# Patient Record
Sex: FEMALE | Race: WHITE | NOT HISPANIC OR LATINO | Employment: OTHER | URBAN - METROPOLITAN AREA
[De-identification: names, ages, dates, MRNs, and addresses within clinical notes are randomized per-mention and may not be internally consistent; named-entity substitution may affect disease eponyms.]

---

## 2023-08-23 ENCOUNTER — HOSPITAL ENCOUNTER (OUTPATIENT)
Dept: DATA CONVERSION | Facility: HOSPITAL | Age: 88
Discharge: HOME | End: 2023-08-23
Payer: COMMERCIAL

## 2023-08-23 DIAGNOSIS — R73.01 IMPAIRED FASTING GLUCOSE: ICD-10-CM

## 2023-08-23 DIAGNOSIS — I63.9 CEREBRAL INFARCTION, UNSPECIFIED (MULTI): ICD-10-CM

## 2023-08-23 DIAGNOSIS — D64.9 ANEMIA, UNSPECIFIED: ICD-10-CM

## 2023-08-23 LAB
ALBUMIN SERPL-MCNC: 4 GM/DL (ref 3.5–5)
ALBUMIN/GLOB SERPL: 1.5 RATIO (ref 1.5–3)
ALP BLD-CCNC: 170 U/L (ref 35–125)
ALT SERPL-CCNC: 34 U/L (ref 5–40)
ANION GAP SERPL CALCULATED.3IONS-SCNC: 11 MMOL/L (ref 0–19)
APPEARANCE PLAS: ABNORMAL
AST SERPL-CCNC: 33 U/L (ref 5–40)
BILIRUB SERPL-MCNC: 0.4 MG/DL (ref 0.1–1.2)
BUN SERPL-MCNC: 15 MG/DL (ref 8–25)
BUN/CREAT SERPL: 25 RATIO (ref 8–21)
CALCIUM SERPL-MCNC: 9.4 MG/DL (ref 8.5–10.4)
CHLORIDE SERPL-SCNC: 101 MMOL/L (ref 97–107)
CHOLEST SERPL-MCNC: 148 MG/DL (ref 133–200)
CHOLEST/HDLC SERPL: 2 RATIO
CO2 SERPL-SCNC: 27 MMOL/L (ref 24–31)
COLOR SPUN FLD: ABNORMAL
CREAT SERPL-MCNC: 0.6 MG/DL (ref 0.4–1.6)
DEPRECATED RDW RBC AUTO: 49.8 FL (ref 37–54)
ERYTHROCYTE [DISTWIDTH] IN BLOOD BY AUTOMATED COUNT: 13.9 % (ref 11.7–15)
FASTING STATUS PATIENT QL REPORTED: ABNORMAL
FERRITIN SERPL-MCNC: 85 NG/ML (ref 13–150)
GFR SERPL CREATININE-BSD FRML MDRD: 86 ML/MIN/1.73 M2
GLOBULIN SER-MCNC: 2.7 G/DL (ref 1.9–3.7)
GLUCOSE SERPL-MCNC: 177 MG/DL (ref 65–99)
HBA1C MFR BLD: 6.5 % (ref 4–6)
HCT VFR BLD AUTO: 38.4 % (ref 36–44)
HDLC SERPL-MCNC: 73 MG/DL
HGB BLD-MCNC: 11.8 GM/DL (ref 12–15)
IRON SATN MFR SERPL: 21.6 % (ref 12–50)
IRON SERPL-MCNC: 65 UG/DL (ref 30–160)
LDLC SERPL CALC-MCNC: 55 MG/DL (ref 65–130)
MCH RBC QN AUTO: 29.9 PG (ref 26–34)
MCHC RBC AUTO-ENTMCNC: 30.7 % (ref 31–37)
MCV RBC AUTO: 97.2 FL (ref 80–100)
NRBC BLD-RTO: 0 /100 WBC
PLATELET # BLD AUTO: 271 K/UL (ref 150–450)
PMV BLD AUTO: 9.6 CU (ref 7–12.6)
POTASSIUM SERPL-SCNC: 4.4 MMOL/L (ref 3.4–5.1)
PROT SERPL-MCNC: 6.7 G/DL (ref 5.9–7.9)
RBC # BLD AUTO: 3.95 M/UL (ref 4–4.9)
SODIUM SERPL-SCNC: 139 MMOL/L (ref 133–145)
TIBC SERPL-MCNC: 301 UG/DL (ref 228–428)
TRIGL SERPL-MCNC: 98 MG/DL (ref 40–150)
VIT B12 SERPL-MCNC: 1652 PG/ML (ref 211–946)
WBC # BLD AUTO: 4.8 K/UL (ref 4.5–11)

## 2023-10-26 ENCOUNTER — APPOINTMENT (OUTPATIENT)
Dept: RADIOLOGY | Facility: HOSPITAL | Age: 88
End: 2023-10-26
Payer: COMMERCIAL

## 2023-10-26 ENCOUNTER — HOSPITAL ENCOUNTER (OUTPATIENT)
Facility: HOSPITAL | Age: 88
Setting detail: OBSERVATION
LOS: 1 days | Discharge: HOME | End: 2023-10-28
Attending: STUDENT IN AN ORGANIZED HEALTH CARE EDUCATION/TRAINING PROGRAM | Admitting: INTERNAL MEDICINE
Payer: COMMERCIAL

## 2023-10-26 DIAGNOSIS — G45.9 TIA (TRANSIENT ISCHEMIC ATTACK): Primary | ICD-10-CM

## 2023-10-26 DIAGNOSIS — I63.89 OTHER CEREBRAL INFARCTION (MULTI): ICD-10-CM

## 2023-10-26 DIAGNOSIS — G45.4 TRANSIENT GLOBAL AMNESIA: ICD-10-CM

## 2023-10-26 DIAGNOSIS — I65.23 OCCLUSION AND STENOSIS OF BILATERAL CAROTID ARTERIES: ICD-10-CM

## 2023-10-26 PROBLEM — R47.81 DEFICIT IN COMMUNICATION DUE TO SLURRED SPEECH: Status: ACTIVE | Noted: 2023-10-26

## 2023-10-26 PROBLEM — R42 DIZZINESS: Status: ACTIVE | Noted: 2023-10-26

## 2023-10-26 LAB
ALBUMIN SERPL-MCNC: 3.5 G/DL (ref 3.5–5)
ALP BLD-CCNC: 190 U/L (ref 35–125)
ALT SERPL-CCNC: 15 U/L (ref 5–40)
ANION GAP SERPL CALC-SCNC: 10 MMOL/L
AST SERPL-CCNC: 16 U/L (ref 5–40)
BASOPHILS # BLD AUTO: 0.03 X10*3/UL (ref 0–0.1)
BASOPHILS NFR BLD AUTO: 0.5 %
BILIRUB SERPL-MCNC: 0.3 MG/DL (ref 0.1–1.2)
BUN SERPL-MCNC: 19 MG/DL (ref 8–25)
CALCIUM SERPL-MCNC: 9.4 MG/DL (ref 8.5–10.4)
CHLORIDE SERPL-SCNC: 103 MMOL/L (ref 97–107)
CO2 SERPL-SCNC: 26 MMOL/L (ref 24–31)
CREAT SERPL-MCNC: 0.8 MG/DL (ref 0.4–1.6)
EOSINOPHIL # BLD AUTO: 0.13 X10*3/UL (ref 0–0.4)
EOSINOPHIL NFR BLD AUTO: 2 %
ERYTHROCYTE [DISTWIDTH] IN BLOOD BY AUTOMATED COUNT: 13.8 % (ref 11.5–14.5)
GFR SERPL CREATININE-BSD FRML MDRD: 71 ML/MIN/1.73M*2
GLUCOSE SERPL-MCNC: 93 MG/DL (ref 65–99)
HCT VFR BLD AUTO: 29.6 % (ref 36–46)
HGB BLD-MCNC: 9.7 G/DL (ref 12–16)
IMM GRANULOCYTES # BLD AUTO: 0.02 X10*3/UL (ref 0–0.5)
IMM GRANULOCYTES NFR BLD AUTO: 0.3 % (ref 0–0.9)
LYMPHOCYTES # BLD AUTO: 1.35 X10*3/UL (ref 0.8–3)
LYMPHOCYTES NFR BLD AUTO: 20.5 %
MAGNESIUM SERPL-MCNC: 2 MG/DL (ref 1.6–3.1)
MCH RBC QN AUTO: 30.6 PG (ref 26–34)
MCHC RBC AUTO-ENTMCNC: 32.8 G/DL (ref 32–36)
MCV RBC AUTO: 93 FL (ref 80–100)
MONOCYTES # BLD AUTO: 0.69 X10*3/UL (ref 0.05–0.8)
MONOCYTES NFR BLD AUTO: 10.5 %
NEUTROPHILS # BLD AUTO: 4.38 X10*3/UL (ref 1.6–5.5)
NEUTROPHILS NFR BLD AUTO: 66.2 %
NRBC BLD-RTO: 0 /100 WBCS (ref 0–0)
PLATELET # BLD AUTO: 251 X10*3/UL (ref 150–450)
PMV BLD AUTO: 9.2 FL (ref 7.5–11.5)
POTASSIUM SERPL-SCNC: 3.8 MMOL/L (ref 3.4–5.1)
PROT SERPL-MCNC: 5.7 G/DL (ref 5.9–7.9)
RBC # BLD AUTO: 3.17 X10*6/UL (ref 4–5.2)
SODIUM SERPL-SCNC: 139 MMOL/L (ref 133–145)
TROPONIN T SERPL-MCNC: 24 NG/L
TROPONIN T SERPL-MCNC: 26 NG/L
TROPONIN T SERPL-MCNC: 26 NG/L
WBC # BLD AUTO: 6.6 X10*3/UL (ref 4.4–11.3)

## 2023-10-26 PROCEDURE — 83735 ASSAY OF MAGNESIUM: CPT | Performed by: EMERGENCY MEDICINE

## 2023-10-26 PROCEDURE — 2060000001 HC INTERMEDIATE ICU ROOM DAILY

## 2023-10-26 PROCEDURE — 70450 CT HEAD/BRAIN W/O DYE: CPT | Mod: MG

## 2023-10-26 PROCEDURE — 36415 COLL VENOUS BLD VENIPUNCTURE: CPT | Performed by: EMERGENCY MEDICINE

## 2023-10-26 PROCEDURE — 93010 ELECTROCARDIOGRAM REPORT: CPT | Performed by: INTERNAL MEDICINE

## 2023-10-26 PROCEDURE — 85025 COMPLETE CBC W/AUTO DIFF WBC: CPT | Performed by: EMERGENCY MEDICINE

## 2023-10-26 PROCEDURE — 84484 ASSAY OF TROPONIN QUANT: CPT | Performed by: EMERGENCY MEDICINE

## 2023-10-26 PROCEDURE — 99285 EMERGENCY DEPT VISIT HI MDM: CPT | Mod: 25 | Performed by: STUDENT IN AN ORGANIZED HEALTH CARE EDUCATION/TRAINING PROGRAM

## 2023-10-26 PROCEDURE — 81003 URINALYSIS AUTO W/O SCOPE: CPT | Performed by: EMERGENCY MEDICINE

## 2023-10-26 PROCEDURE — 80053 COMPREHEN METABOLIC PANEL: CPT | Performed by: EMERGENCY MEDICINE

## 2023-10-26 ASSESSMENT — LIFESTYLE VARIABLES
EVER HAD A DRINK FIRST THING IN THE MORNING TO STEADY YOUR NERVES TO GET RID OF A HANGOVER: NO
HAVE YOU EVER FELT YOU SHOULD CUT DOWN ON YOUR DRINKING: NO
REASON UNABLE TO ASSESS: NO
HAVE PEOPLE ANNOYED YOU BY CRITICIZING YOUR DRINKING: NO
EVER FELT BAD OR GUILTY ABOUT YOUR DRINKING: NO

## 2023-10-26 ASSESSMENT — PAIN DESCRIPTION - DESCRIPTORS: DESCRIPTORS: DULL

## 2023-10-26 ASSESSMENT — PAIN SCALES - GENERAL: PAINLEVEL_OUTOF10: 7

## 2023-10-26 ASSESSMENT — PAIN DESCRIPTION - ORIENTATION: ORIENTATION: LEFT;RIGHT

## 2023-10-26 ASSESSMENT — PAIN - FUNCTIONAL ASSESSMENT: PAIN_FUNCTIONAL_ASSESSMENT: 0-10

## 2023-10-26 ASSESSMENT — PAIN DESCRIPTION - LOCATION: LOCATION: HAND

## 2023-10-26 ASSESSMENT — PAIN DESCRIPTION - PAIN TYPE: TYPE: ACUTE PAIN

## 2023-10-27 ENCOUNTER — APPOINTMENT (OUTPATIENT)
Dept: CARDIOLOGY | Facility: HOSPITAL | Age: 88
End: 2023-10-27
Payer: COMMERCIAL

## 2023-10-27 ENCOUNTER — APPOINTMENT (OUTPATIENT)
Dept: RADIOLOGY | Facility: HOSPITAL | Age: 88
End: 2023-10-27
Payer: COMMERCIAL

## 2023-10-27 ENCOUNTER — APPOINTMENT (OUTPATIENT)
Dept: NEUROLOGY | Facility: HOSPITAL | Age: 88
End: 2023-10-27
Payer: COMMERCIAL

## 2023-10-27 PROBLEM — R47.81 SLURRED SPEECH: Status: ACTIVE | Noted: 2023-10-27

## 2023-10-27 LAB
ANION GAP SERPL CALC-SCNC: 9 MMOL/L
APPEARANCE UR: CLEAR
ATRIAL RATE: 67 BPM
BASOPHILS # BLD AUTO: 0.01 X10*3/UL (ref 0–0.1)
BASOPHILS NFR BLD AUTO: 0.2 %
BILIRUB UR STRIP.AUTO-MCNC: NEGATIVE MG/DL
BUN SERPL-MCNC: 16 MG/DL (ref 8–25)
CALCIUM SERPL-MCNC: 9.1 MG/DL (ref 8.5–10.4)
CHLORIDE SERPL-SCNC: 107 MMOL/L (ref 97–107)
CHOLEST SERPL-MCNC: 106 MG/DL (ref 133–200)
CHOLEST/HDLC SERPL: 1.7 {RATIO}
CO2 SERPL-SCNC: 29 MMOL/L (ref 24–31)
COLOR UR: COLORLESS
CREAT SERPL-MCNC: 0.7 MG/DL (ref 0.4–1.6)
EJECTION FRACTION APICAL 4 CHAMBER: 65.9
EOSINOPHIL # BLD AUTO: 0.1 X10*3/UL (ref 0–0.4)
EOSINOPHIL NFR BLD AUTO: 1.9 %
ERYTHROCYTE [DISTWIDTH] IN BLOOD BY AUTOMATED COUNT: 14 % (ref 11.5–14.5)
EST. AVERAGE GLUCOSE BLD GHB EST-MCNC: 120 MG/DL
GFR SERPL CREATININE-BSD FRML MDRD: 83 ML/MIN/1.73M*2
GLUCOSE BLD MANUAL STRIP-MCNC: 168 MG/DL (ref 74–99)
GLUCOSE BLD MANUAL STRIP-MCNC: 78 MG/DL (ref 74–99)
GLUCOSE SERPL-MCNC: 93 MG/DL (ref 65–99)
GLUCOSE UR STRIP.AUTO-MCNC: NORMAL MG/DL
HBA1C MFR BLD: 5.8 %
HCT VFR BLD AUTO: 31.6 % (ref 36–46)
HDLC SERPL-MCNC: 61 MG/DL
HGB BLD-MCNC: 9.9 G/DL (ref 12–16)
IMM GRANULOCYTES # BLD AUTO: 0.01 X10*3/UL (ref 0–0.5)
IMM GRANULOCYTES NFR BLD AUTO: 0.2 % (ref 0–0.9)
KETONES UR STRIP.AUTO-MCNC: NEGATIVE MG/DL
LDLC SERPL CALC-MCNC: 31 MG/DL (ref 65–130)
LEUKOCYTE ESTERASE UR QL STRIP.AUTO: NEGATIVE
LYMPHOCYTES # BLD AUTO: 1.23 X10*3/UL (ref 0.8–3)
LYMPHOCYTES NFR BLD AUTO: 23.3 %
MCH RBC QN AUTO: 29.5 PG (ref 26–34)
MCHC RBC AUTO-ENTMCNC: 31.3 G/DL (ref 32–36)
MCV RBC AUTO: 94 FL (ref 80–100)
MONOCYTES # BLD AUTO: 0.55 X10*3/UL (ref 0.05–0.8)
MONOCYTES NFR BLD AUTO: 10.4 %
NEUTROPHILS # BLD AUTO: 3.39 X10*3/UL (ref 1.6–5.5)
NEUTROPHILS NFR BLD AUTO: 64 %
NITRITE UR QL STRIP.AUTO: NEGATIVE
NRBC BLD-RTO: 0 /100 WBCS (ref 0–0)
NT-PROBNP SERPL-MCNC: 540 PG/ML (ref 0–624)
P AXIS: 65 DEGREES
P OFFSET: 209 MS
P ONSET: 139 MS
PH UR STRIP.AUTO: 5.5 [PH]
PLATELET # BLD AUTO: 247 X10*3/UL (ref 150–450)
PMV BLD AUTO: 9.4 FL (ref 7.5–11.5)
POTASSIUM SERPL-SCNC: 4 MMOL/L (ref 3.4–5.1)
PR INTERVAL: 162 MS
PROT UR STRIP.AUTO-MCNC: NEGATIVE MG/DL
Q ONSET: 220 MS
QRS COUNT: 11 BEATS
QRS DURATION: 92 MS
QT INTERVAL: 422 MS
QTC CALCULATION(BAZETT): 445 MS
QTC FREDERICIA: 438 MS
R AXIS: 44 DEGREES
RBC # BLD AUTO: 3.36 X10*6/UL (ref 4–5.2)
RBC # UR STRIP.AUTO: NEGATIVE /UL
SODIUM SERPL-SCNC: 145 MMOL/L (ref 133–145)
SP GR UR STRIP.AUTO: 1.01
T AXIS: 63 DEGREES
T OFFSET: 431 MS
TRIGL SERPL-MCNC: 71 MG/DL (ref 40–150)
TROPONIN T SERPL-MCNC: 27 NG/L
UROBILINOGEN UR STRIP.AUTO-MCNC: NORMAL MG/DL
VENTRICULAR RATE: 67 BPM
WBC # BLD AUTO: 5.3 X10*3/UL (ref 4.4–11.3)

## 2023-10-27 PROCEDURE — 97530 THERAPEUTIC ACTIVITIES: CPT | Mod: GP

## 2023-10-27 PROCEDURE — G0378 HOSPITAL OBSERVATION PER HR: HCPCS

## 2023-10-27 PROCEDURE — 97166 OT EVAL MOD COMPLEX 45 MIN: CPT | Mod: GO

## 2023-10-27 PROCEDURE — 85025 COMPLETE CBC W/AUTO DIFF WBC: CPT | Performed by: INTERNAL MEDICINE

## 2023-10-27 PROCEDURE — 93005 ELECTROCARDIOGRAM TRACING: CPT

## 2023-10-27 PROCEDURE — 82947 ASSAY GLUCOSE BLOOD QUANT: CPT | Mod: 59

## 2023-10-27 PROCEDURE — 97535 SELF CARE MNGMENT TRAINING: CPT | Mod: GO

## 2023-10-27 PROCEDURE — 93880 EXTRACRANIAL BILAT STUDY: CPT | Performed by: SURGERY

## 2023-10-27 PROCEDURE — 83036 HEMOGLOBIN GLYCOSYLATED A1C: CPT | Performed by: INTERNAL MEDICINE

## 2023-10-27 PROCEDURE — 70551 MRI BRAIN STEM W/O DYE: CPT | Mod: MG

## 2023-10-27 PROCEDURE — 83880 ASSAY OF NATRIURETIC PEPTIDE: CPT | Performed by: INTERNAL MEDICINE

## 2023-10-27 PROCEDURE — 99223 1ST HOSP IP/OBS HIGH 75: CPT | Performed by: INTERNAL MEDICINE

## 2023-10-27 PROCEDURE — 84484 ASSAY OF TROPONIN QUANT: CPT | Performed by: INTERNAL MEDICINE

## 2023-10-27 PROCEDURE — 93325 DOPPLER ECHO COLOR FLOW MAPG: CPT | Performed by: INTERNAL MEDICINE

## 2023-10-27 PROCEDURE — 80061 LIPID PANEL: CPT | Performed by: INTERNAL MEDICINE

## 2023-10-27 PROCEDURE — 70544 MR ANGIOGRAPHY HEAD W/O DYE: CPT | Mod: 59,MG

## 2023-10-27 PROCEDURE — 2500000001 HC RX 250 WO HCPCS SELF ADMINISTERED DRUGS (ALT 637 FOR MEDICARE OP): Performed by: INTERNAL MEDICINE

## 2023-10-27 PROCEDURE — 92610 EVALUATE SWALLOWING FUNCTION: CPT | Mod: GN | Performed by: SPEECH-LANGUAGE PATHOLOGIST

## 2023-10-27 PROCEDURE — 70547 MR ANGIOGRAPHY NECK W/O DYE: CPT | Mod: MG

## 2023-10-27 PROCEDURE — 94760 N-INVAS EAR/PLS OXIMETRY 1: CPT

## 2023-10-27 PROCEDURE — 36415 COLL VENOUS BLD VENIPUNCTURE: CPT | Performed by: INTERNAL MEDICINE

## 2023-10-27 PROCEDURE — 93325 DOPPLER ECHO COLOR FLOW MAPG: CPT

## 2023-10-27 PROCEDURE — 93880 EXTRACRANIAL BILAT STUDY: CPT

## 2023-10-27 PROCEDURE — 80048 BASIC METABOLIC PNL TOTAL CA: CPT | Performed by: INTERNAL MEDICINE

## 2023-10-27 PROCEDURE — 93308 TTE F-UP OR LMTD: CPT | Performed by: INTERNAL MEDICINE

## 2023-10-27 PROCEDURE — 93321 DOPPLER ECHO F-UP/LMTD STD: CPT | Performed by: INTERNAL MEDICINE

## 2023-10-27 PROCEDURE — 95819 EEG AWAKE AND ASLEEP: CPT

## 2023-10-27 PROCEDURE — 2500000004 HC RX 250 GENERAL PHARMACY W/ HCPCS (ALT 636 FOR OP/ED): Performed by: INTERNAL MEDICINE

## 2023-10-27 PROCEDURE — 97161 PT EVAL LOW COMPLEX 20 MIN: CPT | Mod: GP

## 2023-10-27 RX ORDER — ACETAMINOPHEN 325 MG/1
650 TABLET ORAL EVERY 6 HOURS PRN
Status: DISCONTINUED | OUTPATIENT
Start: 2023-10-27 | End: 2023-10-28 | Stop reason: HOSPADM

## 2023-10-27 RX ORDER — GABAPENTIN 300 MG/1
300 CAPSULE ORAL 2 TIMES DAILY
COMMUNITY
End: 2024-03-29 | Stop reason: ALTCHOICE

## 2023-10-27 RX ORDER — CLOPIDOGREL BISULFATE 75 MG/1
75 TABLET ORAL DAILY
COMMUNITY
End: 2023-10-28 | Stop reason: HOSPADM

## 2023-10-27 RX ORDER — CALCITONIN SALMON 200 [IU]/.09ML
1 SPRAY, METERED NASAL DAILY
COMMUNITY
End: 2023-12-04

## 2023-10-27 RX ORDER — PANTOPRAZOLE SODIUM 40 MG/1
40 TABLET, DELAYED RELEASE ORAL 2 TIMES DAILY
Status: DISCONTINUED | OUTPATIENT
Start: 2023-10-27 | End: 2023-10-28 | Stop reason: HOSPADM

## 2023-10-27 RX ORDER — CLOPIDOGREL BISULFATE 75 MG/1
75 TABLET ORAL DAILY
Status: DISCONTINUED | OUTPATIENT
Start: 2023-10-27 | End: 2023-10-28 | Stop reason: HOSPADM

## 2023-10-27 RX ORDER — ASPIRIN 81 MG/1
81 TABLET ORAL DAILY
Status: DISCONTINUED | OUTPATIENT
Start: 2023-10-27 | End: 2023-10-28 | Stop reason: HOSPADM

## 2023-10-27 RX ORDER — POTASSIUM CHLORIDE 750 MG/1
10 TABLET, FILM COATED, EXTENDED RELEASE ORAL DAILY
COMMUNITY
End: 2023-11-26 | Stop reason: SDUPTHER

## 2023-10-27 RX ORDER — ATORVASTATIN CALCIUM 40 MG/1
40 TABLET, FILM COATED ORAL NIGHTLY
Status: DISCONTINUED | OUTPATIENT
Start: 2023-10-27 | End: 2023-10-27

## 2023-10-27 RX ORDER — HYDRALAZINE HYDROCHLORIDE 20 MG/ML
10 INJECTION INTRAMUSCULAR; INTRAVENOUS
Status: DISCONTINUED | OUTPATIENT
Start: 2023-10-27 | End: 2023-10-28 | Stop reason: HOSPADM

## 2023-10-27 RX ORDER — SULFASALAZINE 500 MG/1
500 TABLET ORAL 2 TIMES DAILY
COMMUNITY
Start: 2022-01-02 | End: 2024-03-05 | Stop reason: ALTCHOICE

## 2023-10-27 RX ORDER — ATORVASTATIN CALCIUM 80 MG/1
80 TABLET, FILM COATED ORAL NIGHTLY
Status: DISCONTINUED | OUTPATIENT
Start: 2023-10-27 | End: 2023-10-28 | Stop reason: HOSPADM

## 2023-10-27 RX ORDER — PANTOPRAZOLE SODIUM 40 MG/1
40 TABLET, DELAYED RELEASE ORAL 2 TIMES DAILY
COMMUNITY
End: 2024-03-05 | Stop reason: ALTCHOICE

## 2023-10-27 RX ORDER — VERAPAMIL HYDROCHLORIDE 180 MG/1
180 TABLET, FILM COATED, EXTENDED RELEASE ORAL DAILY
Status: DISCONTINUED | OUTPATIENT
Start: 2023-10-27 | End: 2023-10-28 | Stop reason: HOSPADM

## 2023-10-27 RX ORDER — ISOSORBIDE MONONITRATE 60 MG/1
60 TABLET, EXTENDED RELEASE ORAL DAILY
Status: DISCONTINUED | OUTPATIENT
Start: 2023-10-27 | End: 2023-10-28 | Stop reason: HOSPADM

## 2023-10-27 RX ORDER — HEPARIN SODIUM 5000 [USP'U]/ML
5000 INJECTION, SOLUTION INTRAVENOUS; SUBCUTANEOUS EVERY 8 HOURS SCHEDULED
Status: DISCONTINUED | OUTPATIENT
Start: 2023-10-27 | End: 2023-10-27

## 2023-10-27 RX ORDER — HYDRALAZINE HYDROCHLORIDE 25 MG/1
25 TABLET, FILM COATED ORAL EVERY 6 HOURS PRN
Status: DISCONTINUED | OUTPATIENT
Start: 2023-10-29 | End: 2023-10-28 | Stop reason: HOSPADM

## 2023-10-27 RX ORDER — ACETAMINOPHEN 500 MG
600 TABLET ORAL EVERY 8 HOURS PRN
COMMUNITY
End: 2023-10-28 | Stop reason: HOSPADM

## 2023-10-27 RX ORDER — HYDROXYCHLOROQUINE SULFATE 200 MG/1
200 TABLET, FILM COATED ORAL DAILY
Status: DISCONTINUED | OUTPATIENT
Start: 2023-10-27 | End: 2023-10-28 | Stop reason: HOSPADM

## 2023-10-27 RX ORDER — POLYETHYLENE GLYCOL 3350 17 G/17G
17 POWDER, FOR SOLUTION ORAL DAILY PRN
Status: DISCONTINUED | OUTPATIENT
Start: 2023-10-27 | End: 2023-10-28 | Stop reason: HOSPADM

## 2023-10-27 RX ORDER — LABETALOL HYDROCHLORIDE 5 MG/ML
10 INJECTION, SOLUTION INTRAVENOUS EVERY 10 MIN PRN
Status: DISCONTINUED | OUTPATIENT
Start: 2023-10-27 | End: 2023-10-28 | Stop reason: HOSPADM

## 2023-10-27 RX ORDER — CLONIDINE HYDROCHLORIDE 0.2 MG/1
0.2 TABLET ORAL 2 TIMES DAILY
COMMUNITY
Start: 2019-03-18 | End: 2023-10-28 | Stop reason: HOSPADM

## 2023-10-27 RX ORDER — FERROUS SULFATE 325(65) MG
65 TABLET ORAL
Status: DISCONTINUED | OUTPATIENT
Start: 2023-10-27 | End: 2023-10-28 | Stop reason: HOSPADM

## 2023-10-27 RX ORDER — SULFASALAZINE 500 MG/1
500 TABLET ORAL
Status: DISCONTINUED | OUTPATIENT
Start: 2023-10-27 | End: 2023-10-28 | Stop reason: HOSPADM

## 2023-10-27 RX ORDER — CARVEDILOL 6.25 MG/1
6.25 TABLET ORAL
Status: DISCONTINUED | OUTPATIENT
Start: 2023-10-27 | End: 2023-10-28 | Stop reason: HOSPADM

## 2023-10-27 RX ORDER — POTASSIUM CHLORIDE 750 MG/1
10 TABLET, FILM COATED, EXTENDED RELEASE ORAL
Status: DISCONTINUED | OUTPATIENT
Start: 2023-10-27 | End: 2023-10-28 | Stop reason: HOSPADM

## 2023-10-27 RX ORDER — ASPIRIN 81 MG/1
81 TABLET ORAL DAILY
Status: DISCONTINUED | OUTPATIENT
Start: 2023-10-27 | End: 2023-10-27

## 2023-10-27 RX ADMIN — FERROUS SULFATE TAB 325 MG (65 MG ELEMENTAL FE) 65 MG OF IRON: 325 (65 FE) TAB at 10:53

## 2023-10-27 RX ADMIN — SULFASALAZINE 500 MG: 500 TABLET ORAL at 10:00

## 2023-10-27 RX ADMIN — ATORVASTATIN CALCIUM 40 MG: 40 TABLET, FILM COATED ORAL at 00:35

## 2023-10-27 RX ADMIN — ASPIRIN 81 MG: 81 TABLET, COATED ORAL at 10:50

## 2023-10-27 RX ADMIN — PANTOPRAZOLE SODIUM 40 MG: 40 TABLET, DELAYED RELEASE ORAL at 10:50

## 2023-10-27 RX ADMIN — CLOPIDOGREL BISULFATE 75 MG: 75 TABLET ORAL at 10:50

## 2023-10-27 RX ADMIN — HYDROXYCHLOROQUINE SULFATE 200 MG: 200 TABLET ORAL at 10:50

## 2023-10-27 RX ADMIN — CARVEDILOL 6.25 MG: 6.25 TABLET, FILM COATED ORAL at 18:25

## 2023-10-27 RX ADMIN — APIXABAN 2.5 MG: 2.5 TABLET, FILM COATED ORAL at 10:51

## 2023-10-27 RX ADMIN — APIXABAN 2.5 MG: 2.5 TABLET, FILM COATED ORAL at 21:45

## 2023-10-27 RX ADMIN — PANTOPRAZOLE SODIUM 40 MG: 40 TABLET, DELAYED RELEASE ORAL at 21:45

## 2023-10-27 RX ADMIN — ATORVASTATIN CALCIUM 80 MG: 80 TABLET, FILM COATED ORAL at 21:45

## 2023-10-27 RX ADMIN — CARVEDILOL 6.25 MG: 6.25 TABLET, FILM COATED ORAL at 10:50

## 2023-10-27 RX ADMIN — ACETAMINOPHEN 650 MG: 325 TABLET ORAL at 12:49

## 2023-10-27 RX ADMIN — HEPARIN SODIUM 5000 UNITS: 5000 INJECTION, SOLUTION INTRAVENOUS; SUBCUTANEOUS at 00:35

## 2023-10-27 RX ADMIN — SULFASALAZINE 500 MG: 500 TABLET ORAL at 18:00

## 2023-10-27 RX ADMIN — POTASSIUM CHLORIDE 10 MEQ: 750 TABLET, EXTENDED RELEASE ORAL at 10:50

## 2023-10-27 SDOH — HEALTH STABILITY: MENTAL HEALTH: HOW OFTEN DO YOU HAVE 6 OR MORE DRINKS ON ONE OCCASION?: NEVER

## 2023-10-27 SDOH — HEALTH STABILITY: PHYSICAL HEALTH: ON AVERAGE, HOW MANY MINUTES DO YOU ENGAGE IN EXERCISE AT THIS LEVEL?: 0 MIN

## 2023-10-27 SDOH — SOCIAL STABILITY: SOCIAL NETWORK: HOW OFTEN DO YOU GET TOGETHER WITH FRIENDS OR RELATIVES?: TWICE A WEEK

## 2023-10-27 SDOH — SOCIAL STABILITY: SOCIAL INSECURITY
WITHIN THE LAST YEAR, HAVE TO BEEN RAPED OR FORCED TO HAVE ANY KIND OF SEXUAL ACTIVITY BY YOUR PARTNER OR EX-PARTNER?: NO

## 2023-10-27 SDOH — ECONOMIC STABILITY: INCOME INSECURITY: IN THE PAST 12 MONTHS, HAS THE ELECTRIC, GAS, OIL, OR WATER COMPANY THREATENED TO SHUT OFF SERVICE IN YOUR HOME?: NO

## 2023-10-27 SDOH — HEALTH STABILITY: MENTAL HEALTH
STRESS IS WHEN SOMEONE FEELS TENSE, NERVOUS, ANXIOUS, OR CAN'T SLEEP AT NIGHT BECAUSE THEIR MIND IS TROUBLED. HOW STRESSED ARE YOU?: NOT AT ALL

## 2023-10-27 SDOH — SOCIAL STABILITY: SOCIAL NETWORK: ARE YOU MARRIED, WIDOWED, DIVORCED, SEPARATED, NEVER MARRIED, OR LIVING WITH A PARTNER?: MARRIED

## 2023-10-27 SDOH — HEALTH STABILITY: MENTAL HEALTH: HOW OFTEN DO YOU HAVE A DRINK CONTAINING ALCOHOL?: 2-4 TIMES A MONTH

## 2023-10-27 SDOH — SOCIAL STABILITY: SOCIAL INSECURITY: DOES ANYONE TRY TO KEEP YOU FROM HAVING/CONTACTING OTHER FRIENDS OR DOING THINGS OUTSIDE YOUR HOME?: NO

## 2023-10-27 SDOH — SOCIAL STABILITY: SOCIAL INSECURITY
WITHIN THE LAST YEAR, HAVE YOU BEEN KICKED, HIT, SLAPPED, OR OTHERWISE PHYSICALLY HURT BY YOUR PARTNER OR EX-PARTNER?: NO

## 2023-10-27 SDOH — ECONOMIC STABILITY: FOOD INSECURITY: WITHIN THE PAST 12 MONTHS, THE FOOD YOU BOUGHT JUST DIDN'T LAST AND YOU DIDN'T HAVE MONEY TO GET MORE.: NEVER TRUE

## 2023-10-27 SDOH — SOCIAL STABILITY: SOCIAL NETWORK
DO YOU BELONG TO ANY CLUBS OR ORGANIZATIONS SUCH AS CHURCH GROUPS UNIONS, FRATERNAL OR ATHLETIC GROUPS, OR SCHOOL GROUPS?: NO

## 2023-10-27 SDOH — HEALTH STABILITY: MENTAL HEALTH: HOW MANY STANDARD DRINKS CONTAINING ALCOHOL DO YOU HAVE ON A TYPICAL DAY?: 1 OR 2

## 2023-10-27 SDOH — HEALTH STABILITY: PHYSICAL HEALTH: ON AVERAGE, HOW MANY DAYS PER WEEK DO YOU ENGAGE IN MODERATE TO STRENUOUS EXERCISE (LIKE A BRISK WALK)?: 0 DAYS

## 2023-10-27 SDOH — ECONOMIC STABILITY: HOUSING INSECURITY: IN THE LAST 12 MONTHS, HOW MANY PLACES HAVE YOU LIVED?: 1

## 2023-10-27 SDOH — ECONOMIC STABILITY: HOUSING INSECURITY
IN THE LAST 12 MONTHS, WAS THERE A TIME WHEN YOU DID NOT HAVE A STEADY PLACE TO SLEEP OR SLEPT IN A SHELTER (INCLUDING NOW)?: NO

## 2023-10-27 SDOH — SOCIAL STABILITY: SOCIAL NETWORK: IN A TYPICAL WEEK, HOW MANY TIMES DO YOU TALK ON THE PHONE WITH FAMILY, FRIENDS, OR NEIGHBORS?: THREE TIMES A WEEK

## 2023-10-27 SDOH — ECONOMIC STABILITY: FOOD INSECURITY: WITHIN THE PAST 12 MONTHS, YOU WORRIED THAT YOUR FOOD WOULD RUN OUT BEFORE YOU GOT MONEY TO BUY MORE.: NEVER TRUE

## 2023-10-27 SDOH — SOCIAL STABILITY: SOCIAL NETWORK: HOW OFTEN DO YOU ATTEND CHURCH OR RELIGIOUS SERVICES?: NEVER

## 2023-10-27 SDOH — SOCIAL STABILITY: SOCIAL INSECURITY: HAS ANYONE EVER THREATENED TO HURT YOUR FAMILY OR YOUR PETS?: NO

## 2023-10-27 SDOH — SOCIAL STABILITY: SOCIAL INSECURITY: WITHIN THE LAST YEAR, HAVE YOU BEEN AFRAID OF YOUR PARTNER OR EX-PARTNER?: NO

## 2023-10-27 SDOH — SOCIAL STABILITY: SOCIAL INSECURITY: WITHIN THE LAST YEAR, HAVE YOU BEEN HUMILIATED OR EMOTIONALLY ABUSED IN OTHER WAYS BY YOUR PARTNER OR EX-PARTNER?: NO

## 2023-10-27 SDOH — SOCIAL STABILITY: SOCIAL INSECURITY: DO YOU FEEL ANYONE HAS EXPLOITED OR TAKEN ADVANTAGE OF YOU FINANCIALLY OR OF YOUR PERSONAL PROPERTY?: NO

## 2023-10-27 SDOH — SOCIAL STABILITY: SOCIAL NETWORK: HOW OFTEN DO YOU ATTENT MEETINGS OF THE CLUB OR ORGANIZATION YOU BELONG TO?: NEVER

## 2023-10-27 SDOH — SOCIAL STABILITY: SOCIAL INSECURITY: HAVE YOU HAD THOUGHTS OF HARMING ANYONE ELSE?: NO

## 2023-10-27 SDOH — ECONOMIC STABILITY: INCOME INSECURITY: IN THE LAST 12 MONTHS, WAS THERE A TIME WHEN YOU WERE NOT ABLE TO PAY THE MORTGAGE OR RENT ON TIME?: NO

## 2023-10-27 SDOH — ECONOMIC STABILITY: INCOME INSECURITY: HOW HARD IS IT FOR YOU TO PAY FOR THE VERY BASICS LIKE FOOD, HOUSING, MEDICAL CARE, AND HEATING?: NOT HARD AT ALL

## 2023-10-27 SDOH — SOCIAL STABILITY: SOCIAL INSECURITY: WERE YOU ABLE TO COMPLETE ALL THE BEHAVIORAL HEALTH SCREENINGS?: YES

## 2023-10-27 SDOH — SOCIAL STABILITY: SOCIAL INSECURITY: ARE THERE ANY APPARENT SIGNS OF INJURIES/BEHAVIORS THAT COULD BE RELATED TO ABUSE/NEGLECT?: NO

## 2023-10-27 SDOH — SOCIAL STABILITY: SOCIAL INSECURITY: DO YOU FEEL UNSAFE GOING BACK TO THE PLACE WHERE YOU ARE LIVING?: NO

## 2023-10-27 SDOH — ECONOMIC STABILITY: TRANSPORTATION INSECURITY
IN THE PAST 12 MONTHS, HAS THE LACK OF TRANSPORTATION KEPT YOU FROM MEDICAL APPOINTMENTS OR FROM GETTING MEDICATIONS?: NO

## 2023-10-27 SDOH — SOCIAL STABILITY: SOCIAL INSECURITY: ARE YOU OR HAVE YOU BEEN THREATENED OR ABUSED PHYSICALLY, EMOTIONALLY, OR SEXUALLY BY ANYONE?: NO

## 2023-10-27 SDOH — SOCIAL STABILITY: SOCIAL INSECURITY: ABUSE: ADULT

## 2023-10-27 SDOH — ECONOMIC STABILITY: TRANSPORTATION INSECURITY
IN THE PAST 12 MONTHS, HAS LACK OF TRANSPORTATION KEPT YOU FROM MEETINGS, WORK, OR FROM GETTING THINGS NEEDED FOR DAILY LIVING?: NO

## 2023-10-27 ASSESSMENT — LIFESTYLE VARIABLES
SKIP TO QUESTIONS 9-10: 1
SUBSTANCE_ABUSE_PAST_12_MONTHS: NO
AUDIT-C TOTAL SCORE: 2
AUDIT-C TOTAL SCORE: 2
SKIP TO QUESTIONS 9-10: 1
HOW MANY STANDARD DRINKS CONTAINING ALCOHOL DO YOU HAVE ON A TYPICAL DAY: 1 OR 2
AUDIT-C TOTAL SCORE: 2
AUDIT-C TOTAL SCORE: 2
HOW OFTEN DO YOU HAVE 6 OR MORE DRINKS ON ONE OCCASION: NEVER
PRESCIPTION_ABUSE_PAST_12_MONTHS: NO
HOW OFTEN DO YOU HAVE A DRINK CONTAINING ALCOHOL: 2-4 TIMES A MONTH
SKIP TO QUESTIONS 9-10: 1

## 2023-10-27 ASSESSMENT — PAIN - FUNCTIONAL ASSESSMENT
PAIN_FUNCTIONAL_ASSESSMENT: 0-10

## 2023-10-27 ASSESSMENT — COGNITIVE AND FUNCTIONAL STATUS - GENERAL
MOVING TO AND FROM BED TO CHAIR: A LITTLE
DRESSING REGULAR LOWER BODY CLOTHING: A LITTLE
DRESSING REGULAR UPPER BODY CLOTHING: A LITTLE
MOBILITY SCORE: 20
DAILY ACTIVITIY SCORE: 21
STANDING UP FROM CHAIR USING ARMS: A LITTLE
STANDING UP FROM CHAIR USING ARMS: A LITTLE
DRESSING REGULAR LOWER BODY CLOTHING: A LITTLE
HELP NEEDED FOR BATHING: A LITTLE
CLIMB 3 TO 5 STEPS WITH RAILING: A LITTLE
MOVING TO AND FROM BED TO CHAIR: A LITTLE
MOBILITY SCORE: 21
HELP NEEDED FOR BATHING: A LITTLE
WALKING IN HOSPITAL ROOM: A LITTLE
DRESSING REGULAR UPPER BODY CLOTHING: A LITTLE
STANDING UP FROM CHAIR USING ARMS: A LITTLE
WALKING IN HOSPITAL ROOM: A LITTLE
HELP NEEDED FOR BATHING: A LITTLE
DAILY ACTIVITIY SCORE: 21
WALKING IN HOSPITAL ROOM: A LITTLE
CLIMB 3 TO 5 STEPS WITH RAILING: A LITTLE
TOILETING: A LITTLE
DAILY ACTIVITIY SCORE: 21
CLIMB 3 TO 5 STEPS WITH RAILING: A LITTLE
CLIMB 3 TO 5 STEPS WITH RAILING: A LITTLE
TOILETING: A LITTLE
MOBILITY SCORE: 20
DRESSING REGULAR LOWER BODY CLOTHING: A LITTLE
MOVING TO AND FROM BED TO CHAIR: A LITTLE
DRESSING REGULAR LOWER BODY CLOTHING: A LITTLE
MOBILITY SCORE: 20
STANDING UP FROM CHAIR USING ARMS: A LITTLE
DAILY ACTIVITIY SCORE: 21
WALKING IN HOSPITAL ROOM: A LITTLE
PATIENT BASELINE BEDBOUND: NO
HELP NEEDED FOR BATHING: A LITTLE

## 2023-10-27 ASSESSMENT — COLUMBIA-SUICIDE SEVERITY RATING SCALE - C-SSRS
6. HAVE YOU EVER DONE ANYTHING, STARTED TO DO ANYTHING, OR PREPARED TO DO ANYTHING TO END YOUR LIFE?: YES
2. HAVE YOU ACTUALLY HAD ANY THOUGHTS OF KILLING YOURSELF?: NO
6. HAVE YOU EVER DONE ANYTHING, STARTED TO DO ANYTHING, OR PREPARED TO DO ANYTHING TO END YOUR LIFE?: NO
1. IN THE PAST MONTH, HAVE YOU WISHED YOU WERE DEAD OR WISHED YOU COULD GO TO SLEEP AND NOT WAKE UP?: NO

## 2023-10-27 ASSESSMENT — ACTIVITIES OF DAILY LIVING (ADL)
LACK_OF_TRANSPORTATION: NO
ADEQUATE_TO_COMPLETE_ADL: YES
TOILETING: INDEPENDENT
JUDGMENT_ADEQUATE_SAFELY_COMPLETE_DAILY_ACTIVITIES: YES
HEARING - RIGHT EAR: HEARING AID
FEEDING YOURSELF: INDEPENDENT
PATIENT'S MEMORY ADEQUATE TO SAFELY COMPLETE DAILY ACTIVITIES?: YES
WALKS IN HOME: INDEPENDENT
BATHING: NEEDS ASSISTANCE
LACK_OF_TRANSPORTATION: NO
HEARING - LEFT EAR: HEARING AID
GROOMING: NEEDS ASSISTANCE
DRESSING YOURSELF: NEEDS ASSISTANCE
BATHING_ASSISTANCE: MINIMAL
HOME_MANAGEMENT_TIME_ENTRY: 15

## 2023-10-27 ASSESSMENT — PAIN SCALES - GENERAL
PAINLEVEL_OUTOF10: 10 - WORST POSSIBLE PAIN
PAINLEVEL_OUTOF10: 5 - MODERATE PAIN
PAINLEVEL_OUTOF10: 0 - NO PAIN
PAINLEVEL_OUTOF10: 0 - NO PAIN
PAINLEVEL_OUTOF10: 8
PAINLEVEL_OUTOF10: 0 - NO PAIN
PAINLEVEL_OUTOF10: 3
PAINLEVEL_OUTOF10: 8

## 2023-10-27 ASSESSMENT — PATIENT HEALTH QUESTIONNAIRE - PHQ9
2. FEELING DOWN, DEPRESSED OR HOPELESS: NOT AT ALL
1. LITTLE INTEREST OR PLEASURE IN DOING THINGS: NOT AT ALL
SUM OF ALL RESPONSES TO PHQ9 QUESTIONS 1 & 2: 0

## 2023-10-27 ASSESSMENT — ENCOUNTER SYMPTOMS
SPEECH DIFFICULTY: 1
DIZZINESS: 1

## 2023-10-27 ASSESSMENT — PAIN DESCRIPTION - DESCRIPTORS: DESCRIPTORS: ACHING

## 2023-10-27 NOTE — NURSING NOTE
0706- received report from off going nurse. Pt denies having any pain or SOB at this time. Bed locked and low and call light within reach.    9666- Pt left unit for MRI

## 2023-10-27 NOTE — PROGRESS NOTES
Physical Therapy    Physical Therapy Evaluation & Treatment    Patient Name: Rakan Cervantes  MRN: [id number]  Today's Date: 10/27/2023   Time Calculation  Start Time: 1130  Stop Time: 1200  Time Calculation (min): 30 min    Assessment/Plan   PT Assessment  Rehab Prognosis: Good  Evaluation/Treatment Tolerance: Patient tolerated treatment well  Strengths: Support of extended family/friends, Housing layout, Attitude of self, Ability to acquire knowledge  Assessment Comment:  (pt demonstrated safe functional mobility and amb with cane; No stairs needed negotiated at home per spouse/son; pt is functioning at baseline  and is safe for d/c home with intermittant assist PRN from family, use of cane at all times; No home PT per pt.)  End of Session Patient Position: Bed, 3 rail up  IP OR SWING BED PT PLAN  Inpatient or Swing Bed: Inpatient  PT Plan  PT Plan: PT Eval only  PT Eval Only Reason: At baseline function  PT Discharge Recommendations: No further acute PT  Equipment Recommended upon Discharge: Straight cane  PT Recommended Transfer Status: Stand by assist      Subjective     General Visit Information:  General  Reason for Referral: impaired mobility  Past Medical History Relevant to Rehab:  (CVA, A-fib, MI, kyphoscoliosis)  Family/Caregiver Present: Yes  Patient Position Received: Bed, 3 rail up  General Comment:  (pt long sitting in bed on room air; pt agreeable to therapy; Spouse and son present)  Home Living:  Home Living  Type of Home: House  Lives With: Spouse  Home Adaptive Equipment: Cane  Home Layout: One level  Home Access: No concerns  Bathroom Shower/Tub: Tub/shower unit  Bathroom Toilet: Adaptive toilet seating  Bathroom Equipment: Grab bars in shower, Shower chair with back  Prior Level of Function:  Prior Function Per Pt/Caregiver Report  Level of [place]: Independent with ADLs and functional transfers  Receives Help From: Family  Homemaking Assistance:  (spouse does the lunadry)  Ambulatory  Assistance: Independent  Precautions:  Precautions  Hearing/Visual Limitations:  ([place]---bilateral aides, glasses for reading)  Medical Precautions: Fall precautions  Vital Signs:  Vital Signs  Heart Rate: 74  SpO2: 98 %  Patient Position: Sitting (after return to bed from ambulation trial)    Objective   Pain:  Pain Assessment  Pain Assessment: 0-10  Pain Score: 8  Pain Type: Chronic pain  Pain Location: Back (shldr)  Pain Orientation: Left  Pain Descriptors: Aching  Pain Frequency: Constant/continuous  Patient's Stated Pain Goal: 4  Pain Interventions:  (modalities, posture, execises, pain meds PRN)  Cognition:  Cognition  Overall Cognitive Status: Within Functional Limits  Orientation Level: Oriented X4    General Assessments:                Activity Tolerance  Endurance: Decreased tolerance for upright activites (due to agr related changes)    Sensation  Light Touch: No apparent deficits    Coordination  Movements are Fluid and Coordinated: Yes (bilateral LE's appropriate for age)    Postural Control  Posture Comment:  (kyphoscoliosis with mild to mod forward head, protracted shldrs)    Static Sitting Balance  Static Sitting-Balance Support: Feet supported  Static Sitting-Level of Assistance: Independent  Static Sitting-Comment/Number of Minutes:  (good balance; distant supervision of 1)  Dynamic Sitting Balance  Dynamic Sitting-Balance Support: Feet supported  Dynamic Sitting-Comments:  (good with distant supervision of 1)    Static Standing Balance  Static Standing-Balance Support: Right upper extremity supported  Static Standing-Level of Assistance: Distant supervision  Static Standing-Comment/Number of Minutes:  (1 min---good balance)  Dynamic Standing Balance  Dynamic Standing-Balance Support: Right upper extremity supported  Dynamic Standing-Comments:  (4 min---fair + balance)  Functional Assessments:  Bed Mobility  Bed Mobility: Yes  Bed Mobility 1  Bed Mobility 1: Supine to sitting  Level of Assistance 1:  Independent  Bed Mobility Comments 1:  (head of bed elevated 35 degrees)  Bed Mobility 2  Bed Mobility  2: Sitting to supine  Level of Assistance 2: Independent  Bed Mobility Comments 2:  (head of bed elevated 35 degrees)    Transfers  Transfer: Yes  Transfer 1  Transfer From 1: Sit to  Transfer to 1: Stand  Technique 1: Sit to stand  Transfer Device 1: Cane  Transfer Level of Assistance 1: Distant supervision  Trials/Comments 1:  (verbal cues for proper bilateral hand placement and reasons for)  Transfers 2  Transfer From 2: Stand to  Transfer to 2: Sit  Technique 2: Stand to sit  Transfer Device 2: Cane  Transfer Level of Assistance 2: Distant supervision  Trials/Comments 2:  (verbal cues for proper bilateral hand placement and reasons for)    Ambulation/Gait Training  Ambulation/Gait Training Performed: Yes  Ambulation/Gait Training 1  Surface 1: Level tile  Device 1: Single point cane  Assistance 1: Close supervision  Comments/Distance (ft) 1:  (pt amb 150 ft x 1 with cane,+ turns,  close supervision of 1 for safety, slow sun, verbal cuesfor erect posture as able.)  Extremity/Trunk Assessments:        Treatments:  Therapeutic Activity  Therapeutic Activity Performed: Yes (see bed mobility, transfers, amb with cane,)    Bed Mobility  Bed Mobility: Yes  Bed Mobility 1  Bed Mobility 1: Supine to sitting  Level of Assistance 1: Independent  Bed Mobility Comments 1:  (head of bed elevated 35 degrees)  Bed Mobility 2  Bed Mobility  2: Sitting to supine  Level of Assistance 2: Independent  Bed Mobility Comments 2:  (head of bed elevated 35 degrees)    Ambulation/Gait Training  Ambulation/Gait Training Performed: Yes  Ambulation/Gait Training 1  Surface 1: Level tile  Device 1: Single point cane  Assistance 1: Close supervision  Comments/Distance (ft) 1:  (pt amb 150 ft x 1 with cane,+ turns,  close supervision of 1 for safety, slow sun, verbal cuesfor erect posture as able.)  Transfers  Transfer: Yes  Transfer  1  Transfer From 1: Sit to  Transfer to 1: Stand  Technique 1: Sit to stand  Transfer Device 1: Cane  Transfer Level of Assistance 1: Distant supervision  Trials/Comments 1:  (verbal cues for proper bilateral hand placement and reasons for)  Transfers 2  Transfer From 2: Stand to  Transfer to 2: Sit  Technique 2: Stand to sit  Transfer Device 2: Cane  Transfer Level of Assistance 2: Distant supervision  Trials/Comments 2:  (verbal cues for proper bilateral hand placement and reasons for)  Outcome Measures:  [facility] Basic Mobility  Turning from your back to your side while in a flat bed without using bedrails: None  Moving from lying on your back to sitting on the side of a flat bed without using bedrails: None  Moving to and from bed to chair (including a wheelchair): A little  Standing up from a chair using your arms (e.g. wheelchair or bedside chair): A little  To walk in hospital room: A little  Climbing 3-5 steps with railing: A little  Basic Mobility - Total Score: 20    Encounter Problems       Encounter Problems (Active)       Pain - Adult             Encounter Problems (Resolved)       ADL       Goal 1 (Met)       Start:  10/27/23    Expected End:  10/27/23    Resolved:  10/27/23    Patient will demonstrate improved ADL skills:  Grooming with Supervision assist .        UE Dressing with Supervision assist .       LE Dressing with Supervision assist .      Toileting with Supervision assist .                   Education Documentation  Mobility Training, taught by Michelle Pastor PT at 10/27/2023 12:36 PM.  Learner: Patient  Readiness: Eager  Method: Explanation  Response: Verbalizes Understanding    ADL Training, taught by Michelle Pastor PT at 10/27/2023 12:36 PM.  Learner: Patient  Readiness: Eager  Method: Explanation  Response: Verbalizes Understanding    Education Comments  No comments found.

## 2023-10-27 NOTE — CONSULTS
Inpatient consult to Cardiology  Consult performed by: Nessa Callaway MD  Consult ordered by: Layo Pantoja MD  Reason for consult: Atrial fibrillation, possible TIA.        History Of Present Illness:    Rakan Cervantes is a 88 y.o. female with history of atrial fibrillation on oral anticoagulation.  Patient presented to the hospital with the slurred speech.  Patient felt disease slurred speech.  Her  noticed the slurred speech and EMS was called.  Patient was admitted to the hospital.  Symptoms resolved quickly.  Feels back to baseline now.  Denies any chest pain or shortness of breath.  Denies any palpitations or dizziness.  Patient has history of atrial fibrillation on oral anticoagulation and baby aspirin.    Last Recorded Vitals:  Vitals:    10/27/23 0223 10/27/23 0229 10/27/23 0734 10/27/23 1121   BP: 142/64  161/70 164/59   BP Location: Right arm  Right arm Right arm   Patient Position: Lying  Lying Lying   Pulse: 61  63 68   Resp: 18  22 15   Temp: 36.3 °C (97.3 °F)  36.2 °C (97.2 °F) 36.6 °C (97.9 °F)   TempSrc: Tympanic  Temporal Temporal   SpO2: 98%  94% 99%   Weight:  54.2 kg (119 lb 7.8 oz)     Height:           Last Labs:  CBC - 10/27/2023:  5:02 AM  5.3 9.9 247    31.6      CMP - 10/27/2023:  5:02 AM  9.1 5.7 16 --- 0.3   3.2 3.5 15 190      PTT - 6/16/2023:  6:43 AM  1.1   11.6 26.3     Hemoglobin A1C   Date/Time Value Ref Range Status   10/27/2023 05:02 AM 5.8 (H) See below % Final   08/23/2023 10:38 AM 6.5 (H) 4.0 - 6.0 % Final     Comment:     Hemoglobin A1C levels are related to mean blood glucose during the   preceding 2-3 months. The relationship table below may be used as a   general guide. Each 1% increase in HGB A1C is a reflection of an   increase in mean glucose of approximately 30 mg/dl.   Reference: Diabetes Care, volume 29, supplement 1 Jan. 2006                        HGB A1C ................. Approx. Mean Glucose   _______________________________________________   6%    ...............................  120 mg/dl   7%   ...............................  150 mg/dl   8%   ...............................  180 mg/dl   9%   ...............................  210 mg/dl   10%  ...............................  240 mg/dl  Performed at [address] [address]     02/17/2023 12:01 PM 5.9 4.0 - 6.0 % Final     Comment:     Hemoglobin A1C levels are related to mean blood glucose during the   preceding 2-3 months. The relationship table below may be used as a   general guide. Each 1% increase in HGB A1C is a reflection of an   increase in mean glucose of approximately 30 mg/dl.   Reference: Diabetes Care, volume 29, supplement 1 Jan. 2006                        HGB A1C ................. Approx. Mean Glucose   _______________________________________________   6%   ...............................  120 mg/dl   7%   ...............................  150 mg/dl   8%   ...............................  180 mg/dl   9%   ...............................  210 mg/dl   10%  ...............................  240 mg/dl  Performed at [address] [address]       LDL Calculated   Date/Time Value Ref Range Status   10/27/2023 05:02 AM 31 (L) 65 - 130 mg/dL Final   08/23/2023 10:38 AM 55 (L) 65 - 130 MG/DL Final   07/17/2023 12:50 PM 40 (L) 65 - 130 MG/DL Final   06/16/2023 06:43 AM 48 (L) 65 - 130 MG/DL Final      Last I/O:  I/O last 3 completed shifts:  In: 200 (3.7 mL/kg) [P.O.:200]  Out: - (0 mL/kg)   Weight: 54.2 kg       Past Medical History:  She has a past medical history of Anemia, Arthritis, Asthma, Jonas esophagus, CAD (coronary artery disease), Cervical radiculopathy, Constipation, Degenerative joint disease, Dyslipidemia, Dysuria, Erosive esophagitis, GERD (gastroesophageal reflux disease), Hyperparathyroidism (CMS/HCC), Irritable bowel syndrome (IBS), Labile hypertension, Left foot pain, Macular degeneration, Neck pain, Osteoporosis, Paroxysmal atrial  fibrillation (CMS/HCC), Polyarthritis with negative rheumatoid factor (CMS/HCC), Post menopausal syndrome, Spinal stenosis, and Stroke (CMS/[facility], located within [facility]).    Past Surgical History:  She has a past surgical history that includes Other surgical history (02/14/2022); MR angio head wo IV contrast (04/23/2013); CT angio head w and wo IV contrast (05/08/2013); MR angio head wo IV contrast (07/20/2016); Cholecystectomy; Cardiovascular stress test (2017); Cardiovascular stress test (2004); Cataract extraction (Bilateral, 2011); Carpal tunnel release (Left, 2014); Total hip arthroplasty (Right, 2016); Revision total hip arthroplasty (Left, 2013); Coronary stent placement (05/2023); MR angio head wo IV contrast (10/27/2023); and MR angio neck wo IV contrast (10/27/2023).      Social History:  She reports that she has never smoked. She has never been exposed to tobacco smoke. She has never used smokeless tobacco. She reports current alcohol use of about 1.0 standard drink of alcohol per week. She reports that she does not use drugs.    Family History:  Family History   Problem Relation Name Age of Onset    No Known Problems Mother      No Known Problems Father      No Known Problems Sister          Allergies:  Meperidine, Morphine, Lyrica [pregabalin], and Tramadol    Inpatient Medications:  Scheduled medications   Medication Dose Route Frequency    apixaban  2.5 mg oral q12h    aspirin  81 mg oral Daily    atorvastatin  80 mg oral Nightly    carvedilol  6.25 mg oral BID after meals    clopidogrel  75 mg oral Daily    ferrous sulfate  65 mg of iron oral Daily with breakfast    hydroxychloroquine  200 mg oral Daily    [Held by provider] isosorbide mononitrate ER  60 mg oral Daily    pantoprazole  40 mg oral BID    pneumococcal conjugate  0.5 mL intramuscular During hospitalization    potassium chloride CR  10 mEq oral Daily with breakfast    sulfaSALAzine  500 mg oral BID after meals    [Held by provider] verapamil SR  180 mg oral Daily      PRN medications   Medication    hydrALAZINE    Followed by    [START ON 10/29/2023] hydrALAZINE    labetaloL    oxygen    polyethylene glycol     Continuous Medications   Medication Dose Last Rate     Outpatient Medications:  Current Outpatient Medications   Medication Instructions    acetaminophen (TYLENOL) 600 mg, oral, Every 8 hours PRN    aspirin 81 mg EC tablet TAKE 1 TABLET BY MOUTH ONE TIME DAILY    calcitonin, salmon, (Miacalcin) 200 unit/actuation nasal spray 1 spray, One Nostril, Daily    cloNIDine (CATAPRES) 0.2 mg, oral, 2 times daily    clopidogrel (PLAVIX) 75 mg, oral, Daily    DULoxetine (CYMBALTA) 30 mg, oral, Daily with evening meal    gabapentin (NEURONTIN) 300 mg, oral, 2 times daily    pantoprazole (PROTONIX) 40 mg, oral, 2 times daily    potassium chloride CR 10 mEq ER tablet 10 mEq, oral, Daily    sulfaSALAzine (AZULFIDINE) 500 mg, oral, 2 times daily       Physical Exam:  General: Patient is in no acute distress.  HEENT: atraumatic normocephalic.  Neck: is supple jugular venous pressure within normal limits no thyromegaly.  Cardiovascular regular rate and rhythm normal heart sounds no murmurs rubs or gallops.  Lungs: clear to auscultation bilaterally.  Abdomen: is soft nontender.  Extremities warm to touch no edema.  Neurologic examination: patient is awake alert oriented to person, place, date/time.  Psychiatric examination: patient has good insight denies feeling suicidal and depressed.  Pulses 2+ intact bilaterally     Assessment/Plan   #1 slurred speech.  Most likely related to TIA.  Pending MRI of the brain.  Telemetry reviewed no atrial fibrillation currently in sinus rhythm.  At home she has been on Eliquis and aspirin.  She is on the right doses for both medications.  Recommend to continue.  We will check NT-proBNP as well as troponin.  Patient had a vague sensation before having the slurred speech of maybe shortness of breath may be suffocation or pressure in her chest.  Otherwise  her vitals remained stable.  Recommend aspirin Eliquis and high intensity statin.  2D echo reviewed no major abnormalities.    2.  Hypertension continue current home medications.    3.  Paroxysmal atrial fibrillation.  As above.    4.  Hyperlipidemia continue intensity statin.    Thank you for allow me to participate with her care  Peripheral IV 10/26/23 20 G Left Forearm (Active)   Site Assessment Clean;Dry;Intact 10/27/23 0750   Dressing Type Transparent 10/27/23 0750   Line Status Flushed 10/27/23 0750   Dressing Status Clean;Dry;Occlusive 10/27/23 0750   Number of days: 1       Code Status:  Full Code      Nessa Callaway MD

## 2023-10-27 NOTE — PROGRESS NOTES
Rakan Cervantes is a 88 y.o. female on day 1 of admission presenting with TIA (transient ischemic attack).      Subjective   Examined.  Patient is alert oriented x3, no slurred speech, moves all extremities equally, to be discharged soon. Passed swallow evaluation.       Objective     Last Recorded Vitals  /70 (BP Location: Right arm, Patient Position: Lying)   Pulse 63   Temp 36.2 °C (97.2 °F) (Temporal)   Resp 22   Wt 54.2 kg (119 lb 7.8 oz)   SpO2 94%   Intake/Output last 3 Shifts:    Intake/Output Summary (Last 24 hours) at 10/27/2023 1024  Last data filed at 10/27/2023 0229  Gross per 24 hour   Intake 200 ml   Output --   Net 200 ml       Admission Weight  Weight: 50.3 kg (111 lb) (10/26/23 1959)    Daily Weight  10/27/23 : 54.2 kg (119 lb 7.8 oz)    Image Results  CT head wo IV contrast  Narrative: Interpreted By:  Sherlyn Jackson,   STUDY:  CT HEAD WO IV CONTRAST 10/26/2023 9:14 pm      INDICATION:  dizziness, slurred speech x 30 min with history of fall 1 week earlier      COMPARISON:  None available.      ACCESSION NUMBER(S):  [id number]      ORDERING CLINICIAN:  ODILIA HANDY      TECHNIQUE:  Unenhanced axial images of the brain are performed.      FINDINGS:  No ventriculomegaly is present. There is old infarct within the right  parietal lobe unchanged since the prior study with an old lacunar  infarct within the right insula identified as well.      There is some diminished density within the periventricular white  matter indicating mild chronic microvascular ischemic disease.      There is no mass effect, intracranial hemorrhage, or extra-axial  fluid collection.      Calcified plaque is seen within each carotid siphon.      Impression: Old transcortical infarct within the right parietal lobe with old  lacunar infarct of right insula.      Mild chronic microvascular ischemic disease.      Signed by: Sherlyn Jackson 10/26/2023 9:28 PM  Dictation workstation:   [id number]      Physical Exam    Relevant  Results               Assessment/Plan   This patient currently has cardiac telemetry ordered; if you would like to modify or discontinue the telemetry order, click here to go to the orders activity to modify/discontinue the order.              Principal Problem:    TIA (transient ischemic attack)  Active Problems:    Dizziness    Deficit in communication due to slurred speech    Rakan Cervantes is a 88 y.o. female presenting with Slurred Speech.  Patient admitted for further evaluation and management.        1) Slurred Speech and Dizziness 2/2 TIA versus Disequilibrium:  Dizziness and slurred speech resolved.   Admitted to [place] unit  Continuous telemetry pulse oximetry monitoring  CT brain without contrast appreciated  Obtain TTE with bubble-pending  Carotid Arterial Doppler f ultrasound-pending  MRI head, MRA brain/neck- in progress  Neurology consultation appreciate recs.  Speech and Swallow Evaluation - passed  Permissive HTN Management  PT/OT/Rehabilitation Consult   Continue ASA/Plavix/statin  Neuro checks Q shift  Up with Assist         2) H/o Paroxysmal Atrial Fibrillation:     Currently SR.   Continue with patient is renally adjusted DOAC for stroke prophylaxis  Continue with Coreg for rate control  Consult cardiology to further evaluate the patient        3) CAD s/p PCI w/ Recent ETHEL:     Continue with DAPT high intensity statin therapy  Cardiology consultation placed           4) IBD/Polyarthritis with negative rheumatoid factor:        Continue with home sulfasalazine 500 mg p.o. twice daily  Continue with home hydroxychloroquine 200 mg p.o. daily     5) Dyslipidemia:        Continue with home high intensity statin therapy        6) ADRIANO/Jonas's esophagus/GERD:     Continue iron supplementation therapy  Continue with home PPI oral dose        7) Hypertension:     Currently holding patient's Imdur 60 mg p.o. daily, verapamil 100 mg p.o. daily  For permissive BP management  Restart when appropriate            8) GI + DVT PPX:        Continue PPI  Continue renally adjusted DOAC         Discharge plan  -PT OT and fall precaution  -Patient lives home with spouse and uses a cane, independent with ADLs  -Anticipated discharge to home, likely with home care once cardiology and neurology evaluation is complete.              Angi Morales, APRN-CNP

## 2023-10-27 NOTE — PROGRESS NOTES
Speech-Language Pathology                 Therapy Communication Note    Patient Name: Rakan Cervantes  MRN: [id number]  Today's Date: 10/27/2023     Discipline: Speech Language Pathology    Missed Visit Reason:  Pt currently off floor for MRI, will attempt later when pt available    Missed Time: Attempt    Comment:

## 2023-10-27 NOTE — CARE PLAN
Problem: Pain - Adult  Goal: Verbalizes/displays adequate comfort level or baseline comfort level  Outcome: Progressing     Problem: Safety - Adult  Goal: Free from fall injury  Outcome: Progressing

## 2023-10-27 NOTE — H&P
History Of Present Illness      Rakan Cervantes is a 88 y.o. female presenting with Slurred Speech.         Rakan Cervantes is a 88 y.o. female presenting with slurred speech for 30 min. Sx started at 1830 and  noted that her legs were weak on both sides.  When the son arrived at their house at 7 the slurred speech was completely resolved.  The patient did have dizziness with a fall approximately 1 week ago and was complaining of dizziness and lightheadedness today.  NIH is currently 0 on triage exam and there are no acute focal neurodeficits on brief neurologic exam.  Stroke work-up is initiated, however given resolution of symptoms brain attack is not called.       88-year-old female presents with slurred speech.  Episode occurred at 1830, lasted approximately 30 minutes.  Patient's son and  at bedside.  Per son, by the time he arrived 1900 slurred speech had completely resolved.  Has been and son both state the patient was incredibly weak, requiring 2 person assist when normally she is ambulatory independently.  Patient had a fall approximately 2 weeks ago, hitting her head, but did not seek medical evaluation at that time.  She has had repeat dizzy episodes since then.  Prior history of MI, no prior history of stroke.  Recent fevers or chills.  No recent urinary symptoms         Family stated at [id number] pt was having slurred speech. While in triage, pt was alert and had no slurred speech. Swanton test was negative. Family stated pt fell a week ago and was laying on the floor all night. Currently pt is c/o left shoulder pain radiating down to her hand         Patient's emergency room diagnostic work-up noted for a normal WBC count of 6.6.  The patient's H&H is slightly low at 9.7/29.6.  The platelet count was normal.  Patient's blood chemistry noted for an elevated alkaline phosphatase of 190.  Kidney function and electrolytes are within normal limits.  First set troponin level elevated 24.   Initial CT brain without contrast was noted for old transcortical infarct within the right parietal lobe with old lacunar infarct of right insula.  Mild chronic microvascular ischemic disease.      Patient recently experienced an MI earlier this year.  She received a stent placement in her heart by Dr. Pantoja from cardiology.  She appears to have developed paroxysmal atrial fibrillation as well during this time and was started on renally adjusted DOAC therapy.  She appears to be on triple therapy at this time.      Patient was accompanied by her son and  at the bedside.  Her son is Eduar Cervantes.  He can be reached at [phone].      EKG:    Rate is 67, Rhythm is sinus, Axis is normal, QTc is 445, no ST elevation         Past Medical History        Degenerative joint disease  GERD  Spinal stenosis  Asthma  CVA  Macular degeneration  Erosive esophagitis  Left foot pain  Dyslipidemia  Irritable bowel syndrome  Postmenopausal disorder  Osteoporosis  Dysuria  Labile hypertension  Neck pain  Polyarthritis with negative rheumatoid factor  Hyperparathyroidism  Anemia  Shoulder arthritis  Leg syndrome  Paroxysmal atrial fibrillation  CAD  Constipation  Jonas's esophagus  Cervical radiculopathy        Surgical History        Past Surgical History:   Procedure Laterality Date    CT HEAD ANGIO W AND WO IV CONTRAST  5/8/2013    CT HEAD ANGIO W AND WO IV CONTRAST [facility]    MR HEAD ANGIO WO IV CONTRAST  4/23/2013    MR HEAD ANGIO WO IV CONTRAST [facility]    MR HEAD ANGIO WO IV CONTRAST  7/20/2016    MR HEAD ANGIO WO IV CONTRAST [facility]    OTHER SURGICAL HISTORY  02/14/2022    No history of surgery             Status postcholecystectomy  S/p left fifth metatarsal fracture  Stress test Dr. Khalil 2017, Dr. Pantoja 2004  Status post right shoulder replacement  S/p left hip ORIF for fracture Dr. Erickson 2007  Right cataract surgery 2011  Left cataract surgery 2011  Left carpal tunnel release  2014  Conversion of left ORIF to THR by Dr. Erickson 2013  Right total hip arthroplasty 2016  CAD s/p stent placement by Dr. Pantoja May 2023 ([facility])  Proximal atrial fibrillation           Social History        She has no history on file for tobacco use, alcohol use, and drug use.    Never smoker      Family History      Father: Heart disease  Mother: Hypertension/CVA      Allergies      Morphine/Lyrica/tramadol          Review of Systems      General: No change in weight. No weakenss. No Fevers/Chills/Night Sweats   Skin: No skin/hair/nail changes. No rashes or sores.  Head:  No trauma. No Headache/nasuea/vomitting.   Eyes: No visual changes. No tearing. No itching.   Ears: No hearing loss. No tinnitus. No vertigo. No discharge.  Nose, Sinuses: No rhinorrhea, No nasal congestion. No epistaxis.  Mouth, Throat, Neck: No bleeding gums, hoarseness, sore throat or swollen neck  Cardiac: No palpitations. No PINTO. No PND. No Orthopnea.   Respiratory: No Shortness of Breath. No wheezing. No cough. No hemoptysis.   GI: No nausea/vomiting. No indigestion. No diarrhea. No constipation.   Extremities: No numbness or tingling. No paresthesias.   Urinary: No change in urinary frequency. No change in hesitancy. No hematuria. No incontinence.         Physical Exam        Constitutional:  Pleasant  Eyes: PERRL, EOMI,   ENMT: mucous membranes moist  Head/Neck: Neck supple, No JVD,   Respiratory/Thorax: Patent airways, CTAB,   Cardiovascular: Regular, rate and rhythm, no murmurs  Gastrointestinal: Soft, non-distended, +BS.  Musculoskeletal: ROM intact, no joint swelling, normal strength  Extremities: peripheral pulses intact; no edema  Neurological: Alert and Oriented x 3; no focal deficits; gross motor and sensation intact; CN II-XII intact. No asterixis.  Psychological: Appropriate mood and behavior  Skin: Warm and dry, no lesions, no rashes.     Last Recorded Vitals        Blood pressure 141/66, pulse 68, temperature 36.9 °C (98.4 °F),  "temperature source Temporal, resp. rate 16, height 1.499 m (4' 11\"), weight 50.3 kg (111 lb), SpO2 94 %.            Relevant Results                    Assessment/Plan   Principal Problem:    TIA (transient ischemic attack)  Active Problems:    Dizziness    Deficit in communication due to slurred speech        Rakan Cervantes is a 88 y.o. female presenting with Slurred Speech.  Patient admitted for further evaluation and management.      1) Slurred Speech and Dizziness 2/2 TIA versus Disequilibrium:      Admitted to [facility] unit  Continuous telemetry pulse oximetry monitoring  CT brain without contrast appreciated  Obtain TTE with bubble  Carotid Arterial Doppler f ultrasound  MRI head, MRA brain/neck  Neurology consultation appreciate recs.  Speech and Swallow Evaluation   Permissive HTN Management  PT/OT/Rehabilitation Consult   Continue ASA/Plavix/statin  Neuro checks Q shift  Up with Assist   Aspiration Precautions       2) H/o Paroxysmal Atrial Fibrillation:      Continue with patient is renally adjusted DOAC for stroke prophylaxis  Continue with Coreg for rate control  Consult cardiology to further evaluate the patient      3) CAD s/p PCI w/ Recent ETHEL:    Continue with DAPT high intensity statin therapy  Cardiology consultation placed        4) IBD/Polyarthritis with negative rheumatoid factor:      Continue with home sulfasalazine 500 mg p.o. twice daily  Continue with home hydroxychloroquine 200 mg p.o. daily    5) Dyslipidemia:      Continue with home high intensity statin therapy      6) ADRIANO/Jonas's esophagus/GERD:    Continue iron supplementation therapy  Continue with home PPI oral dose      7) Hypertension:    Currently holding patient's Imdur 60 mg p.o. daily, verapamil 100 mg p.o. daily  For permissive BP management  Restart when appropriate        8) GI + DVT PPX:      Continue PPI  Continue renally adjusted DOAC             I spent 36 minutes in the professional and overall care of this " patient.        This Dictation was Transcribed using a Nuance Dragon Voice Recognition System Device (with Compatible Computer + Software) and as such may contain Grammatical Errors and Unintentional Typing Misprints.                Layo Pantoja MD/MPH

## 2023-10-27 NOTE — PROGRESS NOTES
Speech-Language Pathology    Inpatient Speech-Language Pathology Clinical Swallow Evaluation    Patient Name: Rakan Cervantes  MRN: [id number]  Today's Date: 10/27/2023   Time Calculation  Start Time: 1007  Stop Time: 1022  Time Calculation (min): 15 min         Current Problem:   1. TIA (transient ischemic attack)  Carotid duplex bilateral    Carotid duplex bilateral    Transthoracic Echo (TTE) Limited    Transthoracic Echo (TTE) Limited    CANCELED: Transthoracic Echo (TTE) Complete    CANCELED: Transthoracic Echo (TTE) Complete      2. Transient global amnesia  Carotid duplex bilateral    Carotid duplex bilateral      Rakan Cervantes is a 88 y.o. female presenting with slurred speech for 30 min. Sx started at 1830 and  noted that her legs were weak on both sides.  When the son arrived at their house at 7 the slurred speech was completely resolved.  The patient did have dizziness with a fall approximately 1 week ago and was complaining of dizziness and lightheadedness today.  NIH is currently 0 on triage exam and there are no acute focal neurodeficits on brief neurologic exam.  Stroke work-up is initiated, however given resolution of symptoms brain attack is not called.          Recommendations:  Risk for Aspiration: No  Solid Diet Recommendations : Regular (IDDSI Level 7)  Liquid Diet Recommendations: Thin (IDDSI Level 0)  Medication Administration Recommendations: With Liquid      Assessment:  Assessment Results: swallow WFL, no overt s/s aspiration, no further tx indicated  Medical Staff Made Aware: Yes      Plan:  Inpatient/Swing Bed or Outpatient: Inpatient  SLP Plan: No skilled SLP  No Skilled SLP: Independent with swallowing  Diet Recommendations: Solid  Solid Consistency: Regular (IDDSI Level 7)  Liquid Consistency: Thin (IDDSI Level 0)      Subjective   Pt alert, oriented x4, eating breakfast when SLP arrived, just returned from MRI, pt reports no difficulty with swallowing/speech    General Visit  Information:  Patient Class: Inpatient  Reason for Referral: assess swallow per CVA protocol  Past Medical History Relevant to Rehab: h/o CVA, slurred speech  Prior to Session Communication: Bedside nurse (elliot GÓMEZ RN)  Date of Onset: 10/26/23  Date of Order: 10/27/23  [employer] Diet: regular solids and thin liquids  Current Diet : regular solids and thin liquids    PAIN  Pain Assessment: 0-10  Pain Score: 8  Pain Type: Chronic pain  Pain Location: Shoulder     Baseline Assessment:  Hearing: Within Functional Limits  Respiratory status: room air  Behavior/Cognition: Alert, cooperative, pleasant  Vision: functional for self feeding  Patient postitioning: upright in bed  Baseline vocal quality: WFL  Volitional cough: WFL  Volitional swallow: WFL    Oral/Motor Assessment:  Oral Hygiene: oral mucosa moist  Dentition: Dentures (Lower Partial), Dentures (Upper Full)  Oral Motor: Within Functional Limits  Facial Symmetry: Within Functional Limits  Labial ROM: Within Functional Limits  Lingual Agility: Within Functional Limits  Lingual ROM: Within Functional Limits  Vocal Quality: Within Functional Limits  Intelligibility: Intelligible  Breath Support: Adequate for speech  Hearing: Within Functional Limits    Consistencies Trialed: Yes  Consistencies Trialed: Yes: approx 4 oz thin liquids via cup and straw, (single and consecutive sips), fresh fruit on breakfast tray.    Objective   Mastication WFL, adequate oral clearance, swallow onset timely, laryngeal elevation adequaate upon palaption, no overt s/s aspiration with any textures assessed.        Pt reports no speech difficulty, states that  her speech was slurred but symptoms have resolved. No dysarthria or aphasia observed. Pt oriented x 4, follows commands, answers questions appropriately, aware of situation, Cognition appears WFL. No formal speech/language evaluation is indicated d/t no acute needs     Inpatient:  Education Documentation  Pt/nursing staff educated on  role of SLP, results and recommendations. Pt?nursing able to verbalize understanding and agreement.

## 2023-10-27 NOTE — PROGRESS NOTES
Acute assessment     Patient states she live home with spouse who is losing his eye site but they manage together. Neither of them drive but their sons, and friends help get them to Dr appointments and get them what he needs. States they have a cleaning service come in once a month. Patient does all the cooking. Patient states they have grab bars, and shower chair, cane, walker and a rolator at home. Pt states she had a heart attack in may and had HHC and does not want it again.   TCC will follow for DC planning    10/27/23 1116   Discharge Planning   Living Arrangements Spouse/significant other   Support Systems Spouse/significant other;Children;Family members;Friends/neighbors   Type of Residence Private residence   Number of Stairs to Enter Residence 0   Number of Stairs Within Residence 0   Do you have animals or pets at home? No   Who is requesting discharge planning? Provider   Home or Post Acute Services None   Patient expects to be discharged to: home with no needs   Does the patient need discharge transport arranged? No   Financial Resource Strain   How hard is it for you to pay for the very basics like food, housing, medical care, and heating? Not hard   Housing Stability   In the last 12 months, was there a time when you were not able to pay the mortgage or rent on time? N   In the last 12 months, how many places have you lived? 1   In the last 12 months, was there a time when you did not have a steady place to sleep or slept in a shelter (including now)? N   Transportation Needs   In the past 12 months, has lack of transportation kept you from medical appointments or from getting medications? no   In the past 12 months, has lack of transportation kept you from meetings, work, or from getting things needed for daily living? No

## 2023-10-27 NOTE — ED TRIAGE NOTES
The patient was seen by me in a triage capacity at 8:04 PM with a goal of rapid assessment and initiation of care.  The patient will have a complete history and physical exam performed and documented by another provider.      Rakan Cervantes is a 88 y.o. female presenting with slurred speech for 30 min. Sx started at 1830 and  noted that her legs were weak on both sides.  When the son arrived at their house at 7 the slurred speech was completely resolved.  The patient did have dizziness with a fall approximately 1 week ago and was complaining of dizziness and lightheadedness today.  NIH is currently 0 on triage exam and there are no acute focal neurodeficits on brief neurologic exam.  Stroke work-up is initiated, however given resolution of symptoms brain attack is not called.      Work up:   Imaging:  CT head wo IV contrast    (Results Pending)     Labs:  Labs Reviewed   TROPONIN T SERIES, HIGH SENSITIVITY (0, 2 HR, 6 HR)    Narrative:     The following orders were created for panel order Troponin T Series, High Sensitivity (0, 2HR, 6HR).  Procedure                               Abnormality         Status                     ---------                               -----------         ------                     Serial Troponin, Initial...[[id number]]                                                   Please view results for these tests on the individual orders.   CBC WITH AUTO DIFFERENTIAL   COMPREHENSIVE METABOLIC PANEL   MAGNESIUM   URINALYSIS WITH REFLEX MICROSCOPIC AND CULTURE    Narrative:     The following orders were created for panel order Urinalysis with Reflex Microscopic and Culture.  Procedure                               Abnormality         Status                     ---------                               -----------         ------                     Urinalysis with Reflex M...[[id number]]                                                 Extra Urine Gray Tube[[id number]]                                                          Please view results for these tests on the individual orders.   SERIAL TROPONIN, INITIAL ([facility])   URINALYSIS WITH REFLEX MICROSCOPIC AND CULTURE   EXTRA URINE GRAY TUBE     Medications:  Medications - No data to display        Rosemarie Kuo,

## 2023-10-27 NOTE — NURSING NOTE
Pt resting quietly in bed, no assessment changes to report from arrival to unit. See corresponding flowsheet. No needs identified at this time. Call light in reach. Continuing to monitor.

## 2023-10-27 NOTE — CARE PLAN
Problem: Pain - Adult  Goal: Verbalizes/displays adequate comfort level or baseline comfort level  Outcome: Progressing     Problem: Safety - Adult  Goal: Free from fall injury  Outcome: Progressing     Problem: Discharge Planning  Goal: Discharge to home or other facility with appropriate resources  Outcome: Progressing     Problem: Chronic Conditions and Co-morbidities  Goal: Patient's chronic conditions and co-morbidity symptoms are monitored and maintained or improved  Outcome: Progressing     Problem: Pain  Goal: Takes deep breaths with improved pain control throughout the shift  Outcome: Progressing  Goal: Turns in bed with improved pain control throughout the shift  Outcome: Progressing  Goal: Walks with improved pain control throughout the shift  Outcome: Progressing  Goal: Performs ADL's with improved pain control throughout shift  Outcome: Progressing  Goal: Participates in PT with improved pain control throughout the shift  Outcome: Progressing  Goal: Free from opioid side effects throughout the shift  Outcome: Progressing  Goal: Free from acute confusion related to pain meds throughout the shift  Outcome: Progressing

## 2023-10-27 NOTE — CONSULTS
Inpatient consult to Neurology  Consult performed by: DANIA Pelletier-CNP  Consult ordered by: Layo Pantoja MD          History Of Present Illness  Rakan Cervantes is a 88 y.o. female presenting with concerns for slurred speech times approximately 30 minutes.  Patient is alert and oriented to all tells me today she feels back to baseline but states yesterday when her  came home he felt like her speech was abnormal and was having difficulty understanding her she noted at that time she did feel an overall heaviness in her body.  She does have a history of stroke 2001.  There is also some noted dizziness 1 week ago with a fall and intermittent episodes of dizziness since.  She does have a history of paroxysmal A-fib on Eliquis 2.5 twice a day denies missed doses.  Lives at home with family no tobacco alcohol or drug use.  Past Medical History  Past Medical History:   Diagnosis Date    Anemia     Arthritis     Asthma     Jonas esophagus     CAD (coronary artery disease)     Cervical radiculopathy     Constipation     Degenerative joint disease     Dyslipidemia     Dysuria     Erosive esophagitis     GERD (gastroesophageal reflux disease)     Hyperparathyroidism (CMS/HCC)     Irritable bowel syndrome (IBS)     Labile hypertension     Left foot pain     Macular degeneration     Neck pain     Osteoporosis     Paroxysmal atrial fibrillation (CMS/HCC)     Polyarthritis with negative rheumatoid factor (CMS/HCC)     Post menopausal syndrome     Spinal stenosis     Stroke (CMS/HCC)      Surgical History  Past Surgical History:   Procedure Laterality Date    CARDIOVASCULAR STRESS TEST  2017    Dr. Khalil    CARDIOVASCULAR STRESS TEST  2004    Dr. Pantoja    CARPAL TUNNEL RELEASE Left 2014    CATARACT EXTRACTION Bilateral 2011    CHOLECYSTECTOMY      CORONARY STENT PLACEMENT  05/2023    LAD    CT HEAD ANGIO W AND WO IV CONTRAST  05/08/2013    CT HEAD ANGIO W AND WO IV CONTRAST [facility]    MR HEAD ANGIO WO  IV CONTRAST  04/23/2013    MR HEAD ANGIO WO IV CONTRAST [facility]    MR HEAD ANGIO WO IV CONTRAST  07/20/2016    MR HEAD ANGIO WO IV CONTRAST [facility]    OTHER SURGICAL HISTORY  02/14/2022    No history of surgery    REVISION TOTAL HIP ARTHROPLASTY Left 2013    Conversion from left ORIF    TOTAL HIP ARTHROPLASTY Right 2016     Social History  Social History     Tobacco Use    Smoking status: Never     Passive exposure: Never    Smokeless tobacco: Never   Vaping Use    Vaping Use: Never used   Substance Use Topics    Alcohol use: Yes     Alcohol/week: 1.0 standard drink of alcohol     Types: 1 Glasses of wine per week    Drug use: Never     Allergies  Meperidine, Morphine, Lyrica [pregabalin], and Tramadol  Medications Prior to Admission   Medication Sig Dispense Refill Last Dose    cloNIDine (Catapres) 0.2 mg tablet Take 1 tablet (0.2 mg) by mouth 2 times a day.   Unknown    sulfaSALAzine (Azulfidine) 500 mg tablet Take 1 tablet (500 mg) by mouth 2 times a day.   Unknown    acetaminophen (Tylenol) 500 mg tablet Take 600 mg by mouth every 8 hours if needed for mild pain (1 - 3).   Unknown    aspirin 81 mg EC tablet TAKE 1 TABLET BY MOUTH ONE TIME DAILY (Patient not taking: Reported on 10/26/2023) 90 tablet 3 Unknown    calcitonin, salmon, (Miacalcin) 200 unit/actuation nasal spray Administer 1 spray into one nostril once daily.   Unknown    clopidogrel (Plavix) 75 mg tablet Take 1 tablet (75 mg) by mouth once daily.   Unknown    DULoxetine (Cymbalta) 30 mg DR capsule TAKE 1 CAPSULE BY MOUTH ONCE DAILY at dinner (Patient not taking: Reported on 10/26/2023) 90 capsule 2 Unknown    gabapentin (Neurontin) 300 mg capsule Take 1 capsule (300 mg) by mouth 2 times a day.   Unknown    pantoprazole (ProtoNix) 40 mg EC tablet Take 1 tablet (40 mg) by mouth 2 times a day.   Unknown    potassium chloride CR 10 mEq ER tablet Take 1 tablet (10 mEq) by mouth once daily.   Unknown       Review of Systems  "  Neurological:  Positive for dizziness and speech difficulty.     Neurological Exam  Physical Exam  Last Recorded Vitals  Blood pressure 161/70, pulse 63, temperature 36.2 °C (97.2 °F), temperature source Temporal, resp. rate 22, height 1.499 m (4' 11\"), weight 54.2 kg (119 lb 7.8 oz), SpO2 94 %.    Relevant Results  Results for orders placed or performed during the hospital encounter of 10/26/23 (from the past 24 hour(s))   Urinalysis with Reflex Microscopic and Culture   Result Value Ref Range    Color, Urine Colorless (N) Light-Yellow, Yellow, Dark-Yellow    Appearance, Urine Clear Clear    Specific Gravity, Urine 1.007 1.005 - 1.035    pH, Urine 5.5 5.0, 5.5, 6.0, 6.5, 7.0, 7.5, 8.0    Protein, Urine NEGATIVE NEGATIVE, 10 (TRACE), 20 (TRACE) mg/dL    Glucose, Urine Normal Normal mg/dL    Blood, Urine NEGATIVE NEGATIVE    Ketones, Urine NEGATIVE NEGATIVE mg/dL    Bilirubin, Urine NEGATIVE NEGATIVE    Urobilinogen, Urine Normal Normal mg/dL    Nitrite, Urine NEGATIVE NEGATIVE    Leukocyte Esterase, Urine NEGATIVE NEGATIVE   CBC and Auto Differential   Result Value Ref Range    WBC 6.6 4.4 - 11.3 x10*3/uL    nRBC 0.0 0.0 - 0.0 /100 WBCs    RBC 3.17 (L) 4.00 - 5.20 x10*6/uL    Hemoglobin 9.7 (L) 12.0 - 16.0 g/dL    Hematocrit 29.6 (L) 36.0 - 46.0 %    MCV 93 80 - 100 fL    MCH 30.6 26.0 - 34.0 pg    MCHC 32.8 32.0 - 36.0 g/dL    RDW 13.8 11.5 - 14.5 %    Platelets 251 150 - 450 x10*3/uL    MPV 9.2 7.5 - 11.5 fL    Neutrophils % 66.2 40.0 - 80.0 %    Immature Granulocytes %, Automated 0.3 0.0 - 0.9 %    Lymphocytes % 20.5 13.0 - 44.0 %    Monocytes % 10.5 2.0 - 10.0 %    Eosinophils % 2.0 0.0 - 6.0 %    Basophils % 0.5 0.0 - 2.0 %    Neutrophils Absolute 4.38 1.60 - 5.50 x10*3/uL    Immature Granulocytes Absolute, Automated 0.02 0.00 - 0.50 x10*3/uL    Lymphocytes Absolute 1.35 0.80 - 3.00 x10*3/uL    Monocytes Absolute 0.69 0.05 - 0.80 x10*3/uL    Eosinophils Absolute 0.13 0.00 - 0.40 x10*3/uL    Basophils Absolute " 0.03 0.00 - 0.10 x10*3/uL   Comprehensive Metabolic Panel   Result Value Ref Range    Glucose 93 65 - 99 mg/dL    Sodium 139 133 - 145 mmol/L    Potassium 3.8 3.4 - 5.1 mmol/L    Chloride 103 97 - 107 mmol/L    Bicarbonate 26 24 - 31 mmol/L    Urea Nitrogen 19 8 - 25 mg/dL    Creatinine 0.80 0.40 - 1.60 mg/dL    eGFR 71 >60 mL/min/1.73m*2    Calcium 9.4 8.5 - 10.4 mg/dL    Albumin 3.5 3.5 - 5.0 g/dL    Alkaline Phosphatase 190 (H) 35 - 125 U/L    Total Protein 5.7 (L) 5.9 - 7.9 g/dL    AST 16 5 - 40 U/L    Bilirubin, Total 0.3 0.1 - 1.2 mg/dL    ALT 15 5 - 40 U/L    Anion Gap 10 <=19 mmol/L   Magnesium   Result Value Ref Range    Magnesium 2.00 1.60 - 3.10 mg/dL   Serial Troponin, Initial ([facility])   Result Value Ref Range    Troponin T, High Sensitivity 24 (H) <=15 ng/L   Serial Troponin, 2 Hour ([facility])   Result Value Ref Range    Troponin T, High Sensitivity 26 (H) <=15 ng/L   Serial Troponin, 6 Hour ([facility])   Result Value Ref Range    Troponin T, High Sensitivity 26 (H) <=15 ng/L   Serial Troponin, 6 Hour ([facility])   Result Value Ref Range    Troponin T, High Sensitivity 27 (H) <=15 ng/L   Lipid Panel   Result Value Ref Range    Cholesterol 106 (L) 133 - 200 mg/dL    HDL-Cholesterol 61.0 >50.0 mg/dL    Cholesterol/HDL Ratio 1.7 SEE COMMENT    LDL Calculated 31 (L) 65 - 130 mg/dL    Triglycerides 71 40 - 150 mg/dL   Hemoglobin A1C   Result Value Ref Range    Hemoglobin A1C 5.8 (H) See below %    Estimated Average Glucose 120 Not Established mg/dL   Basic Metabolic Panel   Result Value Ref Range    Glucose 93 65 - 99 mg/dL    Sodium 145 133 - 145 mmol/L    Potassium 4.0 3.4 - 5.1 mmol/L    Chloride 107 97 - 107 mmol/L    Bicarbonate 29 24 - 31 mmol/L    Urea Nitrogen 16 8 - 25 mg/dL    Creatinine 0.70 0.40 - 1.60 mg/dL    eGFR 83 >60 mL/min/1.73m*2    Calcium 9.1 8.5 - 10.4 mg/dL    Anion Gap 9 <=19 mmol/L   CBC and Auto Differential   Result Value Ref Range    WBC 5.3 4.4 - 11.3 x10*3/uL    nRBC 0.0 0.0 - 0.0 /100 WBCs     RBC 3.36 (L) 4.00 - 5.20 x10*6/uL    Hemoglobin 9.9 (L) 12.0 - 16.0 g/dL    Hematocrit 31.6 (L) 36.0 - 46.0 %    MCV 94 80 - 100 fL    MCH 29.5 26.0 - 34.0 pg    MCHC 31.3 (L) 32.0 - 36.0 g/dL    RDW 14.0 11.5 - 14.5 %    Platelets 247 150 - 450 x10*3/uL    MPV 9.4 7.5 - 11.5 fL    Neutrophils % 64.0 40.0 - 80.0 %    Immature Granulocytes %, Automated 0.2 0.0 - 0.9 %    Lymphocytes % 23.3 13.0 - 44.0 %    Monocytes % 10.4 2.0 - 10.0 %    Eosinophils % 1.9 0.0 - 6.0 %    Basophils % 0.2 0.0 - 2.0 %    Neutrophils Absolute 3.39 1.60 - 5.50 x10*3/uL    Immature Granulocytes Absolute, Automated 0.01 0.00 - 0.50 x10*3/uL    Lymphocytes Absolute 1.23 0.80 - 3.00 x10*3/uL    Monocytes Absolute 0.55 0.05 - 0.80 x10*3/uL    Eosinophils Absolute 0.10 0.00 - 0.40 x10*3/uL    Basophils Absolute 0.01 0.00 - 0.10 x10*3/uL   Transthoracic Echo (TTE) Limited   Result Value Ref Range    BSA 1.5 m2   CT head wo IV contrast    Result Date: 10/26/2023  Interpreted By:  Sherlyn Jackson, STUDY: CT HEAD WO IV CONTRAST 10/26/2023 9:14 pm   INDICATION: dizziness, slurred speech x 30 min with history of fall 1 week earlier   COMPARISON: None available.   ACCESSION NUMBER(S): [id number]   ORDERING CLINICIAN: ODILIA HANDY   TECHNIQUE: Unenhanced axial images of the brain are performed.   FINDINGS: No ventriculomegaly is present. There is old infarct within the right parietal lobe unchanged since the prior study with an old lacunar infarct within the right insula identified as well.   There is some diminished density within the periventricular white matter indicating mild chronic microvascular ischemic disease.   There is no mass effect, intracranial hemorrhage, or extra-axial fluid collection.   Calcified plaque is seen within each carotid siphon.       Old transcortical infarct within the right parietal lobe with old lacunar infarct of right insula.   Mild chronic microvascular ischemic disease.   Signed by: Sherlyn Jackson 10/26/2023 9:28 PM  Dictation workstation:   [id number]       NIH Stroke Scale  1A. Level of Consciousness: Alert, Keenly Responsive  1B. Ask Month and Age: Both Questions Right  1C. Blink Eyes & Squeeze Hands: Performs Both Tasks  2. Best Gaze: Normal  3. Visual: No Visual Loss  4. Facial Palsy: Normal Symmetrical Movements  5A. Motor - Left Arm: No Drift  5B. Motor - Right Arm: No Drift  6A. Motor - Left Leg: No Drift  6B. Motor - Right Leg: No Drift  7. Limb Ataxia: Absent  8. Sensory Loss: Normal  9. Best Language: No Aphasia  10. Dysarthria: Normal  11. Extinction and Inattention: No Abnormality  NIH Stroke Scale: 0           Phil Coma Scale  Best Eye Response: Spontaneous  Best Verbal Response: Oriented  Best Motor Response: Follows commands  Phil Coma Scale Score: 15                 I have personally reviewed the following imaging results CT head wo IV contrast    Result Date: 10/26/2023  Interpreted By:  Sherlyn Jackson, STUDY: CT HEAD WO IV CONTRAST 10/26/2023 9:14 pm   INDICATION: dizziness, slurred speech x 30 min with history of fall 1 week earlier   COMPARISON: None available.   ACCESSION NUMBER(S): [id number]   ORDERING CLINICIAN: ODILIA HANDY   TECHNIQUE: Unenhanced axial images of the brain are performed.   FINDINGS: No ventriculomegaly is present. There is old infarct within the right parietal lobe unchanged since the prior study with an old lacunar infarct within the right insula identified as well.   There is some diminished density within the periventricular white matter indicating mild chronic microvascular ischemic disease.   There is no mass effect, intracranial hemorrhage, or extra-axial fluid collection.   Calcified plaque is seen within each carotid siphon.       Old transcortical infarct within the right parietal lobe with old lacunar infarct of right insula.   Mild chronic microvascular ischemic disease.   Signed by: Sherlyn Jackson 10/26/2023 9:28 PM Dictation workstation:   [id number]        Assessment/Plan   Principal Problem:    TIA (transient ischemic attack)  Active Problems:    Dizziness    Deficit in communication due to slurred speech      88-year-old female with history of CVA, HTN, chronic pain, paroxysmal A-fib presented to the emergency department for concerns for dysarthria x30 minutes.  Patient also noted that time she had overall heaviness.  CT of the brain without acute findings notes remote right parietal infarct given the location and size we will check EEG though likely TIA event as preliminary review of MRI does not reveal acute CVA.  MRA head and neck and echocardiogram pending we will continue to monitor.      Heaven Powers, APRN-CNP

## 2023-10-27 NOTE — PROGRESS NOTES
Occupational Therapy    Evaluation/Treatment    Patient Name: Rakan Cervantes  MRN: [id number]  : 1935  Today's Date: 10/27/23          Assessment:  OT Assessment: Pt appears to be at baseline, no skilled acute OT needs at this time.  Prognosis: Good  End of Session Patient Position: Bed, 2 rail up  Prognosis: Good  Plan:  No Skilled OT: At baseline function  OT Discharge Recommendations: Other (Comment) (prn assist with ADL/IADL at DC)  OT - OK to Discharge: Yes     Subjective   Current Problem:  1. TIA (transient ischemic attack)  Carotid duplex bilateral    Carotid duplex bilateral    Transthoracic Echo (TTE) Limited    Transthoracic Echo (TTE) Limited    CANCELED: Transthoracic Echo (TTE) Complete    CANCELED: Transthoracic Echo (TTE) Complete      2. Transient global amnesia  Carotid duplex bilateral    Carotid duplex bilateral      3. Other cerebral infarction (CMS/HCC)  Transthoracic Echo (TTE) Limited        General:   OT Received On: 10/27/23  General  Reason for Referral: OT Eval and treat  Past Medical History Relevant to Rehab: h/o CVA, slurred speech (PMH:DJD, GERD, spinal stenosis, asthma, CVA, mac degeneration, erosive esophagitis, DJD, polyarthritis, A fib, CAD, cerv radiculopathy)  General Comment: Pt is an 88 year old female admitted for concern for CVA, post fall 2 weeks ago without injury. CT (-) for acute however old infarcts  Precautions:  Medical Precautions: Fall precautions  Vital Signs:     Pain:  Pain Assessment  Pain Assessment: 0-10  Pain Score: 3  Pain Type: Chronic pain  Pain Location: Shoulder    Objective   Cognition:  Orientation Level: Oriented X4      Home Living:  Type of Home: House  Lives With: Spouse  Home Adaptive Equipment: Cane  Home Layout: One level  Home Access: Stairs to enter with rails  Entrance Stairs-Number of Steps: 3  Bathroom Shower/Tub: Tub/shower unit  Bathroom Equipment: Grab bars in shower, Shower chair with back  Prior Function:  Level of [place]:  Needs assistance with ADLs (independent with all except for bathing ( Min A from spouse))  Receives Help From: Family  Homemaking Assistance:  (shared with spouse, son drives)  Ambulatory Assistance: Independent  Hand Dominance: Left  IADL History:  Mode of Transportation: Car (son drives)  ADL:  Eating Assistance: Independent  Grooming Assistance: Stand by  Bathing Assistance: Minimal  UE Dressing Assistance: Stand by  LE Dressing Assistance: Modified independent (Device)  Toileting Assistance with Device: Stand by  Activities of Daily Living:    Toileting  Toileting Level of Assistance: Distant supervision  Where Assessed: Toilet  Toileting Comments: progressed from close superivison to distant supervision  Bed Mobility/Transfers:    Transfers  Transfer:  (supervision with cane)  Vision:Vision - Basic Assessment  Current Vision: Other (Comment)  Visual History:  (mac degeneration)  Sensation:     Strength:  Strength Comments:  (B UE 3+/4- throughout age appropriate)  Coordination:  Movements are Fluid and Coordinated: Yes   Hand Function:  Hand Function  Gross Grasp: Functional  Coordination: Functional (with arthritic deficits)  Outcome Measures: [facility] Daily Activity  Putting on and taking off regular lower body clothing: A little  Bathing (including washing, rinsing, drying): A little  Putting on and taking off regular upper body clothing: None  Toileting, which includes using toilet, bedpan or urinal: A little  Taking care of personal grooming such as brushing teeth: None  Eating Meals: None  Daily Activity - Total Score: 21    EDUCATION:   Goals:  Encounter Problems       Encounter Problems (Resolved)       ADL       Goal 1 (Met)       Start:  10/27/23    Expected End:  10/27/23    Resolved:  10/27/23    Patient will demonstrate improved ADL skills:  Grooming with Supervision assist .        UE Dressing with Supervision assist .       LE Dressing with Supervision assist .      Toileting with Supervision assist  .

## 2023-10-27 NOTE — NURSING NOTE
Report received from ED RN, pt arrived to room 11-A on the SDU via wheelchair accompanied by an ED tech. Assumed patient care on arrival. No signs of acute distress Identified. Admission documentation initiated. No needs expressed at this time. She is currently sitting on the edge of the bed, call light in reach. Assessing and continuing to monitor.

## 2023-10-27 NOTE — ED PROVIDER NOTES
HPI   Chief Complaint   Patient presents with    slurred speech     Family stated at [id number] pt was having slurred speech. While in triage, pt was alert and had no slurred speech. [place] test was negative. Family stated pt fell a week ago and was laying on the floor all night. Currently pt is c/o left shoulder pain radiating down to her hand.       Patient is an 88-year-old female presents emergency department for evaluation of episode of slurred speech and weakness along with dizziness occurred today at 6 PM.  Patient states that over the last week to week and a half she has been having intermittent episodes of dizziness where it feels as if the room is spinning and she feels off balance.  She notes during these episodes she feels very weak.  She states that they resolve on their own, but she has never had episodes like this before.  Today around 6:30 PM patient had episode of significant dizziness and  noted that she had slurred speech and felt very weak.  This resolved after a few minutes, but patient persisted to feel significantly weak and they present here for further evaluation.  Patient relatively asymptomatic at this time.  She notes a history of heart attack with 1 stent following with cardiologist Dr. Pantoja and notes that she is on Plavix but no other blood thinners.  She denies any headache, vision changes, nausea, vomiting, fevers, chills.      History provided by:  Patient   used: No                        No data recorded                Patient History   No past medical history on file.  Past Surgical History:   Procedure Laterality Date    CT HEAD ANGIO W AND WO IV CONTRAST  5/8/2013    CT HEAD ANGIO W AND WO IV CONTRAST [facility]    MR HEAD ANGIO WO IV CONTRAST  4/23/2013    MR HEAD ANGIO WO IV CONTRAST [facility]    MR HEAD ANGIO WO IV CONTRAST  7/20/2016    MR HEAD ANGIO WO IV CONTRAST [facility]    OTHER SURGICAL HISTORY  02/14/2022     No history of surgery     No family history on file.  Social History     Tobacco Use    Smoking status: Not on file    Smokeless tobacco: Not on file   Substance Use Topics    Alcohol use: Not on file    Drug use: Not on file       Physical Exam   ED Triage Vitals [10/26/23 1959]   Temp Heart Rate Resp BP   36.9 °C (98.4 °F) 70 18 152/51      SpO2 Temp Source Heart Rate Source Patient Position   95 % Temporal Brachial --      BP Location FiO2 (%)     -- --       Physical Exam  Constitutional:       Appearance: Normal appearance.   HENT:      Head: Normocephalic and atraumatic.   Eyes:      Extraocular Movements: Extraocular movements intact.      Pupils: Pupils are equal, round, and reactive to light.   Cardiovascular:      Rate and Rhythm: Normal rate and regular rhythm.   Pulmonary:      Effort: Pulmonary effort is normal.      Breath sounds: Normal breath sounds.   Abdominal:      General: Abdomen is flat.      Palpations: Abdomen is soft.      Tenderness: There is abdominal tenderness.   Musculoskeletal:         General: Normal range of motion.      Cervical back: Normal range of motion and neck supple.   Skin:     General: Skin is warm and dry.   Neurological:      General: No focal deficit present.      Mental Status: She is alert and oriented to person, place, and time.      Comments: NIHSS 0.    Psychiatric:         Mood and Affect: Mood normal.         Behavior: Behavior normal.         ED Course & MDM   ED Course as of 10/27/23 0050   Thu Oct 26, 2023   2140 EKG ordered and interpreted by me at 2018.  Rate is 67, Rhythm is sinus, Axis is normal, QTc is 445, no ST elevation [JM]      ED Course User Index  [JM] Randi Trejo MD         Diagnoses as of 10/27/23 0050   TIA (transient ischemic attack)       Medical Decision Making  Patient is an 88-year-old male presents emergency department for evaluation of episode of slurred speech and dizziness.    EKG was interpreted by attending  physician.    Lab work done today included CMP, CBC, troponins, magnesium.  Lab work shows anemia, elevated troponin of 24 with repeat troponin of 26.    Scans done today were interpreted/confirmed by radiologist and also interpreted by me which included CT brain without contrast.  CT brain shows no acute intracranial abnormality with old transcortical infarct within the right parietal lobe with old lacunar infarct of right insula and mild chronic microvascular ischemic disease.    Medications not given at today's visit.    I saw this patient in conjunction with Dr. Trejo.  Given patient's intermittent episodes of dizziness over the last week and a half with episode of dizziness today with slurred speech, concern is for TIA.  Patient is neurologically intact at this time with no deficits and no continued slurred speech.  NIH score of 0.  Given patient's complete resolution of symptoms, patient is not a candidate for any TNK or other acute interventions.  CT scan shows no evidence of acute intracranial abnormality and lab work relatively unremarkable other than borderline elevated troponin.  Given patient's episode today concern is for possible TIA.  Given this she will be admitted for further stroke work-up.  Patient agreeable this plan moving forward.  I spoke with hospitalist on-call who is agreeable to admission of patient for further management.    The patient/family was counseled on clinical impression, expectations, and plan along with recommendations to admission.  All questions were answered and involved parties were understanding and agreeable to course of treatment.  Case was discussed with admitting physician and any consultants. Bed type, ED treatment and further ED workup decided by joint decision making with admitting team and any consultants. Patient stable for admission per my assessment and further management of patient will be deferred to the inpatient setting.    ** Disclaimer:  Parts of this  document were written utilizing a voice to text dictation software.  Note may contain minor transcription or typographical errors that were inadvertently transcribed by the computer software.           Nano Almonte PA-C  10/27/23 0050

## 2023-10-27 NOTE — ED PROVIDER NOTES
HPI   Chief Complaint   Patient presents with    slurred speech     Family stated at [id number] pt was having slurred speech. While in triage, pt was alert and had no slurred speech. [place] test was negative. Family stated pt fell a week ago and was laying on the floor all night. Currently pt is c/o left shoulder pain radiating down to her hand.       88-year-old female presents with slurred speech.  Episode occurred at 1830, lasted approximately 30 minutes.  Patient's son and  at bedside.  Per son, by the time he arrived 1900 slurred speech had completely resolved.  Has been and son both state the patient was incredibly weak, requiring 2 person assist when normally she is ambulatory independently.  Patient had a fall approximately 2 weeks ago, hitting her head, but did not seek medical evaluation at that time.  She has had repeat dizzy episodes since then.  Prior history of MI, no prior history of stroke.  Recent fevers or chills.  No recent urinary symptoms.                            No data recorded                Patient History   No past medical history on file.  Past Surgical History:   Procedure Laterality Date    CT HEAD ANGIO W AND WO IV CONTRAST  5/8/2013    CT HEAD ANGIO W AND WO IV CONTRAST [facility] CLINICAL LEGACY    MR HEAD ANGIO WO IV CONTRAST  4/23/2013    MR HEAD ANGIO WO IV CONTRAST [facility] CLINICAL LEGACY    MR HEAD ANGIO WO IV CONTRAST  7/20/2016    MR HEAD ANGIO WO IV CONTRAST [facility] EMERGENCY LEGACY    OTHER SURGICAL HISTORY  02/14/2022    No history of surgery     No family history on file.  Social History     Tobacco Use    Smoking status: Not on file    Smokeless tobacco: Not on file   Substance Use Topics    Alcohol use: Not on file    Drug use: Not on file       Physical Exam   ED Triage Vitals [10/26/23 1959]   Temp Heart Rate Resp BP   36.9 °C (98.4 °F) 70 18 152/51      SpO2 Temp Source Heart Rate Source Patient Position   95 % Temporal Brachial --      BP Location FiO2 (%)     -- --        Physical Exam  Vitals and nursing note reviewed.   Constitutional:       General: She is not in acute distress.     Appearance: She is not ill-appearing.   HENT:      Head: Normocephalic and atraumatic.      Mouth/Throat:      Mouth: Mucous membranes are moist.      Pharynx: Oropharynx is clear.   Eyes:      Extraocular Movements: Extraocular movements intact.      Conjunctiva/sclera: Conjunctivae normal.      Pupils: Pupils are equal, round, and reactive to light.   Cardiovascular:      Rate and Rhythm: Normal rate and regular rhythm.   Pulmonary:      Effort: Pulmonary effort is normal. No respiratory distress.      Breath sounds: Normal breath sounds.   Abdominal:      General: There is no distension.      Palpations: Abdomen is soft.      Tenderness: There is no abdominal tenderness.   Musculoskeletal:         General: No swelling or deformity. Normal range of motion.      Cervical back: Normal range of motion and neck supple.   Skin:     General: Skin is warm and dry.      Capillary Refill: Capillary refill takes less than 2 seconds.   Neurological:      General: No focal deficit present.      Mental Status: She is alert and oriented to person, place, and time. Mental status is at baseline.      Cranial Nerves: Cranial nerves 2-12 are intact. No dysarthria.      Sensory: Sensation is intact.      Motor: Motor function is intact. No pronator drift.      Coordination: Finger-Nose-Finger Test and Heel to Shin Test normal.   Psychiatric:         Mood and Affect: Mood normal.         Behavior: Behavior normal.         ED Course & [facility]   ED Course as of 10/26/23 2310   u Oct 26, 2023   2140 EKG ordered and interpreted by me at 2018.  Rate is 67, Rhythm is sinus, Axis is normal, QTc is 445, no ST elevation [JM]      ED Course User Index  [JM] Randi Trejo MD       Medical Decision Making  88 y.o. female presents with symptoms most likely d/t TIA given laboratory, radiographic, & historical and  physical exam features.  Considered wide ddx for pt's presentation, including metabolic disturbance (lytes, hypo/hyperglycemia), ICH, seizure, vascular issue, ACS, large brain mass, encephalitis/meningitis -- less/not c/w H&P and supportive studies.  Patient did not have any neurological deficits on arrival.  Code brain attack was not activated.  Patient not given tPA as NIH was 0 and all symptoms had resolved.  CT angiograms not obtained as again NIH is 0, less likely to be LVO.  Neurointerventionalist was not consulted.  Given concern for acute stroke/neurological injury, warrants inpatient admission for further workup and management.  At the end of my shift, patient is pending admission.  Patient signed out to TE[facility]ILO who will admit the patient.          Procedure  Procedures     Randi Trejo MD  10/26/23 7975

## 2023-10-27 NOTE — NURSING NOTE
Assumed care of patient at this time 1905. At bedside report, patient had no c/o pain. vss and afebrile. Bed low and locked, call button within reach and bed alarm on. Patient has no needs that require immediate nursing interventions.

## 2023-10-28 VITALS
SYSTOLIC BLOOD PRESSURE: 169 MMHG | DIASTOLIC BLOOD PRESSURE: 62 MMHG | TEMPERATURE: 97.5 F | OXYGEN SATURATION: 99 % | HEIGHT: 59 IN | BODY MASS INDEX: 24.09 KG/M2 | RESPIRATION RATE: 16 BRPM | WEIGHT: 119.49 LBS | HEART RATE: 65 BPM

## 2023-10-28 LAB
GLUCOSE BLD MANUAL STRIP-MCNC: 195 MG/DL (ref 74–99)
GLUCOSE BLD MANUAL STRIP-MCNC: 99 MG/DL (ref 74–99)
TROPONIN T SERPL-MCNC: 24 NG/L

## 2023-10-28 PROCEDURE — 36415 COLL VENOUS BLD VENIPUNCTURE: CPT | Performed by: INTERNAL MEDICINE

## 2023-10-28 PROCEDURE — 99232 SBSQ HOSP IP/OBS MODERATE 35: CPT | Performed by: INTERNAL MEDICINE

## 2023-10-28 PROCEDURE — G0378 HOSPITAL OBSERVATION PER HR: HCPCS

## 2023-10-28 PROCEDURE — 84484 ASSAY OF TROPONIN QUANT: CPT | Performed by: INTERNAL MEDICINE

## 2023-10-28 PROCEDURE — 2500000001 HC RX 250 WO HCPCS SELF ADMINISTERED DRUGS (ALT 637 FOR MEDICARE OP): Performed by: INTERNAL MEDICINE

## 2023-10-28 PROCEDURE — 82947 ASSAY GLUCOSE BLOOD QUANT: CPT

## 2023-10-28 PROCEDURE — 2500000004 HC RX 250 GENERAL PHARMACY W/ HCPCS (ALT 636 FOR OP/ED): Performed by: INTERNAL MEDICINE

## 2023-10-28 RX ORDER — VERAPAMIL HYDROCHLORIDE 180 MG/1
180 TABLET, FILM COATED, EXTENDED RELEASE ORAL DAILY
Qty: 30 TABLET | Refills: 1 | Status: SHIPPED | OUTPATIENT
Start: 2023-10-29 | End: 2024-04-08 | Stop reason: ALTCHOICE

## 2023-10-28 RX ORDER — CARVEDILOL 6.25 MG/1
6.25 TABLET ORAL
Refills: 0
Start: 2023-10-28 | End: 2024-01-08

## 2023-10-28 RX ORDER — ISOSORBIDE MONONITRATE 60 MG/1
60 TABLET, EXTENDED RELEASE ORAL DAILY
Qty: 30 TABLET | Refills: 1 | Status: SHIPPED | OUTPATIENT
Start: 2023-10-29 | End: 2024-01-08

## 2023-10-28 RX ORDER — HYDROXYCHLOROQUINE SULFATE 200 MG/1
200 TABLET, FILM COATED ORAL DAILY
Refills: 0
Start: 2023-10-29 | End: 2024-01-08

## 2023-10-28 RX ORDER — ATORVASTATIN CALCIUM 80 MG/1
80 TABLET, FILM COATED ORAL NIGHTLY
Qty: 30 TABLET | Refills: 1 | Status: SHIPPED | OUTPATIENT
Start: 2023-10-28 | End: 2024-04-08 | Stop reason: SDUPTHER

## 2023-10-28 RX ORDER — FERROUS SULFATE 325(65) MG
65 TABLET ORAL
Qty: 30 TABLET | Refills: 1 | Status: SHIPPED | OUTPATIENT
Start: 2023-10-29 | End: 2024-03-29 | Stop reason: SDUPTHER

## 2023-10-28 RX ADMIN — APIXABAN 2.5 MG: 2.5 TABLET, FILM COATED ORAL at 09:50

## 2023-10-28 RX ADMIN — HYDROXYCHLOROQUINE SULFATE 200 MG: 200 TABLET ORAL at 08:13

## 2023-10-28 RX ADMIN — SULFASALAZINE 500 MG: 500 TABLET ORAL at 09:00

## 2023-10-28 RX ADMIN — VERAPAMIL HYDROCHLORIDE 180 MG: 180 TABLET, FILM COATED, EXTENDED RELEASE ORAL at 09:50

## 2023-10-28 RX ADMIN — ISOSORBIDE MONONITRATE 60 MG: 60 TABLET, EXTENDED RELEASE ORAL at 09:50

## 2023-10-28 RX ADMIN — ASPIRIN 81 MG: 81 TABLET, COATED ORAL at 08:13

## 2023-10-28 RX ADMIN — POTASSIUM CHLORIDE 10 MEQ: 750 TABLET, EXTENDED RELEASE ORAL at 08:13

## 2023-10-28 RX ADMIN — FERROUS SULFATE TAB 325 MG (65 MG ELEMENTAL FE) 65 MG OF IRON: 325 (65 FE) TAB at 08:13

## 2023-10-28 RX ADMIN — PANTOPRAZOLE SODIUM 40 MG: 40 TABLET, DELAYED RELEASE ORAL at 08:13

## 2023-10-28 RX ADMIN — CARVEDILOL 6.25 MG: 6.25 TABLET, FILM COATED ORAL at 08:13

## 2023-10-28 RX ADMIN — CLOPIDOGREL BISULFATE 75 MG: 75 TABLET ORAL at 09:49

## 2023-10-28 ASSESSMENT — COGNITIVE AND FUNCTIONAL STATUS - GENERAL
STANDING UP FROM CHAIR USING ARMS: A LITTLE
TOILETING: A LITTLE
HELP NEEDED FOR BATHING: A LITTLE
CLIMB 3 TO 5 STEPS WITH RAILING: A LITTLE
MOBILITY SCORE: 20
WALKING IN HOSPITAL ROOM: A LITTLE
MOVING TO AND FROM BED TO CHAIR: A LITTLE
DAILY ACTIVITIY SCORE: 21
DRESSING REGULAR LOWER BODY CLOTHING: A LITTLE

## 2023-10-28 ASSESSMENT — PAIN - FUNCTIONAL ASSESSMENT
PAIN_FUNCTIONAL_ASSESSMENT: 0-10
PAIN_FUNCTIONAL_ASSESSMENT: 0-10

## 2023-10-28 ASSESSMENT — PAIN SCALES - GENERAL
PAINLEVEL_OUTOF10: 0 - NO PAIN
PAINLEVEL_OUTOF10: 0 - NO PAIN

## 2023-10-28 NOTE — PROGRESS NOTES
"Subjective   Patient reports that she is feeling fine.  No funny episodes since yesterday.  She states that she has been getting up to go to the bathroom and walking around in the hallways ad bubba without difficulty.     Objective   Neurological Exam  Physical Exam    Last Recorded Vitals  Blood pressure 146/60, pulse 65, temperature 36.4 °C (97.5 °F), temperature source Temporal, resp. rate 16, height 1.499 m (4' 11\"), weight 54.2 kg (119 lb 7.8 oz), SpO2 99 %.      Neurologically, pt is awake and oriented x4. Attention, concentration, memory, cortical processing are intact. No aphasia  Cranial nerves: VFF, EOMI, No nystagmus, Face is symmetric to sensory and motor, no dysarthria, Tongue protrudes midline, Shrug symmetric  Motor: 5/5 strength B throughout, no tremor or asterixis  Sensation is intact bilaterally throughout  Coordination: no ataxia, no dysmetria/dysdiadochokinesia         Scheduled medications  apixaban, 2.5 mg, oral, q12h  aspirin, 81 mg, oral, Daily  atorvastatin, 80 mg, oral, Nightly  carvedilol, 6.25 mg, oral, BID after meals  clopidogrel, 75 mg, oral, Daily  ferrous sulfate, 65 mg of iron, oral, Daily with breakfast  hydroxychloroquine, 200 mg, oral, Daily  isosorbide mononitrate ER, 60 mg, oral, Daily  pantoprazole, 40 mg, oral, BID  perflutren lipid microspheres, 0.5-10 mL of dilution, intravenous, Once in imaging  perflutren protein A microsphere, 0.5 mL, intravenous, Once in imaging  pneumococcal conjugate, 0.5 mL, intramuscular, During hospitalization  potassium chloride CR, 10 mEq, oral, Daily with breakfast  sulfaSALAzine, 500 mg, oral, BID after meals  sulfur hexafluoride microsphr, 2 mL, intravenous, Once in imaging  verapamil SR, 180 mg, oral, Daily      Continuous medications     PRN medications  PRN medications: acetaminophen, hydrALAZINE **FOLLOWED BY** [START ON 10/29/2023] hydrALAZINE, labetaloL, oxygen, polyethylene glycol     Results for orders placed or performed during the " hospital encounter of 10/26/23 (from the past 96 hour(s))   Urinalysis with Reflex Microscopic and Culture   Result Value Ref Range    Color, Urine Colorless (N) Light-Yellow, Yellow, Dark-Yellow    Appearance, Urine Clear Clear    Specific Gravity, Urine 1.007 1.005 - 1.035    pH, Urine 5.5 5.0, 5.5, 6.0, 6.5, 7.0, 7.5, 8.0    Protein, Urine NEGATIVE NEGATIVE, 10 (TRACE), 20 (TRACE) mg/dL    Glucose, Urine Normal Normal mg/dL    Blood, Urine NEGATIVE NEGATIVE    Ketones, Urine NEGATIVE NEGATIVE mg/dL    Bilirubin, Urine NEGATIVE NEGATIVE    Urobilinogen, Urine Normal Normal mg/dL    Nitrite, Urine NEGATIVE NEGATIVE    Leukocyte Esterase, Urine NEGATIVE NEGATIVE   CBC and Auto Differential   Result Value Ref Range    WBC 6.6 4.4 - 11.3 x10*3/uL    nRBC 0.0 0.0 - 0.0 /100 WBCs    RBC 3.17 (L) 4.00 - 5.20 x10*6/uL    Hemoglobin 9.7 (L) 12.0 - 16.0 g/dL    Hematocrit 29.6 (L) 36.0 - 46.0 %    MCV 93 80 - 100 fL    MCH 30.6 26.0 - 34.0 pg    MCHC 32.8 32.0 - 36.0 g/dL    RDW 13.8 11.5 - 14.5 %    Platelets 251 150 - 450 x10*3/uL    MPV 9.2 7.5 - 11.5 fL    Neutrophils % 66.2 40.0 - 80.0 %    Immature Granulocytes %, Automated 0.3 0.0 - 0.9 %    Lymphocytes % 20.5 13.0 - 44.0 %    Monocytes % 10.5 2.0 - 10.0 %    Eosinophils % 2.0 0.0 - 6.0 %    Basophils % 0.5 0.0 - 2.0 %    Neutrophils Absolute 4.38 1.60 - 5.50 x10*3/uL    Immature Granulocytes Absolute, Automated 0.02 0.00 - 0.50 x10*3/uL    Lymphocytes Absolute 1.35 0.80 - 3.00 x10*3/uL    Monocytes Absolute 0.69 0.05 - 0.80 x10*3/uL    Eosinophils Absolute 0.13 0.00 - 0.40 x10*3/uL    Basophils Absolute 0.03 0.00 - 0.10 x10*3/uL   Comprehensive Metabolic Panel   Result Value Ref Range    Glucose 93 65 - 99 mg/dL    Sodium 139 133 - 145 mmol/L    Potassium 3.8 3.4 - 5.1 mmol/L    Chloride 103 97 - 107 mmol/L    Bicarbonate 26 24 - 31 mmol/L    Urea Nitrogen 19 8 - 25 mg/dL    Creatinine 0.80 0.40 - 1.60 mg/dL    eGFR 71 >60 mL/min/1.73m*2    Calcium 9.4 8.5 - 10.4  mg/dL    Albumin 3.5 3.5 - 5.0 g/dL    Alkaline Phosphatase 190 (H) 35 - 125 U/L    Total Protein 5.7 (L) 5.9 - 7.9 g/dL    AST 16 5 - 40 U/L    Bilirubin, Total 0.3 0.1 - 1.2 mg/dL    ALT 15 5 - 40 U/L    Anion Gap 10 <=19 mmol/L   Magnesium   Result Value Ref Range    Magnesium 2.00 1.60 - 3.10 mg/dL   Serial Troponin, Initial ([facility])   Result Value Ref Range    Troponin T, High Sensitivity 24 (H) <=15 ng/L   Serial Troponin, 2 Hour ([facility])   Result Value Ref Range    Troponin T, High Sensitivity 26 (H) <=15 ng/L   Serial Troponin, 6 Hour ([facility])   Result Value Ref Range    Troponin T, High Sensitivity 26 (H) <=15 ng/L   Serial Troponin, 6 Hour ([facility])   Result Value Ref Range    Troponin T, High Sensitivity 27 (H) <=15 ng/L   Lipid Panel   Result Value Ref Range    Cholesterol 106 (L) 133 - 200 mg/dL    HDL-Cholesterol 61.0 >50.0 mg/dL    Cholesterol/HDL Ratio 1.7 SEE COMMENT    LDL Calculated 31 (L) 65 - 130 mg/dL    Triglycerides 71 40 - 150 mg/dL   Hemoglobin A1C   Result Value Ref Range    Hemoglobin A1C 5.8 (H) See below %    Estimated Average Glucose 120 Not Established mg/dL   Basic Metabolic Panel   Result Value Ref Range    Glucose 93 65 - 99 mg/dL    Sodium 145 133 - 145 mmol/L    Potassium 4.0 3.4 - 5.1 mmol/L    Chloride 107 97 - 107 mmol/L    Bicarbonate 29 24 - 31 mmol/L    Urea Nitrogen 16 8 - 25 mg/dL    Creatinine 0.70 0.40 - 1.60 mg/dL    eGFR 83 >60 mL/min/1.73m*2    Calcium 9.1 8.5 - 10.4 mg/dL    Anion Gap 9 <=19 mmol/L   CBC and Auto Differential   Result Value Ref Range    WBC 5.3 4.4 - 11.3 x10*3/uL    nRBC 0.0 0.0 - 0.0 /100 WBCs    RBC 3.36 (L) 4.00 - 5.20 x10*6/uL    Hemoglobin 9.9 (L) 12.0 - 16.0 g/dL    Hematocrit 31.6 (L) 36.0 - 46.0 %    MCV 94 80 - 100 fL    MCH 29.5 26.0 - 34.0 pg    MCHC 31.3 (L) 32.0 - 36.0 g/dL    RDW 14.0 11.5 - 14.5 %    Platelets 247 150 - 450 x10*3/uL    MPV 9.4 7.5 - 11.5 fL    Neutrophils % 64.0 40.0 - 80.0 %    Immature Granulocytes %, Automated 0.2 0.0 -  0.9 %    Lymphocytes % 23.3 13.0 - 44.0 %    Monocytes % 10.4 2.0 - 10.0 %    Eosinophils % 1.9 0.0 - 6.0 %    Basophils % 0.2 0.0 - 2.0 %    Neutrophils Absolute 3.39 1.60 - 5.50 x10*3/uL    Immature Granulocytes Absolute, Automated 0.01 0.00 - 0.50 x10*3/uL    Lymphocytes Absolute 1.23 0.80 - 3.00 x10*3/uL    Monocytes Absolute 0.55 0.05 - 0.80 x10*3/uL    Eosinophils Absolute 0.10 0.00 - 0.40 x10*3/uL    Basophils Absolute 0.01 0.00 - 0.10 x10*3/uL   NT-PROBNP   Result Value Ref Range    PROBNP 540 0 - 624 pg/mL   Transthoracic Echo (TTE) Limited   Result Value Ref Range    LV A4C EF 65.9    POCT GLUCOSE   Result Value Ref Range    POCT Glucose 168 (H) 74 - 99 mg/dL   ECG 12 lead   Result Value Ref Range    Ventricular Rate 67 BPM    Atrial Rate 67 BPM    DE Interval 162 ms    QRS Duration 92 ms    QT Interval 422 ms    QTC Calculation(Bazett) 445 ms    P Axis 65 degrees    R Axis 44 degrees    T Axis 63 degrees    QRS Count 11 beats    Q Onset 220 ms    P Onset 139 ms    P Offset 209 ms    T Offset 431 ms    QTC Fredericia 438 ms   POCT GLUCOSE   Result Value Ref Range    POCT Glucose 78 74 - 99 mg/dL   Troponin T, High Sensitivity   Result Value Ref Range    Troponin T, High Sensitivity 24 (H) <=15 ng/L   POCT GLUCOSE   Result Value Ref Range    POCT Glucose 99 74 - 99 mg/dL   POCT GLUCOSE   Result Value Ref Range    POCT Glucose 195 (H) 74 - 99 mg/dL          EEG    Result Date: 10/27/2023  IMPRESSION This routine EEG is normal in awake and sleep state. No epileptiform discharges or lateralizing signs are seen. This report has been interpreted and electronically signed by    Carotid duplex bilateral    Result Date: 10/27/2023           [place] [id number]            Phone [phone]  Vascular Lab Report  [facility] US CAROTID ARTERY DUPLEX BILATERAL Patient Name:      NICOLE PRICE       [place] Physician:  [id number] Andres Bender MD Study Date:        10/27/2023            Ordering Provider:  [id number] TERRANCE TAVAREZ MRN/PID:           [id number]             Fellow: Accession#:        [id number]         Technologist:       Michelle Short RVT Date of Birth/Age: 1935 / 88 years Technologist 2: Gender:            F                    Encounter#:         [id number] Admission Status:  Inpatient            Location Performed: [facility]  Diagnosis/ICD: Occlusion and stenosis of bilateral carotid arteries-I65.23 Indication:    Carotid Occlusion/Stenosis w/o infarct CPT Codes:     97262 Cerebrovascular Carotid Duplex scan complete  CONCLUSIONS: Right Carotid: Findings are consistent with less than 50% stenosis of the right proximal internal carotid artery. Laminar flow seen by color Doppler. Right external carotid artery appears patent with no evidence of stenosis. The right vertebral artery is patent with antegrade flow. No evidence of hemodynamically significant stenosis in the right subclavian artery. Left Carotid: Findings are consistent with less than 50% stenosis of the left proximal internal carotid artery. Left external carotid artery appears patent with no evidence of stenosis. The left vertebral artery is patent with antegrade flow. No evidence of hemodynamically significant stenosis in the left subclavian artery. Additional Findings: Tortuous vessels bilaterally.  Imaging & Doppler Findings: Right Plaque Morph: The proximal right internal carotid artery demonstrates heterogenous plaque. The proximal right external carotid artery demonstrates heterogenous and calcified plaque. The mid right common carotid artery demonstrates heterogenous plaque. The distal right common carotid artery demonstrates heterogenous and calcified plaque. The right carotid bulb demonstrates heterogenous plaque. Left Plaque Morph: The proximal left internal carotid artery demonstrates heterogenous plaque. The proximal left external carotid artery demonstrates heterogenous plaque. The mid left common  carotid artery demonstrates heterogenous plaque. The distal left common carotid artery demonstrates heterogenous and calcified plaque. The left carotid bulb demonstrates heterogenous plaque.   Right                        Left   PSV      EDV                PSV      EDV 89 cm/s            CCA P    65 cm/s 54 cm/s            CCA D    58 cm/s 56 cm/s  9 cm/s    ICA P    56 cm/s  6 cm/s 70 cm/s  13 cm/s   ICA M    76 cm/s  12 cm/s 47 cm/s  13 cm/s   ICA D    83 cm/s  17 cm/s 102 cm/s            ECA     116 cm/s 65 cm/s  12 cm/s Vertebral  64 cm/s  6 cm/s 107 cm/s         Subclavian 102 cm/s                Right Left ICA/CCA Ratio  1.0  1.0   [id number] Andres Bender MD Electronically signed by [id number] Andres Bender MD on 10/27/2023 at 3:53:59 PM  ** Final **     ECG 12 lead    Result Date: 10/27/2023  Normal sinus rhythm Cannot rule out Anterior infarct , age undetermined Abnormal ECG No previous ECGs available Confirmed by Krishna Cervantes ([id number]) on 10/27/2023 3:13:15 PM    Transthoracic Echo (TTE) Limited    Result Date: 10/27/2023           [place] [address]            Phone [phone] TRANSTHORACIC ECHOCARDIOGRAM REPORT  Patient Name:      NICOLE Lock Physician:    [id number] Nessa Callaway MD Study Date:        10/27/2023           Ordering Provider:    [id number] GARCIA WISEMAN MRN/PID:           [id number]             Fellow: Accession#:        [id number]         Nurse: Date of Birth/Age: 1935 / 88 years Sonographer:          Lynette Hutton ACS,                                                               DEMETRA, MARYAN Gender:            F                    Additional Staff: Height:            149.86 cm            Admit Date: Weight:            52.99 kg             Admission  Status:     Inpatient -                                                               Routine BSA:               1.47 m2              Department Location[facility]  [facility] Blood Pressure: 161 /70 mmHg Study Type:    TRANSTHORACIC ECHO (TTE) LIMITED Diagnosis/ICD: Other cerebral infarction-I63.89 Indication:    Transischemic Attack CPT Codes:     Echo Limited-01152; Color Doppler-99723; Doppler Full-64357 Patient History: Pertinent History: TIA, Atrial fib. Study Detail: The following Echo studies were performed: 2D, M-Mode, Doppler and               color flow.  PHYSICIAN INTERPRETATION: Left Ventricle: Left ventricular systolic function is normal, with an estimated ejection fraction of 60-65%. There are no regional wall motion abnormalities. The left ventricular cavity size is normal. There is left ventricular concentric remodeling. Spectral Doppler shows a normal pattern of left ventricular diastolic filling. Left Atrium: The left atrium is normal in size. Right Ventricle: The right ventricle is normal in size. There is normal right ventricular global systolic function. Right Atrium: The right atrium is normal in size. Aortic Valve: The aortic valve was not well visualized. There is evidence of mildly elevated transaortic gradients consistent with sclerosis of the aortic valve. There is trivial aortic valve regurgitation. Mitral Valve: The mitral valve is normal in structure. There is no evidence of mitral valve regurgitation. Tricuspid Valve: The tricuspid valve is structurally normal. There is trace tricuspid regurgitation. The Doppler estimated RVSP is slightly elevated at 31.3 mmHg. Pulmonic Valve: The pulmonic valve is structurally normal. There is physiologic pulmonic valve regurgitation. Pericardium: There is no pericardial effusion noted. Aorta: The aortic root is normal. Systemic Veins: The inferior vena cava was not well visualized.  CONCLUSIONS:  1. Left ventricular systolic function is normal with a  60-65% estimated ejection fraction.  2. No evidence of mitral valve regurgitation.  3. Slightly elevated RVSP.  4. Trace tricuspid regurgitation is visualized.  5. Aortic valve sclerosis. QUANTITATIVE DATA SUMMARY: LV SYSTOLIC FUNCTION BY 2D PLANIMETRY (MOD):                     Normal Ranges: EF-A4C View: 65.9 % (>=55%) EF-A2C View: 57.5 % EF-Biplane:  62.4 % LV DIASTOLIC FUNCTION:                        Normal Ranges: MV Peak E:    1.31 m/s (0.7-1.2 m/s) MV Peak A:    1.31 m/s (0.42-0.7 m/s) E/A Ratio:    1.00     (1.0-2.2) MV lateral e' 0.08 m/s MV medial e'  0.05 m/s MITRAL VALVE:                 Normal Ranges: MV DT: 311 msec (150-240msec)  RIGHT VENTRICLE: TAPSE: 16.5 mm RV s'  0.08 m/s TRICUSPID VALVE/RVSP:                             Normal Ranges: Peak TR Velocity: 2.66 m/s RV Syst Pressure: 31.3 mmHg (< 30mmHg)  [id number] Nessa Callaway MD Electronically signed on 10/27/2023 at 12:17:04 PM  ** Final **     MR angio neck wo IV contrast    Result Date: 10/27/2023  Interpreted By:  Vicente Person, STUDY: MR ANGIO NECK WO IV CONTRAST;  10/27/2023 9:42 am   INDICATION: Signs/Symptoms:TIA.   COMPARISON: None.   ACCESSION NUMBER(S): [id number]   ORDERING CLINICIAN: TERRANCE TAVAREZ   TECHNIQUE: Time of flight MRA of the neck was performed. The images were reviewed as source images and maximum intensity projections.   FINDINGS: Right carotid vessels:  There is expected flow signal in the visualized portion of the common carotid artery.  There is mild attenuation of flow signal at the carotid bifurcation which may be secondary to flow related artifact. The internal carotid artery in the neck demonstrates expected flow signal.  No evidence for hemodynamically significant stenosis.   Left carotid vessels:   There is expected flow signal in the visualized portion of the common carotid artery.  There is mild attenuation of flow signal at the carotid bifurcation which may be secondary to flow related artifact. The internal  carotid artery in the neck demonstrates expected flow signal. No evidence for hemodynamically significant stenosis.   Vertebral vessels:   The visualized segments of the cervical vertebral arteries demonstrate expected flow signal. No evidence for hemodynamically significant stenosis.       No evidence of significant stenosis on MRA of the neck.   MACRO: None   Signed by: Vicente Person 10/27/2023 11:35 AM Dictation workstation:   [id number]    MR angio head wo IV contrast    Result Date: 10/27/2023  Interpreted By:  Vicente Person, STUDY: MR ANGIO HEAD WO IV CONTRAST;  10/27/2023 9:42 am   INDICATION: Signs/Symptoms:TIA.   COMPARISON: None.   ACCESSION NUMBER(S): [id number]   ORDERING CLINICIAN: TERRANCE TAVAREZ   TECHNIQUE: Time-of-flight MRA of the head was performed. The images were reviewed as source images and maximum intensity projections.   FINDINGS: No evidence for hemodynamically significant stenosis or acute abnormality.   Anterior circulation:    There is expected flow signal in bilateral intracranial internal carotid arteries, bilateral carotid terminals, bilateral proximal anterior and middle cerebral arteries.   Posterior circulation:    Bilateral intracranial vertebral arteries, vertebrobasilar junction, basilar artery and proximal posterior cerebral arteries demonstrate expected flow signal. Fetal origin of the right posterior cerebral artery.       1.  There is no evidence for hemodynamically significant stenosis or large branch vessel cutoffs of the visualized intracranial vasculature. 2. No evidence for aneurysm or vascular malformation.   MACRO: None   Signed by: Vicente Person 10/27/2023 11:31 AM Dictation workstation:   [id number]    MR brain wo IV contrast    Result Date: 10/27/2023  Interpreted By:  Vicente Person, STUDY: MR BRAIN WO IV CONTRAST; 10/27/2023 9:42 am   INDICATION: Signs/Symptoms:TIA;   COMPARISON: CT head dated 10/26/2023   ACCESSION NUMBER(S): [id number]    ORDERING CLINICIAN: TERRANCE TAVAREZ   TECHNIQUE: Multiple, multiplanar sequences of the brain were acquired.   FINDINGS: Encephalomalacia consistent with old infarct in the posterior right MCA distribution involving the right temporal, right parietal lobe, and posterior right insular cortex. There is no evidence for acute infarct. No restricted diffusion is seen. No focal mass effect or midline shift is identified. The ventricles and sulci are symmetric and appropriate for the patient's age.   There is a mild-moderate degree of nonspecific white matter change most consistent with chronic small-vessel ischemic disease. No acute intracranial hemorrhage is seen. No intra-axial or extra-axial fluid collection is seen.   The visualized paranasal sinuses and mastoid air cells are clear.       No acute intracranial findings.   Old right MCA distribution infarct as described.   Mild-moderate nonspecific white matter change most consistent with chronic small-vessel ischemic disease.   Signed by: Vicente Person 10/27/2023 11:22 AM Dictation workstation:   [id number]    CT head wo IV contrast    Result Date: 10/26/2023  Interpreted By:  Sherlyn Jackson, STUDY: CT HEAD WO IV CONTRAST 10/26/2023 9:14 pm   INDICATION: dizziness, slurred speech x 30 min with history of fall 1 week earlier   COMPARISON: None available.   ACCESSION NUMBER(S): [id number]   ORDERING CLINICIAN: ODILIA HANDY   TECHNIQUE: Unenhanced axial images of the brain are performed.   FINDINGS: No ventriculomegaly is present. There is old infarct within the right parietal lobe unchanged since the prior study with an old lacunar infarct within the right insula identified as well.   There is some diminished density within the periventricular white matter indicating mild chronic microvascular ischemic disease.   There is no mass effect, intracranial hemorrhage, or extra-axial fluid collection.   Calcified plaque is seen within each carotid siphon.       Old  transcortical infarct within the right parietal lobe with old lacunar infarct of right insula.   Mild chronic microvascular ischemic disease.   Signed by: Sherlyn Jackson 10/26/2023 9:28 PM Dictation workstation:   [id number]    EEG reveals no epileptiform activity.  No asymmetry.       Assessment/Plan   Principal Problem:    TIA (transient ischemic attack)  Active Problems:    Dizziness    Deficit in communication due to slurred speech    Slurred speech    88-year-old female with history of CVA, HTN, chronic pain, paroxysmal A-fib presented to the emergency department for concerns for dysarthria x30 minutes.  Patient also noted that time she had overall heaviness.  EEG was unrevealing.  MRI shows no stroke.  Overall, I suspect this was a TIA despite being on maximal medical therapy.  In fact, I believe the combination of Eliquis Plavix and aspirin is in excess of what is needed for stroke prevention.  Nonetheless, I will defer to cardiology for further evaluation of preventative antiplatelet and anticoagulation choices.  She is also back to her normal neurologic baseline and okay for discharge to home when medically cleared to do so.  She should follow-up with Dr. Jones, my partner, in 4 to 6 weeks.    I personally spent 25minutes today, exclusive of procedures, providing care for this patient, including preparation, face to face time, documentation and other services such as review of medical records, diagnostic result, patient education, counseling, coordination of care as specified in the encounter.

## 2023-10-28 NOTE — DISCHARGE SUMMARY
Discharge Diagnosis  TIA (transient ischemic attack)    Issues Requiring Follow-Up  Cardiology and neurology follow up  Prescriptions were sent to your pharmacy    Discharge Meds     Your medication list        START taking these medications        Instructions Last Dose Given Next Dose Due   apixaban 2.5 mg tablet  Commonly known as: Eliquis      Take 1 tablet (2.5 mg) by mouth every 12 hours.       aspirin 81 mg EC tablet      TAKE 1 TABLET BY MOUTH ONE TIME DAILY       atorvastatin 80 mg tablet  Commonly known as: Lipitor      Take 1 tablet (80 mg) by mouth once daily at bedtime.       carvedilol 6.25 mg tablet  Commonly known as: Coreg      Take 1 tablet (6.25 mg) by mouth 2 times a day after meals.       DULoxetine 30 mg DR capsule  Commonly known as: Cymbalta      TAKE 1 CAPSULE BY MOUTH ONCE DAILY at dinner       ferrous sulfate 325 (65 Fe) MG tablet  Start taking on: October 29, 2023      Take 1 tablet (65 mg of iron) by mouth once daily with a meal. Do not start before October 29, 2023.       hydroxychloroquine 200 mg tablet  Commonly known as: Plaquenil  Start taking on: October 29, 2023      Take 1 tablet (200 mg) by mouth once daily. Do not start before October 29, 2023.       isosorbide mononitrate ER 60 mg 24 hr tablet  Commonly known as: Imdur  Start taking on: October 29, 2023      Take 1 tablet (60 mg) by mouth once daily. Do not crush or chew. Do not start before October 29, 2023.       verapamil  mg ER tablet  Commonly known as: Calan-SR  Start taking on: October 29, 2023      Take 1 tablet (180 mg) by mouth once daily. Do not crush or chew. Do not start before October 29, 2023.              CONTINUE taking these medications        Instructions Last Dose Given Next Dose Due   calcitonin (salmon) 200 unit/actuation nasal spray  Commonly known as: Miacalcin           gabapentin 300 mg capsule  Commonly known as: Neurontin           pantoprazole 40 mg EC tablet  Commonly known as: ProtoNix            potassium chloride CR 10 mEq ER tablet  Commonly known as: Klor-Con           sulfaSALAzine 500 mg tablet  Commonly known as: Azulfidine                  STOP taking these medications      acetaminophen 500 mg tablet  Commonly known as: Tylenol        cloNIDine 0.2 mg tablet  Commonly known as: Catapres        clopidogrel 75 mg tablet  Commonly known as: Plavix                  Where to Get Your Medications        These medications were sent to [id number] - [place], [place] - [address]  [address] [place] [address]      Phone: [phone]   apixaban 2.5 mg tablet  atorvastatin 80 mg tablet  ferrous sulfate 325 (65 Fe) MG tablet  isosorbide mononitrate ER 60 mg 24 hr tablet  verapamil  mg ER tablet       Information about where to get these medications is not yet available    Ask your nurse or doctor about these medications  carvedilol 6.25 mg tablet  hydroxychloroquine 200 mg tablet         Test Results Pending At Discharge  Pending Labs       No current pending labs.            Hospital Course   Patient is 88-year-old female patient with past medical history of A-fib, hyperlipidemia, and hypertension who presented with slurred speech and dizziness.  Slurred speech and dizziness resolved without intervention, neurology has seen and evaluated patient and cleared for discharge with neurology follow-up.  Cardiology has seen and evaluated patient commending outpatient follow-up and continue with Eliquis and aspirin.   MRI was negative for acute stroke.  Echocardiogram was unremarkable per cardiology.  Pertinent Physical Exam At Time of Discharge  Physical Exam  HENT:      Head: Normocephalic and atraumatic.      Mouth/Throat:      Mouth: Mucous membranes are moist.   Eyes:      Extraocular Movements: Extraocular movements intact.   Cardiovascular:      Rate and Rhythm: Rhythm irregular.   Pulmonary:      Effort: Pulmonary effort is normal.   Abdominal:      General: Bowel sounds are  normal.   Musculoskeletal:         General: Normal range of motion.      Cervical back: Neck supple.   Skin:     General: Skin is warm and dry.   Neurological:      Mental Status: She is alert and oriented to person, place, and time.   Psychiatric:         Mood and Affect: Mood normal.         Outpatient Follow-Up  Future Appointments   Date Time Provider Department Center   12/15/2023  2:00 PM Agustin Meadows MD [id number] [facility]   12/15/2023  2:30 PM Suzi [id number] [facility]   12/15/2023  3:00 PM Suzi [id number] [facility]   12/20/2023  9:00 AM Luke Pantoja MD [id number] [facility]   3/29/2024 11:30 AM Sergey Balderrama MD [id number] [facility]         Angi Morales, APRN-CNP

## 2023-10-28 NOTE — PROGRESS NOTES
Subjective Data:  Patient is doing good.  Nuys any chest pain.  No shortness of breath.  Lying comfortable in bed    Overnight Events:    Telemetry overnight reviewed no events     Objective Data:  Last Recorded Vitals:  Vitals:    10/27/23 2354 10/28/23 0003 10/28/23 0425 10/28/23 0700   BP: 178/57 168/62 (!) 137/45 179/72   BP Location: Left arm Left arm Left arm Right arm   Patient Position: Lying Lying Lying Lying   Pulse: 67 70 66 73   Resp: 24 17 22 26   Temp:  37.1 °C (98.8 °F) 36.2 °C (97.2 °F) 36.1 °C (97 °F)   TempSrc:  Temporal Temporal Oral   SpO2: 98% 99% 96% 100%   Weight:       Height:           Last Labs:  CBC - 10/27/2023:  5:02 AM  5.3 9.9 247    31.6      CMP - 10/27/2023:  5:02 AM  9.1 5.7 16 --- 0.3   3.2 3.5 15 190      PTT - 6/16/2023:  6:43 AM  1.1   11.6 26.3     HGBA1C   Date/Time Value Ref Range Status   10/27/2023 05:02 AM 5.8 See below % Final   08/23/2023 10:38 AM 6.5 4.0 - 6.0 % Final     Comment:     Hemoglobin A1C levels are related to mean blood glucose during the   preceding 2-3 months. The relationship table below may be used as a   general guide. Each 1% increase in HGB A1C is a reflection of an   increase in mean glucose of approximately 30 mg/dl.   Reference: Diabetes Care, volume 29, supplement 1 Jan. 2006                        HGB A1C ................. Approx. Mean Glucose   _______________________________________________   6%   ...............................  120 mg/dl   7%   ...............................  150 mg/dl   8%   ...............................  180 mg/dl   9%   ...............................  210 mg/dl   10%  ...............................  240 mg/dl  Performed at [address] [address]     02/17/2023 12:01 PM 5.9 4.0 - 6.0 % Final     Comment:     Hemoglobin A1C levels are related to mean blood glucose during the   preceding 2-3 months. The relationship table below may be used as a   general guide. Each 1% increase in HGB A1C is a  reflection of an   increase in mean glucose of approximately 30 mg/dl.   Reference: Diabetes Care, volume 29, supplement 1 Jan. 2006                        HGB A1C ................. Approx. Mean Glucose   _______________________________________________   6%   ...............................  120 mg/dl   7%   ...............................  150 mg/dl   8%   ...............................  180 mg/dl   9%   ...............................  210 mg/dl   10%  ...............................  240 mg/dl  Performed at [address] [address]       LDLCALC   Date/Time Value Ref Range Status   10/27/2023 05:02 AM 31 65 - 130 mg/dL Final   08/23/2023 10:38 AM 55 65 - 130 MG/DL Final   07/17/2023 12:50 PM 40 65 - 130 MG/DL Final   06/16/2023 06:43 AM 48 65 - 130 MG/DL Final      Last I/O:  I/O last 3 completed shifts:  In: 620 (11.4 mL/kg) [P.O.:620]  Out: - (0 mL/kg)   Weight: 54.2 kg         Inpatient Medications:  Scheduled medications   Medication Dose Route Frequency    apixaban  2.5 mg oral q12h    aspirin  81 mg oral Daily    atorvastatin  80 mg oral Nightly    carvedilol  6.25 mg oral BID after meals    clopidogrel  75 mg oral Daily    ferrous sulfate  65 mg of iron oral Daily with breakfast    hydroxychloroquine  200 mg oral Daily    isosorbide mononitrate ER  60 mg oral Daily    pantoprazole  40 mg oral BID    perflutren lipid microspheres  0.5-10 mL of dilution intravenous Once in imaging    perflutren protein A microsphere  0.5 mL intravenous Once in imaging    pneumococcal conjugate  0.5 mL intramuscular During hospitalization    potassium chloride CR  10 mEq oral Daily with breakfast    sulfaSALAzine  500 mg oral BID after meals    sulfur hexafluoride microsphr  2 mL intravenous Once in imaging    verapamil SR  180 mg oral Daily     PRN medications   Medication    acetaminophen    hydrALAZINE    Followed by    [START ON 10/29/2023] hydrALAZINE    labetaloL    oxygen    polyethylene glycol      Continuous Medications   Medication Dose Last Rate       Physical Exam:  General: Patient is in no acute distress.  HEENT: atraumatic normocephalic.  Neck: is supple jugular venous pressure within normal limits no thyromegaly.  Cardiovascular regular rate and rhythm normal heart sounds no murmurs rubs or gallops.  Lungs: clear to auscultation bilaterally.  Abdomen: is soft nontender.  Extremities warm to touch no edema.     Assessment/Plan   1 slurred speech.  Most likely related to TIA.  Pending MRI of the brain.  Telemetry reviewed no atrial fibrillation currently in sinus rhythm.  At home she has been on Eliquis and aspirin.  She is on the right doses for both medications.  Recommend to continue.  NT-proBNP and troponin within normal limit.  2D echo within normal limits.  Stable from my standpoint outpatient follow-up.  Defer further management of her stroke to neurology.     2.  Hypertension continue current home medications.     3.  Paroxysmal atrial fibrillation.  As above.     4.  Hyperlipidemia continue intensity statin.     Peripheral IV 10/26/23 20 G Left Forearm (Active)   Site Assessment Clean;Dry;Intact 10/28/23 0700   Dressing Status Clean;Dry 10/28/23 0700   Number of days: 2       Code Status:  Full Code      Nessa Callaway MD

## 2023-10-28 NOTE — CARE PLAN
The patient's goals for the shift include maintain safety and comfort    The clinical goals for the shift include No falls during shift    Over the shift, the patient did make progress toward the following goals.       Problem: Safety - Adult  Goal: Free from fall injury  Outcome: Progressing

## 2023-10-28 NOTE — CARE PLAN
Problem: Pain - Adult  Goal: Verbalizes/displays adequate comfort level or baseline comfort level  Outcome: Progressing     Problem: Discharge Planning  Goal: Discharge to home or other facility with appropriate resources  Outcome: Progressing     Problem: Safety - Adult  Goal: Free from fall injury  Outcome: Progressing     Problem: Chronic Conditions and Co-morbidities  Goal: Patient's chronic conditions and co-morbidity symptoms are monitored and maintained or improved  Outcome: Progressing     Problem: Fall/Injury  Goal: Verbalize understanding of personal risk factors for fall in the hospital  Outcome: Progressing     Problem: Fall/Injury  Goal: Verbalize understanding of risk factor reduction measures to prevent injury from fall in the home  Outcome: Progressing     Problem: Fall/Injury  Goal: Pace activities to prevent fatigue by end of the shift  Outcome: Progressing   The patient's goals for the shift include getting stronger.     The clinical goals for the shift include improve independence safely and understand plan for pt.     YANET ADHIKARI RN

## 2023-11-17 ENCOUNTER — LAB (OUTPATIENT)
Dept: LAB | Facility: LAB | Age: 88
End: 2023-11-17
Payer: COMMERCIAL

## 2023-11-17 DIAGNOSIS — R20.2 PARESTHESIA OF SKIN: Primary | ICD-10-CM

## 2023-11-17 DIAGNOSIS — M54.12 RADICULOPATHY, CERVICAL REGION: ICD-10-CM

## 2023-11-17 DIAGNOSIS — M54.16 RADICULOPATHY, LUMBAR REGION: ICD-10-CM

## 2023-11-17 LAB
ALBUMIN SERPL-MCNC: 3.6 G/DL (ref 3.5–5)
ALP BLD-CCNC: 150 U/L (ref 35–125)
ALT SERPL-CCNC: 12 U/L (ref 5–40)
ANION GAP SERPL CALC-SCNC: 10 MMOL/L
AST SERPL-CCNC: 14 U/L (ref 5–40)
BASOPHILS # BLD AUTO: 0.03 X10*3/UL (ref 0–0.1)
BASOPHILS NFR BLD AUTO: 0.4 %
BILIRUB SERPL-MCNC: 0.3 MG/DL (ref 0.1–1.2)
BUN SERPL-MCNC: 17 MG/DL (ref 8–25)
CALCIUM SERPL-MCNC: 9.2 MG/DL (ref 8.5–10.4)
CHLORIDE SERPL-SCNC: 102 MMOL/L (ref 97–107)
CK SERPL-CCNC: 97 U/L (ref 24–195)
CO2 SERPL-SCNC: 25 MMOL/L (ref 24–31)
CREAT SERPL-MCNC: 0.7 MG/DL (ref 0.4–1.6)
EOSINOPHIL # BLD AUTO: 0.17 X10*3/UL (ref 0–0.4)
EOSINOPHIL NFR BLD AUTO: 2.4 %
ERYTHROCYTE [DISTWIDTH] IN BLOOD BY AUTOMATED COUNT: 13.9 % (ref 11.5–14.5)
GFR SERPL CREATININE-BSD FRML MDRD: 83 ML/MIN/1.73M*2
GLUCOSE SERPL-MCNC: 87 MG/DL (ref 65–99)
HCT VFR BLD AUTO: 33.9 % (ref 36–46)
HGB BLD-MCNC: 10.6 G/DL (ref 12–16)
IMM GRANULOCYTES # BLD AUTO: 0.06 X10*3/UL (ref 0–0.5)
IMM GRANULOCYTES NFR BLD AUTO: 0.9 % (ref 0–0.9)
LYMPHOCYTES # BLD AUTO: 1.4 X10*3/UL (ref 0.8–3)
LYMPHOCYTES NFR BLD AUTO: 20.2 %
MCH RBC QN AUTO: 29.6 PG (ref 26–34)
MCHC RBC AUTO-ENTMCNC: 31.3 G/DL (ref 32–36)
MCV RBC AUTO: 95 FL (ref 80–100)
MONOCYTES # BLD AUTO: 0.77 X10*3/UL (ref 0.05–0.8)
MONOCYTES NFR BLD AUTO: 11.1 %
NEUTROPHILS # BLD AUTO: 4.51 X10*3/UL (ref 1.6–5.5)
NEUTROPHILS NFR BLD AUTO: 65 %
NRBC BLD-RTO: 0 /100 WBCS (ref 0–0)
PLATELET # BLD AUTO: 329 X10*3/UL (ref 150–450)
POTASSIUM SERPL-SCNC: 5.1 MMOL/L (ref 3.4–5.1)
PROT SERPL-MCNC: 5.9 G/DL (ref 5.9–7.9)
RBC # BLD AUTO: 3.58 X10*6/UL (ref 4–5.2)
SODIUM SERPL-SCNC: 137 MMOL/L (ref 133–145)
TSH SERPL DL<=0.05 MIU/L-ACNC: 1.51 MIU/L (ref 0.27–4.2)
VIT B12 SERPL-MCNC: 1522 PG/ML (ref 211–946)
WBC # BLD AUTO: 6.9 X10*3/UL (ref 4.4–11.3)

## 2023-11-17 PROCEDURE — 36415 COLL VENOUS BLD VENIPUNCTURE: CPT

## 2023-11-17 PROCEDURE — 85025 COMPLETE CBC W/AUTO DIFF WBC: CPT

## 2023-11-17 PROCEDURE — 82550 ASSAY OF CK (CPK): CPT

## 2023-11-17 PROCEDURE — 84443 ASSAY THYROID STIM HORMONE: CPT

## 2023-11-17 PROCEDURE — 82607 VITAMIN B-12: CPT

## 2023-11-17 PROCEDURE — 80053 COMPREHEN METABOLIC PANEL: CPT

## 2023-11-26 PROBLEM — D64.9 ANEMIA: Status: ACTIVE | Noted: 2023-11-26

## 2023-11-26 PROBLEM — R50.9 FEVER: Status: ACTIVE | Noted: 2023-11-26

## 2023-11-26 PROBLEM — D72.819 LEUCOPENIA: Status: ACTIVE | Noted: 2023-11-26

## 2023-11-26 PROBLEM — I95.9 LOW BLOOD PRESSURE: Status: ACTIVE | Noted: 2023-11-26

## 2023-11-26 PROBLEM — R40.0 SLEEPINESS: Status: ACTIVE | Noted: 2023-11-26

## 2023-11-26 PROBLEM — R07.81 RIB PAIN: Status: ACTIVE | Noted: 2023-11-26

## 2023-11-26 PROBLEM — M81.0 OSTEOPOROSIS: Status: ACTIVE | Noted: 2023-11-26

## 2023-11-26 PROBLEM — S19.9XXA INJURY OF NECK: Status: ACTIVE | Noted: 2023-11-26

## 2023-11-26 PROBLEM — M19.019 INFLAMMATION OF JOINT OF SHOULDER REGION: Status: ACTIVE | Noted: 2023-11-26

## 2023-11-26 PROBLEM — E87.1 HYPONATREMIA: Status: ACTIVE | Noted: 2023-11-26

## 2023-11-26 PROBLEM — I49.9 CARDIAC RHYTHM DISORDER OR DISTURBANCE OR CHANGE: Status: ACTIVE | Noted: 2023-11-26

## 2023-11-26 PROBLEM — M06.09 POLYARTHRITIS WITH NEGATIVE RHEUMATOID FACTOR (MULTI): Status: ACTIVE | Noted: 2023-11-26

## 2023-11-26 PROBLEM — H90.3 SENSORINEURAL HEARING LOSS, BILATERAL: Status: ACTIVE | Noted: 2023-11-26

## 2023-11-26 PROBLEM — I48.91 ATRIAL FIBRILLATION (MULTI): Status: ACTIVE | Noted: 2023-11-26

## 2023-11-26 PROBLEM — I25.10 CORONARY ARTERY DISEASE: Status: ACTIVE | Noted: 2023-11-26

## 2023-11-26 PROBLEM — I16.0 HYPERTENSIVE URGENCY: Status: ACTIVE | Noted: 2023-11-26

## 2023-11-26 PROBLEM — K21.9 GASTROESOPHAGEAL REFLUX DISEASE: Status: ACTIVE | Noted: 2023-11-26

## 2023-11-26 PROBLEM — E87.6 HYPOKALEMIA: Status: ACTIVE | Noted: 2023-11-26

## 2023-11-26 PROBLEM — R30.0 DYSURIA: Status: ACTIVE | Noted: 2023-11-26

## 2023-11-26 PROBLEM — E78.2 MIXED HYPERLIPIDEMIA: Status: ACTIVE | Noted: 2023-11-26

## 2023-11-26 PROBLEM — E86.0 DEHYDRATION: Status: ACTIVE | Noted: 2023-11-26

## 2023-11-26 PROBLEM — E21.3 HYPERPARATHYROIDISM (MULTI): Status: ACTIVE | Noted: 2023-11-26

## 2023-11-26 PROBLEM — R06.00 DYSPNEA: Status: ACTIVE | Noted: 2023-11-26

## 2023-11-26 PROBLEM — J44.9 CHRONIC OBSTRUCTIVE PULMONARY DISEASE (MULTI): Status: ACTIVE | Noted: 2023-11-26

## 2023-11-26 PROBLEM — K22.10 EROSIVE ESOPHAGITIS: Status: ACTIVE | Noted: 2023-11-26

## 2023-11-26 PROBLEM — S82.009A FRACTURE OF PATELLA: Status: ACTIVE | Noted: 2023-11-26

## 2023-11-26 PROBLEM — M54.12 CERVICAL RADICULOPATHY: Status: ACTIVE | Noted: 2023-11-26

## 2023-11-26 PROBLEM — H90.A22 SENSORINEURAL HEARING LOSS (SNHL) OF LEFT EAR WITH RESTRICTED HEARING OF RIGHT EAR: Status: ACTIVE | Noted: 2023-11-26

## 2023-11-26 PROBLEM — R05.9 COUGH: Status: ACTIVE | Noted: 2023-11-26

## 2023-11-26 PROBLEM — M96.1 FAILED BACK SYNDROME: Status: ACTIVE | Noted: 2023-11-26

## 2023-11-26 PROBLEM — Z86.73 HISTORY OF CEREBROVASCULAR ACCIDENT: Status: ACTIVE | Noted: 2023-11-26

## 2023-11-26 PROBLEM — J45.909 ASTHMA (HHS-HCC): Status: ACTIVE | Noted: 2023-11-26

## 2023-11-26 PROBLEM — N95.9 MENOPAUSAL AND POSTMENOPAUSAL DISORDER: Status: ACTIVE | Noted: 2023-11-26

## 2023-11-26 PROBLEM — I21.4 ACUTE NON-ST SEGMENT ELEVATION MYOCARDIAL INFARCTION (MULTI): Status: ACTIVE | Noted: 2023-11-26

## 2023-11-26 PROBLEM — R55 NEAR SYNCOPE: Status: ACTIVE | Noted: 2023-11-26

## 2023-11-26 PROBLEM — E55.9 VITAMIN D DEFICIENCY: Status: ACTIVE | Noted: 2023-11-26

## 2023-11-26 PROBLEM — E83.52 HYPERCALCEMIA: Status: ACTIVE | Noted: 2023-11-26

## 2023-11-26 PROBLEM — I67.9 CEREBROVASCULAR DISEASE: Status: ACTIVE | Noted: 2023-10-26

## 2023-11-26 PROBLEM — M79.672 PAIN IN LEFT FOOT: Status: ACTIVE | Noted: 2023-11-26

## 2023-11-26 PROBLEM — I10 LABILE ESSENTIAL HYPERTENSION: Status: ACTIVE | Noted: 2023-11-26

## 2023-11-26 PROBLEM — H69.93 DYSFUNCTION OF BOTH EUSTACHIAN TUBES: Status: ACTIVE | Noted: 2023-11-26
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
## 2023-11-26 PROBLEM — R26.81 UNSTEADY GAIT: Status: ACTIVE | Noted: 2023-11-26

## 2023-11-26 PROBLEM — Z91.89 AT RISK FOR BLEEDING: Status: ACTIVE | Noted: 2023-11-26

## 2023-11-26 PROBLEM — M54.2 NECK PAIN: Status: ACTIVE | Noted: 2023-11-26

## 2023-11-26 PROBLEM — K58.9 IRRITABLE BOWEL SYNDROME: Status: ACTIVE | Noted: 2023-11-26

## 2023-11-26 PROBLEM — R53.1 ASTHENIA: Status: ACTIVE | Noted: 2023-11-26

## 2023-11-26 PROBLEM — N18.30 STAGE 3 CHRONIC KIDNEY DISEASE (MULTI): Status: ACTIVE | Noted: 2023-11-26

## 2023-11-26 PROBLEM — S89.90XA INJURY OF KNEE: Status: ACTIVE | Noted: 2023-11-26

## 2023-11-26 PROBLEM — H35.30 MACULAR DEGENERATION: Status: ACTIVE | Noted: 2023-11-26

## 2023-11-26 PROBLEM — R73.01 IMPAIRED FASTING GLUCOSE: Status: ACTIVE | Noted: 2023-11-26

## 2023-11-26 PROBLEM — F41.9 ANXIETY: Status: ACTIVE | Noted: 2023-11-26

## 2023-11-26 PROBLEM — R07.9 CHEST PAIN: Status: ACTIVE | Noted: 2023-11-26

## 2023-11-26 PROBLEM — M48.00 SPINAL STENOSIS: Status: ACTIVE | Noted: 2023-11-26

## 2023-11-26 PROBLEM — G45.9 TRANSIENT ISCHEMIC ATTACK: Status: ACTIVE | Noted: 2023-11-26

## 2023-11-26 PROBLEM — N39.0 ACUTE LOWER URINARY TRACT INFECTION: Status: ACTIVE | Noted: 2023-11-26

## 2023-11-26 PROBLEM — M25.579 ANKLE PAIN: Status: ACTIVE | Noted: 2023-11-26

## 2023-11-26 PROBLEM — M62.81 MUSCLE WEAKNESS: Status: ACTIVE | Noted: 2023-11-26

## 2023-11-26 PROBLEM — J18.9 PNEUMONIA: Status: ACTIVE | Noted: 2023-11-26

## 2023-11-26 RX ORDER — AMLODIPINE BESYLATE 2.5 MG/1
2.5 TABLET ORAL DAILY
COMMUNITY
End: 2023-11-27 | Stop reason: ALTCHOICE

## 2023-11-26 RX ORDER — VALSARTAN 320 MG/1
320 TABLET ORAL
COMMUNITY
End: 2024-03-29 | Stop reason: ALTCHOICE

## 2023-11-26 RX ORDER — CLOPIDOGREL BISULFATE 75 MG/1
75 TABLET ORAL DAILY
COMMUNITY
Start: 2019-04-15 | End: 2024-01-08

## 2023-11-26 RX ORDER — ACETAMINOPHEN 500 MG
2000 TABLET ORAL DAILY
COMMUNITY

## 2023-11-26 RX ORDER — LOSARTAN POTASSIUM 50 MG/1
50 TABLET ORAL DAILY
COMMUNITY
Start: 2023-06-18 | End: 2024-04-08 | Stop reason: SDUPTHER

## 2023-11-26 RX ORDER — SENNOSIDES 8.6 MG/1
1 TABLET ORAL DAILY
COMMUNITY
End: 2024-03-29 | Stop reason: ALTCHOICE

## 2023-11-26 RX ORDER — POTASSIUM CHLORIDE 1500 MG/1
20 TABLET, EXTENDED RELEASE ORAL DAILY
COMMUNITY
End: 2024-03-29 | Stop reason: ALTCHOICE

## 2023-11-26 RX ORDER — OMEPRAZOLE 40 MG/1
40 CAPSULE, DELAYED RELEASE ORAL
COMMUNITY
End: 2024-03-29 | Stop reason: ALTCHOICE

## 2023-11-26 RX ORDER — SERTRALINE HYDROCHLORIDE 25 MG/1
25 TABLET, FILM COATED ORAL DAILY
COMMUNITY
End: 2024-03-29 | Stop reason: ALTCHOICE

## 2023-11-26 RX ORDER — DOCUSATE SODIUM 100 MG/1
100 CAPSULE, LIQUID FILLED ORAL DAILY PRN
COMMUNITY
End: 2024-05-20

## 2023-11-26 RX ORDER — DICYCLOMINE HYDROCHLORIDE 10 MG/1
10 CAPSULE ORAL 3 TIMES DAILY PRN
COMMUNITY
End: 2024-03-29 | Stop reason: ALTCHOICE

## 2023-11-26 RX ORDER — METOPROLOL SUCCINATE 50 MG/1
50 TABLET, EXTENDED RELEASE ORAL DAILY
COMMUNITY
End: 2024-03-29 | Stop reason: ALTCHOICE

## 2023-11-26 RX ORDER — FLUTICASONE PROPIONATE AND SALMETEROL 250; 50 UG/1; UG/1
1 POWDER RESPIRATORY (INHALATION)
COMMUNITY
End: 2024-03-05 | Stop reason: ALTCHOICE

## 2023-11-26 RX ORDER — OLMESARTAN MEDOXOMIL AND HYDROCHLOROTHIAZIDE 40/25 40; 25 MG/1; MG/1
1 TABLET ORAL DAILY
COMMUNITY
End: 2024-03-29 | Stop reason: ALTCHOICE

## 2023-11-26 RX ORDER — NITROGLYCERIN 80 MG/1
1 PATCH TRANSDERMAL DAILY
COMMUNITY
End: 2024-03-29 | Stop reason: ALTCHOICE

## 2023-11-26 RX ORDER — CLONIDINE HYDROCHLORIDE 0.2 MG/1
0.2 TABLET ORAL DAILY
COMMUNITY
End: 2024-03-29 | Stop reason: ALTCHOICE

## 2023-11-26 RX ORDER — CLONAZEPAM 1 MG/1
1 TABLET ORAL EVERY 12 HOURS PRN
COMMUNITY
End: 2024-03-29 | Stop reason: ALTCHOICE

## 2023-11-27 ENCOUNTER — OFFICE VISIT (OUTPATIENT)
Dept: CARDIOLOGY | Facility: CLINIC | Age: 88
End: 2023-11-27
Payer: COMMERCIAL

## 2023-11-27 VITALS
HEART RATE: 82 BPM | TEMPERATURE: 98.6 F | HEIGHT: 59 IN | SYSTOLIC BLOOD PRESSURE: 120 MMHG | DIASTOLIC BLOOD PRESSURE: 60 MMHG | RESPIRATION RATE: 16 BRPM | BODY MASS INDEX: 22.38 KG/M2 | WEIGHT: 111 LBS

## 2023-11-27 DIAGNOSIS — I20.89 STABLE ANGINA PECTORIS (CMS-HCC): ICD-10-CM

## 2023-11-27 DIAGNOSIS — I21.4 ACUTE NON-ST SEGMENT ELEVATION MYOCARDIAL INFARCTION (MULTI): ICD-10-CM

## 2023-11-27 DIAGNOSIS — I48.0 PAROXYSMAL ATRIAL FIBRILLATION (MULTI): Primary | ICD-10-CM

## 2023-11-27 DIAGNOSIS — I25.10 CORONARY ARTERY DISEASE INVOLVING NATIVE CORONARY ARTERY OF NATIVE HEART WITHOUT ANGINA PECTORIS: ICD-10-CM

## 2023-11-27 PROCEDURE — 1126F AMNT PAIN NOTED NONE PRSNT: CPT | Performed by: INTERNAL MEDICINE

## 2023-11-27 PROCEDURE — 3078F DIAST BP <80 MM HG: CPT | Performed by: INTERNAL MEDICINE

## 2023-11-27 PROCEDURE — 1111F DSCHRG MED/CURRENT MED MERGE: CPT | Performed by: INTERNAL MEDICINE

## 2023-11-27 PROCEDURE — 1159F MED LIST DOCD IN RCRD: CPT | Performed by: INTERNAL MEDICINE

## 2023-11-27 PROCEDURE — 1036F TOBACCO NON-USER: CPT | Performed by: INTERNAL MEDICINE

## 2023-11-27 PROCEDURE — 99214 OFFICE O/P EST MOD 30 MIN: CPT | Performed by: INTERNAL MEDICINE

## 2023-11-27 PROCEDURE — 3074F SYST BP LT 130 MM HG: CPT | Performed by: INTERNAL MEDICINE

## 2023-11-27 ASSESSMENT — LIFESTYLE VARIABLES
HAS A RELATIVE, FRIEND, DOCTOR, OR ANOTHER HEALTH PROFESSIONAL EXPRESSED CONCERN ABOUT YOUR DRINKING OR SUGGESTED YOU CUT DOWN: NO
HOW OFTEN DO YOU HAVE A DRINK CONTAINING ALCOHOL: MONTHLY OR LESS
HOW OFTEN DO YOU HAVE SIX OR MORE DRINKS ON ONE OCCASION: NEVER
SKIP TO QUESTIONS 9-10: 1
HAVE YOU OR SOMEONE ELSE BEEN INJURED AS A RESULT OF YOUR DRINKING: NO
AUDIT-C TOTAL SCORE: 1
HOW MANY STANDARD DRINKS CONTAINING ALCOHOL DO YOU HAVE ON A TYPICAL DAY: 1 OR 2
AUDIT TOTAL SCORE: 1

## 2023-11-27 ASSESSMENT — PATIENT HEALTH QUESTIONNAIRE - PHQ9
1. LITTLE INTEREST OR PLEASURE IN DOING THINGS: NOT AT ALL
SUM OF ALL RESPONSES TO PHQ9 QUESTIONS 1 AND 2: 0
2. FEELING DOWN, DEPRESSED OR HOPELESS: NOT AT ALL

## 2023-11-27 ASSESSMENT — PAIN SCALES - GENERAL: PAINLEVEL: 0-NO PAIN

## 2023-11-27 NOTE — PROGRESS NOTES
History of present illness:  This is a very pleasant 88-year-old female following up in my office after recent hospitalization for TIA.  Patient has a longstanding history of for atrial fibrillation.  She has been on aspirin and Eliquis when she had this episode of possible TIA due to weakness and inability to speech.  Patient is unaware of the episode but her  noticed that.  Patient underwent extensive workup including 2D echo, MRI MRA of the brain and carotid duplex everything within normal limits.  Recovered within 24 hours.  No clear evidence of UTI.  Patient doing good.  Denies any chest pain or shortness of breath.     Past Medical History:   Diagnosis Date    Acute non-ST segment elevation myocardial infarction (CMS/HCC) 11/26/2023    Anemia     Arthritis     Asthma     Atrial fibrillation (CMS/[facility]) 11/26/2023    Jonas esophagus     CAD (coronary artery disease)     Cardiac rhythm disorder or disturbance or change 11/26/2023    Cervical radiculopathy     Chest pain 11/26/2023    Chronic obstructive pulmonary disease (CMS/HCC) 11/26/2023    Constipation     Coronary artery disease 11/26/2023    Degenerative joint disease     Dyslipidemia     Dysuria     Erosive esophagitis     GERD (gastroesophageal reflux disease)     Hyperparathyroidism (CMS/[facility])     Hypertensive urgency 11/26/2023    Irritable bowel syndrome (IBS)     Labile hypertension     Left foot pain     Low blood pressure 11/26/2023    Macular degeneration     Mixed hyperlipidemia 11/26/2023    Neck pain     Osteoporosis     Paroxysmal atrial fibrillation (CMS/[facility])     Polyarthritis with negative rheumatoid factor (CMS/[facility])     Post menopausal syndrome     Spinal stenosis     Stage 3 chronic kidney disease (CMS/HCC) 11/26/2023    Stroke (CMS/[facility])     Transient ischemic attack 11/26/2023       Past Surgical History:   Procedure Laterality Date    CARDIOVASCULAR STRESS TEST  2017    Dr. Khalil    CARDIOVASCULAR STRESS TEST  2004    Dr. Pantoja     "CARPAL TUNNEL RELEASE Left 2014    CATARACT EXTRACTION Bilateral 2011    CHOLECYSTECTOMY      CORONARY STENT PLACEMENT  05/2023    LAD    CT HEAD ANGIO W AND WO IV CONTRAST  05/08/2013    CT HEAD ANGIO W AND WO IV CONTRAST [facility]    MR HEAD ANGIO WO IV CONTRAST  04/23/2013    MR HEAD ANGIO WO IV CONTRAST [facility]    MR HEAD ANGIO WO IV CONTRAST  07/20/2016    MR HEAD ANGIO WO IV CONTRAST [facility]    MR HEAD ANGIO WO IV CONTRAST  10/27/2023    MR HEAD ANGIO WO IV CONTRAST 10/27/2023 [facility]    MR NECK ANGIO WO IV CONTRAST  10/27/2023    MR NECK ANGIO WO IV CONTRAST 10/27/2023 VER[facility] MRI    OTHER SURGICAL HISTORY  02/14/2022    No history of surgery    REVISION TOTAL HIP ARTHROPLASTY Left 2013    Conversion from left ORIF    TOTAL HIP ARTHROPLASTY Right 2016       Allergies   Allergen Reactions    Meperidine Hallucinations     Other reaction(s): Mental Status Change    Morphine Hallucinations    Opioids - Morphine Analogues Hallucinations and Confusion    Pregabalin Rash, Other and Unknown     \"Flu like symptoms\"    Flu like symtoms    Tramadol Rash, Itching and Unknown        reports that she has never smoked. She has never been exposed to tobacco smoke. She has never used smokeless tobacco. She reports that she does not currently use alcohol. She reports that she does not use drugs.    Family History   Problem Relation Name Age of Onset    No Known Problems Mother      No Known Problems Father      No Known Problems Sister         Patient's Medications   New Prescriptions    No medications on file   Previous Medications    APIXABAN (ELIQUIS) 2.5 MG TABLET    Take 1 tablet (2.5 mg) by mouth every 12 hours.    ASCORBIC ACID (VITAMIN C) 100 MG TABLET    Take 1 tablet (100 mg) by mouth once daily.    ASPIRIN 81 MG EC TABLET    TAKE 1 TABLET BY MOUTH ONE TIME DAILY    ATORVASTATIN (LIPITOR) 80 MG TABLET    Take 1 tablet (80 mg) by mouth once daily at bedtime.    CALCITONIN, SALMON, " (MIACALCIN) 200 UNIT/ACTUATION NASAL SPRAY    Administer 1 spray into one nostril once daily.    CARVEDILOL (COREG) 6.25 MG TABLET    Take 1 tablet (6.25 mg) by mouth 2 times a day after meals.    CHOLECALCIFEROL (VITAMIN D-3) 50 MCG (2,000 UNIT) CAPSULE    Take 1 capsule (50 mcg) by mouth early in the morning..    CLONAZEPAM (KLONOPIN) 1 MG TABLET    Take 1 tablet (1 mg) by mouth every 12 hours if needed.    CLONIDINE (CATAPRES) 0.2 MG TABLET    Take 1 tablet (0.2 mg) by mouth once daily.    CLOPIDOGREL (PLAVIX) 75 MG TABLET    Take 1 tablet (75 mg) by mouth once daily.    DICYCLOMINE (BENTYL) 10 MG CAPSULE    Take 1 capsule (10 mg) by mouth 3 times a day as needed. Before meals    DOCUSATE SODIUM (COLACE) 100 MG CAPSULE    Take 1 capsule (100 mg) by mouth once daily as needed for constipation.    DULOXETINE (CYMBALTA) 30 MG DR CAPSULE    TAKE 1 CAPSULE BY MOUTH ONCE DAILY at dinner    FERROUS SULFATE 325 (65 FE) MG TABLET    Take 1 tablet (65 mg of iron) by mouth once daily with a meal. Do not start before October 29, 2023.    FLUTICASONE PROPION-SALMETEROL (ADVAIR DISKUS) 250-50 MCG/DOSE DISKUS INHALER    Inhale 1 puff.    GABAPENTIN (NEURONTIN) 300 MG CAPSULE    Take 1 capsule (300 mg) by mouth 2 times a day.    HYDROXYCHLOROQUINE (PLAQUENIL) 200 MG TABLET    Take 1 tablet (200 mg) by mouth once daily. Do not start before October 29, 2023.    ISOSORBIDE MONONITRATE ER (IMDUR) 60 MG 24 HR TABLET    Take 1 tablet (60 mg) by mouth once daily. Do not crush or chew. Do not start before October 29, 2023.    LOSARTAN (COZAAR) 50 MG TABLET    Take 1 tablet (50 mg) by mouth once daily.    METOPROLOL SUCCINATE XL (TOPROL-XL) 50 MG 24 HR TABLET    Take 1 tablet (50 mg) by mouth once daily.    NITROGLYCERIN (NITRODUR) 0.4 MG/HR PATCH    Place 1 patch on the skin once daily. remove after 12 hours    OLMESARTAN-HYDROCHLOROTHIAZIDE (BENICAR HCT) 40-25 MG TABLET    Take 1 tablet by mouth once daily.    OMEPRAZOLE (PRILOSEC) 40  MG DR CAPSULE    Take 1 capsule (40 mg) by mouth once daily in the morning. Take before meals.    PANTOPRAZOLE (PROTONIX) 40 MG EC TABLET    Take 1 tablet (40 mg) by mouth 2 times a day.    POTASSIUM CHLORIDE CR 20 MEQ ER TABLET    Take 1 tablet (20 mEq) by mouth once daily. Take with food.    SENNOSIDES (SENOKOT) 8.6 MG TABLET    Take 1 tablet (8.6 mg) by mouth once daily.    SERTRALINE (ZOLOFT) 25 MG TABLET    Take 1 tablet (25 mg) by mouth once daily.    SULFASALAZINE (AZULFIDINE) 500 MG TABLET    Take 1 tablet (500 mg) by mouth 2 times a day.    VALSARTAN (DIOVAN) 320 MG TABLET    Take 1 tablet (320 mg) by mouth once daily.    VERAPAMIL SR (CALAN-SR) 180 MG ER TABLET    Take 1 tablet (180 mg) by mouth once daily. Do not crush or chew. Do not start before October 29, 2023.    VIT C/E/ZN/COPPR/LUTEIN/ZEAXAN (PRESERVISION AREDS-2 ORAL)    As directed orally   Modified Medications    No medications on file   Discontinued Medications    AMLODIPINE (NORVASC) 2.5 MG TABLET    Take 1 tablet (2.5 mg) by mouth once daily.    POTASSIUM CHLORIDE CR 10 MEQ ER TABLET    Take 1 tablet (10 mEq) by mouth once daily.       Objective   Physical Exam  General: Patient in no acute distress   HEENT: Atraumatic normocephalic.  Neck: Supple, jugular venous pressure within normal limit.  No bruits  Lungs: Clear to auscultation bilaterally  Cardiovascular: Regular rate and rhythm, normal heart sounds, no murmurs rubs or gallops  Abdomen: Soft nontender nondistended.  Normal bowel sounds.  Extremities: Warm to touch, no edema.    Lab Review   Lab on 11/17/2023   Component Date Value    Thyroid Stimulating Horm* 11/17/2023 1.51     Creatine Kinase 11/17/2023 97     Vitamin B12 11/17/2023 1,522 (H)     WBC 11/17/2023 6.9     nRBC 11/17/2023 0.0     RBC 11/17/2023 3.58 (L)     Hemoglobin 11/17/2023 10.6 (L)     Hematocrit 11/17/2023 33.9 (L)     MCV 11/17/2023 95     MCH 11/17/2023 29.6     MCHC 11/17/2023 31.3 (L)     RDW 11/17/2023 13.9      Platelets 11/17/2023 329     Neutrophils % 11/17/2023 65.0     Immature Granulocytes %,* 11/17/2023 0.9     Lymphocytes % 11/17/2023 20.2     Monocytes % 11/17/2023 11.1     Eosinophils % 11/17/2023 2.4     Basophils % 11/17/2023 0.4     Neutrophils Absolute 11/17/2023 4.51     Immature Granulocytes Ab* 11/17/2023 0.06     Lymphocytes Absolute 11/17/2023 1.40     Monocytes Absolute 11/17/2023 0.77     Eosinophils Absolute 11/17/2023 0.17     Basophils Absolute 11/17/2023 0.03     Glucose 11/17/2023 87     Sodium 11/17/2023 137     Potassium 11/17/2023 5.1     Chloride 11/17/2023 102     Bicarbonate 11/17/2023 25     Urea Nitrogen 11/17/2023 17     Creatinine 11/17/2023 0.70     eGFR 11/17/2023 83     Calcium 11/17/2023 9.2     Albumin 11/17/2023 3.6     Alkaline Phosphatase 11/17/2023 150 (H)     Total Protein 11/17/2023 5.9     AST 11/17/2023 14     Bilirubin, Total 11/17/2023 0.3     ALT 11/17/2023 12     Anion Gap 11/17/2023 10    Admission on 10/26/2023, Discharged on 10/28/2023   Component Date Value    WBC 10/26/2023 6.6     nRBC 10/26/2023 0.0     RBC 10/26/2023 3.17 (L)     Hemoglobin 10/26/2023 9.7 (L)     Hematocrit 10/26/2023 29.6 (L)     MCV 10/26/2023 93     MCH 10/26/2023 30.6     MCHC 10/26/2023 32.8     RDW 10/26/2023 13.8     Platelets 10/26/2023 251     MPV 10/26/2023 9.2     Neutrophils % 10/26/2023 66.2     Immature Granulocytes %,* 10/26/2023 0.3     Lymphocytes % 10/26/2023 20.5     Monocytes % 10/26/2023 10.5     Eosinophils % 10/26/2023 2.0     Basophils % 10/26/2023 0.5     Neutrophils Absolute 10/26/2023 4.38     Immature Granulocytes Ab* 10/26/2023 0.02     Lymphocytes Absolute 10/26/2023 1.35     Monocytes Absolute 10/26/2023 0.69     Eosinophils Absolute 10/26/2023 0.13     Basophils Absolute 10/26/2023 0.03     Glucose 10/26/2023 93     Sodium 10/26/2023 139     Potassium 10/26/2023 3.8     Chloride 10/26/2023 103     Bicarbonate 10/26/2023 26     Urea Nitrogen 10/26/2023 19      Creatinine 10/26/2023 0.80     eGFR 10/26/2023 71     Calcium 10/26/2023 9.4     Albumin 10/26/2023 3.5     Alkaline Phosphatase 10/26/2023 190 (H)     Total Protein 10/26/2023 5.7 (L)     AST 10/26/2023 16     Bilirubin, Total 10/26/2023 0.3     ALT 10/26/2023 15     Anion Gap 10/26/2023 10     Magnesium 10/26/2023 2.00     Ventricular Rate 10/27/2023 67     Atrial Rate 10/27/2023 67     CO Interval 10/27/2023 162     QRS Duration 10/27/2023 92     QT Interval 10/27/2023 422     QTC Calculation(Bazett) 10/27/2023 445     P Axis 10/27/2023 65     R Axis 10/27/2023 44     T Axis 10/27/2023 63     QRS Count 10/27/2023 11     Q Onset 10/27/2023 220     P Onset 10/27/2023 139     P Offset 10/27/2023 209     T Offset 10/27/2023 431     QTC Fredericia 10/27/2023 438     Troponin T, High Sensiti* 10/26/2023 24 (H)     Color, Urine 10/26/2023 Colorless (N)     Appearance, Urine 10/26/2023 Clear     Specific Gravity, Urine 10/26/2023 1.007     pH, Urine 10/26/2023 5.5     Protein, Urine 10/26/2023 NEGATIVE     Glucose, Urine 10/26/2023 Normal     Blood, Urine 10/26/2023 NEGATIVE     Ketones, Urine 10/26/2023 NEGATIVE     Bilirubin, Urine 10/26/2023 NEGATIVE     Urobilinogen, Urine 10/26/2023 Normal     Nitrite, Urine 10/26/2023 NEGATIVE     Leukocyte Esterase, Urine 10/26/2023 NEGATIVE     Troponin T, High Sensiti* 10/26/2023 26 (H)     Troponin T, High Sensiti* 10/26/2023 26 (H)     Troponin T, High Sensiti* 10/27/2023 27 (H)     Cholesterol 10/27/2023 106 (L)     HDL-Cholesterol 10/27/2023 61.0     Cholesterol/HDL Ratio 10/27/2023 1.7     LDL Calculated 10/27/2023 31 (L)     Triglycerides 10/27/2023 71     Hemoglobin A1C 10/27/2023 5.8 (H)     Estimated Average Glucose 10/27/2023 120     Glucose 10/27/2023 93     Sodium 10/27/2023 145     Potassium 10/27/2023 4.0     Chloride 10/27/2023 107     Bicarbonate 10/27/2023 29     Urea Nitrogen 10/27/2023 16     Creatinine 10/27/2023 0.70     eGFR 10/27/2023 83     Calcium  10/27/2023 9.1     Anion Gap 10/27/2023 9     WBC 10/27/2023 5.3     nRBC 10/27/2023 0.0     RBC 10/27/2023 3.36 (L)     Hemoglobin 10/27/2023 9.9 (L)     Hematocrit 10/27/2023 31.6 (L)     MCV 10/27/2023 94     MCH 10/27/2023 29.5     MCHC 10/27/2023 31.3 (L)     RDW 10/27/2023 14.0     Platelets 10/27/2023 247     MPV 10/27/2023 9.4     Neutrophils % 10/27/2023 64.0     Immature Granulocytes %,* 10/27/2023 0.2     Lymphocytes % 10/27/2023 23.3     Monocytes % 10/27/2023 10.4     Eosinophils % 10/27/2023 1.9     Basophils % 10/27/2023 0.2     Neutrophils Absolute 10/27/2023 3.39     Immature Granulocytes Ab* 10/27/2023 0.01     Lymphocytes Absolute 10/27/2023 1.23     Monocytes Absolute 10/27/2023 0.55     Eosinophils Absolute 10/27/2023 0.10     Basophils Absolute 10/27/2023 0.01     LV A4C EF 10/27/2023 65.9     POCT Glucose 10/27/2023 168 (H)     PROBNP 10/27/2023 540     Troponin T, High Sensiti* 10/28/2023 24 (H)     POCT Glucose 10/27/2023 78     POCT Glucose 10/28/2023 99     POCT Glucose 10/28/2023 195 (H)         Assessment/Plan   Patient Active Problem List   Diagnosis    Cerebrovascular disease    Dizziness and giddiness    Deficit in communication due to slurred speech    Slurred speech    At risk for bleeding    Unsteady gait    Cardiac rhythm disorder or disturbance or change    Acute lower urinary tract infection    Acute non-ST segment elevation myocardial infarction (CMS/HCC)    Anemia    Chest pain    Ankle pain    Anxiety    Asthenia    Asthma    Atrial fibrillation (CMS/HCC)    Cervical radiculopathy    Chronic obstructive pulmonary disease (CMS/HCC)    Coronary artery disease    Cough    Dehydration    Dysfunction of both eustachian tubes    Dyspnea    Dysuria    Erosive esophagitis    Failed back syndrome    Fall    Fever    Fracture of patella    Gastroesophageal reflux disease    History of cerebrovascular accident    Hypercalcemia    Hyperparathyroidism (CMS/HCC)    Hypertensive urgency     Hypokalemia    Hyponatremia    Impaired fasting glucose    Inflammation of joint of shoulder region    Injury of knee    Injury of neck    Irritable bowel syndrome    Leucopenia    Macular degeneration    Menopausal and postmenopausal disorder    Mixed hyperlipidemia    Muscle weakness    Neck pain    Osteoporosis    Pain in left foot    Pneumonia    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  Patient has a longstanding history of for atrial fibrillation.  She has been on aspirin and Eliquis when she had this episode of possible TIA due to weakness and inability to speech.  Patient is unaware of the episode but her  noticed that.  Patient underwent extensive workup including 2D echo, MRI MRA of the brain and carotid duplex everything within normal limits.  Recovered within 24 hours.  No clear evidence of UTI.  Patient doing good.  Denies any chest pain or shortness of breath.  Denies any palpitations or dizziness.  At this point from my standpoint she is stable continue current medications blood pressure heart rate well-controlled continue aspirin and Eliquis.  Will follow-up in 6 months.  Of note patient may need a left shoulder surgery she is going to see Dr. Alejandra in 2 weeks.  If that is the case then think she could have the surgery from cardiac standpoint at low moderate risk.  If she needs clearance please let me know.    Nessa Callaway MD

## 2023-12-02 DIAGNOSIS — M81.0 AGE-RELATED OSTEOPOROSIS WITHOUT CURRENT PATHOLOGICAL FRACTURE: ICD-10-CM

## 2023-12-04 RX ORDER — CALCITONIN SALMON 200 [IU]/.09ML
SPRAY, METERED NASAL
Qty: 3.7 ML | Refills: 11 | Status: SHIPPED | OUTPATIENT
Start: 2023-12-04 | End: 2024-03-05

## 2023-12-06 ENCOUNTER — APPOINTMENT (OUTPATIENT)
Dept: PAIN MEDICINE | Facility: CLINIC | Age: 88
End: 2023-12-06
Payer: COMMERCIAL

## 2023-12-15 ENCOUNTER — APPOINTMENT (OUTPATIENT)
Dept: OTOLARYNGOLOGY | Facility: CLINIC | Age: 88
End: 2023-12-15
Payer: COMMERCIAL

## 2023-12-15 ENCOUNTER — APPOINTMENT (OUTPATIENT)
Dept: AUDIOLOGY | Facility: CLINIC | Age: 88
End: 2023-12-15
Payer: COMMERCIAL

## 2023-12-20 ENCOUNTER — OFFICE VISIT (OUTPATIENT)
Dept: PAIN MEDICINE | Facility: CLINIC | Age: 88
End: 2023-12-20
Payer: COMMERCIAL

## 2023-12-20 VITALS
SYSTOLIC BLOOD PRESSURE: 134 MMHG | WEIGHT: 112 LBS | RESPIRATION RATE: 22 BRPM | DIASTOLIC BLOOD PRESSURE: 82 MMHG | HEART RATE: 63 BPM | HEIGHT: 59 IN | BODY MASS INDEX: 22.58 KG/M2

## 2023-12-20 DIAGNOSIS — G89.29 CHRONIC LEFT SHOULDER PAIN: ICD-10-CM

## 2023-12-20 DIAGNOSIS — M46.1 BILATERAL SACROILIITIS (CMS-HCC): Primary | ICD-10-CM

## 2023-12-20 DIAGNOSIS — M25.512 CHRONIC LEFT SHOULDER PAIN: ICD-10-CM

## 2023-12-20 PROCEDURE — 1125F AMNT PAIN NOTED PAIN PRSNT: CPT | Performed by: ANESTHESIOLOGY

## 2023-12-20 PROCEDURE — 3079F DIAST BP 80-89 MM HG: CPT | Performed by: ANESTHESIOLOGY

## 2023-12-20 PROCEDURE — 1036F TOBACCO NON-USER: CPT | Performed by: ANESTHESIOLOGY

## 2023-12-20 PROCEDURE — 99214 OFFICE O/P EST MOD 30 MIN: CPT | Performed by: ANESTHESIOLOGY

## 2023-12-20 PROCEDURE — 3075F SYST BP GE 130 - 139MM HG: CPT | Performed by: ANESTHESIOLOGY

## 2023-12-20 PROCEDURE — 99204 OFFICE O/P NEW MOD 45 MIN: CPT | Performed by: ANESTHESIOLOGY

## 2023-12-20 PROCEDURE — 1159F MED LIST DOCD IN RCRD: CPT | Performed by: ANESTHESIOLOGY

## 2023-12-20 ASSESSMENT — PATIENT HEALTH QUESTIONNAIRE - PHQ9
1. LITTLE INTEREST OR PLEASURE IN DOING THINGS: NOT AT ALL
SUM OF ALL RESPONSES TO PHQ9 QUESTIONS 1 & 2: 0
2. FEELING DOWN, DEPRESSED OR HOPELESS: NOT AT ALL

## 2023-12-20 ASSESSMENT — PAIN DESCRIPTION - DESCRIPTORS: DESCRIPTORS: SHARP

## 2023-12-20 ASSESSMENT — LIFESTYLE VARIABLES
HOW MANY STANDARD DRINKS CONTAINING ALCOHOL DO YOU HAVE ON A TYPICAL DAY: 1 OR 2
AUDIT-C TOTAL SCORE: 1
HOW OFTEN DO YOU HAVE A DRINK CONTAINING ALCOHOL: MONTHLY OR LESS
SKIP TO QUESTIONS 9-10: 1
HOW OFTEN DO YOU HAVE SIX OR MORE DRINKS ON ONE OCCASION: NEVER

## 2023-12-20 ASSESSMENT — ENCOUNTER SYMPTOMS
ARTHRALGIAS: 1
JOINT SWELLING: 1
ACTIVITY CHANGE: 1

## 2023-12-20 ASSESSMENT — PAIN SCALES - GENERAL
PAINLEVEL: 10-WORST PAIN EVER
PAINLEVEL_OUTOF10: 10 - WORST POSSIBLE PAIN

## 2023-12-20 ASSESSMENT — PAIN - FUNCTIONAL ASSESSMENT: PAIN_FUNCTIONAL_ASSESSMENT: 0-10

## 2023-12-20 NOTE — PROGRESS NOTES
The patient is an 88-year-old female with left shoulder pain.  She has had this pain for many years.  The pain is worse with activity involving use of her left arm.  The patient is left-handed.  She underwent right shoulder replacement approximately 10 years ago.  The surgery was quite successful.   The patient would like to undergo surgery to address her left shoulder if it is an option for her.  She is also experiencing right sided low back pain that radiates into the hip.  The pain is worse when she is rising from the seated position and getting out of bed in the morning.  She has pain when walking.  She underwent lumbar fusion many years ago.  She also has had both hips replaced.    Review of Systems   Constitutional:  Positive for activity change.   Musculoskeletal:  Positive for arthralgias and joint swelling.   All other systems reviewed and are negative.    GENERAL: alert and appropriate, in no distress, well-hydrated, well nourished, interactive         SKIN: no rash noted         HEAD: normocephalic, no abnormality or lesion noted         EYES: no injection and visual acuity is grossly normal         EARS: external ears normal, no mastoid tenderness         NOSE: external nose normal without rhinorrhea         OROPHARYNX: moist mucus membranes, no tonsillar hypertrophy/exudate, uvula midline and pharynx non-erythematous, lips, teeth and gums are without obvious lesion         NECK: Reduced ROM, no cervical LNs noted         RESPIRATORY: breathing non-labored and no grunting/flaring/retractions         CHEST: equal chest rise with normal respiratory effort         ABDOMEN: soft and non-tender         BACK: back normal in appearance, cervical and lumbar spine with reduced ROM         EXTREMITIES: strength intact, left shoulder range of motion reduced and painful, the SI compression test reproduced pain, Gaenslen's reproduced pain, TAWNY was deferred          NEUROLOGIC: gait antalgic, Sharan sign negative,  Spurling sign did not reproduce pain, sensation grossly intact    Assessment and Plan    -Chronicity--chronic musculoskeletal pain    -Diagnostics--we reviewed her imaging    -Pharmacologic--no change    -Psychologic--no need for psychologic intervention from my standpoint    -Physical--we discussed the importance of physical therapy and exercise.  We discussed avoidance and modification techniques.    -Intervention--the patient is a candidate for right sacroiliac joint injection.  I explained the risks benefits and alternatives of the procedure to the patient.  The patient wishes to proceed.    I spent time educating the patient on the condition including the treatment and the prognosis.  I invited the patient to call at anytime with any questions.

## 2023-12-20 NOTE — PROGRESS NOTES
Sp02: 99.    Patient has left shoulder pain.  She would like a shoulder replacement, but is getting info from doctors.  Her right shoulder was replaced 2010.

## 2024-01-04 ENCOUNTER — ANCILLARY PROCEDURE (OUTPATIENT)
Dept: RADIOLOGY | Facility: CLINIC | Age: 89
End: 2024-01-04
Payer: COMMERCIAL

## 2024-01-04 ENCOUNTER — OUTSIDE PROCEDURE (OUTPATIENT)
Dept: PAIN MEDICINE | Facility: CLINIC | Age: 89
End: 2024-01-04
Payer: COMMERCIAL

## 2024-01-04 DIAGNOSIS — M46.1 SACROILIITIS, NOT ELSEWHERE CLASSIFIED (CMS-HCC): ICD-10-CM

## 2024-01-04 PROCEDURE — 77003 FLUOROGUIDE FOR SPINE INJECT: CPT | Mod: RSC

## 2024-01-04 NOTE — PROGRESS NOTES
Pre and postprocedure diagnosis--sacroiliitis    Procedure--right sacroiliac joint injection    Anesthesia--local    Complications--none    Clinical note--the patient has a history of pain.  I explained the risks, benefits, and alternatives of the procedure to the patient.  The patient wishes to proceed.    Procedure Note--The patient was brought to the procedure room and placed in prone position.  Sterile prep and drape with ChloraPrep and a fenestrated drape.  5 mL of half percent lidocaine were injected through a 25-gauge spinal needle for local anesthesia.  A 22-gauge spinal needle was guided to the right sacroiliac joint under fluoroscopic guidance.  Contrast was injected under live fluoroscopy to ensure proper needle placement.  Then 40 mg of triamcinolone and 2 mL of half percent lidocaine were injected through the needle.  The needle was removed and the patient was transferred to recovery.

## 2024-01-07 DIAGNOSIS — I10 ESSENTIAL (PRIMARY) HYPERTENSION: ICD-10-CM

## 2024-01-07 DIAGNOSIS — G45.9 TIA (TRANSIENT ISCHEMIC ATTACK): ICD-10-CM

## 2024-01-08 RX ORDER — CARVEDILOL 6.25 MG/1
6.25 TABLET ORAL
Qty: 180 TABLET | Refills: 2 | Status: SHIPPED | OUTPATIENT
Start: 2024-01-08

## 2024-01-08 RX ORDER — AMLODIPINE BESYLATE 2.5 MG/1
2.5 TABLET ORAL DAILY
Qty: 90 TABLET | Refills: 2 | Status: SHIPPED | OUTPATIENT
Start: 2024-01-08 | End: 2024-03-29 | Stop reason: ALTCHOICE

## 2024-01-08 RX ORDER — CLOPIDOGREL BISULFATE 75 MG/1
75 TABLET ORAL DAILY
Qty: 90 TABLET | Refills: 2 | Status: SHIPPED | OUTPATIENT
Start: 2024-01-08 | End: 2024-03-05 | Stop reason: ALTCHOICE

## 2024-01-08 RX ORDER — ISOSORBIDE MONONITRATE 60 MG/1
60 TABLET, EXTENDED RELEASE ORAL
Qty: 90 TABLET | Refills: 2 | Status: SHIPPED | OUTPATIENT
Start: 2024-01-08

## 2024-01-08 RX ORDER — HYDROXYCHLOROQUINE SULFATE 200 MG/1
TABLET, FILM COATED ORAL
Qty: 90 TABLET | Refills: 2 | Status: SHIPPED | OUTPATIENT
Start: 2024-01-08 | End: 2024-03-05 | Stop reason: ALTCHOICE

## 2024-01-08 NOTE — PROGRESS NOTES
Orders Only  1/4/2024  [facility]       Campos Stevenson MD  Pain Medicine     Progress Notes  Campos Stevenson MD (Physician)  Pain Management  Pre and postprocedure diagnosis--sacroiliitis     Procedure--right sacroiliac joint injection     Anesthesia--local     Complications--none     Clinical note--the patient has a history of pain.  I explained the risks, benefits, and alternatives of the procedure to the patient.  The patient wishes to proceed.     Procedure Note--The patient was brought to the procedure room and placed in prone position.  Sterile prep and drape with ChloraPrep and a fenestrated drape.  5 mL of half percent lidocaine were injected through a 25-gauge spinal needle for local anesthesia.  A 22-gauge spinal needle was guided to the right sacroiliac joint under fluoroscopic guidance.  Contrast was injected under live fluoroscopy to ensure proper needle placement.  Then 40 mg of triamcinolone and 2 mL of half percent lidocaine were injected through the needle.  The needle was removed and the patient was transferred to recovery.

## 2024-01-26 ENCOUNTER — APPOINTMENT (OUTPATIENT)
Dept: OTOLARYNGOLOGY | Facility: CLINIC | Age: 89
End: 2024-01-26
Payer: COMMERCIAL

## 2024-01-26 ENCOUNTER — APPOINTMENT (OUTPATIENT)
Dept: AUDIOLOGY | Facility: CLINIC | Age: 89
End: 2024-01-26
Payer: COMMERCIAL

## 2024-01-31 ENCOUNTER — TELEPHONE (OUTPATIENT)
Dept: PAIN MEDICINE | Facility: CLINIC | Age: 89
End: 2024-01-31

## 2024-01-31 ENCOUNTER — OFFICE VISIT (OUTPATIENT)
Dept: PAIN MEDICINE | Facility: CLINIC | Age: 89
End: 2024-01-31
Payer: COMMERCIAL

## 2024-01-31 VITALS
RESPIRATION RATE: 22 BRPM | SYSTOLIC BLOOD PRESSURE: 150 MMHG | DIASTOLIC BLOOD PRESSURE: 50 MMHG | BODY MASS INDEX: 22.38 KG/M2 | HEART RATE: 69 BPM | HEIGHT: 59 IN | WEIGHT: 111 LBS

## 2024-01-31 DIAGNOSIS — M96.1 POSTLAMINECTOMY SYNDROME, LUMBAR REGION: Primary | ICD-10-CM

## 2024-01-31 PROCEDURE — 1125F AMNT PAIN NOTED PAIN PRSNT: CPT | Performed by: ANESTHESIOLOGY

## 2024-01-31 PROCEDURE — 1157F ADVNC CARE PLAN IN RCRD: CPT | Performed by: ANESTHESIOLOGY

## 2024-01-31 PROCEDURE — 99214 OFFICE O/P EST MOD 30 MIN: CPT | Performed by: ANESTHESIOLOGY

## 2024-01-31 PROCEDURE — 3077F SYST BP >= 140 MM HG: CPT | Performed by: ANESTHESIOLOGY

## 2024-01-31 PROCEDURE — 1036F TOBACCO NON-USER: CPT | Performed by: ANESTHESIOLOGY

## 2024-01-31 PROCEDURE — 1159F MED LIST DOCD IN RCRD: CPT | Performed by: ANESTHESIOLOGY

## 2024-01-31 PROCEDURE — 3078F DIAST BP <80 MM HG: CPT | Performed by: ANESTHESIOLOGY

## 2024-01-31 ASSESSMENT — PAIN SCALES - GENERAL
PAINLEVEL_OUTOF10: 9
PAINLEVEL: 9

## 2024-01-31 ASSESSMENT — PAIN - FUNCTIONAL ASSESSMENT: PAIN_FUNCTIONAL_ASSESSMENT: 0-10

## 2024-01-31 ASSESSMENT — PAIN DESCRIPTION - DESCRIPTORS: DESCRIPTORS: ACHING

## 2024-01-31 ASSESSMENT — ENCOUNTER SYMPTOMS
ARTHRALGIAS: 1
BACK PAIN: 1
ACTIVITY CHANGE: 1

## 2024-01-31 NOTE — LETTER
1/31/24   Rakan Cervantes   1935   [address]  [facility] [address]    Dear Dr. Valladares,    ______ Patient IS CLEARED to hold ELIQUIS (APIXABAN)  for 3 days for Interventional Pain Management Procedure.  ______ Patient IS NOT CLEARED to hold ELIQUIS (APIXABAN)  for Interventional Pain Management Procedure.    Comments: ______________________________________________________________________________________________________________________    Physician Print Name: ______________________________________    Physician Signature: ________________________________________    Date/Time: _______________________________________________    We thank you for your prompt response.  Please fax response back to [phone].    Sincerely,      NOAM ANTHONY

## 2024-01-31 NOTE — PROGRESS NOTES
The patient is an 88-year-old female with low back pain.  The patient reports that the right-sided hip pain is seem to improve after her sacroiliac joint injection.  The low back pain is the predominant issue.  The pain is worse with activity.  She gets some relief with rest.  She is also experiencing left shoulder pain.  The patient is awaiting a phone call from the shoulder surgeon to discuss shoulder replacement.    Review of Systems   Constitutional:  Positive for activity change.   Musculoskeletal:  Positive for arthralgias, back pain and gait problem.   All other systems reviewed and are negative.    GENERAL: alert and appropriate, in no distress, well-hydrated, well-nourished and interactive  SKIN: no rash noted, surgical scars well healed  RESPIRATORY: breathing non-labored and no grunting/flaring/retractions  CHEST: equal chest rise with normal respiratory effort  ABDOMEN: soft and non-tender  BACK: back normal in appearance, spine with reduced ROM  EXTREMITIES: strength intact  NEUROLOGIC: gait antalgic, SLR negative, sensation grossly intact.    Assessment and Plan    -Chronicity--chronic spinal pain    -Diagnostics--no new imaging ordered    -Pharmacologic--no change    -Psychologic--no need for psychologic intervention from my standpoint    -Physical--we discussed the importance of physical therapy and exercise.  We discussed avoidance and modification techniques.    -Intervention--the patient is a candidate for a caudal epidural steroid injection.  I explained the risks benefits and alternatives of the procedure to the patient.  The patient wishes to proceed.  If the patient is not a candidate for shoulder replacement, we can consider temporary peripheral nerve stimulation of the suprascapular nerve.    I spent time educating the patient on the condition including the treatment and the prognosis.  I invited the patient to call at anytime with any questions.

## 2024-01-31 NOTE — LETTER
1/31/24   Rakan Cervantes   1935   [address]  [facility] [address]    Dear Dr. Balderrama,    ______ Patient IS CLEARED to hold PLAVIX (CLOPIDROGEL) for 7 days for Interventional Pain Management Procedure.  ______ Patient IS NOT CLEARED to hold PLAVIX (CLOPIDROGEL) for Interventional Pain Management Procedure.    Comments: ______________________________________________________________________________________________________________________    Physician Print Name: ______________________________________    Physician Signature: ________________________________________    Date/Time: _______________________________________________    We thank you for your prompt response.  Please fax response back to [phone]    Sincerely,      NOAM ANTHONY

## 2024-02-12 ENCOUNTER — TELEPHONE (OUTPATIENT)
Dept: PAIN MEDICINE | Facility: CLINIC | Age: 89
End: 2024-02-12
Payer: COMMERCIAL

## 2024-02-12 NOTE — TELEPHONE ENCOUNTER
Called patient AGAIN. Explained to her the situation with her claim at [facility]. Patient scheduled PRESTON

## 2024-02-12 NOTE — TELEPHONE ENCOUNTER
Patient called stating that  keeps calling her to schedule her procedure. She stated that she had an Epidural done on 1/4/24 and on 2/1/24 she was notified that it was denied by insurance and not covered. She states she does want to have another if it is not covered by the insurance and would like to know what to do now.

## 2024-02-22 ENCOUNTER — TELEPHONE (OUTPATIENT)
Dept: CARDIOLOGY | Facility: CLINIC | Age: 89
End: 2024-02-22
Payer: COMMERCIAL

## 2024-02-23 NOTE — TELEPHONE ENCOUNTER
The provider information for surgery for pt:    Dr. Ivan Sandoval  [facility]  [address] [place] [address] [phone]/ [phone] Office  [phone] Fax

## 2024-03-05 ENCOUNTER — HOSPITAL ENCOUNTER (OUTPATIENT)
Dept: OPERATING ROOM | Facility: HOSPITAL | Age: 89
Discharge: HOME | End: 2024-03-05
Payer: COMMERCIAL

## 2024-03-05 ENCOUNTER — HOSPITAL ENCOUNTER (OUTPATIENT)
Dept: RADIOLOGY | Facility: HOSPITAL | Age: 89
Discharge: HOME | End: 2024-03-05
Payer: COMMERCIAL

## 2024-03-05 VITALS
HEIGHT: 59 IN | RESPIRATION RATE: 18 BRPM | WEIGHT: 112 LBS | BODY MASS INDEX: 22.58 KG/M2 | DIASTOLIC BLOOD PRESSURE: 61 MMHG | HEART RATE: 68 BPM | OXYGEN SATURATION: 100 % | TEMPERATURE: 97.2 F | SYSTOLIC BLOOD PRESSURE: 149 MMHG

## 2024-03-05 DIAGNOSIS — M96.1 POSTLAMINECTOMY SYNDROME, LUMBAR REGION: ICD-10-CM

## 2024-03-05 PROCEDURE — 62323 NJX INTERLAMINAR LMBR/SAC: CPT | Performed by: ANESTHESIOLOGY

## 2024-03-05 PROCEDURE — 2550000001 HC RX 255 CONTRASTS: Performed by: ANESTHESIOLOGY

## 2024-03-05 PROCEDURE — 2500000005 HC RX 250 GENERAL PHARMACY W/O HCPCS: Performed by: ANESTHESIOLOGY

## 2024-03-05 PROCEDURE — 2500000004 HC RX 250 GENERAL PHARMACY W/ HCPCS (ALT 636 FOR OP/ED): Performed by: ANESTHESIOLOGY

## 2024-03-05 RX ORDER — LIDOCAINE HYDROCHLORIDE 5 MG/ML
INJECTION, SOLUTION INFILTRATION; PERINEURAL AS NEEDED
Status: COMPLETED | OUTPATIENT
Start: 2024-03-05 | End: 2024-03-05

## 2024-03-05 RX ORDER — TRIAMCINOLONE ACETONIDE 40 MG/ML
INJECTION, SUSPENSION INTRA-ARTICULAR; INTRAMUSCULAR AS NEEDED
Status: COMPLETED | OUTPATIENT
Start: 2024-03-05 | End: 2024-03-05

## 2024-03-05 RX ADMIN — TRIAMCINOLONE ACETONIDE 60 MG: 40 INJECTION, SUSPENSION INTRA-ARTICULAR; INTRAMUSCULAR at 13:46

## 2024-03-05 RX ADMIN — LIDOCAINE HYDROCHLORIDE 8 ML: 5 INJECTION, SOLUTION INFILTRATION; PERINEURAL at 13:47

## 2024-03-05 RX ADMIN — IOHEXOL 1 ML: 240 INJECTION, SOLUTION INTRATHECAL; INTRAVASCULAR; INTRAVENOUS; ORAL at 13:45

## 2024-03-05 RX ADMIN — LIDOCAINE HYDROCHLORIDE 6 ML: 5 INJECTION, SOLUTION INFILTRATION; PERINEURAL at 13:45

## 2024-03-05 ASSESSMENT — PAIN DESCRIPTION - DESCRIPTORS: DESCRIPTORS: ACHING;BURNING;DULL

## 2024-03-05 ASSESSMENT — PAIN SCALES - GENERAL
PAINLEVEL_OUTOF10: 7
PAINLEVEL_OUTOF10: 7
PAINLEVEL_OUTOF10: 0 - NO PAIN

## 2024-03-05 ASSESSMENT — PAIN - FUNCTIONAL ASSESSMENT
PAIN_FUNCTIONAL_ASSESSMENT: 0-10
PAIN_FUNCTIONAL_ASSESSMENT: 0-10

## 2024-03-05 ASSESSMENT — COLUMBIA-SUICIDE SEVERITY RATING SCALE - C-SSRS
1. IN THE PAST MONTH, HAVE YOU WISHED YOU WERE DEAD OR WISHED YOU COULD GO TO SLEEP AND NOT WAKE UP?: NO
2. HAVE YOU ACTUALLY HAD ANY THOUGHTS OF KILLING YOURSELF?: NO
6. HAVE YOU EVER DONE ANYTHING, STARTED TO DO ANYTHING, OR PREPARED TO DO ANYTHING TO END YOUR LIFE?: NO

## 2024-03-05 NOTE — OP NOTE
* No procedures listed * Operative Note     Date: 3/5/2024  OR Location: [facility] OR    Name: Rakan Cervantes, : 1935, Age: 88 y.o., MRN: [id number], Sex: female    Diagnosis  * No Diagnosis Codes entered * * No Diagnosis Codes entered *     Procedures  * No procedures documented on diagnosis form *    Surgeons   * No surgeons found in log *    Resident/Fellow/Other Assistant:  * No surgeons found in log *    Procedure Summary  Anesthesia: * No anesthesia type entered *  ASA: ASA status not filed in the log.  Anesthesia Staff: No anesthesia staff entered.  Estimated Blood Loss: 0mL  Intra-op Medications: * Intraprocedure medication information is unavailable because the case start and end events have not been set *      Intraprocedure I/O Totals       None           Specimen: No specimens collected     Staff:   No surgical staff documented.         Drains and/or Catheters: * None in log *    Tourniquet Times:         Implants:     Findings:     Indications: Rakan Cervnates is an 88 y.o. female who is having surgery for * No pre-op diagnosis entered *.     The patient was seen in the preoperative area. The risks, benefits, complications, treatment options, non-operative alternatives, expected recovery and outcomes were discussed with the patient. The possibilities of reaction to medication, pulmonary aspiration, injury to surrounding structures, bleeding, recurrent infection, the need for additional procedures, failure to diagnose a condition, and creating a complication requiring transfusion or operation were discussed with the patient. The patient concurred with the proposed plan, giving informed consent.  The site of surgery was properly noted/marked if necessary per policy. The patient has been actively warmed in preoperative area. Preoperative antibiotics are not indicated. Venous thrombosis prophylaxis are not indicated.    Procedure Details: The patient was brought to the procedure room and  placed in the prone position.  Sterile prep and drape with ChloraPrep and sterile towels.  6 mL of half percent lidocaine were injected through a 25-gauge needle for local anesthesia.  A 22-gauge spinal needle was guided through the sacral hiatus to the caudal epidural space.  After negative aspiration, 1 mL of contrast was injected through the needle to ensure proper needle placement.  Then 8 mL of 0.25% percent lidocaine and 60 mg of triamcinolone were injected through the needle.  The needle was removed and the patient was transferred to recovery.    Complications:  None; patient tolerated the procedure well.    Disposition: PACU - hemodynamically stable.  Condition: stable         Additional Details:     Attending Attestation: I performed the procedure.    *No primary surgeon found*

## 2024-03-05 NOTE — DISCHARGE INSTRUCTIONS
You underwent a procedure today    After most procedures, it is recommended that you relax and limit your activity for the remainder of the day unless you have been told otherwise by your pain physician.  You should not drive a car, operate machinery, or make important legal decisions unless otherwise directed by your pain physician.  You may resume your normal activity, including exercise, tomorrow.      Keep a written pain diary of how much pain relief you experienced following the procedure and the length of time of pain relief you experienced pain relief. Following diagnostic injections like medial branch nerve blocks, sacroiliac joint blocks, stellate ganglion injections and other blocks, it is very important you record the specific amount of pain relief you experienced immediately after the injectionand how long it lasted. Your doctor will ask you for this information at your follow up visit.     For all injections, please keep the injection site dry and inspect the site for a couple of days. You may remove the Band-Aid the day of the injection at any time.     Some discomfort, bruising or slight swelling may occur at the injection site. This is not abnormal if it occurs.  If needed you may:    -Take over the counter medication such as Tylenol or Motrin.   -Apply an ice pack for 30 minutes, 2 to 3 times a day for the first 24 hours.     You may shower today; no soaking baths, hot tubs, whirlpools or swimming pools for two days.      If you are given steroids in your injection, it may take 3-5 days for the steroid medication to take effect. You may notice a worsening of your symptoms for 1-2 days after the injection. This is not abnormal.  You may use acetaminophen, ibuprofen, or prescription medication that your doctor may have prescribed for you if you need to do so.     A few common side effects of steroids include facial flushing, sweating, restlessness, irritability,difficulty sleeping, increase in blood  sugar, and increased blood pressure. If you have diabetes, please monitor your blood sugar at least once a day for at least 5 days. If you have poorly controlled high blood pressure, monitoryour blood pressure for at least 2 days and contact your primary care physician if these numbers are unusually high for you.      If you take aspirin or non-steroidal anti-inflammatory drugs (examples are Motrin, Advil, ibuprofen, Naprosyn, Voltaren, Relafen, etc.) you may restart these this evening.    You do not need to discontinue non-aspirin-containing pain medications prior to an injection (examples: Celebrex, tramadol, hydrocodone and acetaminophen).      If you take a blood thinning medication (Coumadin, Lovenox, Fragmin,Ticlid, Plavix, Pradaxa, etc.), please discuss this with your primary care physician/cardiologist and your pain physician. These medications MUST be discontinued before you can have an injection safely, without the risk of uncontrolled bleeding. If these medications are not discontinued for an appropriate period of time, you will not be able to receivean injection.      If you are taking Coumadin, please have your INR checked the morning of your procedure and bringthe result to your appointment unless otherwise instructed. If your INR is over 1.2, your injection may need to be rescheduled to avoid uncontrolled bleeding from the needle placement.     Call [facility] at [phone] between 8am-4pm Monday - Friday if you are experiencing the following:    If you received an epidural or spinal injection:    -Headache that doesnot go away with medicine, is worse when sitting or standing up, and is greatly relieved upon lying down.   -Severe pain worse than or different than your baseline pain.   -Chills or fever (101º F or greater).   -Drainage or signs of infection at the injection site     Go directly to the Emergency Department if you are experiencing the following and received an  epidural or spinal injection:   -Abrupt weakness or progressive weakness in your legs that starts after you leave the clinic.   -Abrupt severe or worsening numbness in your legs.   -Inability to urinate after the injection or loss of bowel or bladder control without the urge to defecate or urinate.     If you have a clinical question that cannot wait until your next appointment, please call [phone] between 8am-4pm Monday - Friday or send a [employer] message. We do our best to return all non-emergency messages within 24 hours, Monday - Friday. A nurse or physician will return your message.      If you need to cancel an appointment, please call the scheduling staff at [phone] during normal business hours or leave a message at least 24 hours in advance.     If you are going to be sedated for your next procedure, you MUST have responsible adult who can legally drive accompany you home. You cannot eat or drink for eight hours prior to the planned procedure if you are going to receive sedation. You may take your non-blood thinning medications with a small sip of water.

## 2024-03-05 NOTE — POST-PROCEDURE NOTE
PT ARRIVED IN RECOVERY.  PT NOT IN DISTRESS.  DENIES PAIN, NAUSEA OR DIZZINESS.  PT ABLE TO STAND AND AMBULATE WITHOUT DIFFICULTY. DISCHARGE INSTRUCTIONS REVIEWED WITH PT.  DRESSING CLEAN, DRY AND INTACT WITHOUT SHADOWING.

## 2024-03-05 NOTE — H&P
The patient has a history of low back pain.    ROS:    Negative except pain    GENERAL: alert and appropriate, in no distress, well-hydrated, well-nourished and interactive  SKIN: no rash noted  RESPIRATORY: breathing non-labored and no grunting/flaring/retractions  CHEST: equal chest rise with normal respiratory effort  ABDOMEN: soft and non-tender  BACK: back normal in appearance, spine with reduced ROM  EXTREMITIES: strength intact  NEUROLOGIC: gait antalgic, sensation grossly intact.    Assessment and plan    Caudal epidural steroid injection

## 2024-03-06 ENCOUNTER — APPOINTMENT (OUTPATIENT)
Dept: PAIN MEDICINE | Facility: CLINIC | Age: 89
End: 2024-03-06
Payer: COMMERCIAL

## 2024-03-11 NOTE — TELEPHONE ENCOUNTER
Dear Dr.Dr. Ivan Sandoval     Mr. Rakan Cervantes is a patient of mine who I saw in the office for follow-up on history of stroke.Patient has no active chest pain or shortness of breath.  She did have a history of atrial fibrillation on Eliquis and baby aspirin.  From cardiac standpoint may proceed with surgery at low moderate risk.  Patient asymptomatic cardiac wise.  May hold Eliquis for 3 days and restart after.  May continue baby aspirin throughout the procedure. Please do not hesitate to contact me if you have additional questions.    Sincerely.    Nessa Callaway MD  Phone [phone]

## 2024-03-14 ENCOUNTER — LAB (OUTPATIENT)
Dept: LAB | Facility: LAB | Age: 89
End: 2024-03-14
Payer: COMMERCIAL

## 2024-03-14 DIAGNOSIS — R73.01 IMPAIRED FASTING GLUCOSE: Primary | ICD-10-CM

## 2024-03-14 DIAGNOSIS — I25.10 ATHEROSCLEROTIC HEART DISEASE OF NATIVE CORONARY ARTERY WITHOUT ANGINA PECTORIS: ICD-10-CM

## 2024-03-14 DIAGNOSIS — Z86.2 PERSONAL HISTORY OF DISEASES OF THE BLOOD AND BLOOD-FORMING ORGANS AND CERTAIN DISORDERS INVOLVING THE IMMUNE MECHANISM: ICD-10-CM

## 2024-03-14 LAB
ALBUMIN SERPL-MCNC: 4.1 G/DL (ref 3.5–5)
ALP BLD-CCNC: 99 U/L (ref 35–125)
ALT SERPL-CCNC: 25 U/L (ref 5–40)
ANION GAP SERPL CALC-SCNC: 12 MMOL/L
AST SERPL-CCNC: 20 U/L (ref 5–40)
BILIRUB SERPL-MCNC: 0.6 MG/DL (ref 0.1–1.2)
BUN SERPL-MCNC: 11 MG/DL (ref 8–25)
CALCIUM SERPL-MCNC: 9.4 MG/DL (ref 8.5–10.4)
CHLORIDE SERPL-SCNC: 99 MMOL/L (ref 97–107)
CHOLEST SERPL-MCNC: 154 MG/DL (ref 133–200)
CHOLEST/HDLC SERPL: 1.9 {RATIO}
CO2 SERPL-SCNC: 26 MMOL/L (ref 24–31)
CREAT SERPL-MCNC: 0.6 MG/DL (ref 0.4–1.6)
EGFRCR SERPLBLD CKD-EPI 2021: 86 ML/MIN/1.73M*2
ERYTHROCYTE [DISTWIDTH] IN BLOOD BY AUTOMATED COUNT: 13.9 % (ref 11.5–14.5)
EST. AVERAGE GLUCOSE BLD GHB EST-MCNC: 134 MG/DL
FERRITIN SERPL-MCNC: 40 NG/ML (ref 13–150)
GLUCOSE SERPL-MCNC: 97 MG/DL (ref 65–99)
HBA1C MFR BLD: 6.3 %
HCT VFR BLD AUTO: 37.9 % (ref 36–46)
HDLC SERPL-MCNC: 83 MG/DL
HGB BLD-MCNC: 12.2 G/DL (ref 12–16)
IRON SATN MFR SERPL: 27 % (ref 12–50)
IRON SERPL-MCNC: 82 UG/DL (ref 30–160)
LDLC SERPL CALC-MCNC: 44 MG/DL (ref 65–130)
MCH RBC QN AUTO: 30.1 PG (ref 26–34)
MCHC RBC AUTO-ENTMCNC: 32.2 G/DL (ref 32–36)
MCV RBC AUTO: 94 FL (ref 80–100)
NRBC BLD-RTO: 0 /100 WBCS (ref 0–0)
PLATELET # BLD AUTO: 273 X10*3/UL (ref 150–450)
POTASSIUM SERPL-SCNC: 4 MMOL/L (ref 3.4–5.1)
PROT SERPL-MCNC: 5.9 G/DL (ref 5.9–7.9)
RBC # BLD AUTO: 4.05 X10*6/UL (ref 4–5.2)
SODIUM SERPL-SCNC: 137 MMOL/L (ref 133–145)
TIBC SERPL-MCNC: 307 UG/DL (ref 228–428)
TRIGL SERPL-MCNC: 136 MG/DL (ref 40–150)
UIBC SERPL-MCNC: 225 UG/DL (ref 110–370)
WBC # BLD AUTO: 8.1 X10*3/UL (ref 4.4–11.3)

## 2024-03-14 PROCEDURE — 80061 LIPID PANEL: CPT

## 2024-03-14 PROCEDURE — 82728 ASSAY OF FERRITIN: CPT

## 2024-03-14 PROCEDURE — 80053 COMPREHEN METABOLIC PANEL: CPT

## 2024-03-14 PROCEDURE — 83550 IRON BINDING TEST: CPT

## 2024-03-14 PROCEDURE — 83036 HEMOGLOBIN GLYCOSYLATED A1C: CPT

## 2024-03-14 PROCEDURE — 83540 ASSAY OF IRON: CPT

## 2024-03-14 PROCEDURE — 85027 COMPLETE CBC AUTOMATED: CPT

## 2024-03-14 PROCEDURE — 36415 COLL VENOUS BLD VENIPUNCTURE: CPT

## 2024-03-20 ENCOUNTER — OFFICE VISIT (OUTPATIENT)
Dept: PAIN MEDICINE | Facility: CLINIC | Age: 89
End: 2024-03-20
Payer: COMMERCIAL

## 2024-03-20 VITALS
WEIGHT: 112 LBS | HEART RATE: 66 BPM | DIASTOLIC BLOOD PRESSURE: 84 MMHG | OXYGEN SATURATION: 98 % | BODY MASS INDEX: 22.58 KG/M2 | RESPIRATION RATE: 22 BRPM | HEIGHT: 59 IN | SYSTOLIC BLOOD PRESSURE: 130 MMHG

## 2024-03-20 DIAGNOSIS — M96.1 LUMBAR POSTLAMINECTOMY SYNDROME: Primary | ICD-10-CM

## 2024-03-20 PROCEDURE — 1125F AMNT PAIN NOTED PAIN PRSNT: CPT | Performed by: ANESTHESIOLOGY

## 2024-03-20 PROCEDURE — 3075F SYST BP GE 130 - 139MM HG: CPT | Performed by: ANESTHESIOLOGY

## 2024-03-20 PROCEDURE — 99213 OFFICE O/P EST LOW 20 MIN: CPT | Performed by: ANESTHESIOLOGY

## 2024-03-20 PROCEDURE — 3079F DIAST BP 80-89 MM HG: CPT | Performed by: ANESTHESIOLOGY

## 2024-03-20 PROCEDURE — 1036F TOBACCO NON-USER: CPT | Performed by: ANESTHESIOLOGY

## 2024-03-20 PROCEDURE — 1157F ADVNC CARE PLAN IN RCRD: CPT | Performed by: ANESTHESIOLOGY

## 2024-03-20 PROCEDURE — 1159F MED LIST DOCD IN RCRD: CPT | Performed by: ANESTHESIOLOGY

## 2024-03-20 ASSESSMENT — PATIENT HEALTH QUESTIONNAIRE - PHQ9
SUM OF ALL RESPONSES TO PHQ9 QUESTIONS 1 & 2: 0
2. FEELING DOWN, DEPRESSED OR HOPELESS: NOT AT ALL
1. LITTLE INTEREST OR PLEASURE IN DOING THINGS: NOT AT ALL

## 2024-03-20 ASSESSMENT — ENCOUNTER SYMPTOMS
ARTHRALGIAS: 1
ACTIVITY CHANGE: 1
BACK PAIN: 1

## 2024-03-20 ASSESSMENT — PAIN DESCRIPTION - DESCRIPTORS: DESCRIPTORS: SHARP

## 2024-03-20 ASSESSMENT — PAIN SCALES - GENERAL
PAINLEVEL: 7
PAINLEVEL_OUTOF10: 7

## 2024-03-20 ASSESSMENT — PAIN - FUNCTIONAL ASSESSMENT: PAIN_FUNCTIONAL_ASSESSMENT: 0-10

## 2024-03-20 NOTE — PROGRESS NOTES
The patient is an 88-year-old female with low back and right leg pain.  The patient reports that the pain is much improved after her caudal epidural steroid injection.  The patient is pleased with her response to treatment.    Review of Systems   Constitutional:  Positive for activity change.   Musculoskeletal:  Positive for arthralgias, back pain and gait problem.   All other systems reviewed and are negative.    GENERAL: alert and appropriate, in no distress, well-hydrated, well-nourished and interactive  SKIN: no rash noted, surgical scars well healed  RESPIRATORY: breathing non-labored and no grunting/flaring/retractions  CHEST: equal chest rise with normal respiratory effort  ABDOMEN: soft and non-tender  BACK: back normal in appearance, spine with reduced ROM  EXTREMITIES: strength intact  NEUROLOGIC: gait antalgic, SLR negative, sensation grossly intact.    Assessment and Plan    -Chronicity--chronic spinal pain    -Diagnostics--no new imaging    -Pharmacologic--no change    -Psychologic--no need for psychologic intervention from my standpoint    -Physical--we discussed the importance of physical therapy and exercise.  We discussed avoidance and modification techniques.    -Intervention--we will consider repeating the caudal epidural steroid injection in the future if necessary    I spent time educating the patient on the condition including the treatment and the prognosis.  I invited the patient to call at anytime with any questions.

## 2024-03-26 PROBLEM — Z66 DO NOT RESUSCITATE: Status: ACTIVE | Noted: 2023-05-14

## 2024-03-26 PROBLEM — M19.90 OSTEOARTHRITIS: Status: ACTIVE | Noted: 2023-04-28

## 2024-03-26 PROBLEM — I25.10 ARTERIOSCLEROSIS OF CORONARY ARTERY: Status: ACTIVE | Noted: 2023-05-14

## 2024-03-26 PROBLEM — R53.1 WEAKNESS: Status: ACTIVE | Noted: 2023-04-28

## 2024-03-26 PROBLEM — Z86.2 HISTORY OF ANEMIA: Status: ACTIVE | Noted: 2024-03-14

## 2024-03-26 PROBLEM — H91.93 BILATERAL HEARING LOSS: Status: ACTIVE | Noted: 2023-11-26

## 2024-03-26 PROBLEM — H69.90 DYSFUNCTION OF EUSTACHIAN TUBE: Status: ACTIVE | Noted: 2023-11-26

## 2024-03-26 PROBLEM — M19.012 OSTEOARTHRITIS OF LEFT GLENOHUMERAL JOINT: Status: ACTIVE | Noted: 2024-02-22

## 2024-03-26 PROBLEM — G25.81 RESTLESS LEGS SYNDROME: Status: ACTIVE | Noted: 2023-05-23

## 2024-03-26 PROBLEM — R47.9 DIFFICULTY USING VERBAL COMMUNICATION: Status: ACTIVE | Noted: 2023-10-26

## 2024-03-26 PROBLEM — B99.9 INFECTIOUS DISEASE: Status: ACTIVE | Noted: 2023-11-26

## 2024-03-26 PROBLEM — M19.012 ARTHRITIS OF LEFT GLENOHUMERAL JOINT: Status: ACTIVE | Noted: 2023-11-26

## 2024-03-26 PROBLEM — I65.23 BILATERAL CAROTID ARTERY OCCLUSION: Status: ACTIVE | Noted: 2023-05-23

## 2024-03-26 PROBLEM — G89.29 CHRONIC PAIN OF LEFT UPPER EXTREMITY: Status: ACTIVE | Noted: 2023-02-23

## 2024-03-26 PROBLEM — S46.112A LABRAL TEAR OF LONG HEAD OF LEFT BICEPS TENDON: Status: ACTIVE | Noted: 2023-12-07

## 2024-03-26 PROBLEM — R26.9 ABNORMAL GAIT: Status: ACTIVE | Noted: 2023-11-26

## 2024-03-26 PROBLEM — K59.00 CONSTIPATION: Status: ACTIVE | Noted: 2023-05-14

## 2024-03-26 PROBLEM — E78.00 HYPERCHOLESTEROLEMIA: Status: ACTIVE | Noted: 2023-05-23

## 2024-03-26 PROBLEM — I12.9 CHRONIC KIDNEY DISEASE DUE TO HYPERTENSION: Status: ACTIVE | Noted: 2023-06-17

## 2024-03-26 PROBLEM — T81.9XXA COMPLICATION OF SURGICAL PROCEDURE: Status: ACTIVE | Noted: 2023-11-26

## 2024-03-26 PROBLEM — I48.91 RAPID ATRIAL FIBRILLATION (MULTI): Status: ACTIVE | Noted: 2023-05-14

## 2024-03-26 PROBLEM — M79.602 CHRONIC PAIN OF LEFT UPPER EXTREMITY: Status: ACTIVE | Noted: 2023-02-23

## 2024-03-26 RX ORDER — CYCLOBENZAPRINE HCL 5 MG
TABLET ORAL
COMMUNITY
End: 2024-03-29 | Stop reason: ALTCHOICE

## 2024-03-26 RX ORDER — PREDNISONE 10 MG/1
TABLET ORAL
COMMUNITY
Start: 2023-12-05 | End: 2024-03-29 | Stop reason: ALTCHOICE

## 2024-03-26 RX ORDER — POLYETHYLENE GLYCOL 3350 17 G/17G
17 POWDER, FOR SOLUTION ORAL DAILY PRN
COMMUNITY
Start: 2023-10-27 | End: 2024-03-29 | Stop reason: ALTCHOICE

## 2024-03-29 ENCOUNTER — OFFICE VISIT (OUTPATIENT)
Dept: PRIMARY CARE | Facility: CLINIC | Age: 89
End: 2024-03-29
Payer: COMMERCIAL

## 2024-03-29 VITALS
SYSTOLIC BLOOD PRESSURE: 108 MMHG | OXYGEN SATURATION: 95 % | TEMPERATURE: 97.5 F | DIASTOLIC BLOOD PRESSURE: 57 MMHG | HEART RATE: 62 BPM | BODY MASS INDEX: 23.59 KG/M2 | WEIGHT: 117 LBS | HEIGHT: 59 IN

## 2024-03-29 DIAGNOSIS — M81.0 AGE-RELATED OSTEOPOROSIS WITHOUT CURRENT PATHOLOGICAL FRACTURE: ICD-10-CM

## 2024-03-29 DIAGNOSIS — I48.0 PAF (PAROXYSMAL ATRIAL FIBRILLATION) (MULTI): Primary | ICD-10-CM

## 2024-03-29 DIAGNOSIS — I10 LABILE ESSENTIAL HYPERTENSION: ICD-10-CM

## 2024-03-29 DIAGNOSIS — E55.9 VITAMIN D DEFICIENCY: ICD-10-CM

## 2024-03-29 DIAGNOSIS — E78.00 HYPERCHOLESTEROLEMIA: ICD-10-CM

## 2024-03-29 DIAGNOSIS — R73.01 IMPAIRED FASTING BLOOD SUGAR: ICD-10-CM

## 2024-03-29 DIAGNOSIS — G45.9 TIA (TRANSIENT ISCHEMIC ATTACK): ICD-10-CM

## 2024-03-29 DIAGNOSIS — F41.9 ANXIETY: ICD-10-CM

## 2024-03-29 DIAGNOSIS — M30.0: ICD-10-CM

## 2024-03-29 DIAGNOSIS — R73.01 IMPAIRED FASTING GLUCOSE: ICD-10-CM

## 2024-03-29 PROCEDURE — 3074F SYST BP LT 130 MM HG: CPT | Performed by: INTERNAL MEDICINE

## 2024-03-29 PROCEDURE — 99214 OFFICE O/P EST MOD 30 MIN: CPT | Performed by: INTERNAL MEDICINE

## 2024-03-29 PROCEDURE — 1159F MED LIST DOCD IN RCRD: CPT | Performed by: INTERNAL MEDICINE

## 2024-03-29 PROCEDURE — G2211 COMPLEX E/M VISIT ADD ON: HCPCS | Performed by: INTERNAL MEDICINE

## 2024-03-29 PROCEDURE — 1157F ADVNC CARE PLAN IN RCRD: CPT | Performed by: INTERNAL MEDICINE

## 2024-03-29 PROCEDURE — 1125F AMNT PAIN NOTED PAIN PRSNT: CPT | Performed by: INTERNAL MEDICINE

## 2024-03-29 PROCEDURE — 3078F DIAST BP <80 MM HG: CPT | Performed by: INTERNAL MEDICINE

## 2024-03-29 PROCEDURE — 1123F ACP DISCUSS/DSCN MKR DOCD: CPT | Performed by: INTERNAL MEDICINE

## 2024-03-29 PROCEDURE — 1158F ADVNC CARE PLAN TLK DOCD: CPT | Performed by: INTERNAL MEDICINE

## 2024-03-29 PROCEDURE — 1036F TOBACCO NON-USER: CPT | Performed by: INTERNAL MEDICINE

## 2024-03-29 RX ORDER — SULFASALAZINE 500 MG/1
500 TABLET ORAL 2 TIMES DAILY
COMMUNITY

## 2024-03-29 RX ORDER — FERROUS SULFATE 325(65) MG
65 TABLET ORAL
Start: 2024-03-29 | End: 2024-04-08 | Stop reason: SDUPTHER

## 2024-03-29 RX ORDER — CLOPIDOGREL BISULFATE 75 MG/1
TABLET ORAL DAILY
COMMUNITY

## 2024-03-29 RX ORDER — CALCITONIN SALMON 200 [IU]/.09ML
1 SPRAY, METERED NASAL DAILY
COMMUNITY

## 2024-03-29 RX ORDER — HYDROXYCHLOROQUINE SULFATE 200 MG/1
TABLET, FILM COATED ORAL
COMMUNITY

## 2024-03-29 ASSESSMENT — ENCOUNTER SYMPTOMS
LOSS OF SENSATION IN FEET: 0
DEPRESSION: 0
OCCASIONAL FEELINGS OF UNSTEADINESS: 1

## 2024-03-29 ASSESSMENT — PATIENT HEALTH QUESTIONNAIRE - PHQ9
SUM OF ALL RESPONSES TO PHQ9 QUESTIONS 1 AND 2: 0
2. FEELING DOWN, DEPRESSED OR HOPELESS: NOT AT ALL
1. LITTLE INTEREST OR PLEASURE IN DOING THINGS: NOT AT ALL

## 2024-03-29 ASSESSMENT — PAIN SCALES - GENERAL: PAINLEVEL: 10-WORST PAIN EVER

## 2024-03-29 NOTE — PATIENT INSTRUCTIONS
follow up September.    Diagnoses and all orders for this visit:  PAF (paroxysmal atrial fibrillation) (CMS/HCC)  Comments:  With TIA HX PAF on Plavix( clopidogrel) 75 mg, asa 81 mg daily and Eliquis low dose..  Hypercholesterolemia  Comments:  atrovastatin LDL 44.  Labile essential hypertension  Comments:  BP doing well.  Impaired fasting glucose  Comments:  HGA1C 6.3%.  Anxiety  Polyarteritis (CMS/HCC)  TIA (transient ischemic attack)  Comments:  Recommebnd Assited living for both you and .  Orders:  -     ferrous sulfate, 325 mg ferrous sulfate, tablet; Take 1 tablet by mouth once daily with breakfast. M-W-F  Age-related osteoporosis without current pathological fracture  Comments:  Calcitonin

## 2024-03-29 NOTE — PROGRESS NOTES
[facility]: [facility]  PROGRESS NOTE      Rakan Cervantes is a 88 y.o. female that is presenting today for No chief complaint on file..    Assessment/Plan   Diagnoses and all orders for this visit:  PAF (paroxysmal atrial fibrillation) (CMS/HCC)  Comments:  With TIA HX PAF on Plavix( clopidogrel) 75 mg, asa 81 mg daily and Eliquis low dose..  Hypercholesterolemia  Comments:  atrovastatin LDL 44.  Labile essential hypertension  Comments:  BP doing well.  Impaired fasting glucose  Comments:  HGA1C 6.3%.  Anxiety  Polyarteritis (CMS/HCC)  TIA (transient ischemic attack)  Comments:  Recommebnd Assited living for both you and .  Orders:  -     ferrous sulfate, 325 mg ferrous sulfate, tablet; Take 1 tablet by mouth once daily with breakfast. M-W-F  Age-related osteoporosis without current pathological fracture  Comments:  Calcitonin    Subjective   TIA,PAF, HTN, SS , , osteoporosis .Wheel chair, Yoon daughter from [place], pt and  decline. He dose not like fact wife has DNRCCARREST. Does not want to lose her.      Review of Systems   Objective   There were no vitals filed for this visit.   There is no height or weight on file to calculate BMI.  Physical Exam  Diagnostic Results   Lab Results   Component Value Date    GLUCOSE 97 03/14/2024    CALCIUM 9.4 03/14/2024     03/14/2024    K 4.0 03/14/2024    CO2 26 03/14/2024    CL 99 03/14/2024    BUN 11 03/14/2024    CREATININE 0.60 03/14/2024     Lab Results   Component Value Date    ALT 25 03/14/2024    AST 20 03/14/2024    ALKPHOS 99 03/14/2024    BILITOT 0.6 03/14/2024     Lab Results   Component Value Date    WBC 8.1 03/14/2024    HGB 12.2 03/14/2024    HCT 37.9 03/14/2024    MCV 94 03/14/2024     03/14/2024     Lab Results   Component Value Date    CHOL 154 03/14/2024    CHOL 106 (L) 10/27/2023    CHOL 148 08/23/2023     Lab Results   Component Value Date    HDL 83.0 03/14/2024    HDL 61.0  "10/27/2023    HDL 73 08/23/2023     Lab Results   Component Value Date    LDLCALC 44 (L) 03/14/2024    LDLCALC 31 (L) 10/27/2023    LDLCALC 55 (L) 08/23/2023     Lab Results   Component Value Date    TRIG 136 03/14/2024    TRIG 71 10/27/2023    TRIG 98 08/23/2023     No components found for: \"CHOLHDL\"  Lab Results   Component Value Date    HGBA1C 6.3 (H) 03/14/2024     Other labs not included in the list above were reviewed either before or during this encounter.    History    Past Medical History:   Diagnosis Date    Acute non-ST segment elevation myocardial infarction (CMS/HCC) 11/26/2023    Anemia     Arthritis     Asthma     Atrial fibrillation (CMS/HCC) 11/26/2023    Jonas esophagus     CAD (coronary artery disease)     Cardiac rhythm disorder or disturbance or change 11/26/2023    Cervical radiculopathy     Chest pain 11/26/2023    Chronic obstructive pulmonary disease (CMS/HCC) 11/26/2023    Constipation     Coronary artery disease 11/26/2023    Degenerative joint disease     Dyslipidemia     Dysuria     Erosive esophagitis     GERD (gastroesophageal reflux disease)     Hyperparathyroidism (CMS/HCC)     Hypertensive urgency 11/26/2023    Irritable bowel syndrome (IBS)     Labile hypertension     Left foot pain     Low blood pressure 11/26/2023    Macular degeneration     Mixed hyperlipidemia 11/26/2023    Neck pain     Osteoporosis     Paroxysmal atrial fibrillation (CMS/HCC)     Polyarthritis with negative rheumatoid factor (CMS/HCC)     Post menopausal syndrome     Spinal stenosis     Stage 3 chronic kidney disease (CMS/HCC) 11/26/2023    Stroke (CMS/[facility])     Transient ischemic attack 11/26/2023     Past Surgical History:   Procedure Laterality Date    CARDIOVASCULAR STRESS TEST  2017    Dr. Khalil    CARDIOVASCULAR STRESS TEST  2004    Dr. Pantoja    CARPAL TUNNEL RELEASE Left 2014    CATARACT EXTRACTION Bilateral 2011    CHOLECYSTECTOMY      CORONARY STENT PLACEMENT  05/2023    [facility]    CT HEAD ANGIO W AND " WO IV CONTRAST  05/08/2013    CT HEAD ANGIO W AND WO IV CONTRAST [facility]    MR HEAD ANGIO WO IV CONTRAST  04/23/2013    MR HEAD ANGIO WO IV CONTRAST [facility]    MR HEAD ANGIO WO IV CONTRAST  07/20/2016    MR HEAD ANGIO WO IV CONTRAST [facility]    MR HEAD ANGIO WO IV CONTRAST  10/27/2023    MR HEAD ANGIO WO IV CONTRAST 10/27/2023 [facility]    MR NECK ANGIO WO IV CONTRAST  10/27/2023    MR NECK ANGIO WO IV CONTRAST 10/27/2023 [facility] MRI    OTHER SURGICAL HISTORY  02/14/2022    No history of surgery    REVISION TOTAL HIP ARTHROPLASTY Left 2013    Conversion from left ORIF    TOTAL HIP ARTHROPLASTY Right 2016     Family History   Problem Relation Name Age of Onset    No Known Problems Mother      No Known Problems Father      No Known Problems Sister       Social History     Socioeconomic History    Marital status:      Spouse name: Not on file    Number of children: Not on file    Years of education: Not on file    Highest education level: Not on file   Occupational History    Not on file   Tobacco Use    Smoking status: Never     Passive exposure: Never    Smokeless tobacco: Never   Vaping Use    Vaping Use: Never used   Substance and Sexual Activity    Alcohol use: Not Currently    Drug use: Never    Sexual activity: Not Currently   Other Topics Concern    Not on file   Social History Narrative    Not on file     Social Determinants of Health     Financial Resource Strain: Low Risk  (10/27/2023)    Overall Financial Resource Strain (CARDIA)     Difficulty of Paying Living Expenses: Not hard at all   Food Insecurity: No Food Insecurity (10/27/2023)    Hunger Vital Sign     Worried About Running Out of Food in the Last Year: Never true     Ran Out of Food in the Last Year: Never true   Transportation Needs: No Transportation Needs (10/27/2023)    PRAPARE - Transportation     Lack of Transportation (Medical): No     Lack of Transportation (Non-Medical): No   Physical Activity:  "Inactive (10/27/2023)    Exercise Vital Sign     Days of Exercise per Week: 0 days     Minutes of Exercise per Session: 0 min   Stress: No Stress Concern Present (10/27/2023)    Citizen of [place] of Occupational Health - Occupational Stress Questionnaire     Feeling of Stress : Not at all   Social Connections: Moderately Isolated (10/27/2023)    Social Connection and Isolation Panel [NHANES]     Frequency of Communication with Friends and Family: Three times a week     Frequency of Social Gatherings with Friends and Family: Twice a week     Attends Rastafarian Services: Never     Active Member of Clubs or Organizations: No     Attends Club or Organization Meetings: Never     Marital Status:    Intimate Partner Violence: Not At Risk (10/27/2023)    Humiliation, Afraid, Rape, and Kick questionnaire     Fear of Current or Ex-Partner: No     Emotionally Abused: No     Physically Abused: No     Sexually Abused: No   Housing Stability: Low Risk  (10/27/2023)    Housing Stability Vital Sign     Unable to Pay for Housing in the Last Year: No     Number of Places Lived in the Last Year: 1     Unstable Housing in the Last Year: No     Allergies   Allergen Reactions    Meperidine Hallucinations     Other reaction(s): Mental Status Change    Morphine Hallucinations    Opioids - Morphine Analogues Hallucinations and Confusion    Pregabalin Rash, Other and Unknown     \"Flu like symptoms\"    Flu like symtoms    Tramadol Rash, Itching and Unknown     Current Outpatient Medications on File Prior to Visit   Medication Sig Dispense Refill    polyethylene glycol (Glycolax, Miralax) 17 gram packet Take 17 g by mouth once daily as needed.      predniSONE (Deltasone) 10 mg tablet TAKE 1/2 (ONE-HALF) OF A TABLET (5 mg) by mouth in the morning.      amLODIPine (Norvasc) 2.5 mg tablet TAKE 1 TABLET BY MOUTH ONCE DAILY 90 tablet 2    apixaban (Eliquis) 2.5 mg tablet Take 1 tablet (2.5 mg) by mouth every 12 hours. 60 tablet 1    ascorbic " acid (Vitamin C) 100 mg tablet Take 1 tablet (100 mg) by mouth once daily.      aspirin 81 mg EC tablet TAKE 1 TABLET BY MOUTH ONE TIME DAILY 90 tablet 3    atorvastatin (Lipitor) 80 mg tablet Take 1 tablet (80 mg) by mouth once daily at bedtime. 30 tablet 1    carvedilol (Coreg) 6.25 mg tablet TAKE 1 TABLET BY MOUTH TWICE DAILY WITH FOOD 180 tablet 2    cholecalciferol (Vitamin D-3) 50 mcg (2,000 unit) capsule Take 1 capsule (50 mcg) by mouth early in the morning..      clonazePAM (KlonoPIN) 1 mg tablet Take 1 tablet (1 mg) by mouth every 12 hours if needed.      cloNIDine (Catapres) 0.2 mg tablet Take 1 tablet (0.2 mg) by mouth once daily.      cyclobenzaprine (Flexeril) 5 mg tablet Take by mouth.      dicyclomine (Bentyl) 10 mg capsule Take 1 capsule (10 mg) by mouth 3 times a day as needed. Before meals      docusate sodium (Colace) 100 mg capsule Take 1 capsule (100 mg) by mouth once daily as needed for constipation.      DULoxetine (Cymbalta) 30 mg DR capsule TAKE 1 CAPSULE BY MOUTH ONCE DAILY at dinner 90 capsule 2    ferrous sulfate 325 (65 Fe) MG tablet Take 1 tablet (65 mg of iron) by mouth once daily with a meal. Do not start before October 29, 2023. 30 tablet 1    gabapentin (Neurontin) 300 mg capsule Take 1 capsule (300 mg) by mouth 2 times a day.      isosorbide mononitrate ER (Imdur) 60 mg 24 hr tablet TAKE 1 TABLET BY MOUTH ONCE DAILY IN THE MORNING 90 tablet 2    losartan (Cozaar) 50 mg tablet Take 1 tablet (50 mg) by mouth once daily.      metoprolol succinate XL (Toprol-XL) 50 mg 24 hr tablet Take 1 tablet (50 mg) by mouth once daily.      nitroglycerin (Nitrodur) 0.4 mg/hr patch Place 1 patch on the skin once daily. remove after 12 hours      olmesartan-hydrochlorothiazide (BENIcar HCT) 40-25 mg tablet Take 1 tablet by mouth once daily.      omeprazole (PriLOSEC) 40 mg DR capsule Take 1 capsule (40 mg) by mouth once daily in the morning. Take before meals.      potassium chloride CR 20 mEq ER  tablet Take 1 tablet (20 mEq) by mouth once daily. Take with food.      sennosides (Senokot) 8.6 mg tablet Take 1 tablet (8.6 mg) by mouth once daily.      sertraline (Zoloft) 25 mg tablet Take 1 tablet (25 mg) by mouth once daily.      valsartan (Diovan) 320 mg tablet Take 1 tablet (320 mg) by mouth once daily.      verapamil SR (Calan-SR) 180 mg ER tablet Take 1 tablet (180 mg) by mouth once daily. Do not crush or chew. Do not start before October 29, 2023. 30 tablet 1    vit C/E/Zn/coppr/lutein/zeaxan (PRESERVISION AREDS-2 ORAL) As directed orally       No current facility-administered medications on file prior to visit.     Immunization History   Administered Date(s) Administered    Flu vaccine, quadrivalent, high-dose, preservative free, age 65y+ (FLUZONE) 09/28/2020, 09/29/2021, 11/10/2022, 09/15/2023    Influenza, High Dose Seasonal, Preservative Free 10/12/2016, 10/16/2017, 10/05/2018, 10/09/2019    Influenza, seasonal, injectable 11/01/2008, 10/21/2010, 09/27/2011, 10/03/2012, 10/28/2013, 09/11/2014    Influenza, seasonal, injectable, preservative free 09/23/2015    Moderna SARS-CoV-2 Vaccination 01/30/2021, 02/27/2021, 11/12/2021    Pneumococcal conjugate vaccine, 13-valent (PREVNAR 13) 08/04/2015    Pneumococcal polysaccharide vaccine, 23-valent, age 2 years and older (PNEUMOVAX 23) 10/01/2006, 01/01/2010    Tdap vaccine, age 7 year and older (BOOSTRIX, ADACEL) 09/28/2020    Zoster vaccine, recombinant, adult (SHINGRIX) 05/14/2019, 07/31/2019    Zoster, live 05/27/2015     Patient's medical history was reviewed and updated either before or during this encounter.       Sergey Balderrama MD

## 2024-04-05 ENCOUNTER — TELEPHONE (OUTPATIENT)
Dept: PRIMARY CARE | Facility: CLINIC | Age: 89
End: 2024-04-05
Payer: COMMERCIAL

## 2024-04-05 DIAGNOSIS — G45.9 TIA (TRANSIENT ISCHEMIC ATTACK): ICD-10-CM

## 2024-04-05 DIAGNOSIS — I10 ESSENTIAL HYPERTENSION: ICD-10-CM

## 2024-04-05 NOTE — TELEPHONE ENCOUNTER
LV 3/29/24, 9/29/24    Potassium 20 meq   Atorvastatin 80mg  Verapamil  ER  Ferrous sulfate 325mg   Losartan 50mg     [facility] [address]    Patient can wait until Monday

## 2024-04-07 DIAGNOSIS — I10 ESSENTIAL (PRIMARY) HYPERTENSION: ICD-10-CM

## 2024-04-07 DIAGNOSIS — I48.0 PAROXYSMAL ATRIAL FIBRILLATION (MULTI): ICD-10-CM

## 2024-04-07 DIAGNOSIS — G45.9 TIA (TRANSIENT ISCHEMIC ATTACK): ICD-10-CM

## 2024-04-08 RX ORDER — POTASSIUM CHLORIDE 1500 MG/1
20 TABLET, EXTENDED RELEASE ORAL DAILY
Qty: 90 TABLET | Refills: 2 | Status: SHIPPED | OUTPATIENT
Start: 2024-04-08 | End: 2024-04-08 | Stop reason: ALTCHOICE

## 2024-04-08 RX ORDER — ATORVASTATIN CALCIUM 80 MG/1
80 TABLET, FILM COATED ORAL NIGHTLY
Qty: 30 TABLET | Refills: 2 | Status: SHIPPED | OUTPATIENT
Start: 2024-04-08

## 2024-04-08 RX ORDER — POTASSIUM CHLORIDE 750 MG/1
20 TABLET, FILM COATED, EXTENDED RELEASE ORAL DAILY
COMMUNITY
End: 2024-04-08 | Stop reason: ALTCHOICE

## 2024-04-08 RX ORDER — POTASSIUM CHLORIDE 20 MEQ/1
20 TABLET, EXTENDED RELEASE ORAL DAILY
Qty: 90 TABLET | Refills: 2 | Status: SHIPPED | OUTPATIENT
Start: 2024-04-08

## 2024-04-08 RX ORDER — VERAPAMIL HYDROCHLORIDE 180 MG/1
180 TABLET, FILM COATED, EXTENDED RELEASE ORAL DAILY
Qty: 30 TABLET | Refills: 2 | Status: SHIPPED | OUTPATIENT
Start: 2024-04-08 | End: 2024-05-20

## 2024-04-08 RX ORDER — VERAPAMIL HYDROCHLORIDE 180 MG/1
180 CAPSULE, EXTENDED RELEASE ORAL DAILY
Qty: 90 CAPSULE | Refills: 2 | Status: SHIPPED | OUTPATIENT
Start: 2024-04-08 | End: 2024-04-08 | Stop reason: ALTCHOICE

## 2024-04-08 RX ORDER — FERROUS SULFATE TAB 325 MG (65 MG ELEMENTAL FE) 325 (65 FE) MG
1 TAB ORAL
Qty: 36 TABLET | Refills: 2 | OUTPATIENT
Start: 2024-04-08

## 2024-04-08 RX ORDER — LOSARTAN POTASSIUM 50 MG/1
50 TABLET ORAL DAILY
Qty: 90 TABLET | Refills: 2 | OUTPATIENT
Start: 2024-04-08

## 2024-04-08 RX ORDER — LOSARTAN POTASSIUM 50 MG/1
50 TABLET ORAL DAILY
Qty: 90 TABLET | Refills: 2 | Status: SHIPPED | OUTPATIENT
Start: 2024-04-08 | End: 2024-05-28 | Stop reason: SDUPTHER

## 2024-04-08 RX ORDER — FERROUS SULFATE 325(65) MG
65 TABLET ORAL
Qty: 36 TABLET | Refills: 2 | Status: SHIPPED | OUTPATIENT
Start: 2024-04-08

## 2024-04-08 RX ORDER — ATORVASTATIN CALCIUM 80 MG/1
80 TABLET, FILM COATED ORAL DAILY
Qty: 90 TABLET | Refills: 2 | OUTPATIENT
Start: 2024-04-08

## 2024-04-18 ENCOUNTER — TELEPHONE (OUTPATIENT)
Dept: PRIMARY CARE | Facility: CLINIC | Age: 89
End: 2024-04-18
Payer: COMMERCIAL

## 2024-04-18 NOTE — TELEPHONE ENCOUNTER
NTG patch stopped on isosorbide 60 mg, Gabapentin 300 mg BID stopped on Duloxetine polypharmacy, list from 3/24 vist Is what I wish her to be on.  list.

## 2024-05-17 DIAGNOSIS — G45.9 TIA (TRANSIENT ISCHEMIC ATTACK): ICD-10-CM

## 2024-05-17 DIAGNOSIS — M48.07 SPINAL STENOSIS, LUMBOSACRAL REGION: ICD-10-CM

## 2024-05-17 DIAGNOSIS — M06.09 RHEUMATOID ARTHRITIS WITHOUT RHEUMATOID FACTOR, MULTIPLE SITES (MULTI): ICD-10-CM

## 2024-05-17 DIAGNOSIS — D64.9 ANEMIA, UNSPECIFIED: ICD-10-CM

## 2024-05-20 RX ORDER — VERAPAMIL HYDROCHLORIDE 180 MG/1
180 TABLET, FILM COATED, EXTENDED RELEASE ORAL DAILY
Qty: 90 TABLET | Refills: 2 | Status: SHIPPED | OUTPATIENT
Start: 2024-05-20

## 2024-05-20 RX ORDER — DULOXETIN HYDROCHLORIDE 30 MG/1
30 CAPSULE, DELAYED RELEASE ORAL
Qty: 90 CAPSULE | Refills: 2 | Status: SHIPPED | OUTPATIENT
Start: 2024-05-20

## 2024-05-20 RX ORDER — DOCUSATE SODIUM 100 MG/1
CAPSULE, LIQUID FILLED ORAL
Qty: 90 CAPSULE | Refills: 2 | Status: SHIPPED | OUTPATIENT
Start: 2024-05-20

## 2024-05-20 RX ORDER — PANTOPRAZOLE SODIUM 40 MG/1
40 TABLET, DELAYED RELEASE ORAL 2 TIMES DAILY
Qty: 180 TABLET | Refills: 2 | Status: SHIPPED | OUTPATIENT
Start: 2024-05-20

## 2024-05-20 RX ORDER — GABAPENTIN 300 MG/1
CAPSULE ORAL
Qty: 180 CAPSULE | Refills: 2 | Status: SHIPPED | OUTPATIENT
Start: 2024-05-20

## 2024-05-28 ENCOUNTER — OFFICE VISIT (OUTPATIENT)
Dept: CARDIOLOGY | Facility: CLINIC | Age: 89
End: 2024-05-28
Payer: COMMERCIAL

## 2024-05-28 VITALS
HEIGHT: 59 IN | HEART RATE: 72 BPM | OXYGEN SATURATION: 97 % | BODY MASS INDEX: 22.78 KG/M2 | SYSTOLIC BLOOD PRESSURE: 185 MMHG | DIASTOLIC BLOOD PRESSURE: 73 MMHG | TEMPERATURE: 98.6 F | RESPIRATION RATE: 18 BRPM | WEIGHT: 113 LBS

## 2024-05-28 DIAGNOSIS — I95.1 ORTHOSTATIC HYPOTENSION: ICD-10-CM

## 2024-05-28 DIAGNOSIS — E78.00 HYPERCHOLESTEROLEMIA: ICD-10-CM

## 2024-05-28 DIAGNOSIS — I48.0 PAROXYSMAL ATRIAL FIBRILLATION (MULTI): ICD-10-CM

## 2024-05-28 DIAGNOSIS — R07.2 PRECORDIAL PAIN: ICD-10-CM

## 2024-05-28 DIAGNOSIS — I10 LABILE ESSENTIAL HYPERTENSION: ICD-10-CM

## 2024-05-28 DIAGNOSIS — I10 ESSENTIAL HYPERTENSION: ICD-10-CM

## 2024-05-28 DIAGNOSIS — I16.0 HYPERTENSIVE URGENCY: ICD-10-CM

## 2024-05-28 DIAGNOSIS — I48.91 RAPID ATRIAL FIBRILLATION (MULTI): Primary | ICD-10-CM

## 2024-05-28 PROCEDURE — 3077F SYST BP >= 140 MM HG: CPT | Performed by: INTERNAL MEDICINE

## 2024-05-28 PROCEDURE — 99214 OFFICE O/P EST MOD 30 MIN: CPT | Performed by: INTERNAL MEDICINE

## 2024-05-28 PROCEDURE — 1159F MED LIST DOCD IN RCRD: CPT | Performed by: INTERNAL MEDICINE

## 2024-05-28 PROCEDURE — 1157F ADVNC CARE PLAN IN RCRD: CPT | Performed by: INTERNAL MEDICINE

## 2024-05-28 PROCEDURE — 1036F TOBACCO NON-USER: CPT | Performed by: INTERNAL MEDICINE

## 2024-05-28 PROCEDURE — 1125F AMNT PAIN NOTED PAIN PRSNT: CPT | Performed by: INTERNAL MEDICINE

## 2024-05-28 PROCEDURE — 3078F DIAST BP <80 MM HG: CPT | Performed by: INTERNAL MEDICINE

## 2024-05-28 PROCEDURE — 1160F RVW MEDS BY RX/DR IN RCRD: CPT | Performed by: INTERNAL MEDICINE

## 2024-05-28 RX ORDER — LOSARTAN POTASSIUM 100 MG/1
100 TABLET ORAL DAILY
Qty: 90 TABLET | Refills: 3 | Status: SHIPPED | OUTPATIENT
Start: 2024-05-28 | End: 2025-05-28

## 2024-05-28 ASSESSMENT — LIFESTYLE VARIABLES
HAS A RELATIVE, FRIEND, DOCTOR, OR ANOTHER HEALTH PROFESSIONAL EXPRESSED CONCERN ABOUT YOUR DRINKING OR SUGGESTED YOU CUT DOWN: NO
HOW OFTEN DO YOU HAVE SIX OR MORE DRINKS ON ONE OCCASION: NEVER
HOW MANY STANDARD DRINKS CONTAINING ALCOHOL DO YOU HAVE ON A TYPICAL DAY: 1 OR 2
AUDIT-C TOTAL SCORE: 1
HOW OFTEN DO YOU HAVE A DRINK CONTAINING ALCOHOL: MONTHLY OR LESS
HAVE YOU OR SOMEONE ELSE BEEN INJURED AS A RESULT OF YOUR DRINKING: NO
AUDIT TOTAL SCORE: 1
SKIP TO QUESTIONS 9-10: 1

## 2024-05-28 ASSESSMENT — PATIENT HEALTH QUESTIONNAIRE - PHQ9
1. LITTLE INTEREST OR PLEASURE IN DOING THINGS: SEVERAL DAYS
10. IF YOU CHECKED OFF ANY PROBLEMS, HOW DIFFICULT HAVE THESE PROBLEMS MADE IT FOR YOU TO DO YOUR WORK, TAKE CARE OF THINGS AT HOME, OR GET ALONG WITH OTHER PEOPLE: SOMEWHAT DIFFICULT
2. FEELING DOWN, DEPRESSED OR HOPELESS: SEVERAL DAYS
SUM OF ALL RESPONSES TO PHQ9 QUESTIONS 1 AND 2: 2

## 2024-05-28 ASSESSMENT — ENCOUNTER SYMPTOMS
DEPRESSION: 0
OCCASIONAL FEELINGS OF UNSTEADINESS: 1
LOSS OF SENSATION IN FEET: 0

## 2024-05-28 ASSESSMENT — PAIN SCALES - GENERAL: PAINLEVEL: 10-WORST PAIN EVER

## 2024-05-28 NOTE — PROGRESS NOTES
History of present illness:   This is a very pleasant 88-year-old female following up in my office after recent hospitalization for TIA.  Patient has a longstanding history of for atrial fibrillation.  She has been on aspirin and Eliquis when she had this episode of possible TIA due to weakness and inability to speech.  Patient is unaware of the episode but her  noticed that.  Patient underwent extensive workup including 2D echo, MRI MRA of the brain and carotid duplex everything within normal limits.  Recovered within 24 hours.  No clear evidence of UTI.  Patient doing good.  Denies any chest pain or shortness of breath.  Denies any palpitations or dizziness.      Past Medical History:   Diagnosis Date    Acute non-ST segment elevation myocardial infarction (Multi) 11/26/2023    Anemia     Arteriosclerosis of coronary artery 05/14/2023    Arthritis     Asthma ([facility])     Atrial fibrillation (Multi) 11/26/2023    Jonas esophagus     Bilateral carotid artery occlusion 05/23/2023    CAD (coronary artery disease)     Cardiac rhythm disorder or disturbance or change 11/26/2023    Cervical radiculopathy     Chest pain 11/26/2023    Chronic obstructive pulmonary disease (Multi) 11/26/2023    Constipation     Coronary artery disease 11/26/2023    Degenerative joint disease     Dyslipidemia     Dysuria     Erosive esophagitis     GERD (gastroesophageal reflux disease)     Hypercholesterolemia 05/23/2023    Hyperparathyroidism (Multi)     Hypertensive urgency 11/26/2023    Impaired fasting glucose 11/26/2023    Irritable bowel syndrome (IBS)     Labile essential hypertension 11/26/2023    Labile hypertension     Left foot pain     Low blood pressure 11/26/2023    Macular degeneration     Mixed hyperlipidemia 11/26/2023    Neck pain     Osteoporosis 11/26/2023    REclast '19 Cr 1.4 switch to calcitonin.    Paroxysmal atrial fibrillation (Multi)     Polyarthritis with negative rheumatoid factor (Multi)     Post  "menopausal syndrome     Rapid atrial fibrillation (Multi) 05/14/2023    Spinal stenosis     Stage 3 chronic kidney disease (Multi) 11/26/2023    Stroke (Multi)     Transient ischemic attack 11/26/2023       Past Surgical History:   Procedure Laterality Date    CARDIOVASCULAR STRESS TEST  2017    Dr. Khalil    CARDIOVASCULAR STRESS TEST  2004    Dr. Pantoja    CARPAL TUNNEL RELEASE Left 2014    CATARACT EXTRACTION Bilateral 2011    CHOLECYSTECTOMY      CORONARY STENT PLACEMENT  05/2023    [facility]    CT HEAD ANGIO W AND WO IV CONTRAST  05/08/2013    CT HEAD ANGIO W AND WO IV CONTRAST [facility]    MR HEAD ANGIO WO IV CONTRAST  04/23/2013    MR HEAD ANGIO WO IV CONTRAST [facility]    MR HEAD ANGIO WO IV CONTRAST  07/20/2016    MR HEAD ANGIO WO IV CONTRAST [facility]    MR HEAD ANGIO WO IV CONTRAST  10/27/2023    MR HEAD ANGIO WO IV CONTRAST 10/27/2023 [facility] MRI    MR NECK ANGIO WO IV CONTRAST  10/27/2023    MR NECK ANGIO WO IV CONTRAST 10/27/2023 [facility] MRI    OTHER SURGICAL HISTORY  02/14/2022    No history of surgery    REVISION TOTAL HIP ARTHROPLASTY Left 2013    Conversion from left ORIF    TOTAL HIP ARTHROPLASTY Right 2016       Allergies   Allergen Reactions    Meperidine Hallucinations     Other reaction(s): Mental Status Change    Morphine Hallucinations    Opioids - Morphine Analogues Hallucinations and Confusion    Pregabalin Rash, Other and Unknown     \"Flu like symptoms\"    Flu like symtoms    Tramadol Rash, Itching and Unknown        reports that she has never smoked. She has never been exposed to tobacco smoke. She has never used smokeless tobacco. She reports current alcohol use. She reports that she does not use drugs.    Family History   Problem Relation Name Age of Onset    No Known Problems Mother      No Known Problems Father      No Known Problems Sister         Patient's Medications   New Prescriptions    No medications on file   Previous Medications    APIXABAN (ELIQUIS) 2.5 MG " TABLET    Take 1 tablet (2.5 mg) by mouth every 12 hours.    ASCORBIC ACID (VITAMIN C) 100 MG TABLET    Take 1 tablet (100 mg) by mouth once daily.    ATORVASTATIN (LIPITOR) 80 MG TABLET    Take 1 tablet (80 mg) by mouth once daily at bedtime.    CALCITONIN, SALMON, (MIACALCIN) 200 UNIT/ACTUATION NASAL SPRAY    Administer 1 spray into one nostril once daily. Alternate nostril    CARVEDILOL (COREG) 6.25 MG TABLET    TAKE 1 TABLET BY MOUTH TWICE DAILY WITH FOOD    CHOLECALCIFEROL (VITAMIN D-3) 50 MCG (2,000 UNIT) CAPSULE    Take 1 capsule (50 mcg) by mouth early in the morning..    CLOPIDOGREL (PLAVIX) 75 MG TABLET    Take by mouth once daily.    DOCUSATE SODIUM (COLACE) 100 MG CAPSULE    TAKE 1 CAPSULE BY MOUTH ONCE DAILY AS NEEDED to prevent constipation from iron    DULOXETINE (CYMBALTA) 30 MG DR CAPSULE    TAKE 1 CAPSULE BY MOUTH ONCE DAILY at dinner    FERROUS SULFATE, 325 MG FERROUS SULFATE, TABLET    Take 1 tablet by mouth once daily with breakfast. M-W-F    GABAPENTIN (NEURONTIN) 300 MG CAPSULE    TAKE 1 CAPSULE BY MOUTH AT NOON AND BEDTIME    HYDROXYCHLOROQUINE (PLAQUENIL) 200 MG TABLET    Take by mouth.    ISOSORBIDE MONONITRATE ER (IMDUR) 60 MG 24 HR TABLET    TAKE 1 TABLET BY MOUTH ONCE DAILY IN THE MORNING    LOSARTAN (COZAAR) 50 MG TABLET    Take 1 tablet (50 mg) by mouth once daily.    PANTOPRAZOLE (PROTONIX) 40 MG EC TABLET    TAKE 1 TABLET BY MOUTH TWICE DAILY    POTASSIUM CHLORIDE CR (KLOR-CON M20) 20 MEQ ER TABLET    Take 1 tablet (20 mEq) by mouth once daily. Do not crush, chew, or split.    SULFASALAZINE (AZULFIDINE) 500 MG TABLET    Take 1 tablet (500 mg) by mouth 2 times a day.    VERAPAMIL SR (CALAN-SR) 180 MG ER TABLET    Take 1 tablet (180 mg) by mouth once daily. Do not crush or chew.    VIT C/E/ZN/COPPR/LUTEIN/ZEAXAN (PRESERVISION AREDS-2 ORAL)    As directed orally   Modified Medications    No medications on file   Discontinued Medications    No medications on file       Objective    Physical Exam  General: Patient in no acute distress   HEENT: Atraumatic normocephalic.  Neck: Supple, jugular venous pressure within normal limit.  No bruits  Lungs: Clear to auscultation bilaterally  Cardiovascular: Regular rate and rhythm, normal heart sounds, no murmurs rubs or gallops  Abdomen: Soft nontender nondistended.  Normal bowel sounds.  Extremities: Warm to touch, no edema.      Lab Review   No visits with results within 2 Month(s) from this visit.   Latest known visit with results is:   Lab on 03/14/2024   Component Date Value    Hemoglobin A1C 03/14/2024 6.3 (H)     Estimated Average Glucose 03/14/2024 134     WBC 03/14/2024 8.1     nRBC 03/14/2024 0.0     RBC 03/14/2024 4.05     Hemoglobin 03/14/2024 12.2     Hematocrit 03/14/2024 37.9     MCV 03/14/2024 94     MCH 03/14/2024 30.1     MCHC 03/14/2024 32.2     RDW 03/14/2024 13.9     Platelets 03/14/2024 273     Glucose 03/14/2024 97     Sodium 03/14/2024 137     Potassium 03/14/2024 4.0     Chloride 03/14/2024 99     Bicarbonate 03/14/2024 26     Urea Nitrogen 03/14/2024 11     Creatinine 03/14/2024 0.60     eGFR 03/14/2024 86     Calcium 03/14/2024 9.4     Albumin 03/14/2024 4.1     Alkaline Phosphatase 03/14/2024 99     Total Protein 03/14/2024 5.9     AST 03/14/2024 20     Bilirubin, Total 03/14/2024 0.6     ALT 03/14/2024 25     Anion Gap 03/14/2024 12     Cholesterol 03/14/2024 154     HDL-Cholesterol 03/14/2024 83.0     Cholesterol/HDL Ratio 03/14/2024 1.9     LDL Calculated 03/14/2024 44 (L)     Triglycerides 03/14/2024 136     Iron 03/14/2024 82     UIBC 03/14/2024 225     TIBC 03/14/2024 307     % Saturation 03/14/2024 27     Ferritin 03/14/2024 40         Assessment/Plan   Patient Active Problem List   Diagnosis    Cerebrovascular disease    Dizziness and giddiness    Difficulty using verbal communication    Slurred speech    At risk for bleeding    Abnormal gait    Cardiac rhythm disorder or disturbance or change    Acute lower urinary  tract infection    Acute non-ST segment elevation myocardial infarction (Multi)    Anemia    Chest pain    Ankle pain    Anxiety    Weakness    Asthma ([facility])    Rapid atrial fibrillation (Multi)    Cervical radiculopathy    Chronic obstructive pulmonary disease (Multi)    Arteriosclerosis of coronary artery    Cough    Dehydration    Dysfunction of both eustachian tubes    Dyspnea    Dysuria    Erosive esophagitis    Failed back syndrome    Fall    Fever    Fracture of patella    Gastroesophageal reflux disease    History of cerebrovascular accident    Hypercalcemia    Hyperparathyroidism (Multi)    Hypertensive urgency    Hypokalemia    Hyponatremia    Impaired fasting glucose    Arthritis of left glenohumeral joint    Injury of knee    Injury of neck    Irritable bowel syndrome    Leucopenia    Macular degeneration    Infectious disease    Hypercholesterolemia    Muscle weakness    Neck pain    Osteoporosis    Pain in left foot    Pneumonia    Osteoarthritis    Rib pain    Bilateral hearing loss    Sensorineural hearing loss (SNHL) of both ears    Somnolence    Spinal stenosis    Chronic kidney disease due to hypertension    Labile essential hypertension    Low blood pressure    Near syncope    Transient ischemic attack    Vitamin D deficiency    Bilateral carotid artery occlusion    Chronic pain of left upper extremity    Complication of surgical procedure    Constipation    Do not resuscitate    Dysfunction of eustachian tube    History of anemia    Labral tear of long head of left biceps tendon    Osteoarthritis of left glenohumeral joint    Restless legs syndrome    Polyarteritis (Multi)    This is a very pleasant 88-year-old female following up in my office after recent hospitalization for TIA.  Patient has a longstanding history of for atrial fibrillation.  She has been on aspirin and Eliquis when she had this episode of possible TIA due to weakness and inability to speech.  Patient is unaware of the  episode but her  noticed that.  Patient underwent extensive workup including 2D echo, MRI MRA of the brain and carotid duplex everything within normal limits.  Recovered within 24 hours.  No clear evidence of UTI.  Patient doing good.  Denies any chest pain or shortness of breath.  Denies any palpitations or dizziness.  Patient stable from my standpoint however her blood pressure is elevated today she does not track her blood pressure at home and before it was not high therefore I am going to start initially with increasing losartan to 100 mg oral daily.  We will add more medications if she calls me back in a week telling me that her blood pressure remains poorly controlled.  Otherwise she stable from my standpoint we will follow-up in 6 months        Nessa Callaway MD

## 2024-05-29 ENCOUNTER — TELEPHONE (OUTPATIENT)
Dept: CARDIOLOGY | Facility: CLINIC | Age: 89
End: 2024-05-29
Payer: COMMERCIAL

## 2024-05-29 DIAGNOSIS — I10 PRIMARY HYPERTENSION: Primary | ICD-10-CM

## 2024-05-30 ENCOUNTER — OFFICE VISIT (OUTPATIENT)
Dept: PAIN MEDICINE | Facility: CLINIC | Age: 89
End: 2024-05-30
Payer: COMMERCIAL

## 2024-05-30 VITALS
DIASTOLIC BLOOD PRESSURE: 61 MMHG | OXYGEN SATURATION: 95 % | HEIGHT: 59 IN | WEIGHT: 113 LBS | BODY MASS INDEX: 22.78 KG/M2 | HEART RATE: 70 BPM | SYSTOLIC BLOOD PRESSURE: 153 MMHG

## 2024-05-30 DIAGNOSIS — M96.1 POSTLAMINECTOMY SYNDROME OF LUMBAR REGION: ICD-10-CM

## 2024-05-30 DIAGNOSIS — M54.16 LUMBAR RADICULOPATHY: ICD-10-CM

## 2024-05-30 DIAGNOSIS — M46.1 SACROILIITIS (CMS-HCC): Primary | ICD-10-CM

## 2024-05-30 PROCEDURE — 99214 OFFICE O/P EST MOD 30 MIN: CPT | Performed by: NURSE PRACTITIONER

## 2024-05-30 PROCEDURE — 1160F RVW MEDS BY RX/DR IN RCRD: CPT | Performed by: NURSE PRACTITIONER

## 2024-05-30 PROCEDURE — 3077F SYST BP >= 140 MM HG: CPT | Performed by: NURSE PRACTITIONER

## 2024-05-30 PROCEDURE — 3078F DIAST BP <80 MM HG: CPT | Performed by: NURSE PRACTITIONER

## 2024-05-30 PROCEDURE — 1036F TOBACCO NON-USER: CPT | Performed by: NURSE PRACTITIONER

## 2024-05-30 PROCEDURE — 1157F ADVNC CARE PLAN IN RCRD: CPT | Performed by: NURSE PRACTITIONER

## 2024-05-30 PROCEDURE — 1159F MED LIST DOCD IN RCRD: CPT | Performed by: NURSE PRACTITIONER

## 2024-05-30 RX ORDER — HYDROCHLOROTHIAZIDE 25 MG/1
25 TABLET ORAL DAILY
Qty: 90 TABLET | Refills: 3 | Status: SHIPPED | OUTPATIENT
Start: 2024-05-30 | End: 2025-05-30

## 2024-05-30 ASSESSMENT — PAIN SCALES - GENERAL
PAINLEVEL_OUTOF10: 10 - WORST POSSIBLE PAIN
PAINLEVEL: 10-WORST PAIN EVER
PAINLEVEL_OUTOF10: 10 - WORST POSSIBLE PAIN

## 2024-05-30 ASSESSMENT — ENCOUNTER SYMPTOMS
OCCASIONAL FEELINGS OF UNSTEADINESS: 1
DEPRESSION: 0
LOSS OF SENSATION IN FEET: 0

## 2024-05-30 ASSESSMENT — PAIN DESCRIPTION - DESCRIPTORS
DESCRIPTORS: ACHING
DESCRIPTORS: ACHING

## 2024-05-30 ASSESSMENT — PAIN - FUNCTIONAL ASSESSMENT: PAIN_FUNCTIONAL_ASSESSMENT: 0-10

## 2024-05-30 NOTE — PROGRESS NOTES
Subjective   Patient ID: Rakan Cervantes is a 88 y.o. female who presents for Back Pain.    HPI 87 YO Female with a longstanding history of right leg and low-back pain 2/2 Lumbar Post-Laminectomy Syndrome. On January 4th, 2024, Rakan underwent a Right SIJ injection due to persistent Sacroiliitis. She then underwent a Caudal PRESTON on 3/5/2024 2/2 her post-laminectomy syndrome. Today she reports that she has an appointment with Dr. Stevenson scheduled for June to discuss repeating the SIJ injection however she had a fall a couple weeks ago that has further exacerbated her right low back pain and she is hoping today's appointment can expedite the scheduling of her SIJ injection.     She reports she had significant improvement in her right low-back pain post-injection. She reports at least 75% improvement in pain until just recently (>90 days). She is now approaching her baseline pain. She reports the pain is most significant with prolonged sitting, standing or walking. She reports pain that originates in the low back, on the right side. She states the pain radiates down the back of her right leg through to her heel and then wraps around to the anterior portion of her right foot. She does note that the relief she has been feeling from the caudal injection is still prominent but covers a different area.     Review of Systems Unless noted in the HPI all other systems have been reviewed and are negative for complaint.    Objective   Physical Exam  General- No acute distress, well appearing and well nourished. Quite thin/frail.   Eyes Conjunctiva and lids: No erythema, swelling or discharge  Neck - Supple, no cervical lymphadenopathy.   Pulmonary - Respiratory effort: Normal respiration.   Cardiovascular - Normal rate and rhythm.  Examination of extremities for edema and/or varicosities: No peripheral edema  Abdomen: Soft, Non-tender, non-distended, no abdominal masses.   Musculoskeletal - Spine with significantly decreased ROM.    Skin - Skin and subcutaneous tissue: Normal without rashes or lesions. Well-healed surgical scars.   Neurologic - Antalgic gait; use of wheelchair for mobility. Sensation grossly intact. SIJ provocation is positive: Evelin's, Compression, Distraction, Gaenslen's and Thigh Thrust are all positive.   Psychiatric - Orientation to person, place, and time: Normal. Mood and affect: Normal.    Assessment/Plan   Problem List Items Addressed This Visit       Sacroiliitis (CMS-HCC) - Primary    Postlaminectomy syndrome of lumbar region    Lumbar radiculopathy     TREATMENT PLAN:  I had a nice discussion with the patient today and our plan will be as follows:  Radiology: No new imaging to review at this time.   Physically: Encouraged patient to continue with increased physical activity as able.   Psychologically: No acute psychological needs at this time.    Medication: Nothing at this time.   Duration: Chronic/ongoing.    Intervention: Rakan is s/p both right SIJ injection on 1/4/24 and Caudal PRESTON on 3/5/2024. Based on patient's complaints and physical exam findings, I am ordering a repeat Right SIJ injection to be performed under fluoroscopic guidance with local anesthetic by Dr. BABCOCK.

## 2024-06-03 NOTE — TELEPHONE ENCOUNTER
Called pt to advise provider prescribed hyrdochlorothiazide and prescription is ready at preferred pharmacy.

## 2024-06-17 ENCOUNTER — OFFICE VISIT (OUTPATIENT)
Dept: PAIN MEDICINE | Facility: CLINIC | Age: 89
End: 2024-06-17
Payer: COMMERCIAL

## 2024-06-17 ENCOUNTER — PREP FOR PROCEDURE (OUTPATIENT)
Dept: PAIN MEDICINE | Facility: CLINIC | Age: 89
End: 2024-06-17

## 2024-06-17 ENCOUNTER — PREP FOR PROCEDURE (OUTPATIENT)
Dept: PAIN MEDICINE | Facility: CLINIC | Age: 89
End: 2024-06-17
Payer: COMMERCIAL

## 2024-06-17 VITALS
WEIGHT: 112 LBS | RESPIRATION RATE: 22 BRPM | SYSTOLIC BLOOD PRESSURE: 162 MMHG | OXYGEN SATURATION: 98 % | DIASTOLIC BLOOD PRESSURE: 58 MMHG | HEART RATE: 62 BPM | HEIGHT: 59 IN | BODY MASS INDEX: 22.58 KG/M2

## 2024-06-17 DIAGNOSIS — M96.1 POSTLAMINECTOMY SYNDROME, LUMBAR REGION: Primary | ICD-10-CM

## 2024-06-17 DIAGNOSIS — M46.1 SACROILIITIS (CMS-HCC): ICD-10-CM

## 2024-06-17 DIAGNOSIS — M46.1 SACROILIITIS (CMS-HCC): Primary | ICD-10-CM

## 2024-06-17 PROCEDURE — 99214 OFFICE O/P EST MOD 30 MIN: CPT | Performed by: ANESTHESIOLOGY

## 2024-06-17 PROCEDURE — 3078F DIAST BP <80 MM HG: CPT | Performed by: ANESTHESIOLOGY

## 2024-06-17 PROCEDURE — 1159F MED LIST DOCD IN RCRD: CPT | Performed by: ANESTHESIOLOGY

## 2024-06-17 PROCEDURE — 3077F SYST BP >= 140 MM HG: CPT | Performed by: ANESTHESIOLOGY

## 2024-06-17 PROCEDURE — 1036F TOBACCO NON-USER: CPT | Performed by: ANESTHESIOLOGY

## 2024-06-17 PROCEDURE — 1157F ADVNC CARE PLAN IN RCRD: CPT | Performed by: ANESTHESIOLOGY

## 2024-06-17 ASSESSMENT — PAIN DESCRIPTION - DESCRIPTORS: DESCRIPTORS: ACHING

## 2024-06-17 ASSESSMENT — ENCOUNTER SYMPTOMS
BACK PAIN: 1
ACTIVITY CHANGE: 1
BACK PAIN: 1
ACTIVITY CHANGE: 1

## 2024-06-17 ASSESSMENT — PAIN SCALES - GENERAL
PAINLEVEL_OUTOF10: 5 - MODERATE PAIN
PAINLEVEL: 5

## 2024-06-17 ASSESSMENT — PAIN - FUNCTIONAL ASSESSMENT: PAIN_FUNCTIONAL_ASSESSMENT: 0-10

## 2024-06-17 NOTE — PROGRESS NOTES
The patient is an 88-year-old female with low back and bilateral leg pain.  The pain is constant.  The pain is worse with activity.  She gets some relief with rest.  The patient underwent a caudal epidural steroid injection 3 and half months ago.  The patient reports at least 75% relief for 3 months before the pain returned.  The pain has not yet reached preprocedure state.  The patient would like to have another caudal epidural steroid injection authorized and performed prior to the pain returning to its preprocedure state.  Her quality of life is unacceptable.  The patient continues her physician directed home exercise program daily as she has daily since she participated in a formal physical therapy program last year.    Review of Systems   Constitutional:  Positive for activity change.   Musculoskeletal:  Positive for back pain and gait problem.   All other systems reviewed and are negative.    GENERAL: alert and appropriate, in no distress, well-hydrated, well-nourished and interactive  SKIN: no rash noted, surgical scars well healed  RESPIRATORY: breathing non-labored and no grunting/flaring/retractions  CHEST: equal chest rise with normal respiratory effort  ABDOMEN: soft and non-tender  BACK: back normal in appearance, spine with reduced ROM  EXTREMITIES: strength intact  NEUROLOGIC: gait antalgic, SLR negative, sensation grossly intact.    Assessment and Plan    -Chronicity--chronic spinal pain    -Diagnostics--no new imaging    -Pharmacologic--no change    -Psychologic--no need for psychologic intervention from my standpoint    -Physical--we discussed the importance of physical therapy and exercise.  We discussed avoidance and modification techniques.    -Intervention--the patient is a candidate for a caudal epidural steroid injection.  I explained the risks benefits and alternatives of the procedure to the patient.  The patient wishes to proceed.    I spent time educating the patient on the condition  including the treatment and the prognosis.  I invited the patient to call at anytime with any questions.

## 2024-06-17 NOTE — H&P
Review of Systems   Constitutional:  Positive for activity change.   Musculoskeletal:  Positive for back pain and gait problem.   All other systems reviewed and are negative.

## 2024-07-03 DIAGNOSIS — G45.9 TIA (TRANSIENT ISCHEMIC ATTACK): ICD-10-CM

## 2024-07-03 DIAGNOSIS — E78.00 HYPERCHOLESTEROLEMIA: ICD-10-CM

## 2024-07-07 RX ORDER — ATORVASTATIN CALCIUM 80 MG/1
80 TABLET, FILM COATED ORAL NIGHTLY
Qty: 90 TABLET | Refills: 0 | Status: SHIPPED | OUTPATIENT
Start: 2024-07-07

## 2024-07-08 DIAGNOSIS — G45.9 TIA (TRANSIENT ISCHEMIC ATTACK): ICD-10-CM

## 2024-07-08 NOTE — TELEPHONE ENCOUNTER
Pharmacy is requesting a refill on pt behalf, pharmacy will let pt know when ready for \ out for delivery        Requested Prescriptions     Pending Prescriptions Disp Refills    apixaban (Eliquis) 2.5 mg tablet [Pharmacy Med Name: Eliquis 2.5 mg tablet] 180 tablet 3     Sig: Take 1 tablet (2.5 mg) by mouth 2 times a day.

## 2024-07-16 ENCOUNTER — HOSPITAL ENCOUNTER (OUTPATIENT)
Dept: OPERATING ROOM | Facility: HOSPITAL | Age: 89
Discharge: HOME | End: 2024-07-16
Payer: COMMERCIAL

## 2024-07-16 VITALS
TEMPERATURE: 96.8 F | DIASTOLIC BLOOD PRESSURE: 61 MMHG | HEART RATE: 64 BPM | HEIGHT: 59 IN | OXYGEN SATURATION: 98 % | RESPIRATION RATE: 16 BRPM | BODY MASS INDEX: 22.58 KG/M2 | WEIGHT: 112 LBS | SYSTOLIC BLOOD PRESSURE: 153 MMHG

## 2024-07-16 DIAGNOSIS — M46.1 SACROILIITIS (CMS-HCC): ICD-10-CM

## 2024-07-16 PROCEDURE — 7100000010 HC PHASE TWO TIME - EACH INCREMENTAL 1 MINUTE

## 2024-07-16 PROCEDURE — 2500000004 HC RX 250 GENERAL PHARMACY W/ HCPCS (ALT 636 FOR OP/ED): Performed by: ANESTHESIOLOGY

## 2024-07-16 PROCEDURE — 27096 INJECT SACROILIAC JOINT: CPT | Performed by: ANESTHESIOLOGY

## 2024-07-16 PROCEDURE — 7100000009 HC PHASE TWO TIME - INITIAL BASE CHARGE

## 2024-07-16 PROCEDURE — 2500000005 HC RX 250 GENERAL PHARMACY W/O HCPCS: Performed by: ANESTHESIOLOGY

## 2024-07-16 PROCEDURE — 2550000001 HC RX 255 CONTRASTS: Performed by: ANESTHESIOLOGY

## 2024-07-16 PROCEDURE — 3600000006 HC OR TIME - EACH INCREMENTAL 1 MINUTE - PROCEDURE LEVEL ONE

## 2024-07-16 PROCEDURE — 3600000001 HC OR TIME - INITIAL BASE CHARGE - PROCEDURE LEVEL ONE

## 2024-07-16 RX ORDER — TRIAMCINOLONE ACETONIDE 40 MG/ML
INJECTION, SUSPENSION INTRA-ARTICULAR; INTRAMUSCULAR AS NEEDED
Status: COMPLETED | OUTPATIENT
Start: 2024-07-16 | End: 2024-07-16

## 2024-07-16 RX ORDER — LIDOCAINE HYDROCHLORIDE 5 MG/ML
INJECTION, SOLUTION INFILTRATION; INTRAVENOUS AS NEEDED
Status: COMPLETED | OUTPATIENT
Start: 2024-07-16 | End: 2024-07-16

## 2024-07-16 ASSESSMENT — PAIN - FUNCTIONAL ASSESSMENT
PAIN_FUNCTIONAL_ASSESSMENT: 0-10
PAIN_FUNCTIONAL_ASSESSMENT: 0-10

## 2024-07-16 ASSESSMENT — PAIN DESCRIPTION - DESCRIPTORS
DESCRIPTORS: ACHING;DULL
DESCRIPTORS: PRESSURE;ACHING

## 2024-07-16 ASSESSMENT — PAIN SCALES - GENERAL
PAINLEVEL_OUTOF10: 7
PAINLEVEL_OUTOF10: 7

## 2024-07-16 NOTE — DISCHARGE INSTRUCTIONS
You underwent a procedure today    After most procedures, it is recommended that you relax and limit your activity for the remainder of the day unless you have been told otherwise by your pain physician.  You should not drive a car, operate machinery, or make important legal decisions unless otherwise directed by your pain physician.  You may resume your normal activity, including exercise, tomorrow.      Keep a written pain diary of how much pain relief you experienced following the procedure and the length of time of pain relief you experienced pain relief. Following diagnostic injections like medial branch nerve blocks, sacroiliac joint blocks, stellate ganglion injections and other blocks, it is very important you record the specific amount of pain relief you experienced immediately after the injectionand how long it lasted. Your doctor will ask you for this information at your follow up visit.     For all injections, please keep the injection site dry and inspect the site for a couple of days. You may remove the Band-Aid the day of the injection at any time.     Some discomfort, bruising or slight swelling may occur at the injection site. This is not abnormal if it occurs.  If needed you may:    -Take over the counter medication such as Tylenol or Motrin.   -Apply an ice pack for 30 minutes, 2 to 3 times a day for the first 24 hours.     You may shower today; no soaking baths, hot tubs, whirlpools or swimming pools for two days.      If you are given steroids in your injection, it may take 3-5 days for the steroid medication to take effect. You may notice a worsening of your symptoms for 1-2 days after the injection. This is not abnormal.  You may use acetaminophen, ibuprofen, or prescription medication that your doctor may have prescribed for you if you need to do so.     A few common side effects of steroids include facial flushing, sweating, restlessness, irritability,difficulty sleeping, increase in blood  sugar, and increased blood pressure. If you have diabetes, please monitor your blood sugar at least once a day for at least 5 days. If you have poorly controlled high blood pressure, monitoryour blood pressure for at least 2 days and contact your primary care physician if these numbers are unusually high for you.      If you take aspirin or non-steroidal anti-inflammatory drugs (examples are Motrin, Advil, ibuprofen, Naprosyn, Voltaren, Relafen, etc.) you may restart these this evening.    You do not need to discontinue non-aspirin-containing pain medications prior to an injection (examples: Celebrex, tramadol, hydrocodone and acetaminophen).      If you take a blood thinning medication (Coumadin, Lovenox, Fragmin,Ticlid, Plavix, Pradaxa, etc.), please discuss this with your primary care physician/cardiologist and your pain physician. These medications MUST be discontinued before you can have an injection safely, without the risk of uncontrolled bleeding. If these medications are not discontinued for an appropriate period of time, you will not be able to receivean injection.      If you are taking Coumadin, please have your INR checked the morning of your procedure and bringthe result to your appointment unless otherwise instructed. If your INR is over 1.2, your injection may need to be rescheduled to avoid uncontrolled bleeding from the needle placement.     Call [facility] at [phone] between 8am-4pm Monday - Friday if you are experiencing the following:    If you received an epidural or spinal injection:    -Headache that doesnot go away with medicine, is worse when sitting or standing up, and is greatly relieved upon lying down.   -Severe pain worse than or different than your baseline pain.   -Chills or fever (101º F or greater).   -Drainage or signs of infection at the injection site     Go directly to the Emergency Department if you are experiencing the following and received an  epidural or spinal injection:   -Abrupt weakness or progressive weakness in your legs that starts after you leave the clinic.   -Abrupt severe or worsening numbness in your legs.   -Inability to urinate after the injection or loss of bowel or bladder control without the urge to defecate or urinate.     If you have a clinical question that cannot wait until your next appointment, please call [phone] between 8am-4pm Monday - Friday or send a [employer] message. We do our best to return all non-emergency messages within 24 hours, Monday - Friday. A nurse or physician will return your message.      If you need to cancel an appointment, please call the scheduling staff at [phone] during normal business hours or leave a message at least 24 hours in advance.     If you are going to be sedated for your next procedure, you MUST have responsible adult who can legally drive accompany you home. You cannot eat or drink for eight hours prior to the planned procedure if you are going to receive sedation. You may take your non-blood thinning medications with a small sip of water.

## 2024-07-16 NOTE — POST-PROCEDURE NOTE
1401-Patient brought back from procedure room. Patient states right lower back pain rated 7/10, no change since prior to admission. Bandaid to right lower back dry and intact. Patient denies any weakness or decrease in sensation to BLE. Discharge instructions explained to patient and copy provided. Patient to follow up with doctor as scheduled.

## 2024-07-30 ENCOUNTER — APPOINTMENT (OUTPATIENT)
Dept: OPERATING ROOM | Facility: HOSPITAL | Age: 89
End: 2024-07-30
Payer: COMMERCIAL

## 2024-08-14 ENCOUNTER — APPOINTMENT (OUTPATIENT)
Dept: PAIN MEDICINE | Facility: CLINIC | Age: 89
End: 2024-08-14
Payer: COMMERCIAL

## 2024-09-10 ENCOUNTER — LAB (OUTPATIENT)
Dept: LAB | Facility: LAB | Age: 89
End: 2024-09-10
Payer: COMMERCIAL

## 2024-09-10 DIAGNOSIS — E78.00 HYPERCHOLESTEROLEMIA: ICD-10-CM

## 2024-09-10 DIAGNOSIS — R73.01 IMPAIRED FASTING BLOOD SUGAR: ICD-10-CM

## 2024-09-10 DIAGNOSIS — E55.9 VITAMIN D DEFICIENCY: ICD-10-CM

## 2024-09-10 LAB
25(OH)D3 SERPL-MCNC: 45 NG/ML (ref 31–100)
ALBUMIN SERPL-MCNC: 4 G/DL (ref 3.5–5)
ALP BLD-CCNC: 92 U/L (ref 35–125)
ALT SERPL-CCNC: 13 U/L (ref 5–40)
ANION GAP SERPL CALC-SCNC: 13 MMOL/L
AST SERPL-CCNC: 16 U/L (ref 5–40)
BASOPHILS # BLD AUTO: 0.02 X10*3/UL (ref 0–0.1)
BASOPHILS NFR BLD AUTO: 0.5 %
BILIRUB SERPL-MCNC: 0.5 MG/DL (ref 0.1–1.2)
BUN SERPL-MCNC: 10 MG/DL (ref 8–25)
CALCIUM SERPL-MCNC: 9.8 MG/DL (ref 8.5–10.4)
CHLORIDE SERPL-SCNC: 94 MMOL/L (ref 97–107)
CHOLEST SERPL-MCNC: 146 MG/DL (ref 133–200)
CHOLEST/HDLC SERPL: 1.9 {RATIO}
CO2 SERPL-SCNC: 29 MMOL/L (ref 24–31)
CREAT SERPL-MCNC: 0.6 MG/DL (ref 0.4–1.6)
EGFRCR SERPLBLD CKD-EPI 2021: 86 ML/MIN/1.73M*2
EOSINOPHIL # BLD AUTO: 0.07 X10*3/UL (ref 0–0.4)
EOSINOPHIL NFR BLD AUTO: 1.7 %
ERYTHROCYTE [DISTWIDTH] IN BLOOD BY AUTOMATED COUNT: 13.6 % (ref 11.5–14.5)
EST. AVERAGE GLUCOSE BLD GHB EST-MCNC: 120 MG/DL
GLUCOSE SERPL-MCNC: 107 MG/DL (ref 65–99)
HBA1C MFR BLD: 5.8 %
HCT VFR BLD AUTO: 31.6 % (ref 36–46)
HDLC SERPL-MCNC: 75 MG/DL
HGB BLD-MCNC: 10.2 G/DL (ref 12–16)
IMM GRANULOCYTES # BLD AUTO: 0.01 X10*3/UL (ref 0–0.5)
IMM GRANULOCYTES NFR BLD AUTO: 0.2 % (ref 0–0.9)
LDLC SERPL CALC-MCNC: 50 MG/DL (ref 65–130)
LYMPHOCYTES # BLD AUTO: 1.14 X10*3/UL (ref 0.8–3)
LYMPHOCYTES NFR BLD AUTO: 27.7 %
MCH RBC QN AUTO: 28.7 PG (ref 26–34)
MCHC RBC AUTO-ENTMCNC: 32.3 G/DL (ref 32–36)
MCV RBC AUTO: 89 FL (ref 80–100)
MONOCYTES # BLD AUTO: 0.5 X10*3/UL (ref 0.05–0.8)
MONOCYTES NFR BLD AUTO: 12.2 %
NEUTROPHILS # BLD AUTO: 2.37 X10*3/UL (ref 1.6–5.5)
NEUTROPHILS NFR BLD AUTO: 57.7 %
NRBC BLD-RTO: 0 /100 WBCS (ref 0–0)
PLATELET # BLD AUTO: 234 X10*3/UL (ref 150–450)
POTASSIUM SERPL-SCNC: 3.7 MMOL/L (ref 3.4–5.1)
PROT SERPL-MCNC: 6 G/DL (ref 5.9–7.9)
RBC # BLD AUTO: 3.55 X10*6/UL (ref 4–5.2)
SODIUM SERPL-SCNC: 136 MMOL/L (ref 133–145)
TRIGL SERPL-MCNC: 104 MG/DL (ref 40–150)
WBC # BLD AUTO: 4.1 X10*3/UL (ref 4.4–11.3)

## 2024-09-10 PROCEDURE — 80053 COMPREHEN METABOLIC PANEL: CPT

## 2024-09-10 PROCEDURE — 36415 COLL VENOUS BLD VENIPUNCTURE: CPT

## 2024-09-10 PROCEDURE — 82306 VITAMIN D 25 HYDROXY: CPT

## 2024-09-10 PROCEDURE — 85025 COMPLETE CBC W/AUTO DIFF WBC: CPT

## 2024-09-10 PROCEDURE — 83036 HEMOGLOBIN GLYCOSYLATED A1C: CPT

## 2024-09-10 PROCEDURE — 80061 LIPID PANEL: CPT

## 2024-09-20 ENCOUNTER — OFFICE VISIT (OUTPATIENT)
Dept: PRIMARY CARE | Facility: CLINIC | Age: 89
End: 2024-09-20
Payer: COMMERCIAL

## 2024-09-20 VITALS
WEIGHT: 114 LBS | SYSTOLIC BLOOD PRESSURE: 138 MMHG | DIASTOLIC BLOOD PRESSURE: 60 MMHG | HEIGHT: 59 IN | OXYGEN SATURATION: 97 % | HEART RATE: 60 BPM | TEMPERATURE: 97.3 F | BODY MASS INDEX: 22.98 KG/M2

## 2024-09-20 DIAGNOSIS — M81.0 OSTEOPOROSIS WITHOUT CURRENT PATHOLOGICAL FRACTURE, UNSPECIFIED OSTEOPOROSIS TYPE: ICD-10-CM

## 2024-09-20 DIAGNOSIS — I48.91 RAPID ATRIAL FIBRILLATION (MULTI): Primary | ICD-10-CM

## 2024-09-20 DIAGNOSIS — R73.01 IMPAIRED FASTING GLUCOSE: ICD-10-CM

## 2024-09-20 DIAGNOSIS — I10 LABILE ESSENTIAL HYPERTENSION: ICD-10-CM

## 2024-09-20 DIAGNOSIS — Z23 ENCOUNTER FOR IMMUNIZATION: ICD-10-CM

## 2024-09-20 DIAGNOSIS — K21.00 GASTROESOPHAGEAL REFLUX DISEASE WITH ESOPHAGITIS WITHOUT HEMORRHAGE: ICD-10-CM

## 2024-09-20 DIAGNOSIS — H91.93 BILATERAL HEARING LOSS, UNSPECIFIED HEARING LOSS TYPE: ICD-10-CM

## 2024-09-20 DIAGNOSIS — E78.00 HYPERCHOLESTEROLEMIA: ICD-10-CM

## 2024-09-20 DIAGNOSIS — M96.1 POSTLAMINECTOMY SYNDROME OF LUMBAR REGION: ICD-10-CM

## 2024-09-20 DIAGNOSIS — E21.3 HYPERPARATHYROIDISM (MULTI): ICD-10-CM

## 2024-09-20 PROCEDURE — 90662 IIV NO PRSV INCREASED AG IM: CPT | Performed by: INTERNAL MEDICINE

## 2024-09-20 PROCEDURE — 3078F DIAST BP <80 MM HG: CPT | Performed by: INTERNAL MEDICINE

## 2024-09-20 PROCEDURE — 1126F AMNT PAIN NOTED NONE PRSNT: CPT | Performed by: INTERNAL MEDICINE

## 2024-09-20 PROCEDURE — 3075F SYST BP GE 130 - 139MM HG: CPT | Performed by: INTERNAL MEDICINE

## 2024-09-20 PROCEDURE — 99214 OFFICE O/P EST MOD 30 MIN: CPT | Performed by: INTERNAL MEDICINE

## 2024-09-20 PROCEDURE — 1159F MED LIST DOCD IN RCRD: CPT | Performed by: INTERNAL MEDICINE

## 2024-09-20 PROCEDURE — 1157F ADVNC CARE PLAN IN RCRD: CPT | Performed by: INTERNAL MEDICINE

## 2024-09-20 ASSESSMENT — ENCOUNTER SYMPTOMS
OCCASIONAL FEELINGS OF UNSTEADINESS: 1
LOSS OF SENSATION IN FEET: 0
DEPRESSION: 0

## 2024-09-20 ASSESSMENT — PAIN SCALES - GENERAL: PAINLEVEL: 0-NO PAIN

## 2024-09-20 ASSESSMENT — PATIENT HEALTH QUESTIONNAIRE - PHQ9
1. LITTLE INTEREST OR PLEASURE IN DOING THINGS: NOT AT ALL
2. FEELING DOWN, DEPRESSED OR HOPELESS: NOT AT ALL
SUM OF ALL RESPONSES TO PHQ9 QUESTIONS 1 AND 2: 0

## 2024-09-22 NOTE — PROGRESS NOTES
[facility]: [facility]  PROGRESS NOTE      Rakan Cervantes is a 89 y.o. female that is being seen  today for New Patient Visit ([facility]).  Subjective   Patient is a 89-year-old female with history of atrial fibrillation on Eliquis, low back pain s/p surgery, hypertension, hyperlipidemia, osteoporosis who is being seen for follow-up as well as to establish a new primary care physician.  Patient has been seen by pain management in the past and had injections in her back.  Patient had surgery done in the back many years ago.  Patient does have difficulty in walking and is usually in wheelchair or uses walker.  Patient's blood pressure has been fairly controlled.  Patient lab work reviewed and has been stable.      ROS  Negative for fever or chills  Negative for sore throat, ear pain, nasal discharge  Negative for cough, shortness of breath or wheezing  Negative for chest pain, palpitations, swelling of legs  Negative for abdominal pain, constipation, diarrhea, blood in the stools  Negative for urinary complaints  Negative for headache, dizziness, weakness or numbness  Positive for back pain and difficulty in walking  Negative for depression or anxiety  All other systems reviewed and were negative   Vitals:    09/20/24 1125   BP: 138/60   Pulse: 60   Temp: 36.3 °C (97.3 °F)   SpO2: 97%      Vitals:    09/20/24 1125   Weight: 51.7 kg (114 lb)     Body mass index is 23.03 kg/m².  Physical Exam  Constitutional: Patient does not appear to be in any acute distress  Head and Face: NCAT  Eyes: Normal external exam, EOMI  ENT: Normal external inspection of ears and nose. Oropharynx normal.  Cardiovascular: RRR, S1/S2, no murmurs, rubs, or gallops, radial pulses +2, no edema of extremities  Pulmonary: CTAB, no respiratory distress.  Abdomen: +BS, soft, non-tender, nondistended, no guarding or rebound, no masses noted  MSK: Patient has back pain and has difficulty in walking.  Skin- No lesions,  contusions, or erythema.  Peripheral puslses palpable bilaterally 2+  Neuro: AAO X3, Cranial nerves 2-12 grossly intact,DTR 2+ in all 4 limbs   Psychiatric: Judgment intact. Appropriate mood and behavior    LABS   [unfilled]  Lab Results   Component Value Date    GLUCOSE 107 (H) 09/10/2024    CALCIUM 9.8 09/10/2024     09/10/2024    K 3.7 09/10/2024    CO2 29 09/10/2024    CL 94 (L) 09/10/2024    BUN 10 09/10/2024    CREATININE 0.60 09/10/2024     Lab Results   Component Value Date    ALT 13 09/10/2024    AST 16 09/10/2024    ALKPHOS 92 09/10/2024    BILITOT 0.5 09/10/2024     Lab Results   Component Value Date    WBC 4.1 (L) 09/10/2024    HGB 10.2 (L) 09/10/2024    HCT 31.6 (L) 09/10/2024    MCV 89 09/10/2024     09/10/2024     Lab Results   Component Value Date    CHOL 146 09/10/2024    CHOL 154 03/14/2024    CHOL 106 (L) 10/27/2023     Lab Results   Component Value Date    HDL 75.0 09/10/2024    HDL 83.0 03/14/2024    HDL 61.0 10/27/2023     Lab Results   Component Value Date    LDLCALC 50 (L) 09/10/2024    LDLCALC 44 (L) 03/14/2024    LDLCALC 31 (L) 10/27/2023     Lab Results   Component Value Date    TRIG 104 09/10/2024    TRIG 136 03/14/2024    TRIG 71 10/27/2023     Lab Results   Component Value Date    HGBA1C 5.8 (H) 09/10/2024     Other labs not included in the list above were reviewed either before or during this encounter.    History    Past Medical History:   Diagnosis Date    Acute non-ST segment elevation myocardial infarction (Multi) 11/26/2023    Anemia     Arteriosclerosis of coronary artery 05/14/2023    Arthritis     Asthma (HHS-HCC)     Atrial fibrillation (Multi) 11/26/2023    Jonas esophagus     Bilateral carotid artery occlusion 05/23/2023    CAD (coronary artery disease)     Cardiac rhythm disorder or disturbance or change 11/26/2023    Cervical radiculopathy     Chest pain 11/26/2023    Chronic obstructive pulmonary disease (Multi) 11/26/2023    Constipation     Coronary  artery disease 11/26/2023    Degenerative joint disease     Dyslipidemia     Dysuria     Erosive esophagitis     GERD (gastroesophageal reflux disease)     Hypercholesterolemia 05/23/2023    Hyperparathyroidism (Multi)     Hypertensive urgency 11/26/2023    Impaired fasting glucose 11/26/2023    Irritable bowel syndrome (IBS)     Labile essential hypertension 11/26/2023    Labile hypertension     Left foot pain     Low blood pressure 11/26/2023    Macular degeneration     Mixed hyperlipidemia 11/26/2023    Neck pain     Osteoporosis 11/26/2023    REclast '19 Cr 1.4 switch to calcitonin.    Paroxysmal atrial fibrillation (Multi)     Polyarthritis with negative rheumatoid factor (Multi)     Post menopausal syndrome     Rapid atrial fibrillation (Multi) 05/14/2023    Spinal stenosis     Stage 3 chronic kidney disease (Multi) 11/26/2023    Stroke (Multi)     Transient ischemic attack 11/26/2023     Past Surgical History:   Procedure Laterality Date    CARDIOVASCULAR STRESS TEST  2017    Dr. Khalil    CARDIOVASCULAR STRESS TEST  2004    Dr. Pantoja    CARPAL TUNNEL RELEASE Left 2014    CATARACT EXTRACTION Bilateral 2011    CHOLECYSTECTOMY      CORONARY STENT PLACEMENT  05/2023    [facility]    CT HEAD ANGIO W AND WO IV CONTRAST  05/08/2013    CT HEAD ANGIO W AND WO IV CONTRAST [facility]    MR HEAD ANGIO WO IV CONTRAST  04/23/2013    MR HEAD ANGIO WO IV CONTRAST [facility]    MR HEAD ANGIO WO IV CONTRAST  07/20/2016    MR HEAD ANGIO WO IV CONTRAST [facility]    MR HEAD ANGIO WO IV CONTRAST  10/27/2023    MR HEAD ANGIO WO IV CONTRAST 10/27/2023 [facility]    MR NECK ANGIO WO IV CONTRAST  10/27/2023    MR NECK ANGIO WO IV CONTRAST 10/27/2023 [facility]    OTHER SURGICAL HISTORY  02/14/2022    No history of surgery    REVISION TOTAL HIP ARTHROPLASTY Left 2013    Conversion from left ORIF    TOTAL HIP ARTHROPLASTY Right 2016     Family History   Problem Relation Name Age of Onset    No Known Problems Mother       "No Known Problems Father      No Known Problems Sister       Allergies   Allergen Reactions    Meperidine Hallucinations     Other reaction(s): Mental Status Change    Morphine Hallucinations    Opioids - Morphine Analogues Hallucinations and Confusion    Pregabalin Rash, Other and Unknown     \"Flu like symptoms\"    Flu like symtoms    Tramadol Rash, Itching and Unknown     Current Outpatient Medications on File Prior to Visit   Medication Sig Dispense Refill    apixaban (Eliquis) 2.5 mg tablet Take 1 tablet (2.5 mg) by mouth 2 times a day. 180 tablet 3    ascorbic acid (Vitamin C) 100 mg tablet Take 1 tablet (100 mg) by mouth once daily.      atorvastatin (Lipitor) 80 mg tablet Take 1 tablet (80 mg) by mouth once daily at bedtime. 90 tablet 0    carvedilol (Coreg) 6.25 mg tablet TAKE 1 TABLET BY MOUTH TWICE DAILY WITH FOOD 180 tablet 2    cholecalciferol (Vitamin D-3) 50 mcg (2,000 unit) capsule Take 1 capsule (50 mcg) by mouth early in the morning..      clopidogrel (Plavix) 75 mg tablet Take by mouth once daily.      docusate sodium (Colace) 100 mg capsule TAKE 1 CAPSULE BY MOUTH ONCE DAILY AS NEEDED to prevent constipation from iron 90 capsule 2    DULoxetine (Cymbalta) 30 mg DR capsule TAKE 1 CAPSULE BY MOUTH ONCE DAILY at dinner 90 capsule 2    ferrous sulfate, 325 mg ferrous sulfate, tablet Take 1 tablet by mouth once daily with breakfast. M-W-F 36 tablet 2    hydroCHLOROthiazide (HYDRODiuril) 25 mg tablet Take 1 tablet (25 mg) by mouth once daily. 90 tablet 3    hydroxychloroquine (Plaquenil) 200 mg tablet Take by mouth.      isosorbide mononitrate ER (Imdur) 60 mg 24 hr tablet TAKE 1 TABLET BY MOUTH ONCE DAILY IN THE MORNING 90 tablet 2    losartan (Cozaar) 100 mg tablet Take 1 tablet (100 mg) by mouth once daily. 90 tablet 3    pantoprazole (ProtoNix) 40 mg EC tablet TAKE 1 TABLET BY MOUTH TWICE DAILY 180 tablet 2    potassium chloride CR (Klor-Con M20) 20 mEq ER tablet Take 1 tablet (20 mEq) by mouth once " daily. Do not crush, chew, or split. 90 tablet 2    verapamil SR (Calan-SR) 180 mg ER tablet Take 1 tablet (180 mg) by mouth once daily. Do not crush or chew. 90 tablet 2    vit C/E/Zn/coppr/lutein/zeaxan (PRESERVISION AREDS-2 ORAL) As directed orally      calcitonin, salmon, (Miacalcin) 200 unit/actuation nasal spray Administer 1 spray into one nostril once daily. Alternate nostril      gabapentin (Neurontin) 300 mg capsule TAKE 1 CAPSULE BY MOUTH AT NOON AND BEDTIME (Patient not taking: Reported on 9/20/2024) 180 capsule 2    sulfaSALAzine (Azulfidine) 500 mg tablet Take 1 tablet (500 mg) by mouth 2 times a day.       No current facility-administered medications on file prior to visit.     Immunization History   Administered Date(s) Administered    Flu vaccine, quadrivalent, high-dose, preservative free, age 65y+ (FLUZONE) 09/28/2020, 09/29/2021, 11/10/2022, 09/15/2023    Flu vaccine, trivalent, preservative free, HIGH-DOSE, age 65y+ (Fluzone) 10/12/2016, 10/16/2017, 10/05/2018, 10/09/2019, 09/20/2024    Flu vaccine, trivalent, preservative free, age 6 months and greater (Fluarix/Fluzone/Flulaval) 09/23/2015    Influenza, seasonal, injectable 11/01/2008, 10/21/2010, 09/27/2011, 10/03/2012, 10/28/2013, 09/11/2014    Moderna SARS-CoV-2 Vaccination 01/30/2021, 02/27/2021, 11/12/2021    Pneumococcal conjugate vaccine, 13-valent (PREVNAR 13) 08/04/2015    Pneumococcal polysaccharide vaccine, 23-valent, age 2 years and older (PNEUMOVAX 23) 10/01/2006, 01/01/2010    Tdap vaccine, age 7 year and older (BOOSTRIX, ADACEL) 09/28/2020    Zoster vaccine, recombinant, adult (SHINGRIX) 05/14/2019, 07/31/2019    Zoster, live 05/27/2015     Patient's medical history was reviewed and updated either before or during this encounter.  ASSESSMENT / PLAN:  Diagnoses and all orders for this visit:  Rapid atrial fibrillation (Multi)  Hypercholesterolemia  Labile essential hypertension  -     CBC; Future  -     Comprehensive Metabolic  Panel; Future  Bilateral hearing loss, unspecified hearing loss type  Hyperparathyroidism (Multi)  Impaired fasting glucose  -     Hemoglobin A1C; Future  Osteoporosis without current pathological fracture, unspecified osteoporosis type  Gastroesophageal reflux disease with esophagitis without hemorrhage  Postlaminectomy syndrome of lumbar region  Encounter for immunization  Other orders  -     Flu vaccine, trivalent, preservative free, HIGH-DOSE, age 65y+ (Fluzone)    Patient is being seen for follow-up as well as to establish a new primary care physician.  Previous records reviewed.  Patient does have chronic pain in her back and has difficulty in walking.  Patient has been seen by pain management.  Blood pressure is fairly controlled with current medications.  Patient lab work reviewed and has been stable.      Samuel Sky MD

## 2024-09-26 ENCOUNTER — APPOINTMENT (OUTPATIENT)
Dept: CARDIOLOGY | Facility: HOSPITAL | Age: 89
DRG: 322 | End: 2024-09-26
Payer: COMMERCIAL

## 2024-09-26 ENCOUNTER — HOSPITAL ENCOUNTER (INPATIENT)
Facility: HOSPITAL | Age: 89
LOS: 3 days | Discharge: HOME HEALTH | DRG: 322 | End: 2024-09-29
Attending: EMERGENCY MEDICINE | Admitting: INTERNAL MEDICINE
Payer: COMMERCIAL

## 2024-09-26 ENCOUNTER — APPOINTMENT (OUTPATIENT)
Dept: RADIOLOGY | Facility: HOSPITAL | Age: 89
DRG: 322 | End: 2024-09-26
Payer: COMMERCIAL

## 2024-09-26 DIAGNOSIS — M48.07 SPINAL STENOSIS, LUMBOSACRAL REGION: ICD-10-CM

## 2024-09-26 DIAGNOSIS — R07.9 CHEST PAIN, UNSPECIFIED TYPE: ICD-10-CM

## 2024-09-26 DIAGNOSIS — G45.9 TIA (TRANSIENT ISCHEMIC ATTACK): ICD-10-CM

## 2024-09-26 DIAGNOSIS — I21.4 NSTEMI (NON-ST ELEVATED MYOCARDIAL INFARCTION) (MULTI): Primary | ICD-10-CM

## 2024-09-26 DIAGNOSIS — I10 ESSENTIAL HYPERTENSION: ICD-10-CM

## 2024-09-26 DIAGNOSIS — E87.6 HYPOKALEMIA: ICD-10-CM

## 2024-09-26 DIAGNOSIS — G45.9 TRANSIENT CEREBRAL ISCHEMIC ATTACK, UNSPECIFIED: ICD-10-CM

## 2024-09-26 DIAGNOSIS — I25.10 CORONARY ARTERY DISEASE INVOLVING NATIVE CORONARY ARTERY OF NATIVE HEART WITHOUT ANGINA PECTORIS: ICD-10-CM

## 2024-09-26 PROBLEM — R07.89 OTHER CHEST PAIN: Status: ACTIVE | Noted: 2024-09-26

## 2024-09-26 PROBLEM — I48.0 PAF (PAROXYSMAL ATRIAL FIBRILLATION) (MULTI): Status: ACTIVE | Noted: 2024-09-26

## 2024-09-26 PROBLEM — R79.89 TROPONIN LEVEL ELEVATED: Status: ACTIVE | Noted: 2024-09-26

## 2024-09-26 PROBLEM — E83.51 HYPOCALCEMIA: Status: ACTIVE | Noted: 2024-09-26

## 2024-09-26 LAB
ALBUMIN SERPL BCP-MCNC: 2.8 G/DL (ref 3.4–5)
ALBUMIN SERPL BCP-MCNC: 3.3 G/DL (ref 3.4–5)
ALP SERPL-CCNC: 55 U/L (ref 33–136)
ALP SERPL-CCNC: 68 U/L (ref 33–136)
ALT SERPL W P-5'-P-CCNC: 11 U/L (ref 7–45)
ALT SERPL W P-5'-P-CCNC: 15 U/L (ref 7–45)
ANION GAP SERPL CALCULATED.3IONS-SCNC: 10 MMOL/L (ref 10–20)
ANION GAP SERPL CALCULATED.3IONS-SCNC: 8 MMOL/L (ref 10–20)
APTT PPP: 24.9 SECONDS (ref 22–32.5)
AST SERPL W P-5'-P-CCNC: 15 U/L (ref 9–39)
AST SERPL W P-5'-P-CCNC: 19 U/L (ref 9–39)
BASOPHILS # BLD AUTO: 0.03 X10*3/UL (ref 0–0.1)
BASOPHILS NFR BLD AUTO: 0.6 %
BILIRUB SERPL-MCNC: 0.3 MG/DL (ref 0–1.2)
BILIRUB SERPL-MCNC: 0.4 MG/DL (ref 0–1.2)
BNP SERPL-MCNC: 600 PG/ML (ref 0–99)
BUN SERPL-MCNC: 7 MG/DL (ref 6–23)
BUN SERPL-MCNC: 9 MG/DL (ref 6–23)
CALCIUM SERPL-MCNC: 6.7 MG/DL (ref 8.6–10.3)
CALCIUM SERPL-MCNC: 8.1 MG/DL (ref 8.6–10.3)
CARDIAC TROPONIN I PNL SERPL HS: 118 NG/L (ref 0–13)
CARDIAC TROPONIN I PNL SERPL HS: 145 NG/L (ref 0–13)
CHLORIDE SERPL-SCNC: 109 MMOL/L (ref 98–107)
CHLORIDE SERPL-SCNC: 115 MMOL/L (ref 98–107)
CO2 SERPL-SCNC: 22 MMOL/L (ref 21–32)
CO2 SERPL-SCNC: 29 MMOL/L (ref 21–32)
CREAT SERPL-MCNC: 0.51 MG/DL (ref 0.5–1.05)
CREAT SERPL-MCNC: 0.62 MG/DL (ref 0.5–1.05)
EGFRCR SERPLBLD CKD-EPI 2021: 85 ML/MIN/1.73M*2
EGFRCR SERPLBLD CKD-EPI 2021: 89 ML/MIN/1.73M*2
EOSINOPHIL # BLD AUTO: 0.05 X10*3/UL (ref 0–0.4)
EOSINOPHIL NFR BLD AUTO: 1 %
ERYTHROCYTE [DISTWIDTH] IN BLOOD BY AUTOMATED COUNT: 15.5 % (ref 11.5–14.5)
ERYTHROCYTE [DISTWIDTH] IN BLOOD BY AUTOMATED COUNT: 15.6 % (ref 11.5–14.5)
FLUAV RNA RESP QL NAA+PROBE: NOT DETECTED
FLUBV RNA RESP QL NAA+PROBE: NOT DETECTED
GLUCOSE SERPL-MCNC: 139 MG/DL (ref 74–99)
GLUCOSE SERPL-MCNC: 88 MG/DL (ref 74–99)
HCT VFR BLD AUTO: 32.9 % (ref 36–46)
HCT VFR BLD AUTO: 33.3 % (ref 36–46)
HGB BLD-MCNC: 10.2 G/DL (ref 12–16)
HGB BLD-MCNC: 10.5 G/DL (ref 12–16)
HOLD SPECIMEN: NORMAL
IMM GRANULOCYTES # BLD AUTO: 0.01 X10*3/UL (ref 0–0.5)
IMM GRANULOCYTES NFR BLD AUTO: 0.2 % (ref 0–0.9)
LYMPHOCYTES # BLD AUTO: 0.87 X10*3/UL (ref 0.8–3)
LYMPHOCYTES NFR BLD AUTO: 17.2 %
MAGNESIUM SERPL-MCNC: 1.71 MG/DL (ref 1.6–2.4)
MCH RBC QN AUTO: 28.8 PG (ref 26–34)
MCH RBC QN AUTO: 28.9 PG (ref 26–34)
MCHC RBC AUTO-ENTMCNC: 31 G/DL (ref 32–36)
MCHC RBC AUTO-ENTMCNC: 31.5 G/DL (ref 32–36)
MCV RBC AUTO: 91 FL (ref 80–100)
MCV RBC AUTO: 93 FL (ref 80–100)
MONOCYTES # BLD AUTO: 0.52 X10*3/UL (ref 0.05–0.8)
MONOCYTES NFR BLD AUTO: 10.3 %
NEUTROPHILS # BLD AUTO: 3.58 X10*3/UL (ref 1.6–5.5)
NEUTROPHILS NFR BLD AUTO: 70.7 %
NRBC BLD-RTO: 0 /100 WBCS (ref 0–0)
NRBC BLD-RTO: 0 /100 WBCS (ref 0–0)
PLATELET # BLD AUTO: 247 X10*3/UL (ref 150–450)
PLATELET # BLD AUTO: 247 X10*3/UL (ref 150–450)
POTASSIUM SERPL-SCNC: 2.1 MMOL/L (ref 3.5–5.3)
POTASSIUM SERPL-SCNC: 2.8 MMOL/L (ref 3.5–5.3)
PROT SERPL-MCNC: 4.5 G/DL (ref 6.4–8.2)
PROT SERPL-MCNC: 5.3 G/DL (ref 6.4–8.2)
RBC # BLD AUTO: 3.53 X10*6/UL (ref 4–5.2)
RBC # BLD AUTO: 3.65 X10*6/UL (ref 4–5.2)
SARS-COV-2 RNA RESP QL NAA+PROBE: NOT DETECTED
SODIUM SERPL-SCNC: 143 MMOL/L (ref 136–145)
SODIUM SERPL-SCNC: 145 MMOL/L (ref 136–145)
TSH SERPL-ACNC: 0.57 MIU/L (ref 0.44–3.98)
WBC # BLD AUTO: 5.1 X10*3/UL (ref 4.4–11.3)
WBC # BLD AUTO: 6.8 X10*3/UL (ref 4.4–11.3)

## 2024-09-26 PROCEDURE — 2500000001 HC RX 250 WO HCPCS SELF ADMINISTERED DRUGS (ALT 637 FOR MEDICARE OP): Performed by: EMERGENCY MEDICINE

## 2024-09-26 PROCEDURE — B2111ZZ FLUOROSCOPY OF MULTIPLE CORONARY ARTERIES USING LOW OSMOLAR CONTRAST: ICD-10-PCS | Performed by: INTERNAL MEDICINE

## 2024-09-26 PROCEDURE — 93010 ELECTROCARDIOGRAM REPORT: CPT | Performed by: INTERNAL MEDICINE

## 2024-09-26 PROCEDURE — 96365 THER/PROPH/DIAG IV INF INIT: CPT

## 2024-09-26 PROCEDURE — 84484 ASSAY OF TROPONIN QUANT: CPT | Performed by: EMERGENCY MEDICINE

## 2024-09-26 PROCEDURE — 36415 COLL VENOUS BLD VENIPUNCTURE: CPT | Performed by: EMERGENCY MEDICINE

## 2024-09-26 PROCEDURE — B2151ZZ FLUOROSCOPY OF LEFT HEART USING LOW OSMOLAR CONTRAST: ICD-10-PCS | Performed by: INTERNAL MEDICINE

## 2024-09-26 PROCEDURE — 2500000004 HC RX 250 GENERAL PHARMACY W/ HCPCS (ALT 636 FOR OP/ED)

## 2024-09-26 PROCEDURE — 80053 COMPREHEN METABOLIC PANEL: CPT | Performed by: EMERGENCY MEDICINE

## 2024-09-26 PROCEDURE — 71045 X-RAY EXAM CHEST 1 VIEW: CPT | Performed by: STUDENT IN AN ORGANIZED HEALTH CARE EDUCATION/TRAINING PROGRAM

## 2024-09-26 PROCEDURE — 83880 ASSAY OF NATRIURETIC PEPTIDE: CPT | Performed by: EMERGENCY MEDICINE

## 2024-09-26 PROCEDURE — 85027 COMPLETE CBC AUTOMATED: CPT | Performed by: EMERGENCY MEDICINE

## 2024-09-26 PROCEDURE — 96366 THER/PROPH/DIAG IV INF ADDON: CPT

## 2024-09-26 PROCEDURE — 2500000002 HC RX 250 W HCPCS SELF ADMINISTERED DRUGS (ALT 637 FOR MEDICARE OP, ALT 636 FOR OP/ED): Performed by: INTERNAL MEDICINE

## 2024-09-26 PROCEDURE — 85730 THROMBOPLASTIN TIME PARTIAL: CPT | Performed by: EMERGENCY MEDICINE

## 2024-09-26 PROCEDURE — 2500000001 HC RX 250 WO HCPCS SELF ADMINISTERED DRUGS (ALT 637 FOR MEDICARE OP): Performed by: INTERNAL MEDICINE

## 2024-09-26 PROCEDURE — 2500000004 HC RX 250 GENERAL PHARMACY W/ HCPCS (ALT 636 FOR OP/ED): Performed by: EMERGENCY MEDICINE

## 2024-09-26 PROCEDURE — 2500000001 HC RX 250 WO HCPCS SELF ADMINISTERED DRUGS (ALT 637 FOR MEDICARE OP)

## 2024-09-26 PROCEDURE — 83735 ASSAY OF MAGNESIUM: CPT | Performed by: INTERNAL MEDICINE

## 2024-09-26 PROCEDURE — 96375 TX/PRO/DX INJ NEW DRUG ADDON: CPT

## 2024-09-26 PROCEDURE — 96361 HYDRATE IV INFUSION ADD-ON: CPT

## 2024-09-26 PROCEDURE — 71045 X-RAY EXAM CHEST 1 VIEW: CPT

## 2024-09-26 PROCEDURE — 2060000001 HC INTERMEDIATE ICU ROOM DAILY

## 2024-09-26 PROCEDURE — 93005 ELECTROCARDIOGRAM TRACING: CPT

## 2024-09-26 PROCEDURE — 027135Z DILATION OF CORONARY ARTERY, TWO ARTERIES WITH TWO DRUG-ELUTING INTRALUMINAL DEVICES, PERCUTANEOUS APPROACH: ICD-10-PCS | Performed by: INTERNAL MEDICINE

## 2024-09-26 PROCEDURE — 87636 SARSCOV2 & INF A&B AMP PRB: CPT

## 2024-09-26 PROCEDURE — 85025 COMPLETE CBC W/AUTO DIFF WBC: CPT | Performed by: EMERGENCY MEDICINE

## 2024-09-26 PROCEDURE — 99291 CRITICAL CARE FIRST HOUR: CPT

## 2024-09-26 PROCEDURE — 84443 ASSAY THYROID STIM HORMONE: CPT | Performed by: INTERNAL MEDICINE

## 2024-09-26 PROCEDURE — 4A023N7 MEASUREMENT OF CARDIAC SAMPLING AND PRESSURE, LEFT HEART, PERCUTANEOUS APPROACH: ICD-10-PCS | Performed by: INTERNAL MEDICINE

## 2024-09-26 PROCEDURE — 84075 ASSAY ALKALINE PHOSPHATASE: CPT | Performed by: EMERGENCY MEDICINE

## 2024-09-26 RX ORDER — ASPIRIN 325 MG
325 TABLET ORAL ONCE
Status: COMPLETED | OUTPATIENT
Start: 2024-09-26 | End: 2024-09-26

## 2024-09-26 RX ORDER — CARVEDILOL 6.25 MG/1
6.25 TABLET ORAL
Qty: 180 TABLET | Refills: 1 | Status: SHIPPED | OUTPATIENT
Start: 2024-09-26 | End: 2024-09-29 | Stop reason: HOSPADM

## 2024-09-26 RX ORDER — ISOSORBIDE MONONITRATE 30 MG/1
60 TABLET, EXTENDED RELEASE ORAL
Status: DISCONTINUED | OUTPATIENT
Start: 2024-09-27 | End: 2024-09-26

## 2024-09-26 RX ORDER — PANTOPRAZOLE SODIUM 40 MG/10ML
40 INJECTION, POWDER, LYOPHILIZED, FOR SOLUTION INTRAVENOUS DAILY
Status: DISCONTINUED | OUTPATIENT
Start: 2024-09-27 | End: 2024-09-26

## 2024-09-26 RX ORDER — PANTOPRAZOLE SODIUM 40 MG/1
40 TABLET, DELAYED RELEASE ORAL 2 TIMES DAILY
Status: DISCONTINUED | OUTPATIENT
Start: 2024-09-26 | End: 2024-09-27

## 2024-09-26 RX ORDER — ISOSORBIDE MONONITRATE 60 MG/1
60 TABLET, EXTENDED RELEASE ORAL
Status: DISCONTINUED | OUTPATIENT
Start: 2024-09-26 | End: 2024-09-28

## 2024-09-26 RX ORDER — HYDRALAZINE HYDROCHLORIDE 20 MG/ML
5 INJECTION INTRAMUSCULAR; INTRAVENOUS EVERY 6 HOURS PRN
Status: DISCONTINUED | OUTPATIENT
Start: 2024-09-26 | End: 2024-09-27

## 2024-09-26 RX ORDER — ISOSORBIDE MONONITRATE 60 MG/1
60 TABLET, EXTENDED RELEASE ORAL
Qty: 90 TABLET | Refills: 1 | Status: SHIPPED | OUTPATIENT
Start: 2024-09-26 | End: 2024-09-29 | Stop reason: HOSPADM

## 2024-09-26 RX ORDER — ONDANSETRON HYDROCHLORIDE 2 MG/ML
4 INJECTION, SOLUTION INTRAVENOUS EVERY 8 HOURS PRN
Status: DISCONTINUED | OUTPATIENT
Start: 2024-09-26 | End: 2024-09-29 | Stop reason: HOSPADM

## 2024-09-26 RX ORDER — DULOXETIN HYDROCHLORIDE 30 MG/1
30 CAPSULE, DELAYED RELEASE ORAL
Status: DISCONTINUED | OUTPATIENT
Start: 2024-09-27 | End: 2024-09-29 | Stop reason: HOSPADM

## 2024-09-26 RX ORDER — CARVEDILOL 6.25 MG/1
6.25 TABLET ORAL
Status: DISCONTINUED | OUTPATIENT
Start: 2024-09-27 | End: 2024-09-26

## 2024-09-26 RX ORDER — ONDANSETRON 4 MG/1
4 TABLET, FILM COATED ORAL EVERY 8 HOURS PRN
Status: DISCONTINUED | OUTPATIENT
Start: 2024-09-26 | End: 2024-09-29 | Stop reason: HOSPADM

## 2024-09-26 RX ORDER — FERROUS SULFATE 325(65) MG
65 TABLET ORAL
Status: DISCONTINUED | OUTPATIENT
Start: 2024-09-27 | End: 2024-09-29 | Stop reason: HOSPADM

## 2024-09-26 RX ORDER — CARVEDILOL 6.25 MG/1
6.25 TABLET ORAL
Status: DISCONTINUED | OUTPATIENT
Start: 2024-09-26 | End: 2024-09-27

## 2024-09-26 RX ORDER — CLOPIDOGREL BISULFATE 75 MG/1
75 TABLET ORAL DAILY
Qty: 90 TABLET | Refills: 1 | Status: SHIPPED | OUTPATIENT
Start: 2024-09-26

## 2024-09-26 RX ORDER — LOSARTAN POTASSIUM 100 MG/1
100 TABLET ORAL DAILY
Status: DISCONTINUED | OUTPATIENT
Start: 2024-09-27 | End: 2024-09-26

## 2024-09-26 RX ORDER — HEPARIN SODIUM 5000 [USP'U]/ML
INJECTION, SOLUTION INTRAVENOUS; SUBCUTANEOUS AS NEEDED
Status: DISCONTINUED | OUTPATIENT
Start: 2024-09-26 | End: 2024-09-27

## 2024-09-26 RX ORDER — POLYETHYLENE GLYCOL 3350 17 G/17G
17 POWDER, FOR SOLUTION ORAL DAILY PRN
Status: DISCONTINUED | OUTPATIENT
Start: 2024-09-26 | End: 2024-09-29 | Stop reason: HOSPADM

## 2024-09-26 RX ORDER — GUAIFENESIN 600 MG/1
600 TABLET, EXTENDED RELEASE ORAL EVERY 12 HOURS PRN
Status: DISCONTINUED | OUTPATIENT
Start: 2024-09-26 | End: 2024-09-29 | Stop reason: HOSPADM

## 2024-09-26 RX ORDER — ACETAMINOPHEN 650 MG/1
650 SUPPOSITORY RECTAL EVERY 4 HOURS PRN
Status: DISCONTINUED | OUTPATIENT
Start: 2024-09-26 | End: 2024-09-29 | Stop reason: HOSPADM

## 2024-09-26 RX ORDER — POTASSIUM CHLORIDE 14.9 MG/ML
20 INJECTION INTRAVENOUS
Status: COMPLETED | OUTPATIENT
Start: 2024-09-26 | End: 2024-09-27

## 2024-09-26 RX ORDER — HYDROXYCHLOROQUINE SULFATE 200 MG/1
200 TABLET, FILM COATED ORAL DAILY
Status: DISCONTINUED | OUTPATIENT
Start: 2024-09-27 | End: 2024-09-29 | Stop reason: HOSPADM

## 2024-09-26 RX ORDER — HEPARIN SODIUM 5000 [USP'U]/ML
60 INJECTION, SOLUTION INTRAVENOUS; SUBCUTANEOUS ONCE
Status: COMPLETED | OUTPATIENT
Start: 2024-09-26 | End: 2024-09-26

## 2024-09-26 RX ORDER — ASCORBIC ACID 250 MG
250 TABLET ORAL DAILY
Status: DISCONTINUED | OUTPATIENT
Start: 2024-09-27 | End: 2024-09-29 | Stop reason: HOSPADM

## 2024-09-26 RX ORDER — GUAIFENESIN/DEXTROMETHORPHAN 100-10MG/5
5 SYRUP ORAL EVERY 4 HOURS PRN
Status: DISCONTINUED | OUTPATIENT
Start: 2024-09-26 | End: 2024-09-29 | Stop reason: HOSPADM

## 2024-09-26 RX ORDER — ATORVASTATIN CALCIUM 80 MG/1
80 TABLET, FILM COATED ORAL NIGHTLY
Status: DISCONTINUED | OUTPATIENT
Start: 2024-09-26 | End: 2024-09-29 | Stop reason: HOSPADM

## 2024-09-26 RX ORDER — FAMOTIDINE 10 MG/ML
20 INJECTION INTRAVENOUS ONCE
Status: COMPLETED | OUTPATIENT
Start: 2024-09-26 | End: 2024-09-26

## 2024-09-26 RX ORDER — LOSARTAN POTASSIUM 100 MG/1
100 TABLET ORAL DAILY
Status: DISCONTINUED | OUTPATIENT
Start: 2024-09-26 | End: 2024-09-29 | Stop reason: HOSPADM

## 2024-09-26 RX ORDER — POTASSIUM CHLORIDE 20 MEQ/1
20 TABLET, EXTENDED RELEASE ORAL DAILY
Status: DISCONTINUED | OUTPATIENT
Start: 2024-09-27 | End: 2024-09-29 | Stop reason: HOSPADM

## 2024-09-26 RX ORDER — VERAPAMIL HYDROCHLORIDE 180 MG/1
180 TABLET, EXTENDED RELEASE ORAL DAILY
Status: DISCONTINUED | OUTPATIENT
Start: 2024-09-27 | End: 2024-09-27

## 2024-09-26 RX ORDER — POTASSIUM CHLORIDE 1.5 G/1.58G
40 POWDER, FOR SOLUTION ORAL ONCE
Status: COMPLETED | OUTPATIENT
Start: 2024-09-26 | End: 2024-09-26

## 2024-09-26 RX ORDER — ACETAMINOPHEN 160 MG/5ML
650 SOLUTION ORAL EVERY 4 HOURS PRN
Status: DISCONTINUED | OUTPATIENT
Start: 2024-09-26 | End: 2024-09-29 | Stop reason: HOSPADM

## 2024-09-26 RX ORDER — SULFASALAZINE 500 MG/1
500 TABLET ORAL 2 TIMES DAILY
Status: DISCONTINUED | OUTPATIENT
Start: 2024-09-26 | End: 2024-09-29 | Stop reason: HOSPADM

## 2024-09-26 RX ORDER — ACETAMINOPHEN 325 MG/1
650 TABLET ORAL EVERY 4 HOURS PRN
Status: DISCONTINUED | OUTPATIENT
Start: 2024-09-26 | End: 2024-09-29 | Stop reason: HOSPADM

## 2024-09-26 RX ORDER — HEPARIN SODIUM 10000 [USP'U]/100ML
0-4000 INJECTION, SOLUTION INTRAVENOUS CONTINUOUS
Status: DISCONTINUED | OUTPATIENT
Start: 2024-09-26 | End: 2024-09-27

## 2024-09-26 SDOH — ECONOMIC STABILITY: HOUSING INSECURITY: AT ANY TIME IN THE PAST 12 MONTHS, WERE YOU HOMELESS OR LIVING IN A SHELTER (INCLUDING NOW)?: NO

## 2024-09-26 SDOH — ECONOMIC STABILITY: INCOME INSECURITY: HOW HARD IS IT FOR YOU TO PAY FOR THE VERY BASICS LIKE FOOD, HOUSING, MEDICAL CARE, AND HEATING?: NOT VERY HARD

## 2024-09-26 SDOH — ECONOMIC STABILITY: INCOME INSECURITY: IN THE LAST 12 MONTHS, WAS THERE A TIME WHEN YOU WERE NOT ABLE TO PAY THE MORTGAGE OR RENT ON TIME?: NO

## 2024-09-26 SDOH — HEALTH STABILITY: MENTAL HEALTH: HOW OFTEN DO YOU HAVE A DRINK CONTAINING ALCOHOL?: MONTHLY OR LESS

## 2024-09-26 SDOH — SOCIAL STABILITY: SOCIAL INSECURITY: ARE YOU OR HAVE YOU BEEN THREATENED OR ABUSED PHYSICALLY, EMOTIONALLY, OR SEXUALLY BY ANYONE?: NO

## 2024-09-26 SDOH — SOCIAL STABILITY: SOCIAL INSECURITY: WITHIN THE LAST YEAR, HAVE YOU BEEN AFRAID OF YOUR PARTNER OR EX-PARTNER?: NO

## 2024-09-26 SDOH — SOCIAL STABILITY: SOCIAL INSECURITY: ARE THERE ANY APPARENT SIGNS OF INJURIES/BEHAVIORS THAT COULD BE RELATED TO ABUSE/NEGLECT?: NO

## 2024-09-26 SDOH — SOCIAL STABILITY: SOCIAL INSECURITY: WITHIN THE LAST YEAR, HAVE YOU BEEN HUMILIATED OR EMOTIONALLY ABUSED IN OTHER WAYS BY YOUR PARTNER OR EX-PARTNER?: NO

## 2024-09-26 SDOH — ECONOMIC STABILITY: FOOD INSECURITY: WITHIN THE PAST 12 MONTHS, YOU WORRIED THAT YOUR FOOD WOULD RUN OUT BEFORE YOU GOT MONEY TO BUY MORE.: NEVER TRUE

## 2024-09-26 SDOH — ECONOMIC STABILITY: INCOME INSECURITY: IN THE PAST 12 MONTHS, HAS THE ELECTRIC, GAS, OIL, OR WATER COMPANY THREATENED TO SHUT OFF SERVICE IN YOUR HOME?: NO

## 2024-09-26 SDOH — SOCIAL STABILITY: SOCIAL INSECURITY: HAS ANYONE EVER THREATENED TO HURT YOUR FAMILY OR YOUR PETS?: NO

## 2024-09-26 SDOH — ECONOMIC STABILITY: FOOD INSECURITY: WITHIN THE PAST 12 MONTHS, THE FOOD YOU BOUGHT JUST DIDN'T LAST AND YOU DIDN'T HAVE MONEY TO GET MORE.: NEVER TRUE

## 2024-09-26 SDOH — SOCIAL STABILITY: SOCIAL INSECURITY: HAVE YOU HAD THOUGHTS OF HARMING ANYONE ELSE?: NO

## 2024-09-26 SDOH — HEALTH STABILITY: MENTAL HEALTH: HOW OFTEN DO YOU HAVE 6 OR MORE DRINKS ON ONE OCCASION?: NEVER

## 2024-09-26 SDOH — SOCIAL STABILITY: SOCIAL INSECURITY: DOES ANYONE TRY TO KEEP YOU FROM HAVING/CONTACTING OTHER FRIENDS OR DOING THINGS OUTSIDE YOUR HOME?: NO

## 2024-09-26 SDOH — HEALTH STABILITY: MENTAL HEALTH: HOW MANY STANDARD DRINKS CONTAINING ALCOHOL DO YOU HAVE ON A TYPICAL DAY?: 1 OR 2

## 2024-09-26 SDOH — SOCIAL STABILITY: SOCIAL INSECURITY: HAVE YOU HAD ANY THOUGHTS OF HARMING ANYONE ELSE?: NO

## 2024-09-26 SDOH — SOCIAL STABILITY: SOCIAL INSECURITY: DO YOU FEEL UNSAFE GOING BACK TO THE PLACE WHERE YOU ARE LIVING?: NO

## 2024-09-26 SDOH — SOCIAL STABILITY: SOCIAL INSECURITY: WERE YOU ABLE TO COMPLETE ALL THE BEHAVIORAL HEALTH SCREENINGS?: YES

## 2024-09-26 SDOH — SOCIAL STABILITY: SOCIAL INSECURITY: DO YOU FEEL ANYONE HAS EXPLOITED OR TAKEN ADVANTAGE OF YOU FINANCIALLY OR OF YOUR PERSONAL PROPERTY?: NO

## 2024-09-26 SDOH — ECONOMIC STABILITY: HOUSING INSECURITY: IN THE PAST 12 MONTHS, HOW MANY TIMES HAVE YOU MOVED WHERE YOU WERE LIVING?: 0

## 2024-09-26 SDOH — SOCIAL STABILITY: SOCIAL INSECURITY: ABUSE: ADULT

## 2024-09-26 ASSESSMENT — LIFESTYLE VARIABLES
AUDIT-C TOTAL SCORE: 1
SKIP TO QUESTIONS 9-10: 1
HOW MANY STANDARD DRINKS CONTAINING ALCOHOL DO YOU HAVE ON A TYPICAL DAY: 1 OR 2
HOW OFTEN DO YOU HAVE 6 OR MORE DRINKS ON ONE OCCASION: NEVER
AUDIT-C TOTAL SCORE: 1
SKIP TO QUESTIONS 9-10: 1
AUDIT-C TOTAL SCORE: 1
HOW OFTEN DO YOU HAVE A DRINK CONTAINING ALCOHOL: MONTHLY OR LESS

## 2024-09-26 ASSESSMENT — COGNITIVE AND FUNCTIONAL STATUS - GENERAL
STANDING UP FROM CHAIR USING ARMS: A LITTLE
TOILETING: A LITTLE
PATIENT BASELINE BEDBOUND: NO
CLIMB 3 TO 5 STEPS WITH RAILING: A LITTLE
DAILY ACTIVITIY SCORE: 23
WALKING IN HOSPITAL ROOM: A LITTLE
MOBILITY SCORE: 21

## 2024-09-26 ASSESSMENT — ACTIVITIES OF DAILY LIVING (ADL)
LACK_OF_TRANSPORTATION: NO
PATIENT'S MEMORY ADEQUATE TO SAFELY COMPLETE DAILY ACTIVITIES?: YES
FEEDING YOURSELF: INDEPENDENT
BATHING: INDEPENDENT
WALKS IN HOME: INDEPENDENT
HEARING - RIGHT EAR: HEARING AID
GROOMING: INDEPENDENT
TOILETING: NEEDS ASSISTANCE
ADEQUATE_TO_COMPLETE_ADL: YES
JUDGMENT_ADEQUATE_SAFELY_COMPLETE_DAILY_ACTIVITIES: YES
HEARING - LEFT EAR: HEARING AID
DRESSING YOURSELF: INDEPENDENT

## 2024-09-26 ASSESSMENT — PATIENT HEALTH QUESTIONNAIRE - PHQ9
SUM OF ALL RESPONSES TO PHQ9 QUESTIONS 1 & 2: 0
1. LITTLE INTEREST OR PLEASURE IN DOING THINGS: NOT AT ALL
2. FEELING DOWN, DEPRESSED OR HOPELESS: NOT AT ALL

## 2024-09-26 ASSESSMENT — COLUMBIA-SUICIDE SEVERITY RATING SCALE - C-SSRS
1. IN THE PAST MONTH, HAVE YOU WISHED YOU WERE DEAD OR WISHED YOU COULD GO TO SLEEP AND NOT WAKE UP?: NO
6. HAVE YOU EVER DONE ANYTHING, STARTED TO DO ANYTHING, OR PREPARED TO DO ANYTHING TO END YOUR LIFE?: NO
2. HAVE YOU ACTUALLY HAD ANY THOUGHTS OF KILLING YOURSELF?: NO

## 2024-09-26 ASSESSMENT — PAIN - FUNCTIONAL ASSESSMENT
PAIN_FUNCTIONAL_ASSESSMENT: 0-10
PAIN_FUNCTIONAL_ASSESSMENT: 0-10

## 2024-09-26 ASSESSMENT — PAIN SCALES - GENERAL
PAINLEVEL_OUTOF10: 0 - NO PAIN
PAINLEVEL_OUTOF10: 0 - NO PAIN

## 2024-09-26 NOTE — Clinical Note
The right radial pulse is 1+. [Normal] : alert, normal voice/communication, healthy appearing, no acute distress [Sclera] : the sclera and conjunctiva were normal [No Respiratory Distress] : no respiratory distress [No Acc Muscle Use] : no accessory muscle use [Alert] : alert [Healthy Appearing] : healthy appearing [Hearing Threshold Finger Rub Not [place]] : hearing was normal [Normal Appearance] : the appearance of the neck was normal [Heart Rate And Rhythm] : heart rate was normal and rhythm regular [Normal S1, S2] : normal S1 and S2 [Bowel Sounds] : normal bowel sounds [Abdomen Tenderness] : non-tender [No Masses] : no abdominal mass palpated [Abdomen Soft] : soft [Abnormal Walk] : normal gait [No Focal Deficits] : no focal deficits [Oriented To Time, Place, And Person] : oriented to person, place, and time [No External Hemorrhoid] : no external hemorrhoids [de-identified] : No blood seen

## 2024-09-26 NOTE — Clinical Note
Inflation 1: Pressure = 15 kedar; Duration = 20 sec. Inflation 2: Pressure = 14 kedar; Duration = 14 sec. Inflation 3: Pressure = 16 kedar; Duration = 20 sec.

## 2024-09-26 NOTE — Clinical Note
Closure device placed in the right radial artery. Site closed by radial compression system. 16cc [id number]

## 2024-09-26 NOTE — ED PROVIDER NOTES
[facility]   Chief Complaint   Patient presents with   • Chest Pain     Chest pain with sob and weakness since this afternoon,. Sate she feels lightheaded.        HPI  Patient is an 89-year-old female with history of NSTEMI, asthma, A-fib, CAD who presents ED for chest pain that started this afternoon.  Patient reports associated shortness of breath and weakness.  She states her shortness of breath is exertional.  Her chest pain is substernal, constant, does not radiate.  Patient is well-appearing and in no acute distress.  Not diaphoretic.  Patient denies any recent illness, fever chills, cough or congestion, abdominal pain or NVD, urinary symptoms.  She denies any recent hospitalizations or surgeries.  Denies any recent travel or sick contacts.      Patient History   Past Medical History:   Diagnosis Date   • Acute non-ST segment elevation myocardial infarction (Multi) 11/26/2023   • Anemia    • Arteriosclerosis of coronary artery 05/14/2023   • Arthritis    • Asthma ([facility])    • Atrial fibrillation (Multi) 11/26/2023   • Jonas esophagus    • Bilateral carotid artery occlusion 05/23/2023   • CAD (coronary artery disease)    • Cardiac rhythm disorder or disturbance or change 11/26/2023   • Cervical radiculopathy    • Chest pain 11/26/2023   • Chronic obstructive pulmonary disease (Multi) 11/26/2023   • Constipation    • Coronary artery disease 11/26/2023   • Degenerative joint disease    • Dyslipidemia    • Dysuria    • Erosive esophagitis    • GERD (gastroesophageal reflux disease)    • Hypercholesterolemia 05/23/2023   • Hyperparathyroidism (Multi)    • Hypertensive urgency 11/26/2023   • Impaired fasting glucose 11/26/2023   • Irritable bowel syndrome (IBS)    • Labile essential hypertension 11/26/2023   • Labile hypertension    • Left foot pain    • Low blood pressure 11/26/2023   • Macular degeneration    • Mixed hyperlipidemia 11/26/2023   • Neck pain    • Osteoporosis 11/26/2023    REclast '19 Cr 1.4 switch to  calcitonin.   • Paroxysmal atrial fibrillation (Multi)    • Polyarthritis with negative rheumatoid factor (Multi)    • Post menopausal syndrome    • Rapid atrial fibrillation (Multi) 05/14/2023   • Spinal stenosis    • Stage 3 chronic kidney disease (Multi) 11/26/2023   • Stroke (Multi)    • Transient ischemic attack 11/26/2023     Past Surgical History:   Procedure Laterality Date   • CARDIOVASCULAR STRESS TEST  2017    Dr. Khalil   • CARDIOVASCULAR STRESS TEST  2004    Dr. Pantoja   • CARPAL TUNNEL RELEASE Left 2014   • CATARACT EXTRACTION Bilateral 2011   • CHOLECYSTECTOMY     • CORONARY STENT PLACEMENT  05/2023    [facility]   • CT HEAD ANGIO W AND WO IV CONTRAST  05/08/2013    CT HEAD ANGIO W AND WO IV CONTRAST [facility]   • MR HEAD ANGIO WO IV CONTRAST  04/23/2013    MR HEAD ANGIO WO IV CONTRAST [facility]   • MR HEAD ANGIO WO IV CONTRAST  07/20/2016    MR HEAD ANGIO WO IV CONTRAST [facility]   • MR HEAD ANGIO WO IV CONTRAST  10/27/2023    MR HEAD ANGIO WO IV CONTRAST 10/27/2023 [facility]   • MR NECK ANGIO WO IV CONTRAST  10/27/2023    MR NECK ANGIO WO IV CONTRAST 10/27/2023 [facility] MRI   • OTHER SURGICAL HISTORY  02/14/2022    No history of surgery   • REVISION TOTAL HIP ARTHROPLASTY Left 2013    Conversion from left ORIF   • TOTAL HIP ARTHROPLASTY Right 2016     Family History   Problem Relation Name Age of Onset   • No Known Problems Mother     • No Known Problems Father     • No Known Problems Sister       Social History     Tobacco Use   • Smoking status: Never     Passive exposure: Never   • Smokeless tobacco: Never   Vaping Use   • Vaping status: Never Used   Substance Use Topics   • Alcohol use: Yes   • Drug use: Never       Physical Exam   ED Triage Vitals   Temperature Heart Rate Respirations BP   09/26/24 1424 09/26/24 1424 09/26/24 1424 09/26/24 1424   36.3 °C (97.3 °F) (!) 109 18 (!) 155/93      Pulse Ox Temp Source Heart Rate Source Patient Position   09/26/24 1424 09/26/24 1424  09/26/24 1530 09/26/24 1424   97 % Oral Monitor Lying      BP Location FiO2 (%)     09/26/24 1424 --     Right arm        Physical Exam  Vitals reviewed.   Constitutional:       General: She is not in acute distress.     Appearance: Normal appearance. She is not ill-appearing.   HENT:      Head: Normocephalic and atraumatic.   Eyes:      Extraocular Movements: Extraocular movements intact.   Cardiovascular:      Rate and Rhythm: Regular rhythm.      Heart sounds: Normal heart sounds.   Pulmonary:      Effort: Pulmonary effort is normal.      Breath sounds: Normal breath sounds.   Abdominal:      General: Abdomen is flat.      Palpations: Abdomen is soft.      Tenderness: There is no abdominal tenderness.   Musculoskeletal:         General: Normal range of motion.      Cervical back: Normal range of motion and neck supple.   Skin:     General: Skin is warm and dry.   Neurological:      General: No focal deficit present.      Mental Status: She is alert and oriented to person, place, and time.   Psychiatric:         Mood and Affect: Mood normal.         Behavior: Behavior normal.           ED Course & MDM   Diagnoses as of 09/26/24 1830   Hypokalemia   Chest pain, unspecified type                 No data recorded     Hilmar Coma Scale Score: 15 (09/26/24 1426 : Patience Banks, ANGELICA)                           Medical Decision Making  Parts of this chart have been completed using voice recognition software. Please excuse any errors of transcription.  My thought process and reason for plan has been formulated from the time that I saw the patient until the time of disposition and is not specific to one specific moment during their visit and furthermore my MDM encompasses this entire chart and not only this text box.    HPI:   Detailed above.    Exam:   A medically appropriate exam performed, outlined above, given the known history and presentation.    History obtained from:   Patient, EMR    EKG/Cardiac monitor:    Interpreted by attending physician, see their note for ED course for more detail.    Social Determinants of Health considered during this visit:   Housing, Family or social support    Medications given during visit:  Medications   potassium chloride (Klor-Con) packet 40 mEq (has no administration in time range)   potassium chloride 20 mEq in sterile water for injection 100 mL (has no administration in time range)   aspirin tablet 325 mg (325 mg oral Given 9/26/24 1601)   famotidine PF (Pepcid) injection 20 mg (20 mg intravenous Given 9/26/24 1601)   sodium chloride 0.9 % bolus 500 mL (500 mL intravenous New Bag 9/26/24 1600)        Diagnostic/tests:  Labs Reviewed   CBC WITH AUTO DIFFERENTIAL - Abnormal       Result Value    WBC 5.1      nRBC 0.0      RBC 3.65 (*)     Hemoglobin 10.5 (*)     Hematocrit 33.3 (*)     MCV 91      MCH 28.8      MCHC 31.5 (*)     RDW 15.5 (*)     Platelets 247      Neutrophils % 70.7      Immature Granulocytes %, Automated 0.2      Lymphocytes % 17.2      Monocytes % 10.3      Eosinophils % 1.0      Basophils % 0.6      Neutrophils Absolute 3.58      Immature Granulocytes Absolute, Automated 0.01      Lymphocytes Absolute 0.87      Monocytes Absolute 0.52      Eosinophils Absolute 0.05      Basophils Absolute 0.03     B-TYPE NATRIURETIC PEPTIDE - Abnormal     (*)     Narrative:        <100 pg/mL - Heart failure unlikely  100-299 pg/mL - Intermediate probability of acute heart                  failure exacerbation. Correlate with clinical                  context and patient history.    >=300 pg/mL - Heart Failure likely. Correlate with clinical                  context and patient history.    BNP testing is performed using different testing methodology at [facility] than at other [facility] hospitals. Direct result comparisons should only be made within the same method.      SERIAL TROPONIN-INITIAL - Abnormal    Troponin I, High Sensitivity 145 (*)     Narrative:      Less than 99th percentile of normal range cutoff-  Female and children under 18 years old <14 ng/L; Male <21 ng/L: Negative  Repeat testing should be performed if clinically indicated.     Female and children under 18 years old 14-50 ng/L; Male 21-50 ng/L:  Consistent with possible cardiac damage and possible increased clinical   risk. Serial measurements may help to assess extent of myocardial damage.     >50 ng/L: Consistent with cardiac damage, increased clinical risk and  myocardial infarction. Serial measurements may help assess extent of   myocardial damage.      NOTE: Children less than 1 year old may have higher baseline troponin   levels and results should be interpreted in conjunction with the overall   clinical context.     NOTE: Troponin I testing is performed using a different   testing methodology at [facility] than at other   [facility]. Direct result comparisons should only   be made within the same method.   SERIAL TROPONIN, 1 HOUR - Abnormal    Troponin I, High Sensitivity 118 (*)     Narrative:     Less than 99th percentile of normal range cutoff-  Female and children under 18 years old <14 ng/L; Male <21 ng/L: Negative  Repeat testing should be performed if clinically indicated.     Female and children under 18 years old 14-50 ng/L; Male 21-50 ng/L:  Consistent with possible cardiac damage and possible increased clinical   risk. Serial measurements may help to assess extent of myocardial damage.     >50 ng/L: Consistent with cardiac damage, increased clinical risk and  myocardial infarction. Serial measurements may help assess extent of   myocardial damage.      NOTE: Children less than 1 year old may have higher baseline troponin   levels and results should be interpreted in conjunction with the overall   clinical context.     NOTE: Troponin I testing is performed using a different   testing methodology at [facility] than at other   [facility]. Direct  result comparisons should only   be made within the same method.   COMPREHENSIVE METABOLIC PANEL - Abnormal    Glucose 139 (*)     Sodium 145      Potassium 2.1 (*)     Chloride 115 (*)     Bicarbonate 22      Anion Gap 10      Urea Nitrogen 7      Creatinine 0.51      eGFR 89      Calcium 6.7 (*)     Albumin 2.8 (*)     Alkaline Phosphatase 55      Total Protein 4.5 (*)     AST 15      Bilirubin, Total 0.3      ALT 11     SARS-COV-2 PCR - Normal    Coronavirus 2019, PCR Not Detected      Narrative:     This assay has received FDA Emergency Use Authorization (EUA) and is only authorized for the duration of time that circumstances exist to justify the authorization of the emergency use of in vitro diagnostic tests for the detection of SARS-CoV-2 virus and/or diagnosis of COVID-19 infection under section 564(b)(1) of the Act, 21 U.S.C. 360bbb-3(b)(1). This assay is an in vitro diagnostic nucleic acid amplification test for the qualitative detection of SARS-CoV-2 from nasopharyngeal specimens and has been validated for use at [facility]. Negative results do not preclude COVID-19 infections and should not be used as the sole basis for diagnosis, treatment, or other management decisions.     INFLUENZA A AND B PCR - Normal    Flu A Result Not Detected      Flu B Result Not Detected      Narrative:     This assay is an in vitro diagnostic multiplex nucleic acid amplification test for the detection and discrimination of Influenza A & B from nasopharyngeal specimens, and has been validated for use at [facility]. Negative results do not preclude Influenza A/B infections, and should not be used as the sole basis for diagnosis, treatment, or other management decisions. If Influenza A/B and RSV PCR results are negative, testing for Parainfluenza virus, Adenovirus and Metapneumovirus is routinely performed for [facility] pediatric oncology and intensive care inpatients, and is available on  other patients by placing an add-on request.   TROPONIN SERIES- (INITIAL, 1 HR)    Narrative:     The following orders were created for panel order Troponin I Series, High Sensitivity (0, 1 HR).  Procedure                               Abnormality         Status                     ---------                               -----------         ------                     Troponin I, High Sensiti...[[id number]]  Abnormal            Final result               Troponin, High Sensitivi...[[id number]]  Abnormal            Final result                 Please view results for these tests on the individual orders.   COMPREHENSIVE METABOLIC PANEL   URINALYSIS WITH REFLEX CULTURE AND MICROSCOPIC    Narrative:     The following orders were created for panel order Urinalysis with Reflex Culture and Microscopic.  Procedure                               Abnormality         Status                     ---------                               -----------         ------                     Urinalysis with Reflex C...[[id number]]                                                 Extra Urine Gray Tube[[id number]]                                                         Please view results for these tests on the individual orders.   URINALYSIS WITH REFLEX CULTURE AND MICROSCOPIC   EXTRA URINE GRAY TUBE      XR chest 1 view   Final Result   No acute pulmonary process.        MACRO   None        Signed by: Madhu Stanford 9/26/2024 3:24 PM   Dictation workstation:   [id number]           MDM Summary:  Patient will require admission for hypokalemia and high risk chest pain.  Potassium is 2.1, repletion initiated.  No other electrolyte abnormality.  Patient has a positive delta troponin.  Chest x-ray shows no acute findings.  Patient is pending admission at this time.    I have signed out the patient´s emergency department care to the attending physician Dr. Harden. We discussed the pertinent history, physical exam, completed/pending test results (if  applicable) and current treatment plan. Please refer to his/her chart for the patients remaining Emergency Department course and final disposition.      Procedure  Procedures     José Del Valle PA-C  09/26/24 1134

## 2024-09-27 PROBLEM — I10 ESSENTIAL HYPERTENSION: Status: ACTIVE | Noted: 2024-09-27

## 2024-09-27 LAB
ALBUMIN SERPL BCP-MCNC: 3.1 G/DL (ref 3.4–5)
ALP SERPL-CCNC: 56 U/L (ref 33–136)
ALT SERPL W P-5'-P-CCNC: 14 U/L (ref 7–45)
ANION GAP SERPL CALCULATED.3IONS-SCNC: 9 MMOL/L (ref 10–20)
APTT PPP: 52.6 SECONDS (ref 22–32.5)
AST SERPL W P-5'-P-CCNC: 19 U/L (ref 9–39)
ATRIAL RATE: 108 BPM
BASOPHILS # BLD AUTO: 0.03 X10*3/UL (ref 0–0.1)
BASOPHILS NFR BLD AUTO: 0.6 %
BILIRUB SERPL-MCNC: 0.7 MG/DL (ref 0–1.2)
BUN SERPL-MCNC: 8 MG/DL (ref 6–23)
CA-I BLD-SCNC: 1.26 MMOL/L (ref 1.1–1.33)
CALCIUM SERPL-MCNC: 9 MG/DL (ref 8.6–10.3)
CHLORIDE SERPL-SCNC: 112 MMOL/L (ref 98–107)
CO2 SERPL-SCNC: 26 MMOL/L (ref 21–32)
CREAT SERPL-MCNC: 0.54 MG/DL (ref 0.5–1.05)
EGFRCR SERPLBLD CKD-EPI 2021: 88 ML/MIN/1.73M*2
EOSINOPHIL # BLD AUTO: 0.08 X10*3/UL (ref 0–0.4)
EOSINOPHIL NFR BLD AUTO: 1.5 %
ERYTHROCYTE [DISTWIDTH] IN BLOOD BY AUTOMATED COUNT: 15.5 % (ref 11.5–14.5)
GLUCOSE SERPL-MCNC: 117 MG/DL (ref 74–99)
HCT VFR BLD AUTO: 29 % (ref 36–46)
HGB BLD-MCNC: 8.9 G/DL (ref 12–16)
IMM GRANULOCYTES # BLD AUTO: 0.01 X10*3/UL (ref 0–0.5)
IMM GRANULOCYTES NFR BLD AUTO: 0.2 % (ref 0–0.9)
LYMPHOCYTES # BLD AUTO: 1.36 X10*3/UL (ref 0.8–3)
LYMPHOCYTES NFR BLD AUTO: 25 %
MAGNESIUM SERPL-MCNC: 1.99 MG/DL (ref 1.6–2.4)
MCH RBC QN AUTO: 28.3 PG (ref 26–34)
MCHC RBC AUTO-ENTMCNC: 30.7 G/DL (ref 32–36)
MCV RBC AUTO: 92 FL (ref 80–100)
MONOCYTES # BLD AUTO: 0.54 X10*3/UL (ref 0.05–0.8)
MONOCYTES NFR BLD AUTO: 9.9 %
NEUTROPHILS # BLD AUTO: 3.41 X10*3/UL (ref 1.6–5.5)
NEUTROPHILS NFR BLD AUTO: 62.8 %
NRBC BLD-RTO: 0 /100 WBCS (ref 0–0)
P AXIS: 77 DEGREES
P OFFSET: 201 MS
P ONSET: 138 MS
PLATELET # BLD AUTO: 233 X10*3/UL (ref 150–450)
POTASSIUM SERPL-SCNC: 3.5 MMOL/L (ref 3.5–5.3)
PR INTERVAL: 150 MS
PROT SERPL-MCNC: 5.1 G/DL (ref 6.4–8.2)
Q ONSET: 213 MS
QRS COUNT: 18 BEATS
QRS DURATION: 96 MS
QT INTERVAL: 368 MS
QTC CALCULATION(BAZETT): 493 MS
QTC FREDERICIA: 447 MS
R AXIS: 45 DEGREES
RBC # BLD AUTO: 3.14 X10*6/UL (ref 4–5.2)
SODIUM SERPL-SCNC: 143 MMOL/L (ref 136–145)
T AXIS: 34 DEGREES
T OFFSET: 397 MS
VENTRICULAR RATE: 108 BPM
WBC # BLD AUTO: 5.4 X10*3/UL (ref 4.4–11.3)

## 2024-09-27 PROCEDURE — 2780000003 HC OR 278 NO HCPCS: Performed by: INTERNAL MEDICINE

## 2024-09-27 PROCEDURE — 97110 THERAPEUTIC EXERCISES: CPT | Mod: GP

## 2024-09-27 PROCEDURE — C1725 CATH, TRANSLUMIN NON-LASER: HCPCS | Performed by: INTERNAL MEDICINE

## 2024-09-27 PROCEDURE — 82330 ASSAY OF CALCIUM: CPT | Performed by: INTERNAL MEDICINE

## 2024-09-27 PROCEDURE — 99153 MOD SED SAME PHYS/QHP EA: CPT | Performed by: INTERNAL MEDICINE

## 2024-09-27 PROCEDURE — 2500000004 HC RX 250 GENERAL PHARMACY W/ HCPCS (ALT 636 FOR OP/ED): Mod: JZ | Performed by: INTERNAL MEDICINE

## 2024-09-27 PROCEDURE — 83735 ASSAY OF MAGNESIUM: CPT | Performed by: INTERNAL MEDICINE

## 2024-09-27 PROCEDURE — 99152 MOD SED SAME PHYS/QHP 5/>YRS: CPT | Performed by: INTERNAL MEDICINE

## 2024-09-27 PROCEDURE — 85025 COMPLETE CBC W/AUTO DIFF WBC: CPT | Performed by: INTERNAL MEDICINE

## 2024-09-27 PROCEDURE — 2060000001 HC INTERMEDIATE ICU ROOM DAILY

## 2024-09-27 PROCEDURE — 2500000005 HC RX 250 GENERAL PHARMACY W/O HCPCS: Performed by: INTERNAL MEDICINE

## 2024-09-27 PROCEDURE — 97161 PT EVAL LOW COMPLEX 20 MIN: CPT | Mod: GP

## 2024-09-27 PROCEDURE — 75710 ARTERY X-RAYS ARM/LEG: CPT | Performed by: INTERNAL MEDICINE

## 2024-09-27 PROCEDURE — 2500000001 HC RX 250 WO HCPCS SELF ADMINISTERED DRUGS (ALT 637 FOR MEDICARE OP): Performed by: INTERNAL MEDICINE

## 2024-09-27 PROCEDURE — C1894 INTRO/SHEATH, NON-LASER: HCPCS | Performed by: INTERNAL MEDICINE

## 2024-09-27 PROCEDURE — C1887 CATHETER, GUIDING: HCPCS | Performed by: INTERNAL MEDICINE

## 2024-09-27 PROCEDURE — 85730 THROMBOPLASTIN TIME PARTIAL: CPT | Performed by: EMERGENCY MEDICINE

## 2024-09-27 PROCEDURE — 93458 L HRT ARTERY/VENTRICLE ANGIO: CPT | Performed by: INTERNAL MEDICINE

## 2024-09-27 PROCEDURE — C9600 PERC DRUG-EL COR STENT SING: HCPCS | Mod: LC | Performed by: INTERNAL MEDICINE

## 2024-09-27 PROCEDURE — 92928 PRQ TCAT PLMT NTRAC ST 1 LES: CPT | Performed by: INTERNAL MEDICINE

## 2024-09-27 PROCEDURE — C1874 STENT, COATED/COV W/DEL SYS: HCPCS | Performed by: INTERNAL MEDICINE

## 2024-09-27 PROCEDURE — 99223 1ST HOSP IP/OBS HIGH 75: CPT | Performed by: INTERNAL MEDICINE

## 2024-09-27 PROCEDURE — 2720000007 HC OR 272 NO HCPCS: Performed by: INTERNAL MEDICINE

## 2024-09-27 PROCEDURE — 80053 COMPREHEN METABOLIC PANEL: CPT | Performed by: INTERNAL MEDICINE

## 2024-09-27 PROCEDURE — 2500000002 HC RX 250 W HCPCS SELF ADMINISTERED DRUGS (ALT 637 FOR MEDICARE OP, ALT 636 FOR OP/ED): Performed by: INTERNAL MEDICINE

## 2024-09-27 PROCEDURE — 2500000004 HC RX 250 GENERAL PHARMACY W/ HCPCS (ALT 636 FOR OP/ED): Performed by: INTERNAL MEDICINE

## 2024-09-27 PROCEDURE — 2550000001 HC RX 255 CONTRASTS: Performed by: INTERNAL MEDICINE

## 2024-09-27 PROCEDURE — C1769 GUIDE WIRE: HCPCS | Performed by: INTERNAL MEDICINE

## 2024-09-27 PROCEDURE — 36415 COLL VENOUS BLD VENIPUNCTURE: CPT | Performed by: INTERNAL MEDICINE

## 2024-09-27 DEVICE — STENT ONYXNG25026UX ONYX 2.50X26RX
Type: IMPLANTABLE DEVICE | Site: HEART | Status: FUNCTIONAL
Brand: ONYX FRONTIER™

## 2024-09-27 RX ORDER — HEPARIN SODIUM 1000 [USP'U]/ML
INJECTION, SOLUTION INTRAVENOUS; SUBCUTANEOUS AS NEEDED
Status: DISCONTINUED | OUTPATIENT
Start: 2024-09-27 | End: 2024-09-27 | Stop reason: HOSPADM

## 2024-09-27 RX ORDER — CARVEDILOL 12.5 MG/1
12.5 TABLET ORAL
Status: DISCONTINUED | OUTPATIENT
Start: 2024-09-27 | End: 2024-09-28

## 2024-09-27 RX ORDER — NITROGLYCERIN 40 MG/100ML
INJECTION INTRAVENOUS AS NEEDED
Status: DISCONTINUED | OUTPATIENT
Start: 2024-09-27 | End: 2024-09-27 | Stop reason: HOSPADM

## 2024-09-27 RX ORDER — LIDOCAINE HYDROCHLORIDE 10 MG/ML
INJECTION, SOLUTION EPIDURAL; INFILTRATION; INTRACAUDAL; PERINEURAL AS NEEDED
Status: DISCONTINUED | OUTPATIENT
Start: 2024-09-27 | End: 2024-09-27 | Stop reason: HOSPADM

## 2024-09-27 RX ORDER — HYDRALAZINE HYDROCHLORIDE 20 MG/ML
10 INJECTION INTRAMUSCULAR; INTRAVENOUS ONCE
Status: COMPLETED | OUTPATIENT
Start: 2024-09-27 | End: 2024-09-27

## 2024-09-27 RX ORDER — FENTANYL CITRATE 50 UG/ML
INJECTION, SOLUTION INTRAMUSCULAR; INTRAVENOUS AS NEEDED
Status: DISCONTINUED | OUTPATIENT
Start: 2024-09-27 | End: 2024-09-27 | Stop reason: HOSPADM

## 2024-09-27 RX ORDER — ASPIRIN 81 MG/1
81 TABLET ORAL DAILY
Status: DISCONTINUED | OUTPATIENT
Start: 2024-09-28 | End: 2024-09-29 | Stop reason: HOSPADM

## 2024-09-27 RX ORDER — NITROGLYCERIN 0.4 MG/1
0.4 TABLET SUBLINGUAL EVERY 5 MIN PRN
Status: CANCELLED | OUTPATIENT
Start: 2024-09-27

## 2024-09-27 RX ORDER — PANTOPRAZOLE SODIUM 40 MG/1
40 TABLET, DELAYED RELEASE ORAL DAILY
Status: DISCONTINUED | OUTPATIENT
Start: 2024-09-28 | End: 2024-09-29 | Stop reason: HOSPADM

## 2024-09-27 RX ORDER — HYDRALAZINE HYDROCHLORIDE 20 MG/ML
INJECTION INTRAMUSCULAR; INTRAVENOUS AS NEEDED
Status: DISCONTINUED | OUTPATIENT
Start: 2024-09-27 | End: 2024-09-27 | Stop reason: HOSPADM

## 2024-09-27 RX ORDER — CLOPIDOGREL BISULFATE 75 MG/1
75 TABLET ORAL DAILY
Status: DISCONTINUED | OUTPATIENT
Start: 2024-09-28 | End: 2024-09-29 | Stop reason: HOSPADM

## 2024-09-27 RX ORDER — ATORVASTATIN CALCIUM 80 MG/1
80 TABLET, FILM COATED ORAL NIGHTLY
Status: CANCELLED | OUTPATIENT
Start: 2024-09-27

## 2024-09-27 RX ORDER — CALCIUM GLUCONATE 20 MG/ML
2 INJECTION, SOLUTION INTRAVENOUS ONCE
Status: COMPLETED | OUTPATIENT
Start: 2024-09-27 | End: 2024-09-27

## 2024-09-27 RX ORDER — IODIXANOL 320 MG/ML
INJECTION, SOLUTION INTRAVASCULAR AS NEEDED
Status: DISCONTINUED | OUTPATIENT
Start: 2024-09-27 | End: 2024-09-27 | Stop reason: HOSPADM

## 2024-09-27 RX ORDER — VERAPAMIL HYDROCHLORIDE 180 MG/1
180 TABLET, EXTENDED RELEASE ORAL DAILY
Status: DISCONTINUED | OUTPATIENT
Start: 2024-09-27 | End: 2024-09-28

## 2024-09-27 RX ORDER — MIDAZOLAM HYDROCHLORIDE 1 MG/ML
INJECTION, SOLUTION INTRAMUSCULAR; INTRAVENOUS AS NEEDED
Status: DISCONTINUED | OUTPATIENT
Start: 2024-09-27 | End: 2024-09-27 | Stop reason: HOSPADM

## 2024-09-27 RX ORDER — HYDRALAZINE HYDROCHLORIDE 20 MG/ML
10 INJECTION INTRAMUSCULAR; INTRAVENOUS EVERY 6 HOURS PRN
Status: DISCONTINUED | OUTPATIENT
Start: 2024-09-27 | End: 2024-09-29 | Stop reason: HOSPADM

## 2024-09-27 RX ORDER — NAPROXEN SODIUM 220 MG/1
81 TABLET, FILM COATED ORAL DAILY
Status: CANCELLED | OUTPATIENT
Start: 2024-09-27

## 2024-09-27 RX ORDER — SODIUM CHLORIDE 9 MG/ML
75 INJECTION, SOLUTION INTRAVENOUS CONTINUOUS
Status: DISCONTINUED | OUTPATIENT
Start: 2024-09-27 | End: 2024-09-28

## 2024-09-27 RX ORDER — NAPROXEN SODIUM 220 MG/1
TABLET, FILM COATED ORAL AS NEEDED
Status: DISCONTINUED | OUTPATIENT
Start: 2024-09-27 | End: 2024-09-27 | Stop reason: HOSPADM

## 2024-09-27 RX ORDER — MAGNESIUM SULFATE 1 G/100ML
1 INJECTION INTRAVENOUS ONCE
Status: COMPLETED | OUTPATIENT
Start: 2024-09-27 | End: 2024-09-27

## 2024-09-27 RX ORDER — VERAPAMIL HYDROCHLORIDE 2.5 MG/ML
INJECTION, SOLUTION INTRAVENOUS AS NEEDED
Status: DISCONTINUED | OUTPATIENT
Start: 2024-09-27 | End: 2024-09-27 | Stop reason: HOSPADM

## 2024-09-27 RX ORDER — CLOPIDOGREL BISULFATE 75 MG/1
TABLET ORAL AS NEEDED
Status: DISCONTINUED | OUTPATIENT
Start: 2024-09-27 | End: 2024-09-27 | Stop reason: HOSPADM

## 2024-09-27 SDOH — SOCIAL STABILITY: SOCIAL INSECURITY: WITHIN THE LAST YEAR, HAVE YOU BEEN AFRAID OF YOUR PARTNER OR EX-PARTNER?: NO

## 2024-09-27 SDOH — SOCIAL STABILITY: SOCIAL NETWORK: HOW OFTEN DO YOU ATTEND CHURCH OR RELIGIOUS SERVICES?: NEVER

## 2024-09-27 SDOH — SOCIAL STABILITY: SOCIAL INSECURITY: WITHIN THE LAST YEAR, HAVE YOU BEEN HUMILIATED OR EMOTIONALLY ABUSED IN OTHER WAYS BY YOUR PARTNER OR EX-PARTNER?: NO

## 2024-09-27 SDOH — HEALTH STABILITY: MENTAL HEALTH
HOW OFTEN DO YOU NEED TO HAVE SOMEONE HELP YOU WHEN YOU READ INSTRUCTIONS, PAMPHLETS, OR OTHER WRITTEN MATERIAL FROM YOUR DOCTOR OR PHARMACY?: NEVER

## 2024-09-27 SDOH — HEALTH STABILITY: MENTAL HEALTH: HOW OFTEN DO YOU HAVE 6 OR MORE DRINKS ON ONE OCCASION?: NEVER

## 2024-09-27 SDOH — SOCIAL STABILITY: SOCIAL NETWORK: HOW OFTEN DO YOU GET TOGETHER WITH FRIENDS OR RELATIVES?: TWICE A WEEK

## 2024-09-27 SDOH — SOCIAL STABILITY: SOCIAL NETWORK: IN A TYPICAL WEEK, HOW MANY TIMES DO YOU TALK ON THE PHONE WITH FAMILY, FRIENDS, OR NEIGHBORS?: THREE TIMES A WEEK

## 2024-09-27 SDOH — SOCIAL STABILITY: SOCIAL NETWORK: ARE YOU MARRIED, WIDOWED, DIVORCED, SEPARATED, NEVER MARRIED, OR LIVING WITH A PARTNER?: MARRIED

## 2024-09-27 SDOH — HEALTH STABILITY: PHYSICAL HEALTH: ON AVERAGE, HOW MANY MINUTES DO YOU ENGAGE IN EXERCISE AT THIS LEVEL?: 0 MIN

## 2024-09-27 SDOH — SOCIAL STABILITY: SOCIAL NETWORK: HOW OFTEN DO YOU ATTENT MEETINGS OF THE CLUB OR ORGANIZATION YOU BELONG TO?: NEVER

## 2024-09-27 SDOH — HEALTH STABILITY: MENTAL HEALTH: HOW OFTEN DO YOU HAVE A DRINK CONTAINING ALCOHOL?: MONTHLY OR LESS

## 2024-09-27 SDOH — ECONOMIC STABILITY: INCOME INSECURITY: IN THE PAST 12 MONTHS, HAS THE ELECTRIC, GAS, OIL, OR WATER COMPANY THREATENED TO SHUT OFF SERVICE IN YOUR HOME?: NO

## 2024-09-27 SDOH — HEALTH STABILITY: MENTAL HEALTH: HOW MANY STANDARD DRINKS CONTAINING ALCOHOL DO YOU HAVE ON A TYPICAL DAY?: 1 OR 2

## 2024-09-27 SDOH — HEALTH STABILITY: PHYSICAL HEALTH: ON AVERAGE, HOW MANY DAYS PER WEEK DO YOU ENGAGE IN MODERATE TO STRENUOUS EXERCISE (LIKE A BRISK WALK)?: 0 DAYS

## 2024-09-27 ASSESSMENT — PAIN DESCRIPTION - DESCRIPTORS
DESCRIPTORS: ACHING
DESCRIPTORS: ACHING

## 2024-09-27 ASSESSMENT — LIFESTYLE VARIABLES
AUDIT-C TOTAL SCORE: 1
SKIP TO QUESTIONS 9-10: 1

## 2024-09-27 ASSESSMENT — COGNITIVE AND FUNCTIONAL STATUS - GENERAL
STANDING UP FROM CHAIR USING ARMS: A LITTLE
MOBILITY SCORE: 17
STANDING UP FROM CHAIR USING ARMS: A LITTLE
DAILY ACTIVITIY SCORE: 23
MOVING FROM LYING ON BACK TO SITTING ON SIDE OF FLAT BED WITH BEDRAILS: A LITTLE
TURNING FROM BACK TO SIDE WHILE IN FLAT BAD: A LITTLE
WALKING IN HOSPITAL ROOM: A LITTLE
WALKING IN HOSPITAL ROOM: A LITTLE
MOBILITY SCORE: 21
MOVING TO AND FROM BED TO CHAIR: A LITTLE
TOILETING: A LITTLE
CLIMB 3 TO 5 STEPS WITH RAILING: A LOT
CLIMB 3 TO 5 STEPS WITH RAILING: A LITTLE

## 2024-09-27 ASSESSMENT — ACTIVITIES OF DAILY LIVING (ADL)
ADL_ASSISTANCE: INDEPENDENT
LACK_OF_TRANSPORTATION: NO

## 2024-09-27 ASSESSMENT — PAIN SCALES - GENERAL
PAINLEVEL_OUTOF10: 1
PAINLEVEL_OUTOF10: 0 - NO PAIN
PAINLEVEL_OUTOF10: 10 - WORST POSSIBLE PAIN
PAINLEVEL_OUTOF10: 10 - WORST POSSIBLE PAIN
PAINLEVEL_OUTOF10: 0 - NO PAIN

## 2024-09-27 ASSESSMENT — PAIN - FUNCTIONAL ASSESSMENT
PAIN_FUNCTIONAL_ASSESSMENT: 0-10

## 2024-09-27 ASSESSMENT — PAIN DESCRIPTION - LOCATION: LOCATION: HEAD

## 2024-09-27 NOTE — H&P
History Of Present Illness        Rakan Cervantes is a 89 y.o. female presenting with Chest Pain.      The patient has been experiencing chest pain over the past 2 to 3 days.  This has been intermittent and substernal.       Patient reports associated shortness of breath and weakness. She states her shortness of breath is exertional. Her chest pain is substernal, constant, does not radiate.       She states that she does have a history of CAD and had a heart attack with 1 stent placed in May 2023. Her cardiologist is Dr. Callaway. She states that she does not smoke or drink. She denies any history of PE or DVT. No recent travel or immobility. No recent surgery. She does have a history of hypertension and hyperlipidemia.      Vital signs in the emergency room are noted for Tmax 36.3, pulse rate 109, respiratory rate 18, /93.  Patient saturate 97% on room air.      The patient's ED diagnostic workup upon arrival to the hospital was noted for a CBC with differential that suggested a minimal anemia with an H&H of 10.5/33.3.  The patient's platelet count was stable at 247.  The WBC count was within normal range.  Rapid influenza and coronavirus testing were negative.  The patient's blood chemistry was noted for an elevated glucose of 139.  The potassium was 2.1 with a chloride of 115.  His calcium was low at 6.7 with a low albumin of 2.8.  The patient's BNP was elevated 600.  First set cardiac exam levels 145 followed by 118.  A magnesium level and TSH level were added on by me.      A chest x-ray was obtained for the patient noted for the following:      IMPRESSION:  No acute pulmonary process.      EKG with sinus tachycardia at 108 bpm, normal axis, normal voltage, and a slight inversion of the T waves in depression of the ST segment in the inferior and lateral leads .      In the ED the patient was loaded with aspirin 325 mg p.o. x 1.  She was given Pepcid 20 mg IV push x 1.  She is given potassium 40 mEq p.o. x 1.   She was given 100 cc normal saline bolus.  This was discussed with her cardiologist and decision was made to start her on a heparin drip with ED physician.  She will be kept n.p.o. past midnight for possible left heart catheterization tomorrow      Off note patient underwent a 2D echocardiogram on October 27, 2023.  This was read for the following:    CONCLUSIONS:     1. Left ventricular systolic function is normal with a 60-65% estimated ejection fraction.   2. No evidence of mitral valve regurgitation.   3. Slightly elevated RVSP.   4. Trace tricuspid regurgitation is visualized.   5. Aortic valve sclerosis.        Past Medical History        Past Medical History:   Diagnosis Date    Acute non-ST segment elevation myocardial infarction (Multi) 11/26/2023    Anemia     Arteriosclerosis of coronary artery 05/14/2023    Arthritis     Asthma ([facility])     Atrial fibrillation (Multi) 11/26/2023    Jonas esophagus     Bilateral carotid artery occlusion 05/23/2023    CAD (coronary artery disease)     Cardiac rhythm disorder or disturbance or change 11/26/2023    Cervical radiculopathy     Chest pain 11/26/2023    Chronic obstructive pulmonary disease (Multi) 11/26/2023    Constipation     Coronary artery disease 11/26/2023    Degenerative joint disease     Dyslipidemia     Dysuria     Erosive esophagitis     GERD (gastroesophageal reflux disease)     Hypercholesterolemia 05/23/2023    Hyperparathyroidism (Multi)     Hypertensive urgency 11/26/2023    Impaired fasting glucose 11/26/2023    Irritable bowel syndrome (IBS)     Labile essential hypertension 11/26/2023    Labile hypertension     Left foot pain     Low blood pressure 11/26/2023    Macular degeneration     Mixed hyperlipidemia 11/26/2023    Neck pain     Osteoporosis 11/26/2023    REclast '19 Cr 1.4 switch to calcitonin.    Paroxysmal atrial fibrillation (Multi)     Polyarthritis with negative rheumatoid factor (Multi)     Post menopausal syndrome     Rapid  atrial fibrillation (Multi) 05/14/2023    Spinal stenosis     Stage 3 chronic kidney disease (Multi) 11/26/2023    Stroke (Multi)     Transient ischemic attack 11/26/2023           Surgical History          Past Surgical History:   Procedure Laterality Date    CARDIOVASCULAR STRESS TEST  2017    Dr. Khalil    CARDIOVASCULAR STRESS TEST  2004    Dr. Pantoja    CARPAL TUNNEL RELEASE Left 2014    CATARACT EXTRACTION Bilateral 2011    CHOLECYSTECTOMY      CORONARY STENT PLACEMENT  05/2023    [facility]AD    CT HEAD ANGIO W AND WO IV CONTRAST  05/08/2013    CT HEAD ANGIO W AND WO IV CONTRAST [facility]    MR HEAD ANGIO WO IV CONTRAST  04/23/2013    MR HEAD ANGIO WO IV CONTRAST [facility]    MR HEAD ANGIO WO IV CONTRAST  07/20/2016    MR HEAD ANGIO WO IV CONTRAST [facility]    MR HEAD ANGIO WO IV CONTRAST  10/27/2023    MR HEAD ANGIO WO IV CONTRAST 10/27/2023 [facility]    MR NECK ANGIO WO IV CONTRAST  10/27/2023    MR NECK ANGIO WO IV CONTRAST 10/27/2023 [facility] MRI    OTHER SURGICAL HISTORY  02/14/2022    No history of surgery    REVISION TOTAL HIP ARTHROPLASTY Left 2013    Conversion from left ORIF    TOTAL HIP ARTHROPLASTY Right 2016            Social History      She reports that she has never smoked. She has never been exposed to tobacco smoke. She has never used smokeless tobacco. She reports current alcohol use. She reports that she does not use drugs.        Family History        Family History   Problem Relation Name Age of Onset    No Known Problems Mother      No Known Problems Father      No Known Problems Sister              Allergies        Meperidine, Morphine, Opioids - morphine analogues, Pregabalin, and Tramadol        Review of Systems      14-point ROS otherwise negative, as per HPI/Interval History.    General: No change in weight. No weakenss. No Fevers/Chills/Night Sweats   Skin: No skin/hair/nail changes. No rashes or sores.  Head:  No trauma. No Headache/nasuea/vomitting.   Eyes: No  "visual changes. No tearing. No itching.   Ears: No hearing loss. No tinnitus. No vertigo. No discharge.  Nose, Sinuses: No rhinorrhea, No nasal congestion. No epistaxis.  Mouth, Throat, Neck: No bleeding gums, hoarseness, sore throat or swollen neck  Cardiac: No palpitations. No PINTO. No PND. No Orthopnea.   Respiratory: No Shortness of Breath. No wheezing. No cough. No hemoptysis.   GI: No nausea/vomiting. No indigestion. No diarrhea. No constipation.   Extremities: No numbness or tingling. No paresthesias.   Urinary: No change in urinary frequency. No change in hesitancy. No hematuria. No incontinence.           Physical Exam        Constitutional:  Pleasant  Eyes: PERRL, EOMI,   ENMT: mucous membranes moist  Head/Neck: Neck supple, No JVD,   Respiratory/Thorax: Patent airways, CTAB,   Cardiovascular: Regular, rate and rhythm, no murmurs  Gastrointestinal: Soft, non-distended, +BS.  Musculoskeletal: ROM intact, no joint swelling, normal strength  Extremities: peripheral pulses intact; no edema  Neurological: Alert and Oriented x 3; no focal deficits; gross motor and sensation intact; CN II-XII intact. No asterixis.  Psychological: Appropriate mood and behavior  Skin: No lesions, No rashes.         Last Recorded Vitals  Blood pressure 171/78, pulse 87, temperature 36.5 °C (97.7 °F), temperature source Oral, resp. rate (!) 28, height 1.448 m (4' 9\"), weight 51 kg (112 lb 7 oz), SpO2 97%.    Relevant Results    Lab Results   Component Value Date    WBC 6.8 09/26/2024    HGB 10.2 (L) 09/26/2024    HCT 32.9 (L) 09/26/2024    MCV 93 09/26/2024     09/26/2024       Lab Results   Component Value Date    GLUCOSE 88 09/26/2024    CALCIUM 8.1 (L) 09/26/2024     09/26/2024    K 2.8 (LL) 09/26/2024    CO2 29 09/26/2024     (H) 09/26/2024    BUN 9 09/26/2024    CREATININE 0.62 09/26/2024       Lab Results   Component Value Date    HGBA1C 5.8 (H) 09/10/2024         CT head wo IV contrast    Result Date: " 10/26/2023  Interpreted By:  Sherlyn Jackson, STUDY: CT HEAD WO IV CONTRAST 10/26/2023 9:14 pm   INDICATION: dizziness, slurred speech x 30 min with history of fall 1 week earlier   COMPARISON: None available.   ACCESSION NUMBER(S): [id number]   ORDERING CLINICIAN: ODILIA HANDY   TECHNIQUE: Unenhanced axial images of the brain are performed.   FINDINGS: No ventriculomegaly is present. There is old infarct within the right parietal lobe unchanged since the prior study with an old lacunar infarct within the right insula identified as well.   There is some diminished density within the periventricular white matter indicating mild chronic microvascular ischemic disease.   There is no mass effect, intracranial hemorrhage, or extra-axial fluid collection.   Calcified plaque is seen within each carotid siphon.       Old transcortical infarct within the right parietal lobe with old lacunar infarct of right insula.   Mild chronic microvascular ischemic disease.   Signed by: Sherlyn Jackson 10/26/2023 9:28 PM Dictation workstation:   [id number]       Scheduled medications  [START ON 9/27/2024] ascorbic acid, 250 mg, oral, Daily  atorvastatin, 80 mg, oral, Nightly  carvedilol, 6.25 mg, oral, BID  [START ON 9/27/2024] DULoxetine, 30 mg, oral, Daily with evening meal  [START ON 9/27/2024] ferrous sulfate (325 mg ferrous sulfate), 65 mg of iron, oral, Daily with breakfast  heparin (porcine), 60 Units/kg, intravenous, Once  [START ON 9/27/2024] hydroxychloroquine, 200 mg, oral, Daily  isosorbide mononitrate ER, 60 mg, oral, Daily before breakfast  losartan, 100 mg, oral, Daily  pantoprazole, 40 mg, oral, BID  potassium chloride, 20 mEq, intravenous, q2h  [START ON 9/27/2024] potassium chloride CR, 20 mEq, oral, Daily  sulfaSALAzine, 500 mg, oral, BID  [START ON 9/27/2024] verapamil SR, 180 mg, oral, Daily      Continuous medications  heparin, 0-4,000 Units/hr      PRN medications  PRN medications: heparin (porcine),  hydrALAZINE        Assessment/Plan   Principal Problem:    NSTEMI (non-ST elevated myocardial infarction) (Multi)  Active Problems:    Hypokalemia    Hypercholesterolemia    Hypocalcemia    Troponin level elevated    Other chest pain    PAF (paroxysmal atrial fibrillation) (Multi)        Rakan Cervantes is a 89 y.o. Female presenting with Chest Pain.  The patient has been experiencing chest pain over the past 2 to 3 days.  This has been intermittent and substernal.  Patient admitted for further evaluation and management.          NSTEMI    Admit patient to Telemetry service   Continuous Cardiac Monitor and BP Monitor Placement   She currently denies any chest pain whatsoever.  Pain occurred earlier in the day right after 1 PM  Cardiology evaluation in AM.   Monitor Electrolytes, Keep K+>4 + Mg++>2.   EKGs noted  Will keep patient NPO  Heparin drip per Dr. Callaway  Continue High Intensity Statin in view of existing CAD   2D-Echocardiography to evaluate for LVEF, Regional Wall motion abnormalities, and any Valvular defects per Cardiology       Hypokalemia    Replace and recheck levels  Continue home potassium supplementation maintenance dose  Will add on a magnesium level      Hypocalcemia    Calcium will be replaced  Follow-up ionized calcium level in the a.m.      Elevated troponin level    Management as above    Paroxysmal atrial fibrillation    Holding patient's DOAC as she currently remains on heparin drip  Will continue rate control medication      H/o CAD s/p Stent    Continue aspirin and statin therapy      Uncontrolled hypertension    I attribute the patient's hypertensive episode in the ED secondary to her not taking her BP meds earlier today  Continue home antihypertensive medications      Dyslipidemia    Continue home statin therapy      GI + DVT prophylaxis    Home oral PPI  Heparin drip          This Dictation was Transcribed using a Nuance Dragon Voice Recognition System Device (with Compatible Computer +  Software) and as such may contain Grammatical Errors and Unintentional Typing Misprints.      I spent 34 minutes in the professional and overall care of this patient.      Layo Pantoja MD

## 2024-09-27 NOTE — PROGRESS NOTES
Physical Therapy    Physical Therapy Evaluation & Treatment    Patient Name: Rakan Cervantes  MRN: [id number]  Department: [address]  Room: 20/20  Today's Date: 9/27/2024   Time Calculation  Start Time: 1010  Stop Time: 1035  Time Calculation (min): 25 min    Assessment/Plan   PT Assessment  PT Assessment Results: Decreased strength, Decreased range of motion, Decreased endurance, Impaired balance, Decreased mobility, Decreased safety awareness, Impaired hearing  Rehab Prognosis: Good  Barriers to Discharge: weakness  Evaluation/Treatment Tolerance: Patient tolerated treatment well  Medical Staff Made Aware: Yes  Strengths: Ability to acquire knowledge, Living arrangement secure, Support of extended family/friends, Housing layout  Barriers to Participation: Comorbidities  End of Session Communication: Bedside nurse  Assessment Comment: pt required close supervision to contact guard of 1 for the above mobility; pt demonstrates decreased strength, balance, endurance, mobility ease, however pt should progress well enough for safe d/c home with intermittant assist, use of rollator, low int rehab. Pt appears to be functioning close to baseline  End of Session Patient Position: Bed, 3 rail up, Alarm on (call button in reach)   IP OR SWING BED PT PLAN  Inpatient or Swing Bed: Inpatient  PT Plan  Treatment/Interventions: Bed mobility, Transfer training, Gait training, Balance training, Strengthening, Endurance training, Range of motion, Therapeutic exercise, Therapeutic activity  PT Plan: Ongoing PT  PT Frequency: 4 times per week  PT Discharge Recommendations: Low intensity level of continued care  Equipment Recommended upon Discharge: Wheeled walker  PT Recommended Transfer Status: Contact guard, Assistive device (FWW)  PT - OK to Discharge: Yes      Subjective     General Visit Information:  General  Reason for Referral: impaired mobility; +NSTEMI  Referred By: Layo Pantoja MD  Past Medical History Relevant to Rehab: CAD  with stent, MI, HTN, COPD, OA, asthma, A-fib, osteoporosis, macular degeneration, spinal stenosis, CKD3, CVA, TIA  Family/Caregiver Present: No  Prior to Session Communication: Bedside nurse  Patient Position Received: Bed, 3 rail up, Alarm on  Preferred Learning Style: verbal, visual  General Comment: 88 yo WF admitted [facility] via ED 9/26/24 with c/o intermittant substernal CP x 2-3 days. K+ 2.1, elevated Troponins to 145, no  trending downward, placed on heparin drip. Cardiology saw pt early this morning and waiting for note however nurse cleared pt for therapy; K+now 3.5. Pt agreeable to therapy  Home Living:  Home Living  Type of Home: House  Lives With: Spouse  Home Adaptive Equipment: Walker rolling or standard, Cane, Other (Comment) (rollator, transport chair)  Home Layout: One level  Home Access: Level entry  Bathroom Shower/Tub: Tub/shower unit  Bathroom Toilet: Adaptive toilet seating  Bathroom Equipment: Grab bars in shower, Shower chair without back  Bathroom Accessibility: main floor  Home Living Comments: laundry on main floor; + med alert button  Prior Level of Function:  Prior Function Per Pt/Caregiver Report  Level of [place]: Independent with ADLs and functional transfers, Needs assistance with homemaking  Receives Help From: Family (spouse does the laundry; 2 sons provide meals, transportation;)  ADL Assistance: Independent  Homemaking Assistance: Needs assistance  Cleaning:  (has cleaning lady every 2 weeks)  Driving/Transportation:  (family/friends provide)  Ambulatory Assistance: Independent (with cane vs rollator; transport chair for community distances)  Vocational: Retired  Hand Dominance: Left  Prior Function Comments: no falls in past 6 mos; pt manages her own meds  Precautions:  Precautions  Hearing/Visual Limitations: reading glasses; robert hearing aides at home---mild [place]  Medical Precautions: Fall precautions, Cardiac precautions    Vital Signs (Past 2hrs)        Date/Time Vitals  Session Patient Position Pulse Resp SpO2 BP MAP (mmHg)    09/27/24 1010 Pre PT  Lying  61  --  97 %  --  --                        Objective   Pain:  Pain Assessment  Pain Assessment: 0-10  Cognition:  Cognition  Overall Cognitive Status: Within Functional Limits  Orientation Level: Oriented X4  Safety/Judgement:  (decreased safety insight during functional mobility)    General Assessments:  General Observation  General Observation: pleasant, cooperative, NAD               Activity Tolerance  Endurance: Decreased tolerance for upright activites  Activity Tolerance Comments: fair---PT did limit mobility until Cardiologist makes plan of care    Sensation  Light Touch: No apparent deficits    Strength  Strength Comments: robert hips 3-/5, knees 4-/5, ankles 3+/5  Coordination  Movements are Fluid and Coordinated: Yes  Heel to [place]: Intact (age appropriate)    Postural Control  Posture Comment: mild to mod forward head, protracted shldrs, mild forward flexed    Static Sitting Balance  Static Sitting-Balance Support: Feet supported  Static Sitting-Level of Assistance: Close supervision  Static Sitting-Comment/Number of Minutes: 4-5 min---good  Dynamic Sitting Balance  Dynamic Sitting-Balance Support: Feet supported  Dynamic Sitting-Level of Assistance: Close supervision  Dynamic Sitting-Comments: good -    Static Standing Balance  Static Standing-Balance Support: Bilateral upper extremity supported  Static Standing-Level of Assistance: Contact guard  Static Standing-Comment/Number of Minutes: FWW, 1-2 min---good  Dynamic Standing Balance  Dynamic Standing-Balance Support: Bilateral upper extremity supported  Dynamic Standing-Level of Assistance: Contact guard  Dynamic Standing-Comments: FWW, good -  Functional Assessments:  Bed Mobility  Bed Mobility: Yes  Bed Mobility 1  Bed Mobility 1: Supine to sitting  Level of Assistance 1: Close supervision  Bed Mobility Comments 1: head of bed elevated; limited difficulty  Bed  Mobility 2  Bed Mobility  2: Sitting to supine  Level of Assistance 2: Close supervision  Bed Mobility Comments 2: head of bed elevated; no difficulties    Transfers  Transfer: Yes  Transfer 1  Transfer From 1: Sit to  Transfer to 1: Stand  Technique 1: Sit to stand  Transfer Device 1: Walker  Transfer Level of Assistance 1: Contact guard, Minimal verbal cues  Trials/Comments 1: verbal cues for proper robert hand placement on bed  Transfers 2  Transfer From 2: Stand to  Transfer to 2: Sit  Technique 2: Stand to sit  Transfer Device 2: Walker  Transfer Level of Assistance 2: Contact guard, Minimal verbal cues  Trials/Comments 2: verbal cues for reaching back with both hands for arms of chair    Ambulation/Gait Training  Ambulation/Gait Training Performed: Yes  Ambulation/Gait Training 1  Surface 1: Level tile  Device 1: Rolling walker  Assistance 1: Contact guard  Quality of Gait 1: Diminished heel strike, Decreased step length, Forward flexed posture  Comments/Distance (ft) 1: pt amb 4 ft forward/retro x 4 sucessive trials, no loss of balance, mild SOB; O2 sat 94% with HR 66 bpm    Stairs  Stairs: No  Extremity/Trunk Assessments:  Cervical Spine   Cervical Spine:  (mild to mod forward head)  RLE   RLE :  (see above strength comments)  LLE   LLE :  (see above strength comments)  Treatments:  Therapeutic Exercise  Therapeutic Exercise Performed: Yes  Therapeutic Exercise Activity 1: supine robert AP x 30 reps  Therapeutic Exercise Activity 2: supine robert heel slides x 10 reps  Therapeutic Exercise Activity 3: supine robert hip bd/add x 10 reps  Therapeutic Exercise Activity 4: seated robert LAQ's x 15 reps  Therapeutic Exercise Activity 5: seated robert marching x 10 reps  Outcome Measures:  [facility] Basic Mobility  Turning from your back to your side while in a flat bed without using bedrails: A little  Moving from lying on your back to sitting on the side of a flat bed without using bedrails: A little  Moving to and from bed to chair  (including a wheelchair): A little  Standing up from a chair using your arms (e.g. wheelchair or bedside chair): A little  To walk in hospital room: A little  Climbing 3-5 steps with railing: A lot  Basic Mobility - Total Score: 17    Encounter Problems       Encounter Problems (Active)       Balance       STG - Maintains dynamic standing balance with upper extremity support (Progressing)       Start:  09/27/24    Expected End:  10/25/24       INTERVENTIONS:  1. Practice standing with minimal support.  2. Educate patient about standing tolerance.  3. Educate patient about independence with gait, transfers, and ADL's.  4. Educate patient about use of assistive device.  5. Educate patient about self-directed care.            Mobility       STG - Patient will ambulate 150 ft, + turns, FWW, distant supervision of 1 (Progressing)       Start:  09/27/24    Expected End:  10/25/24               PT Transfers       STG - Patient to transfer to and from sit to supine mod Ind (Progressing)       Start:  09/27/24    Expected End:  10/25/24            STG - Patient will transfer sit to and from stand mod Ind (Progressing)       Start:  09/27/24    Expected End:  10/25/24                   Education Documentation  Mobility Training, taught by Michelle Pastor, PT at 9/27/2024 10:58 AM.  Learner: Patient  Readiness: Acceptance  Method: Explanation  Response: Verbalizes Understanding    Education Comments  No comments found.

## 2024-09-27 NOTE — PROGRESS NOTES
TCC spoke to patient at bedside. Patient lives home with spouse. States their children and friends help them with grocery shopping and gets them to appointments. Patient mostly uses a cane but has rolator if needed. No reports of smoking or alcohol use. PCP is [facility] Ceron. [facility]. TCC spoke to patient, updated regarding therapy recommendations. Patient agreeable to [facility] at this time.   Will need internal referral for home health upon discharge     09/27/24 1036   Discharge Planning   Living Arrangements Spouse/significant other   Support Systems Spouse/significant other;Children;Family members;Friends/neighbors   Assistance Needed patient uses a cane and rolator, Raised toilet seat, shower chair, grab bars and a transfer chair in the home   Type of Residence Private residence   Number of Stairs to Enter Residence 0   Number of Stairs Within Residence 0   Do you have animals or pets at home? No   Who is requesting discharge planning? Provider   Home or Post Acute Services In home services   Type of Home Care Services Home PT;Home OT   Expected Discharge Disposition Home H   Does the patient need discharge transport arranged? No   Financial Resource Strain   How hard is it for you to pay for the very basics like food, housing, medical care, and heating? Not hard   Housing Stability   In the last 12 months, was there a time when you were not able to pay the mortgage or rent on time? N   In the past 12 months, how many times have you moved where you were living? 0   At any time in the past 12 months, were you homeless or living in a shelter (including now)? N   Transportation Needs   In the past 12 months, has lack of transportation kept you from medical appointments or from getting medications? no   In the past 12 months, has lack of transportation kept you from meetings, work, or from getting things needed for daily living? No   Patient Choice   Provider Choice list and CMS website  (https://medicare.gov/care-compare#search) for post-acute Quality and Resource Measure Data were provided and reviewed with: Patient   Patient / Family choosing to utilize agency / facility established prior to hospitalization No

## 2024-09-27 NOTE — ASSESSMENT & PLAN NOTE
- patient was on eliquis at home  -currently on heparin gtt due to NSTEMI  -telemetry  -cardiology following

## 2024-09-27 NOTE — NURSING NOTE
Patient arrived from ER. Assumed care of patient. Patient has been oriented to the room. Patient is A&Ox3 and is on room air. Patient is on a heparin drip at 6ml/hr. Patient is normal sinus on monitor @ 82bpm. Call light in reach.

## 2024-09-27 NOTE — PROGRESS NOTES
Rakan Cervatnes is a 89 y.o. female on day 1 of admission presenting with NSTEMI (non-ST elevated myocardial infarction) (Multi).      Subjective   Patient seen and examined. Patient w/o new c/o.  Heparin gtt infusing. NPO, Cardiac cath this afternoon.        Objective     Last Recorded Vitals  /64 (BP Location: Right arm, Patient Position: Lying)   Pulse 61   Temp 36.5 °C (97.7 °F) (Oral)   Resp 18   Wt 54.9 kg (121 lb 0.5 oz)   SpO2 97%   Intake/Output last 3 Shifts:    Intake/Output Summary (Last 24 hours) at 9/27/2024 1230  Last data filed at 9/27/2024 0400  Gross per 24 hour   Intake 320 ml   Output 0 ml   Net 320 ml       Admission Weight  Weight: 51 kg (112 lb 7 oz) (09/26/24 1424)    Daily Weight  09/27/24 : 54.9 kg (121 lb 0.5 oz)    Image Results  ECG 12 lead  Sinus tachycardia  ST & T wave abnormality, consider inferolateral ischemia  Abnormal ECG  When compared with ECG of 26-OCT-2023 20:18,  Significant changes have occurred      Physical Exam  Vitals reviewed.   Constitutional:       Appearance: She is ill-appearing.   HENT:      Head: Normocephalic.      Nose: Nose normal.      Mouth/Throat:      Mouth: Mucous membranes are moist.   Eyes:      Extraocular Movements: Extraocular movements intact.   Cardiovascular:      Rate and Rhythm: Regular rhythm.   Pulmonary:      Effort: Pulmonary effort is normal.   Abdominal:      General: Bowel sounds are normal.   Musculoskeletal:         General: Normal range of motion.      Cervical back: Neck supple.   Skin:     General: Skin is warm.   Neurological:      Mental Status: She is alert and oriented to person, place, and time.         Relevant Results      Results for orders placed or performed during the hospital encounter of 09/26/24 (from the past 24 hour(s))   Brain Natriuretic Peptide   Result Value Ref Range     (H) 0 - 99 pg/mL   Troponin I, High Sensitivity, Initial   Result Value Ref Range    Troponin I, High Sensitivity 145 (HH) 0 -  13 ng/L   PST Top   Result Value Ref Range    Extra Tube Hold for add-ons.    ECG 12 lead   Result Value Ref Range    Ventricular Rate 108 BPM    Atrial Rate 108 BPM    MI Interval 150 ms    QRS Duration 96 ms    QT Interval 368 ms    QTC Calculation(Bazett) 493 ms    P Axis 77 degrees    R Axis 45 degrees    T Axis 34 degrees    QRS Count 18 beats    Q Onset 213 ms    P Onset 138 ms    P Offset 201 ms    T Offset 397 ms    QTC Fredericia 447 ms   CBC with Differential   Result Value Ref Range    WBC 5.1 4.4 - 11.3 x10*3/uL    nRBC 0.0 0.0 - 0.0 /100 WBCs    RBC 3.65 (L) 4.00 - 5.20 x10*6/uL    Hemoglobin 10.5 (L) 12.0 - 16.0 g/dL    Hematocrit 33.3 (L) 36.0 - 46.0 %    MCV 91 80 - 100 fL    MCH 28.8 26.0 - 34.0 pg    MCHC 31.5 (L) 32.0 - 36.0 g/dL    RDW 15.5 (H) 11.5 - 14.5 %    Platelets 247 150 - 450 x10*3/uL    Neutrophils % 70.7 40.0 - 80.0 %    Immature Granulocytes %, Automated 0.2 0.0 - 0.9 %    Lymphocytes % 17.2 13.0 - 44.0 %    Monocytes % 10.3 2.0 - 10.0 %    Eosinophils % 1.0 0.0 - 6.0 %    Basophils % 0.6 0.0 - 2.0 %    Neutrophils Absolute 3.58 1.60 - 5.50 x10*3/uL    Immature Granulocytes Absolute, Automated 0.01 0.00 - 0.50 x10*3/uL    Lymphocytes Absolute 0.87 0.80 - 3.00 x10*3/uL    Monocytes Absolute 0.52 0.05 - 0.80 x10*3/uL    Eosinophils Absolute 0.05 0.00 - 0.40 x10*3/uL    Basophils Absolute 0.03 0.00 - 0.10 x10*3/uL   Troponin, High Sensitivity, 1 Hour   Result Value Ref Range    Troponin I, High Sensitivity 118 (HH) 0 - 13 ng/L   Comprehensive metabolic panel   Result Value Ref Range    Glucose 139 (H) 74 - 99 mg/dL    Sodium 145 136 - 145 mmol/L    Potassium 2.1 (LL) 3.5 - 5.3 mmol/L    Chloride 115 (H) 98 - 107 mmol/L    Bicarbonate 22 21 - 32 mmol/L    Anion Gap 10 10 - 20 mmol/L    Urea Nitrogen 7 6 - 23 mg/dL    Creatinine 0.51 0.50 - 1.05 mg/dL    eGFR 89 >60 mL/min/1.73m*2    Calcium 6.7 (L) 8.6 - 10.3 mg/dL    Albumin 2.8 (L) 3.4 - 5.0 g/dL    Alkaline Phosphatase 55 33 - 136 U/L     Total Protein 4.5 (L) 6.4 - 8.2 g/dL    AST 15 9 - 39 U/L    Bilirubin, Total 0.3 0.0 - 1.2 mg/dL    ALT 11 7 - 45 U/L   Sars-CoV-2 PCR   Result Value Ref Range    Coronavirus 2019, PCR Not Detected Not Detected   Influenza A, and B PCR   Result Value Ref Range    Flu A Result Not Detected Not Detected    Flu B Result Not Detected Not Detected   Comprehensive Metabolic Panel   Result Value Ref Range    Glucose 88 74 - 99 mg/dL    Sodium 143 136 - 145 mmol/L    Potassium 2.8 (LL) 3.5 - 5.3 mmol/L    Chloride 109 (H) 98 - 107 mmol/L    Bicarbonate 29 21 - 32 mmol/L    Anion Gap 8 (L) 10 - 20 mmol/L    Urea Nitrogen 9 6 - 23 mg/dL    Creatinine 0.62 0.50 - 1.05 mg/dL    eGFR 85 >60 mL/min/1.73m*2    Calcium 8.1 (L) 8.6 - 10.3 mg/dL    Albumin 3.3 (L) 3.4 - 5.0 g/dL    Alkaline Phosphatase 68 33 - 136 U/L    Total Protein 5.3 (L) 6.4 - 8.2 g/dL    AST 19 9 - 39 U/L    Bilirubin, Total 0.4 0.0 - 1.2 mg/dL    ALT 15 7 - 45 U/L   Magnesium   Result Value Ref Range    Magnesium 1.71 1.60 - 2.40 mg/dL   TSH with reflex to Free T4 if abnormal   Result Value Ref Range    Thyroid Stimulating Hormone 0.57 0.44 - 3.98 mIU/L   aPTT   Result Value Ref Range    aPTT 24.9 22.0 - 32.5 seconds   CBC   Result Value Ref Range    WBC 6.8 4.4 - 11.3 x10*3/uL    nRBC 0.0 0.0 - 0.0 /100 WBCs    RBC 3.53 (L) 4.00 - 5.20 x10*6/uL    Hemoglobin 10.2 (L) 12.0 - 16.0 g/dL    Hematocrit 32.9 (L) 36.0 - 46.0 %    MCV 93 80 - 100 fL    MCH 28.9 26.0 - 34.0 pg    MCHC 31.0 (L) 32.0 - 36.0 g/dL    RDW 15.6 (H) 11.5 - 14.5 %    Platelets 247 150 - 450 x10*3/uL   aPTT   Result Value Ref Range    aPTT 52.6 (H) 22.0 - 32.5 seconds   Comprehensive metabolic panel   Result Value Ref Range    Glucose 117 (H) 74 - 99 mg/dL    Sodium 143 136 - 145 mmol/L    Potassium 3.5 3.5 - 5.3 mmol/L    Chloride 112 (H) 98 - 107 mmol/L    Bicarbonate 26 21 - 32 mmol/L    Anion Gap 9 (L) 10 - 20 mmol/L    Urea Nitrogen 8 6 - 23 mg/dL    Creatinine 0.54 0.50 - 1.05 mg/dL     eGFR 88 >60 mL/min/1.73m*2    Calcium 9.0 8.6 - 10.3 mg/dL    Albumin 3.1 (L) 3.4 - 5.0 g/dL    Alkaline Phosphatase 56 33 - 136 U/L    Total Protein 5.1 (L) 6.4 - 8.2 g/dL    AST 19 9 - 39 U/L    Bilirubin, Total 0.7 0.0 - 1.2 mg/dL    ALT 14 7 - 45 U/L   CBC and Auto Differential   Result Value Ref Range    WBC 5.4 4.4 - 11.3 x10*3/uL    nRBC 0.0 0.0 - 0.0 /100 WBCs    RBC 3.14 (L) 4.00 - 5.20 x10*6/uL    Hemoglobin 8.9 (L) 12.0 - 16.0 g/dL    Hematocrit 29.0 (L) 36.0 - 46.0 %    MCV 92 80 - 100 fL    MCH 28.3 26.0 - 34.0 pg    MCHC 30.7 (L) 32.0 - 36.0 g/dL    RDW 15.5 (H) 11.5 - 14.5 %    Platelets 233 150 - 450 x10*3/uL    Neutrophils % 62.8 40.0 - 80.0 %    Immature Granulocytes %, Automated 0.2 0.0 - 0.9 %    Lymphocytes % 25.0 13.0 - 44.0 %    Monocytes % 9.9 2.0 - 10.0 %    Eosinophils % 1.5 0.0 - 6.0 %    Basophils % 0.6 0.0 - 2.0 %    Neutrophils Absolute 3.41 1.60 - 5.50 x10*3/uL    Immature Granulocytes Absolute, Automated 0.01 0.00 - 0.50 x10*3/uL    Lymphocytes Absolute 1.36 0.80 - 3.00 x10*3/uL    Monocytes Absolute 0.54 0.05 - 0.80 x10*3/uL    Eosinophils Absolute 0.08 0.00 - 0.40 x10*3/uL    Basophils Absolute 0.03 0.00 - 0.10 x10*3/uL   Magnesium   Result Value Ref Range    Magnesium 1.99 1.60 - 2.40 mg/dL   Calcium, ionized   Result Value Ref Range    POCT Calcium, Ionized 1.26 1.1 - 1.33 mmol/L      ECG 12 lead    Result Date: 9/27/2024  Sinus tachycardia ST & T wave abnormality, consider inferolateral ischemia Abnormal ECG When compared with ECG of 26-OCT-2023 20:18, Significant changes have occurred    XR chest 1 view    Result Date: 9/26/2024  Interpreted By:  Madhu Stanford, STUDY: XR CHEST 1 VIEW; 9/26/2024 3:17 pm   INDICATION: Signs/Symptoms:Chest Pain   COMPARISON: CT chest 06/16/2023   ACCESSION NUMBER(S): [id number]   ORDERING CLINICIAN: EVELINA CROOK   TECHNIQUE: Single frontal view of the chest performed.   FINDINGS: LINES AND DEVICES: None.   LUNGS: No focal consolidation,  pulmonary edema, pleural effusion or pneumothorax.   CARDIOMEDIASTINAL SILHOUETTE: The cardiomediastinal silhouette is within normal limits.   OTHER: Reverse right total shoulder arthroplasty. Severe left glenohumeral osteoarthritis.       No acute pulmonary process.   MACRO None   Signed by: Madhu Stanford 9/26/2024 3:24 PM Dictation workstation:   [id number]        Assessment & Plan  NSTEMI (non-ST elevated myocardial infarction) (Multi)  - h/o of CAD and cardiac stent  - heparin gtt  -NPO  -cardiac cath  -follow labs   Hypokalemia  - resolved  - monitor  Hypercholesterolemia  - statin  Hypocalcemia  - resolved  Troponin level elevated  - elevated, NSTEMI, cardiac cath planned  Other chest pain    PAF (paroxysmal atrial fibrillation) (Multi)  - patient was on eliquis at home  -currently on heparin gtt due to NSTEMI  -telemetry  -cardiology following  Essential hypertension  -BP improved  -continue with BP medications  - check orthostatic BP  - cardiology following    DVT risk  -heparin gtt    Discharge plan  - home with  vs SNF  -will need to follow up with PT/OT recommendation  -fall precaution    Angi Morales, DANIA-CNP

## 2024-09-27 NOTE — POST-PROCEDURE NOTE
Physician Transition of Care Summary  Invasive Cardiovascular Lab    Procedure Date: 9/27/2024  Attending:    * Nessa Callaway - Primary  Resident/Fellow/Other Assistant: Surgeons and Role:  * No surgeons found with a matching role *    Indications:   Pre-op Diagnosis      * NSTEMI (non-ST elevated myocardial infarction) (Multi) [I21.4]    Post-procedure diagnosis:   Post-op Diagnosis     * NSTEMI (non-ST elevated myocardial infarction) (Multi) [I21.4]    Procedure(s):   Left Heart Cath  48296 - [facility] CATH PLMT L HRT & ARTS W/NJX & ANGIO IMG S&I    PCI  92960 - [facility] PRQ TRLUML CORONARY ANGIOPLASTY ONE ART/BRANCH        Procedure Findings:   Patient has moderate LAD proximal disease of 40%.  Patent mid LAD stent with ostial diagonal 2 plaque shift MARISOL-3 flow no intervention needed for the left anterior descending artery or diagonals.  The left circumflex is left dominant vessel.  Has severe lesion at the proximal LPDA we treated with drug-eluting stent.  Left ventricular end-diastolic pressure mildly elevated at 19 mmHg.  LV gram was not obtained to avoid excess use of contrast    Description of the Procedure:   Coronary Geref native vessels.  Left heart catheterization.  PCI to left circumflex artery with drug-eluting stent.    Complications:   None    Stents/Implants:   Implants       Stent    Stent, [place] Rhea Jeffrey, 2.50 X 26rx - [id number] - Implanted        Inventory item: STENT, RISSA FRONTIER JEFFREY, 2.50 X 26RX Model/Cat number: [id number]    : [employer] Lot number: [id number]    Device identifier: [id number]        As of 9/27/2024       Status: Implanted                              Anticoagulation/Antiplatelet Plan:   6000 units of heparin    Estimated Blood Loss:   10 mL    Anesthesia: Moderate Sedation Anesthesia Staff: No anesthesia staff entered.    Any Specimen(s) Removed:   No specimens collected during this procedure.    Disposition:   Will be transferred to [facility].  Tomorrow may  start Eliquis 2.5 mg oral twice daily.  Aspirin 81 mg daily for 1 week and then stop.  Clopidogrel 75 mg oral daily at least for 6 months.      Electronically signed by: Nessa Callaway MD, 9/27/2024 5:08 PM

## 2024-09-27 NOTE — CONSULTS
Inpatient consult to Cardiology  Consult performed by: Nessa Callaway MD  Consult ordered by: Layo Pantoja MD  Reason for consult: Elevated troponin        History Of Present Illness:    Rakan Cervantes is a 89 y.o. female presenting with not feeling good near syncope.  Patient apparently was washing close on the sink when she felt dizzy not feeling good close to pass out.  Patient pushed the alert button that she has around her neck.  Continued  feeling good and she went to sat on her couch.  Still not eating good.  Had no chest pain.  Patient was transferred via EMS.  Her troponin were mildly elevated. EKG showing normal sinus rhythm nonspecific ST T wave abnormalities.  EKG showing sinus tachycardia nonspecific ST-T wave abnormalities.  Troponin mildly elevated without significant delta.  Patient was started on heparin drip.      Review of systems.  10 point review systems otherwise negative      Last Recorded Vitals:  Vitals:    09/27/24 0112 09/27/24 0315 09/27/24 0549 09/27/24 0557   BP: (!) 204/65 (!) 210/84 (!) 113/41    BP Location: Right arm Right arm Right arm    Patient Position: Lying  Lying    Pulse: 76 77 64    Resp: 22 24 24    Temp:  36.5 °C (97.7 °F) 36.5 °C (97.7 °F)    TempSrc:  Temporal Temporal    SpO2: 98% 97% 94%    Weight:    54.9 kg (121 lb 0.5 oz)   Height:           Last Labs:  CBC - 9/27/2024:  4:45 AM  5.4 8.9 233    29.0      CMP - 9/27/2024:  4:45 AM  9.0 5.1 19 --- 0.7   _ 3.1 14 56      PTT - 9/27/2024:  4:44 AM  _   _ 52.6     Troponin I, High Sensitivity   Date/Time Value Ref Range Status   09/26/2024 03:00  (HH) 0 - 13 ng/L Final   09/26/2024 02:21  (HH) 0 - 13 ng/L Final     BNP   Date/Time Value Ref Range Status   09/26/2024 02:21  (H) 0 - 99 pg/mL Final     Hemoglobin A1C   Date/Time Value Ref Range Status   09/10/2024 09:14 AM 5.8 (H) See below % Final   03/14/2024 11:10 AM 6.3 (H) See below % Final     LDL Calculated   Date/Time Value Ref Range Status  "  09/10/2024 09:14 AM 50 (L) 65 - 130 mg/dL Final   03/14/2024 11:10 AM 44 (L) 65 - 130 mg/dL Final   10/27/2023 05:02 AM 31 (L) 65 - 130 mg/dL Final      Last I/O:  I/O last 3 completed shifts:  In: 320 (5.8 mL/kg) [P.O.:120; IV Piggyback:200]  Out: 0 (0 mL/kg)   Weight: 54.9 kg     Past Cardiology Tests (Last 3 Years):  EKG:  ECG 12 lead 09/26/2024 (Preliminary)      ECG 12 lead 10/27/2023    Echo:  Transthoracic Echo (TTE) Limited 10/27/2023    Ejection Fractions:  No results found for: \"EF\"  Cath:  No results found for this or any previous visit from the past 1095 days.    Stress Test:  No results found for this or any previous visit from the past 1095 days.    Cardiac Imaging:  No results found for this or any previous visit from the past 1095 days.      Past Medical History:  She has a past medical history of Acute non-ST segment elevation myocardial infarction (Multi) (11/26/2023), Anemia, Arteriosclerosis of coronary artery (05/14/2023), Arthritis, Asthma ([facility]), Atrial fibrillation (Multi) (11/26/2023), Jonas esophagus, Bilateral carotid artery occlusion (05/23/2023), CAD (coronary artery disease), Cardiac rhythm disorder or disturbance or change (11/26/2023), Cervical radiculopathy, Chest pain (11/26/2023), Chronic obstructive pulmonary disease (Multi) (11/26/2023), Constipation, Coronary artery disease (11/26/2023), Degenerative joint disease, Dyslipidemia, Dysuria, Erosive esophagitis, GERD (gastroesophageal reflux disease), Hypercholesterolemia (05/23/2023), Hyperparathyroidism (Multi), Hypertensive urgency (11/26/2023), Impaired fasting glucose (11/26/2023), Irritable bowel syndrome (IBS), Labile essential hypertension (11/26/2023), Labile hypertension, Left foot pain, Low blood pressure (11/26/2023), Macular degeneration, Mixed hyperlipidemia (11/26/2023), Neck pain, Osteoporosis (11/26/2023), Paroxysmal atrial fibrillation (Multi), Polyarthritis with negative rheumatoid factor (Multi), Post " menopausal syndrome, Rapid atrial fibrillation (Multi) (05/14/2023), Spinal stenosis, Stage 3 chronic kidney disease (Multi) (11/26/2023), Stroke (Multi), and Transient ischemic attack (11/26/2023).    Past Surgical History:  She has a past surgical history that includes Other surgical history (02/14/2022); MR angio head wo IV contrast (04/23/2013); CT angio head w and wo IV contrast (05/08/2013); MR angio head wo IV contrast (07/20/2016); Cholecystectomy; Cardiovascular stress test (2017); Cardiovascular stress test (2004); Cataract extraction (Bilateral, 2011); Carpal tunnel release (Left, 2014); Total hip arthroplasty (Right, 2016); Revision total hip arthroplasty (Left, 2013); Coronary stent placement (05/2023); MR angio head wo IV contrast (10/27/2023); and MR angio neck wo IV contrast (10/27/2023).      Social History:  She reports that she has never smoked. She has never been exposed to tobacco smoke. She has never used smokeless tobacco. She reports current alcohol use. She reports that she does not use drugs.    Family History:  Family History   Problem Relation Name Age of Onset    No Known Problems Mother      No Known Problems Father      No Known Problems Sister          Allergies:  Meperidine, Morphine, Opioids - morphine analogues, Pregabalin, and Tramadol    Inpatient Medications:  Scheduled medications   Medication Dose Route Frequency    ascorbic acid  250 mg oral Daily    atorvastatin  80 mg oral Nightly    carvedilol  6.25 mg oral BID    DULoxetine  30 mg oral Daily with evening meal    ferrous sulfate (325 mg ferrous sulfate)  65 mg of iron oral Daily with breakfast    hydroxychloroquine  200 mg oral Daily    isosorbide mononitrate ER  60 mg oral Daily before breakfast    losartan  100 mg oral Daily    [START ON 9/28/2024] pantoprazole  40 mg oral Daily    potassium chloride CR  20 mEq oral Daily    sulfaSALAzine  500 mg oral BID    verapamil SR  180 mg oral Daily     PRN medications    Medication    acetaminophen    Or    acetaminophen    Or    acetaminophen    benzocaine-menthol    dextromethorphan-guaifenesin    guaiFENesin    heparin (porcine)    hydrALAZINE    ondansetron    Or    ondansetron    polyethylene glycol     Continuous Medications   Medication Dose Last Rate    heparin  0-4,000 Units/hr 600 Units/hr (09/27/24 0611)     Outpatient Medications:  Current Outpatient Medications   Medication Instructions    apixaban (ELIQUIS) 2.5 mg, oral, 2 times daily    ascorbic acid (VITAMIN C) 100 mg, oral, Daily    atorvastatin (LIPITOR) 80 mg, oral, Nightly    calcitonin, salmon, (Miacalcin) 200 unit/actuation nasal spray 1 spray, One Nostril, Daily, Alternate nostril    carvedilol (COREG) 6.25 mg, oral, 2 times daily (morning and late afternoon)    cholecalciferol (VITAMIN D-3) 2,000 Units, oral, Daily    clopidogrel (PLAVIX) 75 mg, oral, Daily    docusate sodium (Colace) 100 mg capsule TAKE 1 CAPSULE BY MOUTH ONCE DAILY AS NEEDED to prevent constipation from iron    DULoxetine (CYMBALTA) 30 mg, oral, Daily with evening meal    ferrous sulfate, 325 mg ferrous sulfate, tablet 1 tablet, oral, Daily with breakfast, M-W-F    gabapentin (Neurontin) 300 mg capsule TAKE 1 CAPSULE BY MOUTH AT NOON AND BEDTIME    hydroCHLOROthiazide (HYDRODIURIL) 25 mg, oral, Daily    hydroxychloroquine (Plaquenil) 200 mg tablet oral    isosorbide mononitrate ER (IMDUR) 60 mg, oral, Daily before breakfast    losartan (COZAAR) 100 mg, oral, Daily    pantoprazole (PROTONIX) 40 mg, oral, 2 times daily    potassium chloride CR (Klor-Con M20) 20 mEq ER tablet 20 mEq, oral, Daily, Do not crush, chew, or split.    sulfaSALAzine (AZULFIDINE) 500 mg, oral, 2 times daily    verapamil SR (CALAN-SR) 180 mg, oral, Daily, Do not crush or chew.    vit C/E/Zn/coppr/lutein/zeaxan (PRESERVISION AREDS-2 ORAL) As directed orally       Physical Exam:  General: Patient is in no acute distress.  HEENT: atraumatic normocephalic.  Neck: is  supple jugular venous pressure within normal limits no thyromegaly.  Cardiovascular regular rate and rhythm normal heart sounds no murmurs rubs or gallops.  Lungs: clear to auscultation bilaterally.  Abdomen: is soft nontender.  Extremities warm to touch no edema.  Neurologic examination: patient is awake alert oriented to person, place, date/time.  Psychiatric examination: patient has good insight denies feeling suicidal and depressed.  Pulses 2+ intact bilaterally     Assessment/Plan   #1 elevated troponin.  Patient has elevated troponin with no clear evidence of chest pain just not feeling good and dizziness.  I am not sure if she had vasovagal event or she had hypertensive urgency.  Patient has history of coronary artery disease PCI to mid LAD heavily calcified lesion back in May 2023.  EKG is nondiagnostic with nonspecific changes.  Troponin mildly elevated.  At this point we will arrange for cardiac catheterization to rule out any major changes in her coronary anatomy.  Patient's symptoms are not classic for angina.  Will keep her n.p.o. for now.    #2 hypertension.  Restart home medications.  Check orthostatics.  Will follow-up on the result of testing.    3.  Hyperlipidemia continue high-dose statin.    4.  History of atrial fibrillation currently in sinus rhythm on carvedilol as well as verapamil.  Continue current medications will monitor.  Will restart warfarin Eliquis after cardiac catheterization.    5.  History of TIA.  Recommend neurology evaluation since her symptoms included dizziness and fogginess and not feeling good.    Peripheral IV 09/26/24 20 G Left Antecubital (Active)   Site Assessment Clean;Dry;Intact 09/26/24 2120   Dressing Type Transparent 09/26/24 2120   Line Status Flushed 09/26/24 2120   Dressing Status Clean;Dry;Occlusive 09/26/24 2120   Number of days: 1       Code Status:  Full Code        Nessa Callaway MD

## 2024-09-27 NOTE — PROGRESS NOTES
Mom notified Occupational Therapy                 Therapy Communication Note    Patient Name: Rakan Cervantes  MRN: [id number]  Department: [id number]  Room: 20/20-A  Today's Date: 9/27/2024     Discipline: Occupational Therapy    Missed Visit Reason: Missed Visit Reason: Patient placed on medical hold (pt currently off floor for heart cath at this time)    Missed Time: Attempt    Comment:

## 2024-09-28 LAB
ALBUMIN SERPL BCP-MCNC: 3 G/DL (ref 3.4–5)
ANION GAP SERPL CALCULATED.3IONS-SCNC: 10 MMOL/L (ref 10–20)
BUN SERPL-MCNC: 12 MG/DL (ref 6–23)
CALCIUM SERPL-MCNC: 8 MG/DL (ref 8.6–10.3)
CHLORIDE SERPL-SCNC: 110 MMOL/L (ref 98–107)
CO2 SERPL-SCNC: 24 MMOL/L (ref 21–32)
CREAT SERPL-MCNC: 0.58 MG/DL (ref 0.5–1.05)
EGFRCR SERPLBLD CKD-EPI 2021: 87 ML/MIN/1.73M*2
ERYTHROCYTE [DISTWIDTH] IN BLOOD BY AUTOMATED COUNT: 15.7 % (ref 11.5–14.5)
GLUCOSE SERPL-MCNC: 89 MG/DL (ref 74–99)
HCT VFR BLD AUTO: 28 % (ref 36–46)
HGB BLD-MCNC: 8.3 G/DL (ref 12–16)
MCH RBC QN AUTO: 28 PG (ref 26–34)
MCHC RBC AUTO-ENTMCNC: 29.6 G/DL (ref 32–36)
MCV RBC AUTO: 95 FL (ref 80–100)
NRBC BLD-RTO: 0 /100 WBCS (ref 0–0)
PHOSPHATE SERPL-MCNC: 2.7 MG/DL (ref 2.5–4.9)
PLATELET # BLD AUTO: 236 X10*3/UL (ref 150–450)
POTASSIUM SERPL-SCNC: 3.5 MMOL/L (ref 3.5–5.3)
RBC # BLD AUTO: 2.96 X10*6/UL (ref 4–5.2)
SODIUM SERPL-SCNC: 140 MMOL/L (ref 136–145)
WBC # BLD AUTO: 4.1 X10*3/UL (ref 4.4–11.3)

## 2024-09-28 PROCEDURE — 2500000001 HC RX 250 WO HCPCS SELF ADMINISTERED DRUGS (ALT 637 FOR MEDICARE OP): Performed by: INTERNAL MEDICINE

## 2024-09-28 PROCEDURE — 97165 OT EVAL LOW COMPLEX 30 MIN: CPT | Mod: GO

## 2024-09-28 PROCEDURE — 80069 RENAL FUNCTION PANEL: CPT | Performed by: INTERNAL MEDICINE

## 2024-09-28 PROCEDURE — 2060000001 HC INTERMEDIATE ICU ROOM DAILY

## 2024-09-28 PROCEDURE — 36415 COLL VENOUS BLD VENIPUNCTURE: CPT | Performed by: INTERNAL MEDICINE

## 2024-09-28 PROCEDURE — 97530 THERAPEUTIC ACTIVITIES: CPT | Mod: GO

## 2024-09-28 PROCEDURE — 2500000002 HC RX 250 W HCPCS SELF ADMINISTERED DRUGS (ALT 637 FOR MEDICARE OP, ALT 636 FOR OP/ED): Performed by: INTERNAL MEDICINE

## 2024-09-28 PROCEDURE — 2500000001 HC RX 250 WO HCPCS SELF ADMINISTERED DRUGS (ALT 637 FOR MEDICARE OP): Performed by: NURSE PRACTITIONER

## 2024-09-28 PROCEDURE — 99232 SBSQ HOSP IP/OBS MODERATE 35: CPT | Performed by: INTERNAL MEDICINE

## 2024-09-28 PROCEDURE — 85027 COMPLETE CBC AUTOMATED: CPT | Performed by: INTERNAL MEDICINE

## 2024-09-28 RX ORDER — CARVEDILOL 25 MG/1
25 TABLET ORAL
Status: DISCONTINUED | OUTPATIENT
Start: 2024-09-28 | End: 2024-09-29 | Stop reason: HOSPADM

## 2024-09-28 RX ORDER — AMLODIPINE BESYLATE 5 MG/1
5 TABLET ORAL DAILY
Status: DISCONTINUED | OUTPATIENT
Start: 2024-09-28 | End: 2024-09-29 | Stop reason: HOSPADM

## 2024-09-28 ASSESSMENT — COGNITIVE AND FUNCTIONAL STATUS - GENERAL
HELP NEEDED FOR BATHING: A LOT
MOBILITY SCORE: 20
DAILY ACTIVITIY SCORE: 18
MOVING TO AND FROM BED TO CHAIR: A LITTLE
WALKING IN HOSPITAL ROOM: A LITTLE
TOILETING: A LITTLE
DAILY ACTIVITIY SCORE: 23
TOILETING: A LITTLE
CLIMB 3 TO 5 STEPS WITH RAILING: A LITTLE
WALKING IN HOSPITAL ROOM: A LITTLE
STANDING UP FROM CHAIR USING ARMS: A LITTLE
CLIMB 3 TO 5 STEPS WITH RAILING: A LITTLE
TOILETING: A LITTLE
DRESSING REGULAR UPPER BODY CLOTHING: A LITTLE
DAILY ACTIVITIY SCORE: 23
MOBILITY SCORE: 20
STANDING UP FROM CHAIR USING ARMS: A LITTLE
DRESSING REGULAR LOWER BODY CLOTHING: A LOT
MOVING TO AND FROM BED TO CHAIR: A LITTLE

## 2024-09-28 ASSESSMENT — PAIN - FUNCTIONAL ASSESSMENT
PAIN_FUNCTIONAL_ASSESSMENT: 0-10

## 2024-09-28 ASSESSMENT — PAIN SCALES - GENERAL
PAINLEVEL_OUTOF10: 0 - NO PAIN
PAINLEVEL_OUTOF10: 7

## 2024-09-28 ASSESSMENT — ACTIVITIES OF DAILY LIVING (ADL)
ADL_ASSISTANCE: NEEDS ASSISTANCE
BATHING_ASSISTANCE: MODERATE

## 2024-09-28 NOTE — PROGRESS NOTES
Rakan Cervantes is a 89 y.o. female on day 2 of admission presenting with NSTEMI (non-ST elevated myocardial infarction) (Multi).      Subjective   Patient seen and examined. Patient with elevated BP this morning and HA.      Objective     Last Recorded Vitals  /76   Pulse 70   Temp 36.2 °C (97.2 °F) (Temporal)   Resp (!) 28   Wt 56.3 kg (124 lb 1.9 oz)   SpO2 95%   Intake/Output last 3 Shifts:    Intake/Output Summary (Last 24 hours) at 9/28/2024 0838  Last data filed at 9/28/2024 0600  Gross per 24 hour   Intake 674.7 ml   Output 10 ml   Net 664.7 ml       Admission Weight  Weight: 51 kg (112 lb 7 oz) (09/26/24 1424)    Daily Weight  09/28/24 : 56.3 kg (124 lb 1.9 oz)    Image Results  ECG 12 lead  Sinus tachycardia  ST & T wave abnormality, consider inferolateral ischemia  Abnormal ECG  When compared with ECG of 26-OCT-2023 20:18,  Significant changes have occurred      Physical Exam  Vitals reviewed.   Constitutional:       Appearance: She is ill-appearing.   HENT:      Head: Normocephalic.      Nose: Nose normal.      Mouth/Throat:      Mouth: Mucous membranes are moist.   Eyes:      Extraocular Movements: Extraocular movements intact.   Cardiovascular:      Rate and Rhythm: Regular rhythm.   Pulmonary:      Effort: Pulmonary effort is normal.   Abdominal:      General: Bowel sounds are normal.   Musculoskeletal:         General: Normal range of motion.      Cervical back: Neck supple.   Skin:     General: Skin is warm.   Neurological:      Mental Status: She is alert and oriented to person, place, and time.         Relevant Results      Results for orders placed or performed during the hospital encounter of 09/26/24 (from the past 24 hour(s))   CBC   Result Value Ref Range    WBC 4.1 (L) 4.4 - 11.3 x10*3/uL    nRBC 0.0 0.0 - 0.0 /100 WBCs    RBC 2.96 (L) 4.00 - 5.20 x10*6/uL    Hemoglobin 8.3 (L) 12.0 - 16.0 g/dL    Hematocrit 28.0 (L) 36.0 - 46.0 %    MCV 95 80 - 100 fL    MCH 28.0 26.0 - 34.0 pg     MCHC 29.6 (L) 32.0 - 36.0 g/dL    RDW 15.7 (H) 11.5 - 14.5 %    Platelets 236 150 - 450 x10*3/uL   Renal Function Panel   Result Value Ref Range    Glucose 89 74 - 99 mg/dL    Sodium 140 136 - 145 mmol/L    Potassium 3.5 3.5 - 5.3 mmol/L    Chloride 110 (H) 98 - 107 mmol/L    Bicarbonate 24 21 - 32 mmol/L    Anion Gap 10 10 - 20 mmol/L    Urea Nitrogen 12 6 - 23 mg/dL    Creatinine 0.58 0.50 - 1.05 mg/dL    eGFR 87 >60 mL/min/1.73m*2    Calcium 8.0 (L) 8.6 - 10.3 mg/dL    Phosphorus 2.7 2.5 - 4.9 mg/dL    Albumin 3.0 (L) 3.4 - 5.0 g/dL      ECG 12 lead    Result Date: 9/27/2024  Sinus tachycardia ST & T wave abnormality, consider inferolateral ischemia Abnormal ECG When compared with ECG of 26-OCT-2023 20:18, Significant changes have occurred    XR chest 1 view    Result Date: 9/26/2024  Interpreted By:  Madhu Stanford, STUDY: XR CHEST 1 VIEW; 9/26/2024 3:17 pm   INDICATION: Signs/Symptoms:Chest Pain   COMPARISON: CT chest 06/16/2023   ACCESSION NUMBER(S): [id number]   ORDERING CLINICIAN: EVELINA CROOK   TECHNIQUE: Single frontal view of the chest performed.   FINDINGS: LINES AND DEVICES: None.   LUNGS: No focal consolidation, pulmonary edema, pleural effusion or pneumothorax.   CARDIOMEDIASTINAL SILHOUETTE: The cardiomediastinal silhouette is within normal limits.   OTHER: Reverse right total shoulder arthroplasty. Severe left glenohumeral osteoarthritis.       No acute pulmonary process.   MACRO None   Signed by: Madhu Stanford 9/26/2024 3:24 PM Dictation workstation:   [id number]        Assessment & Plan  NSTEMI (non-ST elevated myocardial infarction) (Multi)  - h/o of CAD and cardiac stent  - heparin gtt- discontinued after cath  - now on Eliquis, ASA, and Plavix  -cardiac cath-> cardiac stent placed  -follow labs   Hypokalemia  - resolved  - monitor  Hypercholesterolemia  - statin  Hypocalcemia  - resolved  Troponin level elevated  - elevated, NSTEMI, cardiac cath planned  Other chest pain    PAF (paroxysmal  atrial fibrillation) (Multi)  - resumed eliquis  - continue with current medications  - currently SR with controlled HR  -telemetry  -cardiology following  Essential hypertension  -BP elevated this morning-> patient just received her medications  - PRN Hydralazine ordered  -continue with BP medications  - check orthostatic BP  - cardiology following    DVT risk  -eliquis, asa, plavix, scd's    Discharge plan  - BP elevated this morning, patient is not ready for discharge  - will follow up with cardiology recommendation  - home with HC vs SNF  -will need to follow up with PT/OT recommendation  -fall precaution    Angi Morales, APRN-CNP

## 2024-09-28 NOTE — PROGRESS NOTES
Occupational Therapy    Evaluation/Treatment    Patient Name: Rakan Cervantes  MRN: [id number]  Department: [address]  Room: 20/20  Today's Date: 09/28/24  Time Calculation  Start Time: 1247  Stop Time: 1310  Time Calculation (min): 23 min       Assessment:  OT Assessment: Referral received, chart recviewed, and evaluation complete.  Presents with NSTEMI and weakness.  Would benefit from acute OT services. Recommend low intensity rehab upon discharge.  Prognosis: Good  Barriers to Discharge: None  Evaluation/Treatment Tolerance: Patient limited by fatigue  Medical Staff Made Aware: Yes  End of Session Communication: Bedside nurse  End of Session Patient Position: Bed, 3 rail up, Alarm off, caregiver present  Prognosis: Good  Barriers to Discharge: None  Evaluation/Treatment Tolerance: Patient limited by fatigue  Medical Staff Made Aware: Yes  Plan:  Treatment Interventions: ADL retraining, UE strengthening/ROM, Patient/family training, Neuromuscular reeducation, Compensatory technique education  OT Frequency: 4 times per week  OT Recommended Transfer Status: Assist of 1, Minimal assist  OT - OK to Discharge: Yes  Treatment Interventions: ADL retraining, UE strengthening/ROM, Patient/family training, Neuromuscular reeducation, Compensatory technique education    Subjective   Current Problem:  1. NSTEMI (non-ST elevated myocardial infarction) (Multi)  Case Request Cath Lab: Left Heart Cath    Case Request Cath Lab: Left Heart Cath    Cardiac Catheterization Procedure    Cardiac Catheterization Procedure      2. Hypokalemia        3. Chest pain, unspecified type        4. Coronary artery disease involving native coronary artery of native heart without angina pectoris  Referral to Cardiac Rehab - outpatient (for providers who will be following patient along cardiac rehab)        General:   OT Received On: 09/28/24  General  Reason for Referral: decreased funcitonal status  Referred By: Layo Pantoja MD  Past Medical  History Relevant to Rehab: CAD with stent, MI, HTN, COPD, OA, asthma, A-fib, osteoporosis, macular degeneration, spinal stenosis, CKD3, CVA, TIA  Family/Caregiver Present: Yes  Caregiver Feedback: spouse and daughter are present and provide information on patient's prior level of function  Prior to Session Communication: Bedside nurse  Patient Position Received: Bed, 4 rail up  General Comment: 89 year old WF admitted with c/o CP, SOB, and weakness.  She was positive for an NSTEMI and had a cardiac cath performed yesterday.  Precautions:  Hearing/Visual Limitations: [place] and wears heraing aides, wears glasses  Medical Precautions: Cardiac precautions, Fall precautions    Vital Sign (Past 2hrs)        Date/Time Vitals Session Patient Position Pulse Resp SpO2 BP MAP (mmHg)    09/28/24 1200 --  --  55  28  97 %  121/53  70                         Pain:  Pain Assessment  Pain Assessment: 0-10  0-10 (Numeric) Pain Score: 7  Pain Type: Acute pain  Pain Location: Head (patient reports she has a headache)    Objective   Cognition:  Overall Cognitive Status: Within Functional Limits  Orientation Level: Oriented X4           Home Living:  Type of Home: House  Lives With: Spouse  Home Adaptive Equipment: Cane, Walker rolling or standard  Home Layout: One level  Home Access: Level entry  Bathroom Shower/Tub: Tub/shower unit  Bathroom Toilet: Standard  Bathroom Equipment: Grab bars in shower, Shower chair with back  Prior Function:  Level of [place]: Needs assistance with ADLs, Needs assistance with homemaking  Receives Help From: Family  ADL Assistance: Needs assistance  Homemaking Assistance: Needs assistance  Ambulatory Assistance: Independent  Vocational: Retired  Hand Dominance: Left  IADL History:  Homemaking Responsibilities: No  IADL Comments: spouse performs IADls  ADL:  Eating Assistance: Independent  Grooming Assistance: Minimal  Bathing Assistance: Moderate  UE Dressing Assistance: Minimal  LE Dressing  Assistance: Moderate  Toileting Assistance with Device: Minimal  Functional Assistance: Minimal    Activity Tolerance:  Endurance: Decreased tolerance for upright activites  Functional Standing Tolerance:     Bed Mobility/Transfers: Bed Mobility  Bed Mobility: Yes  Bed Mobility 1  Bed Mobility 1: Supine to sitting  Level of Assistance 1: Moderate assistance  Bed Mobility 2  Bed Mobility  2: Sitting to supine  Level of Assistance 2: Minimum assistance    Transfers  Transfer: Yes  Transfer 1  Transfer From 1: Bed to  Transfer to 1: Stand  Technique 1: Sit to stand  Transfer Level of Assistance 1: Minimum assistance  Transfers 2  Transfer From 2: Stand to  Transfer to 2: Bed  Technique 2: Stand to sit  Transfer Level of Assistance 2: Minimum assistance    Sitting Balance:  Static Sitting Balance  Static Sitting-Balance Support: Feet supported  Static Sitting-Level of Assistance: Independent  Standing Balance:  Static Standing Balance  Static Standing-Balance Support: No upper extremity supported  Static Standing-Level of Assistance: Minimum assistance     Sensation:  Light Touch: No apparent deficits  Strength:  Strength Comments: BUE 3/5 grossly     Coordination:  Movements are Fluid and Coordinated: Yes   Hand Function:  Hand Function  Gross Grasp: Functional  Coordination: Impaired    Outcome Measures: [facility] Daily Activity  Putting on and taking off regular lower body clothing: A lot  Bathing (including washing, rinsing, drying): A lot  Putting on and taking off regular upper body clothing: A little  Toileting, which includes using toilet, bedpan or urinal: A little  Taking care of personal grooming such as brushing teeth: None  Eating Meals: None  Daily Activity - Total Score: 18    Goals:         Problem: Bathing  Goal: STG - Patient will bathe upper body with set up  Outcome: Progressing     Problem: Dressing Upper Extremities  Goal: LTG - Patient will complete upper body dressing independent  Outcome:  Progressing     Problem: Grooming  Goal: STG - Patient completes grooming independent  Outcome: Progressing     Problem: Functional Mobility  Goal: Perform functional mobility a household distance independent with 2ww  Outcome: Progressing     Problem: Toileting  Goal: STG - Patient will complete toileting tasks independent with 2ww  Outcome: Progressing     Problem: OT Transfers  Goal: LTG - Patient will perform all functional transfers with 2ww independent  Outcome: Progressing

## 2024-09-28 NOTE — NURSING NOTE
2013: pure wick placed.   2225: assisted up to the bathroom, tolerated well.  Resulted in a large BM.

## 2024-09-28 NOTE — ASSESSMENT & PLAN NOTE
- h/o of CAD and cardiac stent  - heparin gtt- discontinued after cath  - now on Eliquis, ASA, and Plavix  -cardiac cath-> cardiac stent placed  -follow labs

## 2024-09-28 NOTE — CARE PLAN
The patient's goals for the shift include      The clinical goals for the shift include reduce bp    Over the shift, the patient did not make progress toward the following goals. Barriers to progression include Hx of high blood pressure. Recommendations to address these barriers include monitor and administer bp medications.

## 2024-09-28 NOTE — CARE PLAN
Problem: Bathing  Goal: STG - Patient will bathe upper body with set up  Outcome: Progressing     Problem: Dressing Upper Extremities  Goal: LTG - Patient will complete upper body dressing independent  Outcome: Progressing     Problem: Grooming  Goal: STG - Patient completes grooming independent  Outcome: Progressing     Problem: Functional Mobility  Goal: Perform functional mobility a household distance independent with 2ww  Outcome: Progressing     Problem: Toileting  Goal: STG - Patient will complete toileting tasks independent with 2ww  Outcome: Progressing     Problem: OT Transfers  Goal: LTG - Patient will perform all functional transfers with 2ww independent  Outcome: Progressing

## 2024-09-28 NOTE — PROGRESS NOTES
"Subjective Data:      Overnight Events:         Objective Data:  Last Recorded Vitals:  Vitals:    09/28/24 0442 09/28/24 0500 09/28/24 0700 09/28/24 0800   BP: 149/83 164/59 170/71 177/76   BP Location:   Left arm    Patient Position:   Lying    Pulse: 69 66 73 70   Resp: (!) 28 26 (!) 29 (!) 28   Temp:   36.2 °C (97.2 °F)    TempSrc:   Temporal    SpO2: 95% 92% 96% 95%   Weight:  56.3 kg (124 lb 1.9 oz)     Height:           Last Labs:  CBC - 9/28/2024:  4:28 AM  4.1 8.3 236    28.0      CMP - 9/28/2024:  4:28 AM  8.0 5.1 19 --- 0.7   2.7 3.0 14 56      PTT - 9/27/2024:  4:44 AM  _   _ 52.6     TROPHS   Date/Time Value Ref Range Status   09/26/2024 03:00  0 - 13 ng/L Final   09/26/2024 02:21  0 - 13 ng/L Final     BNP   Date/Time Value Ref Range Status   09/26/2024 02:21  0 - 99 pg/mL Final     HGBA1C   Date/Time Value Ref Range Status   09/10/2024 09:14 AM 5.8 See below % Final   03/14/2024 11:10 AM 6.3 See below % Final     LDLCALC   Date/Time Value Ref Range Status   09/10/2024 09:14 AM 50 65 - 130 mg/dL Final   03/14/2024 11:10 AM 44 65 - 130 mg/dL Final   10/27/2023 05:02 AM 31 65 - 130 mg/dL Final      Last I/O:  I/O last 3 completed shifts:  In: 994.7 (17.7 mL/kg) [P.O.:120; I.V.:674.7 (12 mL/kg); IV Piggyback:200]  Out: 10 (0.2 mL/kg) [Blood:10]  Weight: 56.3 kg     Past Cardiology Tests (Last 3 Years):  EKG:  ECG 12 lead 09/26/2024 (Preliminary)      ECG 12 lead 10/27/2023    Echo:  Transthoracic Echo (TTE) Limited 10/27/2023    Ejection Fractions:  No results found for: \"EF\"  Cath:  No results found for this or any previous visit from the past 1095 days.    Stress Test:  No results found for this or any previous visit from the past 1095 days.    Cardiac Imaging:  No results found for this or any previous visit from the past 1095 days.      Inpatient Medications:  Scheduled medications   Medication Dose Route Frequency    apixaban  2.5 mg oral q12h    ascorbic acid  250 mg oral Daily    " aspirin  81 mg oral Daily    atorvastatin  80 mg oral Nightly    carvedilol  12.5 mg oral BID    clopidogrel  75 mg oral Daily    DULoxetine  30 mg oral Daily with evening meal    ferrous sulfate (325 mg ferrous sulfate)  65 mg of iron oral Daily with breakfast    hydroxychloroquine  200 mg oral Daily    isosorbide mononitrate ER  60 mg oral Daily before breakfast    losartan  100 mg oral Daily    pantoprazole  40 mg oral Daily    potassium chloride CR  20 mEq oral Daily    sulfaSALAzine  500 mg oral BID    verapamil SR  180 mg oral Daily     PRN medications   Medication    acetaminophen    Or    acetaminophen    Or    acetaminophen    benzocaine-menthol    dextromethorphan-guaifenesin    guaiFENesin    hydrALAZINE    ondansetron    Or    ondansetron    polyethylene glycol     Continuous Medications   Medication Dose Last Rate       Physical Exam:       Assessment/Plan   Patient has moderate LAD proximal disease of 40%.  Patent mid LAD stent with ostial diagonal 2 plaque shift MARISOL-3 flow no intervention needed for the left anterior descending artery or diagonals.  The left circumflex is left dominant vessel.  Has severe lesion at the proximal LPDA we treated with drug-eluting stent.  Left ventricular end-diastolic pressure mildly elevated at 19 mmHg.  LV gram was not obtained to avoid excess use of contrast    9/28: The patient had an uneventful night following her PCI to the distal LCx/proximal LPDA.  She has ongoing chronic pain in the left lateral chest region musculoskeletal in nature.  Systolic blood pressure slightly elevated stable sinus rhythm in the 60s per minute.  Will modify the patient's antihypertensive therapy by increasing the carvedilol from 12.5 mg twice daily to 25 mg twice daily.  Will change verapamil to amlodipine 5 mg daily to avoid bradycardia given the use of the carvedilol.  Will discontinue Imdur for now which may not be necessary for angina control following PCI.  Patient will remain on  the losartan 100 mg daily.  The patient's renal parameters remain stable creatinine is 0.58.  She remains on potassium supplementation potassium is improved to 3.5.  Will observe patient for another 24 to 48 hours.  She is on dual antiplatelet therapy for her PCI along with low-dose Eliquis 2.5 mg twice daily.    Peripheral IV 09/26/24 22 G Right;Ventral Forearm (Active)   Site Assessment Clean;Dry;Intact 09/27/24 2048   Dressing Status Clean;Dry 09/27/24 2048   Number of days: 2       Peripheral IV 09/26/24 20 G Left Antecubital (Active)   Site Assessment Clean;Dry;Intact 09/27/24 2047   Dressing Status Clean;Dry 09/27/24 2047   Number of days: 2       Code Status:  Full Code    I spent  minutes in the professional and overall care of this patient.        Sergey Bond MD

## 2024-09-28 NOTE — ASSESSMENT & PLAN NOTE
-BP elevated this morning-> patient just received her medications  - PRN Hydralazine ordered  -continue with BP medications  - check orthostatic BP  - cardiology following

## 2024-09-28 NOTE — ASSESSMENT & PLAN NOTE
- resumed eliquis  - continue with current medications  - currently SR with controlled HR  -telemetry  -cardiology following

## 2024-09-29 ENCOUNTER — HOME HEALTH ADMISSION (OUTPATIENT)
Dept: HOME HEALTH SERVICES | Facility: HOME HEALTH | Age: 89
End: 2024-09-29
Payer: COMMERCIAL

## 2024-09-29 VITALS
WEIGHT: 99.21 LBS | HEART RATE: 67 BPM | SYSTOLIC BLOOD PRESSURE: 148 MMHG | RESPIRATION RATE: 28 BRPM | TEMPERATURE: 98.6 F | HEIGHT: 57 IN | OXYGEN SATURATION: 96 % | BODY MASS INDEX: 21.4 KG/M2 | DIASTOLIC BLOOD PRESSURE: 60 MMHG

## 2024-09-29 LAB
ANION GAP SERPL CALCULATED.3IONS-SCNC: 10 MMOL/L (ref 10–20)
BUN SERPL-MCNC: 14 MG/DL (ref 6–23)
CALCIUM SERPL-MCNC: 8.4 MG/DL (ref 8.6–10.3)
CHLORIDE SERPL-SCNC: 109 MMOL/L (ref 98–107)
CO2 SERPL-SCNC: 23 MMOL/L (ref 21–32)
CREAT SERPL-MCNC: 0.52 MG/DL (ref 0.5–1.05)
EGFRCR SERPLBLD CKD-EPI 2021: 89 ML/MIN/1.73M*2
GLUCOSE SERPL-MCNC: 104 MG/DL (ref 74–99)
HOLD SPECIMEN: NORMAL
POTASSIUM SERPL-SCNC: 3.6 MMOL/L (ref 3.5–5.3)
SODIUM SERPL-SCNC: 138 MMOL/L (ref 136–145)

## 2024-09-29 PROCEDURE — 2500000001 HC RX 250 WO HCPCS SELF ADMINISTERED DRUGS (ALT 637 FOR MEDICARE OP): Performed by: INTERNAL MEDICINE

## 2024-09-29 PROCEDURE — 2500000001 HC RX 250 WO HCPCS SELF ADMINISTERED DRUGS (ALT 637 FOR MEDICARE OP): Performed by: NURSE PRACTITIONER

## 2024-09-29 PROCEDURE — 2500000002 HC RX 250 W HCPCS SELF ADMINISTERED DRUGS (ALT 637 FOR MEDICARE OP, ALT 636 FOR OP/ED): Performed by: INTERNAL MEDICINE

## 2024-09-29 PROCEDURE — 36415 COLL VENOUS BLD VENIPUNCTURE: CPT | Performed by: INTERNAL MEDICINE

## 2024-09-29 PROCEDURE — 80048 BASIC METABOLIC PNL TOTAL CA: CPT | Performed by: INTERNAL MEDICINE

## 2024-09-29 PROCEDURE — 99232 SBSQ HOSP IP/OBS MODERATE 35: CPT | Performed by: INTERNAL MEDICINE

## 2024-09-29 RX ORDER — ASPIRIN 81 MG/1
81 TABLET ORAL DAILY
Start: 2024-09-30 | End: 2024-10-04

## 2024-09-29 RX ORDER — CARVEDILOL 25 MG/1
25 TABLET ORAL
Qty: 60 TABLET | Refills: 1 | Status: SHIPPED | OUTPATIENT
Start: 2024-09-29

## 2024-09-29 RX ORDER — GABAPENTIN 300 MG/1
300 CAPSULE ORAL 2 TIMES DAILY
Start: 2024-09-29

## 2024-09-29 RX ORDER — AMLODIPINE BESYLATE 5 MG/1
5 TABLET ORAL DAILY
Qty: 30 TABLET | Refills: 1 | Status: SHIPPED | OUTPATIENT
Start: 2024-09-30

## 2024-09-29 ASSESSMENT — COGNITIVE AND FUNCTIONAL STATUS - GENERAL
TOILETING: A LITTLE
STANDING UP FROM CHAIR USING ARMS: A LITTLE
MOBILITY SCORE: 20
CLIMB 3 TO 5 STEPS WITH RAILING: A LITTLE
DAILY ACTIVITIY SCORE: 23
WALKING IN HOSPITAL ROOM: A LITTLE
MOVING TO AND FROM BED TO CHAIR: A LITTLE

## 2024-09-29 ASSESSMENT — PAIN SCALES - GENERAL: PAINLEVEL_OUTOF10: 0 - NO PAIN

## 2024-09-29 ASSESSMENT — PAIN - FUNCTIONAL ASSESSMENT: PAIN_FUNCTIONAL_ASSESSMENT: 0-10

## 2024-09-29 NOTE — PROGRESS NOTES
09/29/24 1538   Discharge Planning   Expected Discharge Disposition Home H     Will discharge home with [facility].  Referral received by [facility].  SOC is pending insurance approval.

## 2024-09-29 NOTE — NURSING NOTE
Patient noted lying in bed . Patient denies pain and distress . Patient A&Ox4 . This nurse assumes care for patient at this time .

## 2024-09-29 NOTE — DISCHARGE INSTRUCTIONS
Please follow up with cardiology and PCP  Take ASA for the total 7 days  Continue with Plavix and Eliquis  Home with HC

## 2024-09-29 NOTE — DISCHARGE SUMMARY
Discharge Diagnosis  NSTEMI (non-ST elevated myocardial infarction) (Multi)    Issues Requiring Follow-Up  Home care  Follow up with cardiology and PCP  ASA for 7 days, stop 10/5  Continue with Eliquis and Plavix    Discharge Meds     Medication List      START taking these medications     amLODIPine 5 mg tablet; Commonly known as: Norvasc; Take 1 tablet (5 mg)   by mouth once daily.; Start taking on: September 30, 2024   aspirin 81 mg EC tablet; Take 1 tablet (81 mg) by mouth once daily for 4   days. Stop after 10/5; Start taking on: September 30, 2024     CHANGE how you take these medications     carvedilol 25 mg tablet; Commonly known as: Coreg; Take 1 tablet (25 mg)   by mouth 2 times daily (morning and late afternoon).; What changed:   medication strength, how much to take   gabapentin 300 mg capsule; Commonly known as: Neurontin; Take 1 capsule   (300 mg) by mouth 2 times a day. Resume as needed; What changed: See the   new instructions.     CONTINUE taking these medications     apixaban 2.5 mg tablet; Commonly known as: Eliquis; Take 1 tablet (2.5   mg) by mouth 2 times a day.   ascorbic acid 100 mg tablet; Commonly known as: Vitamin C   atorvastatin 80 mg tablet; Commonly known as: Lipitor; Take 1 tablet (80   mg) by mouth once daily at bedtime.   calcitonin (salmon) 200 unit/actuation nasal spray; Commonly known as:   Miacalcin   cholecalciferol 50 mcg (2,000 unit) capsule; Commonly known as: Vitamin   D-3   clopidogrel 75 mg tablet; Commonly known as: Plavix; TAKE 1 TABLET BY   MOUTH ONCE DAILY   docusate sodium 100 mg capsule; Commonly known as: Colace; TAKE 1   CAPSULE BY MOUTH ONCE DAILY AS NEEDED to prevent constipation from iron   DULoxetine 30 mg DR capsule; Commonly known as: Cymbalta; TAKE 1 CAPSULE   BY MOUTH ONCE DAILY at dinner   ferrous sulfate (325 mg ferrous sulfate) tablet; Take 1 tablet by mouth   once daily with breakfast. M-W-F   hydroxychloroquine 200 mg tablet; Commonly known as:  Plaquenil   losartan 100 mg tablet; Commonly known as: Cozaar; Take 1 tablet (100   mg) by mouth once daily.   pantoprazole 40 mg EC tablet; Commonly known as: ProtoNix; TAKE 1 TABLET   BY MOUTH TWICE DAILY   PRESERVISION AREDS-2 ORAL   sulfaSALAzine 500 mg tablet; Commonly known as: Azulfidine     STOP taking these medications     hydroCHLOROthiazide 25 mg tablet; Commonly known as: HYDRODiuril   isosorbide mononitrate ER 60 mg 24 hr tablet; Commonly known as: Imdur   potassium chloride CR 20 mEq ER tablet; Commonly known as: Klor-Con M20   verapamil  mg ER tablet; Commonly known as: Calan-SR       Test Results Pending At Discharge  Pending Labs       No current pending labs.            Hospital Course   Patient reports associated shortness of breath and weakness. She states her shortness of breath is exertional. Her chest pain is substernal, constant, does not radiate.   Patient was seen and evaluated by cardiology for NSTEMI.-Cardiac catheterization and cardiac stent was placed.  Patient was cleared for discharge by cardiology on aspirin, Plavix, and Eliquis.  Patient will need outpatient follow-up with cardiology and PCP.    Pertinent Physical Exam At Time of Discharge  Physical Exam  Vitals reviewed.   HENT:      Head: Normocephalic.      Nose: Nose normal.      Mouth/Throat:      Mouth: Mucous membranes are moist.   Eyes:      Extraocular Movements: Extraocular movements intact.   Cardiovascular:      Rate and Rhythm: Regular rhythm.   Pulmonary:      Effort: Pulmonary effort is normal.   Abdominal:      General: Bowel sounds are normal.   Musculoskeletal:         General: Normal range of motion.      Cervical back: Neck supple.   Skin:     General: Skin is warm.   Neurological:      Mental Status: She is alert and oriented to person, place, and time.         Outpatient Follow-Up  Future Appointments   Date Time Provider Department Center   10/9/2024 10:00 AM Campos Stevenson MD [facility]    11/25/2024  1:00 PM Nessa Callaway MD [id number] [facility]   4/22/2025 11:30 AM Samuel Sky MD [id number] [facility]         Angi Morales, APRN-CNP   non-reactive

## 2024-09-29 NOTE — CARE PLAN
The patient's goals for the shift include      The clinical goals for the shift include remain chest pain free    Over the shift, the patient did not make progress toward the following goals. Barriers to progression include admitted with chest pain. Recommendations to address these barriers include report any chest pain.

## 2024-09-29 NOTE — PROGRESS NOTES
"Subjective Data:      Overnight Events:         Objective Data:  Last Recorded Vitals:  Vitals:    09/29/24 0200 09/29/24 0400 09/29/24 0557 09/29/24 0752   BP:  148/59     BP Location:  Right arm     Patient Position:  Lying     Pulse: 69 62 67    Resp: 23 20 (!) 28    Temp:  36.4 °C (97.5 °F)  37.2 °C (99 °F)   TempSrc:  Temporal  Temporal   SpO2: 94% 94% 96%    Weight:   45 kg (99 lb 3.3 oz)    Height:           Last Labs:  CBC - 9/28/2024:  4:28 AM  4.1 8.3 236    28.0      CMP - 9/29/2024:  5:33 AM  8.4 5.1 19 --- 0.7   2.7 3.0 14 56      PTT - 9/27/2024:  4:44 AM  _   _ 52.6     TROPHS   Date/Time Value Ref Range Status   09/26/2024 03:00  0 - 13 ng/L Final   09/26/2024 02:21  0 - 13 ng/L Final     BNP   Date/Time Value Ref Range Status   09/26/2024 02:21  0 - 99 pg/mL Final     HGBA1C   Date/Time Value Ref Range Status   09/10/2024 09:14 AM 5.8 See below % Final   03/14/2024 11:10 AM 6.3 See below % Final     LDLCALC   Date/Time Value Ref Range Status   09/10/2024 09:14 AM 50 65 - 130 mg/dL Final   03/14/2024 11:10 AM 44 65 - 130 mg/dL Final   10/27/2023 05:02 AM 31 65 - 130 mg/dL Final      Last I/O:  I/O last 3 completed shifts:  In: 1774.7 (39.4 mL/kg) [P.O.:950; I.V.:824.7 (18.3 mL/kg)]  Out: 1 (0 mL/kg) [Urine:1 (0 mL/kg/hr)]  Weight: 45 kg     Past Cardiology Tests (Last 3 Years):  EKG:  ECG 12 lead 09/26/2024 (Preliminary)      ECG 12 lead 10/27/2023    Echo:  Transthoracic Echo (TTE) Limited 10/27/2023    Ejection Fractions:  No results found for: \"EF\"  Cath:  No results found for this or any previous visit from the past 1095 days.    Stress Test:  No results found for this or any previous visit from the past 1095 days.    Cardiac Imaging:  No results found for this or any previous visit from the past 1095 days.      Inpatient Medications:  Scheduled medications   Medication Dose Route Frequency    amLODIPine  5 mg oral Daily    apixaban  2.5 mg oral q12h    ascorbic acid  250 mg " oral Daily    aspirin  81 mg oral Daily    atorvastatin  80 mg oral Nightly    carvedilol  25 mg oral BID    clopidogrel  75 mg oral Daily    DULoxetine  30 mg oral Daily with evening meal    ferrous sulfate (325 mg ferrous sulfate)  65 mg of iron oral Daily with breakfast    hydroxychloroquine  200 mg oral Daily    losartan  100 mg oral Daily    pantoprazole  40 mg oral Daily    potassium chloride CR  20 mEq oral Daily    sulfaSALAzine  500 mg oral BID     PRN medications   Medication    acetaminophen    Or    acetaminophen    Or    acetaminophen    benzocaine-menthol    dextromethorphan-guaifenesin    guaiFENesin    hydrALAZINE    ondansetron    Or    ondansetron    polyethylene glycol     Continuous Medications   Medication Dose Last Rate       Physical Exam:       Assessment/Plan   Patient has moderate LAD proximal disease of 40%.  Patent mid LAD stent with ostial diagonal 2 plaque shift MARISOL-3 flow no intervention needed for the left anterior descending artery or diagonals.  The left circumflex is left dominant vessel.  Has severe lesion at the proximal LPDA we treated with drug-eluting stent.  Left ventricular end-diastolic pressure mildly elevated at 19 mmHg.  LV gram was not obtained to avoid excess use of contrast     9/28: The patient had an uneventful night following her PCI to the distal LCx/proximal LPDA.  She has ongoing chronic pain in the left lateral chest region musculoskeletal in nature.  Systolic blood pressure slightly elevated stable sinus rhythm in the 60s per minute.  Will modify the patient's antihypertensive therapy by increasing the carvedilol from 12.5 mg twice daily to 25 mg twice daily.  Will change verapamil to amlodipine 5 mg daily to avoid bradycardia given the use of the carvedilol.  Will discontinue Imdur for now which may not be necessary for angina control following PCI.  Patient will remain on the losartan 100 mg daily.  The patient's renal parameters remain stable creatinine is  0.58.  She remains on potassium supplementation potassium is improved to 3.5.  Will observe patient for another 24 to 48 hours.  She is on dual antiplatelet therapy for her PCI along with low-dose Eliquis 2.5 mg twice daily.    9/29: The patient resting comfortably no complaints.  The automated blood pressure machine is still reading elevated systolic values of 190 mmHg but suspect that this may be an over read of her true blood pressures.  Recorded systolic blood pressures from yesterday are in the 150 mmHg range.  Her current antihypertensive therapy currently includes an increased dose of carvedilol 25 mg twice daily along with losartan 100 mg daily and amlodipine 5 mg daily as a replacement for previous verapamil.  Her potassium was only 2.8 on admission and would wish to avoid the use of a diuretic so that potassium supplements are not required.  Potassium is 3.6 today.  Patient appears prepared for discharge today but will need outpatient follow-up from her PCI procedure in approximately 2 weeks and to reassess her blood pressure at that time.  We discharged the patient on current therapy minus the potassium supplement which will need to be rechecked at the time of her first office visit.    Peripheral IV 09/26/24 22 G Right;Ventral Forearm (Active)   Site Assessment Clean;Dry;Intact 09/28/24 2100   Dressing Status Clean;Dry 09/28/24 2100   Number of days: 3       Peripheral IV 09/26/24 20 G Left Antecubital (Active)   Site Assessment Clean;Dry;Intact 09/28/24 2100   Dressing Status Clean;Dry 09/28/24 2100   Number of days: 3       Code Status:  Full Code    I spent 20 minutes in the professional and overall care of this patient.        Sergey Bond MD

## 2024-09-29 NOTE — CARE PLAN
The patient's goals for the shift include      The clinical goals for the shift include remain chest pain free    Over the shift, the patient did not make progress toward the following goals.   Problem: Pain - Adult  Goal: Verbalizes/displays adequate comfort level or baseline comfort level  Outcome: Progressing     Problem: Safety - Adult  Goal: Free from fall injury  Outcome: Progressing     Problem: Discharge Planning  Goal: Discharge to home or other facility with appropriate resources  Outcome: Progressing     Problem: Chronic Conditions and Co-morbidities  Goal: Patient's chronic conditions and co-morbidity symptoms are monitored and maintained or improved  Outcome: Progressing     Problem: Bathing  Goal: STG - Patient will bathe upper body with set up  Outcome: Progressing     Problem: Dressing Upper Extremities  Goal: LTG - Patient will complete upper body dressing independent  Outcome: Progressing     Problem: Grooming  Goal: STG - Patient completes grooming independent  Outcome: Progressing     Problem: Toileting  Goal: STG - Patient will complete toileting tasks independent with 2ww  Outcome: Progressing

## 2024-09-29 NOTE — CARE PLAN
The patient's goals for the shift include      The clinical goals for the shift include remain chest pain free    Over the shift, the patient did not make progress toward the following goals. Barriers to progression include admitted with chest pain. Recommendations to address these barriers include monitor for chest pain.

## 2024-09-29 NOTE — CARE PLAN
The patient's goals for the shift include      The clinical goals for the shift include maintain free of chest pain    Over the shift, the patient did not make progress toward the following goals.         Problem: Pain - Adult  Goal: Verbalizes/displays adequate comfort level or baseline comfort level  9/29/2024 1159 by Yenny Silva RN  Outcome: Adequate for Discharge  9/29/2024 1020 by Yenny Silva RN  Outcome: Progressing     Problem: Safety - Adult  Goal: Free from fall injury  9/29/2024 1159 by Yenny Silva RN  Outcome: Adequate for Discharge  9/29/2024 1020 by Yenny Silva RN  Outcome: Progressing     Problem: Discharge Planning  Goal: Discharge to home or other facility with appropriate resources  9/29/2024 1159 by Yenny Silva RN  Outcome: Adequate for Discharge  9/29/2024 1020 by Yenny Silva RN  Outcome: Progressing     Problem: Chronic Conditions and Co-morbidities  Goal: Patient's chronic conditions and co-morbidity symptoms are monitored and maintained or improved  9/29/2024 1159 by Yenny Silva RN  Outcome: Adequate for Discharge  9/29/2024 1020 by Yenny Silva RN  Outcome: Progressing     Problem: Bathing  Goal: STG - Patient will bathe upper body with set up  9/29/2024 1159 by Yenny Silva RN  Outcome: Adequate for Discharge  9/29/2024 1020 by Yenny Silva RN  Outcome: Progressing     Problem: Dressing Upper Extremities  Goal: LTG - Patient will complete upper body dressing independent  9/29/2024 1159 by Yenny Silva RN  Outcome: Adequate for Discharge  9/29/2024 1020 by Yenny Silva RN  Outcome: Progressing     Problem: Grooming  Goal: STG - Patient completes grooming independent  9/29/2024 1159 by Yenny Silva RN  Outcome: Adequate for Discharge  9/29/2024 1020 by Yenny Silva RN  Outcome: Progressing     Problem: Toileting  Goal: STG - Patient will complete toileting tasks independent with 2ww  9/29/2024 1159 by Yenny Silva  RN  Outcome: Adequate for Discharge  9/29/2024 1020 by Yenny Silva RN  Outcome: Progressing

## 2024-09-30 ENCOUNTER — DOCUMENTATION (OUTPATIENT)
Dept: HOME HEALTH SERVICES | Facility: HOME HEALTH | Age: 89
End: 2024-09-30
Payer: COMMERCIAL

## 2024-09-30 NOTE — PROGRESS NOTES
09/30/24 0852   Discharge Planning   Expected Discharge Disposition Home H     Received response from [facility] that they are OON with insurance and unable to accept patient.   Additional referral sent.       UPDATE:  [facility] confirmed they are willing and able to accept.  Final discharge orders, AVS, HCO attached to referral and sent.

## 2024-10-01 ENCOUNTER — TELEPHONE (OUTPATIENT)
Dept: PRIMARY CARE | Facility: CLINIC | Age: 89
End: 2024-10-01
Payer: COMMERCIAL

## 2024-10-01 ENCOUNTER — PATIENT OUTREACH (OUTPATIENT)
Dept: PRIMARY CARE | Facility: CLINIC | Age: 89
End: 2024-10-01
Payer: COMMERCIAL

## 2024-10-01 NOTE — PROGRESS NOTES
Discharge Facility: [facility]  Discharge Diagnosis: NSTEMI (non-ST elevated myocardial infarction) (Multi)   Admission Date: 9/26/24  Discharge Date:  9/29/24    PCP Appointment Date: No contact made. Task to office  Specialist Appointment Date: Unknown  Hospital Encounter and Summary Linked: Yes    Two attempts were made to reach patient within two business days after discharge. Voicemail left with contact information for patient to call back with any non-emergent questions or concerns.    Alexei Peace LPN

## 2024-10-01 NOTE — TELEPHONE ENCOUNTER
----- Message from Alexei Peace sent at 10/1/2024 12:51 PM EDT -----    Can you please contact pt to schedule hosp  follow up within 14 days of discharge.    Discharge Facility: [facility]  Discharge Diagnosis: NSTEMI (non-ST elevated myocardial infarction) (Multi)   Admission Date: 9/26/24  Discharge Date:  9/29/24    Thank you     Alexei Peace LPN

## 2024-10-03 LAB
ATRIAL RATE: 108 BPM
P AXIS: 77 DEGREES
P OFFSET: 201 MS
P ONSET: 138 MS
PR INTERVAL: 150 MS
Q ONSET: 213 MS
QRS COUNT: 18 BEATS
QRS DURATION: 96 MS
QT INTERVAL: 368 MS
QTC CALCULATION(BAZETT): 493 MS
QTC FREDERICIA: 447 MS
R AXIS: 45 DEGREES
T AXIS: 34 DEGREES
T OFFSET: 397 MS
VENTRICULAR RATE: 108 BPM

## 2024-10-07 DIAGNOSIS — J44.9 CHRONIC OBSTRUCTIVE PULMONARY DISEASE, UNSPECIFIED COPD TYPE (MULTI): Primary | ICD-10-CM

## 2024-10-07 DIAGNOSIS — Z86.73 HISTORY OF CEREBROVASCULAR ACCIDENT: ICD-10-CM

## 2024-10-09 ENCOUNTER — PREP FOR PROCEDURE (OUTPATIENT)
Dept: PAIN MEDICINE | Facility: CLINIC | Age: 89
End: 2024-10-09

## 2024-10-09 ENCOUNTER — OFFICE VISIT (OUTPATIENT)
Dept: PAIN MEDICINE | Facility: CLINIC | Age: 89
End: 2024-10-09
Payer: COMMERCIAL

## 2024-10-09 VITALS
DIASTOLIC BLOOD PRESSURE: 50 MMHG | WEIGHT: 112 LBS | BODY MASS INDEX: 22.58 KG/M2 | RESPIRATION RATE: 22 BRPM | HEART RATE: 81 BPM | HEIGHT: 59 IN | OXYGEN SATURATION: 99 % | SYSTOLIC BLOOD PRESSURE: 110 MMHG

## 2024-10-09 DIAGNOSIS — M96.1 POSTLAMINECTOMY SYNDROME, LUMBAR REGION: Primary | ICD-10-CM

## 2024-10-09 PROCEDURE — 1036F TOBACCO NON-USER: CPT | Performed by: ANESTHESIOLOGY

## 2024-10-09 PROCEDURE — 99214 OFFICE O/P EST MOD 30 MIN: CPT | Performed by: ANESTHESIOLOGY

## 2024-10-09 PROCEDURE — 3078F DIAST BP <80 MM HG: CPT | Performed by: ANESTHESIOLOGY

## 2024-10-09 PROCEDURE — 1111F DSCHRG MED/CURRENT MED MERGE: CPT | Performed by: ANESTHESIOLOGY

## 2024-10-09 PROCEDURE — 1159F MED LIST DOCD IN RCRD: CPT | Performed by: ANESTHESIOLOGY

## 2024-10-09 PROCEDURE — 3074F SYST BP LT 130 MM HG: CPT | Performed by: ANESTHESIOLOGY

## 2024-10-09 PROCEDURE — 1157F ADVNC CARE PLAN IN RCRD: CPT | Performed by: ANESTHESIOLOGY

## 2024-10-09 PROCEDURE — 1125F AMNT PAIN NOTED PAIN PRSNT: CPT | Performed by: ANESTHESIOLOGY

## 2024-10-09 ASSESSMENT — PAIN - FUNCTIONAL ASSESSMENT: PAIN_FUNCTIONAL_ASSESSMENT: 0-10

## 2024-10-09 ASSESSMENT — PAIN DESCRIPTION - DESCRIPTORS: DESCRIPTORS: ACHING

## 2024-10-09 ASSESSMENT — ENCOUNTER SYMPTOMS
ACTIVITY CHANGE: 1
BACK PAIN: 1
ARTHRALGIAS: 1

## 2024-10-09 ASSESSMENT — PAIN SCALES - GENERAL
PAINLEVEL: 7
PAINLEVEL_OUTOF10: 7

## 2024-10-09 NOTE — PROGRESS NOTES
The patient is an 89-year-old female with low back and bilateral leg pain.  The pain is constant but worse with activity.  She gets some relief with rest.  She is responded well to the occasional caudal epidural steroid injection.  Her last injection was several months ago.  The patient reports at least 75% relief of her back and leg pain for 6 months before the pain started to return.  The patient requests another caudal epidural steroid injection.  Incidentally, the patient recently underwent stent placement for non-ST elevated myocardial infarction.  The patient feels considerably better from a cardiac standpoint as well.    Review of Systems   Constitutional:  Positive for activity change.   Musculoskeletal:  Positive for arthralgias, back pain and gait problem.   All other systems reviewed and are negative.    GENERAL: alert and appropriate, in no distress, well-hydrated, well-nourished and happy, smiling, interactive  SKIN: no rash noted  RESPIRATORY: breathing non-labored and no grunting/flaring/retractions  CHEST: equal chest rise with normal respiratory effort  ABDOMEN: soft and non-tender  BACK: back normal in appearance, spine with reduced ROM  EXTREMITIES: strength intact  NEUROLOGIC: gait antalgic, SLR negative, sensation grossly intact.    Assessment and Plan    -Chronicity--chronic spinal pain    -Diagnostics--no new imaging ordered    -Pharmacologic--no change    -Psychologic--no need for psychologic intervention from my standpoint.  There are no mental health issues of which I am aware that are contributing to the patient's pain.  There are no substance abuse or alcohol abuse issues of which I am aware that are contributing to the patient's pain.    -Physical--we discussed the importance of physical therapy and exercise.  We discussed avoidance and modification techniques.    -Intervention--the patient is a candidate for a caudal epidural steroid injection which we are planning at the end of the  month.  There is no need to hold the patient's anticoagulants for this procedure.  I explained the risks benefits and alternatives of the procedure to the patient.  The patient wishes to proceed.    I spent time educating the patient on the condition including the treatment and the prognosis.  I invited the patient to call at anytime with any questions.

## 2024-10-15 ENCOUNTER — PATIENT OUTREACH (OUTPATIENT)
Dept: PRIMARY CARE | Facility: CLINIC | Age: 89
End: 2024-10-15
Payer: COMMERCIAL

## 2024-10-15 NOTE — PROGRESS NOTES
Call regarding appt. with PCP on (n/a) after hospitalization.  At time of outreach call the patient feels as if their condition has (improved) since last visit. Reviewed the PCP appointment with the pt and addressed any questions or concerns.    Alexei Peace LPN

## 2024-10-22 ENCOUNTER — APPOINTMENT (OUTPATIENT)
Dept: CARDIOLOGY | Facility: CLINIC | Age: 89
End: 2024-10-22
Payer: COMMERCIAL

## 2024-10-22 VITALS
HEART RATE: 63 BPM | HEIGHT: 59 IN | RESPIRATION RATE: 16 BRPM | DIASTOLIC BLOOD PRESSURE: 65 MMHG | TEMPERATURE: 98.6 F | BODY MASS INDEX: 22.58 KG/M2 | OXYGEN SATURATION: 95 % | WEIGHT: 112 LBS | SYSTOLIC BLOOD PRESSURE: 150 MMHG

## 2024-10-22 DIAGNOSIS — I48.0 PAF (PAROXYSMAL ATRIAL FIBRILLATION) (MULTI): ICD-10-CM

## 2024-10-22 DIAGNOSIS — I48.91 RAPID ATRIAL FIBRILLATION (MULTI): ICD-10-CM

## 2024-10-22 DIAGNOSIS — R79.89 TROPONIN LEVEL ELEVATED: ICD-10-CM

## 2024-10-22 DIAGNOSIS — I21.4 NSTEMI (NON-ST ELEVATED MYOCARDIAL INFARCTION) (MULTI): ICD-10-CM

## 2024-10-22 DIAGNOSIS — R07.89 OTHER CHEST PAIN: ICD-10-CM

## 2024-10-22 DIAGNOSIS — I25.10 CORONARY ARTERY DISEASE INVOLVING NATIVE CORONARY ARTERY OF NATIVE HEART WITHOUT ANGINA PECTORIS: Primary | ICD-10-CM

## 2024-10-22 PROCEDURE — 3077F SYST BP >= 140 MM HG: CPT | Performed by: INTERNAL MEDICINE

## 2024-10-22 PROCEDURE — 99214 OFFICE O/P EST MOD 30 MIN: CPT | Performed by: INTERNAL MEDICINE

## 2024-10-22 PROCEDURE — 3078F DIAST BP <80 MM HG: CPT | Performed by: INTERNAL MEDICINE

## 2024-10-22 PROCEDURE — 1126F AMNT PAIN NOTED NONE PRSNT: CPT | Performed by: INTERNAL MEDICINE

## 2024-10-22 PROCEDURE — 1159F MED LIST DOCD IN RCRD: CPT | Performed by: INTERNAL MEDICINE

## 2024-10-22 PROCEDURE — 1111F DSCHRG MED/CURRENT MED MERGE: CPT | Performed by: INTERNAL MEDICINE

## 2024-10-22 PROCEDURE — 1157F ADVNC CARE PLAN IN RCRD: CPT | Performed by: INTERNAL MEDICINE

## 2024-10-22 PROCEDURE — 1160F RVW MEDS BY RX/DR IN RCRD: CPT | Performed by: INTERNAL MEDICINE

## 2024-10-22 RX ORDER — ISOSORBIDE MONONITRATE 30 MG/1
30 TABLET, EXTENDED RELEASE ORAL DAILY
Qty: 30 TABLET | Refills: 11 | Status: SHIPPED | OUTPATIENT
Start: 2024-10-22 | End: 2025-10-22

## 2024-10-22 ASSESSMENT — LIFESTYLE VARIABLES
HOW OFTEN DO YOU HAVE A DRINK CONTAINING ALCOHOL: NEVER
HOW OFTEN DO YOU HAVE SIX OR MORE DRINKS ON ONE OCCASION: NEVER
SKIP TO QUESTIONS 9-10: 1
AUDIT TOTAL SCORE: 0
HAVE YOU OR SOMEONE ELSE BEEN INJURED AS A RESULT OF YOUR DRINKING: NO
AUDIT-C TOTAL SCORE: 0
HOW MANY STANDARD DRINKS CONTAINING ALCOHOL DO YOU HAVE ON A TYPICAL DAY: PATIENT DOES NOT DRINK
HAS A RELATIVE, FRIEND, DOCTOR, OR ANOTHER HEALTH PROFESSIONAL EXPRESSED CONCERN ABOUT YOUR DRINKING OR SUGGESTED YOU CUT DOWN: NO

## 2024-10-22 ASSESSMENT — PAIN SCALES - GENERAL: PAINLEVEL_OUTOF10: 0-NO PAIN

## 2024-10-22 ASSESSMENT — ENCOUNTER SYMPTOMS
DEPRESSION: 0
OCCASIONAL FEELINGS OF UNSTEADINESS: 0
LOSS OF SENSATION IN FEET: 0

## 2024-10-22 NOTE — PATIENT INSTRUCTIONS
Start taking isosorbide 30 mg a day.  Stop taking aspirin.  Watch your salt intake.  I will see you in 2 to 3 months.  Use compression stocking knee-high 20-30 mmHg.  Keep them during the day take them off at night.

## 2024-10-22 NOTE — PROGRESS NOTES
History of present illness:  is a very pleasant 89-year-old female with history of atrial fibrillation, hypertension, hyperlipidemia.  Patient follows up in my office after recent hospitalization for acute non-ST elevation myocardial infarction.  Cardiac catheterization showed patent LAD was diagonal one 99% disease small vessel for intervention.  Distal left circumflex left dominant artery of 90% treated with 1 drug-eluting stent.  Patient returns to my office for follow-up.  Still complaining of chest tightness and shortness of breath.  Has been having persistent lower extremity edema but she uses wheelchair and she barely walks.  Denies having nausea vomiting diaphoresis palpitations or syncope.    Past Medical History:   Diagnosis Date    Acute non-ST segment elevation myocardial infarction (Multi) 11/26/2023    Anemia     Arteriosclerosis of coronary artery 05/14/2023    Arthritis     Asthma     Atrial fibrillation (Multi) 11/26/2023    Jonas esophagus     Bilateral carotid artery occlusion 05/23/2023    CAD (coronary artery disease)     Cardiac rhythm disorder or disturbance or change 11/26/2023    Cervical radiculopathy     Chest pain 11/26/2023    Chronic obstructive pulmonary disease (Multi) 11/26/2023    Constipation     Coronary artery disease 11/26/2023    Degenerative joint disease     Dyslipidemia     Dysuria     Erosive esophagitis     GERD (gastroesophageal reflux disease)     Hypercholesterolemia 05/23/2023    Hyperparathyroidism (Multi)     Hypertensive urgency 11/26/2023    Impaired fasting glucose 11/26/2023    Irritable bowel syndrome (IBS)     Labile essential hypertension 11/26/2023    Labile hypertension     Left foot pain     Low blood pressure 11/26/2023    Macular degeneration     Mixed hyperlipidemia 11/26/2023    Neck pain     Osteoporosis 11/26/2023    REclast '19 Cr 1.4 switch to calcitonin.    Paroxysmal atrial fibrillation (Multi)     Polyarthritis with negative rheumatoid factor  "(Multi)     Post menopausal syndrome     Rapid atrial fibrillation (Multi) 05/14/2023    Spinal stenosis     Stage 3 chronic kidney disease (Multi) 11/26/2023    Stroke (Multi)     Transient ischemic attack 11/26/2023       Past Surgical History:   Procedure Laterality Date    CARDIAC CATHETERIZATION Left 9/27/2024    Procedure: Left Heart Cath;  Surgeon: Nessa Callaway MD;  Location: [facility];  Service: Cardiovascular;  Laterality: Left;    CARDIAC CATHETERIZATION N/A 9/27/2024    Procedure: PCI;  Surgeon: Nessa Callaway MD;  Location: [facility]iac [facility];  Service: Cardiovascular;  Laterality: N/A;    CARDIOVASCULAR STRESS TEST  2017    Dr. Khalil    CARDIOVASCULAR STRESS TEST  2004    Dr. Pantoja    CARPAL TUNNEL RELEASE Left 2014    CATARACT EXTRACTION Bilateral 2011    CHOLECYSTECTOMY      CORONARY STENT PLACEMENT  05/2023    LAD    CT HEAD ANGIO W AND WO IV CONTRAST  05/08/2013    CT HEAD ANGIO W AND WO IV CONTRAST [facility]    MR HEAD ANGIO WO IV CONTRAST  04/23/2013    MR HEAD ANGIO WO IV CONTRAST [facility]    MR HEAD ANGIO WO IV CONTRAST  07/20/2016    MR HEAD ANGIO WO IV CONTRAST [facility]    MR HEAD ANGIO WO IV CONTRAST  10/27/2023    MR HEAD ANGIO WO IV CONTRAST 10/27/2023 [facility]    MR NECK ANGIO WO IV CONTRAST  10/27/2023    MR NECK ANGIO WO IV CONTRAST 10/27/2023 [facility] MRI    OTHER SURGICAL HISTORY  02/14/2022    No history of surgery    REVISION TOTAL HIP ARTHROPLASTY Left 2013    Conversion from left ORIF    TOTAL HIP ARTHROPLASTY Right 2016       Allergies   Allergen Reactions    Meperidine Hallucinations     Other reaction(s): Mental Status Change    Morphine Hallucinations    Opioids - Morphine Analogues Hallucinations and Confusion    Pregabalin Rash, Other and Unknown     \"Flu like symptoms\"    Flu like symtoms    Tramadol Rash, Itching and Unknown        reports that she has never smoked. She has never been exposed to tobacco smoke. She has never used " smokeless tobacco. She reports that she does not currently use alcohol. She reports that she does not use drugs.    Family History   Problem Relation Name Age of Onset    No Known Problems Mother      No Known Problems Father      No Known Problems Sister         Patient's Medications   New Prescriptions    No medications on file   Previous Medications    AMLODIPINE (NORVASC) 5 MG TABLET    Take 1 tablet (5 mg) by mouth once daily.    APIXABAN (ELIQUIS) 2.5 MG TABLET    Take 1 tablet (2.5 mg) by mouth 2 times a day.    ASCORBIC ACID (VITAMIN C) 100 MG TABLET    Take 1 tablet (100 mg) by mouth once daily.    ATORVASTATIN (LIPITOR) 80 MG TABLET    Take 1 tablet (80 mg) by mouth once daily at bedtime.    CALCITONIN, SALMON, (MIACALCIN) 200 UNIT/ACTUATION NASAL SPRAY    Administer 1 spray into one nostril once daily. Alternate nostril    CARVEDILOL (COREG) 25 MG TABLET    Take 1 tablet (25 mg) by mouth 2 times daily (morning and late afternoon).    CHOLECALCIFEROL (VITAMIN D-3) 50 MCG (2,000 UNIT) CAPSULE    Take 1 capsule (50 mcg) by mouth early in the morning..    CLOPIDOGREL (PLAVIX) 75 MG TABLET    TAKE 1 TABLET BY MOUTH ONCE DAILY    DOCUSATE SODIUM (COLACE) 100 MG CAPSULE    TAKE 1 CAPSULE BY MOUTH ONCE DAILY AS NEEDED to prevent constipation from iron    DULOXETINE (CYMBALTA) 30 MG DR CAPSULE    TAKE 1 CAPSULE BY MOUTH ONCE DAILY at dinner    FERROUS SULFATE, 325 MG FERROUS SULFATE, TABLET    Take 1 tablet by mouth once daily with breakfast. M-W-[id number]    GABAPENTIN (NEURONTIN) 300 MG CAPSULE    Take 1 capsule (300 mg) by mouth 2 times a day. Resume as needed    HYDROXYCHLOROQUINE (PLAQUENIL) 200 MG TABLET    Take by mouth.    LOSARTAN (COZAAR) 100 MG TABLET    Take 1 tablet (100 mg) by mouth once daily.    PANTOPRAZOLE (PROTONIX) 40 MG EC TABLET    TAKE 1 TABLET BY MOUTH TWICE DAILY    SULFASALAZINE (AZULFIDINE) 500 MG TABLET    Take 1 tablet (500 mg) by mouth 2 times a day.    VIT C/E/ZN/COPPR/LUTEIN/ZEAXAN  (PRESERVISION AREDS-2 ORAL)    As directed orally   Modified Medications    No medications on file   Discontinued Medications    No medications on file       Objective   Physical Exam  General: Patient in no acute distress   HEENT: Atraumatic normocephalic.  Neck: Supple, jugular venous pressure within normal limit.  No bruits  Lungs: Clear to auscultation bilaterally  Cardiovascular: Regular rate and rhythm, normal heart sounds, no murmurs rubs or gallops  Abdomen: Soft nontender nondistended.  Normal bowel sounds.  Extremities: Warm to touch, no edema.    Lab Review   Admission on 09/26/2024, Discharged on 09/29/2024   Component Date Value    Ventricular Rate 09/26/2024 108     Atrial Rate 09/26/2024 108     AK Interval 09/26/2024 150     QRS Duration 09/26/2024 96     QT Interval 09/26/2024 368     QTC Calculation(Bazett) 09/26/2024 493     P Axis 09/26/2024 77     R Coggon 09/26/2024 45     T Axis 09/26/2024 34     QRS Count 09/26/2024 18     Q Onset 09/26/2024 213     P Onset 09/26/2024 138     P Offset 09/26/2024 201     T Offset 09/26/2024 397     QTC Fredericia 09/26/2024 447     WBC 09/26/2024 5.1     nRBC 09/26/2024 0.0     RBC 09/26/2024 3.65 (L)     Hemoglobin 09/26/2024 10.5 (L)     Hematocrit 09/26/2024 33.3 (L)     MCV 09/26/2024 91     MCH 09/26/2024 28.8     MCHC 09/26/2024 31.5 (L)     RDW 09/26/2024 15.5 (H)     Platelets 09/26/2024 247     Neutrophils % 09/26/2024 70.7     Immature Granulocytes %,* 09/26/2024 0.2     Lymphocytes % 09/26/2024 17.2     Monocytes % 09/26/2024 10.3     Eosinophils % 09/26/2024 1.0     Basophils % 09/26/2024 0.6     Neutrophils Absolute 09/26/2024 3.58     Immature Granulocytes Ab* 09/26/2024 0.01     Lymphocytes Absolute 09/26/2024 0.87     Monocytes Absolute 09/26/2024 0.52     Eosinophils Absolute 09/26/2024 0.05     Basophils Absolute 09/26/2024 0.03     Glucose 09/26/2024 88     Sodium 09/26/2024 143     Potassium 09/26/2024 2.8 (LL)     Chloride 09/26/2024 109 (H)      Bicarbonate 09/26/2024 29     Anion Gap 09/26/2024 8 (L)     Urea Nitrogen 09/26/2024 9     Creatinine 09/26/2024 0.62     eGFR 09/26/2024 85     Calcium 09/26/2024 8.1 (L)     Albumin 09/26/2024 3.3 (L)     Alkaline Phosphatase 09/26/2024 68     Total Protein 09/26/2024 5.3 (L)     AST 09/26/2024 19     Bilirubin, Total 09/26/2024 0.4     ALT 09/26/2024 15     BNP 09/26/2024 600 (H)     Troponin I, High Sensiti* 09/26/2024 145 (HH)     Troponin I, High Sensiti* 09/26/2024 118 (HH)     Coronavirus 2019, PCR 09/26/2024 Not Detected     Flu A Result 09/26/2024 Not Detected     Flu B Result 09/26/2024 Not Detected     Extra Tube 09/26/2024 Hold for add-ons.     Glucose 09/26/2024 139 (H)     Sodium 09/26/2024 145     Potassium 09/26/2024 2.1 (LL)     Chloride 09/26/2024 115 (H)     Bicarbonate 09/26/2024 22     Anion Gap 09/26/2024 10     Urea Nitrogen 09/26/2024 7     Creatinine 09/26/2024 0.51     eGFR 09/26/2024 89     Calcium 09/26/2024 6.7 (L)     Albumin 09/26/2024 2.8 (L)     Alkaline Phosphatase 09/26/2024 55     Total Protein 09/26/2024 4.5 (L)     AST 09/26/2024 15     Bilirubin, Total 09/26/2024 0.3     ALT 09/26/2024 11     aPTT 09/26/2024 24.9     WBC 09/26/2024 6.8     nRBC 09/26/2024 0.0     RBC 09/26/2024 3.53 (L)     Hemoglobin 09/26/2024 10.2 (L)     Hematocrit 09/26/2024 32.9 (L)     MCV 09/26/2024 93     MCH 09/26/2024 28.9     MCHC 09/26/2024 31.0 (L)     RDW 09/26/2024 15.6 (H)     Platelets 09/26/2024 247     Glucose 09/27/2024 117 (H)     Sodium 09/27/2024 143     Potassium 09/27/2024 3.5     Chloride 09/27/2024 112 (H)     Bicarbonate 09/27/2024 26     Anion Gap 09/27/2024 9 (L)     Urea Nitrogen 09/27/2024 8     Creatinine 09/27/2024 0.54     eGFR 09/27/2024 88     Calcium 09/27/2024 9.0     Albumin 09/27/2024 3.1 (L)     Alkaline Phosphatase 09/27/2024 56     Total Protein 09/27/2024 5.1 (L)     AST 09/27/2024 19     Bilirubin, Total 09/27/2024 0.7     ALT 09/27/2024 14     WBC 09/27/2024  5.4     nRBC 09/27/2024 0.0     RBC 09/27/2024 3.14 (L)     Hemoglobin 09/27/2024 8.9 (L)     Hematocrit 09/27/2024 29.0 (L)     MCV 09/27/2024 92     MCH 09/27/2024 28.3     MCHC 09/27/2024 30.7 (L)     RDW 09/27/2024 15.5 (H)     Platelets 09/27/2024 233     Neutrophils % 09/27/2024 62.8     Immature Granulocytes %,* 09/27/2024 0.2     Lymphocytes % 09/27/2024 25.0     Monocytes % 09/27/2024 9.9     Eosinophils % 09/27/2024 1.5     Basophils % 09/27/2024 0.6     Neutrophils Absolute 09/27/2024 3.41     Immature Granulocytes Ab* 09/27/2024 0.01     Lymphocytes Absolute 09/27/2024 1.36     Monocytes Absolute 09/27/2024 0.54     Eosinophils Absolute 09/27/2024 0.08     Basophils Absolute 09/27/2024 0.03     Magnesium 09/27/2024 1.99     POCT Calcium, Ionized 09/27/2024 1.26     Magnesium 09/26/2024 1.71     Thyroid Stimulating Horm* 09/26/2024 0.57     aPTT 09/27/2024 52.6 (H)     WBC 09/28/2024 4.1 (L)     nRBC 09/28/2024 0.0     RBC 09/28/2024 2.96 (L)     Hemoglobin 09/28/2024 8.3 (L)     Hematocrit 09/28/2024 28.0 (L)     MCV 09/28/2024 95     MCH 09/28/2024 28.0     MCHC 09/28/2024 29.6 (L)     RDW 09/28/2024 15.7 (H)     Platelets 09/28/2024 236     Glucose 09/28/2024 89     Sodium 09/28/2024 140     Potassium 09/28/2024 3.5     Chloride 09/28/2024 110 (H)     Bicarbonate 09/28/2024 24     Anion Gap 09/28/2024 10     Urea Nitrogen 09/28/2024 12     Creatinine 09/28/2024 0.58     eGFR 09/28/2024 87     Calcium 09/28/2024 8.0 (L)     Phosphorus 09/28/2024 2.7     Albumin 09/28/2024 3.0 (L)     Glucose 09/29/2024 104 (H)     Sodium 09/29/2024 138     Potassium 09/29/2024 3.6     Chloride 09/29/2024 109 (H)     Bicarbonate 09/29/2024 23     Anion Gap 09/29/2024 10     Urea Nitrogen 09/29/2024 14     Creatinine 09/29/2024 0.52     eGFR 09/29/2024 89     Calcium 09/29/2024 8.4 (L)     Extra Tube 09/29/2024 Hold for add-ons.    Lab on 09/10/2024   Component Date Value    WBC 09/10/2024 4.1 (L)     nRBC 09/10/2024 0.0      RBC 09/10/2024 3.55 (L)     Hemoglobin 09/10/2024 10.2 (L)     Hematocrit 09/10/2024 31.6 (L)     MCV 09/10/2024 89     MCH 09/10/2024 28.7     MCHC 09/10/2024 32.3     RDW 09/10/2024 13.6     Platelets 09/10/2024 234     Neutrophils % 09/10/2024 57.7     Immature Granulocytes %,* 09/10/2024 0.2     Lymphocytes % 09/10/2024 27.7     Monocytes % 09/10/2024 12.2     Eosinophils % 09/10/2024 1.7     Basophils % 09/10/2024 0.5     Neutrophils Absolute 09/10/2024 2.37     Immature Granulocytes Ab* 09/10/2024 0.01     Lymphocytes Absolute 09/10/2024 1.14     Monocytes Absolute 09/10/2024 0.50     Eosinophils Absolute 09/10/2024 0.07     Basophils Absolute 09/10/2024 0.02     Glucose 09/10/2024 107 (H)     Sodium 09/10/2024 136     Potassium 09/10/2024 3.7     Chloride 09/10/2024 94 (L)     Bicarbonate 09/10/2024 29     Urea Nitrogen 09/10/2024 10     Creatinine 09/10/2024 0.60     eGFR 09/10/2024 86     Calcium 09/10/2024 9.8     Albumin 09/10/2024 4.0     Alkaline Phosphatase 09/10/2024 92     Total Protein 09/10/2024 6.0     AST 09/10/2024 16     Bilirubin, Total 09/10/2024 0.5     ALT 09/10/2024 13     Anion Gap 09/10/2024 13     Hemoglobin A1C 09/10/2024 5.8 (H)     Estimated Average Glucose 09/10/2024 120     Cholesterol 09/10/2024 146     HDL-Cholesterol 09/10/2024 75.0     Cholesterol/HDL Ratio 09/10/2024 1.9     LDL Calculated 09/10/2024 50 (L)     Triglycerides 09/10/2024 104     Vitamin D, 25-Hydroxy, T* 09/10/2024 45         Assessment/Plan   Patient Active Problem List   Diagnosis    Cerebrovascular disease    Dizziness and giddiness    Difficulty using verbal communication    Slurred speech    At risk for bleeding    Abnormal gait    Cardiac rhythm disorder or disturbance or change    Acute lower urinary tract infection    Acute non-ST segment elevation myocardial infarction (Multi)    Anemia    Chest pain    Ankle pain    Anxiety    Weakness    Asthma    Rapid atrial fibrillation (Multi)    Cervical  radiculopathy    Chronic obstructive pulmonary disease (Multi)    Arteriosclerosis of coronary artery    Cough    Dehydration    Dysfunction of both eustachian tubes    Dyspnea    Dysuria    Erosive esophagitis    Failed back syndrome    Fall    Fever    Fracture of patella    Gastroesophageal reflux disease    History of cerebrovascular accident    Hypercalcemia    Hyperparathyroidism (Multi)    Hypertensive urgency    Hypokalemia    Hyponatremia    Impaired fasting glucose    Arthritis of left glenohumeral joint    Injury of knee    Injury of neck    Irritable bowel syndrome    Leucopenia    Macular degeneration    Infectious disease    Hypercholesterolemia    Muscle weakness    Neck pain    Osteoporosis    Pain in left foot    Pneumonia    Osteoarthritis    Rib pain    Bilateral hearing loss    Sensorineural hearing loss (SNHL) of both ears    Somnolence    Spinal stenosis    Chronic kidney disease due to hypertension    Labile essential hypertension    Low blood pressure    Near syncope    Transient ischemic attack    Vitamin D deficiency    Bilateral carotid artery occlusion    Chronic pain of left upper extremity    Complication of surgical procedure    Constipation    Do not resuscitate    Dysfunction of eustachian tube    History of anemia    Labral tear of long head of left biceps tendon    Osteoarthritis of left glenohumeral joint    Restless legs syndrome    Polyarteritis (Multi)    Sacroiliitis (CMS-HCC)    Postlaminectomy syndrome of lumbar region    Lumbar radiculopathy    NSTEMI (non-ST elevated myocardial infarction) (Multi)    Hypocalcemia    Troponin level elevated    Other chest pain    PAF (paroxysmal atrial fibrillation) (Multi)    Essential hypertension      This is a very pleasant 89-year-old female with history of atrial fibrillation, hypertension, hyperlipidemia.  Patient follows up in my office after recent hospitalization for acute non-ST elevation myocardial infarction.  Cardiac  catheterization showed patent LAD was diagonal one 99% disease small vessel for intervention.  Distal left circumflex left dominant artery of 90% treated with 1 drug-eluting stent.  Patient returns to my office for follow-up.  Still complaining of chest tightness and shortness of breath.  Has been having persistent lower extremity edema but she uses wheelchair and she barely walks.  Denies having nausea vomiting diaphoresis palpitations or syncope.  Blood pressure slightly elevated today.  I will start her on isosorbide 30 mg oral daily for chest tightness shortness of breath.  Does not appear volume overloaded to me.  She does have lower extremity edema 1+ likely secondary to venous stasis and positional.  Recommend her to have compression stocking.  Will follow-up in 2 months.  Will refer for GI evaluation for chronic constipation and abdominal pain.      Nessa Callaway MD

## 2024-10-23 ENCOUNTER — TELEPHONE (OUTPATIENT)
Dept: PRIMARY CARE | Facility: CLINIC | Age: 89
End: 2024-10-23
Payer: COMMERCIAL

## 2024-10-28 ENCOUNTER — APPOINTMENT (OUTPATIENT)
Dept: RADIOLOGY | Facility: HOSPITAL | Age: 89
DRG: 280 | End: 2024-10-28
Payer: COMMERCIAL

## 2024-10-28 ENCOUNTER — APPOINTMENT (OUTPATIENT)
Dept: CARDIOLOGY | Facility: HOSPITAL | Age: 89
DRG: 280 | End: 2024-10-28
Payer: COMMERCIAL

## 2024-10-28 ENCOUNTER — HOSPITAL ENCOUNTER (INPATIENT)
Facility: HOSPITAL | Age: 89
DRG: 280 | End: 2024-10-28
Admitting: STUDENT IN AN ORGANIZED HEALTH CARE EDUCATION/TRAINING PROGRAM
Payer: COMMERCIAL

## 2024-10-28 ENCOUNTER — PATIENT OUTREACH (OUTPATIENT)
Dept: PRIMARY CARE | Facility: CLINIC | Age: 89
End: 2024-10-28
Payer: COMMERCIAL

## 2024-10-28 DIAGNOSIS — R79.89 ELEVATED TROPONIN: ICD-10-CM

## 2024-10-28 DIAGNOSIS — I50.41 ACUTE COMBINED SYSTOLIC (CONGESTIVE) AND DIASTOLIC (CONGESTIVE) HEART FAILURE: ICD-10-CM

## 2024-10-28 DIAGNOSIS — R07.9 CHEST PAIN, UNSPECIFIED TYPE: ICD-10-CM

## 2024-10-28 DIAGNOSIS — I50.9 ACUTE HEART FAILURE, UNSPECIFIED HEART FAILURE TYPE: ICD-10-CM

## 2024-10-28 DIAGNOSIS — J96.01 ACUTE HYPOXIC RESPIRATORY FAILURE (MULTI): ICD-10-CM

## 2024-10-28 DIAGNOSIS — R00.0 SINUS TACHYCARDIA: Primary | ICD-10-CM

## 2024-10-28 DIAGNOSIS — E87.70 GENERALIZED EDEMA DUE TO FLUID OVERLOAD: ICD-10-CM

## 2024-10-28 DIAGNOSIS — I63.9 CEREBROVASCULAR ACCIDENT (CVA), UNSPECIFIED MECHANISM (MULTI): ICD-10-CM

## 2024-10-28 DIAGNOSIS — R60.1 GENERALIZED EDEMA DUE TO FLUID OVERLOAD: ICD-10-CM

## 2024-10-28 DIAGNOSIS — R55 SYNCOPE, UNSPECIFIED SYNCOPE TYPE: ICD-10-CM

## 2024-10-28 DIAGNOSIS — M30.0: ICD-10-CM

## 2024-10-28 DIAGNOSIS — I63.89 CEREBROVASCULAR ACCIDENT (CVA) DUE TO OTHER MECHANISM: ICD-10-CM

## 2024-10-28 DIAGNOSIS — R60.0 EXTREMITY EDEMA: ICD-10-CM

## 2024-10-28 DIAGNOSIS — J90 BILATERAL PLEURAL EFFUSION: ICD-10-CM

## 2024-10-28 PROBLEM — R20.0 LEFT ARM NUMBNESS: Status: ACTIVE | Noted: 2024-10-28

## 2024-10-28 LAB
ALBUMIN SERPL BCP-MCNC: 3.6 G/DL (ref 3.4–5)
ALP SERPL-CCNC: 105 U/L (ref 33–136)
ALT SERPL W P-5'-P-CCNC: 13 U/L (ref 7–45)
ANION GAP SERPL CALCULATED.3IONS-SCNC: 12 MMOL/L (ref 10–20)
APPEARANCE UR: CLEAR
AST SERPL W P-5'-P-CCNC: 17 U/L (ref 9–39)
ATRIAL RATE: 136 BPM
BASOPHILS # BLD AUTO: 0.04 X10*3/UL (ref 0–0.1)
BASOPHILS NFR BLD AUTO: 0.5 %
BILIRUB SERPL-MCNC: 0.6 MG/DL (ref 0–1.2)
BILIRUB UR STRIP.AUTO-MCNC: NEGATIVE MG/DL
BNP SERPL-MCNC: 253 PG/ML (ref 0–99)
BUN SERPL-MCNC: 16 MG/DL (ref 6–23)
CALCIUM SERPL-MCNC: 9.1 MG/DL (ref 8.6–10.3)
CARDIAC TROPONIN I PNL SERPL HS: 43 NG/L (ref 0–13)
CARDIAC TROPONIN I PNL SERPL HS: 608 NG/L (ref 0–13)
CARDIAC TROPONIN I PNL SERPL HS: 96 NG/L (ref 0–13)
CHLORIDE SERPL-SCNC: 99 MMOL/L (ref 98–107)
CO2 SERPL-SCNC: 29 MMOL/L (ref 21–32)
COLOR UR: COLORLESS
CREAT SERPL-MCNC: 0.54 MG/DL (ref 0.5–1.05)
D DIMER PPP FEU-MCNC: 0.9 MG/L FEU (ref 0.19–0.5)
EGFRCR SERPLBLD CKD-EPI 2021: 88 ML/MIN/1.73M*2
EOSINOPHIL # BLD AUTO: 0.09 X10*3/UL (ref 0–0.4)
EOSINOPHIL NFR BLD AUTO: 1.2 %
ERYTHROCYTE [DISTWIDTH] IN BLOOD BY AUTOMATED COUNT: 15.9 % (ref 11.5–14.5)
GLUCOSE SERPL-MCNC: 138 MG/DL (ref 74–99)
GLUCOSE UR STRIP.AUTO-MCNC: NORMAL MG/DL
HCT VFR BLD AUTO: 33.6 % (ref 36–46)
HGB BLD-MCNC: 10.4 G/DL (ref 12–16)
HOLD SPECIMEN: NORMAL
IMM GRANULOCYTES # BLD AUTO: 0.03 X10*3/UL (ref 0–0.5)
IMM GRANULOCYTES NFR BLD AUTO: 0.4 % (ref 0–0.9)
KETONES UR STRIP.AUTO-MCNC: NEGATIVE MG/DL
LEUKOCYTE ESTERASE UR QL STRIP.AUTO: NEGATIVE
LYMPHOCYTES # BLD AUTO: 0.77 X10*3/UL (ref 0.8–3)
LYMPHOCYTES NFR BLD AUTO: 10.5 %
MAGNESIUM SERPL-MCNC: 1.5 MG/DL (ref 1.6–2.4)
MCH RBC QN AUTO: 28.7 PG (ref 26–34)
MCHC RBC AUTO-ENTMCNC: 31 G/DL (ref 32–36)
MCV RBC AUTO: 93 FL (ref 80–100)
MONOCYTES # BLD AUTO: 0.72 X10*3/UL (ref 0.05–0.8)
MONOCYTES NFR BLD AUTO: 9.8 %
NEUTROPHILS # BLD AUTO: 5.7 X10*3/UL (ref 1.6–5.5)
NEUTROPHILS NFR BLD AUTO: 77.6 %
NITRITE UR QL STRIP.AUTO: NEGATIVE
NRBC BLD-RTO: 0 /100 WBCS (ref 0–0)
PH UR STRIP.AUTO: 7 [PH]
PLATELET # BLD AUTO: 281 X10*3/UL (ref 150–450)
POTASSIUM SERPL-SCNC: 3.6 MMOL/L (ref 3.5–5.3)
PROT SERPL-MCNC: 6.2 G/DL (ref 6.4–8.2)
PROT UR STRIP.AUTO-MCNC: NEGATIVE MG/DL
Q ONSET: 218 MS
QRS COUNT: 23 BEATS
QRS DURATION: 86 MS
QT INTERVAL: 254 MS
QTC CALCULATION(BAZETT): 382 MS
QTC FREDERICIA: 333 MS
R AXIS: 7 DEGREES
RBC # BLD AUTO: 3.63 X10*6/UL (ref 4–5.2)
RBC # UR STRIP.AUTO: NEGATIVE /UL
SODIUM SERPL-SCNC: 136 MMOL/L (ref 136–145)
SP GR UR STRIP.AUTO: 1.01
T AXIS: 197 DEGREES
T OFFSET: 345 MS
UROBILINOGEN UR STRIP.AUTO-MCNC: NORMAL MG/DL
VENTRICULAR RATE: 136 BPM
WBC # BLD AUTO: 7.4 X10*3/UL (ref 4.4–11.3)

## 2024-10-28 PROCEDURE — 72100 X-RAY EXAM L-S SPINE 2/3 VWS: CPT | Performed by: RADIOLOGY

## 2024-10-28 PROCEDURE — 84484 ASSAY OF TROPONIN QUANT: CPT | Performed by: NURSE PRACTITIONER

## 2024-10-28 PROCEDURE — 83735 ASSAY OF MAGNESIUM: CPT | Performed by: NURSE PRACTITIONER

## 2024-10-28 PROCEDURE — 2500000001 HC RX 250 WO HCPCS SELF ADMINISTERED DRUGS (ALT 637 FOR MEDICARE OP)

## 2024-10-28 PROCEDURE — 96361 HYDRATE IV INFUSION ADD-ON: CPT

## 2024-10-28 PROCEDURE — 80053 COMPREHEN METABOLIC PANEL: CPT | Performed by: NURSE PRACTITIONER

## 2024-10-28 PROCEDURE — 83880 ASSAY OF NATRIURETIC PEPTIDE: CPT | Performed by: NURSE PRACTITIONER

## 2024-10-28 PROCEDURE — 36415 COLL VENOUS BLD VENIPUNCTURE: CPT | Performed by: NURSE PRACTITIONER

## 2024-10-28 PROCEDURE — 81003 URINALYSIS AUTO W/O SCOPE: CPT

## 2024-10-28 PROCEDURE — 2500000001 HC RX 250 WO HCPCS SELF ADMINISTERED DRUGS (ALT 637 FOR MEDICARE OP): Performed by: NURSE PRACTITIONER

## 2024-10-28 PROCEDURE — 93010 ELECTROCARDIOGRAM REPORT: CPT | Performed by: INTERNAL MEDICINE

## 2024-10-28 PROCEDURE — 2500000004 HC RX 250 GENERAL PHARMACY W/ HCPCS (ALT 636 FOR OP/ED): Performed by: NURSE PRACTITIONER

## 2024-10-28 PROCEDURE — 2500000005 HC RX 250 GENERAL PHARMACY W/O HCPCS: Performed by: NURSE PRACTITIONER

## 2024-10-28 PROCEDURE — 71046 X-RAY EXAM CHEST 2 VIEWS: CPT

## 2024-10-28 PROCEDURE — 71275 CT ANGIOGRAPHY CHEST: CPT | Performed by: STUDENT IN AN ORGANIZED HEALTH CARE EDUCATION/TRAINING PROGRAM

## 2024-10-28 PROCEDURE — 72125 CT NECK SPINE W/O DYE: CPT | Performed by: RADIOLOGY

## 2024-10-28 PROCEDURE — 99223 1ST HOSP IP/OBS HIGH 75: CPT | Performed by: NURSE PRACTITIONER

## 2024-10-28 PROCEDURE — 72170 X-RAY EXAM OF PELVIS: CPT

## 2024-10-28 PROCEDURE — 70450 CT HEAD/BRAIN W/O DYE: CPT | Performed by: RADIOLOGY

## 2024-10-28 PROCEDURE — 85025 COMPLETE CBC W/AUTO DIFF WBC: CPT | Performed by: NURSE PRACTITIONER

## 2024-10-28 PROCEDURE — 71275 CT ANGIOGRAPHY CHEST: CPT

## 2024-10-28 PROCEDURE — 96374 THER/PROPH/DIAG INJ IV PUSH: CPT

## 2024-10-28 PROCEDURE — 70450 CT HEAD/BRAIN W/O DYE: CPT

## 2024-10-28 PROCEDURE — 72170 X-RAY EXAM OF PELVIS: CPT | Performed by: RADIOLOGY

## 2024-10-28 PROCEDURE — 72100 X-RAY EXAM L-S SPINE 2/3 VWS: CPT

## 2024-10-28 PROCEDURE — 2550000001 HC RX 255 CONTRASTS

## 2024-10-28 PROCEDURE — 93970 EXTREMITY STUDY: CPT

## 2024-10-28 PROCEDURE — 93971 EXTREMITY STUDY: CPT | Performed by: RADIOLOGY

## 2024-10-28 PROCEDURE — 72125 CT NECK SPINE W/O DYE: CPT

## 2024-10-28 PROCEDURE — 71046 X-RAY EXAM CHEST 2 VIEWS: CPT | Performed by: RADIOLOGY

## 2024-10-28 PROCEDURE — 85300 ANTITHROMBIN III ACTIVITY: CPT | Performed by: NURSE PRACTITIONER

## 2024-10-28 PROCEDURE — 1200000002 HC GENERAL ROOM WITH TELEMETRY DAILY

## 2024-10-28 PROCEDURE — 93005 ELECTROCARDIOGRAM TRACING: CPT

## 2024-10-28 PROCEDURE — 99291 CRITICAL CARE FIRST HOUR: CPT | Mod: 25

## 2024-10-28 RX ORDER — SULFASALAZINE 500 MG/1
500 TABLET ORAL 2 TIMES DAILY
Status: DISCONTINUED | OUTPATIENT
Start: 2024-10-28 | End: 2024-10-28

## 2024-10-28 RX ORDER — AMLODIPINE BESYLATE 5 MG/1
5 TABLET ORAL ONCE
Status: COMPLETED | OUTPATIENT
Start: 2024-10-28 | End: 2024-10-28

## 2024-10-28 RX ORDER — ISOSORBIDE MONONITRATE 120 MG/1
120 TABLET, EXTENDED RELEASE ORAL DAILY
Status: DISCONTINUED | OUTPATIENT
Start: 2024-10-29 | End: 2024-11-04 | Stop reason: HOSPADM

## 2024-10-28 RX ORDER — ACETAMINOPHEN 650 MG/1
650 SUPPOSITORY RECTAL 3 TIMES DAILY
Status: DISCONTINUED | OUTPATIENT
Start: 2024-10-28 | End: 2024-10-28

## 2024-10-28 RX ORDER — CARVEDILOL 25 MG/1
25 TABLET ORAL ONCE
Status: COMPLETED | OUTPATIENT
Start: 2024-10-28 | End: 2024-10-28

## 2024-10-28 RX ORDER — ATORVASTATIN CALCIUM 80 MG/1
80 TABLET, FILM COATED ORAL NIGHTLY
Status: DISCONTINUED | OUTPATIENT
Start: 2024-10-28 | End: 2024-11-04 | Stop reason: HOSPADM

## 2024-10-28 RX ORDER — DOCUSATE SODIUM 100 MG/1
100 CAPSULE, LIQUID FILLED ORAL 2 TIMES DAILY
Status: DISCONTINUED | OUTPATIENT
Start: 2024-10-28 | End: 2024-10-28

## 2024-10-28 RX ORDER — FUROSEMIDE 10 MG/ML
20 INJECTION INTRAMUSCULAR; INTRAVENOUS DAILY
Status: DISCONTINUED | OUTPATIENT
Start: 2024-10-28 | End: 2024-11-02

## 2024-10-28 RX ORDER — CARVEDILOL 25 MG/1
25 TABLET ORAL
Status: DISCONTINUED | OUTPATIENT
Start: 2024-10-28 | End: 2024-10-30

## 2024-10-28 RX ORDER — LIDOCAINE 560 MG/1
2 PATCH PERCUTANEOUS; TOPICAL; TRANSDERMAL DAILY
Status: DISCONTINUED | OUTPATIENT
Start: 2024-10-28 | End: 2024-11-01

## 2024-10-28 RX ORDER — GABAPENTIN 300 MG/1
300 CAPSULE ORAL 2 TIMES DAILY
Status: DISCONTINUED | OUTPATIENT
Start: 2024-10-28 | End: 2024-11-04 | Stop reason: HOSPADM

## 2024-10-28 RX ORDER — ACETAMINOPHEN 325 MG/1
650 TABLET ORAL 3 TIMES DAILY
Status: DISCONTINUED | OUTPATIENT
Start: 2024-10-28 | End: 2024-11-04 | Stop reason: HOSPADM

## 2024-10-28 RX ORDER — BISACODYL 5 MG
10 TABLET, DELAYED RELEASE (ENTERIC COATED) ORAL DAILY PRN
Status: DISCONTINUED | OUTPATIENT
Start: 2024-10-28 | End: 2024-11-04 | Stop reason: HOSPADM

## 2024-10-28 RX ORDER — LOSARTAN POTASSIUM 100 MG/1
100 TABLET ORAL ONCE
Status: COMPLETED | OUTPATIENT
Start: 2024-10-28 | End: 2024-10-28

## 2024-10-28 RX ORDER — FERROUS SULFATE 325(65) MG
65 TABLET ORAL
Status: DISCONTINUED | OUTPATIENT
Start: 2024-10-29 | End: 2024-11-04 | Stop reason: HOSPADM

## 2024-10-28 RX ORDER — LOSARTAN POTASSIUM 100 MG/1
100 TABLET ORAL DAILY
Status: DISCONTINUED | OUTPATIENT
Start: 2024-10-29 | End: 2024-11-04 | Stop reason: HOSPADM

## 2024-10-28 RX ORDER — SULFASALAZINE 500 MG/1
500 TABLET, DELAYED RELEASE ORAL 2 TIMES DAILY
Status: DISCONTINUED | OUTPATIENT
Start: 2024-10-28 | End: 2024-11-04 | Stop reason: HOSPADM

## 2024-10-28 RX ORDER — PANTOPRAZOLE SODIUM 40 MG/1
40 TABLET, DELAYED RELEASE ORAL 2 TIMES DAILY
Status: DISCONTINUED | OUTPATIENT
Start: 2024-10-28 | End: 2024-11-04 | Stop reason: HOSPADM

## 2024-10-28 RX ORDER — POLYETHYLENE GLYCOL 3350 17 G/17G
17 POWDER, FOR SOLUTION ORAL DAILY PRN
Status: DISCONTINUED | OUTPATIENT
Start: 2024-10-28 | End: 2024-10-29

## 2024-10-28 RX ORDER — ONDANSETRON HYDROCHLORIDE 2 MG/ML
4 INJECTION, SOLUTION INTRAVENOUS ONCE
Status: COMPLETED | OUTPATIENT
Start: 2024-10-28 | End: 2024-10-28

## 2024-10-28 RX ORDER — CHOLECALCIFEROL (VITAMIN D3) 50 MCG
2000 TABLET ORAL DAILY
Status: DISCONTINUED | OUTPATIENT
Start: 2024-10-28 | End: 2024-11-04 | Stop reason: HOSPADM

## 2024-10-28 RX ORDER — ASCORBIC ACID 250 MG
125 TABLET ORAL DAILY
Status: DISCONTINUED | OUTPATIENT
Start: 2024-10-28 | End: 2024-11-04 | Stop reason: HOSPADM

## 2024-10-28 RX ORDER — ISOSORBIDE MONONITRATE 30 MG/1
30 TABLET, EXTENDED RELEASE ORAL DAILY
Status: DISCONTINUED | OUTPATIENT
Start: 2024-10-28 | End: 2024-10-28

## 2024-10-28 RX ORDER — ACETAMINOPHEN 160 MG/5ML
650 SOLUTION ORAL 3 TIMES DAILY
Status: DISCONTINUED | OUTPATIENT
Start: 2024-10-28 | End: 2024-10-28

## 2024-10-28 RX ORDER — HYDROXYCHLOROQUINE SULFATE 200 MG/1
200 TABLET, FILM COATED ORAL DAILY
Status: DISCONTINUED | OUTPATIENT
Start: 2024-10-28 | End: 2024-11-04 | Stop reason: HOSPADM

## 2024-10-28 RX ORDER — AMLODIPINE BESYLATE 5 MG/1
5 TABLET ORAL DAILY
Status: DISCONTINUED | OUTPATIENT
Start: 2024-10-29 | End: 2024-11-03

## 2024-10-28 RX ORDER — CLOPIDOGREL BISULFATE 75 MG/1
75 TABLET ORAL DAILY
Status: DISCONTINUED | OUTPATIENT
Start: 2024-10-28 | End: 2024-11-04 | Stop reason: HOSPADM

## 2024-10-28 RX ORDER — DULOXETIN HYDROCHLORIDE 30 MG/1
30 CAPSULE, DELAYED RELEASE ORAL
Status: DISCONTINUED | OUTPATIENT
Start: 2024-10-28 | End: 2024-11-04 | Stop reason: HOSPADM

## 2024-10-28 RX ORDER — DOCUSATE SODIUM 100 MG/1
100 CAPSULE, LIQUID FILLED ORAL 2 TIMES DAILY
Status: DISCONTINUED | OUTPATIENT
Start: 2024-10-28 | End: 2024-11-04 | Stop reason: HOSPADM

## 2024-10-28 SDOH — ECONOMIC STABILITY: FOOD INSECURITY: WITHIN THE PAST 12 MONTHS, THE FOOD YOU BOUGHT JUST DIDN'T LAST AND YOU DIDN'T HAVE MONEY TO GET MORE.: NEVER TRUE

## 2024-10-28 SDOH — SOCIAL STABILITY: SOCIAL INSECURITY: ARE YOU MARRIED, WIDOWED, DIVORCED, SEPARATED, NEVER MARRIED, OR LIVING WITH A PARTNER?: MARRIED

## 2024-10-28 SDOH — HEALTH STABILITY: MENTAL HEALTH: HOW MANY DRINKS CONTAINING ALCOHOL DO YOU HAVE ON A TYPICAL DAY WHEN YOU ARE DRINKING?: PATIENT DOES NOT DRINK

## 2024-10-28 SDOH — SOCIAL STABILITY: SOCIAL INSECURITY: WITHIN THE LAST YEAR, HAVE YOU BEEN AFRAID OF YOUR PARTNER OR EX-PARTNER?: NO

## 2024-10-28 SDOH — HEALTH STABILITY: PHYSICAL HEALTH
HOW OFTEN DO YOU NEED TO HAVE SOMEONE HELP YOU WHEN YOU READ INSTRUCTIONS, PAMPHLETS, OR OTHER WRITTEN MATERIAL FROM YOUR DOCTOR OR PHARMACY?: SOMETIMES

## 2024-10-28 SDOH — SOCIAL STABILITY: SOCIAL INSECURITY: WITHIN THE LAST YEAR, HAVE YOU BEEN HUMILIATED OR EMOTIONALLY ABUSED IN OTHER WAYS BY YOUR PARTNER OR EX-PARTNER?: NO

## 2024-10-28 SDOH — ECONOMIC STABILITY: INCOME INSECURITY: IN THE PAST 12 MONTHS HAS THE ELECTRIC, GAS, OIL, OR WATER COMPANY THREATENED TO SHUT OFF SERVICES IN YOUR HOME?: NO

## 2024-10-28 SDOH — HEALTH STABILITY: MENTAL HEALTH: HOW OFTEN DO YOU HAVE SIX OR MORE DRINKS ON ONE OCCASION?: NEVER

## 2024-10-28 SDOH — ECONOMIC STABILITY: TRANSPORTATION INSECURITY: IN THE PAST 12 MONTHS, HAS LACK OF TRANSPORTATION KEPT YOU FROM MEDICAL APPOINTMENTS OR FROM GETTING MEDICATIONS?: NO

## 2024-10-28 SDOH — HEALTH STABILITY: MENTAL HEALTH: HOW OFTEN DO YOU HAVE A DRINK CONTAINING ALCOHOL?: NEVER

## 2024-10-28 SDOH — SOCIAL STABILITY: SOCIAL INSECURITY
WITHIN THE LAST YEAR, HAVE YOU BEEN RAPED OR FORCED TO HAVE ANY KIND OF SEXUAL ACTIVITY BY YOUR PARTNER OR EX-PARTNER?: NO

## 2024-10-28 SDOH — ECONOMIC STABILITY: FOOD INSECURITY: WITHIN THE PAST 12 MONTHS, YOU WORRIED THAT YOUR FOOD WOULD RUN OUT BEFORE YOU GOT THE MONEY TO BUY MORE.: NEVER TRUE

## 2024-10-28 SDOH — ECONOMIC STABILITY: HOUSING INSECURITY: IN THE LAST 12 MONTHS, WAS THERE A TIME WHEN YOU WERE NOT ABLE TO PAY THE MORTGAGE OR RENT ON TIME?: NO

## 2024-10-28 SDOH — HEALTH STABILITY: PHYSICAL HEALTH: ON AVERAGE, HOW MANY DAYS PER WEEK DO YOU ENGAGE IN MODERATE TO STRENUOUS EXERCISE (LIKE A BRISK WALK)?: 0 DAYS

## 2024-10-28 SDOH — ECONOMIC STABILITY: HOUSING INSECURITY: AT ANY TIME IN THE PAST 12 MONTHS, WERE YOU HOMELESS OR LIVING IN A SHELTER (INCLUDING NOW)?: NO

## 2024-10-28 SDOH — HEALTH STABILITY: PHYSICAL HEALTH: ON AVERAGE, HOW MANY MINUTES DO YOU ENGAGE IN EXERCISE AT THIS LEVEL?: 0 MIN

## 2024-10-28 SDOH — ECONOMIC STABILITY: HOUSING INSECURITY: IN THE PAST 12 MONTHS, HOW MANY TIMES HAVE YOU MOVED WHERE YOU WERE LIVING?: 0

## 2024-10-28 ASSESSMENT — COGNITIVE AND FUNCTIONAL STATUS - GENERAL
PATIENT BASELINE BEDBOUND: NO
MOBILITY SCORE: 24
DAILY ACTIVITIY SCORE: 24

## 2024-10-28 ASSESSMENT — COLUMBIA-SUICIDE SEVERITY RATING SCALE - C-SSRS
2. HAVE YOU ACTUALLY HAD ANY THOUGHTS OF KILLING YOURSELF?: NO
6. HAVE YOU EVER DONE ANYTHING, STARTED TO DO ANYTHING, OR PREPARED TO DO ANYTHING TO END YOUR LIFE?: NO
1. IN THE PAST MONTH, HAVE YOU WISHED YOU WERE DEAD OR WISHED YOU COULD GO TO SLEEP AND NOT WAKE UP?: NO

## 2024-10-28 ASSESSMENT — ENCOUNTER SYMPTOMS
NUMBNESS: 0
RHINORRHEA: 0
APPETITE CHANGE: 0
FEVER: 0
DYSURIA: 0
LIGHT-HEADEDNESS: 0
BACK PAIN: 1
DIZZINESS: 0
VOMITING: 0
NAUSEA: 1
ABDOMINAL PAIN: 0
ABDOMINAL DISTENTION: 0
CONSTIPATION: 0
FATIGUE: 0
DIARRHEA: 0
CHEST TIGHTNESS: 0
COUGH: 0
PALPITATIONS: 0
SHORTNESS OF BREATH: 1

## 2024-10-28 ASSESSMENT — ACTIVITIES OF DAILY LIVING (ADL)
JUDGMENT_ADEQUATE_SAFELY_COMPLETE_DAILY_ACTIVITIES: YES
WALKS IN HOME: INDEPENDENT
TOILETING: INDEPENDENT
PATIENT'S MEMORY ADEQUATE TO SAFELY COMPLETE DAILY ACTIVITIES?: YES
DRESSING YOURSELF: INDEPENDENT
HEARING - RIGHT EAR: FUNCTIONAL
LACK_OF_TRANSPORTATION: NO
GROOMING: INDEPENDENT
FEEDING YOURSELF: INDEPENDENT
ADEQUATE_TO_COMPLETE_ADL: YES
LACK_OF_TRANSPORTATION: NO
HEARING - LEFT EAR: FUNCTIONAL
BATHING: INDEPENDENT

## 2024-10-28 ASSESSMENT — PAIN DESCRIPTION - DESCRIPTORS
DESCRIPTORS: ACHING
DESCRIPTORS: HEAVINESS

## 2024-10-28 ASSESSMENT — LIFESTYLE VARIABLES
SKIP TO QUESTIONS 9-10: 1
AUDIT-C TOTAL SCORE: 0
HAVE YOU EVER FELT YOU SHOULD CUT DOWN ON YOUR DRINKING: NO
EVER HAD A DRINK FIRST THING IN THE MORNING TO STEADY YOUR NERVES TO GET RID OF A HANGOVER: NO
HAVE PEOPLE ANNOYED YOU BY CRITICIZING YOUR DRINKING: NO
TOTAL SCORE: 0
EVER FELT BAD OR GUILTY ABOUT YOUR DRINKING: NO

## 2024-10-28 ASSESSMENT — PAIN - FUNCTIONAL ASSESSMENT
PAIN_FUNCTIONAL_ASSESSMENT: 0-10
PAIN_FUNCTIONAL_ASSESSMENT: 0-10

## 2024-10-28 ASSESSMENT — PAIN DESCRIPTION - ORIENTATION: ORIENTATION: LEFT

## 2024-10-28 ASSESSMENT — PAIN DESCRIPTION - LOCATION: LOCATION: ARM

## 2024-10-28 ASSESSMENT — PAIN SCALES - GENERAL
PAINLEVEL_OUTOF10: 0 - NO PAIN
PAINLEVEL_OUTOF10: 10 - WORST POSSIBLE PAIN
PAINLEVEL_OUTOF10: 3

## 2024-10-28 ASSESSMENT — PAIN DESCRIPTION - FREQUENCY: FREQUENCY: CONSTANT/CONTINUOUS

## 2024-10-28 NOTE — ASSESSMENT & PLAN NOTE
Hx of CVA  Patient is on statin/apixaban/Plavix-continue  Patient fell on Friday, now with numbness in LUE, back pain  Imaging as above  Neurochecks  Consult neurology, appreciate recs

## 2024-10-28 NOTE — ASSESSMENT & PLAN NOTE
"Patient reports \"blacking out\" multiple times at home resulting in falls  Fall precautions  Check Ortho's  Echocardiogram  Ultrasound of the carotids  "

## 2024-10-28 NOTE — ED PROVIDER NOTES
HPI   Chief Complaint   Patient presents with    Arm Injury     Pt sts left arm pain from a fall on Friday evening. Pt is on blood thinners. Pt sts she did hit her head.       HPI  Please see my MDM see my MDMSee my MDM      Patient History   Past Medical History:   Diagnosis Date    Acute non-ST segment elevation myocardial infarction (Multi) 11/26/2023    Anemia     Arteriosclerosis of coronary artery 05/14/2023    Arthritis     Asthma     Atrial fibrillation (Multi) 11/26/2023    Jonas esophagus     Bilateral carotid artery occlusion 05/23/2023    CAD (coronary artery disease)     Cardiac rhythm disorder or disturbance or change 11/26/2023    Cervical radiculopathy     Chest pain 11/26/2023    Chronic obstructive pulmonary disease (Multi) 11/26/2023    Constipation     Coronary artery disease 11/26/2023    Degenerative joint disease     Dyslipidemia     Dysuria     Erosive esophagitis     GERD (gastroesophageal reflux disease)     Hypercholesterolemia 05/23/2023    Hyperparathyroidism (Multi)     Hypertensive urgency 11/26/2023    Impaired fasting glucose 11/26/2023    Irritable bowel syndrome (IBS)     Labile essential hypertension 11/26/2023    Labile hypertension     Left foot pain     Low blood pressure 11/26/2023    Macular degeneration     Mixed hyperlipidemia 11/26/2023    Neck pain     Osteoporosis 11/26/2023    REclast '19 Cr 1.4 switch to calcitonin.    Paroxysmal atrial fibrillation (Multi)     Polyarthritis with negative rheumatoid factor (Multi)     Post menopausal syndrome     Rapid atrial fibrillation (Multi) 05/14/2023    Spinal stenosis     Stage 3 chronic kidney disease (Multi) 11/26/2023    Stroke (Multi)     Transient ischemic attack 11/26/2023     Past Surgical History:   Procedure Laterality Date    CARDIAC CATHETERIZATION Left 9/27/2024    Procedure: Left Heart Cath;  Surgeon: Nessa Callaway MD;  Location: [facility] Cardiac Cath Lab;  Service: Cardiovascular;  Laterality: Left;    CARDIAC  CATHETERIZATION N/A 9/27/2024    Procedure: PCI;  Surgeon: Nessa Callaway MD;  Location: [facility] Cardiac Cath [facility];  Service: Cardiovascular;  Laterality: N/A;    CARDIOVASCULAR STRESS TEST  2017    Dr. Khalil    CARDIOVASCULAR STRESS TEST  2004    Dr. Pantoja    CARPAL TUNNEL RELEASE Left 2014    CATARACT EXTRACTION Bilateral 2011    CHOLECYSTECTOMY      CORONARY STENT PLACEMENT  05/2023    LAD    CT HEAD ANGIO W AND WO IV CONTRAST  05/08/2013    CT HEAD ANGIO W AND WO IV CONTRAST [facility]    MR HEAD ANGIO WO IV CONTRAST  04/23/2013    MR HEAD ANGIO WO IV CONTRAST [facility]    MR HEAD ANGIO WO IV CONTRAST  07/20/2016    MR HEAD ANGIO WO IV CONTRAST [facility]    MR HEAD ANGIO WO IV CONTRAST  10/27/2023    MR HEAD ANGIO WO IV CONTRAST 10/27/2023 [facility]    MR NECK ANGIO WO IV CONTRAST  10/27/2023    MR NECK ANGIO WO IV CONTRAST 10/27/2023 [facility] MRI    OTHER SURGICAL HISTORY  02/14/2022    No history of surgery    REVISION TOTAL HIP ARTHROPLASTY Left 2013    Conversion from left ORIF    TOTAL HIP ARTHROPLASTY Right 2016     Family History   Problem Relation Name Age of Onset    No Known Problems Mother      No Known Problems Father      No Known Problems Sister       Social History     Tobacco Use    Smoking status: Never     Passive exposure: Never    Smokeless tobacco: Never   Vaping Use    Vaping status: Never Used   Substance Use Topics    Alcohol use: Not Currently    Drug use: Never       Physical Exam   ED Triage Vitals [10/28/24 0856]   Temperature Heart Rate Respirations BP   36.3 °C (97.3 °F) 91 20 (!) 188/104      Pulse Ox Temp src Heart Rate Source Patient Position   97 % -- Monitor --      BP Location FiO2 (%)     -- --       Physical Exam  CONSTITUTIONAL: Vital signs reviewed as charted, well-developed and in no distress  Eyes: Extraocular muscles are intact. Pupils equal round and reactive to light. Conjunctiva are pink.  Left lateral eye small subconjunctival hemorrhage   ENT:  Mucous membranes are moist. Tongue in the midline. Pharynx was without erythema or exudates, uvula midline  LUNGS: Breath sounds equal and clear to auscultation. Good air exchange, no wheezes rales or retractions, pulse oximetry is charted.  HEART: Regular rate and rhythm without murmur thrill or rub, strong tones, auscultation is normal.  ABDOMEN: Soft and nontender without guarding rebound rigidity or mass. Bowel sounds are present and normal in all quadrants. There is no palpable masses or aneurysms identified. No hepatosplenomegaly, normal abdominal exam.  Neuro: The patient is awake, alert and oriented ×3. Moving all 4 extremities and answering questions appropriately.   MUSCULOSKELETAL: No midline tenderness step-off deformities or crepitus noted on palpation cervical thoracic or lumbar spine.  Does have some tenderness in the right SI joint.  No permanent spinal muscle spasms noted.  PSYCH: Awake alert oriented, normal mood and affect.  Skin:  Dry, normal color, warm to the touch, no rash present.  Small hematoma to the right occipital area.      ED Course & MDM   ED Course as of 10/28/24 1606   Mon Oct 28, 2024   0920 EKG interpreted by myself independently, EKG shows a sinus tachycardia, rate of 136 bpm, QRS 86, , QTc 382, patient has no ST elevations or depressions, negative for acute MI. [JOSE D]   0944 Heart Rate(!): 136  Patient noted to be hypertensive and tachycardic, when speaking with her she did not take any of her morning medications will order at this time. [RJ]   1459 EKG Time:1458  EKG Interpretation time:1459  EKG Interpretation: EKG shows normal sinus rhythm with a rate of 71 bpm, normal axis, QTc 460, nonspecific T wave flattening but no evidence of STEMI.    EKG was interpreted by myself independently [JL]      ED Course User Index  [JOSE D] Oz Acosta DO  [JL] William Peres DO  [RJ] DANIA Borja-CNP         Diagnoses as of 10/28/24 1606   Sinus tachycardia   Elevated troponin    Chest pain, unspecified type                 No data recorded     Phil Coma Scale Score: 15 (10/28/24 0901 : Alejandra Bond RN)                           Medical Decision Making  History obtained from: patient    Vital signs, nursing notes, current medications, past medical history, Surgical history, allergies, social history, family History were reviewed.         HPI:  Patient is an 89-year-old female history of CAD on Eliquis presenting to ED today for evaluation of interesting that she fell 3 days ago.  She fell down on her right hip also hit the right side of her head.  No loss of consciousness.  Does take the Eliquis.  And today she states she developed some heaviness in her chest into the left arm had some nausea as well.  Think she does have a little bit of a sore throat.  Denies fever chills or night sweats.  Denies dizziness, shortness of breath,, minimal extremity edema.  Vital signs positive for hypertension but otherwise grossly unremarkable.  She is nontoxic-appearing      10 point ROS was reviewed and negative except Noted above in HPI.  DDX: as listed above          MDM Summary/considerations:  EMERGENCY DEPARTMENT COURSE and DIFFERENTIAL DIAGNOSIS/MDM:    The patient presented with a chief complaint of fall, chest pain. The differential diagnosis associated with this patient's presentation includes CAD, musculoskeletal, closed head injury, intracranial hemorrhage.     Vitals:    Vitals:    10/28/24 1400 10/28/24 1415 10/28/24 1430 10/28/24 1530   BP: 148/63  145/64 167/78   Pulse: 73 73 73 76   Resp: (!) 23 19 18 13   Temp:       SpO2:       Weight:       Height:           ED Course as of 10/28/24 1606   Mon Oct 28, 2024   0920 EKG interpreted by myself independently, EKG shows a sinus tachycardia, rate of 136 bpm, QRS 86, , QTc 382, patient has no ST elevations or depressions, negative for acute MI. [JOSE D]   0944 Heart Rate(!): 136  Patient noted to be hypertensive and tachycardic, when  speaking with her she did not take any of her morning medications will order at this time. [RJ]   1459 EKG Time:1458  EKG Interpretation time:1459  EKG Interpretation: EKG shows normal sinus rhythm with a rate of 71 bpm, normal axis, QTc 460, nonspecific T wave flattening but no evidence of STEMI.    EKG was interpreted by myself independently [JL]      ED Course User Index  [JOSE D] Oz Acosta DO  [JL] William Peres DO  [RJ] DANIA Borja-CNP         Diagnoses as of 10/28/24 1606   Sinus tachycardia   Elevated troponin   Chest pain, unspecified type       History Limited by:    None    Independent history obtained from:    Family Member      External records reviewed:    None    Diagnostics interpreted by me:    EKG interpreted by my attending physician, Xray(s) of the chest and pelvis of the chest and pelvis, and CT Scan(s)      Discussions with other clinicians:    Hospitalist/Admitting Team April    Chronic conditions impacting care:    Hypertension and Heart Disease    Social determinants of health affecting care:    None    Diagnostic tests considered but not performed: Ct angio of the chest    ED Medications managed:    Medications   sodium chloride 0.9 % bolus 500 mL (0 mL intravenous Stopped 10/28/24 1131)   ondansetron (Zofran) injection 4 mg (4 mg intravenous Given 10/28/24 1003)   amLODIPine (Norvasc) tablet 5 mg (5 mg oral Given 10/28/24 1003)   carvedilol (Coreg) tablet 25 mg (25 mg oral Given 10/28/24 1003)   losartan (Cozaar) tablet 100 mg (100 mg oral Given 10/28/24 1003)   carvedilol (Coreg) tablet 25 mg (25 mg oral Given 10/28/24 1157)   nitroglycerin (Eliezer-Bid) 2 % ointment 1 inch (1 inch transdermal Given 10/28/24 1157)   iohexol (OMNIPaque) 350 mg iodine/mL solution 75 mL (75 mL intravenous Given 10/28/24 1405)       Prescription drugs considered:    None    Screenings:        Labs Reviewed   COMPREHENSIVE METABOLIC PANEL - Abnormal       Result Value    Glucose 138 (*)     Sodium  136      Potassium 3.6      Chloride 99      Bicarbonate 29      Anion Gap 12      Urea Nitrogen 16      Creatinine 0.54      eGFR 88      Calcium 9.1      Albumin 3.6      Alkaline Phosphatase 105      Total Protein 6.2 (*)     AST 17      Bilirubin, Total 0.6      ALT 13     CBC WITH AUTO DIFFERENTIAL - Abnormal    WBC 7.4      nRBC 0.0      RBC 3.63 (*)     Hemoglobin 10.4 (*)     Hematocrit 33.6 (*)     MCV 93      MCH 28.7      MCHC 31.0 (*)     RDW 15.9 (*)     Platelets 281      Neutrophils % 77.6      Immature Granulocytes %, Automated 0.4      Lymphocytes % 10.5      Monocytes % 9.8      Eosinophils % 1.2      Basophils % 0.5      Neutrophils Absolute 5.70 (*)     Immature Granulocytes Absolute, Automated 0.03      Lymphocytes Absolute 0.77 (*)     Monocytes Absolute 0.72      Eosinophils Absolute 0.09      Basophils Absolute 0.04     SERIAL TROPONIN-INITIAL - Abnormal    Troponin I, High Sensitivity 43 (*)     Narrative:     Less than 99th percentile of normal range cutoff-  Female and children under 18 years old <14 ng/L; Male <21 ng/L: Negative  Repeat testing should be performed if clinically indicated.     Female and children under 18 years old 14-50 ng/L; Male 21-50 ng/L:  Consistent with possible cardiac damage and possible increased clinical   risk. Serial measurements may help to assess extent of myocardial damage.     >50 ng/L: Consistent with cardiac damage, increased clinical risk and  myocardial infarction. Serial measurements may help assess extent of   myocardial damage.      NOTE: Children less than 1 year old may have higher baseline troponin   levels and results should be interpreted in conjunction with the overall   clinical context.     NOTE: Troponin I testing is performed using a different   testing methodology at [facility] than at other   [facility] hospitals. Direct result comparisons should only   be made within the same method.   SERIAL TROPONIN, 1 HOUR - Abnormal     Troponin I, High Sensitivity 96 (*)     Narrative:     Less than 99th percentile of normal range cutoff-  Female and children under 18 years old <14 ng/L; Male <21 ng/L: Negative  Repeat testing should be performed if clinically indicated.     Female and children under 18 years old 14-50 ng/L; Male 21-50 ng/L:  Consistent with possible cardiac damage and possible increased clinical   risk. Serial measurements may help to assess extent of myocardial damage.     >50 ng/L: Consistent with cardiac damage, increased clinical risk and  myocardial infarction. Serial measurements may help assess extent of   myocardial damage.      NOTE: Children less than 1 year old may have higher baseline troponin   levels and results should be interpreted in conjunction with the overall   clinical context.     NOTE: Troponin I testing is performed using a different   testing methodology at [facility] than at other   [facility] hospitals. Direct result comparisons should only   be made within the same method.   B-TYPE NATRIURETIC PEPTIDE - Abnormal     (*)     Narrative:        <100 pg/mL - Heart failure unlikely  100-299 pg/mL - Intermediate probability of acute heart                  failure exacerbation. Correlate with clinical                  context and patient history.    >=300 pg/mL - Heart Failure likely. Correlate with clinical                  context and patient history.    BNP testing is performed using different testing methodology at [facility] than at other [facility]. Direct result comparisons should only be made within the same method.      URINALYSIS WITH REFLEX CULTURE AND MICROSCOPIC - Abnormal    Color, Urine Colorless (*)     Appearance, Urine Clear      Specific Gravity, Urine 1.008      pH, Urine 7.0      Protein, Urine NEGATIVE      Glucose, Urine Normal      Blood, Urine NEGATIVE      Ketones, Urine NEGATIVE      Bilirubin, Urine NEGATIVE      Urobilinogen, Urine Normal       Nitrite, Urine NEGATIVE      Leukocyte Esterase, Urine NEGATIVE     D-DIMER, NON VTE - Abnormal    D-Dimer Non VTE, Quant (mg/L FEU) 0.90 (*)     Narrative:     THROMBOEMBOLIC EVENTS CANNOT BE EXCLUDED SOLELY ON THE BASIS OF THE D-DIMER LEVEL BEING WITHIN THE NORMAL REFERENCE RANGE. D-DIMER LEVELS LESS THAN 0.5 MG/L FEU IN CONJUNCTION WITH A LOW CLINICAL PROBABILITY HAVE AN EXCELLENT NEGATIVE PREDICTIVE VALUE IN EXCLUDING A DIAGNOSIS OF PULMONARY EMBOLUS (PE) OR DEEP VEIN THROMBOSIS (DVT). ELEVATED D-DIMER LEVELS ARE NOT SPECIFIC TO PE OR DVT, AND MAY BE SEEN IN PATIENTS WITH DIC, ADVANCED AGE, PREGNANCY, MALIGNANCY, LIVER DISEASE, INFECTION, AND INFLAMMATORY CONDITIONS AMONG OTHERS. D-DIMER LEVELS MAY BE DECREASED IN PATIENTS RECEIVING ANTI-COAGULATION THERAPY.   TROPONIN SERIES- (INITIAL, 1 HR)    Narrative:     The following orders were created for panel order Troponin I Series, High Sensitivity (0, 1 HR).  Procedure                               Abnormality         Status                     ---------                               -----------         ------                     Troponin I, High Sensiti...[[id number]]  Abnormal            Final result               Troponin, High Sensitivi...[[id number]]  Abnormal            Final result                 Please view results for these tests on the individual orders.   URINALYSIS WITH REFLEX CULTURE AND MICROSCOPIC    Narrative:     The following orders were created for panel order Urinalysis with Reflex Culture and Microscopic.  Procedure                               Abnormality         Status                     ---------                               -----------         ------                     Urinalysis with Reflex C...[[id number]]  Abnormal            Final result               Extra Urine Gray Tube[[id number]]                            In process                   Please view results for these tests on the individual orders.   EXTRA URINE GRAY TUBE     CT  angio chest for pulmonary embolism   Final Result   1.  No evidence of pulmonary emboli. Severe coronary artery   atherosclerotic calcifications.   2. Mild cardiomegaly with biatrial enlargement. Small to   moderate-size right greater than left pleural effusions and bibasilar   subsegmental atelectasis. Diffuse interseptal thickening and   scattered central predominant ground glass opacification of the lungs   suggestive of pulmonary edema.   3. Chronic patulous esophagus with new air-fluid throughout the   thoracic esophagus suggestive of reflux esophagitis versus achalasia.   Clinical correlation is recommended.             Signed by: Richmond Barbosa 10/28/2024 2:41 PM   Dictation workstation:   [id number]      CT cervical spine wo IV contrast   Final Result   No evidence for an acute fracture or subluxation of the cervical   spine.        There is chronic anterolisthesis of C3 on C4 and chronic   retrolisthesis of C4 on C5 with multilevel degenerative disc disease   and cervical spondylosis with central canal stenosis at C4-5 and   multilevel foraminal stenosis bilaterally.        Bone island within left pedicle and lamina of T2 extending into the   spinous process of T2.        Hypodense thyroid nodules.        MACRO:   None        Signed by: Sherlyn Jackson 10/28/2024 11:00 AM   Dictation workstation:   [id number]      CT head wo IV contrast   Final Result   Age-appropriate atrophy.        Encephalomalacia within the right posterior parietal lobe unchanged   consistent with an old infarct at this site. Old lacunar infarct of   the right insula is also identified.        No acute intracranial process.        MACRO:   None             Signed by: Sherlyn Jackson 10/28/2024 10:46 AM   Dictation workstation:   [id number]      XR chest 2 views   Final Result   Cardiomegaly without acute cardiopulmonary process. Low lung volumes.        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         Signed by: Sherlyn Jackson 10/28/2024 10:33 AM   Dictation workstation:   [id number]      XR lumbar spine 2-3 views   Final Result   Status post midline decompressive laminectomy with posterior spinal   fusion from L2 through S1        Lumbar levoscoliosis with multilevel degenerative disc disease and   spondylosis most pronounced in the lower thoracic region and at the   T12-L1 level.        Signed by: Sherlyn Jackson 10/28/2024 10:27 AM   Dictation workstation:   [id number]      XR pelvis 1-2 views   Final Result   No pelvic fracture.        Signed by: Sherlyn Jackson 10/28/2024 10:29 AM   Dictation workstation:   [id number]        Medications   sodium chloride 0.9 % bolus 500 mL (0 mL intravenous Stopped 10/28/24 1131)   ondansetron (Zofran) injection 4 mg (4 mg intravenous Given 10/28/24 1003)   amLODIPine (Norvasc) tablet 5 mg (5 mg oral Given 10/28/24 1003)   carvedilol (Coreg) tablet 25 mg (25 mg oral Given 10/28/24 1003)   losartan (Cozaar) tablet 100 mg (100 mg oral Given 10/28/24 1003)   carvedilol (Coreg) tablet 25 mg (25 mg oral Given 10/28/24 1157)   nitroglycerin (Eliezer-Bid) 2 % ointment 1 inch (1 inch transdermal Given 10/28/24 1157)   iohexol (OMNIPaque) 350 mg iodine/mL solution 75 mL (75 mL intravenous Given 10/28/24 1405)     New Prescriptions    No medications on file     I spoke with  XXXXXX. We thoroughly discussed the history, physical exam, laboratory and imaging studies, as well as, emergency department course. Based upon that discussion, we've decided to admit for further observation and evaluation of their chest pain.  As I have deemed necessary from their history, physical, and studies, I have considered and evaluated for the following diagnoses: ACUTE CORONARY SYNDROME, PERICARDIAL TAMPONADE, PNEUMOTHORAX, P ULMONARY EMBOLISM, and THORACIC DISSECTION.     Patient remains in sinus tachycardia around 130.  I have seen as low as 120.  Workup shows initial troponin 40 3 repeat 96 proBNP 253.   Glucose 138 mild anemia hemoglobin 10.4 hematocrit 33.6.  Chest x-ray shows cardiomegaly.  Spoke with cardiology recommended a total of 50 mg of carvedilol, Nitropaste and admission to the hospital.  Patient's rate did drop to the 70s spontaneously.  CT angio of the chest showed no acute pathology.      After reviewing patient's comorbidities, severity of history of presenting illness, labs and imaging if obtained in conjunction with physical exam and course in emergency department, deemed to have potential for deterioration/progression of symptoms that could lead to multiple morbidities or mortality, decision made that patient requires further observation/evaluation/treatment and patient admitted to appropriate service, patient/family understand and agree with plan.        Critical Care: CRITICAL CARE NOTE     The patient was reevaluated/re-examined multiple times during the visit. Critical care time includes management at bedside, discussion with other providers and consultants, family counseling and answering questions, and documentation. Care involves decision making of high complexity to assess, manipulate, and support vital organ system failure and/or to prevent further life threatening deterioration of the patient's condition. Failure to initiate these interventions on an urgent basis would likely result in sudden, clinically significant or life threatening deterioration in the patient's condition of sinus tachycardia, NSTEMI      Critical Care Time: 35 minutes excluding time spent performing procedures, treating other patients, and teaching time.    Critical Concern/Vital Organ System Affected: heart    Critical Intervention: Medications, cardiology consult, admission to the hospital                  This chart was completed using voice recognition transcription software. Please excuse any errors of transcription including grammatical, punctuation, syntax and spelling errors.  Please contact me with any  questions regarding this chart.    Procedure  Procedures     Dusty Wiggins, APRN-CNP  10/28/24 5332

## 2024-10-28 NOTE — PROGRESS NOTES
Pharmacy Medication History Review    Rakan Cervantes is a 89 y.o. female. Pharmacy reviewed the patient's [id number] medications and allergies for accuracy.    Medications ADDED:  NONE  Medications CHANGED:  NONE  Medications REMOVED:   NONE      The list below reflects the updated PTA list. Comments regarding how patient may be taking medications differently can be found in the Admit Orders Activity  Prior to Admission Medications   Prescriptions Last Dose Informant   DULoxetine (Cymbalta) 30 mg DR capsule Unknown    Sig: TAKE 1 CAPSULE BY MOUTH ONCE DAILY at dinner   amLODIPine (Norvasc) 5 mg tablet Unknown    Sig: Take 1 tablet (5 mg) by mouth once daily.   apixaban (Eliquis) 2.5 mg tablet Unknown    Sig: Take 1 tablet (2.5 mg) by mouth 2 times a day.   ascorbic acid (Vitamin C) 100 mg tablet Unknown    Sig: Take 1 tablet (100 mg) by mouth once daily.   atorvastatin (Lipitor) 80 mg tablet Unknown    Sig: Take 1 tablet (80 mg) by mouth once daily at bedtime.   carvedilol (Coreg) 25 mg tablet Unknown    Sig: Take 1 tablet (25 mg) by mouth 2 times daily (morning and late afternoon).   cholecalciferol (Vitamin D-3) 50 mcg (2,000 unit) capsule Unknown    Sig: Take 1 capsule (50 mcg) by mouth early in the morning..   clopidogrel (Plavix) 75 mg tablet Unknown    Sig: TAKE 1 TABLET BY MOUTH ONCE DAILY   docusate sodium (Colace) 100 mg capsule Unknown    Sig: TAKE 1 CAPSULE BY MOUTH ONCE DAILY AS NEEDED to prevent constipation from iron   ferrous sulfate, 325 mg ferrous sulfate, tablet Unknown    Sig: Take 1 tablet by mouth once daily with breakfast. M-W-F   gabapentin (Neurontin) 300 mg capsule Unknown    Sig: Take 1 capsule (300 mg) by mouth 2 times a day. Resume as needed   hydroxychloroquine (Plaquenil) 200 mg tablet Unknown    Sig: Take by mouth.   isosorbide mononitrate ER (Imdur) 30 mg 24 hr tablet Unknown    Sig: Take 1 tablet (30 mg) by mouth once daily. Do not crush or chew.   losartan (Cozaar) 100 mg  "tablet Unknown    Sig: Take 1 tablet (100 mg) by mouth once daily.   pantoprazole (ProtoNix) 40 mg EC tablet Unknown    Sig: TAKE 1 TABLET BY MOUTH TWICE DAILY   sulfaSALAzine (Azulfidine) 500 mg tablet Unknown    Sig: Take 1 tablet (500 mg) by mouth 2 times a day.   vit C/E/Zn/coppr/lutein/zeaxan (PRESERVISION AREDS-2 ORAL) Unknown    Sig: As directed orally      Facility-Administered Medications: None        The list below reflects the updated allergy list. Please review each documented allergy for additional clarification and justification.  Allergies  Reviewed by Melinda Rodas CPhT on 10/28/2024        Severity Reactions Comments    Meperidine High Hallucinations Other reaction(s): Mental Status Change    Morphine High Hallucinations     Opioids - Morphine Analogues Medium Hallucinations, Confusion     Pregabalin Low Rash, Other, Unknown \"Flu like symptoms\" Flu like symtoms    Tramadol Low Rash, Itching, Unknown             Pharmacy has been updated to [facility].    Sources used to complete the med history include dispense history, PTA medication list, patient interview. Patient/family poor historians.    Below are additional concerns with the patient's PTA list.  Patient and family poor historians. Could not confirm anything. Patient states \"I take what they give me.\"    Melinda Rodas CPhT-Adv  Please reach out via [employer] Secure Chat for questions    "

## 2024-10-28 NOTE — ED PROVIDER NOTES
THIS IS MY TEOFILO SUPERVISORY AND SHARED VISIT NOTE:    I personally saw the patient and made/approved the management plan and take responsibility for the patient management.    History: Patient 89-year-old female presented to chief complaint of arm in her pain, as well as some pain in her joints, she states she fell 3 days ago, she is on Eliquis, however today she developed some heaviness in her chest and her left arm some nausea as well, chest that she has older sore throat, denies any fevers or chills, [place] any shortness of breath, denies abdominal pain,    Exam: GENERAL APPEARANCE: Awake and alert.     HEENT: Normocephalic, atraumatic. Extraocular muscles are intact. Pupils equal round and reactive to light.  CHEST: Nontender to palpation. Clear to auscultation bilaterally. No rales, rhonchi, or wheezing.   HEART: S1, S2. Regular rate and rhythm. No murmurs, gallops or rubs.  Strong and equal pulses in the extremities.   ABDOMEN: Soft,.  non-tender.  No rebound or guarding, bowel sounds normal x 4 quadrants  NEUROLOGICAL: Awake, alert and oriented x 3.       MDM: Patient seen and evaluated at bedside, patient is in no acute distress.  I will order a CBC, CMP, troponin, EKG, trauma imaging, D-dimer, CT angio, . Differential diagnosis includes but is not limited to accidental fall, ACS, pulmonary embolism pneumonia, viral illness.  Patient imaging results and lab work listed below, patient be admitted to the general medicine service with a cardiology consultation.  Results for orders placed or performed during the hospital encounter of 10/28/24   Comprehensive Metabolic Panel    Collection Time: 10/28/24  9:19 AM   Result Value Ref Range    Glucose 138 (H) 74 - 99 mg/dL    Sodium 136 136 - 145 mmol/L    Potassium 3.6 3.5 - 5.3 mmol/L    Chloride 99 98 - 107 mmol/L    Bicarbonate 29 21 - 32 mmol/L    Anion Gap 12 10 - 20 mmol/L    Urea Nitrogen 16 6 - 23 mg/dL    Creatinine 0.54 0.50 - 1.05 mg/dL    eGFR 88 >60  mL/min/1.73m*2    Calcium 9.1 8.6 - 10.3 mg/dL    Albumin 3.6 3.4 - 5.0 g/dL    Alkaline Phosphatase 105 33 - 136 U/L    Total Protein 6.2 (L) 6.4 - 8.2 g/dL    AST 17 9 - 39 U/L    Bilirubin, Total 0.6 0.0 - 1.2 mg/dL    ALT 13 7 - 45 U/L   CBC and Auto Differential    Collection Time: 10/28/24  9:19 AM   Result Value Ref Range    WBC 7.4 4.4 - 11.3 x10*3/uL    nRBC 0.0 0.0 - 0.0 /100 WBCs    RBC 3.63 (L) 4.00 - 5.20 x10*6/uL    Hemoglobin 10.4 (L) 12.0 - 16.0 g/dL    Hematocrit 33.6 (L) 36.0 - 46.0 %    MCV 93 80 - 100 fL    MCH 28.7 26.0 - 34.0 pg    MCHC 31.0 (L) 32.0 - 36.0 g/dL    RDW 15.9 (H) 11.5 - 14.5 %    Platelets 281 150 - 450 x10*3/uL    Neutrophils % 77.6 40.0 - 80.0 %    Immature Granulocytes %, Automated 0.4 0.0 - 0.9 %    Lymphocytes % 10.5 13.0 - 44.0 %    Monocytes % 9.8 2.0 - 10.0 %    Eosinophils % 1.2 0.0 - 6.0 %    Basophils % 0.5 0.0 - 2.0 %    Neutrophils Absolute 5.70 (H) 1.60 - 5.50 x10*3/uL    Immature Granulocytes Absolute, Automated 0.03 0.00 - 0.50 x10*3/uL    Lymphocytes Absolute 0.77 (L) 0.80 - 3.00 x10*3/uL    Monocytes Absolute 0.72 0.05 - 0.80 x10*3/uL    Eosinophils Absolute 0.09 0.00 - 0.40 x10*3/uL    Basophils Absolute 0.04 0.00 - 0.10 x10*3/uL   Troponin I, High Sensitivity, Initial    Collection Time: 10/28/24  9:19 AM   Result Value Ref Range    Troponin I, High Sensitivity 43 (H) 0 - 13 ng/L   B-Type Natriuretic Peptide    Collection Time: 10/28/24  9:19 AM   Result Value Ref Range     (H) 0 - 99 pg/mL   ECG 12 Lead    Collection Time: 10/28/24  9:20 AM   Result Value Ref Range    Ventricular Rate 136 BPM    Atrial Rate 136 BPM    QRS Duration 86 ms    QT Interval 254 ms    QTC Calculation(Bazett) 382 ms    R Axis 7 degrees    T Axis 197 degrees    QRS Count 23 beats    Q Onset 218 ms    T Offset 345 ms    QTC Fredericia 333 ms   Troponin, High Sensitivity, 1 Hour    Collection Time: 10/28/24 10:20 AM   Result Value Ref Range    Troponin I, High Sensitivity 96 (HH)  0 - 13 ng/L   Magnesium    Collection Time: 10/28/24 10:20 AM   Result Value Ref Range    Magnesium 1.50 (L) 1.60 - 2.40 mg/dL   Urinalysis with Reflex Culture and Microscopic    Collection Time: 10/28/24 11:14 AM   Result Value Ref Range    Color, Urine Colorless (N) Light-Yellow, Yellow, Dark-Yellow    Appearance, Urine Clear Clear    Specific Gravity, Urine 1.008 1.005 - 1.035    pH, Urine 7.0 5.0, 5.5, 6.0, 6.5, 7.0, 7.5, 8.0    Protein, Urine NEGATIVE NEGATIVE, 10 (TRACE), 20 (TRACE) mg/dL    Glucose, Urine Normal Normal mg/dL    Blood, Urine NEGATIVE NEGATIVE    Ketones, Urine NEGATIVE NEGATIVE mg/dL    Bilirubin, Urine NEGATIVE NEGATIVE    Urobilinogen, Urine Normal Normal mg/dL    Nitrite, Urine NEGATIVE NEGATIVE    Leukocyte Esterase, Urine NEGATIVE NEGATIVE   Extra Urine Gray Tube    Collection Time: 10/28/24 11:14 AM   Result Value Ref Range    Extra Tube Hold for add-ons.    D-dimer, quantitative    Collection Time: 10/28/24 12:03 PM   Result Value Ref Range    D-Dimer Non VTE, Quant (mg/L FEU) 0.90 (H) 0.19 - 0.50 mg/L FEU   Troponin I, High Sensitivity    Collection Time: 10/28/24  6:04 PM   Result Value Ref Range    Troponin I, High Sensitivity 608 (HH) 0 - 13 ng/L   Basic Metabolic Panel    Collection Time: 10/29/24  5:34 AM   Result Value Ref Range    Glucose 85 74 - 99 mg/dL    Sodium 137 136 - 145 mmol/L    Potassium 3.2 (L) 3.5 - 5.3 mmol/L    Chloride 101 98 - 107 mmol/L    Bicarbonate 32 21 - 32 mmol/L    Anion Gap 7 (L) 10 - 20 mmol/L    Urea Nitrogen 14 6 - 23 mg/dL    Creatinine 0.49 (L) 0.50 - 1.05 mg/dL    eGFR 90 >60 mL/min/1.73m*2    Calcium 8.3 (L) 8.6 - 10.3 mg/dL   Lipid Panel    Collection Time: 10/29/24  5:34 AM   Result Value Ref Range    Cholesterol 80 0 - 199 mg/dL    HDL-Cholesterol 43.0 mg/dL    Cholesterol/HDL Ratio 1.9     LDL Calculated 25 <=99 mg/dL    VLDL 12 0 - 40 mg/dL    Triglycerides 59 0 - 149 mg/dL    Non HDL Cholesterol 37 0 - 149 mg/dL   CBC    Collection Time:  10/29/24  5:34 AM   Result Value Ref Range    WBC 4.0 (L) 4.4 - 11.3 x10*3/uL    nRBC 0.0 0.0 - 0.0 /100 WBCs    RBC 2.76 (L) 4.00 - 5.20 x10*6/uL    Hemoglobin 7.8 (L) 12.0 - 16.0 g/dL    Hematocrit 24.5 (L) 36.0 - 46.0 %    MCV 89 80 - 100 fL    MCH 28.3 26.0 - 34.0 pg    MCHC 31.8 (L) 32.0 - 36.0 g/dL    RDW 15.5 (H) 11.5 - 14.5 %    Platelets 192 150 - 450 x10*3/uL   Magnesium    Collection Time: 10/29/24  5:34 AM   Result Value Ref Range    Magnesium 1.58 (L) 1.60 - 2.40 mg/dL   Magnesium    Collection Time: 10/30/24  5:48 AM   Result Value Ref Range    Magnesium 1.50 (L) 1.60 - 2.40 mg/dL   Basic Metabolic Panel    Collection Time: 10/30/24  5:48 AM   Result Value Ref Range    Glucose 98 74 - 99 mg/dL    Sodium 135 (L) 136 - 145 mmol/L    Potassium 3.8 3.5 - 5.3 mmol/L    Chloride 99 98 - 107 mmol/L    Bicarbonate 31 21 - 32 mmol/L    Anion Gap 9 (L) 10 - 20 mmol/L    Urea Nitrogen 12 6 - 23 mg/dL    Creatinine 0.63 0.50 - 1.05 mg/dL    eGFR 85 >60 mL/min/1.73m*2    Calcium 8.4 (L) 8.6 - 10.3 mg/dL   CBC    Collection Time: 10/30/24  5:48 AM   Result Value Ref Range    WBC 4.6 4.4 - 11.3 x10*3/uL    nRBC 0.0 0.0 - 0.0 /100 WBCs    RBC 2.74 (L) 4.00 - 5.20 x10*6/uL    Hemoglobin 7.7 (L) 12.0 - 16.0 g/dL    Hematocrit 24.9 (L) 36.0 - 46.0 %    MCV 91 80 - 100 fL    MCH 28.1 26.0 - 34.0 pg    MCHC 30.9 (L) 32.0 - 36.0 g/dL    RDW 15.1 (H) 11.5 - 14.5 %    Platelets 192 150 - 450 x10*3/uL   Magnesium    Collection Time: 10/30/24  2:36 PM   Result Value Ref Range    Magnesium 1.97 1.60 - 2.40 mg/dL     *Note: Due to a large number of results and/or encounters for the requested time period, some results have not been displayed. A complete set of results can be found in Results Review.     MR brain wo IV contrast   Final Result   New subcentimeter acute ischemic infarct in the left corona radiata   (x2) and right aspect of the splenium of the corpus callosum.        The demonstrated large area of encephalomalacia in  the right   posterior temporal and parietal lobes and chronic focus of hemorrhage   in the posterior limb of the right internal capsule.        MACRO:   None.        Signed by: Surendra Hylton 10/29/2024 9:41 PM   Dictation workstation:   [id number]      Carotid duplex bilateral         Lower extremity venous duplex bilateral   Final Result   No sonographic evidence DVT in the visualized vessels of bilateral   lower extremities.        MACRO:   None        Signed by: Dino De Paz 10/28/2024 7:48 PM   Dictation workstation:   [id number]      CT angio chest for pulmonary embolism   Final Result   1.  No evidence of pulmonary emboli. Severe coronary artery   atherosclerotic calcifications.   2. Mild cardiomegaly with biatrial enlargement. Small to   moderate-size right greater than left pleural effusions and bibasilar   subsegmental atelectasis. Diffuse interseptal thickening and   scattered central predominant ground glass opacification of the lungs   suggestive of pulmonary edema.   3. Chronic patulous esophagus with new air-fluid throughout the   thoracic esophagus suggestive of reflux esophagitis versus achalasia.   Clinical correlation is recommended.             Signed by: Richmond Barbosa 10/28/2024 2:41 PM   Dictation workstation:   [id number]      CT cervical spine wo IV contrast   Final Result   No evidence for an acute fracture or subluxation of the cervical   spine.        There is chronic anterolisthesis of C3 on C4 and chronic   retrolisthesis of C4 on C5 with multilevel degenerative disc disease   and cervical spondylosis with central canal stenosis at C4-5 and   multilevel foraminal stenosis bilaterally.        Bone island within left pedicle and lamina of T2 extending into the   spinous process of T2.        Hypodense thyroid nodules.        MACRO:   None        Signed by: Sherlyn Jackson 10/28/2024 11:00 AM   Dictation workstation:   [id number]      CT head wo IV contrast   Final Result    Age-appropriate atrophy.        Encephalomalacia within the right posterior parietal lobe unchanged   consistent with an old infarct at this site. Old lacunar infarct of   the right insula is also identified.        No acute intracranial process.        MACRO:   None             Signed by: Sherlyn Jackson 10/28/2024 10:46 AM   Dictation workstation:   [id number]      XR chest 2 views   Final Result   Cardiomegaly without acute cardiopulmonary process. Low lung volumes.        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        Signed by: Sherlyn Jackson 10/28/2024 10:33 AM   Dictation workstation:   [id number]      XR lumbar spine 2-3 views   Final Result   Status post midline decompressive laminectomy with posterior spinal   fusion from L2 through S1        Lumbar levoscoliosis with multilevel degenerative disc disease and   spondylosis most pronounced in the lower thoracic region and at the   T12-L1 level.        Signed by: Sherlyn Jackson 10/28/2024 10:27 AM   Dictation workstation:   [id number]      XR pelvis 1-2 views   Final Result   No pelvic fracture.        Signed by: Sherlyn Jackson 10/28/2024 10:29 AM   Dictation workstation:   [id number]      Transthoracic Echo (TTE) Complete    (Results Pending)   CT angio head and neck w and wo IV contrast    (Results Pending)     .    Please see TEOFILO note for further details    Sections of this report were created using voice-to-text technology and may contain errors in translation    Oz Acosta DO  Emergency Medicine       Oz Acosta DO  10/30/24 8514

## 2024-10-28 NOTE — ASSESSMENT & PLAN NOTE
Resume home cardiac medications  Daily weights  Strict I/Os    Echocardiogram  Diuresis  Consult cardiology, appreciate recommendations

## 2024-10-28 NOTE — CARE PLAN
The patient's goals for the shift include      The clinical goals for the shift include pain management, maintain safety      Problem: Pain - Adult  Goal: Verbalizes/displays adequate comfort level or baseline comfort level  Outcome: Progressing  Flowsheets (Taken 10/28/2024 1843)  Verbalizes/displays adequate comfort level or baseline comfort level:   Encourage patient to monitor pain and request assistance   Assess pain using appropriate pain scale   Administer analgesics based on type and severity of pain and evaluate response   Implement non-pharmacological measures as appropriate and evaluate response   Consider cultural and social influences on pain and pain management   Notify Licensed Independent Practitioner if interventions unsuccessful or patient reports new pain     Problem: Safety - Adult  Goal: Free from fall injury  Outcome: Progressing  Flowsheets (Taken 10/28/2024 1843)  Free from fall injury:   Instruct family/caregiver on patient safety   Based on caregiver fall risk screen, instruct family/caregiver to ask for assistance with transferring infant if caregiver noted to have fall risk factors     Problem: Discharge Planning  Goal: Discharge to home or other facility with appropriate resources  Outcome: Progressing  Flowsheets (Taken 10/28/2024 1843)  Discharge to home or other facility with appropriate resources:   Identify barriers to discharge with patient and caregiver   Arrange for needed discharge resources and transportation as appropriate   Identify discharge learning needs (meds, wound care, etc)   Refer to discharge planning if patient needs post-hospital services based on physician order or complex needs related to functional status, cognitive ability or social support system     Problem: Chronic Conditions and Co-morbidities  Goal: Patient's chronic conditions and co-morbidity symptoms are monitored and maintained or improved  Outcome: Progressing  Flowsheets (Taken 10/28/2024 1843)  Care  Plan - Patient's Chronic Conditions and Co-Morbidity Symptoms are Monitored and Maintained or Improved:   Monitor and assess patient's chronic conditions and comorbid symptoms for stability, deterioration, or improvement   Collaborate with multidisciplinary team to address chronic and comorbid conditions and prevent exacerbation or deterioration   Update acute care plan with appropriate goals if chronic or comorbid symptoms are exacerbated and prevent overall improvement and discharge     Problem: Heart Failure  Goal: Improved gas exchange this shift  Outcome: Progressing  Flowsheets (Taken 10/28/2024 1843)  Improved gas exchange this shift:   Position to promote circulation/maximize ventilation   Assist with pulmonary hygiene and secretion clearance  Goal: Improved urinary output this shift  Outcome: Progressing  Flowsheets (Taken 10/28/2024 1843)  Improved urinary output this shift:   Monitor intake/output and daily weight   Med administration/monitor effect   Monitor serum electrolytes/replace per order  Goal: Reduction in peripheral edema within 24 hours  Outcome: Progressing  Flowsheets (Taken 10/28/2024 1843)  Reduction in peripheral edema within 24 hours:   SCDs/elevate extremities   Monitor edema/skin/mucous membrane integrity   Consult dietician  Goal: Report improvement of dyspnea/breathlessness this shift  Outcome: Progressing  Flowsheets (Taken 10/28/2024 1843)  Report improvement of dyspnea/breathlessness this shift:   Ambulate/OOB 3 times daily   Encourage activity/mobility/ROM   Incentive spirometry/deep breathing /HOB elevated elevated   Facilitate activity/mobility per patient tolerance/advance   Consider PT/OT consult  Goal: Weight from fluid excess reduced over 2-3 days, then stabilize  Outcome: Progressing  Flowsheets (Taken 10/28/2024 1843)  Weight from fluid excess reduced over 2-3 days, then stabilize:   Med administration/monitor effect   Monitor bowel elimination   Monitor serum  electrolytes/replace per order   Monitor intake/output and daily weight  Goal: Increase self care and/or family involvement in 24 hours  Outcome: Progressing  Flowsheets (Taken 10/28/2024 1843)  Increase self care and/or family involvement in 24 hours:   Assess for signs/symptoms of depression   Facilitate advanced care planning/discussion of treatment options   Organize tasks/allow time for rest   Therapy evaluation and treatment   Recognize current coping skills and assist to develop new coping skills     Problem: Skin  Goal: Participates in plan/prevention/treatment measures  Outcome: Progressing  Flowsheets (Taken 10/28/2024 1843)  Participates in plan/prevention/treatment measures:   Discuss with provider PT/OT consult   Elevate heels   Increase activity/out of bed for meals  Goal: Prevent/manage excess moisture  Outcome: Progressing  Flowsheets (Taken 10/28/2024 1843)  Prevent/manage excess moisture:   Moisturize dry skin   Cleanse incontinence/protect with barrier cream  Goal: Prevent/minimize sheer/friction injuries  Outcome: Progressing  Flowsheets (Taken 10/28/2024 1843)  Prevent/minimize sheer/friction injuries: Use pull sheet     Problem: Fall/Injury  Goal: Not fall by end of shift  Outcome: Progressing  Goal: Be free from injury by end of the shift  Outcome: Progressing  Goal: Verbalize understanding of personal risk factors for fall in the hospital  Outcome: Progressing     Problem: Respiratory  Goal: Minimal/no exertional discomfort or dyspnea this shift  Outcome: Progressing  Flowsheets (Taken 10/28/2024 1843)  Minimal/no exertional discomfort or dyspnea this shift: Positioning to promote ventilation/comfort  Goal: No signs of respiratory distress (eg. Use of accessory muscles. Peds grunting)  Outcome: Progressing     Problem: Risk for excess fluid volume  Goal: I will remain free from complications r/t CHF  Outcome: Progressing

## 2024-10-28 NOTE — ASSESSMENT & PLAN NOTE
Resume home antihypertensives  Monitor blood pressure close    Plan  Chest pain workup  Wean oxygen as tolerated  Diuresis  Check Ortho's, TTE, ultrasound of the carotids  DVT prophylaxis: Eliquis  Fall precautions, PT/OT consult  Cardiology, neurology, GI, and pulmonary on consult, appreciate recs  CBC and BMP in the morning

## 2024-10-28 NOTE — ASSESSMENT & PLAN NOTE
Troponin 43, 96, repeat pending  Check lipid panel in the morning  Continue aspirin/statin/Plavix  Recent heart cath with stent placed in September of this year  Check echo  Cardiology consulted, appreciate recs

## 2024-10-28 NOTE — ASSESSMENT & PLAN NOTE
On 5 L oxygen via nasal cannula  CT imaging reviewed personally, noted bilateral pleural effusions and pulmonary edema  IV Lasix  Check TTE  Supplemental oxygen, wean as tolerated  Will add as needed [place]onna  Consult pulmonary and cardiology, appreciate recommendations

## 2024-10-28 NOTE — SIGNIFICANT EVENT
RT called to bedside. On 4L SpO2 87%. RT changed placement of pulse ox probe. Patient SpO2 increased to 92%. Patient started to doze off and spo2 dropped to 88%. RT increased NC to 6L. Spo2 96%. Patients . No respiratory distress noted at this time.

## 2024-10-28 NOTE — ED NOTES
When pt came back from CT she was 80% RA, this rn put pt on 4L NC and it went up to 88% with good wave form. This rn let the DR know and I called ED RT for a second set of eyes      Neda Charles RN  10/28/24 8065

## 2024-10-28 NOTE — ASSESSMENT & PLAN NOTE
CT imaging showed chronic patulous esophagus with new air-fluid throughout the thoracic esophagus suggestive of reflux esophagitis versus achalasia  Consult GI, appreciate recs  Continue PPI

## 2024-10-28 NOTE — H&P
"Chief complaint: Chest pain    History Of Present Illness  Rakan Cervantes is a 89 y.o. female with a past medical history of NSTEMI, atrial fibrillation, on anticoagulation, Jonas's esophagus, bilateral carotid artery occlusion, COPD, erosive esophagitis, hyperparathyroidism, hypercholesteremia, CKD, and stroke who presented to the emergency department with complaints of left arm numbness and tingling.  Patient states that she fell on Friday.  Per her daughter, patient has been \"blacking out\" and falling at home.  She does live at home with her .  Typically is independent.  Patient was recently admitted to this hospital in September.  At that time she had an NSTEMI.  She had a cardiac catheterization and cardiac stent was placed.  She did follow-up with Dr. Callaway on 10/22/2024.  She was started on Imdur.  Patient states that when she fell on Friday, she hit her back and her head.  Her back has been hurting since the fall.  Patient states that today she noted her left arm was having numbness and tingling.  Also having increased shortness of breath.  Hypoxic, on 5 L oxygen via nasal cannula.  Baseline is room air.  Reports bilateral lower extremity edema with pain to lower extremities and back.    In the ED: Stat CT of the head showed age-appropriate atrophy, encephalomalacia within the right posterior parietal lobe unchanged consistent with old infarct.  Old lacunar infarct of the right insula.  No acute intracranial process.  CT of the cervical spine showed no evidence of acute fracture or subluxation of the cervical spine.  X-ray of the pelvis showed postop changes as seen in bilateral bipolar hip arthroplasties.  No fracture or dislocation in either hip.  Pelvic ring intact.  No pelvic fracture.  X-ray of the lumbar spine results showed status post midline decompressive laminectomy with posterior spinal fusion from L2-S1.  Chest x-ray showed no acute cardiopulmonary process.  Past Medical History  Past " Medical History:   Diagnosis Date    Acute non-ST segment elevation myocardial infarction (Multi) 11/26/2023    Anemia     Arteriosclerosis of coronary artery 05/14/2023    Arthritis     Asthma     Atrial fibrillation (Multi) 11/26/2023    Jonas esophagus     Bilateral carotid artery occlusion 05/23/2023    CAD (coronary artery disease)     Cardiac rhythm disorder or disturbance or change 11/26/2023    Cervical radiculopathy     Chest pain 11/26/2023    Chronic obstructive pulmonary disease (Multi) 11/26/2023    Constipation     Coronary artery disease 11/26/2023    Degenerative joint disease     Dyslipidemia     Dysuria     Erosive esophagitis     GERD (gastroesophageal reflux disease)     Hypercholesterolemia 05/23/2023    Hyperparathyroidism (Multi)     Hypertensive urgency 11/26/2023    Impaired fasting glucose 11/26/2023    Irritable bowel syndrome (IBS)     Labile essential hypertension 11/26/2023    Labile hypertension     Left foot pain     Low blood pressure 11/26/2023    Macular degeneration     Mixed hyperlipidemia 11/26/2023    Neck pain     Osteoporosis 11/26/2023    REclast '19 Cr 1.4 switch to calcitonin.    Paroxysmal atrial fibrillation (Multi)     Polyarthritis with negative rheumatoid factor (Multi)     Post menopausal syndrome     Rapid atrial fibrillation (Multi) 05/14/2023    Spinal stenosis     Stage 3 chronic kidney disease (Multi) 11/26/2023    Stroke (Multi)     Transient ischemic attack 11/26/2023       Surgical History  Past Surgical History:   Procedure Laterality Date    CARDIAC CATHETERIZATION Left 9/27/2024    Procedure: Left Heart Cath;  Surgeon: Nessa Callaway MD;  Location: [facility] Cardiac [facility];  Service: Cardiovascular;  Laterality: Left;    CARDIAC CATHETERIZATION N/A 9/27/2024    Procedure: PCI;  Surgeon: Nessa Callaway MD;  Location: [facility] Cardiac Cath Lab;  Service: Cardiovascular;  Laterality: N/A;    CARDIOVASCULAR STRESS TEST  2017    Dr. Khalil    CARDIOVASCULAR STRESS  TEST  2004    Dr. Pantoja    CARPAL TUNNEL RELEASE Left 2014    CATARACT EXTRACTION Bilateral 2011    CHOLECYSTECTOMY      CORONARY STENT PLACEMENT  05/2023    LAD    CT HEAD ANGIO W AND WO IV CONTRAST  05/08/2013    CT HEAD ANGIO W AND WO IV CONTRAST [facility]    MR HEAD ANGIO WO IV CONTRAST  04/23/2013    MR HEAD ANGIO WO IV CONTRAST [facility]    MR HEAD ANGIO WO IV CONTRAST  07/20/2016    MR HEAD ANGIO WO IV CONTRAST [facility]    MR HEAD ANGIO WO IV CONTRAST  10/27/2023    MR HEAD ANGIO WO IV CONTRAST 10/27/2023 [facility]    MR NECK ANGIO WO IV CONTRAST  10/27/2023    MR NECK ANGIO WO IV CONTRAST 10/27/2023 [facility]    OTHER SURGICAL HISTORY  02/14/2022    No history of surgery    REVISION TOTAL HIP ARTHROPLASTY Left 2013    Conversion from left ORIF    TOTAL HIP ARTHROPLASTY Right 2016        Social History  She reports that she has never smoked. She has never been exposed to tobacco smoke. She has never used smokeless tobacco. She reports that she does not currently use alcohol. She reports that she does not use drugs.    Family History  Family History   Problem Relation Name Age of Onset    No Known Problems Mother      No Known Problems Father      No Known Problems Sister          Allergies  Meperidine, Morphine, Opioids - morphine analogues, Pregabalin, and Tramadol    Review of Systems   Constitutional:  Negative for appetite change, fatigue and fever.   HENT:  Negative for congestion and rhinorrhea.    Respiratory:  Positive for shortness of breath. Negative for cough and chest tightness.    Cardiovascular:  Positive for chest pain. Negative for palpitations.   Gastrointestinal:  Positive for nausea. Negative for abdominal distention, abdominal pain, constipation, diarrhea and vomiting.   Genitourinary:  Negative for dysuria and urgency.   Musculoskeletal:  Positive for back pain.   Neurological:  Positive for syncope. Negative for dizziness, light-headedness and numbness.   All  "other systems reviewed and are negative.       Physical Exam  Vitals reviewed.   Constitutional:       Comments: Elderly.  Limited historian.   HENT:      Head: Normocephalic and atraumatic.      Nose: Nose normal.      Mouth/Throat:      Mouth: Mucous membranes are moist.   Eyes:      Extraocular Movements: Extraocular movements intact.      Conjunctiva/sclera: Conjunctivae normal.   Cardiovascular:      Rate and Rhythm: Normal rate. Rhythm irregular.   Pulmonary:      Effort: Pulmonary effort is normal.      Breath sounds: No wheezing, rhonchi or rales.      Comments: Breath sounds clear, diminished bilaterally  Abdominal:      General: Bowel sounds are normal.      Palpations: Abdomen is soft.      Tenderness: There is no abdominal tenderness.   Musculoskeletal:      Cervical back: Normal range of motion and neck supple.      Comments: Generalized weakness   Skin:     General: Skin is warm and dry.      Capillary Refill: Capillary refill takes less than 2 seconds.   Neurological:      General: No focal deficit present.      Mental Status: She is alert and oriented to person, place, and time.   Psychiatric:         Mood and Affect: Mood normal.         Behavior: Behavior normal.          Last Recorded Vitals  Blood pressure 147/67, pulse 65, temperature 36.3 °C (97.3 °F), resp. rate 17, height 1.499 m (4' 11\"), weight 50.8 kg (112 lb), SpO2 (!) 92%.    Relevant Results  Lab Results   Component Value Date    GLUCOSE 138 (H) 10/28/2024    CALCIUM 9.1 10/28/2024     10/28/2024    K 3.6 10/28/2024    CO2 29 10/28/2024    CL 99 10/28/2024    BUN 16 10/28/2024    CREATININE 0.54 10/28/2024      Lab Results   Component Value Date    WBC 7.4 10/28/2024    HGB 10.4 (L) 10/28/2024    HCT 33.6 (L) 10/28/2024    MCV 93 10/28/2024     10/28/2024    CT angio chest for pulmonary embolism  Result Date: 10/28/2024  1.  No evidence of pulmonary emboli. Severe coronary artery atherosclerotic calcifications. 2. Mild " cardiomegaly with biatrial enlargement. Small to moderate-size right greater than left pleural effusions and bibasilar subsegmental atelectasis. Diffuse interseptal thickening and scattered central predominant ground glass opacification of the lungs suggestive of pulmonary edema. 3. Chronic patulous esophagus with new air-fluid throughout the thoracic esophagus suggestive of reflux esophagitis versus achalasia. Clinical correlation is recommended.     Signed by: Richmond Barbosa 10/28/2024 2:41 PM Dictation workstation:   [id number]    ECG 12 Lead  Result Date: 10/28/2024  Supraventricular tachycardia Marked ST abnormality, possible inferolateral subendocardial injury Abnormal ECG When compared with ECG of 26-SEP-2024 14:27, ST now depressed in Inferior leads T wave inversion no longer evident in Inferior leads T wave inversion more evident in Lateral leads    CT cervical spine wo IV contrast  Result Date: 10/28/2024  No evidence for an acute fracture or subluxation of the cervical spine.   There is chronic anterolisthesis of C3 on C4 and chronic retrolisthesis of C4 on C5 with multilevel degenerative disc disease and cervical spondylosis with central canal stenosis at C4-5 and multilevel foraminal stenosis bilaterally.   Bone island within left pedicle and lamina of T2 extending into the spinous process of T2.   Hypodense thyroid nodules.   MACRO: None   Signed by: Sherlyn Jackson 10/28/2024 11:00 AM Dictation workstation:   [id number]    CT head wo IV contrast  Result Date: 10/28/2024  Interpreted By:  Sherlyn Jackson, STUDY: CT HEAD WO IV CONTRAST;  10/28/2024 10:16 am   INDICATION: Signs/Symptoms: Fall several days earlier striking the head     COMPARISON: 10/26/2023   ACCESSION NUMBER(S): [id number]   ORDERING CLINICIAN: ANDREY COX   TECHNIQUE: Noncontrast axial CT scan of head was performed. Angled reformats in brain and bone windows were generated. The images were reviewed in bone, brain, blood and soft tissue  windows.   FINDINGS: CSF Spaces: The ventricles, sulci and basal cisterns are prominent indicating age-appropriate atrophy.. There is no extraaxial fluid collection.   Parenchyma: There is encephalomalacia and gliosis seen within the right parietal lobe posteriorly with transcortical extension. There is also diminished density within the right insula which is stable. No hyperdense MCA sign is present. There is calcified plaque within each carotid siphon. There is no mass effect or midline shift.  There is no intracranial hemorrhage.   Calvarium: The calvarium is unremarkable.   Paranasal sinuses and mastoids: Visualized paranasal sinuses and mastoids are clear.       Age-appropriate atrophy.   Encephalomalacia within the right posterior parietal lobe unchanged consistent with an old infarct at this site. Old lacunar infarct of the right insula is also identified.   No acute intracranial process.   MACRO: None     Signed by: Sherlyn Jackson 10/28/2024 10:46 AM Dictation workstation:   [id number]    XR chest 2 views  Result Date: 10/28/2024  Cardiomegaly without acute cardiopulmonary process. Low lung volumes.   Advanced osteoarthritis of the left shoulder affecting glenohumeral joint.   Signed by: Sehrlyn Jackson 10/28/2024 10:33 AM Dictation workstation:   [id number]    XR pelvis 1-2 views  Result Date: 10/28/2024  No pelvic fracture.   Signed by: Sherlyn Jackson 10/28/2024 10:29 AM Dictation workstation:   [id number]    XR lumbar spine 2-3 views  Result Date: 10/28/2024  Status post midline decompressive laminectomy with posterior spinal fusion from L2 through S1   Lumbar levoscoliosis with multilevel degenerative disc disease and spondylosis most pronounced in the lower thoracic region and at the T12-L1 level.   Signed by: Sherlyn Jackson 10/28/2024 10:27 AM Dictation workstation:   [id number]       Assessment/Plan   Assessment & Plan  Acute hypoxic respiratory failure (Multi)  On 5 L oxygen via nasal cannula  CT imaging  "reviewed personally, noted bilateral pleural effusions and pulmonary edema  IV Lasix  Check TTE  Supplemental oxygen, wean as tolerated  Will add as needed DuoNeshilpi  Consult pulmonary and cardiology, appreciate recommendations  Acute heart failure  Resume home cardiac medications  Daily weights  Strict I/Os    Echocardiogram  Diuresis  Consult cardiology, appreciate recommendations  Chest pain  Troponin 43, 96, repeat pending  Check lipid panel in the morning  Continue aspirin/statin/Plavix  Recent heart cath with stent placed in September of this year  Check echo  Cardiology consulted, appreciate recs  Syncope  Patient reports \"blacking out\" multiple times at home resulting in falls  Fall precautions  Check Ortho's  Echocardiogram  Ultrasound of the carotids  Sinus tachycardia  On admission  Given oral cardiac medications with improvement  Monitor on telemetry  PAF (paroxysmal atrial fibrillation) (Multi)  Monitor on telemetry  Continue antiarrhythmics and Eliquis  Bilateral pleural effusion  Seen on CT imaging  Diuresis  Cardiology and pulmonary consulted, appreciate recommendations  Left arm numbness  Hx of CVA  Patient is on statin/apixaban/Plavix-continue  Patient fell on Friday, now with numbness in LUE, back pain  Imaging as above  Neurochecks  Consult neurology, appreciate recs  Erosive esophagitis  CT imaging showed chronic patulous esophagus with new air-fluid throughout the thoracic esophagus suggestive of reflux esophagitis versus achalasia  Consult GI, appreciate recs  Continue PPI  Essential hypertension  Resume home antihypertensives  Monitor blood pressure close    Plan  Chest pain workup  Wean oxygen as tolerated  Diuresis  Check Ortho's, TTE, ultrasound of the carotids  DVT prophylaxis: Eliquis  Fall precautions, PT/OT consult  Cardiology, neurology, GI, and pulmonary on consult, appreciate recs  CBC and BMP in the morning           CODE STATUS: Discussed with the patient and her family.  " Patient is a DNR CCA, DO NOT INTUBATE      Shiela Reyes, APRN-CNP

## 2024-10-29 ENCOUNTER — APPOINTMENT (OUTPATIENT)
Dept: CARDIOLOGY | Facility: HOSPITAL | Age: 89
End: 2024-10-29
Payer: COMMERCIAL

## 2024-10-29 ENCOUNTER — APPOINTMENT (OUTPATIENT)
Dept: RADIOLOGY | Facility: HOSPITAL | Age: 89
DRG: 280 | End: 2024-10-29
Payer: COMMERCIAL

## 2024-10-29 ENCOUNTER — DOCUMENTATION (OUTPATIENT)
Dept: RESEARCH | Age: 89
End: 2024-10-29

## 2024-10-29 ENCOUNTER — APPOINTMENT (OUTPATIENT)
Dept: OPERATING ROOM | Facility: HOSPITAL | Age: 89
End: 2024-10-29
Payer: COMMERCIAL

## 2024-10-29 LAB
ANION GAP SERPL CALCULATED.3IONS-SCNC: 7 MMOL/L (ref 10–20)
BUN SERPL-MCNC: 14 MG/DL (ref 6–23)
CALCIUM SERPL-MCNC: 8.3 MG/DL (ref 8.6–10.3)
CHLORIDE SERPL-SCNC: 101 MMOL/L (ref 98–107)
CHOLEST SERPL-MCNC: 80 MG/DL (ref 0–199)
CHOLEST/HDLC SERPL: 1.9 {RATIO}
CO2 SERPL-SCNC: 32 MMOL/L (ref 21–32)
CREAT SERPL-MCNC: 0.49 MG/DL (ref 0.5–1.05)
EGFRCR SERPLBLD CKD-EPI 2021: 90 ML/MIN/1.73M*2
ERYTHROCYTE [DISTWIDTH] IN BLOOD BY AUTOMATED COUNT: 15.5 % (ref 11.5–14.5)
GLUCOSE SERPL-MCNC: 85 MG/DL (ref 74–99)
HCT VFR BLD AUTO: 24.5 % (ref 36–46)
HDLC SERPL-MCNC: 43 MG/DL
HGB BLD-MCNC: 7.8 G/DL (ref 12–16)
LDLC SERPL CALC-MCNC: 25 MG/DL
MAGNESIUM SERPL-MCNC: 1.58 MG/DL (ref 1.6–2.4)
MCH RBC QN AUTO: 28.3 PG (ref 26–34)
MCHC RBC AUTO-ENTMCNC: 31.8 G/DL (ref 32–36)
MCV RBC AUTO: 89 FL (ref 80–100)
NON HDL CHOLESTEROL: 37 MG/DL (ref 0–149)
NRBC BLD-RTO: 0 /100 WBCS (ref 0–0)
PLATELET # BLD AUTO: 192 X10*3/UL (ref 150–450)
POTASSIUM SERPL-SCNC: 3.2 MMOL/L (ref 3.5–5.3)
RBC # BLD AUTO: 2.76 X10*6/UL (ref 4–5.2)
SODIUM SERPL-SCNC: 137 MMOL/L (ref 136–145)
TRIGL SERPL-MCNC: 59 MG/DL (ref 0–149)
VLDL: 12 MG/DL (ref 0–40)
WBC # BLD AUTO: 4 X10*3/UL (ref 4.4–11.3)

## 2024-10-29 PROCEDURE — 1200000002 HC GENERAL ROOM WITH TELEMETRY DAILY

## 2024-10-29 PROCEDURE — 2500000004 HC RX 250 GENERAL PHARMACY W/ HCPCS (ALT 636 FOR OP/ED): Performed by: NURSE PRACTITIONER

## 2024-10-29 PROCEDURE — 36415 COLL VENOUS BLD VENIPUNCTURE: CPT | Performed by: NURSE PRACTITIONER

## 2024-10-29 PROCEDURE — 2500000001 HC RX 250 WO HCPCS SELF ADMINISTERED DRUGS (ALT 637 FOR MEDICARE OP): Performed by: NURSE PRACTITIONER

## 2024-10-29 PROCEDURE — 2500000001 HC RX 250 WO HCPCS SELF ADMINISTERED DRUGS (ALT 637 FOR MEDICARE OP)

## 2024-10-29 PROCEDURE — 97161 PT EVAL LOW COMPLEX 20 MIN: CPT | Mod: GP | Performed by: PHYSICAL THERAPIST

## 2024-10-29 PROCEDURE — 99223 1ST HOSP IP/OBS HIGH 75: CPT | Performed by: INTERNAL MEDICINE

## 2024-10-29 PROCEDURE — 99223 1ST HOSP IP/OBS HIGH 75: CPT | Performed by: STUDENT IN AN ORGANIZED HEALTH CARE EDUCATION/TRAINING PROGRAM

## 2024-10-29 PROCEDURE — 93880 EXTRACRANIAL BILAT STUDY: CPT

## 2024-10-29 PROCEDURE — 2500000001 HC RX 250 WO HCPCS SELF ADMINISTERED DRUGS (ALT 637 FOR MEDICARE OP): Performed by: INTERNAL MEDICINE

## 2024-10-29 PROCEDURE — 80048 BASIC METABOLIC PNL TOTAL CA: CPT | Performed by: NURSE PRACTITIONER

## 2024-10-29 PROCEDURE — 99232 SBSQ HOSP IP/OBS MODERATE 35: CPT | Performed by: NURSE PRACTITIONER

## 2024-10-29 PROCEDURE — 2500000005 HC RX 250 GENERAL PHARMACY W/O HCPCS: Performed by: NURSE PRACTITIONER

## 2024-10-29 PROCEDURE — 97165 OT EVAL LOW COMPLEX 30 MIN: CPT | Mod: GO

## 2024-10-29 PROCEDURE — 83735 ASSAY OF MAGNESIUM: CPT | Performed by: NURSE PRACTITIONER

## 2024-10-29 PROCEDURE — 97530 THERAPEUTIC ACTIVITIES: CPT | Mod: GP | Performed by: PHYSICAL THERAPIST

## 2024-10-29 PROCEDURE — 80061 LIPID PANEL: CPT | Performed by: NURSE PRACTITIONER

## 2024-10-29 PROCEDURE — 93880 EXTRACRANIAL BILAT STUDY: CPT | Performed by: SURGERY

## 2024-10-29 PROCEDURE — 2500000002 HC RX 250 W HCPCS SELF ADMINISTERED DRUGS (ALT 637 FOR MEDICARE OP, ALT 636 FOR OP/ED)

## 2024-10-29 PROCEDURE — 70551 MRI BRAIN STEM W/O DYE: CPT

## 2024-10-29 PROCEDURE — 70551 MRI BRAIN STEM W/O DYE: CPT | Performed by: STUDENT IN AN ORGANIZED HEALTH CARE EDUCATION/TRAINING PROGRAM

## 2024-10-29 PROCEDURE — 85027 COMPLETE CBC AUTOMATED: CPT | Performed by: NURSE PRACTITIONER

## 2024-10-29 RX ORDER — SUCRALFATE 1 G/10ML
1 SUSPENSION ORAL EVERY 6 HOURS SCHEDULED
Status: DISCONTINUED | OUTPATIENT
Start: 2024-10-29 | End: 2024-11-04 | Stop reason: HOSPADM

## 2024-10-29 RX ORDER — RANOLAZINE 500 MG/1
500 TABLET, EXTENDED RELEASE ORAL 2 TIMES DAILY
Status: DISCONTINUED | OUTPATIENT
Start: 2024-10-29 | End: 2024-11-04 | Stop reason: HOSPADM

## 2024-10-29 RX ORDER — POTASSIUM CHLORIDE 1.5 G/1.58G
40 POWDER, FOR SOLUTION ORAL ONCE
Status: COMPLETED | OUTPATIENT
Start: 2024-10-29 | End: 2024-10-29

## 2024-10-29 RX ORDER — POLYETHYLENE GLYCOL 3350 17 G/17G
17 POWDER, FOR SOLUTION ORAL DAILY
Status: DISCONTINUED | OUTPATIENT
Start: 2024-10-29 | End: 2024-11-04 | Stop reason: HOSPADM

## 2024-10-29 SDOH — SOCIAL STABILITY: SOCIAL INSECURITY: ARE YOU OR HAVE YOU BEEN THREATENED OR ABUSED PHYSICALLY, EMOTIONALLY, OR SEXUALLY BY ANYONE?: NO

## 2024-10-29 SDOH — SOCIAL STABILITY: SOCIAL INSECURITY: DOES ANYONE TRY TO KEEP YOU FROM HAVING/CONTACTING OTHER FRIENDS OR DOING THINGS OUTSIDE YOUR HOME?: NO

## 2024-10-29 SDOH — SOCIAL STABILITY: SOCIAL INSECURITY: WERE YOU ABLE TO COMPLETE ALL THE BEHAVIORAL HEALTH SCREENINGS?: YES

## 2024-10-29 SDOH — SOCIAL STABILITY: SOCIAL INSECURITY: ARE THERE ANY APPARENT SIGNS OF INJURIES/BEHAVIORS THAT COULD BE RELATED TO ABUSE/NEGLECT?: NO

## 2024-10-29 SDOH — SOCIAL STABILITY: SOCIAL INSECURITY: DO YOU FEEL ANYONE HAS EXPLOITED OR TAKEN ADVANTAGE OF YOU FINANCIALLY OR OF YOUR PERSONAL PROPERTY?: NO

## 2024-10-29 SDOH — SOCIAL STABILITY: SOCIAL INSECURITY: HAVE YOU HAD ANY THOUGHTS OF HARMING ANYONE ELSE?: NO

## 2024-10-29 SDOH — SOCIAL STABILITY: SOCIAL INSECURITY: HAVE YOU HAD THOUGHTS OF HARMING ANYONE ELSE?: NO

## 2024-10-29 SDOH — SOCIAL STABILITY: SOCIAL INSECURITY: HAS ANYONE EVER THREATENED TO HURT YOUR FAMILY OR YOUR PETS?: NO

## 2024-10-29 SDOH — SOCIAL STABILITY: SOCIAL INSECURITY: ABUSE: ADULT

## 2024-10-29 SDOH — SOCIAL STABILITY: SOCIAL INSECURITY: DO YOU FEEL UNSAFE GOING BACK TO THE PLACE WHERE YOU ARE LIVING?: NO

## 2024-10-29 ASSESSMENT — COGNITIVE AND FUNCTIONAL STATUS - GENERAL
PERSONAL GROOMING: A LITTLE
DRESSING REGULAR UPPER BODY CLOTHING: A LITTLE
WALKING IN HOSPITAL ROOM: A LOT
DRESSING REGULAR LOWER BODY CLOTHING: A LOT
MOVING TO AND FROM BED TO CHAIR: A LOT
HELP NEEDED FOR BATHING: A LOT
DRESSING REGULAR UPPER BODY CLOTHING: A LITTLE
CLIMB 3 TO 5 STEPS WITH RAILING: A LOT
DRESSING REGULAR LOWER BODY CLOTHING: A LITTLE
STANDING UP FROM CHAIR USING ARMS: A LITTLE
MOBILITY SCORE: 14
EATING MEALS: A LITTLE
MOVING FROM LYING ON BACK TO SITTING ON SIDE OF FLAT BED WITH BEDRAILS: A LITTLE
DAILY ACTIVITIY SCORE: 18
DAILY ACTIVITIY SCORE: 15
EATING MEALS: A LITTLE
MOBILITY SCORE: 24
PERSONAL GROOMING: A LITTLE
TURNING FROM BACK TO SIDE WHILE IN FLAT BAD: A LOT
TOILETING: A LOT
TOILETING: A LITTLE
HELP NEEDED FOR BATHING: A LITTLE

## 2024-10-29 ASSESSMENT — PAIN SCALES - GENERAL
PAINLEVEL_OUTOF10: 7
PAINLEVEL_OUTOF10: 0 - NO PAIN
PAINLEVEL_OUTOF10: 7

## 2024-10-29 ASSESSMENT — ENCOUNTER SYMPTOMS
NAUSEA: 0
CONFUSION: 0
BACK PAIN: 1
CHEST TIGHTNESS: 1
RESPIRATORY NEGATIVE: 1
EYE REDNESS: 0
DIZZINESS: 0
FREQUENCY: 0
UNEXPECTED WEIGHT CHANGE: 0
MYALGIAS: 0
HEMATOLOGIC/LYMPHATIC NEGATIVE: 1
SEIZURES: 0
WOUND: 0
PSYCHIATRIC NEGATIVE: 1
MUSCULOSKELETAL NEGATIVE: 1
CHILLS: 0
EYE PAIN: 0
COUGH: 0
RHINORRHEA: 0
APPETITE CHANGE: 0
WHEEZING: 1
FEVER: 0
NERVOUS/ANXIOUS: 0
PALPITATIONS: 1
ARTHRALGIAS: 1
VOMITING: 0
GASTROINTESTINAL NEGATIVE: 1
LIGHT-HEADEDNESS: 0
CONSTIPATION: 1
FATIGUE: 1
EYE ITCHING: 0
DYSPHORIC MOOD: 0
ABDOMINAL PAIN: 0
CONSTITUTIONAL NEGATIVE: 1
SORE THROAT: 1
EYES NEGATIVE: 1
HEMATURIA: 0
CARDIOVASCULAR NEGATIVE: 1
NECK PAIN: 1
SHORTNESS OF BREATH: 1
ALLERGIC/IMMUNOLOGIC NEGATIVE: 1
ENDOCRINE NEGATIVE: 1
NEUROLOGICAL NEGATIVE: 1
DIARRHEA: 0
DYSURIA: 0
HEADACHES: 1

## 2024-10-29 ASSESSMENT — LIFESTYLE VARIABLES
HOW OFTEN DO YOU HAVE 6 OR MORE DRINKS ON ONE OCCASION: NEVER
HOW MANY STANDARD DRINKS CONTAINING ALCOHOL DO YOU HAVE ON A TYPICAL DAY: PATIENT DOES NOT DRINK
AUDIT-C TOTAL SCORE: 0
HOW OFTEN DO YOU HAVE A DRINK CONTAINING ALCOHOL: NEVER
AUDIT-C TOTAL SCORE: 0
SKIP TO QUESTIONS 9-10: 1

## 2024-10-29 ASSESSMENT — PATIENT HEALTH QUESTIONNAIRE - PHQ9
2. FEELING DOWN, DEPRESSED OR HOPELESS: NOT AT ALL
SUM OF ALL RESPONSES TO PHQ9 QUESTIONS 1 & 2: 0
1. LITTLE INTEREST OR PLEASURE IN DOING THINGS: NOT AT ALL

## 2024-10-29 ASSESSMENT — PAIN - FUNCTIONAL ASSESSMENT: PAIN_FUNCTIONAL_ASSESSMENT: 0-10

## 2024-10-29 ASSESSMENT — ACTIVITIES OF DAILY LIVING (ADL)
BATHING_ASSISTANCE: MAXIMAL
LACK_OF_TRANSPORTATION: NO
ADL_ASSISTANCE: NEEDS ASSISTANCE

## 2024-10-29 NOTE — PROGRESS NOTES
"Occupational Therapy    Evaluation    Patient Name: Rakan Cervantes  MRN: [id number]  Department: [address]  Room: [facility]429  Today's Date: 10/29/2024  Time Calculation  Start Time: 1343  Stop Time: 1401  Time Calculation (min): 18 min    Assessment  IP OT Assessment  OT Assessment: OT order received, chart reviewed, evaluation completed. Pt demonstrated deficts in ADLs and functional transfers required mod A, falls risk. Pt would benefit from acute OT services to facilitate return to OF.  Prognosis: Good  Barriers to Discharge: Decreased caregiver support  Evaluation/Treatment Tolerance: Patient limited by fatigue  Medical Staff Made Aware: Yes  End of Session Communication: Bedside nurse  End of Session Patient Position: Up in chair, Alarm on  Plan:  Treatment Interventions: ADL retraining, Functional transfer training, UE strengthening/ROM, Endurance training, Cognitive reorientation, Patient/family training, Equipment evaluation/education  OT Frequency: 3 times per week  OT Discharge Recommendations: Moderate intensity level of continued care  Equipment Recommended upon Discharge: Wheeled walker  OT Recommended Transfer Status: Moderate assist  OT - OK to Discharge: Yes    Subjective     General:  General  Reason for Referral: activities of daily living, sinus tachycardia  Referred By: Shiela Reyes APRN-CNP  Past Medical History Relevant to Rehab: NSTEMI, atrial fibrillation, on anticoagulation, Jonas's esophagus, bilateral carotid artery occlusion, COPD, erosive esophagitis, hyperparathyroidism, hypercholesteremia, CKD, CVA, recent Stent  Family/Caregiver Present: Yes  Caregiver Feedback: spouse present  Co-Treatment: PT  Co-Treatment Reason: limited pt tolerance to activity  Prior to Session Communication: Bedside nurse  Patient Position Received: Alarm on, Bed, 3 rail up  Preferred Learning Style: auditory, verbal  General Comment: Pt is an 88 yo woman admit from home with \"blacking out\" and falling. Pt " with recent NSTEMI, cardiac cath ans stent placement.  Precautions:  Hearing/Visual Limitations: glasses for reading, JAMISON HAs no here  Medical Precautions: Fall precautions, Oxygen therapy device and L/min (4L, titrated down from 5 per nursing)    Pain:  Pain Assessment  Pain Assessment: 0-10  0-10 (Numeric) Pain Score: 7  Pain Type: Acute pain  Pain Location: Hip  Pain Interventions: Repositioned  Response to Interventions: appears reduced    Objective   Cognition:  Overall Cognitive Status: Within Functional Limits  Orientation Level: Oriented X4  Following Commands: Follows one step commands with repetition  Safety Judgment: Decreased awareness of need for assistance  Cognition Comments: Mild insight deficits  Insight: Mild  Impulsive: Mildly  Processing Speed: Delayed    ADL:  Eating Deficit: Setup  Grooming Assistance: Minimal  Grooming Deficit:  (for combing hair/washing face)  Bathing Assistance: Maximal  Bathing Deficit:  (projected due to balance deficits)  UE Dressing Assistance: Minimal  UE Dressing Deficit: Thread LUE, Thread RUE, Pull around back (donning gown)  LE Dressing Assistance: Maximal  LE Dressing Deficit: Don/doff R sock, Don/doff L sock  Toileting Assistance with Device: Total  Toileting Deficit:  (purewick, brief management, doffed purewick for chair)  Activity Tolerance:  Endurance: Tolerates less than 10 min exercise, no significant change in vital signs  Activity Tolerance Comments: Pt on 4 L O2  Rate of Perceived Exertion (RPE): 5/10  Bed Mobility/Transfers: Bed Mobility  Bed Mobility: Yes  Bed Mobility 1  Bed Mobility 1: Supine to sitting  Level of Assistance 1: Moderate assistance  Bed Mobility Comments 1: assist for trunk up, pt able to scoot to EOB with cues, pt with poor seated balance at EOB and had multiple instances of retropulsion unable to correct, min A needed    Transfers  Transfer: Yes  Transfer 1  Transfer From 1: Bed to  Transfer to 1: Stand  Technique 1: Sit to  stand  Transfer Device 1: Walker  Transfer Level of Assistance 1: Moderate assistance  Trials/Comments 1: Pt stood from bed with mod A and RW support and cues for safe hand placement  Transfers 2  Transfer From 2: Stand to  Transfer to 2: Chair with arms  Technique 2: Stand to sit  Transfer Device 2: Walker  Transfer Level of Assistance 2: Minimum assistance  Trials/Comments 2: Min A to control descent into chair with cues for safe hand placement.      Functional Mobility:  Functional Mobility  Functional Mobility Performed: Yes  Functional Mobility 1  Surface 1: Level tile  Device 1: Rolling walker  Assistance 1: Minimum assistance  Comments 1: Pt performed functional mobility several steps to bedside chair. Pt significantly unsteady, and mod A to maintain balance, difficulty following cues for transfer technique.    Sensation:  Light Touch: No apparent deficits  Strength:  Strength Comments: 3-/5 B shoulders limited to 90 degrees flexion    Coordination:  Movements are Fluid and Coordinated: No  Upper Body Coordination: delayed rate and accuracy of movements   Hand Function:  Hand Function  Gross Grasp: Functional  Coordination: Functional  Extremities: RUE   RUE :  (impaired to 90 degrees shoulder flexion) and LUE   LUE:  (impaired to 90 degrees shoulder flexion)    Outcome Measures: [facility] Daily Activity  Putting on and taking off regular lower body clothing: A lot  Bathing (including washing, rinsing, drying): A lot  Putting on and taking off regular upper body clothing: A little  Toileting, which includes using toilet, bedpan or urinal: A lot  Taking care of personal grooming such as brushing teeth: A little  Eating Meals: A little  Daily Activity - Total Score: 15      Education Documentation  ADL Training, taught by Shannan Hernandez OT at 10/29/2024  3:21 PM.  Learner: Patient  Readiness: Acceptance  Method: Explanation  Response: Verbalizes Understanding  Comment: Pt edu on OT POC    Education Comments  Pt  educated on occupational therapy plan of care, safety/fall precautions, call light use.     Goals:   Encounter Problems       Encounter Problems (Active)       OT Goals       ADLs       Start:  10/29/24    Expected End:  11/15/24       Pt will complete ADL tasks with Mod I, using AE as needed, in order to complete self-care tasks.           Functional mobility       Start:  10/29/24    Expected End:  11/15/24       Pt will perform functional mobility household distance at mod ind level with RW           Functional transfers       Start:  10/29/24    Expected End:  11/15/24       Pt will perform functional transfers at mod ind level with RW

## 2024-10-29 NOTE — PROGRESS NOTES
Rakan Price is a 89 y.o. female on day 1 of admission presenting with Sinus tachycardia.      Subjective   Patient seen and examined. Awake/alert/oriented. Denies chest pain, shortness of breath, fevers, chills, nausea, or vomiting. No abdominal discomfort.      Objective     Last Recorded Vitals  /50 (BP Location: Left arm, Patient Position: Lying)   Pulse 67   Temp 36.6 °C (97.9 °F) (Oral)   Resp 16   Wt 50.9 kg (112 lb 3.4 oz)   SpO2 92%   Intake/Output last 3 Shifts:    Intake/Output Summary (Last 24 hours) at 10/29/2024 1255  Last data filed at 10/28/2024 1800  Gross per 24 hour   Intake 200 ml   Output --   Net 200 ml       Admission Weight  Weight: 50.8 kg (112 lb) (10/28/24 0856)    Daily Weight  10/29/24 : 50.9 kg (112 lb 3.4 oz)    Image Results  Carotid duplex bilateral  Preliminary Cardiology [place] [address]             Phone [phone]           Preliminary Vascular Lab Report     [facility] US CAROTID ARTERY DUPLEX BILATERAL       Patient Name:     RAKAN PRICE Reading Physician:  [id number] Elías Winter MD, RPVI  Study Date:       10/29/2024     Ordering Provider:  [id number] SONJA BATISTA  MRN/PID:          [id number]       Fellow:  Accession#:       [id number]   Technologist:       Isabell Vidal RVYOAV  YOB: 1935      Technologist 2:  Gender:           F              Encounter#:         [id number]  Admission Status: Inpatient      Location Performed: [facility]       Diagnosis/ICD: Syncope and collapse-R55  CPT Codes:     22092 Cerebrovascular Carotid Duplex scan complete       PRELIMINARY CONCLUSIONS:     Right Carotid: Findings are consistent with less than 50% stenosis of the right proximal internal carotid artery. The internal carotid artery appears tortuous. Right external carotid artery appears patent with no evidence of stenosis. The right vertebral artery is patent with antegrade flow.  No evidence of hemodynamically significant stenosis in the right subclavian artery. There is turbulent flow noted in the proximal common carotid artery which may indicate a more proximal stenosis.  Left Carotid: Findings are consistent with less than 50% stenosis of the left proximal internal carotid artery. The internal carotid artery appears tortuous. There is turbulent flow noted in mid and distal internal carotid artery possibly due to vessel tortuosity. Left external carotid artery appears patent with no evidence of stenosis. The left vertebral artery is patent with antegrade flow. No evidence of hemodynamically significant stenosis in the left subclavian artery.     Imaging & Doppler Findings:  Right Plaque Morph: The proximal right internal carotid artery demonstrates heterogenous plaque. The proximal right external carotid artery demonstrates heterogenous and calcified plaque. The distal right common carotid artery demonstrates heterogenous plaque.  Left Plaque Morph: The proximal left internal carotid artery demonstrates heterogenous plaque. The proximal left external carotid artery demonstrates heterogenous plaque. The distal left common carotid artery demonstrates heterogenous plaque.      Right                        Left    PSV      EDV                PSV      EDV  118 cm/s           CCA P    79 cm/s  86 cm/s            CCA D    75 cm/s  83 cm/s  18 cm/s   ICA P    102 cm/s 16 cm/s  83 cm/s  22 cm/s   ICA M    124 cm/s 25 cm/s  82 cm/s  21 cm/s   ICA D    80 cm/s  16 cm/s  105 cm/s            ECA     192 cm/s  75 cm/s  14 cm/s Vertebral  84 cm/s  18 cm/s  80 cm/s          Subclavian 122 cm/s                     Right Left  ICA/CCA Ratio  1.0  1.4          VASCULAR PRELIMINARY REPORT  completed by Isabell Vidal YOAV on 10/29/2024 at 11:21:41 AM       ** Final **      Physical Exam  Vitals reviewed.   Constitutional:       Appearance: Normal appearance.   HENT:      Head: Normocephalic and atraumatic.    Eyes:      Extraocular Movements: Extraocular movements intact.      Conjunctiva/sclera: Conjunctivae normal.   Cardiovascular:      Rate and Rhythm: Normal rate and regular rhythm.   Pulmonary:      Effort: Pulmonary effort is normal.      Breath sounds: No wheezing, rhonchi or rales.      Comments: Breath sounds diminished with mild crackles heard in the bases  Abdominal:      General: Bowel sounds are normal.      Palpations: Abdomen is soft.      Tenderness: There is no abdominal tenderness.   Skin:     General: Skin is warm and dry.   Neurological:      General: No focal deficit present.      Mental Status: She is alert and oriented to person, place, and time.         Relevant Results  Lab Results   Component Value Date    GLUCOSE 85 10/29/2024    CALCIUM 8.3 (L) 10/29/2024     10/29/2024    K 3.2 (L) 10/29/2024    CO2 32 10/29/2024     10/29/2024    BUN 14 10/29/2024    CREATININE 0.49 (L) 10/29/2024      Lab Results   Component Value Date    WBC 4.0 (L) 10/29/2024    HGB 7.8 (L) 10/29/2024    HCT 24.5 (L) 10/29/2024    MCV 89 10/29/2024     10/29/2024    Carotid duplex bilateral  Result Date: 10/29/2024  PRELIMINARY CONCLUSIONS:    Right Carotid: Findings are consistent with less than 50% stenosis of the right proximal internal carotid artery. The internal carotid artery appears tortuous. Right external carotid artery appears patent with no evidence of stenosis. The right vertebral artery is patent with antegrade flow. No evidence of hemodynamically significant stenosis in the right subclavian artery. There is turbulent flow noted in the proximal common carotid artery which may indicate a more proximal stenosis.   Left Carotid: Findings are consistent with less than 50% stenosis of the left proximal internal carotid artery. The internal carotid artery appears tortuous. There is turbulent flow noted in mid and distal internal carotid artery possibly due to vessel tortuosity. Left external  carotid artery appears patent with no evidence of stenosis. The left vertebral artery is patent with antegrade flow. No evidence of hemodynamically significant stenosis in the left subclavian artery.     ECG 12 Lead  Result Date: 10/28/2024  Supraventricular tachycardia Marked ST abnormality, possible inferolateral subendocardial injury Abnormal ECG When compared with ECG of 26-SEP-2024 14:27, T wave inversion no longer evident in Inferior leads T wave inversion more evident in Lateral leads Confirmed by Sergey Bond ([id number]) on 10/28/2024 7:51:36 PM    Lower extremity venous duplex bilateral  Result Date: 10/28/2024  No sonographic evidence DVT in the visualized vessels of bilateral lower extremities.   MACRO: None   Signed by: Dino De Paz 10/28/2024 7:48 PM Dictation workstation:   [id number]    CT angio chest for pulmonary embolism  Result Date: 10/28/2024  1.  No evidence of pulmonary emboli. Severe coronary artery atherosclerotic calcifications. 2. Mild cardiomegaly with biatrial enlargement. Small to moderate-size right greater than left pleural effusions and bibasilar subsegmental atelectasis. Diffuse interseptal thickening and scattered central predominant ground glass opacification of the lungs suggestive of pulmonary edema. 3. Chronic patulous esophagus with new air-fluid throughout the thoracic esophagus suggestive of reflux esophagitis versus achalasia. Clinical correlation is recommended.     Signed by: Richmond Barbosa 10/28/2024 2:41 PM Dictation workstation:   [id number]    CT cervical spine wo IV contrast  Result Date: 10/28/2024  No evidence for an acute fracture or subluxation of the cervical spine.   There is chronic anterolisthesis of C3 on C4 and chronic retrolisthesis of C4 on C5 with multilevel degenerative disc disease and cervical spondylosis with central canal stenosis at C4-5 and multilevel foraminal stenosis bilaterally.   Bone island within left pedicle and lamina of T2 extending  into the spinous process of T2.   Hypodense thyroid nodules.   MACRO: None   Signed by: Sherlyn Jackson 10/28/2024 11:00 AM Dictation workstation:   [id number]    CT head wo IV contrast  Result Date: 10/28/2024  Age-appropriate atrophy.   Encephalomalacia within the right posterior parietal lobe unchanged consistent with an old infarct at this site. Old lacunar infarct of the right insula is also identified.   No acute intracranial process.   MACRO: None     Signed by: Sherlyn Jackson 10/28/2024 10:46 AM Dictation workstation:   [id number]    XR chest 2 views  Result Date: 10/28/2024  Cardiomegaly without acute cardiopulmonary process. Low lung volumes.   Advanced osteoarthritis of the left shoulder affecting glenohumeral joint.   Signed by: Sherlyn Jackson 10/28/2024 10:33 AM Dictation workstation:   [id number]    XR pelvis 1-2 views  Result Date: 10/28/2024  No pelvic fracture.   Signed by: Sherlyn Jackson 10/28/2024 10:29 AM Dictation workstation:   [id number]    XR lumbar spine 2-3 views  Result Date: 10/28/2024  Status post midline decompressive laminectomy with posterior spinal fusion from L2 through S1   Lumbar levoscoliosis with multilevel degenerative disc disease and spondylosis most pronounced in the lower thoracic region and at the T12-L1 level.   Signed by: Sherlyn Jackson 10/28/2024 10:27 AM Dictation workstation:   [id number]         Assessment/Plan      Assessment & Plan  Acute hypoxic respiratory failure (Multi)  On 5 L oxygen via nasal cannula  CT imaging reviewed personally, noted bilateral pleural effusions and pulmonary edema  IV Lasix  Check TTE  Supplemental oxygen, wean as tolerated  Will add as needed DuoNeshilpi  Consult pulmonary and cardiology, appreciate recommendations  Acute heart failure  Resume home cardiac medications  Daily weights  Strict I/Os    Echocardiogram  Diuresis  Consult cardiology, appreciate recommendations  NSTEMI (non-ST elevated myocardial infarction) (Multi)  Troponin 43, 96,  "600  Check lipid panel in the morning  Continue aspirin/statin/Plavix  Recent heart cath with stent placed in September of this year  Check echo  Cardiology consulted, appreciate recs-recommending medical management, cardiac medications have been adjusted  Syncope  Patient reports \"blacking out\" multiple times at home resulting in falls  Fall precautions  Check Ortho's  Echocardiogram  Ultrasound of the carotids as above  Bilateral pleural effusion  Seen on CT imaging  Diuresis  Cardiology and pulmonary consulted, appreciate recommendations  Sinus tachycardia  On admission  Given oral cardiac medications with improvement  Monitor on telemetry  PAF (paroxysmal atrial fibrillation) (Multi)  Monitor on telemetry  Continue antiarrhythmics and Eliquis  Left arm numbness  Hx of CVA  Patient is on statin/apixaban/Plavix-continue  Patient fell on Friday, now with numbness in LUE, back pain  Imaging as above  Neurochecks  Consult neurology, appreciate recs  Erosive esophagitis  CT imaging showed chronic patulous esophagus with new air-fluid throughout the thoracic esophagus suggestive of reflux esophagitis versus achalasia  Consult GI, appreciate recs  Continue PPI  Essential hypertension  Resume home antihypertensives  Monitor blood pressure close    Plan  Wean oxygen as tolerated  Diuresis  Check Ortho's, TTE  DVT prophylaxis: Eliquis  Fall precautions, PT/OT consult  Cardiology, neurology, GI, and pulmonary on consult, appreciate recs  CBC and BMP in the morning                DANIA Gracia-CNP      "

## 2024-10-29 NOTE — RESEARCH NOTES
Artificial Intelligence Monitoring in Nursing (AIMS Nursing) Study    Principle Investigator - Dr. Lan Jaquez  Research Coordinator - Guera Angulo     Patient Name - Rakan Cervantes  Date - 10/29/2024 3:41 PM  Location -     Rakan Cervantes was approached by Guera Angulo to talk about participating in the AIMS Nursing Study. The patient declined to participate in the study. Study protocol was followed and patient was given study contact information.     Guera Angulo

## 2024-10-29 NOTE — CONSULTS
Inpatient consult to Neurology  Consult performed by: Jose Elias Keen MD  Consult ordered by: DANIA Gracia-CNP          History Of Present Illness  Rakan Cervantes is a 89 y.o. female wit hx of CAD, afib on anticoagulation, COPD presenting with an episode of fall. Neurology consulted for R arm numbness.     Pt states she was in the dark when she essentially blacked out and fell. She denies any prodromal symptoms. Denies LOC. States she had L arm numbness and tingling since.     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     Pt reports that L arm tingling sensation has resolved now.     Past Medical History  Past Medical History:   Diagnosis Date    Acute non-ST segment elevation myocardial infarction (Multi) 11/26/2023    Anemia     Arteriosclerosis of coronary artery 05/14/2023    Arthritis     Asthma     Atrial fibrillation (Multi) 11/26/2023    Jonas esophagus     Bilateral carotid artery occlusion 05/23/2023    CAD (coronary artery disease)     Cardiac rhythm disorder or disturbance or change 11/26/2023    Cervical radiculopathy     Chest pain 11/26/2023    Chronic obstructive pulmonary disease (Multi) 11/26/2023    Constipation     Coronary artery disease 11/26/2023    Degenerative joint disease     Dyslipidemia     Dysuria     Erosive esophagitis     GERD (gastroesophageal reflux disease)     Hypercholesterolemia 05/23/2023    Hyperparathyroidism (Multi)     Hypertensive urgency 11/26/2023    Impaired fasting glucose 11/26/2023    Irritable bowel syndrome (IBS)     Labile essential hypertension 11/26/2023    Labile hypertension     Left foot pain     Low blood pressure 11/26/2023    Macular degeneration     Mixed hyperlipidemia 11/26/2023    Neck pain     Osteoporosis 11/26/2023    REclast '19 Cr 1.4 switch to calcitonin.    Paroxysmal atrial fibrillation (Multi)     Polyarthritis with negative rheumatoid factor (Multi)     Post menopausal syndrome     Rapid atrial  fibrillation (Multi) 05/14/2023    Spinal stenosis     Stage 3 chronic kidney disease (Multi) 11/26/2023    Stroke (Multi)     Transient ischemic attack 11/26/2023     Surgical History  Past Surgical History:   Procedure Laterality Date    CARDIAC CATHETERIZATION Left 9/27/2024    Procedure: Left Heart Cath;  Surgeon: Nessa Callaway MD;  Location: [facility];  Service: Cardiovascular;  Laterality: Left;    CARDIAC CATHETERIZATION N/A 9/27/2024    Procedure: PCI;  Surgeon: Nessa Callaway MD;  Location: [facility];  Service: Cardiovascular;  Laterality: N/A;    CARDIOVASCULAR STRESS TEST  2017    Dr. Khalil    CARDIOVASCULAR STRESS TEST  2004    Dr. Pantoja    CARPAL TUNNEL RELEASE Left 2014    CATARACT EXTRACTION Bilateral 2011    CHOLECYSTECTOMY      CORONARY STENT PLACEMENT  05/2023    LAD    CT HEAD ANGIO W AND WO IV CONTRAST  05/08/2013    CT HEAD ANGIO W AND WO IV CONTRAST [facility]    MR HEAD ANGIO WO IV CONTRAST  04/23/2013    MR HEAD ANGIO WO IV CONTRAST [facility]    MR HEAD ANGIO WO IV CONTRAST  07/20/2016    MR HEAD ANGIO WO IV CONTRAST [facility]    MR HEAD ANGIO WO IV CONTRAST  10/27/2023    MR HEAD ANGIO WO IV CONTRAST 10/27/2023 VER[facility] MRI    MR NECK ANGIO WO IV CONTRAST  10/27/2023    MR NECK ANGIO WO IV CONTRAST 10/27/2023 VERITO MRI    OTHER SURGICAL HISTORY  02/14/2022    No history of surgery    REVISION TOTAL HIP ARTHROPLASTY Left 2013    Conversion from left ORIF    TOTAL HIP ARTHROPLASTY Right 2016     Social History  Social History     Tobacco Use    Smoking status: Never     Passive exposure: Never    Smokeless tobacco: Never   Vaping Use    Vaping status: Never Used   Substance Use Topics    Alcohol use: Not Currently    Drug use: Never     Allergies  Meperidine, Morphine, Opioids - morphine analogues, Pregabalin, and Tramadol  Medications Prior to Admission   Medication Sig Dispense Refill Last Dose/Taking    amLODIPine (Norvasc) 5 mg tablet Take 1  tablet (5 mg) by mouth once daily. 30 tablet 1 Unknown    apixaban (Eliquis) 2.5 mg tablet Take 1 tablet (2.5 mg) by mouth 2 times a day. 180 tablet 3 Unknown    ascorbic acid (Vitamin C) 100 mg tablet Take 1 tablet (100 mg) by mouth once daily.   Unknown    atorvastatin (Lipitor) 80 mg tablet Take 1 tablet (80 mg) by mouth once daily at bedtime. 90 tablet 0 Unknown    carvedilol (Coreg) 25 mg tablet Take 1 tablet (25 mg) by mouth 2 times daily (morning and late afternoon). 60 tablet 1 Unknown    cholecalciferol (Vitamin D-3) 50 mcg (2,000 unit) capsule Take 1 capsule (50 mcg) by mouth early in the morning..   Unknown    clopidogrel (Plavix) 75 mg tablet TAKE 1 TABLET BY MOUTH ONCE DAILY 90 tablet 1 Unknown    docusate sodium (Colace) 100 mg capsule TAKE 1 CAPSULE BY MOUTH ONCE DAILY AS NEEDED to prevent constipation from iron 90 capsule 2 Unknown    DULoxetine (Cymbalta) 30 mg DR capsule TAKE 1 CAPSULE BY MOUTH ONCE DAILY at dinner 90 capsule 2 Unknown    ferrous sulfate, 325 mg ferrous sulfate, tablet Take 1 tablet by mouth once daily with breakfast. M-W-F 36 tablet 2 Unknown    gabapentin (Neurontin) 300 mg capsule Take 1 capsule (300 mg) by mouth 2 times a day. Resume as needed   Unknown    hydroxychloroquine (Plaquenil) 200 mg tablet Take by mouth.   Unknown    isosorbide mononitrate ER (Imdur) 30 mg 24 hr tablet Take 1 tablet (30 mg) by mouth once daily. Do not crush or chew. 30 tablet 11 Unknown    losartan (Cozaar) 100 mg tablet Take 1 tablet (100 mg) by mouth once daily. 90 tablet 3 Unknown    pantoprazole (ProtoNix) 40 mg EC tablet TAKE 1 TABLET BY MOUTH TWICE DAILY 180 tablet 2 Unknown    sulfaSALAzine (Azulfidine) 500 mg tablet Take 1 tablet (500 mg) by mouth 2 times a day.   Unknown    vit C/E/Zn/coppr/lutein/zeaxan (PRESERVISION AREDS-2 ORAL) As directed orally   Unknown       Review of Systems  Neurological Exam  Physical Exam  MENTAL STATUS:  General appearance: in NAD  Orientation: [place] to self,  "time, place and condition   Language: Expression, repetition, naming, comprehension intact.   Concentration: Intact  Fund of knowledge: Appropriate    CRANIAL NERVES:  - Fundoscopic exam: Deferred   - II/III: PERRL  - II:  Visual fields intact to confrontation bilaterally   - III, IV, VI: EOMI to pursuit without nystagmus  - V: V1-V3 sensation intact bilaterally  - VII: Face muscles symmetric with smile and eye closure  - VIII: Intact to finger rub  - IX, X: Palate elevated symmetrically bilaterally, no hoarseness  - XI: 5/5 strength on shoulder shrugging bilaterally  - XII: Tongue midline without atrophy or fasciculation    MOTOR: Tone and bulk normal in all extremities    STRENGTH: 5/5 strength tested proximally and distally in BUE and BLE     REFLEXES: R L  Biceps   +2 +2  Brachioradialis +2 +2  Patellar   +2 +2  Achilles   +1 +1  Plantar   Mute mute   No clonus, frontal release signs or other pathologic reflexes present.   COORDINATION: Intact on finger to nose bl, intact on heel to shin bl, EMILY intact bl  SENSORY: Intact to light touch in BUE and BLE  GAIT: deferred     Last Recorded Vitals  Blood pressure 144/50, pulse 67, temperature 36.6 °C (97.9 °F), temperature source Oral, resp. rate 16, height 1.499 m (4' 11\"), weight 50.9 kg (112 lb 3.4 oz), SpO2 92%.    Relevant Results                    [place] Coma Scale  Best Eye Response: Spontaneous  Best Verbal Response: Oriented  Best Motor Response: Follows commands  [place] Coma Scale Score: 15                 I have personally reviewed the following imaging results Carotid duplex bilateral    Result Date: 10/29/2024  Preliminary Cardiology [place] [address]            Phone [phone]      Preliminary Vascular Lab Report  [facility] CAROTID ARTERY DUPLEX BILATERAL  Patient Name:     NICOLE PRICE [place] Physician:  [id number] Elías Winter MD, RPVI Study Date:       10/29/2024     Ordering Provider: "  [id number] SONJASHANE BATISTA MRN/PID:          [id number]       Fellow: Accession#:       [id number]   Technologist:       Isabellzenaida Vidal RVT YOB: 1935      Technologist 2: Gender:           F              Encounter#:         [id number] Admission Status: Inpatient      Location Performed: [facility]  Diagnosis/ICD: Syncope and collapse-R55 CPT Codes:     93740 Cerebrovascular Carotid Duplex scan complete  PRELIMINARY CONCLUSIONS:  Right Carotid: Findings are consistent with less than 50% stenosis of the right proximal internal carotid artery. The internal carotid artery appears tortuous. Right external carotid artery appears patent with no evidence of stenosis. The right vertebral artery is patent with antegrade flow. No evidence of hemodynamically significant stenosis in the right subclavian artery. There is turbulent flow noted in the proximal common carotid artery which may indicate a more proximal stenosis. Left Carotid: Findings are consistent with less than 50% stenosis of the left proximal internal carotid artery. The internal carotid artery appears tortuous. There is turbulent flow noted in mid and distal internal carotid artery possibly due to vessel tortuosity. Left external carotid artery appears patent with no evidence of stenosis. The left vertebral artery is patent with antegrade flow. No evidence of hemodynamically significant stenosis in the left subclavian artery.  Imaging & Doppler Findings: Right Plaque Morph: The proximal right internal carotid artery demonstrates heterogenous plaque. The proximal right external carotid artery demonstrates heterogenous and calcified plaque. The distal right common carotid artery demonstrates heterogenous plaque. Left Plaque Morph: The proximal left internal carotid artery demonstrates heterogenous plaque. The proximal left external carotid artery demonstrates heterogenous plaque. The distal left common carotid artery demonstrates  heterogenous plaque.   Right                        Left   PSV      EDV                PSV       cm/s           CCA P    79 cm/s 86 cm/s            CCA D    75 cm/s 83 cm/s  18 cm/s   ICA P    102 cm/s 16 cm/s 83 cm/s  22 cm/s   ICA M    124 cm/s 25 cm/s 82 cm/s  21 cm/s   ICA D    80 cm/s  16 cm/s 105 cm/s            ECA     192 cm/s 75 cm/s  14 cm/s Vertebral  84 cm/s  18 cm/s 80 cm/s          Subclavian 122 cm/s                Right Left ICA/CCA Ratio  1.0  1.4   VASCULAR PRELIMINARY REPORT completed by Isabell Vidal T on 10/29/2024 at 11:21:41 AM  ** Final **     ECG 12 Lead    Result Date: 10/28/2024  Supraventricular tachycardia Marked ST abnormality, possible inferolateral subendocardial injury Abnormal ECG When compared with ECG of 26-SEP-2024 14:27, T wave inversion no longer evident in Inferior leads T wave inversion more evident in Lateral leads Confirmed by Sergey Bond ([id number]) on 10/28/2024 7:51:36 PM    Lower extremity venous duplex bilateral    Result Date: 10/28/2024  Interpreted By:  Dino De Paz, STUDY: [facility] LOWER EXTREMITY VENOUS DUPLEX BILATERAL;  10/28/2024 7:15 pm   INDICATION: Signs/Symptoms:extremity edema.   COMPARISON: None.   ACCESSION NUMBER(S): [id number]   ORDERING CLINICIAN: SONJA BATISTA   TECHNIQUE: Grayscale, color and spectral Doppler sonographic images of the bilateral lower extremity deep venous system.   FINDINGS: RIGHT: There is normal compressibility of the common femoral vein, saphenous femoral junction, profunda femoral vein and popliteal vein. The posterior tibial and peroneal veins  demonstrate normal color flow and compressibility. There is normal spontaneous and phasic variation throughout the leg by spectral doppler.   LEFT: There is normal compressibility of the common femoral vein, saphenous femoral junction, profunda femoral vein and popliteal vein. The posterior tibial and peroneal veins  demonstrate normal color flow and compressibility.  There  is normal spontaneous and phasic variation throughout the leg by spectral doppler.   OTHER FINDINGS: None.       No sonographic evidence DVT in the visualized vessels of bilateral lower extremities.   MACRO: None   Signed by: Dino De Paz 10/28/2024 7:48 PM Dictation workstation:   [id number]    CT angio chest for pulmonary embolism    Result Date: 10/28/2024  Interpreted By:  Richmond Barbosa, STUDY: CT ANGIO CHEST FOR PULMONARY EMBOLISM;  10/28/2024 2:05 pm   INDICATION: Signs/Symptoms:hypoxic, tachy.   COMPARISON: Chest CT 03/07/2022.   ACCESSION NUMBER(S): [id number]   ORDERING CLINICIAN: KARMA HAIDER   TECHNIQUE: Helical data acquisition of the chest was obtained contrast volume: 75 mL IV contrast Omnipaque 350.   Axial contiguous images were reformatted in coronal and sagittal planes. Axial and coronal MIP images were created and reviewed.   FINDINGS: POTENTIAL LIMITATIONS OF THE STUDY: Motion and mixing artifact which limits evaluation of the distal branch vessels.   HEART AND VESSELS: No discrete filling defects within the main pulmonary artery or its branches.   Main pulmonary trunk is upper normal limits, measuring 2.9 cm in size.   The thoracic aorta is normal in caliber. It is not well evaluated due to the phase of the contrast. Mild aortic valve atherosclerotic calcifications are present.   Severe coronary artery atherosclerotic calcifications and/or stents.   Mild cardiomegaly with biatrial enlargement.   No evidence of pericardial effusion.   MEDIASTINUM AND DONATO, LOWER NECK AND AXILLA: The visualized thyroid gland is within normal limits.   No evidence of thoracic lymphadenopathy by CT criteria.   There is moderate-size hiatal hernia and there is air-fluid level and mild circumferential wall thickening throughout the thoracic esophagus which is patulous. This is suggestive of achalasia versus reflux esophagitis.   LUNGS AND AIRWAYS: The trachea and central airways are patent. No endobronchial lesion.    Small to moderate-sized right greater than left pleural effusions. There is interseptal thickening with scattered ground-glass opacities suggestive of pulmonary venous congestion/interstitial edema. Mild bibasilar compression atelectasis.   UPPER ABDOMEN: Cholecystectomy change. Diffuse hepatic steatosis.   CHEST WALL AND OSSEOUS STRUCTURES: There are no suspicious osseous lesions. There is endplate reactive change in the lower thoracic spine with partially visualized posterior spinal fusions hardware and laminectomy in the lumbar spine.       1.  No evidence of pulmonary emboli. Severe coronary artery atherosclerotic calcifications. 2. Mild cardiomegaly with biatrial enlargement. Small to moderate-size right greater than left pleural effusions and bibasilar subsegmental atelectasis. Diffuse interseptal thickening and scattered central predominant ground glass opacification of the lungs suggestive of pulmonary edema. 3. Chronic patulous esophagus with new air-fluid throughout the thoracic esophagus suggestive of reflux esophagitis versus achalasia. Clinical correlation is recommended.     Signed by: Richmond Barbosa 10/28/2024 2:41 PM Dictation workstation:   [id number]    CT cervical spine wo IV contrast    Result Date: 10/28/2024  Interpreted By:  Sherlyn Jackson, STUDY: CT CERVICAL SPINE WO IV CONTRAST;  10/28/2024 10:16 am   INDICATION: Signs/Symptoms:fall.     COMPARISON: 07/20/2022   ACCESSION NUMBER(S): [id number]   ORDERING CLINICIAN: ANDREY COX   TECHNIQUE: Axial CT images of the cervical spine are obtained. Axial, coronal and sagittal reconstructions are provided for review.   FINDINGS:     Fractures: There is no evidence for an acute fracture of the cervical spine.   Vertebral Alignment: There is a 2-1/2 mm anterolisthesis of C3 with respect to C4 and a 2-1/2 mm retrolisthesis of C4 with respect to C5.   Craniocervical Junction: The odontoid process and craniocervical junction are intact.    Vertebrae/Disc Spaces:  The cervical vertebral body heights are intact. There is marked disc space narrowing at C4-5, C5-6, and C6-7 levels with moderately severe disc space narrowing at the C3-4 level. Vacuum disc is present at all of these levels. There is multilevel degenerative spondyloarthropathy seen bilaterally. There is also uncovertebral joint spurring seen on the right and on the left from C3-4 through the C6-7 levels with bilateral foraminal stenosis at C4-5, C5-6, and C6-7 levels. Central canal stenosis is present at the C4-5 level. A 1.2 cm sclerotic lesion is seen within the left pedicle and lamina of T2 extending into the spinous process without interval change.   Prevertebral/Paraspinal Soft Tissues: There are several hypodense thyroid nodules with the largest visualized measuring 1.1 cm in diameter within left thyroid lobe.         No evidence for an acute fracture or subluxation of the cervical spine.   There is chronic anterolisthesis of C3 on C4 and chronic retrolisthesis of C4 on C5 with multilevel degenerative disc disease and cervical spondylosis with central canal stenosis at C4-5 and multilevel foraminal stenosis bilaterally.   Bone island within left pedicle and lamina of T2 extending into the spinous process of T2.   Hypodense thyroid nodules.   MACRO: None   Signed by: Sherlyn Jackson 10/28/2024 11:00 AM Dictation workstation:   [id number]    CT head wo IV contrast    Result Date: 10/28/2024  Interpreted By:  Sherlyn Jackson, STUDY: CT HEAD WO IV CONTRAST;  10/28/2024 10:16 am   INDICATION: Signs/Symptoms: Fall several days earlier striking the head     COMPARISON: 10/26/2023   ACCESSION NUMBER(S): [id number]   ORDERING CLINICIAN: ANDREY COX   TECHNIQUE: Noncontrast axial CT scan of head was performed. Angled reformats in brain and bone windows were generated. The images were reviewed in bone, brain, blood and soft tissue windows.   FINDINGS: CSF Spaces: The ventricles, sulci and basal  cisterns are prominent indicating age-appropriate atrophy.. There is no extraaxial fluid collection.   Parenchyma: There is encephalomalacia and gliosis seen within the right parietal lobe posteriorly with transcortical extension. There is also diminished density within the right insula which is stable. No hyperdense MCA sign is present. There is calcified plaque within each carotid siphon. There is no mass effect or midline shift.  There is no intracranial hemorrhage.   Calvarium: The calvarium is unremarkable.   Paranasal sinuses and mastoids: Visualized paranasal sinuses and mastoids are clear.       Age-appropriate atrophy.   Encephalomalacia within the right posterior parietal lobe unchanged consistent with an old infarct at this site. Old lacunar infarct of the right insula is also identified.   No acute intracranial process.   MACRO: None     Signed by: Sherlyn Jackson 10/28/2024 10:46 AM Dictation workstation:   [id number]    XR chest 2 views    Result Date: 10/28/2024  Interpreted By:  Sherlyn Jackson, STUDY: XR CHEST 2 VIEWS 10/28/2024 9:45 am   INDICATION: Fall with left arm pain   COMPARISON: 09/26/2024   ACCESSION NUMBER(S): [id number]   ORDERING CLINICIAN: ANDREY COX   TECHNIQUE: AP erect view of the chest   FINDINGS: The heart is enlarged with atherosclerosis of the thoracic aorta noted. Low lung volumes are observed with crowding of the bronchovascular markings. No acute infiltrate is seen. No pleural abnormality is identified.   There is postoperative change from reverse shoulder arthroplasty on the right. There is osteoarthritis of the left shoulder affecting glenohumeral joint with joint space narrowing, sclerosis, osteophytosis, and subchondral cyst formation.       Cardiomegaly without acute cardiopulmonary process. Low lung volumes.   Advanced osteoarthritis of the left shoulder affecting glenohumeral joint.   Signed by: Sherlyn Jackson 10/28/2024 10:33 AM Dictation workstation:   [id number]    XR  pelvis 1-2 views    Result Date: 10/28/2024  Interpreted By:  Sherlyn Jackson, STUDY: XR PELVIS 1-2 VIEWS 10/28/2024 9:45 am   INDICATION: Pain and fall   COMPARISON: 05/22/2023   ACCESSION NUMBER(S): [id number]   ORDERING CLINICIAN: ANDREY COX   TECHNIQUE: AP view of the pelvis   FINDINGS: Postoperative change is seen as a result bilateral bipolar hip arthroplasties. There is no fracture or dislocation of either hip identified. Pelvic ring is intact. There are degenerative cystic changes within the pubic bones adjacent to the pubic symphysis unchanged. There is a stable 1 cm sclerotic lesion along the right SI joint most likely bone island.       No pelvic fracture.   Signed by: Sherlyn Jackson 10/28/2024 10:29 AM Dictation workstation:   [id number]    XR lumbar spine 2-3 views    Result Date: 10/28/2024  Interpreted By:  Sherlyn Jackson, STUDY: XR LUMBAR SPINE 2-3 VIEWS 10/28/2024 9:45 am   INDICATION: Fall several days ago with low back pain.   COMPARISON: None available.   ACCESSION NUMBER(S): [id number]   ORDERING CLINICIAN: ANDREY COX   TECHNIQUE: Three views of the lumbosacral spine are performed.   FINDINGS: There is a lumbar levoscoliosis observed with postoperative change consisting of midline decompressive laminectomy and posterior spinal fusion from L2 through S1. Bilateral pedicle screws are seen at L2, L3, L4, L5, and S1 with bilateral vertical stabilizing lodged and crosslink bar. The examination shows no evidence for loosening of the hardware or hardware fracture.   There is multilevel degenerative disc space narrowing with vacuum disc phenomenon. There is sclerosis and osteophytosis of the endplates at T10-11, and T12-L1 levels. The vertebral bodies are of normal height. There is no anterolisthesis or retrolisthesis seen.   There is calcified plaque within the wall of the abdominal aorta and iliac arteries without aneurysmal dilatation.   There is postoperative change from bipolar hip arthroplasty  bilaterally.       Status post midline decompressive laminectomy with posterior spinal fusion from L2 through S1   Lumbar levoscoliosis with multilevel degenerative disc disease and spondylosis most pronounced in the lower thoracic region and at the T12-L1 level.   Signed by: Sherlyn Jackson 10/28/2024 10:27 AM Dictation workstation:   [id number]  .      Assessment/Plan   Assessment & Plan  Sinus tachycardia    Chest pain    Erosive esophagitis    Syncope    NSTEMI (non-ST elevated myocardial infarction) (Multi)    PAF (paroxysmal atrial fibrillation) (Multi)    Essential hypertension    Acute heart failure    Acute hypoxic respiratory failure (Multi)    Bilateral pleural effusion    Left arm numbness      Rakan Cervantes is a 89 y.o. female wit hx of CAD, afib on anticoagulation, COPD presenting with an episode of fall. Neurology consulted for R arm numbness. She does have encephalomalacia of R parietal / insular cortex which may explain her numbness (recrudescence) in the setting of pleural effusions, HF. However given her vascular risk factors along with the fact that she is on anticoagulation, would pursue MRI for further evaluation.    - recommend MRI bruno w/o   - will follow up on results        I spent 80 minutes in the professional and overall care of this patient.      Jose Elias Keen MD

## 2024-10-29 NOTE — CARE PLAN
The patient's goals for the shift include      The clinical goals for the shift include safety    =  Problem: Pain - Adult  Goal: Verbalizes/displays adequate comfort level or baseline comfort level  Outcome: Progressing     Problem: Safety - Adult  Goal: Free from fall injury  Outcome: Progressing     Problem: Discharge Planning  Goal: Discharge to home or other facility with appropriate resources  Outcome: Progressing     Problem: Chronic Conditions and Co-morbidities  Goal: Patient's chronic conditions and co-morbidity symptoms are monitored and maintained or improved  Outcome: Progressing     Problem: Heart Failure  Goal: Improved gas exchange this shift  Outcome: Progressing  Goal: Improved urinary output this shift  Outcome: Progressing  Goal: Reduction in peripheral edema within 24 hours  Outcome: Progressing  Goal: Report improvement of dyspnea/breathlessness this shift  Outcome: Progressing  Goal: Weight from fluid excess reduced over 2-3 days, then stabilize  Outcome: Progressing  Goal: Increase self care and/or family involvement in 24 hours  Outcome: Progressing     Problem: Skin  Goal: Participates in plan/prevention/treatment measures  Outcome: Progressing  Goal: Prevent/manage excess moisture  Outcome: Progressing  Goal: Prevent/minimize sheer/friction injuries  Outcome: Progressing     Problem: Fall/Injury  Goal: Not fall by end of shift  Outcome: Progressing  Goal: Be free from injury by end of the shift  Outcome: Progressing  Goal: Verbalize understanding of personal risk factors for fall in the hospital  Outcome: Progressing     Problem: Respiratory  Goal: Minimal/no exertional discomfort or dyspnea this shift  Outcome: Progressing  Goal: No signs of respiratory distress (eg. Use of accessory muscles. Peds grunting)  Outcome: Progressing     Problem: Risk for excess fluid volume  Goal: I will remain free from complications r/t CHF  Outcome: Progressing

## 2024-10-29 NOTE — CONSULTS
Inpatient consult to Cardiology  Consult performed by: Nessa Callaway MD  Consult ordered by: DANIA Gracia-CNP  Reason for consult: Chest pain shortness of breath.        History Of Present Illness:    Rakan Cervantes is a 89 y.o. female presenting with chest tightness and shortness of breath.  Patient well-known to my practice.  Has a history of coronary disease status post PCI to distal left circumflex dominant artery on September 27, 2024,  Heart failure diastolic dysfunction, atrial fibrillation.  Apparently patient has been complaining of progressive shortness of breath for a week or so mild lower extremity edema.  She had a fall on Friday where she felt like the lights switched off suddenly and fell backward.  Hit her head with minor injury.  Patient presented to the emergency room with a complaint of chest tightness radiating to the left arm.  Denies nausea vomiting diaphoresis.  The pain was moderate intensity.  She has been complaining also of shortness of breath orthopnea and lower extremity edema.  EKG showed normal sinus rhythm nonspecific ST-T wave abnormalities.  Troponin mildly elevated.  Head CT showed few lacunar infarcts.    Review of systems.  10 point review of systems otherwise negative     Last Recorded Vitals:  Vitals:    10/29/24 0613 10/29/24 0700 10/29/24 0747 10/29/24 0810   BP:  144/50 144/50    BP Location:  Left arm Left arm    Patient Position:  Lying Lying    Pulse:  67 67    Resp:  16 16    Temp:  36.6 °C (97.9 °F) 36.6 °C (97.9 °F)    TempSrc:  Oral Oral    SpO2:  94% 94% 92%   Weight: 50.9 kg (112 lb 3.4 oz)      Height:           Last Labs:  CBC - 10/29/2024:  5:34 AM  4.0 7.8 192    24.5      CMP - 10/29/2024:  5:34 AM  8.3 6.2 17 --- 0.6   2.7 3.6 13 105      PTT - 9/27/2024:  4:44 AM  _   _ 52.6     Troponin I, High Sensitivity   Date/Time Value Ref Range Status   10/28/2024 06:04  (HH) 0 - 13 ng/L Final     Comment:     Previous result verified on 10/28/2024  "1104 on specimen/case [id number] called with component [facility] for procedure Troponin, High Sensitivity, 1 Hour with value 96 ng/L.   10/28/2024 10:20 AM 96 (HH) 0 - 13 ng/L Final   10/28/2024 09:19 AM 43 (H) 0 - 13 ng/L Final     BNP   Date/Time Value Ref Range Status   10/28/2024 09:19  (H) 0 - 99 pg/mL Final   09/26/2024 02:21  (H) 0 - 99 pg/mL Final     Hemoglobin A1C   Date/Time Value Ref Range Status   09/10/2024 09:14 AM 5.8 (H) See below % Final   03/14/2024 11:10 AM 6.3 (H) See below % Final     LDL Calculated   Date/Time Value Ref Range Status   10/29/2024 05:34 AM 25 <=99 mg/dL Final     Comment:                                 Near   Borderline      AGE      Desirable  Optimal    High     High     Very High     0-19 Y     0 - 109     ---    110-129   >/= 130     ----    20-24 Y     0 - 119     ---    120-159   >/= 160     ----      >24 Y     0 -  99   100-129  130-159   160-189     >/=190     09/10/2024 09:14 AM 50 (L) 65 - 130 mg/dL Final   03/14/2024 11:10 AM 44 (L) 65 - 130 mg/dL Final     VLDL   Date/Time Value Ref Range Status   10/29/2024 05:34 AM 12 0 - 40 mg/dL Final      Last I/O:  I/O last 3 completed shifts:  In: 200 (3.9 mL/kg) [P.O.:200]  Out: - (0 mL/kg)   Weight: 50.9 kg     Past Cardiology Tests (Last 3 Years):  EKG:  ECG 12 Lead 10/28/2024      ECG 12 lead 09/26/2024      ECG 12 lead 10/27/2023    Echo:  Transthoracic Echo (TTE) Limited 10/27/2023    Ejection Fractions:  No results found for: \"EF\"  Cath:  Cardiac Catheterization Procedure 09/27/2024    Stress Test:  No results found for this or any previous visit from the past 1095 days.    Cardiac Imaging:  No results found for this or any previous visit from the past 1095 days.      Past Medical History:  She has a past medical history of Acute non-ST segment elevation myocardial infarction (Multi) (11/26/2023), Anemia, Arteriosclerosis of coronary artery (05/14/2023), Arthritis, Asthma, Atrial fibrillation (Multi) " (11/26/2023), Jonas esophagus, Bilateral carotid artery occlusion (05/23/2023), CAD (coronary artery disease), Cardiac rhythm disorder or disturbance or change (11/26/2023), Cervical radiculopathy, Chest pain (11/26/2023), Chronic obstructive pulmonary disease (Multi) (11/26/2023), Constipation, Coronary artery disease (11/26/2023), Degenerative joint disease, Dyslipidemia, Dysuria, Erosive esophagitis, GERD (gastroesophageal reflux disease), Hypercholesterolemia (05/23/2023), Hyperparathyroidism (Multi), Hypertensive urgency (11/26/2023), Impaired fasting glucose (11/26/2023), Irritable bowel syndrome (IBS), Labile essential hypertension (11/26/2023), Labile hypertension, Left foot pain, Low blood pressure (11/26/2023), Macular degeneration, Mixed hyperlipidemia (11/26/2023), Neck pain, Osteoporosis (11/26/2023), Paroxysmal atrial fibrillation (Multi), Polyarthritis with negative rheumatoid factor (Multi), Post menopausal syndrome, Rapid atrial fibrillation (Multi) (05/14/2023), Spinal stenosis, Stage 3 chronic kidney disease (Multi) (11/26/2023), Stroke (Multi), and Transient ischemic attack (11/26/2023).    Past Surgical History:  She has a past surgical history that includes Other surgical history (02/14/2022); MR angio head wo IV contrast (04/23/2013); CT angio head w and wo IV contrast (05/08/2013); MR angio head wo IV contrast (07/20/2016); Cholecystectomy; Cardiovascular stress test (2017); Cardiovascular stress test (2004); Cataract extraction (Bilateral, 2011); Carpal tunnel release (Left, 2014); Total hip arthroplasty (Right, 2016); Revision total hip arthroplasty (Left, 2013); Coronary stent placement (05/2023); MR angio head wo IV contrast (10/27/2023); MR angio neck wo IV contrast (10/27/2023); Cardiac catheterization (Left, 9/27/2024); and Cardiac catheterization (N/A, 9/27/2024).      Social History:  She reports that she has never smoked. She has never been exposed to tobacco smoke. She has never  used smokeless tobacco. She reports that she does not currently use alcohol. She reports that she does not use drugs.    Family History:  Family History   Problem Relation Name Age of Onset    No Known Problems Mother      No Known Problems Father      No Known Problems Sister          Allergies:  Meperidine, Morphine, Opioids - morphine analogues, Pregabalin, and Tramadol    Inpatient Medications:  Scheduled medications   Medication Dose Route Frequency    acetaminophen  650 mg oral TID    amLODIPine  5 mg oral Daily    apixaban  2.5 mg oral BID    ascorbic acid  125 mg oral Daily    atorvastatin  80 mg oral Nightly    carvedilol  25 mg oral BID    cholecalciferol  2,000 Units oral Daily    clopidogrel  75 mg oral Daily    docusate sodium  100 mg oral BID    DULoxetine  30 mg oral Daily with evening meal    ferrous sulfate (325 mg ferrous sulfate)  65 mg of iron oral Daily with breakfast    furosemide  20 mg intravenous Daily    gabapentin  300 mg oral BID    hydroxychloroquine  200 mg oral Daily    isosorbide mononitrate ER  120 mg oral Daily    lidocaine  2 patch transdermal Daily    losartan  100 mg oral Daily    oxygen   inhalation Continuous - Inhalation    pantoprazole  40 mg oral BID    polyethylene glycol  17 g oral Daily    ranolazine  500 mg oral BID    sucralfate  1 g oral q6h ZEINAB    sulfaSALAzine  500 mg oral BID     PRN medications   Medication    benzocaine-menthol    bisacodyl     Continuous Medications   Medication Dose Last Rate     Outpatient Medications:  Current Outpatient Medications   Medication Instructions    amLODIPine (NORVASC) 5 mg, oral, Daily    apixaban (ELIQUIS) 2.5 mg, oral, 2 times daily    ascorbic acid (VITAMIN C) 100 mg, Daily    atorvastatin (LIPITOR) 80 mg, oral, Nightly    carvedilol (COREG) 25 mg, oral, 2 times daily (morning and late afternoon)    cholecalciferol (VITAMIN D-3) 2,000 Units, Daily    clopidogrel (PLAVIX) 75 mg, oral, Daily    docusate sodium (Colace) 100 mg  capsule TAKE 1 CAPSULE BY MOUTH ONCE DAILY AS NEEDED to prevent constipation from iron    DULoxetine (CYMBALTA) 30 mg, oral, Daily with evening meal    ferrous sulfate, 325 mg ferrous sulfate, tablet 1 tablet, oral, Daily with breakfast, M-W-F    gabapentin (NEURONTIN) 300 mg, oral, 2 times daily, Resume as needed    hydroxychloroquine (Plaquenil) 200 mg tablet Take by mouth.    isosorbide mononitrate ER (IMDUR) 30 mg, oral, Daily, Do not crush or chew.    losartan (COZAAR) 100 mg, oral, Daily    pantoprazole (PROTONIX) 40 mg, oral, 2 times daily    sulfaSALAzine (AZULFIDINE) 500 mg, 2 times daily    vit C/E/Zn/coppr/lutein/zeaxan (PRESERVISION AREDS-2 ORAL) As directed orally       Physical Exam:  General: Patient is in no acute distress.  HEENT: atraumatic normocephalic.  Neck: is supple jugular venous pressure within normal limits no thyromegaly.  Cardiovascular regular rate and rhythm normal heart sounds no murmurs rubs or gallops.  Lungs: Decreased breathing sounds at bases abdomen: is soft nontender.  Extremities warm to touch trace edema.  Neurologic examination: patient is awake alert oriented to person, place, date/time.  Psychiatric examination: patient has good insight denies feeling suicidal and depressed.  Pulses 2+ intact bilaterally     Assessment/Plan   #1 chest pain.  Patient with angina in the setting of hypertension and poorly controlled heart rate.  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  Had PCI to distal left circumflex but distal to the stent there is some moderate distal disease.  Likely demand ischemia from hypertension.  Her LAD looked fine on the cardiac catheterization from September 27, 2024.  At this point recommend conservative management.  We will continue Plavix.  She will be also on Eliquis.  Will monitor blood pressure and heart rate.  Will decrease isosorbide to 120 mg oral daily and start her on ranolazine 500 mg oral daily.  Continue  high intensity statin.    2.  Syncope.  Patient had cryptogenic syncope last Friday.  Happened suddenly and fell backward hurt the back of her head.  Undergoing workup with head CT initially showing old infarcts MRI this afternoon.  For now we will follow-up on the result.  Will consider monitor at discharge.    3.  Hypertension increase isosorbide to 120 mg daily.  Continue beta-blockers.  Continue beta-blockers.    4.  Hyperlipidemia continue high intensity statin.    5.  Shortness of breath.  Acute on chronic diastolic heart failure.  Recommend diuresis will monitor.      Thank you for the consult      Peripheral IV 10/28/24 20 G Right Antecubital (Active)   Site Assessment Clean;Dry 10/29/24 0730   Number of days: 1       Code Status:  Full Code      Nessa Callaway MD

## 2024-10-29 NOTE — ASSESSMENT & PLAN NOTE
"Patient reports \"blacking out\" multiple times at home resulting in falls  Fall precautions  Check Ortho's  Echocardiogram  Ultrasound of the carotids as above  "

## 2024-10-29 NOTE — PROGRESS NOTES
"Physical Therapy    Physical Therapy Evaluation & Treatment    Patient Name: Rakan Cervantes  MRN: [id number]  Department: [facility]  Room: [facility]429-  Today's Date: 10/29/2024   Time Calculation  Start Time: 1342  Stop Time: 1400  Time Calculation (min): 18 min    Assessment/Plan   PT Assessment Results: Decreased strength, Decreased mobility, Decreased range of motion, Decreased endurance, Impaired balance, Decreased safety awareness, Impaired hearing, Pain  Rehab Prognosis: Good  Strengths: Support and attitude of living partners, Support of extended family/friends, Premorbid level of function, Living arrangement secure, Housing layout, Attitude of self, Access to adaptive/assistive products  Barriers to Participation: Comorbidities  End of Session Communication: Bedside nurse  PT Assessment: She presented with the above listed impairments (see Assessment Results). Pt required moderately increased assist during functional mobility compared to her reported baseline of mod I. Pt would benefit from continued skilled PT services for maximizing independence and safety prior to & after discharge (MODERATE intensity).  End of Session Patient Position: Up in chair, Alarm on (call light & needs within reach. Spouse in room.)     IP OR SWING BED PT PLAN  Inpatient or Swing Bed: Inpatient  PT Plan  Treatment/Interventions: Bed mobility, Transfer training, Gait training, Strengthening, Therapeutic exercise, Therapeutic activity, Balance training, Endurance training  PT Plan: Ongoing PT  PT Frequency: 4 times per week  PT Discharge Recommendations: Moderate intensity level of continued care  PT Recommended Transfer Status: Assist x1, Assistive device (Jr FWW)  PT - OK to Discharge: Yes      Subjective   General Visit Information:  Reason for Referral: Impaired functional mobility. This 89 year old presented to the ED on 10/28/24 from home after \"blacking out\" and falling on 10/25 for LUE pain, numbness & tingling and increased SOB. " Pt admitted for acute HF, ARF (hypoxic), LUE numbness & sinus tachycardia. She was reently admitted here in Sept 2024 for NSTEMI, cardiac cath & stent placement.    Past Medical History Relevant to Rehab: asthma, anxiety, NSTEMI, A-fib on anticoagulation, Jonas's esophagus, IBS, bilateral carotid artery occlusion, COPD, erosive esophagitis, HTN, CKD, TIA/CVA, CAD s/p stent placement (Sept 2024), hyperparathyroidism, hypercholesteremia, macular degeneration, post laminectomy syndrome (lumbar), B KESHIA, B IOC placement, L carpel tunnel release, osteoporosis    Family/Caregiver Present: Yes  Caregiver Feedback: spouse present  Co-Treatment: OT (co-eval for pt's decreased activity tolerance and maximal safety of pt/staff with mobility)  Prior to Session Communication: Bedside nurse  Patient Position Received: Bed, 3 rail up, Alarm on  General Comment: Cleared by RN for participation. Pt agreed to session and was fully engaged throughout.    Home Living:  Home Living  Type of Home: House  Lives With: Spouse  Home Adaptive Equipment: Cane, Quad cane (FWW, Rollator walker; transport chair)  Home Layout: One level, Laundry main level  Home Access: Level entry  Bathroom Shower/Tub: Tub/shower unit  Bathroom Equipment: Grab bars in shower, Shower chair with back  Home Living Comments: Spouse stated he could provide some physical assist. Two daughters live in [facility].    Prior Level of Function:  Prior Function Per Pt/Caregiver Report  Receives Help From: Family  ADL Assistance: Needs assistance  Homemaking Assistance: Needs assistance  Transfers: Independent (Denied any other falls within the past 6 months. Spouse concurred.)  Gait:  (Mod I with straight cane at home & quad cane in community.)  Stairs:  (Did not negotiate)  Prior Function Comments: (-) driving; friends provide transportation for pt & spouse    Precautions:  Hearing/Visual Limitations: Bilateral hearing aids (not with her). Reading glasses  (present).  Medical Precautions: Fall precautions, Oxygen therapy device and L/min  Precautions Comment: (+) telemetry; Purewick; 5 L/min O2 via NC and RN decreased to 4 L/min at start of session (none at baseline).      Objective   Pain:  Pain Assessment  0-10 (Numeric) Pain Score: 7  Pain Type: Acute pain  Pain Location:  (R hip, injury from fall prior to admission per RN.)  Pain Interventions: Repositioned (RN informed of pain intensity.)    Cognition:  Overall Cognitive Status: Within Functional Limits to participate  Following Commands: Follows one step commands with repetition  Insight: Mild  Impulsive: Mildly    General Assessments:  Activity Tolerance  Endurance: Decreased tolerance for upright activites (Pt on supplemental oxygen, none at baseline. Denied SOB with standing acttivities. Difficult to obtain reliable values with finger tip pulse oximeter, partly/probably due to dark nail polish (vs fake nail).)    Sensation  Not tested; pt denied paresthesias.    ROM  BLE AROM: grossly WFL  Bilat shoulder elevation AROM: limited to ~90°    Strength  Strength Comments: BLE grossly >/=3+/5    Motor Control  Not formally assessed.  Movements are Fluid and Coordinated: No, but WFL with slowed rate of BUE/BLE movements.    Postural Control  Postural Control: Within Functional Limits (mild thoracic kyphosis)    Static Sitting Balance  (No UE/LE supported: close S)  Dynamic Sitting Balance  (BUE supported: up to Gregory due to compensatory trunk ext with hip flex/knee ext)    Static Standing Balance  (BUE supported on FWW: CGA)  Dynamic Standing Balance  (BUE supported on FWW: up to Gregory x1 for steadying at trunk, especially with amb on non-fwd paths.)    Functional Assessments:  Bed Mobility  Bed Mobility: Yes  Bed Mobility 1  Bed Mobility 1: Supine to sitting  Level of Assistance 1: Moderate assistance (for trunk elevation.)  Bed Mobility Comments 1: HOB elevated </=40° & L bed rail  Bed Mobility 2  Bed Mobility  2:  Scooting (fwd while seated EOB)  Level of Assistance 2: Contact guard (used BUE)    Transfers  Transfer: Yes  Transfer 1  Technique 1: Sit to stand  Transfer Device 1: Walker (Jr FWW; EOB or armrests)  Transfer Level of Assistance 1:  (ModA x1 for lifting torque from EOB & Gregory x1 from bedside chair. Minimal but consistent VCs for walker safety/BUE placement. VCs for fwd scoot at chair to attain start position.)  Trials/Comments 1: x1 trial at each surface  Transfers 2  Technique 2: Stand to sit  Transfer Device 2: Walker (Jr FWW; bilateral armrests)  Transfer Level of Assistance 2: Minimum assistance (for steadying at trunk, including increased eccentric control. Consistent VCs for walker safety/BUE placement.)  Trials/Comments 2: x2 trials  Transfers 3  Technique 3: Stand pivot, To left (EOB>chair)  Transfer Device 3: Walker (Jr FWW)  Transfer Level of Assistance 3: Moderate assistance (for steadying at trunk & waker movement. Cued for walker management & sequencing.)    Ambulation/Gait Training  Ambulation/Gait Training Performed: Yes  Ambulation/Gait Training 1  Surface 1: Level tile  Device 1: Rolling walker (Jr FWW)  Assistance 1: Moderate assistance (for steadying at trunk and walker management. Verbally cued for walker management.)  Comments/Distance (ft) 1: 3 ft fwd/retro. Slow velocity, fairly continuous movement on fwd path & discontinuous with retro steps. Minimal step through pattern & cleared bilateral feet. Decreased stability during retro stepping but no overt threat for LOB.       Treatments:  Therapeutic Activity  -Educated pt & spouse in PT role, POC, disposition, and fall precautions (use of call light for nursing staff assist with mobility). Pt able to correctly identify nurse button on call light and verbalized understanding of fall precautions.  -See balance above for seated EOB time (</=5 minutes) to facilitate core strengthening & pulmonary hygiene.   -See above for 2nd trial of sit<>stand  and consistent cues during all functional mobility.      Outcome Measures:  [facility] Basic Mobility  Turning from your back to your side while in a flat bed without using bedrails: A little  Moving from lying on your back to sitting on the side of a flat bed without using bedrails: A lot  Moving to and from bed to chair (including a wheelchair): A lot  Standing up from a chair using your arms (e.g. wheelchair or bedside chair): A little  To walk in hospital room: A lot  Climbing 3-5 steps with railing: A lot  Basic Mobility - Total Score: 14    Encounter Problems       Encounter Problems (Active)       PT Problem       Pt will transition supine<>sit at a flat bed with mod I.        Start:  10/29/24    Expected End:  11/23/24            Pt will transfer sit<>stand with Jr FWW & mod I.        Start:  10/29/24    Expected End:  11/23/24            Pt will ambulate >/=35 ft with Jr FWW & distant S       Start:  10/29/24    Expected End:  11/23/24            Pt will maintain standing balance while engaging in bimanual activity standing at sink or other environmental support for >/=45 sec with distant S.       Start:  10/29/24    Expected End:  11/23/24                   Education Documentation  Mobility Training, taught by Jen Padilla, PT at 10/29/2024  3:38 PM.  Learner: Significant Other, Patient  Readiness: Acceptance  Method: Explanation  Response: Verbalizes Understanding, Needs Reinforcement    Education Comments  No comments found.

## 2024-10-29 NOTE — ASSESSMENT & PLAN NOTE
Resume home antihypertensives  Monitor blood pressure close    Plan  Wean oxygen as tolerated  Diuresis  Check Ortho's, TTE  DVT prophylaxis: Eliquis  Fall precautions, PT/OT consult  Cardiology, neurology, GI, and pulmonary on consult, appreciate recs  CBC and BMP in the morning

## 2024-10-29 NOTE — CONSULTS
Inpatient consult to Gastroenterology  Consult performed by: MERCED Jaeger  Consult ordered by: MERCED Gracia          Reason For Consult  Esophagitis    History Of Present Illness  Rakan Cervantes is a 89 y.o. female presenting with a past medical history of NSTEMI, atrial fibrillation, on anticoagulation, Jonas's esophagus, bilateral carotid artery occlusion, COPD, erosive esophagitis, hyperparathyroidism, hypercholesteremia, CKD, and stroke who presented to the emergency department with complaints of left arm numbness and tingling.  GI was consulted for CT imaging which showed chronic patulous esophagus with new air-fluid throughout the thoracic esophagus suggestive of reflux esophagitis.  Patient has been seen by Dr. Mccallmu in the past for history of Jonas's.  Patient has been on Protonix 40 mg twice daily.  She currently denies any dysphagia, painful swallowing, heartburn. She reports her symptoms are well-controlled.  Last EGD was done by Dr. Mccallum in 2019 which showed Jonas's with no dysplasia.  At that time he recommended no need to repeat surveillance due to age.  Patient currently denies any abdominal pain, nausea, vomiting.  She reports occasional constipation/diarrhea.  No reported melena/hematochezia.     Past Medical History  She has a past medical history of Acute non-ST segment elevation myocardial infarction (Multi) (11/26/2023), Anemia, Arteriosclerosis of coronary artery (05/14/2023), Arthritis, Asthma, Atrial fibrillation (Multi) (11/26/2023), Jonas esophagus, Bilateral carotid artery occlusion (05/23/2023), CAD (coronary artery disease), Cardiac rhythm disorder or disturbance or change (11/26/2023), Cervical radiculopathy, Chest pain (11/26/2023), Chronic obstructive pulmonary disease (Multi) (11/26/2023), Constipation, Coronary artery disease (11/26/2023), Degenerative joint disease, Dyslipidemia, Dysuria, Erosive esophagitis, GERD (gastroesophageal  reflux disease), Hypercholesterolemia (05/23/2023), Hyperparathyroidism (Multi), Hypertensive urgency (11/26/2023), Impaired fasting glucose (11/26/2023), Irritable bowel syndrome (IBS), Labile essential hypertension (11/26/2023), Labile hypertension, Left foot pain, Low blood pressure (11/26/2023), Macular degeneration, Mixed hyperlipidemia (11/26/2023), Neck pain, Osteoporosis (11/26/2023), Paroxysmal atrial fibrillation (Multi), Polyarthritis with negative rheumatoid factor (Multi), Post menopausal syndrome, Rapid atrial fibrillation (Multi) (05/14/2023), Spinal stenosis, Stage 3 chronic kidney disease (Multi) (11/26/2023), Stroke (Multi), and Transient ischemic attack (11/26/2023).    Surgical History  She has a past surgical history that includes Other surgical history (02/14/2022); MR angio head wo IV contrast (04/23/2013); CT angio head w and wo IV contrast (05/08/2013); MR angio head wo IV contrast (07/20/2016); Cholecystectomy; Cardiovascular stress test (2017); Cardiovascular stress test (2004); Cataract extraction (Bilateral, 2011); Carpal tunnel release (Left, 2014); Total hip arthroplasty (Right, 2016); Revision total hip arthroplasty (Left, 2013); Coronary stent placement (05/2023); MR angio head wo IV contrast (10/27/2023); MR angio neck wo IV contrast (10/27/2023); Cardiac catheterization (Left, 9/27/2024); and Cardiac catheterization (N/A, 9/27/2024).     Social History  She reports that she has never smoked. She has never been exposed to tobacco smoke. She has never used smokeless tobacco. She reports that she does not currently use alcohol. She reports that she does not use drugs.    Family History  Family History   Problem Relation Name Age of Onset    No Known Problems Mother      No Known Problems Father      No Known Problems Sister          Allergies  Meperidine, Morphine, Opioids - morphine analogues, Pregabalin, and Tramadol    Review of Systems   Constitutional: Negative.    HENT:  "Negative.     Eyes: Negative.    Respiratory: Negative.     Cardiovascular: Negative.    Gastrointestinal: Negative.    Endocrine: Negative.    Genitourinary: Negative.    Musculoskeletal: Negative.    Skin: Negative.    Allergic/Immunologic: Negative.    Neurological: Negative.    Hematological: Negative.    Psychiatric/Behavioral: Negative.          Physical Exam  HENT:      Head: Normocephalic.      Nose: Nose normal.      Mouth/Throat:      Mouth: Mucous membranes are moist.   Eyes:      Pupils: Pupils are equal, round, and reactive to light.   Cardiovascular:      Rate and Rhythm: Normal rate.   Pulmonary:      Effort: Pulmonary effort is normal.   Abdominal:      Palpations: Abdomen is soft.   Musculoskeletal:         General: Normal range of motion.      Cervical back: Normal range of motion.   Skin:     General: Skin is warm.   Neurological:      General: No focal deficit present.      Mental Status: She is alert.   Psychiatric:         Mood and Affect: Mood normal.          Last Recorded Vitals  Blood pressure 144/50, pulse 67, temperature 36.6 °C (97.9 °F), temperature source Oral, resp. rate 16, height 1.499 m (4' 11\"), weight 50.9 kg (112 lb 3.4 oz), SpO2 92%.    Relevant Results  Carotid duplex bilateral    Result Date: 10/29/2024  Preliminary Cardiology Report           [facility] [id number] [place] [address]            Phone [phone]      Preliminary Vascular Lab Report  [facility] CAROTID ARTERY DUPLEX BILATERAL  Patient Name:     NICOLE Lock Physician:  [id number] Elías Winter MD, RPVI Study Date:       10/29/2024     Ordering Provider:  [id number] SONJA BATISTA MRN/PID:          [id number]       Fellow: Accession#:       [id number]   Technologist:       Isabell Vidal YOAV YOB: 1935      Technologist 2: Gender:           F              Encounter#:         [id number] Admission Status: Inpatient      Location Performed: [facility]  " Diagnosis/ICD: Syncope and collapse-R55 CPT Codes:     82900 Cerebrovascular Carotid Duplex scan complete  PRELIMINARY CONCLUSIONS:  Right Carotid: Findings are consistent with less than 50% stenosis of the right proximal internal carotid artery. The internal carotid artery appears tortuous. Right external carotid artery appears patent with no evidence of stenosis. The right vertebral artery is patent with antegrade flow. No evidence of hemodynamically significant stenosis in the right subclavian artery. There is turbulent flow noted in the proximal common carotid artery which may indicate a more proximal stenosis. Left Carotid: Findings are consistent with less than 50% stenosis of the left proximal internal carotid artery. The internal carotid artery appears tortuous. There is turbulent flow noted in mid and distal internal carotid artery possibly due to vessel tortuosity. Left external carotid artery appears patent with no evidence of stenosis. The left vertebral artery is patent with antegrade flow. No evidence of hemodynamically significant stenosis in the left subclavian artery.  Imaging & Doppler Findings: Right Plaque Morph: The proximal right internal carotid artery demonstrates heterogenous plaque. The proximal right external carotid artery demonstrates heterogenous and calcified plaque. The distal right common carotid artery demonstrates heterogenous plaque. Left Plaque Morph: The proximal left internal carotid artery demonstrates heterogenous plaque. The proximal left external carotid artery demonstrates heterogenous plaque. The distal left common carotid artery demonstrates heterogenous plaque.   Right                        Left   PSV      EDV                PSV       cm/s           CCA P    79 cm/s 86 cm/s            CCA D    75 cm/s 83 cm/s  18 cm/s   ICA P    102 cm/s 16 cm/s 83 cm/s  22 cm/s   ICA M    124 cm/s 25 cm/s 82 cm/s  21 cm/s   ICA D    80 cm/s  16 cm/s 105 cm/s            ECA      192 cm/s 75 cm/s  14 cm/s Vertebral  84 cm/s  18 cm/s 80 cm/s          Subclavian 122 cm/s                Right Left ICA/CCA Ratio  1.0  1.4   VASCULAR PRELIMINARY REPORT completed by Isabell Vidal T on 10/29/2024 at 11:21:41 AM  ** Final **     ECG 12 Lead    Result Date: 10/28/2024  Supraventricular tachycardia Marked ST abnormality, possible inferolateral subendocardial injury Abnormal ECG When compared with ECG of 26-SEP-2024 14:27, T wave inversion no longer evident in Inferior leads T wave inversion more evident in Lateral leads Confirmed by Sergey Bond ([id number]) on 10/28/2024 7:51:36 PM    Lower extremity venous duplex bilateral    Result Date: 10/28/2024  Interpreted By:  Dino De Paz, STUDY: [facility] LOWER EXTREMITY VENOUS DUPLEX BILATERAL;  10/28/2024 7:15 pm   INDICATION: Signs/Symptoms:extremity edema.   COMPARISON: None.   ACCESSION NUMBER(S): [id number]   ORDERING CLINICIAN: SONJA BATISTA   TECHNIQUE: Grayscale, color and spectral Doppler sonographic images of the bilateral lower extremity deep venous system.   FINDINGS: RIGHT: There is normal compressibility of the common femoral vein, saphenous femoral junction, profunda femoral vein and popliteal vein. The posterior tibial and peroneal veins  demonstrate normal color flow and compressibility. There is normal spontaneous and phasic variation throughout the leg by spectral doppler.   LEFT: There is normal compressibility of the common femoral vein, saphenous femoral junction, profunda femoral vein and popliteal vein. The posterior tibial and peroneal veins  demonstrate normal color flow and compressibility.  There is normal spontaneous and phasic variation throughout the leg by spectral doppler.   OTHER FINDINGS: None.       No sonographic evidence DVT in the visualized vessels of bilateral lower extremities.   MACRO: None   Signed by: Dino De Paz 10/28/2024 7:48 PM Dictation workstation:   [id number]    CT angio chest for pulmonary  embolism    Result Date: 10/28/2024  Interpreted By:  Richmond Barbosa, STUDY: CT ANGIO CHEST FOR PULMONARY EMBOLISM;  10/28/2024 2:05 pm   INDICATION: Signs/Symptoms:hypoxic, tachy.   COMPARISON: Chest CT 03/07/2022.   ACCESSION NUMBER(S): [id number]   ORDERING CLINICIAN: KARMA HAIDER   TECHNIQUE: Helical data acquisition of the chest was obtained contrast volume: 75 mL IV contrast Omnipaque 350.   Axial contiguous images were reformatted in coronal and sagittal planes. Axial and coronal MIP images were created and reviewed.   FINDINGS: POTENTIAL LIMITATIONS OF THE STUDY: Motion and mixing artifact which limits evaluation of the distal branch vessels.   HEART AND VESSELS: No discrete filling defects within the main pulmonary artery or its branches.   Main pulmonary trunk is upper normal limits, measuring 2.9 cm in size.   The thoracic aorta is normal in caliber. It is not well evaluated due to the phase of the contrast. Mild aortic valve atherosclerotic calcifications are present.   Severe coronary artery atherosclerotic calcifications and/or stents.   Mild cardiomegaly with biatrial enlargement.   No evidence of pericardial effusion.   MEDIASTINUM AND DONATO, LOWER NECK AND AXILLA: The visualized thyroid gland is within normal limits.   No evidence of thoracic lymphadenopathy by CT criteria.   There is moderate-size hiatal hernia and there is air-fluid level and mild circumferential wall thickening throughout the thoracic esophagus which is patulous. This is suggestive of achalasia versus reflux esophagitis.   LUNGS AND AIRWAYS: The trachea and central airways are patent. No endobronchial lesion.   Small to moderate-sized right greater than left pleural effusions. There is interseptal thickening with scattered ground-glass opacities suggestive of pulmonary venous congestion/interstitial edema. Mild bibasilar compression atelectasis.   UPPER ABDOMEN: Cholecystectomy change. Diffuse hepatic steatosis.   CHEST WALL AND  OSSEOUS STRUCTURES: There are no suspicious osseous lesions. There is endplate reactive change in the lower thoracic spine with partially visualized posterior spinal fusions hardware and laminectomy in the lumbar spine.       1.  No evidence of pulmonary emboli. Severe coronary artery atherosclerotic calcifications. 2. Mild cardiomegaly with biatrial enlargement. Small to moderate-size right greater than left pleural effusions and bibasilar subsegmental atelectasis. Diffuse interseptal thickening and scattered central predominant ground glass opacification of the lungs suggestive of pulmonary edema. 3. Chronic patulous esophagus with new air-fluid throughout the thoracic esophagus suggestive of reflux esophagitis versus achalasia. Clinical correlation is recommended.     Signed by: Richmond Barbosa 10/28/2024 2:41 PM Dictation workstation:   [id number]    CT cervical spine wo IV contrast    Result Date: 10/28/2024  Interpreted By:  Sherlyn Jackson, STUDY: CT CERVICAL SPINE WO IV CONTRAST;  10/28/2024 10:16 am   INDICATION: Signs/Symptoms:fall.     COMPARISON: 07/20/2022   ACCESSION NUMBER(S): [id number]   ORDERING CLINICIAN: ANDREY COX   TECHNIQUE: Axial CT images of the cervical spine are obtained. Axial, coronal and sagittal reconstructions are provided for review.   FINDINGS:     Fractures: There is no evidence for an acute fracture of the cervical spine.   Vertebral Alignment: There is a 2-1/2 mm anterolisthesis of C3 with respect to C4 and a 2-1/2 mm retrolisthesis of C4 with respect to C5.   Craniocervical Junction: The odontoid process and craniocervical junction are intact.   Vertebrae/Disc Spaces:  The cervical vertebral body heights are intact. There is marked disc space narrowing at C4-5, C5-6, and C6-7 levels with moderately severe disc space narrowing at the C3-4 level. Vacuum disc is present at all of these levels. There is multilevel degenerative spondyloarthropathy seen bilaterally. There is also  uncovertebral joint spurring seen on the right and on the left from C3-4 through the C6-7 levels with bilateral foraminal stenosis at C4-5, C5-6, and C6-7 levels. Central canal stenosis is present at the C4-5 level. A 1.2 cm sclerotic lesion is seen within the left pedicle and lamina of T2 extending into the spinous process without interval change.   Prevertebral/Paraspinal Soft Tissues: There are several hypodense thyroid nodules with the largest visualized measuring 1.1 cm in diameter within left thyroid lobe.         No evidence for an acute fracture or subluxation of the cervical spine.   There is chronic anterolisthesis of C3 on C4 and chronic retrolisthesis of C4 on C5 with multilevel degenerative disc disease and cervical spondylosis with central canal stenosis at C4-5 and multilevel foraminal stenosis bilaterally.   Bone island within left pedicle and lamina of T2 extending into the spinous process of T2.   Hypodense thyroid nodules.   MACRO: None   Signed by: Sherlyn Jackson 10/28/2024 11:00 AM Dictation workstation:   [id number]    CT head wo IV contrast    Result Date: 10/28/2024  Interpreted By:  Sherlyn Jackson, STUDY: CT HEAD WO IV CONTRAST;  10/28/2024 10:16 am   INDICATION: Signs/Symptoms: Fall several days earlier striking the head     COMPARISON: 10/26/2023   ACCESSION NUMBER(S): [id number]   ORDERING CLINICIAN: ANDREY COX   TECHNIQUE: Noncontrast axial CT scan of head was performed. Angled reformats in brain and bone windows were generated. The images were reviewed in bone, brain, blood and soft tissue windows.   FINDINGS: CSF Spaces: The ventricles, sulci and basal cisterns are prominent indicating age-appropriate atrophy.. There is no extraaxial fluid collection.   Parenchyma: There is encephalomalacia and gliosis seen within the right parietal lobe posteriorly with transcortical extension. There is also diminished density within the right insula which is stable. No hyperdense MCA sign is present.  There is calcified plaque within each carotid siphon. There is no mass effect or midline shift.  There is no intracranial hemorrhage.   Calvarium: The calvarium is unremarkable.   Paranasal sinuses and mastoids: Visualized paranasal sinuses and mastoids are clear.       Age-appropriate atrophy.   Encephalomalacia within the right posterior parietal lobe unchanged consistent with an old infarct at this site. Old lacunar infarct of the right insula is also identified.   No acute intracranial process.   MACRO: None     Signed by: Sherlyn Jackson 10/28/2024 10:46 AM Dictation workstation:   [id number]    XR chest 2 views    Result Date: 10/28/2024  Interpreted By:  Sherlyn Jackson, STUDY: XR CHEST 2 VIEWS 10/28/2024 9:45 am   INDICATION: Fall with left arm pain   COMPARISON: 09/26/2024   ACCESSION NUMBER(S): [id number]   ORDERING CLINICIAN: ANDREY COX   TECHNIQUE: AP erect view of the chest   FINDINGS: The heart is enlarged with atherosclerosis of the thoracic aorta noted. Low lung volumes are observed with crowding of the bronchovascular markings. No acute infiltrate is seen. No pleural abnormality is identified.   There is postoperative change from reverse shoulder arthroplasty on the right. There is osteoarthritis of the left shoulder affecting glenohumeral joint with joint space narrowing, sclerosis, osteophytosis, and subchondral cyst formation.       Cardiomegaly without acute cardiopulmonary process. Low lung volumes.   Advanced osteoarthritis of the left shoulder affecting glenohumeral joint.   Signed by: Sherlyn Jackson 10/28/2024 10:33 AM Dictation workstation:   [id number]    XR pelvis 1-2 views    Result Date: 10/28/2024  Interpreted By:  Sherlyn Jackson, STUDY: XR PELVIS 1-2 VIEWS 10/28/2024 9:45 am   INDICATION: Pain and fall   COMPARISON: 05/22/2023   ACCESSION NUMBER(S): [id number]   ORDERING CLINICIAN: ANDREY COX   TECHNIQUE: AP view of the pelvis   FINDINGS: Postoperative change is seen as a result  bilateral bipolar hip arthroplasties. There is no fracture or dislocation of either hip identified. Pelvic ring is intact. There are degenerative cystic changes within the pubic bones adjacent to the pubic symphysis unchanged. There is a stable 1 cm sclerotic lesion along the right SI joint most likely bone island.       No pelvic fracture.   Signed by: Sherlyn Jackson 10/28/2024 10:29 AM Dictation workstation:   [id number]    XR lumbar spine 2-3 views    Result Date: 10/28/2024  Interpreted By:  Sherlyn Jackson, STUDY: XR LUMBAR SPINE 2-3 VIEWS 10/28/2024 9:45 am   INDICATION: Fall several days ago with low back pain.   COMPARISON: None available.   ACCESSION NUMBER(S): [id number]   ORDERING CLINICIAN: ANDREY COX   TECHNIQUE: Three views of the lumbosacral spine are performed.   FINDINGS: There is a lumbar levoscoliosis observed with postoperative change consisting of midline decompressive laminectomy and posterior spinal fusion from L2 through S1. Bilateral pedicle screws are seen at L2, L3, L4, L5, and S1 with bilateral vertical stabilizing lodged and crosslink bar. The examination shows no evidence for loosening of the hardware or hardware fracture.   There is multilevel degenerative disc space narrowing with vacuum disc phenomenon. There is sclerosis and osteophytosis of the endplates at T10-11, and T12-L1 levels. The vertebral bodies are of normal height. There is no anterolisthesis or retrolisthesis seen.   There is calcified plaque within the wall of the abdominal aorta and iliac arteries without aneurysmal dilatation.   There is postoperative change from bipolar hip arthroplasty bilaterally.       Status post midline decompressive laminectomy with posterior spinal fusion from L2 through S1   Lumbar levoscoliosis with multilevel degenerative disc disease and spondylosis most pronounced in the lower thoracic region and at the T12-L1 level.   Signed by: Sherlyn Jackson 10/28/2024 10:27 AM Dictation workstation:    [id number]      Scheduled medications  acetaminophen, 650 mg, oral, TID  amLODIPine, 5 mg, oral, Daily  apixaban, 2.5 mg, oral, BID  ascorbic acid, 125 mg, oral, Daily  atorvastatin, 80 mg, oral, Nightly  carvedilol, 25 mg, oral, BID  cholecalciferol, 2,000 Units, oral, Daily  clopidogrel, 75 mg, oral, Daily  docusate sodium, 100 mg, oral, BID  DULoxetine, 30 mg, oral, Daily with evening meal  ferrous sulfate (325 mg ferrous sulfate), 65 mg of iron, oral, Daily with breakfast  furosemide, 20 mg, intravenous, Daily  gabapentin, 300 mg, oral, BID  hydroxychloroquine, 200 mg, oral, Daily  isosorbide mononitrate ER, 120 mg, oral, Daily  lidocaine, 2 patch, transdermal, Daily  losartan, 100 mg, oral, Daily  oxygen, , inhalation, Continuous - Inhalation  pantoprazole, 40 mg, oral, BID  ranolazine, 500 mg, oral, BID  sulfaSALAzine, 500 mg, oral, BID      Continuous medications     PRN medications  PRN medications: benzocaine-menthol, bisacodyl, polyethylene glycol  Results for orders placed or performed during the hospital encounter of 10/28/24 (from the past 24 hours)   Troponin I, High Sensitivity   Result Value Ref Range    Troponin I, High Sensitivity 608 (HH) 0 - 13 ng/L   Basic Metabolic Panel   Result Value Ref Range    Glucose 85 74 - 99 mg/dL    Sodium 137 136 - 145 mmol/L    Potassium 3.2 (L) 3.5 - 5.3 mmol/L    Chloride 101 98 - 107 mmol/L    Bicarbonate 32 21 - 32 mmol/L    Anion Gap 7 (L) 10 - 20 mmol/L    Urea Nitrogen 14 6 - 23 mg/dL    Creatinine 0.49 (L) 0.50 - 1.05 mg/dL    eGFR 90 >60 mL/min/1.73m*2    Calcium 8.3 (L) 8.6 - 10.3 mg/dL   Lipid Panel   Result Value Ref Range    Cholesterol 80 0 - 199 mg/dL    HDL-Cholesterol 43.0 mg/dL    Cholesterol/HDL Ratio 1.9     LDL Calculated 25 <=99 mg/dL    VLDL 12 0 - 40 mg/dL    Triglycerides 59 0 - 149 mg/dL    Non HDL Cholesterol 37 0 - 149 mg/dL   CBC   Result Value Ref Range    WBC 4.0 (L) 4.4 - 11.3 x10*3/uL    nRBC 0.0 0.0 - 0.0 /100 WBCs    RBC 2.76 (L)  4.00 - 5.20 x10*6/uL    Hemoglobin 7.8 (L) 12.0 - 16.0 g/dL    Hematocrit 24.5 (L) 36.0 - 46.0 %    MCV 89 80 - 100 fL    MCH 28.3 26.0 - 34.0 pg    MCHC 31.8 (L) 32.0 - 36.0 g/dL    RDW 15.5 (H) 11.5 - 14.5 %    Platelets 192 150 - 450 x10*3/uL   Magnesium   Result Value Ref Range    Magnesium 1.58 (L) 1.60 - 2.40 mg/dL     *Note: Due to a large number of results and/or encounters for the requested time period, some results have not been displayed. A complete set of results can be found in Results Review.        Assessment/Plan     Abnormal CT/Constipation   Patient has a chronic history of Jonas's esophagitis. CT imaging showed chronic patulous esophagus with new air-fluid throughout the thoracic esophagus.  Discussed case with Dr. Mccallum and recommends conservative management with continued PPI twice daily.  No urgent indication to repeat EGD at this time.  Will order bowel regimen for constipation.  Continue supportive care measures.     Sabrina Blue, APRN-CNP

## 2024-10-29 NOTE — CARE PLAN
The patient's goals for the shift include  rest    The clinical goals for the shift include safety

## 2024-10-29 NOTE — CONSULTS
Inpatient consult to Pulmonology  Consult performed by: Bonnie Pantoja MD  Consult ordered by: DANIA Gracia-CNP      Reason For Consult  bilateral pleural effusions, hypoxic respiratory failure   History Of Present Illness  Rakan Cervantes is a 89 y.o. female with CAD c/b NSTEMI s/p PCI, Afib on OAC, DLP, hyperparathyroidism, CVA, CKD, COPD, Jonas's esophagitis, who p/w L arm numbness and tingling after falling 2 days earlier. In the ED hypoxia, requiring 5L NC. Admitted to [facility] for further management. Post admission work up revealed b/l pleural effusion. Pulmonary is consulted for hypoxia and pleural effusions.   Has had frequent falls, last 4 days ago, hit her head, with short period of LOC. No amnesia. No recurrence since admission. Complains of HA, and R hip pain.   Denies SOB at rest. PINTO after 20 feet. No orthopnea, PND or LE edema (but h/o edema). Associated with CP and wheezing.  Currently not on any inhalers.  No cough, sputum, or h/o hemoptysis.   Overall is feeling better.  Full ROS as below.    Past Medical History  Past Medical History:   Diagnosis Date    Acute non-ST segment elevation myocardial infarction (Multi) 11/26/2023    Anemia     Arteriosclerosis of coronary artery 05/14/2023    Arthritis     Asthma     Atrial fibrillation (Multi) 11/26/2023    Jonas esophagus     Bilateral carotid artery occlusion 05/23/2023    CAD (coronary artery disease)     Cardiac rhythm disorder or disturbance or change 11/26/2023    Cervical radiculopathy     Chest pain 11/26/2023    Chronic obstructive pulmonary disease (Multi) 11/26/2023    Constipation     Coronary artery disease 11/26/2023    Degenerative joint disease     Dyslipidemia     Dysuria     Erosive esophagitis     GERD (gastroesophageal reflux disease)     Hypercholesterolemia 05/23/2023    Hyperparathyroidism (Multi)     Hypertensive urgency 11/26/2023    Impaired fasting glucose 11/26/2023    Irritable bowel syndrome (IBS)     Labile  essential hypertension 11/26/2023    Labile hypertension     Left foot pain     Low blood pressure 11/26/2023    Macular degeneration     Mixed hyperlipidemia 11/26/2023    Neck pain     Osteoporosis 11/26/2023    REclast '19 Cr 1.4 switch to calcitonin.    Paroxysmal atrial fibrillation (Multi)     Polyarthritis with negative rheumatoid factor (Multi)     Post menopausal syndrome     Rapid atrial fibrillation (Multi) 05/14/2023    Spinal stenosis     Stage 3 chronic kidney disease (Multi) 11/26/2023    Stroke (Multi)     Transient ischemic attack 11/26/2023   Surgical History  Past Surgical History:   Procedure Laterality Date    CARDIAC CATHETERIZATION Left 9/27/2024    Procedure: Left Heart Cath;  Surgeon: Nessa Callaway MD;  Location: [facility] Cardiac [facility];  Service: Cardiovascular;  Laterality: Left;    CARDIAC CATHETERIZATION N/A 9/27/2024    Procedure: PCI;  Surgeon: Nessa Callaway MD;  Location: [facility] Cardiac Cath Lab;  Service: Cardiovascular;  Laterality: N/A;    CARDIOVASCULAR STRESS TEST  2017    Dr. Khalil    CARDIOVASCULAR STRESS TEST  2004    Dr. Pantoja    CARPAL TUNNEL RELEASE Left 2014    CATARACT EXTRACTION Bilateral 2011    CHOLECYSTECTOMY      CORONARY STENT PLACEMENT  05/2023    LAD    CT HEAD ANGIO W AND WO IV CONTRAST  05/08/2013    CT HEAD ANGIO W AND WO IV CONTRAST [facility]    MR HEAD ANGIO WO IV CONTRAST  04/23/2013    MR HEAD ANGIO WO IV CONTRAST [facility]    MR HEAD ANGIO WO IV CONTRAST  07/20/2016    MR HEAD ANGIO WO IV CONTRAST [facility]    MR HEAD ANGIO WO IV CONTRAST  10/27/2023    MR HEAD ANGIO WO IV CONTRAST 10/27/2023 VERITO MRI    MR NECK ANGIO WO IV CONTRAST  10/27/2023    MR NECK ANGIO WO IV CONTRAST 10/27/2023 VERITO MRI    OTHER SURGICAL HISTORY  02/14/2022    No history of surgery    REVISION TOTAL HIP ARTHROPLASTY Left 2013    Conversion from left ORIF    TOTAL HIP ARTHROPLASTY Right 2016   Social History  Social History     Tobacco Use    Smoking  status: Never     Passive exposure: Never    Smokeless tobacco: Never   Vaping Use    Vaping status: Never Used   Substance Use Topics    Alcohol use: Not Currently    Drug use: Never   Family History  Family History   Problem Relation Name Age of Onset    No Known Problems Mother      No Known Problems Father      No Known Problems Sister       Current Outpatient Medications   Medication Instructions    amLODIPine (NORVASC) 5 mg, oral, Daily    apixaban (ELIQUIS) 2.5 mg, oral, 2 times daily    ascorbic acid (VITAMIN C) 100 mg, Daily    atorvastatin (LIPITOR) 80 mg, oral, Nightly    carvedilol (COREG) 25 mg, oral, 2 times daily (morning and late afternoon)    cholecalciferol (VITAMIN D-3) 2,000 Units, Daily    clopidogrel (PLAVIX) 75 mg, oral, Daily    docusate sodium (Colace) 100 mg capsule TAKE 1 CAPSULE BY MOUTH ONCE DAILY AS NEEDED to prevent constipation from iron    DULoxetine (CYMBALTA) 30 mg, oral, Daily with evening meal    ferrous sulfate, 325 mg ferrous sulfate, tablet 1 tablet, oral, Daily with breakfast, M-W-F    gabapentin (NEURONTIN) 300 mg, oral, 2 times daily, Resume as needed    hydroxychloroquine (Plaquenil) 200 mg tablet Take by mouth.    isosorbide mononitrate ER (IMDUR) 30 mg, oral, Daily, Do not crush or chew.    losartan (COZAAR) 100 mg, oral, Daily    pantoprazole (PROTONIX) 40 mg, oral, 2 times daily    sulfaSALAzine (AZULFIDINE) 500 mg, 2 times daily    vit C/E/Zn/coppr/lutein/zeaxan (PRESERVISION AREDS-2 ORAL) As directed orally    Allergies  Meperidine, Morphine, Opioids - morphine analogues, Pregabalin, and Tramadol  Review of Systems   Constitutional:  Positive for fatigue. Negative for appetite change, chills, fever and unexpected weight change.   HENT:  Positive for hearing loss and sore throat. Negative for congestion, ear pain and rhinorrhea.    Eyes:  Negative for pain, redness and itching.   Respiratory:  Positive for chest tightness, shortness of breath and wheezing. Negative  for cough.    Cardiovascular:  Positive for chest pain and palpitations. Negative for leg swelling.   Gastrointestinal:  Positive for constipation. Negative for abdominal pain, diarrhea, nausea and vomiting.   Genitourinary:  Negative for dysuria, frequency and hematuria.   Musculoskeletal:  Positive for arthralgias, back pain and neck pain. Negative for myalgias.   Skin:  Negative for pallor, rash and wound.   Neurological:  Positive for syncope and headaches. Negative for dizziness, seizures and light-headedness.   Psychiatric/Behavioral:  Negative for confusion and dysphoric mood. The patient is not nervous/anxious.    Scheduled medications  acetaminophen, 650 mg, oral, TID  amLODIPine, 5 mg, oral, Daily  apixaban, 2.5 mg, oral, BID  ascorbic acid, 125 mg, oral, Daily  atorvastatin, 80 mg, oral, Nightly  carvedilol, 25 mg, oral, BID  cholecalciferol, 2,000 Units, oral, Daily  clopidogrel, 75 mg, oral, Daily  docusate sodium, 100 mg, oral, BID  DULoxetine, 30 mg, oral, Daily with evening meal  ferrous sulfate (325 mg ferrous sulfate), 65 mg of iron, oral, Daily with breakfast  furosemide, 20 mg, intravenous, Daily  gabapentin, 300 mg, oral, BID  hydroxychloroquine, 200 mg, oral, Daily  isosorbide mononitrate ER, 120 mg, oral, Daily  lidocaine, 2 patch, transdermal, Daily  losartan, 100 mg, oral, Daily  oxygen, , inhalation, Continuous - Inhalation  pantoprazole, 40 mg, oral, BID  ranolazine, 500 mg, oral, BID  sulfaSALAzine, 500 mg, oral, BID    Continuous medications     PRN medications  PRN medications: benzocaine-menthol, bisacodyl, polyethylene glycol    Physical Exam  Constitutional:       General: She is not in acute distress.     Appearance: She is normal weight. She is not ill-appearing or toxic-appearing.   HENT:      Head: Normocephalic and atraumatic.      Nose:      Comments: On 4L NC     Mouth/Throat:      Mouth: Mucous membranes are moist.      Comments: Mallampati 3.   Eyes:      General: No scleral  "icterus.     Extraocular Movements: Extraocular movements intact.      Pupils: Pupils are equal, round, and reactive to light.   Cardiovascular:      Rate and Rhythm: Normal rate and regular rhythm.      Heart sounds: No murmur heard.     No friction rub. No gallop.   Pulmonary:      Effort: Pulmonary effort is normal. No respiratory distress.      Breath sounds: No wheezing or rales.      Comments: Decreased breath sounds at the bases.   Abdominal:      General: Bowel sounds are normal. There is no distension.      Palpations: Abdomen is soft.      Tenderness: There is no abdominal tenderness.   Musculoskeletal:         General: Deformity (arthritic changes both hands.) present. No tenderness.      Cervical back: Neck supple. No rigidity or tenderness.      Right lower leg: No edema.      Left lower leg: No edema.   Lymphadenopathy:      Cervical: No cervical adenopathy.   Skin:     General: Skin is warm and dry.      Coloration: Skin is not jaundiced.   Neurological:      General: No focal deficit present.      Mental Status: She is alert and oriented to person, place, and time.      Cranial Nerves: No cranial nerve deficit.      Motor: No weakness.   Psychiatric:         Mood and Affect: Mood normal.         Behavior: Behavior normal.     Vital Signs  Blood pressure 144/50, pulse 67, temperature 36.6 °C (97.9 °F), temperature source Oral, resp. rate 16, height 1.499 m (4' 11\"), weight 50.9 kg (112 lb 3.4 oz), SpO2 92%.  Oxygen Therapy  SpO2: 92 %  Medical Gas Therapy: Supplemental oxygen  Medical Gas Delivery Method: Nasal cannula     Relevant Results  CT angio chest for pulmonary embolism 10/28/2024    Narrative  Interpreted By:  Richmond Barbosa,  STUDY:  CT ANGIO CHEST FOR PULMONARY EMBOLISM;  10/28/2024 2:05 pm    INDICATION:  Signs/Symptoms:hypoxic, tachy.    COMPARISON:  Chest CT 03/07/2022.    ACCESSION NUMBER(S):  [id number]    ORDERING CLINICIAN:  KARMA HAIDER    TECHNIQUE:  Helical data acquisition of the " chest was obtained contrast volume:  75 mL IV contrast Omnipaque 350.    Axial contiguous images were reformatted in coronal and sagittal  planes. Axial and coronal MIP images were created and reviewed.    FINDINGS:  POTENTIAL LIMITATIONS OF THE STUDY: Motion and mixing artifact which  limits evaluation of the distal branch vessels.    HEART AND VESSELS:  No discrete filling defects within the main pulmonary artery or its  branches.    Main pulmonary trunk is upper normal limits, measuring 2.9 cm in size.    The thoracic aorta is normal in caliber. It is not well evaluated due  to the phase of the contrast. Mild aortic valve atherosclerotic  calcifications are present.    Severe coronary artery atherosclerotic calcifications and/or stents.    Mild cardiomegaly with biatrial enlargement.    No evidence of pericardial effusion.    MEDIASTINUM AND DONATO, LOWER NECK AND AXILLA:  The visualized thyroid gland is within normal limits.    No evidence of thoracic lymphadenopathy by CT criteria.    There is moderate-size hiatal hernia and there is air-fluid level and  mild circumferential wall thickening throughout the thoracic  esophagus which is patulous. This is suggestive of achalasia versus  reflux esophagitis.    LUNGS AND AIRWAYS:  The trachea and central airways are patent. No endobronchial lesion.    Small to moderate-sized right greater than left pleural effusions.  There is interseptal thickening with scattered ground-glass opacities  suggestive of pulmonary venous congestion/interstitial edema. Mild  bibasilar compression atelectasis.    UPPER ABDOMEN:  Cholecystectomy change. Diffuse hepatic steatosis.    CHEST WALL AND OSSEOUS STRUCTURES:  There are no suspicious osseous lesions. There is endplate reactive  change in the lower thoracic spine with partially visualized  posterior spinal fusions hardware and laminectomy in the lumbar spine.    Impression  1.  No evidence of pulmonary emboli. Severe coronary  artery  atherosclerotic calcifications.  2. Mild cardiomegaly with biatrial enlargement. Small to  moderate-size right greater than left pleural effusions and bibasilar  subsegmental atelectasis. Diffuse interseptal thickening and  scattered central predominant ground glass opacification of the lungs  suggestive of pulmonary edema.  3. Chronic patulous esophagus with new air-fluid throughout the  thoracic esophagus suggestive of reflux esophagitis versus achalasia.  Clinical correlation is recommended.      Signed by: Richmond Barbosa 10/28/2024 2:41 PM  Dictation workstation:   [id number]  Results for orders placed or performed during the hospital encounter of 10/28/24 (from the past 24 hours)   Troponin I, High Sensitivity   Result Value Ref Range    Troponin I, High Sensitivity 608 (HH) 0 - 13 ng/L   Basic Metabolic Panel   Result Value Ref Range    Glucose 85 74 - 99 mg/dL    Sodium 137 136 - 145 mmol/L    Potassium 3.2 (L) 3.5 - 5.3 mmol/L    Chloride 101 98 - 107 mmol/L    Bicarbonate 32 21 - 32 mmol/L    Anion Gap 7 (L) 10 - 20 mmol/L    Urea Nitrogen 14 6 - 23 mg/dL    Creatinine 0.49 (L) 0.50 - 1.05 mg/dL    eGFR 90 >60 mL/min/1.73m*2    Calcium 8.3 (L) 8.6 - 10.3 mg/dL   Lipid Panel   Result Value Ref Range    Cholesterol 80 0 - 199 mg/dL    HDL-Cholesterol 43.0 mg/dL    Cholesterol/HDL Ratio 1.9     LDL Calculated 25 <=99 mg/dL    VLDL 12 0 - 40 mg/dL    Triglycerides 59 0 - 149 mg/dL    Non HDL Cholesterol 37 0 - 149 mg/dL   CBC   Result Value Ref Range    WBC 4.0 (L) 4.4 - 11.3 x10*3/uL    nRBC 0.0 0.0 - 0.0 /100 WBCs    RBC 2.76 (L) 4.00 - 5.20 x10*6/uL    Hemoglobin 7.8 (L) 12.0 - 16.0 g/dL    Hematocrit 24.5 (L) 36.0 - 46.0 %    MCV 89 80 - 100 fL    MCH 28.3 26.0 - 34.0 pg    MCHC 31.8 (L) 32.0 - 36.0 g/dL    RDW 15.5 (H) 11.5 - 14.5 %    Platelets 192 150 - 450 x10*3/uL   Magnesium   Result Value Ref Range    Magnesium 1.58 (L) 1.60 - 2.40 mg/dL     *Note: Due to a large number of results and/or  encounters for the requested time period, some results have not been displayed. A complete set of results can be found in Results Review.   Assessment/Plan   89 YOF with CAD c/b NSTEMI s/p PCI, Afib on OAC, DLP, hyperparathyroidism, CVA, CKD, COPD, Jonas's esophagitis, who p/w L arm numbness and tingling after falling 2 days earlier. In the ED hypoxia, requiring 5L NC. Admitted to [facility] for further management. Post admission work up revealed b/l pleural effusion. Pulmonary is consulted for hypoxia and pleural effusions.     Respiratory failure: likely acute with hypoxia, due to below. Overall is slowly improving.        Continue supplemental O2, wean off as tolerates       BPH with IS, Acapella       Home O2 evaluation before DC       Management of the individual causes as below.     Pleural effusions: likely from CHF, given bilateral and also pulmonary edema. Echo in 10/2023 showed normal LV, RV, RVSF and RVSP 31.        Diuresis as tolerates       Repeat echo is pending.       Repeat CXR in 2-3 days, if persist, will need diagnostic thoracentesis.     Asthma/COPD: she never smoked. Not on any inhalers. Currently on wheezing        Consider albuterol PRN        Would benefit from outpatient pulmonary follow up    DVT prophylaxis: on Apixaban    Thank you for the consult.   Pulmonary will FU while in house.   Bonnie Pantoja MD

## 2024-10-29 NOTE — ASSESSMENT & PLAN NOTE
Troponin 43, 96, 600  Check lipid panel in the morning  Continue aspirin/statin/Plavix  Recent heart cath with stent placed in September of this year  Check echo  Cardiology consulted, appreciate recs-recommending medical management, cardiac medications have been adjusted

## 2024-10-29 NOTE — CONSULTS
"Nutrition Initial Assessment:   Nutrition Assessment    Reason for Assessment: Dietitian discretion    Patient is a 89 y.o. female presenting with left arm pain from a fall on Friday. Pt with CHF. No edema or weight changes noted.  Pt willing to drink ensure rec; ensure plus HP 1x daily providing an additional 350 kcals and 20g of protein.      Nutrition History:  Energy Intake: Fair 50-75 %  Food and Nutrient History: Pt with fair PO, pt stated no changes in appetite but eats smaller meals.  Food Allergies/Intolerances:  None  GI Symptoms: None  Oral Problems: None       Anthropometrics:  Height: 149.9 cm (4' 11\")   Weight: 50.9 kg (112 lb 3.4 oz)   BMI (Calculated): 22.65    Weight Change %:  Weight History / % Weight Change: No wt changes to note.    Nutrition Focused Physical Exam Findings:  Subcutaneous Fat Loss:   Orbital Fat Pads: Mild-Moderate (slight dark circles and slight hollowing)  Buccal Fat Pads: Mild-Moderate (flat cheeks, minimal bounce)  Muscle Wasting:  Temporalis: Mild-Moderate (slight depression)  Pectoralis (Clavicular Region): Well nourished (clavicle not visible)  Edema:  Edema: none  Physical Findings:  Hair: Negative  Eyes: Negative  Mouth: Negative  Skin: Negative    Nutrition Significant Labs:  CBC Trend:   Results from last 7 days   Lab Units 10/29/24  0534 10/28/24  0919   WBC AUTO x10*3/uL 4.0* 7.4   RBC AUTO x10*6/uL 2.76* 3.63*   HEMOGLOBIN g/dL 7.8* 10.4*   HEMATOCRIT % 24.5* 33.6*   MCV fL 89 93   PLATELETS AUTO x10*3/uL 192 281    , BMP Trend:   Results from last 7 days   Lab Units 10/29/24  0534 10/28/24  0919   GLUCOSE mg/dL 85 138*   CALCIUM mg/dL 8.3* 9.1   SODIUM mmol/L 137 136   POTASSIUM mmol/L 3.2* 3.6   CO2 mmol/L 32 29   CHLORIDE mmol/L 101 99   BUN mg/dL 14 16   CREATININE mg/dL 0.49* 0.54    , A1C:  Lab Results   Component Value Date    HGBA1C 5.8 (H) 09/10/2024       Nutrition Specific Medications:  acetaminophen, 650 mg, oral, TID  amLODIPine, 5 mg, oral, " Daily  apixaban, 2.5 mg, oral, BID  ascorbic acid, 125 mg, oral, Daily  atorvastatin, 80 mg, oral, Nightly  carvedilol, 25 mg, oral, BID  cholecalciferol, 2,000 Units, oral, Daily  clopidogrel, 75 mg, oral, Daily  docusate sodium, 100 mg, oral, BID  DULoxetine, 30 mg, oral, Daily with evening meal  ferrous sulfate (325 mg ferrous sulfate), 65 mg of iron, oral, Daily with breakfast  furosemide, 20 mg, intravenous, Daily  gabapentin, 300 mg, oral, BID  hydroxychloroquine, 200 mg, oral, Daily  isosorbide mononitrate ER, 120 mg, oral, Daily  lidocaine, 2 patch, transdermal, Daily  losartan, 100 mg, oral, Daily  oxygen, , inhalation, Continuous - Inhalation  pantoprazole, 40 mg, oral, BID  ranolazine, 500 mg, oral, BID  sulfaSALAzine, 500 mg, oral, BID         I/O:   Last BM Date: 10/27/24 (per patient); Stool Appearance: Formed (10/28/24 1847)    Dietary Orders (From admission, onward)       Start     Ordered    10/29/24 0908  Adult diet Cardiac; 70 gm fat; 2 - 3 grams Sodium  Diet effective now        Question Answer Comment   Diet type Cardiac    Fat restriction: 70 gm fat    Sodium restriction: 2 - 3 grams Sodium        10/29/24 0907    10/28/24 1737  May Participate in Room Service  ( ROOM SERVICE MAY PARTICIPATE)  Once        Question:  .  Answer:  Yes    10/28/24 1736                     Estimated Needs:   Total Energy Estimated Needs (kCal): 1500 kCal  Method for Estimating Needs: 30kcals/kg BW  Total Protein Estimated Needs (g): 62 g  Method for Estimating Needs: 1.2g/kg BW  Total Fluid Estimated Needs (mL): 1500 mL  Method for Estimating Needs: 1ml/kcal        Nutrition Diagnosis        Nutrition Diagnosis  Patient has Nutrition Diagnosis: Yes  Diagnosis Status (1): New  Nutrition Diagnosis 1: Inadequate oral intake  Related to (1): age, condition  As Evidenced by (1): PO intakes av 55% of meals       Nutrition Interventions/Recommendations         Nutrition Prescription:  Individualized Nutrition  Prescription Provided for : Individualized nutrition prescription of 1500 kcals and 62g of protein        Nutrition Interventions:   Interventions: Meals and snacks  Meals and Snacks: Fat-modified diet, Mineral-modified diet  Goal: Continue with good PO intakes      Nutrition Monitoring and Evaluation   Food/Nutrient Related History Monitoring  Monitoring and Evaluation Plan: Energy intake  Energy Intake: Estimated energy intake  Criteria: Continue with PO intakes, goal of >50% of all meals  Additional Plans: ensure plus  kcals and 20g of protein daily.    Body Composition/Growth/Weight History  Monitoring and Evaluation Plan: Weight  Weight: Weight change  Criteria: Continue to monitor wt status and wt changes.      Time Spent (min): 60 minutes

## 2024-10-30 ENCOUNTER — TELEPHONE (OUTPATIENT)
Dept: PRIMARY CARE | Facility: CLINIC | Age: 89
End: 2024-10-30

## 2024-10-30 ENCOUNTER — DOCUMENTATION (OUTPATIENT)
Dept: CASE MANAGEMENT | Facility: HOSPITAL | Age: 89
End: 2024-10-30
Payer: COMMERCIAL

## 2024-10-30 ENCOUNTER — APPOINTMENT (OUTPATIENT)
Dept: RADIOLOGY | Facility: HOSPITAL | Age: 89
DRG: 280 | End: 2024-10-30
Payer: COMMERCIAL

## 2024-10-30 PROBLEM — J96.01 ACUTE HYPOXIC RESPIRATORY FAILURE (MULTI): Status: RESOLVED | Noted: 2024-10-28 | Resolved: 2024-10-30

## 2024-10-30 PROBLEM — I63.9 CVA (CEREBRAL VASCULAR ACCIDENT) (MULTI): Status: ACTIVE | Noted: 2024-10-30

## 2024-10-30 LAB
ANION GAP SERPL CALCULATED.3IONS-SCNC: 9 MMOL/L (ref 10–20)
BUN SERPL-MCNC: 12 MG/DL (ref 6–23)
CALCIUM SERPL-MCNC: 8.4 MG/DL (ref 8.6–10.3)
CHLORIDE SERPL-SCNC: 99 MMOL/L (ref 98–107)
CO2 SERPL-SCNC: 31 MMOL/L (ref 21–32)
CREAT SERPL-MCNC: 0.63 MG/DL (ref 0.5–1.05)
EGFRCR SERPLBLD CKD-EPI 2021: 85 ML/MIN/1.73M*2
ERYTHROCYTE [DISTWIDTH] IN BLOOD BY AUTOMATED COUNT: 15.1 % (ref 11.5–14.5)
GLUCOSE SERPL-MCNC: 98 MG/DL (ref 74–99)
HCT VFR BLD AUTO: 24.9 % (ref 36–46)
HGB BLD-MCNC: 7.7 G/DL (ref 12–16)
MAGNESIUM SERPL-MCNC: 1.5 MG/DL (ref 1.6–2.4)
MAGNESIUM SERPL-MCNC: 1.97 MG/DL (ref 1.6–2.4)
MCH RBC QN AUTO: 28.1 PG (ref 26–34)
MCHC RBC AUTO-ENTMCNC: 30.9 G/DL (ref 32–36)
MCV RBC AUTO: 91 FL (ref 80–100)
NRBC BLD-RTO: 0 /100 WBCS (ref 0–0)
PLATELET # BLD AUTO: 192 X10*3/UL (ref 150–450)
POTASSIUM SERPL-SCNC: 3.8 MMOL/L (ref 3.5–5.3)
RBC # BLD AUTO: 2.74 X10*6/UL (ref 4–5.2)
SODIUM SERPL-SCNC: 135 MMOL/L (ref 136–145)
WBC # BLD AUTO: 4.6 X10*3/UL (ref 4.4–11.3)

## 2024-10-30 PROCEDURE — 83735 ASSAY OF MAGNESIUM: CPT | Performed by: NURSE PRACTITIONER

## 2024-10-30 PROCEDURE — 99232 SBSQ HOSP IP/OBS MODERATE 35: CPT | Performed by: NURSE PRACTITIONER

## 2024-10-30 PROCEDURE — 2060000001 HC INTERMEDIATE ICU ROOM DAILY

## 2024-10-30 PROCEDURE — 99223 1ST HOSP IP/OBS HIGH 75: CPT | Performed by: NURSE PRACTITIONER

## 2024-10-30 PROCEDURE — 2500000001 HC RX 250 WO HCPCS SELF ADMINISTERED DRUGS (ALT 637 FOR MEDICARE OP): Performed by: NURSE PRACTITIONER

## 2024-10-30 PROCEDURE — 99233 SBSQ HOSP IP/OBS HIGH 50: CPT | Performed by: INTERNAL MEDICINE

## 2024-10-30 PROCEDURE — 70498 CT ANGIOGRAPHY NECK: CPT | Performed by: RADIOLOGY

## 2024-10-30 PROCEDURE — 99497 ADVNCD CARE PLAN 30 MIN: CPT | Performed by: NURSE PRACTITIONER

## 2024-10-30 PROCEDURE — 36415 COLL VENOUS BLD VENIPUNCTURE: CPT | Performed by: NURSE PRACTITIONER

## 2024-10-30 PROCEDURE — 2550000001 HC RX 255 CONTRASTS: Performed by: HOSPITALIST

## 2024-10-30 PROCEDURE — 2500000001 HC RX 250 WO HCPCS SELF ADMINISTERED DRUGS (ALT 637 FOR MEDICARE OP): Performed by: INTERNAL MEDICINE

## 2024-10-30 PROCEDURE — 70496 CT ANGIOGRAPHY HEAD: CPT | Performed by: RADIOLOGY

## 2024-10-30 PROCEDURE — 99498 ADVNCD CARE PLAN ADDL 30 MIN: CPT | Performed by: NURSE PRACTITIONER

## 2024-10-30 PROCEDURE — 2500000005 HC RX 250 GENERAL PHARMACY W/O HCPCS: Performed by: NURSE PRACTITIONER

## 2024-10-30 PROCEDURE — 85027 COMPLETE CBC AUTOMATED: CPT | Performed by: NURSE PRACTITIONER

## 2024-10-30 PROCEDURE — 2500000001 HC RX 250 WO HCPCS SELF ADMINISTERED DRUGS (ALT 637 FOR MEDICARE OP)

## 2024-10-30 PROCEDURE — 70498 CT ANGIOGRAPHY NECK: CPT

## 2024-10-30 PROCEDURE — 80048 BASIC METABOLIC PNL TOTAL CA: CPT | Performed by: NURSE PRACTITIONER

## 2024-10-30 PROCEDURE — 2500000002 HC RX 250 W HCPCS SELF ADMINISTERED DRUGS (ALT 637 FOR MEDICARE OP, ALT 636 FOR OP/ED)

## 2024-10-30 PROCEDURE — 99232 SBSQ HOSP IP/OBS MODERATE 35: CPT | Performed by: STUDENT IN AN ORGANIZED HEALTH CARE EDUCATION/TRAINING PROGRAM

## 2024-10-30 PROCEDURE — 2500000004 HC RX 250 GENERAL PHARMACY W/ HCPCS (ALT 636 FOR OP/ED): Performed by: NURSE PRACTITIONER

## 2024-10-30 RX ORDER — CYCLOBENZAPRINE HCL 10 MG
5 TABLET ORAL DAILY PRN
Status: DISCONTINUED | OUTPATIENT
Start: 2024-10-30 | End: 2024-11-04 | Stop reason: HOSPADM

## 2024-10-30 RX ORDER — MAGNESIUM SULFATE HEPTAHYDRATE 40 MG/ML
2 INJECTION, SOLUTION INTRAVENOUS ONCE
Status: COMPLETED | OUTPATIENT
Start: 2024-10-30 | End: 2024-10-30

## 2024-10-30 RX ORDER — CARVEDILOL 12.5 MG/1
12.5 TABLET ORAL
Status: DISCONTINUED | OUTPATIENT
Start: 2024-10-30 | End: 2024-11-04

## 2024-10-30 RX ORDER — LANOLIN ALCOHOL/MO/W.PET/CERES
400 CREAM (GRAM) TOPICAL 2 TIMES DAILY
Status: DISCONTINUED | OUTPATIENT
Start: 2024-10-30 | End: 2024-11-04 | Stop reason: HOSPADM

## 2024-10-30 ASSESSMENT — ENCOUNTER SYMPTOMS
SINUS PAIN: 0
HEMATURIA: 0
POLYPHAGIA: 0
COLOR CHANGE: 0
HEADACHES: 0
NAUSEA: 0
CONFUSION: 0
CHEST TIGHTNESS: 0
EYE PAIN: 0
SEIZURES: 0
VOMITING: 0
POLYDIPSIA: 0
CHILLS: 0
ARTHRALGIAS: 1
SORE THROAT: 0
TREMORS: 0
PALPITATIONS: 0
WOUND: 0
MYALGIAS: 0
DIFFICULTY URINATING: 0
LIGHT-HEADEDNESS: 0
BRUISES/BLEEDS EASILY: 0
NUMBNESS: 1
DIARRHEA: 0
DIZZINESS: 0
CONSTIPATION: 0
ABDOMINAL PAIN: 0
SHORTNESS OF BREATH: 1
WHEEZING: 0
FATIGUE: 0
NERVOUS/ANXIOUS: 0
ACTIVITY CHANGE: 0
ABDOMINAL DISTENTION: 0
BACK PAIN: 1
FEVER: 0
SLEEP DISTURBANCE: 0
COUGH: 0
APPETITE CHANGE: 0
WEAKNESS: 1
FACIAL ASYMMETRY: 0

## 2024-10-30 ASSESSMENT — COGNITIVE AND FUNCTIONAL STATUS - GENERAL
TOILETING: A LITTLE
PERSONAL GROOMING: A LITTLE
DRESSING REGULAR LOWER BODY CLOTHING: A LITTLE
MOBILITY SCORE: 24
HELP NEEDED FOR BATHING: A LITTLE
DRESSING REGULAR UPPER BODY CLOTHING: A LITTLE

## 2024-10-30 ASSESSMENT — PAIN SCALES - GENERAL
PAINLEVEL_OUTOF10: 0 - NO PAIN
PAINLEVEL_OUTOF10: 0 - NO PAIN

## 2024-10-30 NOTE — CARE PLAN
The patient's goals for the shift include      The clinical goals for the shift include safety  0  Problem: Safety - Adult  Goal: Free from fall injury  Outcome: Progressing     Problem: Discharge Planning  Goal: Discharge to home or other facility with appropriate resources  Outcome: Progressing     Problem: Chronic Conditions and Co-morbidities  Goal: Patient's chronic conditions and co-morbidity symptoms are monitored and maintained or improved  Outcome: Progressing     Problem: Heart Failure  Goal: Improved gas exchange this shift  Outcome: Progressing  Goal: Improved urinary output this shift  Outcome: Progressing  Goal: Reduction in peripheral edema within 24 hours  Outcome: Progressing  Goal: Report improvement of dyspnea/breathlessness this shift  Outcome: Progressing  Goal: Weight from fluid excess reduced over 2-3 days, then stabilize  Outcome: Progressing  Goal: Increase self care and/or family involvement in 24 hours  Outcome: Progressing     Problem: Skin  Goal: Participates in plan/prevention/treatment measures  Outcome: Progressing  Goal: Prevent/manage excess moisture  Outcome: Progressing  Goal: Prevent/minimize sheer/friction injuries  Outcome: Progressing     Problem: Fall/Injury  Goal: Not fall by end of shift  Outcome: Progressing  Goal: Be free from injury by end of the shift  Outcome: Progressing  Goal: Verbalize understanding of personal risk factors for fall in the hospital  Outcome: Progressing     Problem: Respiratory  Goal: Minimal/no exertional discomfort or dyspnea this shift  Outcome: Progressing  Goal: No signs of respiratory distress (eg. Use of accessory muscles. Peds grunting)  Outcome: Progressing     Problem: Risk for excess fluid volume  Goal: I will remain free from complications r/t CHF  Outcome: Progressing     Problem: Nutrition  Goal: Oral intake greater than 50%  Outcome: Progressing  Goal: Consume prescribed supplement  Outcome: Progressing  Goal: Maintain stable  weight  Outcome: Progressing

## 2024-10-30 NOTE — TELEPHONE ENCOUNTER
"[facility] referral placed by Shiela Reyes NP. Patient admitted to [facility] 10/28/24. Presented to ED 10/28/24 s/p fall 3 days prior with c/o CP, Lt arm pain with numbness and tingling. Per daughter patient has been \"blacking out\" and falling at home. Patient lives at home with her . Typically is independent. She was IP 9/2024 with NSTEMI, s/p cardiac cath with stent placement. Diagnosis include: Acute hypoxic respiratory failure, acute heart failure, CP, sinus tachycardia, PAF, bilateral pleural effusion. [facility] Calls will monitor hospitalization and discharge plan.   "

## 2024-10-30 NOTE — ASSESSMENT & PLAN NOTE
Patient is on statin/apixaban/Plavix-continue  Patient fell on Friday, now with numbness in LUE, back pain-numbness resolved  MRI did show positive stroke  CTA head/neck pending  TTE  US carotid as above  Neurochecks  Consult neurology, appreciate recs  Transfer to SDU

## 2024-10-30 NOTE — ASSESSMENT & PLAN NOTE
Troponin 43, 96, 600  Check lipid panel in the morning  Continue eliquis/statin/Plavix  Recent heart cath with stent placed in September of this year  TTE  Cardiology consulted, appreciate recs-recommending medical management, cardiac medications have been adjusted

## 2024-10-30 NOTE — PROGRESS NOTES
"Rakan Cervantes is a 89 y.o. female on day 2 of admission presenting with Sinus tachycardia.    Subjective   Patient currently denies any abdominal pain, nausea, vomiting.  No BM in several days.        Objective     Physical Exam  HENT:      Head: Normocephalic.      Nose: Nose normal.      Mouth/Throat:      Mouth: Mucous membranes are moist.   Eyes:      Pupils: Pupils are equal, round, and reactive to light.   Cardiovascular:      Rate and Rhythm: Normal rate.   Pulmonary:      Breath sounds: Normal breath sounds.   Abdominal:      Palpations: Abdomen is soft.   Musculoskeletal:      Cervical back: Normal range of motion.   Skin:     General: Skin is warm.   Neurological:      General: No focal deficit present.      Mental Status: She is alert.   Psychiatric:         Mood and Affect: Mood normal.         Last Recorded Vitals  Blood pressure 149/52, pulse 68, temperature 36.7 °C (98.1 °F), temperature source Oral, resp. rate 24, height 1.499 m (4' 11\"), weight 50.9 kg (112 lb 3.4 oz), SpO2 93%.  Intake/Output last 3 Shifts:  No intake/output data recorded.    Relevant Results  MR brain wo IV contrast    Result Date: 10/29/2024  Interpreted By:  Surendra Hylton, STUDY: MR BRAIN WO IV CONTRAST;  10/29/2024 7:57 pm   INDICATION: Signs/Symptoms:TIA eval, arm numbness.     COMPARISON: CT head without contrast 10/28/2024; MRI brain 10/27/2023   ACCESSION NUMBER(S): [id number]   ORDERING CLINICIAN: ИРИНА LOPEZ   TECHNIQUE: Multiplanar, multi-sequence images of the brain were obtained without contrast.   FINDINGS:   Diffusion weighted images show new subcentimeter foci of diffusion restriction in the left corona radiata (x2) consistent with acute ischemic infarct. There is a new subcentimeter focus of diffusion restriction within the splenium of the corpus callosum just right of midline consistent with acute ischemic infarct.   There is redemonstration of cystic encephalomalacia in the posterior right temporal and " parietal lobes. There is background moderate periventricular and subcortical hemispheric T2/FLAIR white matter hyperintensities are most compatible with chronic small vessel ischemic disease.   The major intracranial flow voids are preserved.   There is no acute intraparenchymal hemorrhage, mass, mass-effect, or an extra-axial fluid collection. On GRE images, there is susceptibility artifact within the posterior limb of the right internal capsule that does not correspond to any hyperdense abnormality on the CT and likely represents sequela of remote hemorrhage..   The ventricular size and cerebral volume are age-concordant.   Normal morphology of midline structures. The craniovertebral junction is normal.   The orbits and globes are unremarkable.   The paranasal sinuses show no air-fluid levels or hemorrhage.   The mastoid air cells are clear.   There is hyperostosis frontalis.   Severe degenerative disc disease in the mid cervical spine.       New subcentimeter acute ischemic infarct in the left corona radiata (x2) and right aspect of the splenium of the corpus callosum.   The demonstrated large area of encephalomalacia in the right posterior temporal and parietal lobes and chronic focus of hemorrhage in the posterior limb of the right internal capsule.   MACRO: None.   Signed by: Surendra Hylton 10/29/2024 9:41 PM Dictation workstation:   [id number]    Carotid duplex bilateral    Result Date: 10/29/2024  Preliminary Cardiology [place] [address]            Phone [phone]      Preliminary Vascular Lab Report  [facility] CAROTID ARTERY DUPLEX BILATERAL  Patient Name:     NICOLE PRICE Reading Physician:  [id number] Elías Winter MD, RPVI Study Date:       10/29/2024     Ordering Provider:  [id number] SONJA BATISTA MRN/PID:          [id number]       Fellow: Accession#:       [id number]   Technologist:       Isabell Vidal RVT YOB: 1935       Technologist 2: Gender:           F              Encounter#:         [id number] Admission Status: Inpatient      Location Performed: [facility]  Diagnosis/ICD: Syncope and collapse-R55 CPT Codes:     59616 Cerebrovascular Carotid Duplex scan complete  PRELIMINARY CONCLUSIONS:  Right Carotid: Findings are consistent with less than 50% stenosis of the right proximal internal carotid artery. The internal carotid artery appears tortuous. Right external carotid artery appears patent with no evidence of stenosis. The right vertebral artery is patent with antegrade flow. No evidence of hemodynamically significant stenosis in the right subclavian artery. There is turbulent flow noted in the proximal common carotid artery which may indicate a more proximal stenosis. Left Carotid: Findings are consistent with less than 50% stenosis of the left proximal internal carotid artery. The internal carotid artery appears tortuous. There is turbulent flow noted in mid and distal internal carotid artery possibly due to vessel tortuosity. Left external carotid artery appears patent with no evidence of stenosis. The left vertebral artery is patent with antegrade flow. No evidence of hemodynamically significant stenosis in the left subclavian artery.  Imaging & Doppler Findings: Right Plaque Morph: The proximal right internal carotid artery demonstrates heterogenous plaque. The proximal right external carotid artery demonstrates heterogenous and calcified plaque. The distal right common carotid artery demonstrates heterogenous plaque. Left Plaque Morph: The proximal left internal carotid artery demonstrates heterogenous plaque. The proximal left external carotid artery demonstrates heterogenous plaque. The distal left common carotid artery demonstrates heterogenous plaque.   Right                        Left   PSV      EDV                PSV       cm/s           CCA P    79 cm/s 86 cm/s            CCA D    75 cm/s 83  cm/s  18 cm/s   ICA P    102 cm/s 16 cm/s 83 cm/s  22 cm/s   ICA M    124 cm/s 25 cm/s 82 cm/s  21 cm/s   ICA D    80 cm/s  16 cm/s 105 cm/s            ECA     192 cm/s 75 cm/s  14 cm/s Vertebral  84 cm/s  18 cm/s 80 cm/s          Subclavian 122 cm/s                Right Left ICA/CCA Ratio  1.0  1.4   VASCULAR PRELIMINARY REPORT completed by Isabell Vidal T on 10/29/2024 at 11:21:41 AM  ** Final **     Lower extremity venous duplex bilateral    Result Date: 10/28/2024  Interpreted By:  Dino De Paz, STUDY: [facility] LOWER EXTREMITY VENOUS DUPLEX BILATERAL;  10/28/2024 7:15 pm   INDICATION: Signs/Symptoms:extremity edema.   COMPARISON: None.   ACCESSION NUMBER(S): [id number]   ORDERING CLINICIAN: SONJA BATISTA   TECHNIQUE: Grayscale, color and spectral Doppler sonographic images of the bilateral lower extremity deep venous system.   FINDINGS: RIGHT: There is normal compressibility of the common femoral vein, saphenous femoral junction, profunda femoral vein and popliteal vein. The posterior tibial and peroneal veins  demonstrate normal color flow and compressibility. There is normal spontaneous and phasic variation throughout the leg by spectral doppler.   LEFT: There is normal compressibility of the common femoral vein, saphenous femoral junction, profunda femoral vein and popliteal vein. The posterior tibial and peroneal veins  demonstrate normal color flow and compressibility.  There is normal spontaneous and phasic variation throughout the leg by spectral doppler.   OTHER FINDINGS: None.       No sonographic evidence DVT in the visualized vessels of bilateral lower extremities.   MACRO: None   Signed by: Dino De Paz 10/28/2024 7:48 PM Dictation workstation:   [id number]    CT angio chest for pulmonary embolism    Result Date: 10/28/2024  Interpreted By:  Richmond Barbosa, STUDY: CT ANGIO CHEST FOR PULMONARY EMBOLISM;  10/28/2024 2:05 pm   INDICATION: Signs/Symptoms:hypoxic, tachy.   COMPARISON: Chest CT  03/07/2022.   ACCESSION NUMBER(S): [id number]   ORDERING CLINICIAN: KARMA HAIDER   TECHNIQUE: Helical data acquisition of the chest was obtained contrast volume: 75 mL IV contrast Omnipaque 350.   Axial contiguous images were reformatted in coronal and sagittal planes. Axial and coronal MIP images were created and reviewed.   FINDINGS: POTENTIAL LIMITATIONS OF THE STUDY: Motion and mixing artifact which limits evaluation of the distal branch vessels.   HEART AND VESSELS: No discrete filling defects within the main pulmonary artery or its branches.   Main pulmonary trunk is upper normal limits, measuring 2.9 cm in size.   The thoracic aorta is normal in caliber. It is not well evaluated due to the phase of the contrast. Mild aortic valve atherosclerotic calcifications are present.   Severe coronary artery atherosclerotic calcifications and/or stents.   Mild cardiomegaly with biatrial enlargement.   No evidence of pericardial effusion.   MEDIASTINUM AND DONATO, LOWER NECK AND AXILLA: The visualized thyroid gland is within normal limits.   No evidence of thoracic lymphadenopathy by CT criteria.   There is moderate-size hiatal hernia and there is air-fluid level and mild circumferential wall thickening throughout the thoracic esophagus which is patulous. This is suggestive of achalasia versus reflux esophagitis.   LUNGS AND AIRWAYS: The trachea and central airways are patent. No endobronchial lesion.   Small to moderate-sized right greater than left pleural effusions. There is interseptal thickening with scattered ground-glass opacities suggestive of pulmonary venous congestion/interstitial edema. Mild bibasilar compression atelectasis.   UPPER ABDOMEN: Cholecystectomy change. Diffuse hepatic steatosis.   CHEST WALL AND OSSEOUS STRUCTURES: There are no suspicious osseous lesions. There is endplate reactive change in the lower thoracic spine with partially visualized posterior spinal fusions hardware and laminectomy in the  lumbar spine.       1.  No evidence of pulmonary emboli. Severe coronary artery atherosclerotic calcifications. 2. Mild cardiomegaly with biatrial enlargement. Small to moderate-size right greater than left pleural effusions and bibasilar subsegmental atelectasis. Diffuse interseptal thickening and scattered central predominant ground glass opacification of the lungs suggestive of pulmonary edema. 3. Chronic patulous esophagus with new air-fluid throughout the thoracic esophagus suggestive of reflux esophagitis versus achalasia. Clinical correlation is recommended.     Signed by: Richmond Barbosa 10/28/2024 2:41 PM Dictation workstation:   [id number]      Scheduled medications  acetaminophen, 650 mg, oral, TID  amLODIPine, 5 mg, oral, Daily  apixaban, 2.5 mg, oral, BID  ascorbic acid, 125 mg, oral, Daily  atorvastatin, 80 mg, oral, Nightly  carvedilol, 25 mg, oral, BID  cholecalciferol, 2,000 Units, oral, Daily  clopidogrel, 75 mg, oral, Daily  docusate sodium, 100 mg, oral, BID  DULoxetine, 30 mg, oral, Daily with evening meal  ferrous sulfate (325 mg ferrous sulfate), 65 mg of iron, oral, Daily with breakfast  furosemide, 20 mg, intravenous, Daily  gabapentin, 300 mg, oral, BID  hydroxychloroquine, 200 mg, oral, Daily  isosorbide mononitrate ER, 120 mg, oral, Daily  lidocaine, 2 patch, transdermal, Daily  losartan, 100 mg, oral, Daily  magnesium oxide, 400 mg, oral, BID  magnesium sulfate, 2 g, intravenous, Once  oxygen, , inhalation, Continuous - Inhalation  pantoprazole, 40 mg, oral, BID  polyethylene glycol, 17 g, oral, Daily  ranolazine, 500 mg, oral, BID  sucralfate, 1 g, oral, q6h ZEINAB  sulfaSALAzine, 500 mg, oral, BID      Continuous medications     PRN medications  PRN medications: benzocaine-menthol, bisacodyl  Results for orders placed or performed during the hospital encounter of 10/28/24 (from the past 24 hours)   Magnesium   Result Value Ref Range    Magnesium 1.50 (L) 1.60 - 2.40 mg/dL   Basic  Metabolic Panel   Result Value Ref Range    Glucose 98 74 - 99 mg/dL    Sodium 135 (L) 136 - 145 mmol/L    Potassium 3.8 3.5 - 5.3 mmol/L    Chloride 99 98 - 107 mmol/L    Bicarbonate 31 21 - 32 mmol/L    Anion Gap 9 (L) 10 - 20 mmol/L    Urea Nitrogen 12 6 - 23 mg/dL    Creatinine 0.63 0.50 - 1.05 mg/dL    eGFR 85 >60 mL/min/1.73m*2    Calcium 8.4 (L) 8.6 - 10.3 mg/dL   CBC   Result Value Ref Range    WBC 4.6 4.4 - 11.3 x10*3/uL    nRBC 0.0 0.0 - 0.0 /100 WBCs    RBC 2.74 (L) 4.00 - 5.20 x10*6/uL    Hemoglobin 7.7 (L) 12.0 - 16.0 g/dL    Hematocrit 24.9 (L) 36.0 - 46.0 %    MCV 91 80 - 100 fL    MCH 28.1 26.0 - 34.0 pg    MCHC 30.9 (L) 32.0 - 36.0 g/dL    RDW 15.1 (H) 11.5 - 14.5 %    Platelets 192 150 - 450 x10*3/uL     *Note: Due to a large number of results and/or encounters for the requested time period, some results have not been displayed. A complete set of results can be found in Results Review.                            Assessment/Plan   Assessment & Plan  Sinus tachycardia    Chest pain    Erosive esophagitis    Syncope    NSTEMI (non-ST elevated myocardial infarction) (Multi)    PAF (paroxysmal atrial fibrillation) (Multi)    Essential hypertension    Acute heart failure    Acute hypoxic respiratory failure (Multi)    Bilateral pleural effusion    Left arm numbness      Abnormal CT/Constipation   Patient has a chronic history of Jonas's esophagitis. CT imaging showed chronic patulous esophagus with new air-fluid throughout the thoracic esophagus.  Discussed case with Dr. Mccallum and recommends conservative management with continued PPI twice daily.  No urgent indication to repeat EGD at this time.  Will order bowel regimen for constipation.  Continue supportive care measures.     10/30  Overall patient is feeling pretty good today, soapsuds was ordered earlier this morning and she produced a medium sized BM.  No blood noted.  Patient advised to continue regimen for Jonas's with PPI twice daily.   No further GI interventions are warranted this time.  GI will sign off  Sabrina Blue, APRN-CNP

## 2024-10-30 NOTE — ASSESSMENT & PLAN NOTE
Now on room air  CT imaging as above  Secondary to above  as needed DuoNebs  Pulmonary and cardiology following

## 2024-10-30 NOTE — NURSING NOTE
Attempted to wean patient to room air. Patient spo2 at rest is 88 on RA. 2lo2 placed and patient currently 95% with continuous pulse ox connected to telemetry monitor.

## 2024-10-30 NOTE — ASSESSMENT & PLAN NOTE
Resume home antihypertensives  Monitor blood pressure close    Plan  Wean oxygen as tolerated  Diuresis  Check Ortho's, TTE  CTA head/neck  Transfer to SDU, positive stroke  DVT prophylaxis: Eliquis  Fall precautions, PT/OT consult  Cardiology, neurology, GI, and pulmonary on consult, appreciate recs  CBC and BMP in the morning  Will consult palliative, appreciate recs

## 2024-10-30 NOTE — PROGRESS NOTES
Occupational Therapy                 Therapy Communication Note    Patient Name: Rakan Cervantes  MRN: [id number]  Department: [address]  Room: 429/429-B  Today's Date: 10/30/2024     Discipline: Occupational Therapy    Missed Visit Reason: Missed Visit Reason:  (Per nursing, pt with low BP and not appropriate for therapy this date, also transferring to SDU)    Missed Time: Cancel    Comment:

## 2024-10-30 NOTE — PROGRESS NOTES
"Rakan Cervantes is a 89 y.o. female on day 2 of admission presenting with Sinus tachycardia.      Subjective   MRI showed subacute L corona radiata small scattered infarcts.     Pt seen by bedside. States she just had L finger numbness, no other sx.            Objective     Last Recorded Vitals  Blood pressure (!) 97/41, pulse 56, temperature 36.4 °C (97.5 °F), temperature source Oral, resp. rate 16, height 1.499 m (4' 11\"), weight 50.9 kg (112 lb 3.4 oz), SpO2 92%.    Physical Exam  Neurological Exam    AAOx3, follows commands, no aphasia/dysarthria.  PERRL, VFF,  EOMI, normal saccades and pursuit, no gaze preference/nystagmus.   Intact facial sensation, no asymmetry. Intact hearing bilaterally. Tongue midline. Symmetric palate elevation.   Motor: 5/5 all four extremities.  Sensation: intact to light touch   Gait: deferred     Relevant Results                    Phil Coma Scale  Best Eye Response: Spontaneous  Best Verbal Response: Oriented  Best Motor Response: Follows commands  Phil Coma Scale Score: 15                                  Assessment/Plan      Assessment & Plan  Sinus tachycardia    Chest pain    Erosive esophagitis    Syncope    NSTEMI (non-ST elevated myocardial infarction) (Multi)    PAF (paroxysmal atrial fibrillation) (Multi)    Essential hypertension    Acute heart failure    Bilateral pleural effusion    Left arm numbness    CVA (cerebral vascular accident) (Multi)    Rakan Cervantes is a 89 y.o. female wit hx of CAD, afib on anticoagulation, COPD presenting with an episode of fall. Neurology consulted for R arm numbness. She does have encephalomalacia of R parietal / insular cortex which may explain her numbness (recrudescence) in the setting of pleural effusions, HF. However due to her vascular risk factors MRI brain was obtained -- it showed small scattered infarcts in L corona radiata.     - CTA head and neck to determine etiology of stroke   - will determine best course of " anticoagulation / antiplatelet after CTA   Will continue to follow            I spent 35 minutes in the professional and overall care of this patient.      Jose Elias Keen MD

## 2024-10-30 NOTE — NURSING NOTE
Soap kevon enema administered per order. Patient produced a medium formed brown bowl movement. Refused miralax..

## 2024-10-30 NOTE — PROGRESS NOTES
Rakan Cervantes is a 89 y.o. female on day 2 of admission presenting with Sinus tachycardia.      Subjective   Patient seen and examined. Awake/alert/oriented. Denies chest pain, shortness of breath, fevers, chills, nausea, or vomiting. No abdominal discomfort.      Objective     Last Recorded Vitals  /52 (BP Location: Left arm, Patient Position: Lying)   Pulse 68   Temp 36.7 °C (98.1 °F) (Oral)   Resp 24   Wt 50.9 kg (112 lb 3.4 oz)   SpO2 93%   Intake/Output last 3 Shifts:    Intake/Output Summary (Last 24 hours) at 10/30/2024 1149  Last data filed at 10/30/2024 1115  Gross per 24 hour   Intake 50.25 ml   Output --   Net 50.25 ml       Admission Weight  Weight: 50.8 kg (112 lb) (10/28/24 0856)    Daily Weight  10/29/24 : 50.9 kg (112 lb 3.4 oz)    Image Results  MR brain wo IV contrast  Narrative: Interpreted By:  Surendra Hylton,   STUDY:  MR BRAIN WO IV CONTRAST;  10/29/2024 7:57 pm      INDICATION:  Signs/Symptoms:TIA eval, arm numbness.          COMPARISON:  CT head without contrast 10/28/2024; MRI brain 10/27/2023      ACCESSION NUMBER(S):  [id number]      ORDERING CLINICIAN:  ИРИНА LOPEZ      TECHNIQUE:  Multiplanar, multi-sequence images of the brain were obtained without  contrast.      FINDINGS:      Diffusion weighted images show new subcentimeter foci of diffusion  restriction in the left corona radiata (x2) consistent with acute  ischemic infarct. There is a new subcentimeter focus of diffusion  restriction within the splenium of the corpus callosum just right of  midline consistent with acute ischemic infarct.      There is redemonstration of cystic encephalomalacia in the posterior  right temporal and parietal lobes. There is background moderate  periventricular and subcortical hemispheric T2/FLAIR white matter  hyperintensities are most compatible with chronic small vessel  ischemic disease.      The major intracranial flow voids are preserved.      There is no acute intraparenchymal  hemorrhage, mass, mass-effect, or  an extra-axial fluid collection. On GRE images, there is  susceptibility artifact within the posterior limb of the right  internal capsule that does not correspond to any hyperdense  abnormality on the CT and likely represents sequela of remote  hemorrhage..      The ventricular size and cerebral volume are age-concordant.      Normal morphology of midline structures. The craniovertebral junction  is normal.      The orbits and globes are unremarkable.      The paranasal sinuses show no air-fluid levels or hemorrhage.      The mastoid air cells are clear.      There is hyperostosis frontalis.      Severe degenerative disc disease in the mid cervical spine.      Impression: New subcentimeter acute ischemic infarct in the left corona radiata  (x2) and right aspect of the splenium of the corpus callosum.      The demonstrated large area of encephalomalacia in the right  posterior temporal and parietal lobes and chronic focus of hemorrhage  in the posterior limb of the right internal capsule.      MACRO:  None.      Signed by: Surendra Hylton 10/29/2024 9:41 PM  Dictation workstation:   [id number]  Carotid duplex bilateral  Preliminary Cardiology [place] [address]             Phone [phone]           Preliminary Vascular Lab Report     [facility] US CAROTID ARTERY DUPLEX BILATERAL       Patient Name:     NICOLE PRICE Reading Physician:  [id number] Elías Winter MD, RPVI  Study Date:       10/29/2024     Ordering Provider:  [id number] SONJA BATISTA  MRN/PID:          [id number]       Fellow:  Accession#:       [id number]   Technologist:       Isabell Vidal RVT  YOB: 1935      Technologist 2:  Gender:           F              Encounter#:         [id number]  Admission Status: Inpatient      Location Performed: [facility]       Diagnosis/ICD: Syncope and collapse-R55  CPT Codes:     96257  Cerebrovascular Carotid Duplex scan complete       PRELIMINARY CONCLUSIONS:     Right Carotid: Findings are consistent with less than 50% stenosis of the right proximal internal carotid artery. The internal carotid artery appears tortuous. Right external carotid artery appears patent with no evidence of stenosis. The right vertebral artery is patent with antegrade flow. No evidence of hemodynamically significant stenosis in the right subclavian artery. There is turbulent flow noted in the proximal common carotid artery which may indicate a more proximal stenosis.  Left Carotid: Findings are consistent with less than 50% stenosis of the left proximal internal carotid artery. The internal carotid artery appears tortuous. There is turbulent flow noted in mid and distal internal carotid artery possibly due to vessel tortuosity. Left external carotid artery appears patent with no evidence of stenosis. The left vertebral artery is patent with antegrade flow. No evidence of hemodynamically significant stenosis in the left subclavian artery.     Imaging & Doppler Findings:  Right Plaque Morph: The proximal right internal carotid artery demonstrates heterogenous plaque. The proximal right external carotid artery demonstrates heterogenous and calcified plaque. The distal right common carotid artery demonstrates heterogenous plaque.  Left Plaque Morph: The proximal left internal carotid artery demonstrates heterogenous plaque. The proximal left external carotid artery demonstrates heterogenous plaque. The distal left common carotid artery demonstrates heterogenous plaque.      Right                        Left    PSV      EDV                PSV      EDV  118 cm/s           CCA P    79 cm/s  86 cm/s            CCA D    75 cm/s  83 cm/s  18 cm/s   ICA P    102 cm/s 16 cm/s  83 cm/s  22 cm/s   ICA M    124 cm/s 25 cm/s  82 cm/s  21 cm/s   ICA D    80 cm/s  16 cm/s  105 cm/s            ECA     192 cm/s  75 cm/s  14 cm/s Vertebral   84 cm/s  18 cm/s  80 cm/s          Subclavian 122 cm/s                     Right Left  ICA/CCA Ratio  1.0  1.4          VASCULAR PRELIMINARY REPORT  completed by Isabell Vidal RVT on 10/29/2024 at 11:21:41 AM       ** Final **      Physical Exam  Vitals reviewed.   Constitutional:       Appearance: Normal appearance.   HENT:      Head: Normocephalic and atraumatic.   Eyes:      Extraocular Movements: Extraocular movements intact.      Conjunctiva/sclera: Conjunctivae normal.   Cardiovascular:      Rate and Rhythm: Normal rate and regular rhythm.   Pulmonary:      Effort: Pulmonary effort is normal.      Breath sounds: No wheezing, rhonchi or rales.      Comments: Breath sounds diminished with mild crackles heard in the bases  Abdominal:      General: Bowel sounds are normal.      Palpations: Abdomen is soft.      Tenderness: There is no abdominal tenderness.   Skin:     General: Skin is warm and dry.   Neurological:      General: No focal deficit present.      Mental Status: She is alert and oriented to person, place, and time.         Relevant Results  Lab Results   Component Value Date    GLUCOSE 98 10/30/2024    CALCIUM 8.4 (L) 10/30/2024     (L) 10/30/2024    K 3.8 10/30/2024    CO2 31 10/30/2024    CL 99 10/30/2024    BUN 12 10/30/2024    CREATININE 0.63 10/30/2024      Lab Results   Component Value Date    WBC 4.6 10/30/2024    HGB 7.7 (L) 10/30/2024    HCT 24.9 (L) 10/30/2024    MCV 91 10/30/2024     10/30/2024    Carotid duplex bilateral  Result Date: 10/29/2024  PRELIMINARY CONCLUSIONS:    Right Carotid: Findings are consistent with less than 50% stenosis of the right proximal internal carotid artery. The internal carotid artery appears tortuous. Right external carotid artery appears patent with no evidence of stenosis. The right vertebral artery is patent with antegrade flow. No evidence of hemodynamically significant stenosis in the right subclavian artery. There is turbulent flow noted in  the proximal common carotid artery which may indicate a more proximal stenosis.   Left Carotid: Findings are consistent with less than 50% stenosis of the left proximal internal carotid artery. The internal carotid artery appears tortuous. There is turbulent flow noted in mid and distal internal carotid artery possibly due to vessel tortuosity. Left external carotid artery appears patent with no evidence of stenosis. The left vertebral artery is patent with antegrade flow. No evidence of hemodynamically significant stenosis in the left subclavian artery.     ECG 12 Lead  Result Date: 10/28/2024  Supraventricular tachycardia Marked ST abnormality, possible inferolateral subendocardial injury Abnormal ECG When compared with ECG of 26-SEP-2024 14:27, T wave inversion no longer evident in Inferior leads T wave inversion more evident in Lateral leads Confirmed by Sergey Bond ([id number]) on 10/28/2024 7:51:36 PM    Lower extremity venous duplex bilateral  Result Date: 10/28/2024  No sonographic evidence DVT in the visualized vessels of bilateral lower extremities.   MACRO: None   Signed by: Dino De Paz 10/28/2024 7:48 PM Dictation workstation:   [id number]    CT angio chest for pulmonary embolism  Result Date: 10/28/2024  1.  No evidence of pulmonary emboli. Severe coronary artery atherosclerotic calcifications. 2. Mild cardiomegaly with biatrial enlargement. Small to moderate-size right greater than left pleural effusions and bibasilar subsegmental atelectasis. Diffuse interseptal thickening and scattered central predominant ground glass opacification of the lungs suggestive of pulmonary edema. 3. Chronic patulous esophagus with new air-fluid throughout the thoracic esophagus suggestive of reflux esophagitis versus achalasia. Clinical correlation is recommended.     Signed by: Richmond Barbosa 10/28/2024 2:41 PM Dictation workstation:   [id number]    CT cervical spine wo IV contrast  Result Date: 10/28/2024  No evidence  for an acute fracture or subluxation of the cervical spine.   There is chronic anterolisthesis of C3 on C4 and chronic retrolisthesis of C4 on C5 with multilevel degenerative disc disease and cervical spondylosis with central canal stenosis at C4-5 and multilevel foraminal stenosis bilaterally.   Bone island within left pedicle and lamina of T2 extending into the spinous process of T2.   Hypodense thyroid nodules.   MACRO: None   Signed by: Sherlyn Jackson 10/28/2024 11:00 AM Dictation workstation:   [id number]    CT head wo IV contrast  Result Date: 10/28/2024  Age-appropriate atrophy.   Encephalomalacia within the right posterior parietal lobe unchanged consistent with an old infarct at this site. Old lacunar infarct of the right insula is also identified.   No acute intracranial process.   MACRO: None     Signed by: Sherlyn Jackson 10/28/2024 10:46 AM Dictation workstation:   [id number]    XR chest 2 views  Result Date: 10/28/2024  Cardiomegaly without acute cardiopulmonary process. Low lung volumes.   Advanced osteoarthritis of the left shoulder affecting glenohumeral joint.   Signed by: Sherlyn Jackson 10/28/2024 10:33 AM Dictation workstation:   [id number]    XR pelvis 1-2 views  Result Date: 10/28/2024  No pelvic fracture.   Signed by: Sherlyn Jackson 10/28/2024 10:29 AM Dictation workstation:   [id number]    XR lumbar spine 2-3 views  Result Date: 10/28/2024  Status post midline decompressive laminectomy with posterior spinal fusion from L2 through S1   Lumbar levoscoliosis with multilevel degenerative disc disease and spondylosis most pronounced in the lower thoracic region and at the T12-L1 level.   Signed by: Sherlyn Jackson 10/28/2024 10:27 AM Dictation workstation:   [id number]         Assessment/Plan      Assessment & Plan  CVA (cerebral vascular accident) (Multi)  Patient is on statin/apixaban/Plavix-continue  Patient fell on Friday, now with numbness in LUE, back pain-numbness resolved  MRI did show positive  "stroke  CTA head/neck pending  TTE  US carotid as above  Neurochecks  Consult neurology, appreciate recs  Transfer to SDU  NSTEMI (non-ST elevated myocardial infarction) (Multi)  Troponin 43, 96, 600  Check lipid panel in the morning  Continue eliquis/statin/Plavix  Recent heart cath with stent placed in September of this year  TTE  Cardiology consulted, appreciate recs-recommending medical management, cardiac medications have been adjusted  Acute heart failure  Resume home cardiac medications  Daily weights  Strict I/Os    Echocardiogram  Diuresis  Consult cardiology, appreciate recommendations  Acute hypoxic respiratory failure (Multi) (Resolved: 10/30/2024)  Now on room air  CT imaging as above  Secondary to above  as needed DuoNebs  Pulmonary and cardiology following  Syncope  Patient reports \"blacking out\" multiple times at home resulting in falls  Fall precautions  Check Ortho's  Echocardiogram  Ultrasound of the carotids as above  Bilateral pleural effusion  Seen on CT imaging  Diuresis  Cardiology and pulmonary consulted, appreciate recommendations  Sinus tachycardia  On admission  Given oral cardiac medications with improvement  Monitor on telemetry  PAF (paroxysmal atrial fibrillation) (Multi)  Monitor on telemetry  Continue antiarrhythmics and Eliquis  Erosive esophagitis  CT imaging showed chronic patulous esophagus with new air-fluid throughout the thoracic esophagus suggestive of reflux esophagitis versus achalasia  Consult GI, appreciate recs  Continue PPI  Essential hypertension  Resume home antihypertensives  Monitor blood pressure close    Plan  Wean oxygen as tolerated  Diuresis  Check Ortho's, TTE  CTA head/neck  Transfer to SDU, positive stroke  DVT prophylaxis: Eliquis  Fall precautions, PT/OT consult  Cardiology, neurology, GI, and pulmonary on consult, appreciate recs  CBC and BMP in the morning  Will consult palliative, appreciate recs                Shiela Reyes, APRN-CNP      "

## 2024-10-30 NOTE — CONSULTS
"Inpatient consult to Palliative Care  Consult performed by: DANIA Schultz-CNP  Consult ordered by: MERCED Gracia  Reason for consult: Goals of Care          Reason For Consult  Reason for Consult: communication / medical decision making     History Of Present Illness  Rakan Cervantes is a 89 y.o. female with a past medical history of NSTEMI, atrial fibrillation, on anticoagulation, Jonas's esophagus, bilateral carotid artery occlusion, COPD, erosive esophagitis, hyperparathyroidism, hypercholesteremia, CKD, and stroke who presented to the emergency department with complaints of left arm numbness and tingling.  Patient states that she fell on Friday.  Per her daughter, patient has been \"blacking out\" and falling at home.  She does live at home with her .  Typically is independent.  Patient was recently admitted to this hospital in September.  At that time she had an NSTEMI.  She had a cardiac catheterization and cardiac stent was placed.  She did follow-up with Dr. Callaway on 10/22/2024.  She was started on Imdur.  Patient states that when she fell on Friday, she hit her back and her head.  Her back has been hurting since the fall.  Patient states that today she noted her left arm was having numbness and tingling.  Also having increased shortness of breath.  Hypoxic, on 5 L oxygen via nasal cannula.  Baseline is room air.  Reports bilateral lower extremity edema with pain to lower extremities and back. Stat CT of the head showed age-appropriate atrophy, encephalomalacia within the right posterior parietal lobe unchanged consistent with old infarct.  Old lacunar infarct of the right insula.  No acute intracranial process.  CT of the cervical spine showed no evidence of acute fracture or subluxation of the cervical spine.  X-ray of the pelvis showed postop changes as seen in bilateral bipolar hip arthroplasties.  No fracture or dislocation in either hip.  CT Chest showed effusions and " pulmonary edema. Patient diuresed. EKG showed NSR, no acute ST segment changes. Trop mildly elevated. CT showed esophagitis and moderate sized hiatel hernia. Today patient is feeling better, but has soft BP. She has been weaned down to 1-2L NC. MRI done on brain due to high risk status, and it showed 2 small areas of acute ischemia. PT noted patient now unsteady even with walker. She currently denies dizziness, cp or palpitations. She does rate her LBP and R hip pain 5/10 with some chronic numbness to R foot.      Symptoms (0 - 10, Best to Worst)  [place] Symptom Assessment System  0-10 (Numeric) Pain Score: 0 - No pain    BM in last 48 hours? yes    Emotional/Psychological/Spiritual Needs  Over the past two weeks, how often has the patient been bothered by having little interest or pleasure in doing things?  occurrence (last two weeks): not at all    Over the past two weeks, how often has the patient been bothered by feeling down, depressed, or hopeless?  occurrence (last two weeks): not at all    Screening for spiritual needs?  No    Serious Illness Conversation  What is your understanding now of where you are with your illness:  Patient and family aware of frailty. Spent 60+ min reviewing current conditions.   How much information about what is likely to be ahead with your illness  would you like from me: fully disclose all information to patient, spouse and children  What are your most important goals if your health situation worsens:  patient values quality of life. She does not want to live in a facility, she does not want repeated hospitalizations, she does not want to suffer in chronic pain.   What are your biggest fears and worries about the future with your health:  she does not want to suffer  What gives you strength as you think about the future with your illness:  support of family  What abilities are so critical to your life that you can’t imagine living without them:  function  If you become sicker,  how much are you willing to go through for the possibility of gaining more time:  not sure at this point.   How much does your family know about your priorities and wishes:  family participated in meeting.     Personal/Social History    She reports that she has never smoked. She has never been exposed to tobacco smoke. She has never used smokeless tobacco. She reports that she does not currently use alcohol. She reports that she does not use drugs.    Functional Status    Ambulatory at home, with cane, rollator.     Caregiving/Caregiver Support  Does the patient require assistance in some or all components of his care, including coordination of medical care? Yes  If Yes, which person serves that role?  child   Caregiver emotional or practical needs:      Past Medical History  She has a past medical history of Acute non-ST segment elevation myocardial infarction (Multi) (11/26/2023), Anemia, Arteriosclerosis of coronary artery (05/14/2023), Arthritis, Asthma, Atrial fibrillation (Multi) (11/26/2023), Jonas esophagus, Bilateral carotid artery occlusion (05/23/2023), CAD (coronary artery disease), Cardiac rhythm disorder or disturbance or change (11/26/2023), Cervical radiculopathy, Chest pain (11/26/2023), Chronic obstructive pulmonary disease (Multi) (11/26/2023), Constipation, Coronary artery disease (11/26/2023), Degenerative joint disease, Dyslipidemia, Dysuria, Erosive esophagitis, GERD (gastroesophageal reflux disease), Hypercholesterolemia (05/23/2023), Hyperparathyroidism (Multi), Hypertensive urgency (11/26/2023), Impaired fasting glucose (11/26/2023), Irritable bowel syndrome (IBS), Labile essential hypertension (11/26/2023), Labile hypertension, Left foot pain, Low blood pressure (11/26/2023), Macular degeneration, Mixed hyperlipidemia (11/26/2023), Neck pain, Osteoporosis (11/26/2023), Paroxysmal atrial fibrillation (Multi), Polyarthritis with negative rheumatoid factor (Multi), Post menopausal syndrome,  Rapid atrial fibrillation (Multi) (05/14/2023), Spinal stenosis, Stage 3 chronic kidney disease (Multi) (11/26/2023), Stroke (Multi), and Transient ischemic attack (11/26/2023).    Surgical History  She has a past surgical history that includes Other surgical history (02/14/2022); MR angio head wo IV contrast (04/23/2013); CT angio head w and wo IV contrast (05/08/2013); MR angio head wo IV contrast (07/20/2016); Cholecystectomy; Cardiovascular stress test (2017); Cardiovascular stress test (2004); Cataract extraction (Bilateral, 2011); Carpal tunnel release (Left, 2014); Total hip arthroplasty (Right, 2016); Revision total hip arthroplasty (Left, 2013); Coronary stent placement (05/2023); MR angio head wo IV contrast (10/27/2023); MR angio neck wo IV contrast (10/27/2023); Cardiac catheterization (Left, 9/27/2024); and Cardiac catheterization (N/A, 9/27/2024).     Family History  Family History   Problem Relation Name Age of Onset    No Known Problems Mother      No Known Problems Father      No Known Problems Sister       Allergies  Meperidine, Morphine, Opioids - morphine analogues, Pregabalin, and Tramadol    Review of Systems   Constitutional:  Negative for activity change, appetite change, chills, fatigue and fever.   HENT:  Negative for mouth sores, sinus pain and sore throat.    Eyes:  Negative for pain.   Respiratory:  Positive for shortness of breath. Negative for cough, chest tightness and wheezing.    Cardiovascular:  Positive for leg swelling. Negative for chest pain and palpitations.   Gastrointestinal:  Negative for abdominal distention, abdominal pain, constipation, diarrhea, nausea and vomiting.   Endocrine: Negative for polydipsia, polyphagia and polyuria.   Genitourinary:  Negative for difficulty urinating and hematuria.   Musculoskeletal:  Positive for arthralgias, back pain and gait problem. Negative for myalgias.   Skin:  Negative for color change, rash and wound.   Neurological:  Positive for  weakness and numbness. Negative for dizziness, tremors, seizures, facial asymmetry, light-headedness and headaches.   Hematological:  Does not bruise/bleed easily.   Psychiatric/Behavioral:  Negative for confusion and sleep disturbance. The patient is not nervous/anxious.         Physical Exam  Vitals and nursing note reviewed.   Constitutional:       General: She is not in acute distress.     Appearance: She is ill-appearing.      Comments: Appears stated age, chronically ill appearing, lying in bed, no nonverbal signs of distress   HENT:      Head: Normocephalic and atraumatic.      Nose: Nose normal.      Mouth/Throat:      Mouth: Mucous membranes are moist.      Pharynx: Oropharynx is clear.   Eyes:      Extraocular Movements: Extraocular movements intact.      Pupils: Pupils are equal, round, and reactive to light.   Cardiovascular:      Rate and Rhythm: Normal rate and regular rhythm.      Pulses: Normal pulses.      Heart sounds: No murmur heard.  Pulmonary:      Effort: Pulmonary effort is normal. No respiratory distress.      Breath sounds: Normal breath sounds.      Comments: Diminished bases. Crackles posteriorly.   NC 2L  Abdominal:      General: Abdomen is flat. Bowel sounds are normal. There is no distension.      Palpations: Abdomen is soft.      Tenderness: There is no abdominal tenderness.   Musculoskeletal:         General: No swelling, tenderness, deformity or signs of injury. Normal range of motion.      Cervical back: Normal range of motion and neck supple.      Right lower leg: No edema.      Left lower leg: No edema.   Skin:     General: Skin is warm and dry.      Capillary Refill: Capillary refill takes 2 to 3 seconds.   Neurological:      General: No focal deficit present.      Mental Status: She is alert and oriented to person, place, and time.      Motor: Weakness present.   Psychiatric:         Mood and Affect: Mood normal.         Last Recorded Vitals  Blood pressure 120/57, pulse 62,  "temperature 36.3 °C (97.3 °F), temperature source Temporal, resp. rate 20, height 1.499 m (4' 11\"), weight 50.9 kg (112 lb 3.4 oz), SpO2 92%.    Relevant Results  Results for orders placed or performed during the hospital encounter of 10/28/24 (from the past 24 hours)   Magnesium   Result Value Ref Range    Magnesium 1.50 (L) 1.60 - 2.40 mg/dL   Basic Metabolic Panel   Result Value Ref Range    Glucose 98 74 - 99 mg/dL    Sodium 135 (L) 136 - 145 mmol/L    Potassium 3.8 3.5 - 5.3 mmol/L    Chloride 99 98 - 107 mmol/L    Bicarbonate 31 21 - 32 mmol/L    Anion Gap 9 (L) 10 - 20 mmol/L    Urea Nitrogen 12 6 - 23 mg/dL    Creatinine 0.63 0.50 - 1.05 mg/dL    eGFR 85 >60 mL/min/1.73m*2    Calcium 8.4 (L) 8.6 - 10.3 mg/dL   CBC   Result Value Ref Range    WBC 4.6 4.4 - 11.3 x10*3/uL    nRBC 0.0 0.0 - 0.0 /100 WBCs    RBC 2.74 (L) 4.00 - 5.20 x10*6/uL    Hemoglobin 7.7 (L) 12.0 - 16.0 g/dL    Hematocrit 24.9 (L) 36.0 - 46.0 %    MCV 91 80 - 100 fL    MCH 28.1 26.0 - 34.0 pg    MCHC 30.9 (L) 32.0 - 36.0 g/dL    RDW 15.1 (H) 11.5 - 14.5 %    Platelets 192 150 - 450 x10*3/uL   Magnesium   Result Value Ref Range    Magnesium 1.97 1.60 - 2.40 mg/dL     *Note: Due to a large number of results and/or encounters for the requested time period, some results have not been displayed. A complete set of results can be found in Results Review.    CT angio head and neck w and wo IV contrast    Result Date: 10/30/2024  Interpreted By:  Dario Sam, STUDY: CT ANGIO HEAD AND NECK W AND WO IV CONTRAST;  10/30/2024 4:15 pm   INDICATION: Signs/Symptoms:stroke on MRI on L corona radiata.     COMPARISON: 10/28/2024 brain CT and 10/29/2024 brain MR   ACCESSION NUMBER(S): [id number]   ORDERING CLINICIAN: ИРИНА LOPEZ   TECHNIQUE: Unenhanced CT images of the head were obtained. Subsequently, 75 ML of Omnipaque 350 was administered intravenously and axial images of the head and neck were acquired.  Coronal, sagittal, and 3-D reconstructions were " provided for review. Carotid stenoses were determined using modified NASCET criteria.   FINDINGS:     CTA HEAD FINDINGS:   Anterior circulation: The left M1 segment has mild stenoses with moderate stenosis at the origin of the superior branch of the left middle cerebral artery.  The pre callosal segment of the left anterior cerebral artery has moderate to severe stenosis.   Posterior circulation: The right posterior cerebral is supplied primarily through the posterior communicating artery with moderate stenosis in the anterior P2 segment. The more distal left posterior cerebral artery has moderate stenosis as well.   CTA NECK FINDINGS:   Right carotid vessels: The distal right common carotid artery is non stenotic atherosclerotic plaque. The right common carotid bifurcation has calcified non stenotic plaque extending into the carotid bulb with calcified plaque causing 60% stenosis of the right external carotid artery origin. The internal carotid artery in the neck is normal. There is % stenosis  .   Left carotid vessels: The common carotid artery is normal. The left common carotid bifurcation has non stenotic plaque extending into the internal carotid bulb. The internal carotid artery in the neck is normal. There is 0% stenosis  .   Vertebral vessels:  The visualized segments of the cervical vertebral arteries are normal in caliber.         1. the left anterior cerebral artery has severe stenosis adjacent to the genu of the corpus callosum with mild to moderate stenoses elsewhere in the cerebral arteries as noted.   2. No significant stenoses of the cervical carotid or vertebral arteries are noted.   MACRO: None   Signed by: Dario Sma 10/30/2024 4:48 PM Dictation workstation:   [id number]    MR brain wo IV contrast    Result Date: 10/29/2024  Interpreted By:  Surendra Hylton, STUDY: MR BRAIN WO IV CONTRAST;  10/29/2024 7:57 pm   INDICATION: Signs/Symptoms:TIA eval, arm numbness.     COMPARISON: CT head without  contrast 10/28/2024; MRI brain 10/27/2023   ACCESSION NUMBER(S): [id number]   ORDERING CLINICIAN: ИРИНА LOPEZ   TECHNIQUE: Multiplanar, multi-sequence images of the brain were obtained without contrast.   FINDINGS:   Diffusion weighted images show new subcentimeter foci of diffusion restriction in the left corona radiata (x2) consistent with acute ischemic infarct. There is a new subcentimeter focus of diffusion restriction within the splenium of the corpus callosum just right of midline consistent with acute ischemic infarct.   There is redemonstration of cystic encephalomalacia in the posterior right temporal and parietal lobes. There is background moderate periventricular and subcortical hemispheric T2/FLAIR white matter hyperintensities are most compatible with chronic small vessel ischemic disease.   The major intracranial flow voids are preserved.   There is no acute intraparenchymal hemorrhage, mass, mass-effect, or an extra-axial fluid collection. On GRE images, there is susceptibility artifact within the posterior limb of the right internal capsule that does not correspond to any hyperdense abnormality on the CT and likely represents sequela of remote hemorrhage..   The ventricular size and cerebral volume are age-concordant.   Normal morphology of midline structures. The craniovertebral junction is normal.   The orbits and globes are unremarkable.   The paranasal sinuses show no air-fluid levels or hemorrhage.   The mastoid air cells are clear.   There is hyperostosis frontalis.   Severe degenerative disc disease in the mid cervical spine.       New subcentimeter acute ischemic infarct in the left corona radiata (x2) and right aspect of the splenium of the corpus callosum.   The demonstrated large area of encephalomalacia in the right posterior temporal and parietal lobes and chronic focus of hemorrhage in the posterior limb of the right internal capsule.   MACRO: None.   Signed by: Surendra Hylton  10/29/2024 9:41 PM Dictation workstation:   [id number]    Carotid duplex bilateral    Result Date: 10/29/2024  Preliminary Cardiology [place] [address]            Phone [phone]      Preliminary Vascular Lab Report  [facility] US CAROTID ARTERY DUPLEX BILATERAL  Patient Name:     NICOLE PRICE Reading Physician:  [id number] Elías Winter MD, RPVI Study Date:       10/29/2024     Ordering Provider:  [id number] SONJA BATISTA MRN/PID:          [id number]       Fellow: Accession#:       [id number]   Technologist:       Isabell Vidal RVT YOB: 1935      Technologist 2: Gender:           F              Encounter#:         [id number] Admission Status: Inpatient      Location Performed: [facility]  Diagnosis/ICD: Syncope and collapse-R55 CPT Codes:     18038 Cerebrovascular Carotid Duplex scan complete  PRELIMINARY CONCLUSIONS:  Right Carotid: Findings are consistent with less than 50% stenosis of the right proximal internal carotid artery. The internal carotid artery appears tortuous. Right external carotid artery appears patent with no evidence of stenosis. The right vertebral artery is patent with antegrade flow. No evidence of hemodynamically significant stenosis in the right subclavian artery. There is turbulent flow noted in the proximal common carotid artery which may indicate a more proximal stenosis. Left Carotid: Findings are consistent with less than 50% stenosis of the left proximal internal carotid artery. The internal carotid artery appears tortuous. There is turbulent flow noted in mid and distal internal carotid artery possibly due to vessel tortuosity. Left external carotid artery appears patent with no evidence of stenosis. The left vertebral artery is patent with antegrade flow. No evidence of hemodynamically significant stenosis in the left subclavian artery.  Imaging & Doppler Findings: Right Plaque Morph: The  proximal right internal carotid artery demonstrates heterogenous plaque. The proximal right external carotid artery demonstrates heterogenous and calcified plaque. The distal right common carotid artery demonstrates heterogenous plaque. Left Plaque Morph: The proximal left internal carotid artery demonstrates heterogenous plaque. The proximal left external carotid artery demonstrates heterogenous plaque. The distal left common carotid artery demonstrates heterogenous plaque.   Right                        Left   PSV      EDV                PSV       cm/s           CCA P    79 cm/s 86 cm/s            CCA D    75 cm/s 83 cm/s  18 cm/s   ICA P    102 cm/s 16 cm/s 83 cm/s  22 cm/s   ICA M    124 cm/s 25 cm/s 82 cm/s  21 cm/s   ICA D    80 cm/s  16 cm/s 105 cm/s            ECA     192 cm/s 75 cm/s  14 cm/s Vertebral  84 cm/s  18 cm/s 80 cm/s          Subclavian 122 cm/s                Right Left ICA/CCA Ratio  1.0  1.4   VASCULAR PRELIMINARY REPORT completed by Isabell Vidal [facility] on 10/29/2024 at 11:21:41 AM  ** Final **     ECG 12 Lead    Result Date: 10/28/2024  Supraventricular tachycardia Marked ST abnormality, possible inferolateral subendocardial injury Abnormal ECG When compared with ECG of 26-SEP-2024 14:27, T wave inversion no longer evident in Inferior leads T wave inversion more evident in Lateral leads Confirmed by Sergey Bond ([id number]) on 10/28/2024 7:51:36 PM    Lower extremity venous duplex bilateral    Result Date: 10/28/2024  Interpreted By:  Dino De Paz, STUDY: [facility] LOWER EXTREMITY VENOUS DUPLEX BILATERAL;  10/28/2024 7:15 pm   INDICATION: Signs/Symptoms:extremity edema.   COMPARISON: None.   ACCESSION NUMBER(S): [id number]   ORDERING CLINICIAN: SONJA BATISTA   TECHNIQUE: Grayscale, color and spectral Doppler sonographic images of the bilateral lower extremity deep venous system.   FINDINGS: RIGHT: There is normal compressibility of the common femoral vein, saphenous femoral junction,  profunda femoral vein and popliteal vein. The posterior tibial and peroneal veins  demonstrate normal color flow and compressibility. There is normal spontaneous and phasic variation throughout the leg by spectral doppler.   LEFT: There is normal compressibility of the common femoral vein, saphenous femoral junction, profunda femoral vein and popliteal vein. The posterior tibial and peroneal veins  demonstrate normal color flow and compressibility.  There is normal spontaneous and phasic variation throughout the leg by spectral doppler.   OTHER FINDINGS: None.       No sonographic evidence DVT in the visualized vessels of bilateral lower extremities.   MACRO: None   Signed by: Dino De Paz 10/28/2024 7:48 PM Dictation workstation:   [id number]    CT angio chest for pulmonary embolism    Result Date: 10/28/2024  Interpreted By:  Richmond Barbosa, STUDY: CT ANGIO CHEST FOR PULMONARY EMBOLISM;  10/28/2024 2:05 pm   INDICATION: Signs/Symptoms:hypoxic, tachy.   COMPARISON: Chest CT 03/07/2022.   ACCESSION NUMBER(S): [id number]   ORDERING CLINICIAN: KARMA HAIDER   TECHNIQUE: Helical data acquisition of the chest was obtained contrast volume: 75 mL IV contrast Omnipaque 350.   Axial contiguous images were reformatted in coronal and sagittal planes. Axial and coronal MIP images were created and reviewed.   FINDINGS: POTENTIAL LIMITATIONS OF THE STUDY: Motion and mixing artifact which limits evaluation of the distal branch vessels.   HEART AND VESSELS: No discrete filling defects within the main pulmonary artery or its branches.   Main pulmonary trunk is upper normal limits, measuring 2.9 cm in size.   The thoracic aorta is normal in caliber. It is not well evaluated due to the phase of the contrast. Mild aortic valve atherosclerotic calcifications are present.   Severe coronary artery atherosclerotic calcifications and/or stents.   Mild cardiomegaly with biatrial enlargement.   No evidence of pericardial effusion.    MEDIASTINUM AND DONATO, LOWER NECK AND AXILLA: The visualized thyroid gland is within normal limits.   No evidence of thoracic lymphadenopathy by CT criteria.   There is moderate-size hiatal hernia and there is air-fluid level and mild circumferential wall thickening throughout the thoracic esophagus which is patulous. This is suggestive of achalasia versus reflux esophagitis.   LUNGS AND AIRWAYS: The trachea and central airways are patent. No endobronchial lesion.   Small to moderate-sized right greater than left pleural effusions. There is interseptal thickening with scattered ground-glass opacities suggestive of pulmonary venous congestion/interstitial edema. Mild bibasilar compression atelectasis.   UPPER ABDOMEN: Cholecystectomy change. Diffuse hepatic steatosis.   CHEST WALL AND OSSEOUS STRUCTURES: There are no suspicious osseous lesions. There is endplate reactive change in the lower thoracic spine with partially visualized posterior spinal fusions hardware and laminectomy in the lumbar spine.       1.  No evidence of pulmonary emboli. Severe coronary artery atherosclerotic calcifications. 2. Mild cardiomegaly with biatrial enlargement. Small to moderate-size right greater than left pleural effusions and bibasilar subsegmental atelectasis. Diffuse interseptal thickening and scattered central predominant ground glass opacification of the lungs suggestive of pulmonary edema. 3. Chronic patulous esophagus with new air-fluid throughout the thoracic esophagus suggestive of reflux esophagitis versus achalasia. Clinical correlation is recommended.     Signed by: Richmond Barbosa 10/28/2024 2:41 PM Dictation workstation:   [id number]    CT cervical spine wo IV contrast    Result Date: 10/28/2024  Interpreted By:  Sherlyn Jackson, STUDY: CT CERVICAL SPINE WO IV CONTRAST;  10/28/2024 10:16 am   INDICATION: Signs/Symptoms:fall.     COMPARISON: 07/20/2022   ACCESSION NUMBER(S): [id number]   ORDERING CLINICIAN: ANDREY COX    TECHNIQUE: Axial CT images of the cervical spine are obtained. Axial, coronal and sagittal reconstructions are provided for review.   FINDINGS:     Fractures: There is no evidence for an acute fracture of the cervical spine.   Vertebral Alignment: There is a 2-1/2 mm anterolisthesis of C3 with respect to C4 and a 2-1/2 mm retrolisthesis of C4 with respect to C5.   Craniocervical Junction: The odontoid process and craniocervical junction are intact.   Vertebrae/Disc Spaces:  The cervical vertebral body heights are intact. There is marked disc space narrowing at C4-5, C5-6, and C6-7 levels with moderately severe disc space narrowing at the C3-4 level. Vacuum disc is present at all of these levels. There is multilevel degenerative spondyloarthropathy seen bilaterally. There is also uncovertebral joint spurring seen on the right and on the left from C3-4 through the C6-7 levels with bilateral foraminal stenosis at C4-5, C5-6, and C6-7 levels. Central canal stenosis is present at the C4-5 level. A 1.2 cm sclerotic lesion is seen within the left pedicle and lamina of T2 extending into the spinous process without interval change.   Prevertebral/Paraspinal Soft Tissues: There are several hypodense thyroid nodules with the largest visualized measuring 1.1 cm in diameter within left thyroid lobe.         No evidence for an acute fracture or subluxation of the cervical spine.   There is chronic anterolisthesis of C3 on C4 and chronic retrolisthesis of C4 on C5 with multilevel degenerative disc disease and cervical spondylosis with central canal stenosis at C4-5 and multilevel foraminal stenosis bilaterally.   Bone island within left pedicle and lamina of T2 extending into the spinous process of T2.   Hypodense thyroid nodules.   MACRO: None   Signed by: Sherlyn Jackson 10/28/2024 11:00 AM Dictation workstation:   [id number]    CT head wo IV contrast    Result Date: 10/28/2024  Interpreted By:  Sherlyn Jackson, STUDY: CT HEAD WO IV  CONTRAST;  10/28/2024 10:16 am   INDICATION: Signs/Symptoms: Fall several days earlier striking the head     COMPARISON: 10/26/2023   ACCESSION NUMBER(S): [id number]   ORDERING CLINICIAN: ANDREY COX   TECHNIQUE: Noncontrast axial CT scan of head was performed. Angled reformats in brain and bone windows were generated. The images were reviewed in bone, brain, blood and soft tissue windows.   FINDINGS: CSF Spaces: The ventricles, sulci and basal cisterns are prominent indicating age-appropriate atrophy.. There is no extraaxial fluid collection.   Parenchyma: There is encephalomalacia and gliosis seen within the right parietal lobe posteriorly with transcortical extension. There is also diminished density within the right insula which is stable. No hyperdense MCA sign is present. There is calcified plaque within each carotid siphon. There is no mass effect or midline shift.  There is no intracranial hemorrhage.   Calvarium: The calvarium is unremarkable.   Paranasal sinuses and mastoids: Visualized paranasal sinuses and mastoids are clear.       Age-appropriate atrophy.   Encephalomalacia within the right posterior parietal lobe unchanged consistent with an old infarct at this site. Old lacunar infarct of the right insula is also identified.   No acute intracranial process.   MACRO: None     Signed by: Sherlyn Jackson 10/28/2024 10:46 AM Dictation workstation:   [id number]    XR chest 2 views    Result Date: 10/28/2024  Interpreted By:  Sherlyn Jackson, STUDY: XR CHEST 2 VIEWS 10/28/2024 9:45 am   INDICATION: Fall with left arm pain   COMPARISON: 09/26/2024   ACCESSION NUMBER(S): [id number]   ORDERING CLINICIAN: ANDREY COX   TECHNIQUE: AP erect view of the chest   FINDINGS: The heart is enlarged with atherosclerosis of the thoracic aorta noted. Low lung volumes are observed with crowding of the bronchovascular markings. No acute infiltrate is seen. No pleural abnormality is identified.   There is postoperative  change from reverse shoulder arthroplasty on the right. There is osteoarthritis of the left shoulder affecting glenohumeral joint with joint space narrowing, sclerosis, osteophytosis, and subchondral cyst formation.       Cardiomegaly without acute cardiopulmonary process. Low lung volumes.   Advanced osteoarthritis of the left shoulder affecting glenohumeral joint.   Signed by: Sherlyn Jackson 10/28/2024 10:33 AM Dictation workstation:   [id number]    XR pelvis 1-2 views    Result Date: 10/28/2024  Interpreted By:  Sherlyn Jackson, STUDY: XR PELVIS 1-2 VIEWS 10/28/2024 9:45 am   INDICATION: Pain and fall   COMPARISON: 05/22/2023   ACCESSION NUMBER(S): [id number]   ORDERING CLINICIAN: ANDREY COX   TECHNIQUE: AP view of the pelvis   FINDINGS: Postoperative change is seen as a result bilateral bipolar hip arthroplasties. There is no fracture or dislocation of either hip identified. Pelvic ring is intact. There are degenerative cystic changes within the pubic bones adjacent to the pubic symphysis unchanged. There is a stable 1 cm sclerotic lesion along the right SI joint most likely bone island.       No pelvic fracture.   Signed by: Sherlyn Jackson 10/28/2024 10:29 AM Dictation workstation:   [id number]    XR lumbar spine 2-3 views    Result Date: 10/28/2024  Interpreted By:  Sherlyn Jackson, STUDY: XR LUMBAR SPINE 2-3 VIEWS 10/28/2024 9:45 am   INDICATION: Fall several days ago with low back pain.   COMPARISON: None available.   ACCESSION NUMBER(S): [id number]   ORDERING CLINICIAN: ANDREY COX   TECHNIQUE: Three views of the lumbosacral spine are performed.   FINDINGS: There is a lumbar levoscoliosis observed with postoperative change consisting of midline decompressive laminectomy and posterior spinal fusion from L2 through S1. Bilateral pedicle screws are seen at L2, L3, L4, L5, and S1 with bilateral vertical stabilizing lodged and crosslink bar. The examination shows no evidence for loosening of the hardware or  hardware fracture.   There is multilevel degenerative disc space narrowing with vacuum disc phenomenon. There is sclerosis and osteophytosis of the endplates at T10-11, and T12-L1 levels. The vertebral bodies are of normal height. There is no anterolisthesis or retrolisthesis seen.   There is calcified plaque within the wall of the abdominal aorta and iliac arteries without aneurysmal dilatation.   There is postoperative change from bipolar hip arthroplasty bilaterally.       Status post midline decompressive laminectomy with posterior spinal fusion from L2 through S1   Lumbar levoscoliosis with multilevel degenerative disc disease and spondylosis most pronounced in the lower thoracic region and at the T12-L1 level.   Signed by: Sherlyn Jackson 10/28/2024 10:27 AM Dictation workstation:   [id number]       Assessment/Plan   IMP:    Acute CVA  Acute Respiratory Failure with Hypoxia  NSTEMI/CAD  Acute Diastolic Heart Failure  Syncope/Falls  Bilateral Pleural Effusions  PAF  Esophagitis/Mod Hiatel Hernia  Chronic Pain-followed by Dr Morales outpatient.   Anemia-Hgb 7.7  Palliative Care    DNRCCA/DNI/No ICU  Capable  Son Eduar is stated HPOA, alternate is daughter Tita.  Requested copy of documents from daughter, Tita.     Chart reviewed, discussed patient with cardiology and attending service.     Family meeting today with patient, spouse, daughter and son in law. Reviewed current conditions, falls, repeated hospitalizations. Reviewed frailty. Discussed concerns about plavix/eliquis use with esophagitis, anemia and despite use, patient with new findings of acute CVA. Concern that patient may continue to have repeat hospitalizations due to her risk. We also discussed her new unsteady gait and increased risk of falls. Patient is determined to go home and does not want to go to rehab for gait training. Patient reports a previously bad experience at former [facility]. She also stated that she was essentially her spouse's  eyes as he is legally blind. She is accepting of [facility], she has a medical alert button at home, and son cooks them meals and checks in routinely. I requested PT work with patient today with family present to demonstrate unsteady gait and discuss increased risk of falls/trauma.   Patient very clear in her goal to return home and achieve her prior level of function. She is also very clear that she would not want to live in constant pain, does not want to live in a facility, does not want to live with significant loss of function, she does not want to suffer repeated hospitalizations. She had previously established a DNRCCA with her PCP and confirmed this again during our conversation. She values quality of life. We did briefly discuss hospice, and patient is not ready for this as she feels she was doing ok prior to admission. She is interested in house calls and the navigator program through the [facility] for additional support at home.     Treatment model of care, [place] DNRCCA/DNI/No ICU signed and placed in chart. Referral placed for house calls and navigator program through [facility].     Symptom Management  Pain: moderate  Medications recommended for pain?  Yes  Tiredness: mild  Nausea: none  Depression: none  Anxiety: none  Drowsiness: none  Appetite: poor  Wellbeing: fair  Dyspnea: with exertion  Intervention recommended for dyspnea?  yes  Other: na  Intervention recommended for constipation?  Yes    I spent 2hrs minutes in the professional and overall care of this patient. Spent 90min in family meeting, discussing acp.       Ruth Vasquez, APRN-CNP

## 2024-10-30 NOTE — PROGRESS NOTES
Subjective Data:  Feeling better.  Sitting up comfortable in bed.  However blood pressure running low.  Patient will be transferred to stepdown.  Denies having chest pain shortness of breath or dizziness.    Overnight Events:    Reviewed no events     Objective Data:  Last Recorded Vitals:  Vitals:    10/30/24 0402 10/30/24 0700 10/30/24 1210 10/30/24 1214   BP: 125/51 149/52 (!) 90/29 (!) 85/29   BP Location: Left arm Left arm Left arm    Patient Position: Lying Lying Sitting    Pulse: 64 68 56    Resp: 16 24 16    Temp: 36.6 °C (97.9 °F) 36.7 °C (98.1 °F) 36.4 °C (97.5 °F)    TempSrc: Oral Oral Oral    SpO2: 97% 93% 92%    Weight:       Height:           Last Labs:  CBC - 10/30/2024:  5:48 AM  4.6 7.7 192    24.9      CMP - 10/30/2024:  5:48 AM  8.4 6.2 17 --- 0.6   2.7 3.6 13 105      PTT - 9/27/2024:  4:44 AM  _   _ 52.6     TROPHS   Date/Time Value Ref Range Status   10/28/2024 06:04  0 - 13 ng/L Final     Comment:     Previous result verified on 10/28/2024 1104 on specimen/case [id number] called with component [facility] for procedure Troponin, High Sensitivity, 1 Hour with value 96 ng/L.   10/28/2024 10:20 AM 96 0 - 13 ng/L Final   10/28/2024 09:19 AM 43 0 - 13 ng/L Final     BNP   Date/Time Value Ref Range Status   10/28/2024 09:19  0 - 99 pg/mL Final   09/26/2024 02:21  0 - 99 pg/mL Final     HGBA1C   Date/Time Value Ref Range Status   09/10/2024 09:14 AM 5.8 See below % Final   03/14/2024 11:10 AM 6.3 See below % Final     LDLCALC   Date/Time Value Ref Range Status   10/29/2024 05:34 AM 25 <=99 mg/dL Final     Comment:                                 Near   Borderline      AGE      Desirable  Optimal    High     High     Very High     0-19 Y     0 - 109     ---    110-129   >/= 130     ----    20-24 Y     0 - 119     ---    120-159   >/= 160     ----      >24 Y     0 -  99   100-129  130-159   160-189     >/=190     09/10/2024 09:14 AM 50 65 - 130 mg/dL Final   03/14/2024 11:10 AM 44 65 -  "130 mg/dL Final     VLDL   Date/Time Value Ref Range Status   10/29/2024 05:34 AM 12 0 - 40 mg/dL Final      Last I/O:  No intake/output data recorded.    Past Cardiology Tests (Last 3 Years):  EKG:  ECG 12 Lead 10/28/2024      ECG 12 lead 09/26/2024      ECG 12 lead 10/27/2023    Echo:  Transthoracic Echo (TTE) Limited 10/27/2023    Ejection Fractions:  No results found for: \"EF\"  Cath:  Cardiac Catheterization Procedure 09/27/2024    Stress Test:  No results found for this or any previous visit from the past 1095 days.    Cardiac Imaging:  No results found for this or any previous visit from the past 1095 days.      Inpatient Medications:  Scheduled medications   Medication Dose Route Frequency    acetaminophen  650 mg oral TID    amLODIPine  5 mg oral Daily    apixaban  2.5 mg oral BID    ascorbic acid  125 mg oral Daily    atorvastatin  80 mg oral Nightly    carvedilol  25 mg oral BID    cholecalciferol  2,000 Units oral Daily    clopidogrel  75 mg oral Daily    docusate sodium  100 mg oral BID    DULoxetine  30 mg oral Daily with evening meal    ferrous sulfate (325 mg ferrous sulfate)  65 mg of iron oral Daily with breakfast    [Held by provider] furosemide  20 mg intravenous Daily    gabapentin  300 mg oral BID    hydroxychloroquine  200 mg oral Daily    isosorbide mononitrate ER  120 mg oral Daily    lidocaine  2 patch transdermal Daily    losartan  100 mg oral Daily    magnesium oxide  400 mg oral BID    oxygen   inhalation Continuous - Inhalation    pantoprazole  40 mg oral BID    polyethylene glycol  17 g oral Daily    ranolazine  500 mg oral BID    sucralfate  1 g oral q6h ZEINAB    sulfaSALAzine  500 mg oral BID     PRN medications   Medication    benzocaine-menthol    bisacodyl     Continuous Medications   Medication Dose Last Rate       Physical Exam:  General: Patient is in no acute distress.  HEENT: atraumatic normocephalic.  Neck: is supple jugular venous pressure within normal limits no " thyromegaly.  Cardiovascular regular rate and rhythm normal heart sounds no murmurs rubs or gallops.  Lungs: Decreased breathing sounds at bases abdomen: is soft nontender.  Extremities warm to touch trace edema.  Neurologic examination: patient is awake alert oriented to person, place, date/time.  Psychiatric examination: patient has good insight denies feeling suicidal and depressed.  Pulses 2+ intact bilaterally        Assessment/Plan  1 chest pain.  Patient with angina in the setting of hypertension and poorly controlled heart rate.  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  Had PCI to distal left circumflex but distal to the stent there is some moderate distal disease.  Likely demand ischemia from hypertension.  Her LAD looked fine on the cardiac catheterization from September 27, 2024.  At this point recommend conservative management.  We will continue Plavix.  She will be also on Eliquis.  Blood pressure is low today.  Will hold losartan, amlodipine.  Decrease carvedilol to 12.5 mg oral twice daily.  Continue isosorbide since her chest pain improved as well as ranolazine.  Hold diuretics will follow-up in a.m.     2.  Syncope.  MRI Showing new [place] or infarct.  Not sure if her syncopal episode is related to stroke or syncope.  At this point we will arrange for monitor before discharge.     3.  Hypertension increase isosorbide to 120 mg daily.  Continue beta-blockers.  Continue beta-blockers.     4.  Hyperlipidemia continue high intensity statin.     5.  Shortness of breath.  Acute on chronic diastolic heart failure.  Hold diuretics since blood pressure decreased   Thank you for the consult  Peripheral IV 10/28/24 20 G Right Antecubital (Active)   Site Assessment Clean;Dry 10/29/24 2100   Dressing Status Clean 10/29/24 2100   Number of days: 2       Code Status:  Full Code      Nessa Callaway MD

## 2024-10-30 NOTE — PROGRESS NOTES
10/30/24 1135   Discharge Planning   Expected Discharge Disposition Home H     TCC spoke to pt regarding therapy recommendations for SNF. Patient refusing at this time would like to go home with no skilled services. TCC educated patient on the need for some extra support in the home and pt agreeable to [facility] at this time. Would like [facility] if they will take her back.   Referral sent

## 2024-10-31 ENCOUNTER — APPOINTMENT (OUTPATIENT)
Dept: CARDIOLOGY | Facility: HOSPITAL | Age: 89
End: 2024-10-31
Payer: COMMERCIAL

## 2024-10-31 ENCOUNTER — APPOINTMENT (OUTPATIENT)
Dept: CARDIOLOGY | Facility: HOSPITAL | Age: 89
DRG: 280 | End: 2024-10-31
Payer: COMMERCIAL

## 2024-10-31 LAB
ANION GAP SERPL CALCULATED.3IONS-SCNC: 10 MMOL/L (ref 10–20)
BUN SERPL-MCNC: 11 MG/DL (ref 6–23)
CALCIUM SERPL-MCNC: 8.7 MG/DL (ref 8.6–10.3)
CHLORIDE SERPL-SCNC: 98 MMOL/L (ref 98–107)
CO2 SERPL-SCNC: 30 MMOL/L (ref 21–32)
CREAT SERPL-MCNC: 0.62 MG/DL (ref 0.5–1.05)
EGFRCR SERPLBLD CKD-EPI 2021: 85 ML/MIN/1.73M*2
ERYTHROCYTE [DISTWIDTH] IN BLOOD BY AUTOMATED COUNT: 15.1 % (ref 11.5–14.5)
GLUCOSE SERPL-MCNC: 141 MG/DL (ref 74–99)
HCT VFR BLD AUTO: 30.8 % (ref 36–46)
HGB BLD-MCNC: 9.6 G/DL (ref 12–16)
MAGNESIUM SERPL-MCNC: 1.88 MG/DL (ref 1.6–2.4)
MCH RBC QN AUTO: 28.7 PG (ref 26–34)
MCHC RBC AUTO-ENTMCNC: 31.2 G/DL (ref 32–36)
MCV RBC AUTO: 92 FL (ref 80–100)
NRBC BLD-RTO: 0 /100 WBCS (ref 0–0)
PLATELET # BLD AUTO: 266 X10*3/UL (ref 150–450)
POTASSIUM SERPL-SCNC: 3.6 MMOL/L (ref 3.5–5.3)
RBC # BLD AUTO: 3.35 X10*6/UL (ref 4–5.2)
SODIUM SERPL-SCNC: 134 MMOL/L (ref 136–145)
WBC # BLD AUTO: 5.9 X10*3/UL (ref 4.4–11.3)

## 2024-10-31 PROCEDURE — 2500000001 HC RX 250 WO HCPCS SELF ADMINISTERED DRUGS (ALT 637 FOR MEDICARE OP): Performed by: NURSE PRACTITIONER

## 2024-10-31 PROCEDURE — 51702 INSERT TEMP BLADDER CATH: CPT

## 2024-10-31 PROCEDURE — 51701 INSERT BLADDER CATHETER: CPT

## 2024-10-31 PROCEDURE — 85027 COMPLETE CBC AUTOMATED: CPT | Performed by: NURSE PRACTITIONER

## 2024-10-31 PROCEDURE — 2500000002 HC RX 250 W HCPCS SELF ADMINISTERED DRUGS (ALT 637 FOR MEDICARE OP, ALT 636 FOR OP/ED)

## 2024-10-31 PROCEDURE — 99232 SBSQ HOSP IP/OBS MODERATE 35: CPT | Performed by: NURSE PRACTITIONER

## 2024-10-31 PROCEDURE — 2500000005 HC RX 250 GENERAL PHARMACY W/O HCPCS: Performed by: NURSE PRACTITIONER

## 2024-10-31 PROCEDURE — 97530 THERAPEUTIC ACTIVITIES: CPT | Mod: GO

## 2024-10-31 PROCEDURE — 2500000004 HC RX 250 GENERAL PHARMACY W/ HCPCS (ALT 636 FOR OP/ED): Performed by: NURSE PRACTITIONER

## 2024-10-31 PROCEDURE — 2500000001 HC RX 250 WO HCPCS SELF ADMINISTERED DRUGS (ALT 637 FOR MEDICARE OP): Performed by: INTERNAL MEDICINE

## 2024-10-31 PROCEDURE — 93306 TTE W/DOPPLER COMPLETE: CPT | Performed by: INTERNAL MEDICINE

## 2024-10-31 PROCEDURE — 97535 SELF CARE MNGMENT TRAINING: CPT | Mod: GO

## 2024-10-31 PROCEDURE — 84132 ASSAY OF SERUM POTASSIUM: CPT | Performed by: NURSE PRACTITIONER

## 2024-10-31 PROCEDURE — 2060000001 HC INTERMEDIATE ICU ROOM DAILY

## 2024-10-31 PROCEDURE — 99233 SBSQ HOSP IP/OBS HIGH 50: CPT | Performed by: INTERNAL MEDICINE

## 2024-10-31 PROCEDURE — 83735 ASSAY OF MAGNESIUM: CPT | Performed by: NURSE PRACTITIONER

## 2024-10-31 PROCEDURE — 99232 SBSQ HOSP IP/OBS MODERATE 35: CPT | Performed by: STUDENT IN AN ORGANIZED HEALTH CARE EDUCATION/TRAINING PROGRAM

## 2024-10-31 PROCEDURE — 93306 TTE W/DOPPLER COMPLETE: CPT

## 2024-10-31 PROCEDURE — 36415 COLL VENOUS BLD VENIPUNCTURE: CPT | Performed by: NURSE PRACTITIONER

## 2024-10-31 PROCEDURE — 97116 GAIT TRAINING THERAPY: CPT | Mod: GP,CQ

## 2024-10-31 PROCEDURE — 2500000001 HC RX 250 WO HCPCS SELF ADMINISTERED DRUGS (ALT 637 FOR MEDICARE OP)

## 2024-10-31 PROCEDURE — 93005 ELECTROCARDIOGRAM TRACING: CPT

## 2024-10-31 PROCEDURE — 97110 THERAPEUTIC EXERCISES: CPT | Mod: GP,CQ

## 2024-10-31 RX ORDER — MAGNESIUM SULFATE 1 G/100ML
1 INJECTION INTRAVENOUS ONCE
Status: DISCONTINUED | OUTPATIENT
Start: 2024-10-31 | End: 2024-11-02

## 2024-10-31 ASSESSMENT — COGNITIVE AND FUNCTIONAL STATUS - GENERAL
DRESSING REGULAR UPPER BODY CLOTHING: A LITTLE
CLIMB 3 TO 5 STEPS WITH RAILING: A LITTLE
DRESSING REGULAR LOWER BODY CLOTHING: A LITTLE
DRESSING REGULAR UPPER BODY CLOTHING: A LITTLE
PERSONAL GROOMING: A LITTLE
MOVING TO AND FROM BED TO CHAIR: A LITTLE
DAILY ACTIVITIY SCORE: 18
TURNING FROM BACK TO SIDE WHILE IN FLAT BAD: A LITTLE
TOILETING: A LITTLE
WALKING IN HOSPITAL ROOM: A LITTLE
MOBILITY SCORE: 17
DRESSING REGULAR LOWER BODY CLOTHING: A LITTLE
PERSONAL GROOMING: A LITTLE
MOVING TO AND FROM BED TO CHAIR: A LITTLE
DRESSING REGULAR LOWER BODY CLOTHING: A LITTLE
MOBILITY SCORE: 20
EATING MEALS: A LITTLE
CLIMB 3 TO 5 STEPS WITH RAILING: A LITTLE
TOILETING: A LITTLE
DRESSING REGULAR UPPER BODY CLOTHING: A LITTLE
TOILETING: A LOT
MOVING TO AND FROM BED TO CHAIR: A LITTLE
MOVING FROM LYING ON BACK TO SITTING ON SIDE OF FLAT BED WITH BEDRAILS: A LITTLE
DAILY ACTIVITIY SCORE: 16
CLIMB 3 TO 5 STEPS WITH RAILING: A LOT
HELP NEEDED FOR BATHING: A LOT
EATING MEALS: A LITTLE
EATING MEALS: A LITTLE
HELP NEEDED FOR BATHING: A LITTLE
WALKING IN HOSPITAL ROOM: A LITTLE
HELP NEEDED FOR BATHING: A LITTLE
MOVING FROM LYING ON BACK TO SITTING ON SIDE OF FLAT BED WITH BEDRAILS: A LITTLE
PERSONAL GROOMING: A LITTLE
TURNING FROM BACK TO SIDE WHILE IN FLAT BAD: A LITTLE
STANDING UP FROM CHAIR USING ARMS: A LITTLE
DAILY ACTIVITIY SCORE: 18

## 2024-10-31 ASSESSMENT — PAIN SCALES - GENERAL
PAINLEVEL_OUTOF10: 0 - NO PAIN
PAINLEVEL_OUTOF10: 0 - NO PAIN
PAINLEVEL_OUTOF10: 5 - MODERATE PAIN
PAINLEVEL_OUTOF10: 0 - NO PAIN

## 2024-10-31 ASSESSMENT — ACTIVITIES OF DAILY LIVING (ADL): HOME_MANAGEMENT_TIME_ENTRY: 8

## 2024-10-31 ASSESSMENT — PAIN - FUNCTIONAL ASSESSMENT
PAIN_FUNCTIONAL_ASSESSMENT: 0-10

## 2024-10-31 ASSESSMENT — PAIN DESCRIPTION - LOCATION: LOCATION: HIP

## 2024-10-31 NOTE — PROGRESS NOTES
Occupational Therapy                 Therapy Communication Note    Patient Name: Rakan Cervantes  MRN: [id number]  Department: [facility] 3 E  Room: 10/10-A  Today's Date: 10/31/2024     Discipline: Occupational Therapy    Missed Visit Reason: Missed Visit Reason: Other (Comment) (Elevated /63 mmHg; therapy not indicated at this time. To attempt again when medically appropriate and available.)    Missed Time: Attempt

## 2024-10-31 NOTE — PROGRESS NOTES
Rakan Cervantes is a 89 y.o. female on day 3 of admission presenting with Sinus tachycardia.  Patient with CAD c/b NSTEMI s/p PCI, Afib on OAC, DLP, hyperparathyroidism, CVA, CKD, COPD, Jonas's esophagitis, who p/w L arm numbness and tingling after falling 2 days earlier. In the ED hypoxia, requiring 5L NC. Admitted to [facility] for further management. Post admission work up revealed b/l pleural effusion. Pulmonary is consulted for hypoxia and pleural effusions.   Has had frequent falls, last 4 days ago, hit her head, with short period of LOC. No amnesia. No recurrence since admission. Complains of HA, and R hip pain.   Subjective   No acute overnight events. Transferred to SDU for low BP. O2 requirements improved to 2L.     Overall is feeling fine. Denies SOB, cough, sputum, N/V, wheezing or any pains.   Objective   Scheduled medications  acetaminophen, 650 mg, oral, TID  amLODIPine, 5 mg, oral, Daily  apixaban, 2.5 mg, oral, BID  ascorbic acid, 125 mg, oral, Daily  atorvastatin, 80 mg, oral, Nightly  carvedilol, 12.5 mg, oral, BID  cholecalciferol, 2,000 Units, oral, Daily  clopidogrel, 75 mg, oral, Daily  docusate sodium, 100 mg, oral, BID  DULoxetine, 30 mg, oral, Daily with evening meal  ferrous sulfate (325 mg ferrous sulfate), 65 mg of iron, oral, Daily with breakfast  [Held by provider] furosemide, 20 mg, intravenous, Daily  gabapentin, 300 mg, oral, BID  hydroxychloroquine, 200 mg, oral, Daily  isosorbide mononitrate ER, 120 mg, oral, Daily  lidocaine, 2 patch, transdermal, Daily  [Held by provider] losartan, 100 mg, oral, Daily  magnesium oxide, 400 mg, oral, BID  magnesium sulfate, 1 g, intravenous, Once  oxygen, , inhalation, Continuous - Inhalation  pantoprazole, 40 mg, oral, BID  polyethylene glycol, 17 g, oral, Daily  ranolazine, 500 mg, oral, BID  sucralfate, 1 g, oral, q6h ZEINAB  sulfaSALAzine, 500 mg, oral, BID    Continuous medications     PRN medications  PRN medications: benzocaine-menthol, bisacodyl,  "cyclobenzaprine   Physical Exam  Constitutional:       General: She is not in acute distress.     Appearance: Normal appearance. She is not ill-appearing or toxic-appearing.   HENT:      Nose:      Comments: On 2L NC     Mouth/Throat:      Mouth: Mucous membranes are moist.      Comments: Mallampati 3.   Eyes:      General: No scleral icterus.     Extraocular Movements: Extraocular movements intact.      Pupils: Pupils are equal, round, and reactive to light.   Cardiovascular:      Rate and Rhythm: Normal rate and regular rhythm.      Heart sounds: No murmur heard.     No friction rub.   Pulmonary:      Effort: Pulmonary effort is normal. No respiratory distress.      Breath sounds: No wheezing or rales.      Comments: Decreased breath sounds at the bases.  Abdominal:      General: Bowel sounds are normal. There is no distension.      Palpations: Abdomen is soft.      Tenderness: There is no abdominal tenderness.   Musculoskeletal:         General: Deformity (arthritic changes both hands.) present.      Cervical back: Normal range of motion and neck supple. No rigidity or tenderness.      Right lower leg: No edema.      Left lower leg: No edema.   Lymphadenopathy:      Cervical: No cervical adenopathy.   Skin:     General: Skin is warm and dry.      Coloration: Skin is not jaundiced.   Neurological:      General: No focal deficit present.      Mental Status: She is alert and oriented to person, place, and time.      Cranial Nerves: No cranial nerve deficit.      Motor: No weakness.   Psychiatric:         Mood and Affect: Mood normal.         Behavior: Behavior normal.     Last Recorded Vitals  Blood pressure 162/69, pulse 67, temperature 36.5 °C (97.7 °F), temperature source Temporal, resp. rate 15, height 1.499 m (4' 11\"), weight 51.6 kg (113 lb 12.1 oz), SpO2 94%.  Intake/Output last 3 Shifts:  I/O last 3 completed shifts:  In: 800.3 (15.5 mL/kg) [P.O.:774; I.V.:26.3 (0.5 mL/kg)]  Out: 400 (7.8 mL/kg) [Urine:400 " (0.2 mL/kg/hr)]  Weight: 51.6 kg   Relevant Results  Results for orders placed or performed during the hospital encounter of 10/28/24 (from the past 24 hours)   CBC   Result Value Ref Range    WBC 5.9 4.4 - 11.3 x10*3/uL    nRBC 0.0 0.0 - 0.0 /100 WBCs    RBC 3.35 (L) 4.00 - 5.20 x10*6/uL    Hemoglobin 9.6 (L) 12.0 - 16.0 g/dL    Hematocrit 30.8 (L) 36.0 - 46.0 %    MCV 92 80 - 100 fL    MCH 28.7 26.0 - 34.0 pg    MCHC 31.2 (L) 32.0 - 36.0 g/dL    RDW 15.1 (H) 11.5 - 14.5 %    Platelets 266 150 - 450 x10*3/uL   Basic Metabolic Panel   Result Value Ref Range    Glucose 141 (H) 74 - 99 mg/dL    Sodium 134 (L) 136 - 145 mmol/L    Potassium 3.6 3.5 - 5.3 mmol/L    Chloride 98 98 - 107 mmol/L    Bicarbonate 30 21 - 32 mmol/L    Anion Gap 10 10 - 20 mmol/L    Urea Nitrogen 11 6 - 23 mg/dL    Creatinine 0.62 0.50 - 1.05 mg/dL    eGFR 85 >60 mL/min/1.73m*2    Calcium 8.7 8.6 - 10.3 mg/dL   Magnesium   Result Value Ref Range    Magnesium 1.88 1.60 - 2.40 mg/dL   Transthoracic Echo (TTE) Complete   Result Value Ref Range    BSA 1.47 m2     *Note: Due to a large number of results and/or encounters for the requested time period, some results have not been displayed. A complete set of results can be found in Results Review.   CT angio head and neck w and wo IV contrast    Result Date: 10/30/2024  Interpreted By:  Dario Sam, STUDY: CT ANGIO HEAD AND NECK W AND WO IV CONTRAST;  10/30/2024 4:15 pm   INDICATION: Signs/Symptoms:stroke on MRI on L corona radiata.     COMPARISON: 10/28/2024 brain CT and 10/29/2024 brain MR   ACCESSION NUMBER(S): [id number]   ORDERING CLINICIAN: ИРИНА LOPEZ   TECHNIQUE: Unenhanced CT images of the head were obtained. Subsequently, 75 ML of Omnipaque 350 was administered intravenously and axial images of the head and neck were acquired.  Coronal, sagittal, and 3-D reconstructions were provided for review. Carotid stenoses were determined using modified NASCET criteria.   FINDINGS:     CTA HEAD  FINDINGS:   Anterior circulation: The left M1 segment has mild stenoses with moderate stenosis at the origin of the superior branch of the left middle cerebral artery.  The pre callosal segment of the left anterior cerebral artery has moderate to severe stenosis.   Posterior circulation: The right posterior cerebral is supplied primarily through the posterior communicating artery with moderate stenosis in the anterior P2 segment. The more distal left posterior cerebral artery has moderate stenosis as well.   CTA NECK FINDINGS:   Right carotid vessels: The distal right common carotid artery is non stenotic atherosclerotic plaque. The right common carotid bifurcation has calcified non stenotic plaque extending into the carotid bulb with calcified plaque causing 60% stenosis of the right external carotid artery origin. The internal carotid artery in the neck is normal. There is % stenosis  .   Left carotid vessels: The common carotid artery is normal. The left common carotid bifurcation has non stenotic plaque extending into the internal carotid bulb. The internal carotid artery in the neck is normal. There is 0% stenosis  .   Vertebral vessels:  The visualized segments of the cervical vertebral arteries are normal in caliber.         1. the left anterior cerebral artery has severe stenosis adjacent to the genu of the corpus callosum with mild to moderate stenoses elsewhere in the cerebral arteries as noted.   2. No significant stenoses of the cervical carotid or vertebral arteries are noted.   MACRO: None   Signed by: Dario Sam 10/30/2024 4:48 PM Dictation workstation:   [id number]    MR brain wo IV contrast    Result Date: 10/29/2024  Interpreted By:  Surendra Hylton, STUDY: MR BRAIN WO IV CONTRAST;  10/29/2024 7:57 pm   INDICATION: Signs/Symptoms:TIA eval, arm numbness.     COMPARISON: CT head without contrast 10/28/2024; MRI brain 10/27/2023   ACCESSION NUMBER(S): [id number]   ORDERING CLINICIAN: ИРИНА LOPEZ    TECHNIQUE: Multiplanar, multi-sequence images of the brain were obtained without contrast.   FINDINGS:   Diffusion weighted images show new subcentimeter foci of diffusion restriction in the left corona radiata (x2) consistent with acute ischemic infarct. There is a new subcentimeter focus of diffusion restriction within the splenium of the corpus callosum just right of midline consistent with acute ischemic infarct.   There is redemonstration of cystic encephalomalacia in the posterior right temporal and parietal lobes. There is background moderate periventricular and subcortical hemispheric T2/FLAIR white matter hyperintensities are most compatible with chronic small vessel ischemic disease.   The major intracranial flow voids are preserved.   There is no acute intraparenchymal hemorrhage, mass, mass-effect, or an extra-axial fluid collection. On GRE images, there is susceptibility artifact within the posterior limb of the right internal capsule that does not correspond to any hyperdense abnormality on the CT and likely represents sequela of remote hemorrhage..   The ventricular size and cerebral volume are age-concordant.   Normal morphology of midline structures. The craniovertebral junction is normal.   The orbits and globes are unremarkable.   The paranasal sinuses show no air-fluid levels or hemorrhage.   The mastoid air cells are clear.   There is hyperostosis frontalis.   Severe degenerative disc disease in the mid cervical spine.       New subcentimeter acute ischemic infarct in the left corona radiata (x2) and right aspect of the splenium of the corpus callosum.   The demonstrated large area of encephalomalacia in the right posterior temporal and parietal lobes and chronic focus of hemorrhage in the posterior limb of the right internal capsule.   MACRO: None.   Signed by: Surendra Hylton 10/29/2024 9:41 PM Dictation workstation:   [id number]    Assessment/Plan   Assessment & Plan  Sinus  tachycardia    Chest pain    Erosive esophagitis    Syncope    NSTEMI (non-ST elevated myocardial infarction) (Multi)    PAF (paroxysmal atrial fibrillation) (Multi)    Essential hypertension    Acute heart failure    Bilateral pleural effusion    Left arm numbness    CVA (cerebral vascular accident) (Multi)    89 YOF with CAD c/b NSTEMI s/p PCI, Afib on OAC, DLP, hyperparathyroidism, CVA, CKD, COPD, Jonas's esophagitis, who p/w L arm numbness and tingling after falling 2 days earlier. In the ED hypoxia, requiring 5L NC. Admitted to [facility] for further management. Post admission work up revealed b/l pleural effusion. Pulmonary is consulted for hypoxia and pleural effusions.      Respiratory failure: likely acute with hypoxia, due to below. Overall is slowly improving/stable.        Continue supplemental O2, wean off as tolerates       BPH with IS, Acapella       Home O2 evaluation before DC       Management of the individual causes as below.      Pleural effusions: likely from CHF, given bilateral and also pulmonary edema. Echo in 10/2023 showed normal LV, RV, RVSF and RVSP 31.        Diuresis as tolerates       Repeat echo done, read is pending.       Repeat CXR on 11/1/2024, if persist, will need diagnostic thoracentesis.      Asthma/COPD: she never smoked. Not on any inhalers. Currently no wheezing        Consider albuterol PRN        Would benefit from outpatient pulmonary follow up     DVT prophylaxis: on Apixaban       Pulmonary will FU while in house.   Bonnie Pantoja MD

## 2024-10-31 NOTE — PROGRESS NOTES
Physical Therapy    Physical Therapy Treatment    Patient Name: Rakan Cervantes  MRN: [id number]  Department: [address]  Room: 10/10-A  Today's Date: 10/31/2024  Time Calculation  Start Time: 0935  Stop Time: 1000  Time Calculation (min): 25 min         Assessment/Plan   PT Assessment  End of Session Communication: Bedside nurse  Assessment Comment: Pt progressing steadily towards stated goals. Pt able to further amb distances with good tolerance. Only requiring CGA for majority of activities this session. No instances of inc weakness on L side noted this session.  End of Session Patient Position: Up in chair, Alarm on  PT Plan  Inpatient/Swing Bed or Outpatient: Inpatient  PT Plan  Treatment/Interventions: Bed mobility, Transfer training, Gait training, Strengthening, Therapeutic exercise, Therapeutic activity, Balance training, Endurance training  PT Plan: Ongoing PT  PT Frequency: 4 times per week  PT Discharge Recommendations: Moderate intensity level of continued care  PT Recommended Transfer Status: Assist x1, Assistive device (Jr FWW)  PT - OK to Discharge: Yes      General Visit Information:   PT  Visit  PT Received On: 10/31/24  General  Missed Visit: Yes  Missed Visit Reason: Other (Comment) (Pt off the floor at this time.)  Prior to Session Communication: Bedside nurse  Patient Position Received: On cart, Alarm off, caregiver present  General Comment: Pt cleared for PT by nursing. Pt agreeable to PT services.    Subjective   Precautions:  Precautions  Medical Precautions: Fall precautions, Oxygen therapy device and L/min (2L 02 NC)  Precautions Comment: + tele, + cont sp02    Objective   Pain:  Pain Assessment  Pain Assessment: 0-10  0-10 (Numeric) Pain Score: 0 - No pain  Cognition:  Cognition  Overall Cognitive Status: Within Functional Limits    Postural Control:  Static Sitting Balance  Static Sitting-Balance Support: Feet supported, Bilateral upper extremity supported  Static Sitting-Level of Assistance:  Close supervision  Static Sitting-Comment/Number of Minutes: good seated static balance  Static Standing Balance  Static Standing-Balance Support: Bilateral upper extremity supported  Static Standing-Level of Assistance: Contact guard  Static Standing-Comment/Number of Minutes: fair + static stand balance    Treatments:  Therapeutic Exercise  Therapeutic Exercise Performed: Yes  Therapeutic Exercise Activity 1: Seated B marches x15  Therapeutic Exercise Activity 2: Seated B LAQ x15  Therapeutic Exercise Activity 3: Seated B hip abd/add with light resist x15  Therapeutic Exercise Activity 4: Seated B ankle pumps x15  Therapeutic Exercise Activity 5: STS x5    Balance/Neuromuscular Re-Education  Balance/Neuromuscular Re-Education Activity Performed: Yes  Balance/Neuromuscular Re-Education Activity 1: Standing reaching in all planes for objects in restroom with intermittent single UE support for stability to maximize independence and safety with functional tasks. Pt demo good safety awareness and stability with task.    Bed Mobility  Bed Mobility: Yes  Bed Mobility 1  Bed Mobility 1: Supine to sitting  Level of Assistance 1: Minimum assistance  Bed Mobility Comments 1: Light Abilio to raise trunk from flat bed and no rails available.    Ambulation/Gait Training  Ambulation/Gait Training Performed: Yes  Ambulation/Gait Training 1  Surface 1: Level tile  Device 1: Rolling walker  Assistance 1: Contact guard  Quality of Gait 1: Narrow base of support, Forward flexed posture, Decreased step length, Diminished heel strike  Comments/Distance (ft) 1: 20 feet x2. Pt able to navigate directional turns and obstacles within room with no LOB. Cues for widening CESIA and upright posture.  Transfers  Transfer: Yes  Transfer 1  Transfer From 1: Sit to, Stand to  Transfer to 1: Stand, Sit  Technique 1: Stand to sit, Sit to stand  Transfer Device 1: Walker  Transfer Level of Assistance 1: Contact guard  Trials/Comments 1: Cues for ant  scooting and hand placement.    Outcome Measures:  [facility] Basic Mobility  Turning from your back to your side while in a flat bed without using bedrails: A little  Moving from lying on your back to sitting on the side of a flat bed without using bedrails: A little  Moving to and from bed to chair (including a wheelchair): A little  Standing up from a chair using your arms (e.g. wheelchair or bedside chair): A little  To walk in hospital room: A little  Climbing 3-5 steps with railing: A lot  Basic Mobility - Total Score: 17    Encounter Problems       Encounter Problems (Active)       PT Problem       Pt will transition supine<>sit at a flat bed with mod I.  (Progressing)       Start:  10/29/24    Expected End:  11/23/24            Pt will transfer sit<>stand with Jr FWW & mod I.  (Progressing)       Start:  10/29/24    Expected End:  11/23/24            Pt will ambulate >/=35 ft with Jr FWW & distant S (Progressing)       Start:  10/29/24    Expected End:  11/23/24            Pt will maintain standing balance while engaging in bimanual activity standing at sink or other environmental support for >/=45 sec with distant S. (Progressing)       Start:  10/29/24    Expected End:  11/23/24

## 2024-10-31 NOTE — PROGRESS NOTES
Occupational Therapy    Occupational Therapy Treatment    Name: Rakan Cervantes  MRN: [id number]  Department: [address]  Room: 10/10-A  Date: 10/31/24  Time Calculation  Start Time: 1413  Stop Time: 1440  Time Calculation (min): 27 min    Assessment:  OT Assessment:  (Patient is making good progress towards goals; noted patient had an MRI of her brain after inital OT eval, and showed small scattered infarcts in L corona radiata. However, nno change in patient presentation, and no change in OT POC at this time.)  Prognosis: Good  Barriers to Discharge: None  Evaluation/Treatment Tolerance: Patient tolerated treatment well  Medical Staff Made Aware: Yes  End of Session Communication: Bedside nurse  End of Session Patient Position: Up in chair, Alarm on (Spouse and family present at bedside)  Plan:  Treatment Interventions:  (ADL retraining; Functional transfer training; UE strengthening/ROM; Endurance training; Cognitive reorientation; Patient/family training; Equipment evaluation/education)  OT Frequency: 3 times per week  OT Discharge Recommendations: Moderate intensity level of continued care  Equipment Recommended upon Discharge: Wheeled walker  OT Recommended Transfer Status: Moderate assist  OT - OK to Discharge: Yes    Subjective   Previous Visit Info:  OT Last Visit  OT Received On: 10/31/24  General:  General  Missed Visit: No  Prior to Session Communication: Bedside nurse  Patient Position Received: Bed, 2 rail up, Alarm off, caregiver present, Alarm off, not on at start of session  General Comment:  (Patient cleared for OT treatment via nursing staff)  Precautions:  Medical Precautions: Fall precautions, Oxygen therapy device and L/min (2lpm per NC)    Vital Signs (Past 2hrs)        Date/Time Vitals Session Patient Position Pulse Resp SpO2 BP MAP (mmHg)    10/31/24 1413 --  --  --  --  --  154/57  84                   Vital Signs Comment:  (inconsistent wave length reading, pt appeared to desaturate to aprox 85%  O2 saturation when during ADLs/functional transfers; however able to recover to above 90% with breathing strategies within approx 30 seconds.)    Pain Assessment:  Pain Assessment  Pain Assessment: 0-10  0-10 (Numeric) Pain Score: 0 - No pain     Objective   Cognition:  Orientation Level: Oriented X4  Activities of Daily Living: Grooming  Grooming Level of Assistance: Setup  Grooming Where Assessed: Chair  Grooming Comments:  (patient able to groom hair and wash face while sitting in chair after s/u)    LE Dressing  LE Dressing: Yes  LE Dressing Where Assessed: Edge of bed  LE Dressing Comments:  (donned/doffed socks while sitting EOB with close SPV for sitting balance)    Functional Standing Tolerance:  Functional Standing Tolerance  Time:  (2 minutes)  Activity:  (pre transfer activity)  Functional Standing Tolerance Comments:  (Completed static standing tolerance for approx 2 minutes with CGA with education on pursed lip breathing with RW in front)  Bed Mobility/Transfers: Bed Mobility  Bed Mobility: Yes  Bed Mobility 1  Bed Mobility 1: Supine to sitting  Level of Assistance 1: Close supervision  Bed Mobility Comments 1:  (supine with HOB elevated to sitting EOB  with SPV; pt utilizing bedrail)  Bed Mobility 2  Bed Mobility  2: Sitting to supine  Level of Assistance 2: Minimum assistance  Bed Mobility Comments 2:  (eob>supine utilizing bed rail with Gregory overall for A with LE's)    Transfers  Transfer: Yes  Transfer 1  Transfer From 1: Sit to  Transfer to 1: Stand  Technique 1: Sit to stand  Transfer Device 1: Walker (RW)  Transfer Level of Assistance 1: Contact guard  Trials/Comments 1:  (completed sit/stand from EOB with RW in front x 4 trials with cues for hand placement and sequencing; completed to improve independence with toilet transfers, toileting hygiene, and LBD.)  Transfers 2  Transfer From 2: Bed to  Transfer to 2: Chair with arms  Technique 2: Stand pivot  Transfer Device 2: Walker (RW)  Transfer  Level of Assistance 2: Contact guard  Trials/Comments 2:  (CGA for EOB>chair SPT with RW with cues for hand placement and sequencing.)     Therapy/Activity:   Therapeutic Activity  Therapeutic Activity Performed: Yes  Therapeutic Activity 1:  (While sitting EOB- patient completed BLE marches x 20 reps x 2 sets to improve overall endurance and activity tolerance needed for increased efficency and I with LB dressing)    Outcome Measures:  [facility] Daily Activity  Putting on and taking off regular lower body clothing: A little  Bathing (including washing, rinsing, drying): A lot  Putting on and taking off regular upper body clothing: A little  Toileting, which includes using toilet, bedpan or urinal: A lot  Taking care of personal grooming such as brushing teeth: A little  Eating Meals: A little  Daily Activity - Total Score: 16    Education:  Educated patient on call light use, and OT POC    Goals:  Encounter Problems       Encounter Problems (Active)       OT Goals       ADLs (Progressing)       Start:  10/29/24    Expected End:  11/15/24       Pt will complete ADL tasks with Mod I, using AE as needed, in order to complete self-care tasks.           Functional mobility (Progressing)       Start:  10/29/24    Expected End:  11/15/24       Pt will perform functional mobility household distance at mod ind level with RW           Functional transfers (Progressing)       Start:  10/29/24    Expected End:  11/15/24       Pt will perform functional transfers at mod ind level with RW

## 2024-10-31 NOTE — ASSESSMENT & PLAN NOTE
Resume home cardiac medications  Daily weights  Strict I/Os    Echocardiogram  Diuresis on hold due to hypotension yesterday, today noted dark urine. Monitor urine output close  Consult cardiology, appreciate recommendations

## 2024-10-31 NOTE — PROGRESS NOTES
Rakan Cervantes is a 89 y.o. female on day 2 of admission presenting with Sinus tachycardia.  Patient with CAD c/b NSTEMI s/p PCI, Afib on OAC, DLP, hyperparathyroidism, CVA, CKD, COPD, Jonas's esophagitis, who p/w L arm numbness and tingling after falling 2 days earlier. In the ED hypoxia, requiring 5L NC. Admitted to [facility] for further management. Post admission work up revealed b/l pleural effusion. Pulmonary is consulted for hypoxia and pleural effusions.   Has had frequent falls, last 4 days ago, hit her head, with short period of LOC. No amnesia. No recurrence since admission. Complains of HA, and R hip pain.   Subjective   No acute overnight events. Transferred to SDU for low BP. O2 requirements stable at 4L.     Overall is feeling OK. Complains of back pain. Denies SOB, cough, sputum, wheezing or any other complaints.   Objective   Scheduled medications  acetaminophen, 650 mg, oral, TID  amLODIPine, 5 mg, oral, Daily  apixaban, 2.5 mg, oral, BID  ascorbic acid, 125 mg, oral, Daily  atorvastatin, 80 mg, oral, Nightly  carvedilol, 12.5 mg, oral, BID  cholecalciferol, 2,000 Units, oral, Daily  clopidogrel, 75 mg, oral, Daily  docusate sodium, 100 mg, oral, BID  DULoxetine, 30 mg, oral, Daily with evening meal  ferrous sulfate (325 mg ferrous sulfate), 65 mg of iron, oral, Daily with breakfast  [Held by provider] furosemide, 20 mg, intravenous, Daily  gabapentin, 300 mg, oral, BID  hydroxychloroquine, 200 mg, oral, Daily  isosorbide mononitrate ER, 120 mg, oral, Daily  lidocaine, 2 patch, transdermal, Daily  [Held by provider] losartan, 100 mg, oral, Daily  magnesium oxide, 400 mg, oral, BID  oxygen, , inhalation, Continuous - Inhalation  pantoprazole, 40 mg, oral, BID  polyethylene glycol, 17 g, oral, Daily  ranolazine, 500 mg, oral, BID  sucralfate, 1 g, oral, q6h ZEINAB  sulfaSALAzine, 500 mg, oral, BID    Continuous medications     PRN medications  PRN medications: benzocaine-menthol, bisacodyl, cyclobenzaprine  "  Physical Exam  Constitutional:       General: She is not in acute distress.     Appearance: Normal appearance. She is not ill-appearing or toxic-appearing.   HENT:      Nose:      Comments: On 4L NC     Mouth/Throat:      Mouth: Mucous membranes are moist.      Comments: Mallampati 3.   Eyes:      General: No scleral icterus.     Extraocular Movements: Extraocular movements intact.      Pupils: Pupils are equal, round, and reactive to light.   Cardiovascular:      Rate and Rhythm: Regular rhythm. Bradycardia present.      Heart sounds: No murmur heard.     No friction rub.   Pulmonary:      Effort: Pulmonary effort is normal. No respiratory distress.      Breath sounds: No wheezing or rales.      Comments: Decreased breath sounds at the bases.  Abdominal:      General: Bowel sounds are normal. There is no distension.      Palpations: Abdomen is soft.      Tenderness: There is no abdominal tenderness.   Musculoskeletal:         General: Deformity (arthritic changes both hands.) present.      Cervical back: Normal range of motion and neck supple. No rigidity or tenderness.      Right lower leg: No edema.      Left lower leg: No edema.   Lymphadenopathy:      Cervical: No cervical adenopathy.   Skin:     General: Skin is warm and dry.      Coloration: Skin is not jaundiced.   Neurological:      General: No focal deficit present.      Mental Status: She is alert and oriented to person, place, and time.      Cranial Nerves: No cranial nerve deficit.      Motor: No weakness.   Psychiatric:         Mood and Affect: Mood normal.         Behavior: Behavior normal.     Last Recorded Vitals  Blood pressure 129/53, pulse 59, temperature 36.4 °C (97.5 °F), temperature source Temporal, resp. rate 23, height 1.499 m (4' 11\"), weight 50.9 kg (112 lb 3.4 oz), SpO2 97%.  Intake/Output last 3 Shifts:  I/O last 3 completed shifts:  In: 390.3 (7.7 mL/kg) [P.O.:364; I.V.:26.3 (0.5 mL/kg)]  Out: - (0 mL/kg)   Weight: 50.9 kg   Relevant " Results  Results for orders placed or performed during the hospital encounter of 10/28/24 (from the past 24 hours)   Magnesium   Result Value Ref Range    Magnesium 1.50 (L) 1.60 - 2.40 mg/dL   Basic Metabolic Panel   Result Value Ref Range    Glucose 98 74 - 99 mg/dL    Sodium 135 (L) 136 - 145 mmol/L    Potassium 3.8 3.5 - 5.3 mmol/L    Chloride 99 98 - 107 mmol/L    Bicarbonate 31 21 - 32 mmol/L    Anion Gap 9 (L) 10 - 20 mmol/L    Urea Nitrogen 12 6 - 23 mg/dL    Creatinine 0.63 0.50 - 1.05 mg/dL    eGFR 85 >60 mL/min/1.73m*2    Calcium 8.4 (L) 8.6 - 10.3 mg/dL   CBC   Result Value Ref Range    WBC 4.6 4.4 - 11.3 x10*3/uL    nRBC 0.0 0.0 - 0.0 /100 WBCs    RBC 2.74 (L) 4.00 - 5.20 x10*6/uL    Hemoglobin 7.7 (L) 12.0 - 16.0 g/dL    Hematocrit 24.9 (L) 36.0 - 46.0 %    MCV 91 80 - 100 fL    MCH 28.1 26.0 - 34.0 pg    MCHC 30.9 (L) 32.0 - 36.0 g/dL    RDW 15.1 (H) 11.5 - 14.5 %    Platelets 192 150 - 450 x10*3/uL   Magnesium   Result Value Ref Range    Magnesium 1.97 1.60 - 2.40 mg/dL     *Note: Due to a large number of results and/or encounters for the requested time period, some results have not been displayed. A complete set of results can be found in Results Review.   CT angio head and neck w and wo IV contrast    Result Date: 10/30/2024  Interpreted By:  Dario Sam, STUDY: CT ANGIO HEAD AND NECK W AND WO IV CONTRAST;  10/30/2024 4:15 pm   INDICATION: Signs/Symptoms:stroke on MRI on L corona radiata.     COMPARISON: 10/28/2024 brain CT and 10/29/2024 brain MR   ACCESSION NUMBER(S): [id number]   ORDERING CLINICIAN: ИРИНА LOPEZ   TECHNIQUE: Unenhanced CT images of the head were obtained. Subsequently, 75 ML of Omnipaque 350 was administered intravenously and axial images of the head and neck were acquired.  Coronal, sagittal, and 3-D reconstructions were provided for review. Carotid stenoses were determined using modified NASCET criteria.   FINDINGS:     CTA HEAD FINDINGS:   Anterior circulation: The left M1  segment has mild stenoses with moderate stenosis at the origin of the superior branch of the left middle cerebral artery.  The pre callosal segment of the left anterior cerebral artery has moderate to severe stenosis.   Posterior circulation: The right posterior cerebral is supplied primarily through the posterior communicating artery with moderate stenosis in the anterior P2 segment. The more distal left posterior cerebral artery has moderate stenosis as well.   CTA NECK FINDINGS:   Right carotid vessels: The distal right common carotid artery is non stenotic atherosclerotic plaque. The right common carotid bifurcation has calcified non stenotic plaque extending into the carotid bulb with calcified plaque causing 60% stenosis of the right external carotid artery origin. The internal carotid artery in the neck is normal. There is % stenosis  .   Left carotid vessels: The common carotid artery is normal. The left common carotid bifurcation has non stenotic plaque extending into the internal carotid bulb. The internal carotid artery in the neck is normal. There is 0% stenosis  .   Vertebral vessels:  The visualized segments of the cervical vertebral arteries are normal in caliber.         1. the left anterior cerebral artery has severe stenosis adjacent to the genu of the corpus callosum with mild to moderate stenoses elsewhere in the cerebral arteries as noted.   2. No significant stenoses of the cervical carotid or vertebral arteries are noted.   MACRO: None   Signed by: Dario Sam 10/30/2024 4:48 PM Dictation workstation:   [id number]    MR brain wo IV contrast    Result Date: 10/29/2024  Interpreted By:  Surendra Hylton, STUDY: MR BRAIN WO IV CONTRAST;  10/29/2024 7:57 pm   INDICATION: Signs/Symptoms:TIA eval, arm numbness.     COMPARISON: CT head without contrast 10/28/2024; MRI brain 10/27/2023   ACCESSION NUMBER(S): [id number]   ORDERING CLINICIAN: ИРИНА LOPEZ   TECHNIQUE: Multiplanar, multi-sequence  images of the brain were obtained without contrast.   FINDINGS:   Diffusion weighted images show new subcentimeter foci of diffusion restriction in the left corona radiata (x2) consistent with acute ischemic infarct. There is a new subcentimeter focus of diffusion restriction within the splenium of the corpus callosum just right of midline consistent with acute ischemic infarct.   There is redemonstration of cystic encephalomalacia in the posterior right temporal and parietal lobes. There is background moderate periventricular and subcortical hemispheric T2/FLAIR white matter hyperintensities are most compatible with chronic small vessel ischemic disease.   The major intracranial flow voids are preserved.   There is no acute intraparenchymal hemorrhage, mass, mass-effect, or an extra-axial fluid collection. On GRE images, there is susceptibility artifact within the posterior limb of the right internal capsule that does not correspond to any hyperdense abnormality on the CT and likely represents sequela of remote hemorrhage..   The ventricular size and cerebral volume are age-concordant.   Normal morphology of midline structures. The craniovertebral junction is normal.   The orbits and globes are unremarkable.   The paranasal sinuses show no air-fluid levels or hemorrhage.   The mastoid air cells are clear.   There is hyperostosis frontalis.   Severe degenerative disc disease in the mid cervical spine.       New subcentimeter acute ischemic infarct in the left corona radiata (x2) and right aspect of the splenium of the corpus callosum.   The demonstrated large area of encephalomalacia in the right posterior temporal and parietal lobes and chronic focus of hemorrhage in the posterior limb of the right internal capsule.   MACRO: None.   Signed by: Surendra Hylton 10/29/2024 9:41 PM Dictation workstation:   [id number]    Carotid duplex bilateral    Result Date: 10/29/2024  Preliminary Cardiology Report           [address]  [place] [id number]            Phone [phone]      Preliminary Vascular Lab Report  [facility] US CAROTID ARTERY DUPLEX BILATERAL  Patient Name:     NICOLE PRICE Reading Physician:  [id number] Elías Winter MD, RPVI Study Date:       10/29/2024     Ordering Provider:  [id number] SONJA GANGA BATISTA MRN/PID:          [id number]       Fellow: Accession#:       [id number]   Technologist:       Isabell Vidal RVT YOB: 1935      Technologist 2: Gender:           F              Encounter#:         [id number] Admission Status: Inpatient      Location Performed: [facility]  Diagnosis/ICD: Syncope and collapse-R55 CPT Codes:     75115 Cerebrovascular Carotid Duplex scan complete  PRELIMINARY CONCLUSIONS:  Right Carotid: Findings are consistent with less than 50% stenosis of the right proximal internal carotid artery. The internal carotid artery appears tortuous. Right external carotid artery appears patent with no evidence of stenosis. The right vertebral artery is patent with antegrade flow. No evidence of hemodynamically significant stenosis in the right subclavian artery. There is turbulent flow noted in the proximal common carotid artery which may indicate a more proximal stenosis. Left Carotid: Findings are consistent with less than 50% stenosis of the left proximal internal carotid artery. The internal carotid artery appears tortuous. There is turbulent flow noted in mid and distal internal carotid artery possibly due to vessel tortuosity. Left external carotid artery appears patent with no evidence of stenosis. The left vertebral artery is patent with antegrade flow. No evidence of hemodynamically significant stenosis in the left subclavian artery.  Imaging & Doppler Findings: Right Plaque Morph: The proximal right internal carotid artery demonstrates heterogenous plaque. The proximal right external carotid artery demonstrates heterogenous and calcified plaque.  The distal right common carotid artery demonstrates heterogenous plaque. Left Plaque Morph: The proximal left internal carotid artery demonstrates heterogenous plaque. The proximal left external carotid artery demonstrates heterogenous plaque. The distal left common carotid artery demonstrates heterogenous plaque.   Right                        Left   PSV      EDV                PSV       cm/s           CCA P    79 cm/s 86 cm/s            CCA D    75 cm/s 83 cm/s  18 cm/s   ICA P    102 cm/s 16 cm/s 83 cm/s  22 cm/s   ICA M    124 cm/s 25 cm/s 82 cm/s  21 cm/s   ICA D    80 cm/s  16 cm/s 105 cm/s            ECA     192 cm/s 75 cm/s  14 cm/s Vertebral  84 cm/s  18 cm/s 80 cm/s          Subclavian 122 cm/s                Right Left ICA/CCA Ratio  1.0  1.4   VASCULAR PRELIMINARY REPORT completed by Isabell Vidal RVT on 10/29/2024 at 11:21:41 AM  ** Final **     Assessment/Plan   Assessment & Plan  Sinus tachycardia    Chest pain    Erosive esophagitis    Syncope    NSTEMI (non-ST elevated myocardial infarction) (Multi)    PAF (paroxysmal atrial fibrillation) (Multi)    Essential hypertension    Acute heart failure    Bilateral pleural effusion    Left arm numbness    CVA (cerebral vascular accident) (Multi)    89 YOF with CAD c/b NSTEMI s/p PCI, Afib on OAC, DLP, hyperparathyroidism, CVA, CKD, COPD, Jonas's esophagitis, who p/w L arm numbness and tingling after falling 2 days earlier. In the ED hypoxia, requiring 5L NC. Admitted to [facility] for further management. Post admission work up revealed b/l pleural effusion. Pulmonary is consulted for hypoxia and pleural effusions.      Respiratory failure: likely acute with hypoxia, due to below. Overall is slowly improving/stable.        Continue supplemental O2, wean off as tolerates       BPH with IS, Acapella       Home O2 evaluation before DC       Management of the individual causes as below.      Pleural effusions: likely from CHF, given bilateral and also  pulmonary edema. Echo in 10/2023 showed normal LV, RV, RVSF and RVSP 31.        Diuresis as tolerates       Repeat echo is pending.       Repeat CXR in 2-3 days, if persist, will need diagnostic thoracentesis.      Asthma/COPD: she never smoked. Not on any inhalers. Currently no wheezing        Consider albuterol PRN        Would benefit from outpatient pulmonary follow up     DVT prophylaxis: on Apixaban       Pulmonary will FU while in house.   Bonnie Pantoja MD

## 2024-10-31 NOTE — TELEPHONE ENCOUNTER
Bedside visit with patient and family at [facility] to discuss House Calls referral and program, they are receptive to services. Will follow, patient and family in agreement.

## 2024-10-31 NOTE — NURSING NOTE
Assuming care of patient. Patient resting in bed with HOB elevated during bSSR. Patient verbalized no needs at the moment. Cardiac monitor reads SR 65bpm sat 100% on 2L via NC.. bed at lowest position and locked.

## 2024-10-31 NOTE — NURSING NOTE
Assumed care of pt. Pt alert and oriented. Lying in bed appears to be comfortable. 2Lnc intact with o2 sat of 96%. Denies and pain at this time. NSR with HR of 88. Bed low with call bell in reach.

## 2024-10-31 NOTE — ASSESSMENT & PLAN NOTE
Resume home antihypertensives  Monitor blood pressure close    Plan  Wean oxygen as tolerated  Diuresis on hold, monitor urine output close  Check Ortho's, TTE  DVT prophylaxis: Eliquis  Fall precautions, PT/OT consult  Cardiology, neurology, palliative, and pulmonary on consult, appreciate recs  GI signed off  CBC and BMP in the morning      Patient is a DNRCCA, DNI, no ICU. Family and patient met with pall/med yesterday. Patient declining SNF. Plan for home with [facility], referrals placed for Hospice navigator program, house calls, and healthy at home.

## 2024-10-31 NOTE — PROGRESS NOTES
"Rakan Cervantes is a 89 y.o. female on day 3 of admission presenting with Sinus tachycardia.      Subjective   Pt off the floor this am.    CTA showed intracranial athero.   Appreciate palliative care input -- pt declines nursing home or rehab.   Due to high risk of further falls, not a good candidate for brilinta.            Objective     Last Recorded Vitals  Blood pressure 168/65, pulse 67, temperature 36.7 °C (98.1 °F), temperature source Temporal, resp. rate 15, height 1.499 m (4' 11\"), weight 51.6 kg (113 lb 12.1 oz), SpO2 98%.    Physical Exam  Neurological Exam    AAOx3, follows commands, no aphasia/dysarthria.  PERRL, VFF,  EOMI, normal saccades and pursuit, no gaze preference/nystagmus.   Intact facial sensation, no asymmetry. Intact hearing bilaterally. Tongue midline. Symmetric palate elevation.   Motor: 5/5 all four extremities.  Sensation: intact to light touch   Gait: deferred     Relevant Results        NIH Stroke Scale  1A. Level of Consciousness: Alert, Keenly Responsive  1B. Ask Month and Age: Both Questions Right  1C. Blink Eyes & Squeeze Hands: Performs Both Tasks  2. Best Gaze: Normal  3. Visual: No Visual Loss  4. Facial Palsy: Normal Symmetrical Movements  5A. Motor - Left Arm: No Drift  5B. Motor - Right Arm: No Drift  6A. Motor - Left Leg: No Drift  6B. Motor - Right Leg: No Drift  7. Limb Ataxia: Absent  8. Sensory Loss: Normal  9. Best Language: No Aphasia  10. Dysarthria: Normal  11. Extinction and Inattention: No Abnormality  NIH Stroke Scale: 0           Phil Coma Scale  Best Eye Response: Spontaneous  Best Verbal Response: Oriented  Best Motor Response: Follows commands  Phil Coma Scale Score: 15                0  0  0  0  0  0  0  0  0  0  0  0  0  0  0  0   NIH Stroke Scale: 0                 Assessment/Plan      Assessment & Plan  Sinus tachycardia    Chest pain    Erosive esophagitis    Syncope    NSTEMI (non-ST elevated myocardial infarction) (Multi)    PAF (paroxysmal " atrial fibrillation) (Multi)    Essential hypertension    Acute heart failure    Bilateral pleural effusion    Left arm numbness    CVA (cerebral vascular accident) (Multi)    Rakan Cervantes is a 89 y.o. female wit hx of CAD, afib on anticoagulation, COPD presenting with an episode of fall. Neurology consulted for R arm numbness. She does have encephalomalacia of R parietal / insular cortex which may explain her numbness (recrudescence) in the setting of pleural effusions, HF. However due to her vascular risk factors MRI brain was obtained -- it showed small scattered infarcts in L corona radiata. CTA shows intracranial atherosclerosis likely accounting for unilateral infarct in the setting of low BP / fall.    Stroke - etiology intracranial athero, risk factors: afib on eliquis (2.5mg dose), CAD with recent PCI, COPD, LDL - 25, A1c 5.8, BP low normal - meds being adjusted     Regarding her intracranial atherosclerosis, the only step up from plavix is brilinta. However it does have slightly higher risk of bleeding than plavix, especially when combined with eliquis. Due to her ongoing risks of falls, and pt adamantly refusing rehab / PT, it is anticipated that pt will remain at high fall risk. Thus would not recommend to switch to brilinta. Continue risk factor control. Continue GOC discussions.       2:20pm addendum: pt seen again in the afternoon. Pt is neurologically stable. Discussed above findings of intracranial atherosclerosis, but limitations to switch to stronger meds due to risks of falls. Pt is still adamant she would not want rehab. Thus would not change meds, will follow up with her as outpatient in 4-6 weeks to re-evaluate.     Thank you for the consult. Will sign off -- please do not hesitate to reach out with any questions.          I spent 35 minutes in the professional and overall care of this patient.      Jose Elias Keen MD

## 2024-10-31 NOTE — PROGRESS NOTES
Subjective Data:  Patient is doing better.  Had episode of hypotension yesterday.  Will help with hypertensive medications.  Pressure better today    Overnight Events:    Telemetry overnight reviewed no events     Objective Data:  Last Recorded Vitals:  Vitals:    10/30/24 2345 10/31/24 0335 10/31/24 0605 10/31/24 0719   BP: 148/62 155/67  168/65   BP Location: Right arm Right arm  Right arm   Patient Position: Lying Lying  Lying   Pulse: 65 67     Resp: 22 15     Temp: 36.2 °C (97.2 °F) 36.8 °C (98.2 °F)  36.7 °C (98.1 °F)   TempSrc: Temporal Temporal  Temporal   SpO2: 92% 98%     Weight:   51.6 kg (113 lb 12.1 oz)    Height:           Last Labs:  CBC - 10/31/2024:  8:33 AM  5.9 9.6 266    30.8      CMP - 10/31/2024:  8:33 AM  8.7 6.2 17 --- 0.6   2.7 3.6 13 105      PTT - 9/27/2024:  4:44 AM  _   _ 52.6     TROPHS   Date/Time Value Ref Range Status   10/28/2024 06:04  0 - 13 ng/L Final     Comment:     Previous result verified on 10/28/2024 1104 on specimen/case [id number] called with component [facility] for procedure Troponin, High Sensitivity, 1 Hour with value 96 ng/L.   10/28/2024 10:20 AM 96 0 - 13 ng/L Final   10/28/2024 09:19 AM 43 0 - 13 ng/L Final     BNP   Date/Time Value Ref Range Status   10/28/2024 09:19  0 - 99 pg/mL Final   09/26/2024 02:21  0 - 99 pg/mL Final     HGBA1C   Date/Time Value Ref Range Status   09/10/2024 09:14 AM 5.8 See below % Final   03/14/2024 11:10 AM 6.3 See below % Final     LDLCALC   Date/Time Value Ref Range Status   10/29/2024 05:34 AM 25 <=99 mg/dL Final     Comment:                                 Near   Borderline      AGE      Desirable  Optimal    High     High     Very High     0-19 Y     0 - 109     ---    110-129   >/= 130     ----    20-24 Y     0 - 119     ---    120-159   >/= 160     ----      >24 Y     0 -  99   100-129  130-159   160-189     >/=190     09/10/2024 09:14 AM 50 65 - 130 mg/dL Final   03/14/2024 11:10 AM 44 65 - 130 mg/dL Final  "    VLDL   Date/Time Value Ref Range Status   10/29/2024 05:34 AM 12 0 - 40 mg/dL Final      Last I/O:  I/O last 3 completed shifts:  In: 390.3 (7.6 mL/kg) [P.O.:364; I.V.:26.3 (0.5 mL/kg)]  Out: 400 (7.8 mL/kg) [Urine:400 (0.2 mL/kg/hr)]  Weight: 51.6 kg     Past Cardiology Tests (Last 3 Years):  EKG:  ECG 12 Lead 10/28/2024      ECG 12 lead 09/26/2024      ECG 12 lead 10/27/2023    Echo:  Transthoracic Echo (TTE) Limited 10/27/2023    Ejection Fractions:  No results found for: \"EF\"  Cath:  Cardiac Catheterization Procedure 09/27/2024    Stress Test:  No results found for this or any previous visit from the past 1095 days.    Cardiac Imaging:  No results found for this or any previous visit from the past 1095 days.      Inpatient Medications:  Scheduled medications   Medication Dose Route Frequency    acetaminophen  650 mg oral TID    amLODIPine  5 mg oral Daily    apixaban  2.5 mg oral BID    ascorbic acid  125 mg oral Daily    atorvastatin  80 mg oral Nightly    carvedilol  12.5 mg oral BID    cholecalciferol  2,000 Units oral Daily    clopidogrel  75 mg oral Daily    docusate sodium  100 mg oral BID    DULoxetine  30 mg oral Daily with evening meal    ferrous sulfate (325 mg ferrous sulfate)  65 mg of iron oral Daily with breakfast    [Held by provider] furosemide  20 mg intravenous Daily    gabapentin  300 mg oral BID    hydroxychloroquine  200 mg oral Daily    isosorbide mononitrate ER  120 mg oral Daily    lidocaine  2 patch transdermal Daily    [Held by provider] losartan  100 mg oral Daily    magnesium oxide  400 mg oral BID    magnesium sulfate  1 g intravenous Once    oxygen   inhalation Continuous - Inhalation    pantoprazole  40 mg oral BID    polyethylene glycol  17 g oral Daily    ranolazine  500 mg oral BID    sucralfate  1 g oral q6h ZEINAB    sulfaSALAzine  500 mg oral BID     PRN medications   Medication    benzocaine-menthol    bisacodyl    cyclobenzaprine     Continuous Medications   Medication " Dose Last Rate       Physical Exam:  General: Patient is in no acute distress.  HEENT: atraumatic normocephalic.  Neck: is supple jugular venous pressure within normal limits no thyromegaly.  Cardiovascular regular rate and rhythm normal heart sounds no murmurs rubs or gallops.  Lungs: Decreased breathing sounds at bases abdomen: is soft nontender.  Extremities warm to touch trace edema.  Neurologic examination: patient is awake alert oriented to person, place, date/time.  Psychiatric examination: patient has good insight denies feeling suicidal and depressed.  Pulses 2+ intact bilaterally        Assessment/Plan  1 chest pain.  Patient with angina in the setting of hypertension and poorly controlled heart rate.  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  Had PCI to distal left circumflex but distal to the stent there is some moderate distal disease.  Likely demand ischemia from hypertension.  Her LAD looked fine on the cardiac catheterization from September 27, 2024.  At this point recommend conservative management.  We will continue Plavix.  She will be also on Eliquis.  Blood pressure is high today.  Will restart most of her antihypertensive medications except losartan.  Carvedilol only 12.5 mg oral twice daily.  Will reassess.      2.  Syncope.  MRI Showing new [place] or infarct.  Not sure if her syncopal episode is related to stroke or syncope.  At this point we will arrange for monitor before discharge.     3.  Hypertension increase isosorbide to 120 mg daily.  Continue beta-blockers.  Continue beta-blockers.     4.  Hyperlipidemia continue high intensity statin.     5.  Shortness of breath.  Acute on chronic diastolic heart failure.  Hold diuretics since blood pressure decreased   Thank you for the consult  Peripheral IV 10/28/24 20 G Right Antecubital (Active)   Site Assessment Dry;Clean 10/30/24 2100   Dressing Status Clean;Dry 10/30/24 2100   Number of days: 3        Code Status:  DNR and No Intubation and No ICU      Nessa Callaway MD

## 2024-10-31 NOTE — CARE PLAN
The patient's goals for the shift include  wean oxygen    The clinical goals for the shift include safety        Problem: Safety - Adult  Goal: Free from fall injury  Outcome: Progressing     Problem: Discharge Planning  Goal: Discharge to home or other facility with appropriate resources  Outcome: Progressing     Problem: Chronic Conditions and Co-morbidities  Goal: Patient's chronic conditions and co-morbidity symptoms are monitored and maintained or improved  Outcome: Progressing     Problem: Heart Failure  Goal: Improved gas exchange this shift  Outcome: Progressing  Goal: Improved urinary output this shift  Outcome: Progressing  Goal: Reduction in peripheral edema within 24 hours  Outcome: Progressing  Goal: Report improvement of dyspnea/breathlessness this shift  Outcome: Progressing  Goal: Weight from fluid excess reduced over 2-3 days, then stabilize  Outcome: Progressing  Goal: Increase self care and/or family involvement in 24 hours  Outcome: Progressing     Problem: Fall/Injury  Goal: Not fall by end of shift  Outcome: Progressing  Goal: Be free from injury by end of the shift  Outcome: Progressing  Goal: Verbalize understanding of personal risk factors for fall in the hospital  Outcome: Progressing     Problem: Nutrition  Goal: Oral intake greater than 50%  Outcome: Progressing  Goal: Consume prescribed supplement  Outcome: Progressing  Goal: Maintain stable weight  Outcome: Progressing

## 2024-10-31 NOTE — PROGRESS NOTES
Palliative Care Progress Note    Date of Admission: 10/28/2024    Patient is a 89 y.o. female admitted with Sinus tachycardia. Diuretics placed on hold due to hypotension. Bps are better today    Mental/Cognitive Status: awake, alert, appropriate    Respiratory Status: on 2 liters, denies difficulty with breathing or cough    Pain Assessment:back and hips. States adequately controlled    Pertinent Symptoms: none    Diet/Nutrition: appetite fair    Bowel Regimen: colace and miralax    Patient's current condition/Anticipated Prognosis: fair.  Family/Healthcare Proxy involvement: son Eduar, daughter Tita    Scheduled medications  acetaminophen, 650 mg, oral, TID  amLODIPine, 5 mg, oral, Daily  apixaban, 2.5 mg, oral, BID  ascorbic acid, 125 mg, oral, Daily  atorvastatin, 80 mg, oral, Nightly  carvedilol, 12.5 mg, oral, BID  cholecalciferol, 2,000 Units, oral, Daily  clopidogrel, 75 mg, oral, Daily  docusate sodium, 100 mg, oral, BID  DULoxetine, 30 mg, oral, Daily with evening meal  ferrous sulfate (325 mg ferrous sulfate), 65 mg of iron, oral, Daily with breakfast  [Held by provider] furosemide, 20 mg, intravenous, Daily  gabapentin, 300 mg, oral, BID  hydroxychloroquine, 200 mg, oral, Daily  isosorbide mononitrate ER, 120 mg, oral, Daily  lidocaine, 2 patch, transdermal, Daily  [Held by provider] losartan, 100 mg, oral, Daily  magnesium oxide, 400 mg, oral, BID  magnesium sulfate, 1 g, intravenous, Once  oxygen, , inhalation, Continuous - Inhalation  pantoprazole, 40 mg, oral, BID  polyethylene glycol, 17 g, oral, Daily  ranolazine, 500 mg, oral, BID  sucralfate, 1 g, oral, q6h ZEINAB  sulfaSALAzine, 500 mg, oral, BID      Continuous medications     PRN medications  PRN medications: benzocaine-menthol, bisacodyl, cyclobenzaprine     Results for orders placed or performed during the hospital encounter of 10/28/24 (from the past 24 hours)   Magnesium   Result Value Ref Range    Magnesium 1.97 1.60 - 2.40 mg/dL   CBC    Result Value Ref Range    WBC 5.9 4.4 - 11.3 x10*3/uL    nRBC 0.0 0.0 - 0.0 /100 WBCs    RBC 3.35 (L) 4.00 - 5.20 x10*6/uL    Hemoglobin 9.6 (L) 12.0 - 16.0 g/dL    Hematocrit 30.8 (L) 36.0 - 46.0 %    MCV 92 80 - 100 fL    MCH 28.7 26.0 - 34.0 pg    MCHC 31.2 (L) 32.0 - 36.0 g/dL    RDW 15.1 (H) 11.5 - 14.5 %    Platelets 266 150 - 450 x10*3/uL   Basic Metabolic Panel   Result Value Ref Range    Glucose 141 (H) 74 - 99 mg/dL    Sodium 134 (L) 136 - 145 mmol/L    Potassium 3.6 3.5 - 5.3 mmol/L    Chloride 98 98 - 107 mmol/L    Bicarbonate 30 21 - 32 mmol/L    Anion Gap 10 10 - 20 mmol/L    Urea Nitrogen 11 6 - 23 mg/dL    Creatinine 0.62 0.50 - 1.05 mg/dL    eGFR 85 >60 mL/min/1.73m*2    Calcium 8.7 8.6 - 10.3 mg/dL   Magnesium   Result Value Ref Range    Magnesium 1.88 1.60 - 2.40 mg/dL   Transthoracic Echo (TTE) Complete   Result Value Ref Range    BSA 1.47 m2     *Note: Due to a large number of results and/or encounters for the requested time period, some results have not been displayed. A complete set of results can be found in Results Review.        Carotid duplex bilateral    Result Date: 10/30/2024           [place] [address]            Phone [phone]  Vascular Lab Report  [facility] US CAROTID ARTERY DUPLEX BILATERAL Patient Name:      NICOLE Lock Physician:  [id number] lEías Winter MD, RPVI Study Date:        10/29/2024           Ordering Provider:  [id number] SONJA BATISTA MRN/PID:           [id number]             Fellow: Accession#:        [id number]         Technologist:       Isabell Vidal RVYOAV Date of Birth/Age: 1935 / 89 years Technologist 2: Gender:            F                    Encounter#:         [id number] Admission Status:  Inpatient            Location Performed: [facility]  Diagnosis/ICD: Syncope and  collapse-R55 CPT Codes:     79086 Cerebrovascular Carotid Duplex scan complete  CONCLUSIONS: Right Carotid: Findings are consistent with less than 50% stenosis of the right proximal internal carotid artery. The internal carotid artery appears tortuous. Right external carotid artery appears patent with no evidence of stenosis. The right vertebral artery is patent with antegrade flow. No evidence of hemodynamically significant stenosis in the right subclavian artery. There is turbulent flow noted in the proximal common carotid artery which may indicate a more proximal stenosis. Left Carotid: Findings are consistent with less than 50% stenosis of the left proximal internal carotid artery. The internal carotid artery appears tortuous. There is turbulent flow noted in mid and distal internal carotid artery possibly due to vessel tortuosity. Left external carotid artery appears patent with no evidence of stenosis. The left vertebral artery is patent with antegrade flow. No evidence of hemodynamically significant stenosis in the left subclavian artery.  Imaging & Doppler Findings: Right Plaque Morph: The proximal right internal carotid artery demonstrates heterogenous plaque. The proximal right external carotid artery demonstrates heterogenous and calcified plaque. The distal right common carotid artery demonstrates heterogenous plaque. Left Plaque Morph: The proximal left internal carotid artery demonstrates heterogenous plaque. The proximal left external carotid artery demonstrates heterogenous plaque. The distal left common carotid artery demonstrates heterogenous plaque.   Right                        Left   PSV      EDV                PSV       cm/s           CCA P    79 cm/s 86 cm/s            CCA D    75 cm/s 83 cm/s  18 cm/s   ICA P    102 cm/s 16 cm/s 83 cm/s  22 cm/s   ICA M    124 cm/s 25 cm/s 82 cm/s  21 cm/s   ICA D    80 cm/s  16 cm/s 105 cm/s            ECA     192 cm/s 75 cm/s  14 cm/s Vertebral  84  cm/s  18 cm/s 80 cm/s          Subclavian 122 cm/s                Right Left ICA/CCA Ratio  1.0  1.4   [id number] OSCAR Toribio MD Electronically signed by [id number] OSCAR Toribio MD on 10/30/2024 at 9:00:56 PM  ** Final **     CT angio head and neck w and wo IV contrast    Result Date: 10/30/2024  Interpreted By:  Dario Sam, STUDY: CT ANGIO HEAD AND NECK W AND WO IV CONTRAST;  10/30/2024 4:15 pm   INDICATION: Signs/Symptoms:stroke on MRI on L corona radiata.     COMPARISON: 10/28/2024 brain CT and 10/29/2024 brain MR   ACCESSION NUMBER(S): [id number]   ORDERING CLINICIAN: ИРИНА LOPEZ   TECHNIQUE: Unenhanced CT images of the head were obtained. Subsequently, 75 ML of Omnipaque 350 was administered intravenously and axial images of the head and neck were acquired.  Coronal, sagittal, and 3-D reconstructions were provided for review. Carotid stenoses were determined using modified NASCET criteria.   FINDINGS:     CTA HEAD FINDINGS:   Anterior circulation: The left M1 segment has mild stenoses with moderate stenosis at the origin of the superior branch of the left middle cerebral artery.  The pre callosal segment of the left anterior cerebral artery has moderate to severe stenosis.   Posterior circulation: The right posterior cerebral is supplied primarily through the posterior communicating artery with moderate stenosis in the anterior P2 segment. The more distal left posterior cerebral artery has moderate stenosis as well.   CTA NECK FINDINGS:   Right carotid vessels: The distal right common carotid artery is non stenotic atherosclerotic plaque. The right common carotid bifurcation has calcified non stenotic plaque extending into the carotid bulb with calcified plaque causing 60% stenosis of the right external carotid artery origin. The internal carotid artery in the neck is normal. There is % stenosis  .   Left carotid vessels: The common carotid artery is normal. The left common carotid bifurcation has non  stenotic plaque extending into the internal carotid bulb. The internal carotid artery in the neck is normal. There is 0% stenosis  .   Vertebral vessels:  The visualized segments of the cervical vertebral arteries are normal in caliber.         1. the left anterior cerebral artery has severe stenosis adjacent to the genu of the corpus callosum with mild to moderate stenoses elsewhere in the cerebral arteries as noted.   2. No significant stenoses of the cervical carotid or vertebral arteries are noted.   MACRO: None   Signed by: Dario Sam 10/30/2024 4:48 PM Dictation workstation:   [id number]    MR brain wo IV contrast    Result Date: 10/29/2024  Interpreted By:  Surendra Hylton, STUDY: MR BRAIN WO IV CONTRAST;  10/29/2024 7:57 pm   INDICATION: Signs/Symptoms:TIA eval, arm numbness.     COMPARISON: CT head without contrast 10/28/2024; MRI brain 10/27/2023   ACCESSION NUMBER(S): [id number]   ORDERING CLINICIAN: ИРИНА LOPEZ   TECHNIQUE: Multiplanar, multi-sequence images of the brain were obtained without contrast.   FINDINGS:   Diffusion weighted images show new subcentimeter foci of diffusion restriction in the left corona radiata (x2) consistent with acute ischemic infarct. There is a new subcentimeter focus of diffusion restriction within the splenium of the corpus callosum just right of midline consistent with acute ischemic infarct.   There is redemonstration of cystic encephalomalacia in the posterior right temporal and parietal lobes. There is background moderate periventricular and subcortical hemispheric T2/FLAIR white matter hyperintensities are most compatible with chronic small vessel ischemic disease.   The major intracranial flow voids are preserved.   There is no acute intraparenchymal hemorrhage, mass, mass-effect, or an extra-axial fluid collection. On GRE images, there is susceptibility artifact within the posterior limb of the right internal capsule that does not correspond to any hyperdense  abnormality on the CT and likely represents sequela of remote hemorrhage..   The ventricular size and cerebral volume are age-concordant.   Normal morphology of midline structures. The craniovertebral junction is normal.   The orbits and globes are unremarkable.   The paranasal sinuses show no air-fluid levels or hemorrhage.   The mastoid air cells are clear.   There is hyperostosis frontalis.   Severe degenerative disc disease in the mid cervical spine.       New subcentimeter acute ischemic infarct in the left corona radiata (x2) and right aspect of the splenium of the corpus callosum.   The demonstrated large area of encephalomalacia in the right posterior temporal and parietal lobes and chronic focus of hemorrhage in the posterior limb of the right internal capsule.   MACRO: None.   Signed by: Surendra Hylton 10/29/2024 9:41 PM Dictation workstation:   [id number]    ECG 12 Lead    Result Date: 10/28/2024  Supraventricular tachycardia Marked ST abnormality, possible inferolateral subendocardial injury Abnormal ECG When compared with ECG of 26-SEP-2024 14:27, T wave inversion no longer evident in Inferior leads T wave inversion more evident in Lateral leads Confirmed by Sergey Bond ([id number]) on 10/28/2024 7:51:36 PM    Lower extremity venous duplex bilateral    Result Date: 10/28/2024  Interpreted By:  Dino De Paz, STUDY: [facility] LOWER EXTREMITY VENOUS DUPLEX BILATERAL;  10/28/2024 7:15 pm   INDICATION: Signs/Symptoms:extremity edema.   COMPARISON: None.   ACCESSION NUMBER(S): [id number]   ORDERING CLINICIAN: SONJA BATISTA   TECHNIQUE: Grayscale, color and spectral Doppler sonographic images of the bilateral lower extremity deep venous system.   FINDINGS: RIGHT: There is normal compressibility of the common femoral vein, saphenous femoral junction, profunda femoral vein and popliteal vein. The posterior tibial and peroneal veins  demonstrate normal color flow and compressibility. There is normal  spontaneous and phasic variation throughout the leg by spectral doppler.   LEFT: There is normal compressibility of the common femoral vein, saphenous femoral junction, profunda femoral vein and popliteal vein. The posterior tibial and peroneal veins  demonstrate normal color flow and compressibility.  There is normal spontaneous and phasic variation throughout the leg by spectral doppler.   OTHER FINDINGS: None.       No sonographic evidence DVT in the visualized vessels of bilateral lower extremities.   MACRO: None   Signed by: Dino De Paz 10/28/2024 7:48 PM Dictation workstation:   [id number]    CT angio chest for pulmonary embolism    Result Date: 10/28/2024  Interpreted By:  Richmond Barbosa, STUDY: CT ANGIO CHEST FOR PULMONARY EMBOLISM;  10/28/2024 2:05 pm   INDICATION: Signs/Symptoms:hypoxic, tachy.   COMPARISON: Chest CT 03/07/2022.   ACCESSION NUMBER(S): [id number]   ORDERING CLINICIAN: KARMA HAIDER   TECHNIQUE: Helical data acquisition of the chest was obtained contrast volume: 75 mL IV contrast Omnipaque 350.   Axial contiguous images were reformatted in coronal and sagittal planes. Axial and coronal MIP images were created and reviewed.   FINDINGS: POTENTIAL LIMITATIONS OF THE STUDY: Motion and mixing artifact which limits evaluation of the distal branch vessels.   HEART AND VESSELS: No discrete filling defects within the main pulmonary artery or its branches.   Main pulmonary trunk is upper normal limits, measuring 2.9 cm in size.   The thoracic aorta is normal in caliber. It is not well evaluated due to the phase of the contrast. Mild aortic valve atherosclerotic calcifications are present.   Severe coronary artery atherosclerotic calcifications and/or stents.   Mild cardiomegaly with biatrial enlargement.   No evidence of pericardial effusion.   MEDIASTINUM AND DONATO, LOWER NECK AND AXILLA: The visualized thyroid gland is within normal limits.   No evidence of thoracic lymphadenopathy by CT criteria.    There is moderate-size hiatal hernia and there is air-fluid level and mild circumferential wall thickening throughout the thoracic esophagus which is patulous. This is suggestive of achalasia versus reflux esophagitis.   LUNGS AND AIRWAYS: The trachea and central airways are patent. No endobronchial lesion.   Small to moderate-sized right greater than left pleural effusions. There is interseptal thickening with scattered ground-glass opacities suggestive of pulmonary venous congestion/interstitial edema. Mild bibasilar compression atelectasis.   UPPER ABDOMEN: Cholecystectomy change. Diffuse hepatic steatosis.   CHEST WALL AND OSSEOUS STRUCTURES: There are no suspicious osseous lesions. There is endplate reactive change in the lower thoracic spine with partially visualized posterior spinal fusions hardware and laminectomy in the lumbar spine.       1.  No evidence of pulmonary emboli. Severe coronary artery atherosclerotic calcifications. 2. Mild cardiomegaly with biatrial enlargement. Small to moderate-size right greater than left pleural effusions and bibasilar subsegmental atelectasis. Diffuse interseptal thickening and scattered central predominant ground glass opacification of the lungs suggestive of pulmonary edema. 3. Chronic patulous esophagus with new air-fluid throughout the thoracic esophagus suggestive of reflux esophagitis versus achalasia. Clinical correlation is recommended.     Signed by: Richmond Barbosa 10/28/2024 2:41 PM Dictation workstation:   [id number]    CT cervical spine wo IV contrast    Result Date: 10/28/2024  Interpreted By:  Sherlyn Jackson, STUDY: CT CERVICAL SPINE WO IV CONTRAST;  10/28/2024 10:16 am   INDICATION: Signs/Symptoms:fall.     COMPARISON: 07/20/2022   ACCESSION NUMBER(S): [id number]   ORDERING CLINICIAN: ANDREY COX   TECHNIQUE: Axial CT images of the cervical spine are obtained. Axial, coronal and sagittal reconstructions are provided for review.   FINDINGS:      Fractures: There is no evidence for an acute fracture of the cervical spine.   Vertebral Alignment: There is a 2-1/2 mm anterolisthesis of C3 with respect to C4 and a 2-1/2 mm retrolisthesis of C4 with respect to C5.   Craniocervical Junction: The odontoid process and craniocervical junction are intact.   Vertebrae/Disc Spaces:  The cervical vertebral body heights are intact. There is marked disc space narrowing at C4-5, C5-6, and C6-7 levels with moderately severe disc space narrowing at the C3-4 level. Vacuum disc is present at all of these levels. There is multilevel degenerative spondyloarthropathy seen bilaterally. There is also uncovertebral joint spurring seen on the right and on the left from C3-4 through the C6-7 levels with bilateral foraminal stenosis at C4-5, C5-6, and C6-7 levels. Central canal stenosis is present at the C4-5 level. A 1.2 cm sclerotic lesion is seen within the left pedicle and lamina of T2 extending into the spinous process without interval change.   Prevertebral/Paraspinal Soft Tissues: There are several hypodense thyroid nodules with the largest visualized measuring 1.1 cm in diameter within left thyroid lobe.         No evidence for an acute fracture or subluxation of the cervical spine.   There is chronic anterolisthesis of C3 on C4 and chronic retrolisthesis of C4 on C5 with multilevel degenerative disc disease and cervical spondylosis with central canal stenosis at C4-5 and multilevel foraminal stenosis bilaterally.   Bone island within left pedicle and lamina of T2 extending into the spinous process of T2.   Hypodense thyroid nodules.   MACRO: None   Signed by: Sherlyn Jackson 10/28/2024 11:00 AM Dictation workstation:   [id number]    CT head wo IV contrast    Result Date: 10/28/2024  Interpreted By:  Sherlyn Jackson, STUDY: CT HEAD WO IV CONTRAST;  10/28/2024 10:16 am   INDICATION: Signs/Symptoms: Fall several days earlier striking the head     COMPARISON: 10/26/2023   ACCESSION  NUMBER(S): [id number]   ORDERING CLINICIAN: ANDREY COX   TECHNIQUE: Noncontrast axial CT scan of head was performed. Angled reformats in brain and bone windows were generated. The images were reviewed in bone, brain, blood and soft tissue windows.   FINDINGS: CSF Spaces: The ventricles, sulci and basal cisterns are prominent indicating age-appropriate atrophy.. There is no extraaxial fluid collection.   Parenchyma: There is encephalomalacia and gliosis seen within the right parietal lobe posteriorly with transcortical extension. There is also diminished density within the right insula which is stable. No hyperdense MCA sign is present. There is calcified plaque within each carotid siphon. There is no mass effect or midline shift.  There is no intracranial hemorrhage.   Calvarium: The calvarium is unremarkable.   Paranasal sinuses and mastoids: Visualized paranasal sinuses and mastoids are clear.       Age-appropriate atrophy.   Encephalomalacia within the right posterior parietal lobe unchanged consistent with an old infarct at this site. Old lacunar infarct of the right insula is also identified.   No acute intracranial process.   MACRO: None     Signed by: Sherlyn Jackson 10/28/2024 10:46 AM Dictation workstation:   [id number]    XR chest 2 views    Result Date: 10/28/2024  Interpreted By:  Sherlyn Jackson, STUDY: XR CHEST 2 VIEWS 10/28/2024 9:45 am   INDICATION: Fall with left arm pain   COMPARISON: 09/26/2024   ACCESSION NUMBER(S): [id number]   ORDERING CLINICIAN: ANDREY COX   TECHNIQUE: AP erect view of the chest   FINDINGS: The heart is enlarged with atherosclerosis of the thoracic aorta noted. Low lung volumes are observed with crowding of the bronchovascular markings. No acute infiltrate is seen. No pleural abnormality is identified.   There is postoperative change from reverse shoulder arthroplasty on the right. There is osteoarthritis of the left shoulder affecting glenohumeral joint with joint space  narrowing, sclerosis, osteophytosis, and subchondral cyst formation.       Cardiomegaly without acute cardiopulmonary process. Low lung volumes.   Advanced osteoarthritis of the left shoulder affecting glenohumeral joint.   Signed by: Sherlyn Jackson 10/28/2024 10:33 AM Dictation workstation:   [id number]    XR pelvis 1-2 views    Result Date: 10/28/2024  Interpreted By:  Sherlyn Jackson, STUDY: XR PELVIS 1-2 VIEWS 10/28/2024 9:45 am   INDICATION: Pain and fall   COMPARISON: 05/22/2023   ACCESSION NUMBER(S): [id number]   ORDERING CLINICIAN: ANDREY COX   TECHNIQUE: AP view of the pelvis   FINDINGS: Postoperative change is seen as a result bilateral bipolar hip arthroplasties. There is no fracture or dislocation of either hip identified. Pelvic ring is intact. There are degenerative cystic changes within the pubic bones adjacent to the pubic symphysis unchanged. There is a stable 1 cm sclerotic lesion along the right SI joint most likely bone island.       No pelvic fracture.   Signed by: Sherlyn Jackson 10/28/2024 10:29 AM Dictation workstation:   [id number]    XR lumbar spine 2-3 views    Result Date: 10/28/2024  Interpreted By:  Sherlyn Jackson, STUDY: XR LUMBAR SPINE 2-3 VIEWS 10/28/2024 9:45 am   INDICATION: Fall several days ago with low back pain.   COMPARISON: None available.   ACCESSION NUMBER(S): [id number]   ORDERING CLINICIAN: ANDREY COX   TECHNIQUE: Three views of the lumbosacral spine are performed.   FINDINGS: There is a lumbar levoscoliosis observed with postoperative change consisting of midline decompressive laminectomy and posterior spinal fusion from L2 through S1. Bilateral pedicle screws are seen at L2, L3, L4, L5, and S1 with bilateral vertical stabilizing lodged and crosslink bar. The examination shows no evidence for loosening of the hardware or hardware fracture.   There is multilevel degenerative disc space narrowing with vacuum disc phenomenon. There is sclerosis and osteophytosis of the  endplates at T10-11, and T12-L1 levels. The vertebral bodies are of normal height. There is no anterolisthesis or retrolisthesis seen.   There is calcified plaque within the wall of the abdominal aorta and iliac arteries without aneurysmal dilatation.   There is postoperative change from bipolar hip arthroplasty bilaterally.       Status post midline decompressive laminectomy with posterior spinal fusion from L2 through S1   Lumbar levoscoliosis with multilevel degenerative disc disease and spondylosis most pronounced in the lower thoracic region and at the T12-L1 level.   Signed by: Sherlyn Jackson 10/28/2024 10:27 AM Dictation workstation:   [id number]     Visit Vitals  BP (!) 194/63 (BP Location: Right arm, Patient Position: Sitting)   Pulse 67   Temp 36.4 °C (97.5 °F) (Temporal)   Resp 15        Physical Exam  Vitals and nursing note reviewed.   Constitutional:       General: She is not in acute distress.     Appearance: She is not ill-appearing.   HENT:      Mouth/Throat:      Mouth: Mucous membranes are moist.      Pharynx: Oropharynx is clear.   Eyes:      General: No scleral icterus.     Extraocular Movements: Extraocular movements intact.   Cardiovascular:      Rate and Rhythm: Normal rate. Rhythm irregular.      Pulses: Normal pulses.   Pulmonary:      Effort: Pulmonary effort is normal. No respiratory distress.      Breath sounds: Normal breath sounds.   Abdominal:      General: There is no distension.      Palpations: Abdomen is soft.      Tenderness: There is no abdominal tenderness. There is no guarding.   Musculoskeletal:      Right lower leg: No edema.      Left lower leg: No edema.   Skin:     General: Skin is warm and dry.      Findings: Bruising present.   Neurological:      General: No focal deficit present.      Mental Status: She is alert and oriented to person, place, and time.   Psychiatric:         Mood and Affect: Mood normal.         Behavior: Behavior normal.         Thought Content: Thought  content normal.         Judgment: Judgment normal.          Assessment/Plan   IMP:    Acute CVA - on ASA, Eliquis and Plavix. Neuro consulted.   Acute Respiratory Failure with Hypoxia - improving - 2/2 CHF effusions, diuretic on hold due to hypotension, Bps are better today actually on the higher side  NSTEMI/CAD - on Plavix, ASA, statin  Acute Diastolic Heart Failure - improving, had echo today, interpretation pending  Syncope/Falls - 2/2 stroke vs hypotension?   PAF - hr controlled, on Eliquis  Esophagitis/Mod Hiatel Hernia - on PPI, hgb stable  Chronic Pain-followed by Dr Morales outpatient. Pt reports adequate pain control.     Palliative Care Encounter   DNRCCA/DNI/No ICU  Capable  Son Eduar is stated HPOA, alternate is daughter Tita.  Requested copy of documents from daughterTita.      10/31/2024  No changes today from a palliative perspective. Pain is adequately controlled. Goals of care established. Planning to dc home soon once specialty services sign off. Pt agreeable to [facility] for PT. Palliative will sign off. Please feel free to reach out if change in condition or other pall care needs arise.     Family meeting today with patient, spouse, daughter and son in law. Reviewed current conditions, falls, repeated hospitalizations. Reviewed frailty. Discussed concerns about plavix/eliquis use with esophagitis, anemia and despite use, patient with new findings of acute CVA. Concern that patient may continue to have repeat hospitalizations due to her risk. We also discussed her new unsteady gait and increased risk of falls. Patient is determined to go home and does not want to go to rehab for gait training. [facility] She also stated that she was essentially her spouse's eyes as he is legally blind. She is accepting of [facility], she has a medical alert button at home, and son cooks them meals and checks in routinely. I requested PT work with patient today with  family present to demonstrate unsteady gait and discuss increased risk of falls/trauma.   Patient very clear in her goal to return home and achieve her prior level of function. She is also very clear that she would not want to live in constant pain, does not want to live in a facility, does not want to live with significant loss of function, she does not want to suffer repeated hospitalizations. She had previously established a DNRCCA with her PCP and confirmed this again during our conversation. She values quality of life. We did briefly discuss hospice, and patient is not ready for this as she feels she was doing ok prior to admission. She is interested in house calls and the navigator program through the [facility] for additional support at home.     Advance Directives Info: Patient has advance directive, copy not in chart  Discharge Planning: as above  Palliative Care Team will continue to follow patient: as needed      Meg Brar, APRN-CNP

## 2024-10-31 NOTE — PROGRESS NOTES
Physical Therapy                 Therapy Communication Note    Patient Name: Rakan Cervantes  MRN: [id number]  Department: [facility] NONV1  Room: 10/10-A  Today's Date: 10/31/2024     Discipline: Physical Therapy    Missed Visit Reason: Missed Visit Reason: Other (Comment) (Pt off the floor at this time.)    Missed Time: Attempt    Comment:

## 2024-10-31 NOTE — PROGRESS NOTES
10/31/24 1205   Discharge Planning   Expected Discharge Disposition Home H     Accepted by [facility]. Declining SNF. Active with [facility] and the Hospice Navigator Program. Will need to confirm SOC with [facility] prior to discharge.     PLEASE NOTIFY CARE COORDINATION PRIOR TO DISCHARGE

## 2024-10-31 NOTE — ASSESSMENT & PLAN NOTE
CT imaging showed chronic patulous esophagus with new air-fluid throughout the thoracic esophagus suggestive of reflux esophagitis versus achalasia  Consult GI, appreciate recs-signed off  Continue PPI

## 2024-10-31 NOTE — PROGRESS NOTES
Rakan Price is a 89 y.o. female on day 3 of admission presenting with Sinus tachycardia.      Subjective   Patient seen and examined. Awake/alert/oriented. Denies chest pain, shortness of breath, fevers, chills, nausea, or vomiting. No abdominal discomfort.      Objective     Last Recorded Vitals  /65 (BP Location: Right arm, Patient Position: Lying)   Pulse 67   Temp 36.7 °C (98.1 °F) (Temporal)   Resp 15   Wt 51.6 kg (113 lb 12.1 oz)   SpO2 98%   Intake/Output last 3 Shifts:    Intake/Output Summary (Last 24 hours) at 10/31/2024 0908  Last data filed at 10/31/2024 0109  Gross per 24 hour   Intake 390.25 ml   Output 400 ml   Net -9.75 ml       Admission Weight  Weight: 50.8 kg (112 lb) (10/28/24 0856)    Daily Weight  10/31/24 : 51.6 kg (113 lb 12.1 oz)    Image Results  Carotid [place] [address]             Phone [phone]       Vascular Lab Report     [facility] US CAROTID ARTERY DUPLEX BILATERAL    Patient Name:      RAKAN PRICE       Reading Physician:  [id number] Elías Winter MD, RPVI  Study Date:        10/29/2024           Ordering Provider:  [id number] SONJA BATISTA  MRN/PID:           [id number]             Fellow:  Accession#:        [id number]         Technologist:       Isabell Vidal RVYOAV  Date of Birth/Age: 1935 / 89 years Technologist 2:  Gender:            F                    Encounter#:         [id number]  Admission Status:  Inpatient            Location Performed: [facility]       Diagnosis/ICD: Syncope and collapse-R55  CPT Codes:     07761 Cerebrovascular Carotid Duplex scan complete       CONCLUSIONS:  Right Carotid: Findings are consistent with less than 50% stenosis of the right proximal internal carotid artery. The internal carotid artery appears tortuous. Right external  carotid artery appears patent with no evidence of stenosis. The right vertebral artery is patent with antegrade flow. No evidence of hemodynamically significant stenosis in the right subclavian artery. There is turbulent flow noted in the proximal common carotid artery which may indicate a more proximal stenosis.  Left Carotid: Findings are consistent with less than 50% stenosis of the left proximal internal carotid artery. The internal carotid artery appears tortuous. There is turbulent flow noted in mid and distal internal carotid artery possibly due to vessel tortuosity. Left external carotid artery appears patent with no evidence of stenosis. The left vertebral artery is patent with antegrade flow. No evidence of hemodynamically significant stenosis in the left subclavian artery.     Imaging & Doppler Findings:  Right Plaque Morph: The proximal right internal carotid artery demonstrates heterogenous plaque. The proximal right external carotid artery demonstrates heterogenous and calcified plaque. The distal right common carotid artery demonstrates heterogenous plaque.  Left Plaque Morph: The proximal left internal carotid artery demonstrates heterogenous plaque. The proximal left external carotid artery demonstrates heterogenous plaque. The distal left common carotid artery demonstrates heterogenous plaque.      Right                        Left    PSV      EDV                PSV      EDV  118 cm/s           CCA P    79 cm/s  86 cm/s            CCA D    75 cm/s  83 cm/s  18 cm/s   ICA P    102 cm/s 16 cm/s  83 cm/s  22 cm/s   ICA M    124 cm/s 25 cm/s  82 cm/s  21 cm/s   ICA D    80 cm/s  16 cm/s  105 cm/s            ECA     192 cm/s  75 cm/s  14 cm/s Vertebral  84 cm/s  18 cm/s  80 cm/s          Subclavian 122 cm/s                     Right Left  ICA/CCA Ratio  1.0  1.4          [id number] Elías Winter MD, OSCAR  Electronically signed by [id number] Elías Winter MD, OSCAR on 10/30/2024 at 9:00:56 PM       ** Final **  CT  angio head and neck w and wo IV contrast  Narrative: Interpreted By:  Dario Sam,   STUDY:  CT ANGIO HEAD AND NECK W AND WO IV CONTRAST;  10/30/2024 4:15 pm      INDICATION:  Signs/Symptoms:stroke on MRI on L corona radiata.          COMPARISON:  10/28/2024 brain CT and 10/29/2024 brain MR      ACCESSION NUMBER(S):  [id number]      ORDERING CLINICIAN:  ИРИНА LOPEZ      TECHNIQUE:  Unenhanced CT images of the head were obtained. Subsequently, 75 ML  of Omnipaque 350 was administered intravenously and axial images of  the head and neck were acquired.  Coronal, sagittal, and 3-D  reconstructions were provided for review. Carotid stenoses were  determined using modified NASCET criteria.      FINDINGS:          CTA HEAD FINDINGS:      Anterior circulation: The left M1 segment has mild stenoses with  moderate stenosis at the origin of the superior branch of the left  middle cerebral artery.  The pre callosal segment of the left  anterior cerebral artery has moderate to severe stenosis.      Posterior circulation: The right posterior cerebral is supplied  primarily through the posterior communicating artery with moderate  stenosis in the anterior P2 segment. The more distal left posterior  cerebral artery has moderate stenosis as well.      CTA NECK FINDINGS:      Right carotid vessels: The distal right common carotid artery is non  stenotic atherosclerotic plaque. The right common carotid bifurcation  has calcified non stenotic plaque extending into the carotid bulb  with calcified plaque causing 60% stenosis of the right external  carotid artery origin. The internal carotid artery in the neck is  normal. There is % stenosis  .      Left carotid vessels: The common carotid artery is normal. The left  common carotid bifurcation has non stenotic plaque extending into the  internal carotid bulb. The internal carotid artery in the neck is  normal. There is 0% stenosis  .      Vertebral vessels:  The visualized segments of  the cervical vertebral  arteries are normal in caliber.          Impression: 1. the left anterior cerebral artery has severe stenosis adjacent to  the genu of the corpus callosum with mild to moderate stenoses  elsewhere in the cerebral arteries as noted.      2. No significant stenoses of the cervical carotid or vertebral  arteries are noted.      MACRO:  None      Signed by: Candelariokrystyna Flaco 10/30/2024 4:48 PM  Dictation workstation:   [id number]      Physical Exam  Vitals reviewed.   Constitutional:       Appearance: Normal appearance.   HENT:      Head: Normocephalic and atraumatic.   Eyes:      Extraocular Movements: Extraocular movements intact.      Conjunctiva/sclera: Conjunctivae normal.   Cardiovascular:      Rate and Rhythm: Normal rate and regular rhythm.   Pulmonary:      Effort: Pulmonary effort is normal.      Breath sounds: No wheezing, rhonchi or rales.      Comments: Breath sounds diminished T/O  Abdominal:      General: Bowel sounds are normal.      Palpations: Abdomen is soft.      Tenderness: There is no abdominal tenderness.   Skin:     General: Skin is warm and dry.   Neurological:      General: No focal deficit present.      Mental Status: She is alert and oriented to person, place, and time.         Relevant Results  Lab Results   Component Value Date    GLUCOSE 98 10/30/2024    CALCIUM 8.4 (L) 10/30/2024     (L) 10/30/2024    K 3.8 10/30/2024    CO2 31 10/30/2024    CL 99 10/30/2024    BUN 12 10/30/2024    CREATININE 0.63 10/30/2024      Lab Results   Component Value Date    WBC 5.9 10/31/2024    HGB 9.6 (L) 10/31/2024    HCT 30.8 (L) 10/31/2024    MCV 92 10/31/2024     10/31/2024    Carotid duplex bilateral  Result Date: 10/29/2024  PRELIMINARY CONCLUSIONS:    Right Carotid: Findings are consistent with less than 50% stenosis of the right proximal internal carotid artery. The internal carotid artery appears tortuous. Right external carotid artery appears patent with no evidence  of stenosis. The right vertebral artery is patent with antegrade flow. No evidence of hemodynamically significant stenosis in the right subclavian artery. There is turbulent flow noted in the proximal common carotid artery which may indicate a more proximal stenosis.   Left Carotid: Findings are consistent with less than 50% stenosis of the left proximal internal carotid artery. The internal carotid artery appears tortuous. There is turbulent flow noted in mid and distal internal carotid artery possibly due to vessel tortuosity. Left external carotid artery appears patent with no evidence of stenosis. The left vertebral artery is patent with antegrade flow. No evidence of hemodynamically significant stenosis in the left subclavian artery.     ECG 12 Lead  Result Date: 10/28/2024  Supraventricular tachycardia Marked ST abnormality, possible inferolateral subendocardial injury Abnormal ECG When compared with ECG of 26-SEP-2024 14:27, T wave inversion no longer evident in Inferior leads T wave inversion more evident in Lateral leads Confirmed by Sergey Bond ([id number]) on 10/28/2024 7:51:36 PM    Lower extremity venous duplex bilateral  Result Date: 10/28/2024  No sonographic evidence DVT in the visualized vessels of bilateral lower extremities.   MACRO: None   Signed by: Dino De Paz 10/28/2024 7:48 PM Dictation workstation:   [id number]    CT angio chest for pulmonary embolism  Result Date: 10/28/2024  1.  No evidence of pulmonary emboli. Severe coronary artery atherosclerotic calcifications. 2. Mild cardiomegaly with biatrial enlargement. Small to moderate-size right greater than left pleural effusions and bibasilar subsegmental atelectasis. Diffuse interseptal thickening and scattered central predominant ground glass opacification of the lungs suggestive of pulmonary edema. 3. Chronic patulous esophagus with new air-fluid throughout the thoracic esophagus suggestive of reflux esophagitis versus achalasia. Clinical  correlation is recommended.     Signed by: Richmond Barbosa 10/28/2024 2:41 PM Dictation workstation:   [id number]    CT cervical spine wo IV contrast  Result Date: 10/28/2024  No evidence for an acute fracture or subluxation of the cervical spine.   There is chronic anterolisthesis of C3 on C4 and chronic retrolisthesis of C4 on C5 with multilevel degenerative disc disease and cervical spondylosis with central canal stenosis at C4-5 and multilevel foraminal stenosis bilaterally.   Bone island within left pedicle and lamina of T2 extending into the spinous process of T2.   Hypodense thyroid nodules.   MACRO: None   Signed by: Sherlyn Jackson 10/28/2024 11:00 AM Dictation workstation:   [id number]    CT head wo IV contrast  Result Date: 10/28/2024  Age-appropriate atrophy.   Encephalomalacia within the right posterior parietal lobe unchanged consistent with an old infarct at this site. Old lacunar infarct of the right insula is also identified.   No acute intracranial process.   MACRO: None     Signed by: Sherlyn Jackson 10/28/2024 10:46 AM Dictation workstation:   [id number]    XR chest 2 views  Result Date: 10/28/2024  Cardiomegaly without acute cardiopulmonary process. Low lung volumes.   Advanced osteoarthritis of the left shoulder affecting glenohumeral joint.   Signed by: Sherlyn Jackson 10/28/2024 10:33 AM Dictation workstation:   [id number]    XR pelvis 1-2 views  Result Date: 10/28/2024  No pelvic fracture.   Signed by: Sherlyn Jackson 10/28/2024 10:29 AM Dictation workstation:   [id number]    XR lumbar spine 2-3 views  Result Date: 10/28/2024  Status post midline decompressive laminectomy with posterior spinal fusion from L2 through S1   Lumbar levoscoliosis with multilevel degenerative disc disease and spondylosis most pronounced in the lower thoracic region and at the T12-L1 level.   Signed by: Sherlyn Jackson 10/28/2024 10:27 AM Dictation workstation:   [id number]         Assessment/Plan      Assessment & Plan  CVA  "(cerebral vascular accident) (Multi)  Patient is on statin/apixaban/Plavix-continue  Patient fell at home, now with numbness in LUE-numbness resolved, still having back discomfort  MRI did show positive stroke  CTA head/neck showed left anterior cerebral artery with severe stenosis adjacent to the genu of the corpus callosum with mild to moderate stenoses elsewhere in the cerebral arteries-discussed with neurology, awaiting further recommendations  TTE  US carotid as above  Neurochecks  Consult neurology, appreciate recs  NSTEMI (non-ST elevated myocardial infarction) (Multi)  Troponin 43, 96, 600  Check lipid panel in the morning  Continue eliquis/statin/Plavix  Recent heart cath with stent placed in September of this year  TTE  Cardiology consulted, appreciate recs-recommending medical management, cardiac medications have been adjusted  Acute heart failure  Resume home cardiac medications  Daily weights  Strict I/Os    Echocardiogram  Diuresis on hold due to hypotension yesterday, today noted dark urine. Monitor urine output close  Consult cardiology, appreciate recommendations  Syncope  Patient reports \"blacking out\" multiple times at home resulting in falls  Fall precautions  Check Ortho's  Echocardiogram  Ultrasound of the carotids as above  Bilateral pleural effusion  Seen on CT imaging  Diuresis currently on hold due to hypotension  Respiratory status has improved  Cardiology and pulmonary consulted, appreciate recommendations  Sinus tachycardia  On admission  Given oral cardiac medications with improvement  Monitor on telemetry  PAF (paroxysmal atrial fibrillation) (Multi)  Monitor on telemetry  Continue antiarrhythmics and Eliquis  Erosive esophagitis  CT imaging showed chronic patulous esophagus with new air-fluid throughout the thoracic esophagus suggestive of reflux esophagitis versus achalasia  Consult GI, appreciate recs-signed off  Continue PPI  Essential hypertension  Resume home " antihypertensives  Monitor blood pressure close    Plan  Wean oxygen as tolerated  Diuresis on hold, monitor urine output close  Check Ortho's, TTE  DVT prophylaxis: Eliquis  Fall precautions, PT/OT consult  Cardiology, neurology, palliative, and pulmonary on consult, appreciate recs  GI signed off  CBC and BMP in the morning      Patient is a DNRCCA, DNI, no ICU. Family and patient met with pall/med yesterday. Patient declining SNF. Plan for home with [facility], referrals placed for Hospice navigator program, house calls, and healthy at home.                 Shiela Reyes, APRN-CNP

## 2024-10-31 NOTE — ASSESSMENT & PLAN NOTE
Patient is on statin/apixaban/Plavix-continue  Patient fell at home, now with numbness in LUE-numbness resolved, still having back discomfort  MRI did show positive stroke  CTA head/neck showed left anterior cerebral artery with severe stenosis adjacent to the genu of the corpus callosum with mild to moderate stenoses elsewhere in the cerebral arteries-discussed with neurology, awaiting further recommendations  TTE  US carotid as above  Neurochecks  Consult neurology, appreciate recs

## 2024-10-31 NOTE — ASSESSMENT & PLAN NOTE
Seen on CT imaging  Diuresis currently on hold due to hypotension  Respiratory status has improved  Cardiology and pulmonary consulted, appreciate recommendations

## 2024-11-01 ENCOUNTER — APPOINTMENT (OUTPATIENT)
Dept: RADIOLOGY | Facility: HOSPITAL | Age: 89
DRG: 280 | End: 2024-11-01
Payer: COMMERCIAL

## 2024-11-01 PROBLEM — R00.0 SINUS TACHYCARDIA: Status: RESOLVED | Noted: 2024-10-28 | Resolved: 2024-11-01

## 2024-11-01 PROBLEM — R33.9 URINARY RETENTION: Status: ACTIVE | Noted: 2024-11-01

## 2024-11-01 LAB
ANION GAP SERPL CALCULATED.3IONS-SCNC: 8 MMOL/L (ref 10–20)
AORTIC VALVE PEAK VELOCITY: 1.9 M/S
APPEARANCE UR: ABNORMAL
AV PEAK GRADIENT: 14 MMHG
AVA (PEAK VEL): 2.65 CM2
BILIRUB UR STRIP.AUTO-MCNC: NEGATIVE MG/DL
BUN SERPL-MCNC: 12 MG/DL (ref 6–23)
CALCIUM SERPL-MCNC: 8.6 MG/DL (ref 8.6–10.3)
CHLORIDE SERPL-SCNC: 97 MMOL/L (ref 98–107)
CO2 SERPL-SCNC: 33 MMOL/L (ref 21–32)
COLOR UR: ABNORMAL
CREAT SERPL-MCNC: 0.51 MG/DL (ref 0.5–1.05)
EGFRCR SERPLBLD CKD-EPI 2021: 89 ML/MIN/1.73M*2
EJECTION FRACTION APICAL 4 CHAMBER: 57.5
EJECTION FRACTION: 63 %
ERYTHROCYTE [DISTWIDTH] IN BLOOD BY AUTOMATED COUNT: 14.9 % (ref 11.5–14.5)
GLUCOSE SERPL-MCNC: 103 MG/DL (ref 74–99)
GLUCOSE UR STRIP.AUTO-MCNC: NORMAL MG/DL
HCT VFR BLD AUTO: 29.6 % (ref 36–46)
HGB BLD-MCNC: 9.6 G/DL (ref 12–16)
KETONES UR STRIP.AUTO-MCNC: NEGATIVE MG/DL
LEFT VENTRICLE INTERNAL DIMENSION DIASTOLE: 4.99 CM (ref 3.5–6)
LEFT VENTRICULAR OUTFLOW TRACT DIAMETER: 2.3 CM
LEUKOCYTE ESTERASE UR QL STRIP.AUTO: ABNORMAL
LV EJECTION FRACTION BIPLANE: 59 %
MCH RBC QN AUTO: 28.5 PG (ref 26–34)
MCHC RBC AUTO-ENTMCNC: 32.4 G/DL (ref 32–36)
MCV RBC AUTO: 88 FL (ref 80–100)
MITRAL VALVE E/A RATIO: 1.5
NITRITE UR QL STRIP.AUTO: NEGATIVE
NRBC BLD-RTO: 0 /100 WBCS (ref 0–0)
PH UR STRIP.AUTO: 6 [PH]
PLATELET # BLD AUTO: 221 X10*3/UL (ref 150–450)
POTASSIUM SERPL-SCNC: 3.7 MMOL/L (ref 3.5–5.3)
PROT UR STRIP.AUTO-MCNC: ABNORMAL MG/DL
RBC # BLD AUTO: 3.37 X10*6/UL (ref 4–5.2)
RBC # UR STRIP.AUTO: ABNORMAL /UL
RBC #/AREA URNS AUTO: >20 /HPF
RIGHT VENTRICLE FREE WALL PEAK S': 15 CM/S
RIGHT VENTRICLE PEAK SYSTOLIC PRESSURE: 52.3 MMHG
SODIUM SERPL-SCNC: 134 MMOL/L (ref 136–145)
SP GR UR STRIP.AUTO: 1.03
SQUAMOUS #/AREA URNS AUTO: ABNORMAL /HPF
TRICUSPID ANNULAR PLANE SYSTOLIC EXCURSION: 2.1 CM
UROBILINOGEN UR STRIP.AUTO-MCNC: NORMAL MG/DL
WBC # BLD AUTO: 5.1 X10*3/UL (ref 4.4–11.3)
WBC #/AREA URNS AUTO: ABNORMAL /HPF

## 2024-11-01 PROCEDURE — 71045 X-RAY EXAM CHEST 1 VIEW: CPT

## 2024-11-01 PROCEDURE — 2500000002 HC RX 250 W HCPCS SELF ADMINISTERED DRUGS (ALT 637 FOR MEDICARE OP, ALT 636 FOR OP/ED)

## 2024-11-01 PROCEDURE — 2500000001 HC RX 250 WO HCPCS SELF ADMINISTERED DRUGS (ALT 637 FOR MEDICARE OP)

## 2024-11-01 PROCEDURE — 99232 SBSQ HOSP IP/OBS MODERATE 35: CPT | Performed by: NURSE PRACTITIONER

## 2024-11-01 PROCEDURE — 2500000004 HC RX 250 GENERAL PHARMACY W/ HCPCS (ALT 636 FOR OP/ED): Performed by: NURSE PRACTITIONER

## 2024-11-01 PROCEDURE — 36415 COLL VENOUS BLD VENIPUNCTURE: CPT | Performed by: NURSE PRACTITIONER

## 2024-11-01 PROCEDURE — 99233 SBSQ HOSP IP/OBS HIGH 50: CPT | Performed by: INTERNAL MEDICINE

## 2024-11-01 PROCEDURE — 81001 URINALYSIS AUTO W/SCOPE: CPT | Performed by: NURSE PRACTITIONER

## 2024-11-01 PROCEDURE — 97116 GAIT TRAINING THERAPY: CPT | Mod: GP

## 2024-11-01 PROCEDURE — 2500000001 HC RX 250 WO HCPCS SELF ADMINISTERED DRUGS (ALT 637 FOR MEDICARE OP): Performed by: INTERNAL MEDICINE

## 2024-11-01 PROCEDURE — 2500000005 HC RX 250 GENERAL PHARMACY W/O HCPCS: Performed by: NURSE PRACTITIONER

## 2024-11-01 PROCEDURE — 85027 COMPLETE CBC AUTOMATED: CPT | Performed by: NURSE PRACTITIONER

## 2024-11-01 PROCEDURE — 99232 SBSQ HOSP IP/OBS MODERATE 35: CPT | Performed by: STUDENT IN AN ORGANIZED HEALTH CARE EDUCATION/TRAINING PROGRAM

## 2024-11-01 PROCEDURE — 2500000001 HC RX 250 WO HCPCS SELF ADMINISTERED DRUGS (ALT 637 FOR MEDICARE OP): Performed by: NURSE PRACTITIONER

## 2024-11-01 PROCEDURE — 97110 THERAPEUTIC EXERCISES: CPT | Mod: GP

## 2024-11-01 PROCEDURE — 2060000001 HC INTERMEDIATE ICU ROOM DAILY

## 2024-11-01 PROCEDURE — 87086 URINE CULTURE/COLONY COUNT: CPT | Mod: WESLAB | Performed by: NURSE PRACTITIONER

## 2024-11-01 PROCEDURE — 71045 X-RAY EXAM CHEST 1 VIEW: CPT | Performed by: RADIOLOGY

## 2024-11-01 PROCEDURE — 97535 SELF CARE MNGMENT TRAINING: CPT | Mod: GO,CO

## 2024-11-01 PROCEDURE — 80048 BASIC METABOLIC PNL TOTAL CA: CPT | Performed by: NURSE PRACTITIONER

## 2024-11-01 RX ORDER — LIDOCAINE 560 MG/1
3 PATCH PERCUTANEOUS; TOPICAL; TRANSDERMAL DAILY
Status: DISCONTINUED | OUTPATIENT
Start: 2024-11-01 | End: 2024-11-04 | Stop reason: HOSPADM

## 2024-11-01 RX ORDER — CEFTRIAXONE 1 G/50ML
1 INJECTION, SOLUTION INTRAVENOUS NIGHTLY
Status: DISCONTINUED | OUTPATIENT
Start: 2024-11-01 | End: 2024-11-04 | Stop reason: HOSPADM

## 2024-11-01 RX ORDER — ALBUTEROL SULFATE 0.83 MG/ML
2.5 SOLUTION RESPIRATORY (INHALATION) EVERY 6 HOURS PRN
Status: DISCONTINUED | OUTPATIENT
Start: 2024-11-01 | End: 2024-11-04 | Stop reason: HOSPADM

## 2024-11-01 ASSESSMENT — COGNITIVE AND FUNCTIONAL STATUS - GENERAL
MOVING TO AND FROM BED TO CHAIR: A LITTLE
DRESSING REGULAR LOWER BODY CLOTHING: A LITTLE
STANDING UP FROM CHAIR USING ARMS: A LITTLE
CLIMB 3 TO 5 STEPS WITH RAILING: A LITTLE
EATING MEALS: A LITTLE
DRESSING REGULAR UPPER BODY CLOTHING: A LITTLE
EATING MEALS: A LITTLE
CLIMB 3 TO 5 STEPS WITH RAILING: A LOT
MOBILITY SCORE: 19
TURNING FROM BACK TO SIDE WHILE IN FLAT BAD: A LITTLE
STANDING UP FROM CHAIR USING ARMS: A LITTLE
DAILY ACTIVITIY SCORE: 18
WALKING IN HOSPITAL ROOM: A LITTLE
MOBILITY SCORE: 17
DRESSING REGULAR UPPER BODY CLOTHING: A LITTLE
PERSONAL GROOMING: A LITTLE
MOVING TO AND FROM BED TO CHAIR: A LITTLE
HELP NEEDED FOR BATHING: A LITTLE
TOILETING: A LITTLE
DAILY ACTIVITIY SCORE: 17
MOVING FROM LYING ON BACK TO SITTING ON SIDE OF FLAT BED WITH BEDRAILS: A LITTLE
TOILETING: A LOT
DRESSING REGULAR LOWER BODY CLOTHING: A LITTLE
TURNING FROM BACK TO SIDE WHILE IN FLAT BAD: A LITTLE
HELP NEEDED FOR BATHING: A LITTLE
WALKING IN HOSPITAL ROOM: A LITTLE
PERSONAL GROOMING: A LITTLE

## 2024-11-01 ASSESSMENT — PAIN SCALES - GENERAL
PAINLEVEL_OUTOF10: 0 - NO PAIN

## 2024-11-01 ASSESSMENT — PAIN - FUNCTIONAL ASSESSMENT
PAIN_FUNCTIONAL_ASSESSMENT: 0-10

## 2024-11-01 ASSESSMENT — ACTIVITIES OF DAILY LIVING (ADL)
HOME_MANAGEMENT_TIME_ENTRY: 34
BATHING_LEVEL_OF_ASSISTANCE: CLOSE SUPERVISION

## 2024-11-01 NOTE — ASSESSMENT & PLAN NOTE
-Acute ischemic infarct in the left corona radiata (x2) and right aspect of the splenium of the corpus callosum  -left anterior cerebral artery has severe stenosis  CT imaging and MRI as above  TTE pending results  US carotid as above  Neurochecks  Consult neurology, appreciate recs-would change Plavix t Brilinta however would place patient at higher bleeding risk, due to high risk of falls and declining SNF, does not recommend changing.  Stable from neuro standpoint-follow up outpatient

## 2024-11-01 NOTE — PROGRESS NOTES
Palliative Care Progress Note    Date of Admission: 10/28/2024    Patient is a 89 y.o. female admitted with Sinus tachycardia.     Patient up ambulating with walker and 2 L of oxygen with physical therapy in hallways.  She reports pain to her right lower back extending down to her right thigh after sitting back down.  She denies any shortness of breath.  She did have a urinary catheter placed with dark lilly output, currently clamped to obtain urine sample.    Scheduled medications  acetaminophen, 650 mg, oral, TID  amLODIPine, 5 mg, oral, Daily  apixaban, 2.5 mg, oral, BID  ascorbic acid, 125 mg, oral, Daily  atorvastatin, 80 mg, oral, Nightly  carvedilol, 12.5 mg, oral, BID  cholecalciferol, 2,000 Units, oral, Daily  clopidogrel, 75 mg, oral, Daily  docusate sodium, 100 mg, oral, BID  DULoxetine, 30 mg, oral, Daily with evening meal  ferrous sulfate (325 mg ferrous sulfate), 65 mg of iron, oral, Daily with breakfast  [Held by provider] furosemide, 20 mg, intravenous, Daily  gabapentin, 300 mg, oral, BID  hydroxychloroquine, 200 mg, oral, Daily  isosorbide mononitrate ER, 120 mg, oral, Daily  lidocaine, 2 patch, transdermal, Daily  losartan, 100 mg, oral, Daily  magnesium oxide, 400 mg, oral, BID  magnesium sulfate, 1 g, intravenous, Once  oxygen, , inhalation, Continuous - Inhalation  pantoprazole, 40 mg, oral, BID  polyethylene glycol, 17 g, oral, Daily  ranolazine, 500 mg, oral, BID  sucralfate, 1 g, oral, q6h ZEINAB  sulfaSALAzine, 500 mg, oral, BID      Continuous medications     PRN medications  PRN medications: benzocaine-menthol, bisacodyl, cyclobenzaprine     Results for orders placed or performed during the hospital encounter of 10/28/24 (from the past 24 hours)   CBC   Result Value Ref Range    WBC 5.1 4.4 - 11.3 x10*3/uL    nRBC 0.0 0.0 - 0.0 /100 WBCs    RBC 3.37 (L) 4.00 - 5.20 x10*6/uL    Hemoglobin 9.6 (L) 12.0 - 16.0 g/dL    Hematocrit 29.6 (L) 36.0 - 46.0 %    MCV 88 80 - 100 fL    MCH 28.5 26.0 -  34.0 pg    MCHC 32.4 32.0 - 36.0 g/dL    RDW 14.9 (H) 11.5 - 14.5 %    Platelets 221 150 - 450 x10*3/uL   Basic Metabolic Panel   Result Value Ref Range    Glucose 103 (H) 74 - 99 mg/dL    Sodium 134 (L) 136 - 145 mmol/L    Potassium 3.7 3.5 - 5.3 mmol/L    Chloride 97 (L) 98 - 107 mmol/L    Bicarbonate 33 (H) 21 - 32 mmol/L    Anion Gap 8 (L) 10 - 20 mmol/L    Urea Nitrogen 12 6 - 23 mg/dL    Creatinine 0.51 0.50 - 1.05 mg/dL    eGFR 89 >60 mL/min/1.73m*2    Calcium 8.6 8.6 - 10.3 mg/dL     *Note: Due to a large number of results and/or encounters for the requested time period, some results have not been displayed. A complete set of results can be found in Results Review.        Carotid duplex bilateral    Result Date: 10/30/2024           [place] [address]            Phone [phone]  Vascular Lab Report  [facility] US CAROTID ARTERY DUPLEX BILATERAL Patient Name:      NICOLEMACK PRICE       [place] Physician:  [id number] Elías Winter MD, RPVI Study Date:        10/29/2024           Ordering Provider:  [id number] SONJA BATISTA MRN/PID:           [id number]             Fellow: Accession#:        [id number]         Technologist:       Isabell Vidal RVT Date of Birth/Age: 1935 / 89 years Technologist 2: Gender:            F                    Encounter#:         [id number] Admission Status:  Inpatient            Location Performed: [facility]  Diagnosis/ICD: Syncope and collapse-R55 CPT Codes:     68503 Cerebrovascular Carotid Duplex scan complete  CONCLUSIONS: Right Carotid: Findings are consistent with less than 50% stenosis of the right proximal internal carotid artery. The internal carotid artery appears tortuous. Right external carotid artery appears patent with no evidence of stenosis. The right vertebral artery is patent with antegrade  flow. No evidence of hemodynamically significant stenosis in the right subclavian artery. There is turbulent flow noted in the proximal common carotid artery which may indicate a more proximal stenosis. Left Carotid: Findings are consistent with less than 50% stenosis of the left proximal internal carotid artery. The internal carotid artery appears tortuous. There is turbulent flow noted in mid and distal internal carotid artery possibly due to vessel tortuosity. Left external carotid artery appears patent with no evidence of stenosis. The left vertebral artery is patent with antegrade flow. No evidence of hemodynamically significant stenosis in the left subclavian artery.  Imaging & Doppler Findings: Right Plaque Morph: The proximal right internal carotid artery demonstrates heterogenous plaque. The proximal right external carotid artery demonstrates heterogenous and calcified plaque. The distal right common carotid artery demonstrates heterogenous plaque. Left Plaque Morph: The proximal left internal carotid artery demonstrates heterogenous plaque. The proximal left external carotid artery demonstrates heterogenous plaque. The distal left common carotid artery demonstrates heterogenous plaque.   Right                        Left   PSV      EDV                PSV       cm/s           CCA P    79 cm/s 86 cm/s            CCA D    75 cm/s 83 cm/s  18 cm/s   ICA P    102 cm/s 16 cm/s 83 cm/s  22 cm/s   ICA M    124 cm/s 25 cm/s 82 cm/s  21 cm/s   ICA D    80 cm/s  16 cm/s 105 cm/s            ECA     192 cm/s 75 cm/s  14 cm/s Vertebral  84 cm/s  18 cm/s 80 cm/s          Subclavian 122 cm/s                Right Left ICA/CCA Ratio  1.0  1.4   [id number] OSCAR Toribio MD Electronically signed by [id number] OSCAR Toribio MD on 10/30/2024 at 9:00:56 PM  ** Final **     CT angio head and neck w and wo IV contrast    Result Date: 10/30/2024  Interpreted By:  Dario Sam, STUDY: CT ANGIO HEAD AND NECK W AND WO IV  CONTRAST;  10/30/2024 4:15 pm   INDICATION: Signs/Symptoms:stroke on MRI on L corona radiata.     COMPARISON: 10/28/2024 brain CT and 10/29/2024 brain MR   ACCESSION NUMBER(S): [id number]   ORDERING CLINICIAN: ИРИНА LOPEZ   TECHNIQUE: Unenhanced CT images of the head were obtained. Subsequently, 75 ML of Omnipaque 350 was administered intravenously and axial images of the head and neck were acquired.  Coronal, sagittal, and 3-D reconstructions were provided for review. Carotid stenoses were determined using modified NASCET criteria.   FINDINGS:     CTA HEAD FINDINGS:   Anterior circulation: The left M1 segment has mild stenoses with moderate stenosis at the origin of the superior branch of the left middle cerebral artery.  The pre callosal segment of the left anterior cerebral artery has moderate to severe stenosis.   Posterior circulation: The right posterior cerebral is supplied primarily through the posterior communicating artery with moderate stenosis in the anterior P2 segment. The more distal left posterior cerebral artery has moderate stenosis as well.   CTA NECK FINDINGS:   Right carotid vessels: The distal right common carotid artery is non stenotic atherosclerotic plaque. The right common carotid bifurcation has calcified non stenotic plaque extending into the carotid bulb with calcified plaque causing 60% stenosis of the right external carotid artery origin. The internal carotid artery in the neck is normal. There is % stenosis  .   Left carotid vessels: The common carotid artery is normal. The left common carotid bifurcation has non stenotic plaque extending into the internal carotid bulb. The internal carotid artery in the neck is normal. There is 0% stenosis  .   Vertebral vessels:  The visualized segments of the cervical vertebral arteries are normal in caliber.         1. the left anterior cerebral artery has severe stenosis adjacent to the genu of the corpus callosum with mild to moderate stenoses  elsewhere in the cerebral arteries as noted.   2. No significant stenoses of the cervical carotid or vertebral arteries are noted.   MACRO: None   Signed by: Dario Sam 10/30/2024 4:48 PM Dictation workstation:   [id number]    MR brain wo IV contrast    Result Date: 10/29/2024  Interpreted By:  Surendra Hylton, STUDY: MR BRAIN WO IV CONTRAST;  10/29/2024 7:57 pm   INDICATION: Signs/Symptoms:TIA eval, arm numbness.     COMPARISON: CT head without contrast 10/28/2024; MRI brain 10/27/2023   ACCESSION NUMBER(S): [id number]   ORDERING CLINICIAN: ИРИНА LOPEZ   TECHNIQUE: Multiplanar, multi-sequence images of the brain were obtained without contrast.   FINDINGS:   Diffusion weighted images show new subcentimeter foci of diffusion restriction in the left corona radiata (x2) consistent with acute ischemic infarct. There is a new subcentimeter focus of diffusion restriction within the splenium of the corpus callosum just right of midline consistent with acute ischemic infarct.   There is redemonstration of cystic encephalomalacia in the posterior right temporal and parietal lobes. There is background moderate periventricular and subcortical hemispheric T2/FLAIR white matter hyperintensities are most compatible with chronic small vessel ischemic disease.   The major intracranial flow voids are preserved.   There is no acute intraparenchymal hemorrhage, mass, mass-effect, or an extra-axial fluid collection. On GRE images, there is susceptibility artifact within the posterior limb of the right internal capsule that does not correspond to any hyperdense abnormality on the CT and likely represents sequela of remote hemorrhage..   The ventricular size and cerebral volume are age-concordant.   Normal morphology of midline structures. The craniovertebral junction is normal.   The orbits and globes are unremarkable.   The paranasal sinuses show no air-fluid levels or hemorrhage.   The mastoid air cells are clear.   There is  hyperostosis frontalis.   Severe degenerative disc disease in the mid cervical spine.       New subcentimeter acute ischemic infarct in the left corona radiata (x2) and right aspect of the splenium of the corpus callosum.   The demonstrated large area of encephalomalacia in the right posterior temporal and parietal lobes and chronic focus of hemorrhage in the posterior limb of the right internal capsule.   MACRO: None.   Signed by: Surendra Hylton 10/29/2024 9:41 PM Dictation workstation:   [id number]    ECG 12 Lead    Result Date: 10/28/2024  Supraventricular tachycardia Marked ST abnormality, possible inferolateral subendocardial injury Abnormal ECG When compared with ECG of 26-SEP-2024 14:27, T wave inversion no longer evident in Inferior leads T wave inversion more evident in Lateral leads Confirmed by Sergey Bond ([id number]) on 10/28/2024 7:51:36 PM    Lower extremity venous duplex bilateral    Result Date: 10/28/2024  Interpreted By:  Dino De Paz, STUDY: [facility] LOWER EXTREMITY VENOUS DUPLEX BILATERAL;  10/28/2024 7:15 pm   INDICATION: Signs/Symptoms:extremity edema.   COMPARISON: None.   ACCESSION NUMBER(S): [id number]   ORDERING CLINICIAN: SONJA BATISTA   TECHNIQUE: Grayscale, color and spectral Doppler sonographic images of the bilateral lower extremity deep venous system.   FINDINGS: RIGHT: There is normal compressibility of the common femoral vein, saphenous femoral junction, profunda femoral vein and popliteal vein. The posterior tibial and peroneal veins  demonstrate normal color flow and compressibility. There is normal spontaneous and phasic variation throughout the leg by spectral doppler.   LEFT: There is normal compressibility of the common femoral vein, saphenous femoral junction, profunda femoral vein and popliteal vein. The posterior tibial and peroneal veins  demonstrate normal color flow and compressibility.  There is normal spontaneous and phasic variation throughout the leg by  spectral doppler.   OTHER FINDINGS: None.       No sonographic evidence DVT in the visualized vessels of bilateral lower extremities.   MACRO: None   Signed by: Dino De Paz 10/28/2024 7:48 PM Dictation workstation:   [id number]    CT angio chest for pulmonary embolism    Result Date: 10/28/2024  Interpreted By:  Richmond Barbosa, STUDY: CT ANGIO CHEST FOR PULMONARY EMBOLISM;  10/28/2024 2:05 pm   INDICATION: Signs/Symptoms:hypoxic, tachy.   COMPARISON: Chest CT 03/07/2022.   ACCESSION NUMBER(S): [id number]   ORDERING CLINICIAN: KARMA HAIDER   TECHNIQUE: Helical data acquisition of the chest was obtained contrast volume: 75 mL IV contrast Omnipaque 350.   Axial contiguous images were reformatted in coronal and sagittal planes. Axial and coronal MIP images were created and reviewed.   FINDINGS: POTENTIAL LIMITATIONS OF THE STUDY: Motion and mixing artifact which limits evaluation of the distal branch vessels.   HEART AND VESSELS: No discrete filling defects within the main pulmonary artery or its branches.   Main pulmonary trunk is upper normal limits, measuring 2.9 cm in size.   The thoracic aorta is normal in caliber. It is not well evaluated due to the phase of the contrast. Mild aortic valve atherosclerotic calcifications are present.   Severe coronary artery atherosclerotic calcifications and/or stents.   Mild cardiomegaly with biatrial enlargement.   No evidence of pericardial effusion.   MEDIASTINUM AND DONATO, LOWER NECK AND AXILLA: The visualized thyroid gland is within normal limits.   No evidence of thoracic lymphadenopathy by CT criteria.   There is moderate-size hiatal hernia and there is air-fluid level and mild circumferential wall thickening throughout the thoracic esophagus which is patulous. This is suggestive of achalasia versus reflux esophagitis.   LUNGS AND AIRWAYS: The trachea and central airways are patent. No endobronchial lesion.   Small to moderate-sized right greater than left pleural  effusions. There is interseptal thickening with scattered ground-glass opacities suggestive of pulmonary venous congestion/interstitial edema. Mild bibasilar compression atelectasis.   UPPER ABDOMEN: Cholecystectomy change. Diffuse hepatic steatosis.   CHEST WALL AND OSSEOUS STRUCTURES: There are no suspicious osseous lesions. There is endplate reactive change in the lower thoracic spine with partially visualized posterior spinal fusions hardware and laminectomy in the lumbar spine.       1.  No evidence of pulmonary emboli. Severe coronary artery atherosclerotic calcifications. 2. Mild cardiomegaly with biatrial enlargement. Small to moderate-size right greater than left pleural effusions and bibasilar subsegmental atelectasis. Diffuse interseptal thickening and scattered central predominant ground glass opacification of the lungs suggestive of pulmonary edema. 3. Chronic patulous esophagus with new air-fluid throughout the thoracic esophagus suggestive of reflux esophagitis versus achalasia. Clinical correlation is recommended.     Signed by: Richmond Barbosa 10/28/2024 2:41 PM Dictation workstation:   [id number]    CT cervical spine wo IV contrast    Result Date: 10/28/2024  Interpreted By:  Sherlyn Jackson, STUDY: CT CERVICAL SPINE WO IV CONTRAST;  10/28/2024 10:16 am   INDICATION: Signs/Symptoms:fall.     COMPARISON: 07/20/2022   ACCESSION NUMBER(S): [id number]   ORDERING CLINICIAN: ANDREY COX   TECHNIQUE: Axial CT images of the cervical spine are obtained. Axial, coronal and sagittal reconstructions are provided for review.   FINDINGS:     Fractures: There is no evidence for an acute fracture of the cervical spine.   Vertebral Alignment: There is a 2-1/2 mm anterolisthesis of C3 with respect to C4 and a 2-1/2 mm retrolisthesis of C4 with respect to C5.   Craniocervical Junction: The odontoid process and craniocervical junction are intact.   Vertebrae/Disc Spaces:  The cervical vertebral body heights are  intact. There is marked disc space narrowing at C4-5, C5-6, and C6-7 levels with moderately severe disc space narrowing at the C3-4 level. Vacuum disc is present at all of these levels. There is multilevel degenerative spondyloarthropathy seen bilaterally. There is also uncovertebral joint spurring seen on the right and on the left from C3-4 through the C6-7 levels with bilateral foraminal stenosis at C4-5, C5-6, and C6-7 levels. Central canal stenosis is present at the C4-5 level. A 1.2 cm sclerotic lesion is seen within the left pedicle and lamina of T2 extending into the spinous process without interval change.   Prevertebral/Paraspinal Soft Tissues: There are several hypodense thyroid nodules with the largest visualized measuring 1.1 cm in diameter within left thyroid lobe.         No evidence for an acute fracture or subluxation of the cervical spine.   There is chronic anterolisthesis of C3 on C4 and chronic retrolisthesis of C4 on C5 with multilevel degenerative disc disease and cervical spondylosis with central canal stenosis at C4-5 and multilevel foraminal stenosis bilaterally.   Bone island within left pedicle and lamina of T2 extending into the spinous process of T2.   Hypodense thyroid nodules.   MACRO: None   Signed by: Sherlyn Jackson 10/28/2024 11:00 AM Dictation workstation:   [id number]    CT head wo IV contrast    Result Date: 10/28/2024  Interpreted By:  Sherlyn Jackson, STUDY: CT HEAD WO IV CONTRAST;  10/28/2024 10:16 am   INDICATION: Signs/Symptoms: Fall several days earlier striking the head     COMPARISON: 10/26/2023   ACCESSION NUMBER(S): [id number]   ORDERING CLINICIAN: ANDREY COX   TECHNIQUE: Noncontrast axial CT scan of head was performed. Angled reformats in brain and bone windows were generated. The images were reviewed in bone, brain, blood and soft tissue windows.   FINDINGS: CSF Spaces: The ventricles, sulci and basal cisterns are prominent indicating age-appropriate atrophy.. There is  no extraaxial fluid collection.   Parenchyma: There is encephalomalacia and gliosis seen within the right parietal lobe posteriorly with transcortical extension. There is also diminished density within the right insula which is stable. No hyperdense MCA sign is present. There is calcified plaque within each carotid siphon. There is no mass effect or midline shift.  There is no intracranial hemorrhage.   Calvarium: The calvarium is unremarkable.   Paranasal sinuses and mastoids: Visualized paranasal sinuses and mastoids are clear.       Age-appropriate atrophy.   Encephalomalacia within the right posterior parietal lobe unchanged consistent with an old infarct at this site. Old lacunar infarct of the right insula is also identified.   No acute intracranial process.   MACRO: None     Signed by: Sherlyn Jackson 10/28/2024 10:46 AM Dictation workstation:   [id number]    XR chest 2 views    Result Date: 10/28/2024  Interpreted By:  Sherlyn Jackson, STUDY: XR CHEST 2 VIEWS 10/28/2024 9:45 am   INDICATION: Fall with left arm pain   COMPARISON: 09/26/2024   ACCESSION NUMBER(S): [id number]   ORDERING CLINICIAN: ANDREY COX   TECHNIQUE: AP erect view of the chest   FINDINGS: The heart is enlarged with atherosclerosis of the thoracic aorta noted. Low lung volumes are observed with crowding of the bronchovascular markings. No acute infiltrate is seen. No pleural abnormality is identified.   There is postoperative change from reverse shoulder arthroplasty on the right. There is osteoarthritis of the left shoulder affecting glenohumeral joint with joint space narrowing, sclerosis, osteophytosis, and subchondral cyst formation.       Cardiomegaly without acute cardiopulmonary process. Low lung volumes.   Advanced osteoarthritis of the left shoulder affecting glenohumeral joint.   Signed by: Sherlyn Jackson 10/28/2024 10:33 AM Dictation workstation:   [id number]    XR pelvis 1-2 views    Result Date: 10/28/2024  Interpreted By:  Manuel  Sherlyn, STUDY: XR PELVIS 1-2 VIEWS 10/28/2024 9:45 am   INDICATION: Pain and fall   COMPARISON: 05/22/2023   ACCESSION NUMBER(S): [id number]   ORDERING CLINICIAN: ANDREY COX   TECHNIQUE: AP view of the pelvis   FINDINGS: Postoperative change is seen as a result bilateral bipolar hip arthroplasties. There is no fracture or dislocation of either hip identified. Pelvic ring is intact. There are degenerative cystic changes within the pubic bones adjacent to the pubic symphysis unchanged. There is a stable 1 cm sclerotic lesion along the right SI joint most likely bone island.       No pelvic fracture.   Signed by: Sherlyn Jackson 10/28/2024 10:29 AM Dictation workstation:   [id number]    XR lumbar spine 2-3 views    Result Date: 10/28/2024  Interpreted By:  Sherlyn Jackson, STUDY: XR LUMBAR SPINE 2-3 VIEWS 10/28/2024 9:45 am   INDICATION: Fall several days ago with low back pain.   COMPARISON: None available.   ACCESSION NUMBER(S): [id number]   ORDERING CLINICIAN: ANDREY COX   TECHNIQUE: Three views of the lumbosacral spine are performed.   FINDINGS: There is a lumbar levoscoliosis observed with postoperative change consisting of midline decompressive laminectomy and posterior spinal fusion from L2 through S1. Bilateral pedicle screws are seen at L2, L3, L4, L5, and S1 with bilateral vertical stabilizing lodged and crosslink bar. The examination shows no evidence for loosening of the hardware or hardware fracture.   There is multilevel degenerative disc space narrowing with vacuum disc phenomenon. There is sclerosis and osteophytosis of the endplates at T10-11, and T12-L1 levels. The vertebral bodies are of normal height. There is no anterolisthesis or retrolisthesis seen.   There is calcified plaque within the wall of the abdominal aorta and iliac arteries without aneurysmal dilatation.   There is postoperative change from bipolar hip arthroplasty bilaterally.       Status post midline decompressive laminectomy with  posterior spinal fusion from L2 through S1   Lumbar levoscoliosis with multilevel degenerative disc disease and spondylosis most pronounced in the lower thoracic region and at the T12-L1 level.   Signed by: Sherlyn Jackson 10/28/2024 10:27 AM Dictation workstation:   [id number]     Visit Vitals  /71 (BP Location: Right arm, Patient Position: Lying)   Pulse 67   Temp 36.3 °C (97.3 °F) (Temporal)   Resp 23        Physical Exam  Vitals and nursing note reviewed.   Constitutional:       General: She is not in acute distress.     Appearance: She is not ill-appearing.      Comments: Frail elderly sitting up in chair   HENT:      Mouth/Throat:      Mouth: Mucous membranes are moist.      Pharynx: Oropharynx is clear.   Eyes:      General: No scleral icterus.     Extraocular Movements: Extraocular movements intact.   Cardiovascular:      Rate and Rhythm: Normal rate. Rhythm irregular.      Pulses: Normal pulses.   Pulmonary:      Effort: Pulmonary effort is normal. No respiratory distress.      Breath sounds: Normal breath sounds.   Abdominal:      General: There is no distension.      Palpations: Abdomen is soft.      Tenderness: There is no abdominal tenderness. There is no guarding.   Musculoskeletal:      Right lower leg: No edema.      Left lower leg: No edema.      Comments: Couple areas of DJD and crepitus, prior shoulder replacement and hip replacement noted.  Patient's back is kyphotic in appearance   Skin:     General: Skin is warm and dry.      Findings: Bruising present.   Neurological:      General: No focal deficit present.      Mental Status: She is alert and oriented to person, place, and time.   Psychiatric:         Mood and Affect: Mood normal.         Behavior: Behavior normal.         Thought Content: Thought content normal.         Judgment: Judgment normal.          Assessment/Plan   IMP:    Acute CVA - on ASA, Eliquis and Plavix. Neuro consulted.   Acute Respiratory Failure with Hypoxia - improving  - 2/2 CHF effusions, diuretic on hold due to hypotension, Bps are better today actually on the higher side  NSTEMI/CAD - on Plavix, ASA, statin  Acute Diastolic Heart Failure - improving, had echo today, interpretation pending  Syncope/Falls - 2/2 stroke vs hypotension?   PAF - hr controlled, on Eliquis  Esophagitis/Mod Hiatel Hernia - on PPI, hgb stable  Chronic Pain-followed by Dr Morales outpatient. Pt reports adequate pain control.   Urinary Retention-sample ordered, has catheter in place    Palliative Care Encounter   DNRCCA/DNI/No ICU  Capable  Son Eduar is stated HPOA, alternate is daughter Tita.  Requested copy of documents from daughter, Tita.      11/1  Plan is to discharge home with home health care, referrals were placed for navigator and housecalls for further home support.  Increased amount of lidocaine patches for pain control, recommend follow-up with pain management upon discharge.    10/31/2024  No changes today from a palliative perspective. Pain is adequately controlled. Goals of care established. Planning to dc home soon once specialty services sign off. Pt agreeable to [facility] for PT. Palliative will sign off. Please feel free to reach out if change in condition or other pall care needs arise.     Family meeting today with patient, spouse, daughter and son in law. Reviewed current conditions, falls, repeated hospitalizations. Reviewed frailty. Discussed concerns about plavix/eliquis use with esophagitis, anemia and despite use, patient with new findings of acute CVA. Concern that patient may continue to have repeat hospitalizations due to her risk. We also discussed her new unsteady gait and increased risk of falls. Patient is determined to go home and does not want to go to rehab for gait training. Patient reports a previously bad experience at former [facility]. She also stated that she was essentially her spouse's eyes as he is legally blind. She is accepting of [facility], she has a medical alert  button at home, and son cooks them meals and checks in routinely. I requested PT work with patient today with family present to demonstrate unsteady gait and discuss increased risk of falls/trauma.   Patient very clear in her goal to return home and achieve her prior level of function. She is also very clear that she would not want to live in constant pain, does not want to live in a facility, does not want to live with significant loss of function, she does not want to suffer repeated hospitalizations. She had previously established a DNRCCA with her PCP and confirmed this again during our conversation. She values quality of life. We did briefly discuss hospice, and patient is not ready for this as she feels she was doing ok prior to admission. She is interested in house calls and the navigator program through the [facility] for additional support at home.     Advance Directives Info: Patient has advance directive, copy not in chart  Discharge Planning: as above  Palliative Care Team will continue to follow patient: as needed      Ruth Vasquez, APRN-CNP

## 2024-11-01 NOTE — PROGRESS NOTES
"Physical Therapy    Physical Therapy Treatment    Patient Name: Rakan Cervantes  MRN: [id number]  Department: [address]  Room: 10/10  Today's Date: 11/1/2024  Time Calculation  Start Time: 1101  Stop Time: 1126  Time Calculation (min): 25 min         Assessment/Plan   PT Assessment  PT Assessment Results: Decreased strength, Decreased mobility, Decreased range of motion, Decreased endurance, Impaired balance, Decreased safety awareness, Impaired hearing, Pain  Rehab Prognosis: Good  Barriers to Discharge: 2L oxygen via nasal canula  Evaluation/Treatment Tolerance: Patient tolerated treatment well (with rest breaks as needed)  Medical Staff Made Aware: Yes  Strengths: Ability to acquire knowledge  Barriers to Participation: Comorbidities  End of Session Communication: Bedside nurse  Assessment Comment: Pt progressing steadily towards stated goals. Pt able to further amb distances with good tolerance. Only requiring CGA for majority of activities this session. No instances of inc weakness on L side noted this session.  End of Session Patient Position: Up in chair, Alarm on  PT Plan  Inpatient/Swing Bed or Outpatient: Inpatient  PT Plan  Treatment/Interventions: Bed mobility, Transfer training, Gait training, Endurance training, Therapeutic exercise, Therapeutic activity  PT Plan: Ongoing PT  PT Frequency: 4 times per week  PT Discharge Recommendations: Moderate intensity level of continued care  Equipment Recommended upon Discharge: Wheeled walker  PT Recommended Transfer Status: Assist x1, Assistive device (RW)  PT - OK to Discharge: Yes      General Visit Information:   PT  Visit  PT Received On: 11/01/24  Response to Previous Treatment: Patient with no complaints from previous session.  General  Reason for Referral: Impaired functional mobility. This 89 year old presented to the ED on 10/28/24 from home after \"blacking out\" and falling on 10/25 for LUE pain, numbness & tingling and increased SOB. Pt admitted for acute " HF, ARF (hypoxic), LUE numbness & sinus tachycardia. She was reently admitted here in Sept 2024 for NSTEMI, cardiac cath & stent placement.  Referred By: MERCED Gracia  Past Medical History Relevant to Rehab: asthma, anxiety, NSTEMI, A-fib on anticoagulation, Jonas's esophagus, IBS, bilateral carotid artery occlusion, COPD, erosive esophagitis, HTN, CKD, TIA/CVA, CAD s/p stent placement (Sept 2024), hyperparathyroidism, hypercholesteremia, macular degeneration, post laminectomy syndrome (lumbar), B KESHIA,B IOC placement, L carpel tunnel release, osteoporosis,  Prior to Session Communication: Bedside nurse  Patient Position Received: Bed, 3 rail up, Alarm off, not on at start of session  Preferred Learning Style: auditory, verbal  General Comment: patient is cleared by RN for treatment, patient is agreeable to PT    Subjective   Precautions:  Precautions  Hearing/Visual Limitations: Bilateral hearing aids (not with her). Reading glasses (present).  Medical Precautions: Fall precautions, Oxygen therapy device and L/min (2L via nasal canula)  Precautions Comment: + tele, + cont sp02            Objective   Pain:  Pain Assessment  Pain Assessment: 0-10  0-10 (Numeric) Pain Score: 0 - No pain  Cognition:  Cognition  Overall Cognitive Status: Within Functional Limits  Orientation Level: Oriented X4  Insight: Mild  Impulsive: Mildly  Processing Speed: Delayed       Postural Control:  Static Sitting Balance  Static Sitting-Balance Support: Feet supported, Bilateral upper extremity supported  Static Sitting-Level of Assistance: Close supervision  Static Sitting-Comment/Number of Minutes: slightly retropulsive with fatigue but able to self correct  Static Standing Balance  Static Standing-Balance Support: Bilateral upper extremity supported  Static Standing-Level of Assistance: Contact guard  Dynamic Standing Balance  Dynamic Standing-Balance Support: Bilateral upper extremity supported  Dynamic Standing-Level of  Assistance: Contact guard  Dynamic Standing-Balance: Turning  Dynamic Standing-Comments: with RW          Activity Tolerance:  Activity Tolerance  Endurance: Decreased tolerance for upright activites  Activity Tolerance Comments: 2L oxygen via nasal canula  Rate of Perceived Exertion (RPE): 4/10  Treatments:  Therapeutic Exercise  Therapeutic Exercise Activity 1: Seated B marches x15  Therapeutic Exercise Activity 2: Seated B LAQ x15  Therapeutic Exercise Activity 3: Seated B hip abd/add with light resist x15  Therapeutic Exercise Activity 4: Seated B ankle pumps x15         Bed Mobility 1  Bed Mobility 1: Supine to sitting  Level of Assistance 1: Close supervision    Ambulation/Gait Training 1  Surface 1: Level tile  Device 1: Rolling walker  Assistance 1: Contact guard  Quality of Gait 1: Narrow base of support, Forward flexed posture, Decreased step length, Diminished heel strike  Comments/Distance (ft) 1: 35 ft, 40 ft with RW on 2L oxygen via nasal canula. Fatigues with distance.  Transfer 1  Transfer From 1: Bed to  Transfer to 1: Stand  Technique 1: Sit to stand  Transfer Device 1: Walker  Transfer Level of Assistance 1: Contact guard  Trials/Comments 1: vc's for safe hand placement.  Transfers 2  Transfer From 2: Stand to  Transfer to 2: Chair with arms  Technique 2: Stand to sit  Transfer Device 2: Walker  Trials/Comments 2: vc's for safe hand placement and safe eccentric lowering to chair at bedside    Outcome Measures:  [facility] Basic Mobility  Turning from your back to your side while in a flat bed without using bedrails: A little  Moving from lying on your back to sitting on the side of a flat bed without using bedrails: A little  Moving to and from bed to chair (including a wheelchair): A little  Standing up from a chair using your arms (e.g. wheelchair or bedside chair): A little  To walk in hospital room: A little  Climbing 3-5 steps with railing: A lot  Basic Mobility - Total Score: 17    Education  Documentation  Mobility Training, taught by Monique Castaneda, PT at 11/1/2024  1:10 PM.  Learner: Patient  Readiness: Acceptance  Method: Explanation  Response: Verbalizes Understanding                 Encounter Problems       Encounter Problems (Active)       PT Problem       Pt will transition supine<>sit at a flat bed with mod I.  (Progressing)       Start:  10/29/24    Expected End:  11/23/24            Pt will transfer sit<>stand with Jr FWW & mod I.  (Progressing)       Start:  10/29/24    Expected End:  11/23/24            Pt will ambulate >/=35 ft with Jr FWW & distant S (Progressing)       Start:  10/29/24    Expected End:  11/23/24            Pt will maintain standing balance while engaging in bimanual activity standing at sink or other environmental support for >/=45 sec with distant S. (Progressing)       Start:  10/29/24    Expected End:  11/23/24

## 2024-11-01 NOTE — ASSESSMENT & PLAN NOTE
Troponin 43, 96, 600  Check lipid panel in the morning  Continue eliquis/statin/Plavix  Recent heart cath with stent placed in September of this year  TTE pending results  Cardiology consulted, appreciate recs-recommending medical management, cardiac medications have been adjusted

## 2024-11-01 NOTE — ASSESSMENT & PLAN NOTE
Resume home antihypertensives  Monitor blood pressure close    Plan  Wean oxygen as tolerated  Diuresis on hold, monitor urine output close  Check Ortho's, TTE  Campbell catheter placed, check UA, urology consulted  DVT prophylaxis: Eliquis  Fall precautions, PT/OT consult  Cardiology, neurology, palliative, and pulmonary on consult, appreciate recs  GI signed off  CBC and BMP in the morning      Patient is a DNRCCA, DNI, no ICU. Family and patient met with pall/med yesterday. Patient declining SNF. Plan for home with [facility], referrals placed for Hospice navigator program, house calls, and healthy at home.

## 2024-11-01 NOTE — PROGRESS NOTES
Rakan Price is a 89 y.o. female on day 4 of admission presenting with Sinus tachycardia.      Subjective   Patient seen and examined. Awake/alert/oriented. Denies chest pain, shortness of breath, fevers, chills, nausea, or vomiting. Had urinary retention overnight, del valle catheter was placed. Also complaining of loose stools, can stop enemas and give as needed.      Objective     Last Recorded Vitals  /71 (BP Location: Right arm, Patient Position: Lying)   Pulse 67   Temp 36.3 °C (97.3 °F) (Temporal)   Resp 23   Wt 54.7 kg (120 lb 9.5 oz)   SpO2 96%   Intake/Output last 3 Shifts:    Intake/Output Summary (Last 24 hours) at 11/1/2024 1046  Last data filed at 11/1/2024 0800  Gross per 24 hour   Intake --   Output 2150 ml   Net -2150 ml       Admission Weight  Weight: 50.8 kg (112 lb) (10/28/24 0856)    Daily Weight  11/01/24 : 54.7 kg (120 lb 9.5 oz)    Image Results  Carotid duplex [place] [address]             Phone [phone]       Vascular Lab Report     [facility] US CAROTID ARTERY DUPLEX BILATERAL    Patient Name:      RAKAN PRICE       Reading Physician:  [id number] Elías Winter MD, RPVI  Study Date:        10/29/2024           Ordering Provider:  [id number] SONJA BATISTA  MRN/PID:           [id number]             Fellow:  Accession#:        [id number]         Technologist:       Isabell Vidal RVT  Date of Birth/Age: 1935 / 89 years Technologist 2:  Gender:            F                    Encounter#:         [id number]  Admission Status:  Inpatient            Location Performed: [facility]       Diagnosis/ICD: Syncope and collapse-R55  CPT Codes:     45280 Cerebrovascular Carotid Duplex scan complete       CONCLUSIONS:  Right Carotid: Findings are consistent with less than 50% stenosis of the right  proximal internal carotid artery. The internal carotid artery appears tortuous. Right external carotid artery appears patent with no evidence of stenosis. The right vertebral artery is patent with antegrade flow. No evidence of hemodynamically significant stenosis in the right subclavian artery. There is turbulent flow noted in the proximal common carotid artery which may indicate a more proximal stenosis.  Left Carotid: Findings are consistent with less than 50% stenosis of the left proximal internal carotid artery. The internal carotid artery appears tortuous. There is turbulent flow noted in mid and distal internal carotid artery possibly due to vessel tortuosity. Left external carotid artery appears patent with no evidence of stenosis. The left vertebral artery is patent with antegrade flow. No evidence of hemodynamically significant stenosis in the left subclavian artery.     Imaging & Doppler Findings:  Right Plaque Morph: The proximal right internal carotid artery demonstrates heterogenous plaque. The proximal right external carotid artery demonstrates heterogenous and calcified plaque. The distal right common carotid artery demonstrates heterogenous plaque.  Left Plaque Morph: The proximal left internal carotid artery demonstrates heterogenous plaque. The proximal left external carotid artery demonstrates heterogenous plaque. The distal left common carotid artery demonstrates heterogenous plaque.      Right                        Left    PSV      EDV                PSV      EDV  118 cm/s           CCA P    79 cm/s  86 cm/s            CCA D    75 cm/s  83 cm/s  18 cm/s   ICA P    102 cm/s 16 cm/s  83 cm/s  22 cm/s   ICA M    124 cm/s 25 cm/s  82 cm/s  21 cm/s   ICA D    80 cm/s  16 cm/s  105 cm/s            ECA     192 cm/s  75 cm/s  14 cm/s Vertebral  84 cm/s  18 cm/s  80 cm/s          Subclavian 122 cm/s                     Right Left  ICA/CCA Ratio  1.0  1.4          [id number] Elías Winter MD,  RPVI  Electronically signed by [id number] Elías Winter MD, RPVI on 10/30/2024 at 9:00:56 PM       ** Final **  CT angio head and neck w and wo IV contrast  Narrative: Interpreted By:  Dario Sam,   STUDY:  CT ANGIO HEAD AND NECK W AND WO IV CONTRAST;  10/30/2024 4:15 pm      INDICATION:  Signs/Symptoms:stroke on MRI on L corona radiata.          COMPARISON:  10/28/2024 brain CT and 10/29/2024 brain MR      ACCESSION NUMBER(S):  [id number]      ORDERING CLINICIAN:  ИРИНА LOPEZ      TECHNIQUE:  Unenhanced CT images of the head were obtained. Subsequently, 75 ML  of Omnipaque 350 was administered intravenously and axial images of  the head and neck were acquired.  Coronal, sagittal, and 3-D  reconstructions were provided for review. Carotid stenoses were  determined using modified NASCET criteria.      FINDINGS:          CTA HEAD FINDINGS:      Anterior circulation: The left M1 segment has mild stenoses with  moderate stenosis at the origin of the superior branch of the left  middle cerebral artery.  The pre callosal segment of the left  anterior cerebral artery has moderate to severe stenosis.      Posterior circulation: The right posterior cerebral is supplied  primarily through the posterior communicating artery with moderate  stenosis in the anterior P2 segment. The more distal left posterior  cerebral artery has moderate stenosis as well.      CTA NECK FINDINGS:      Right carotid vessels: The distal right common carotid artery is non  stenotic atherosclerotic plaque. The right common carotid bifurcation  has calcified non stenotic plaque extending into the carotid bulb  with calcified plaque causing 60% stenosis of the right external  carotid artery origin. The internal carotid artery in the neck is  normal. There is % stenosis  .      Left carotid vessels: The common carotid artery is normal. The left  common carotid bifurcation has non stenotic plaque extending into the  internal carotid bulb. The internal carotid  artery in the neck is  normal. There is 0% stenosis  .      Vertebral vessels:  The visualized segments of the cervical vertebral  arteries are normal in caliber.          Impression: 1. the left anterior cerebral artery has severe stenosis adjacent to  the genu of the corpus callosum with mild to moderate stenoses  elsewhere in the cerebral arteries as noted.      2. No significant stenoses of the cervical carotid or vertebral  arteries are noted.      MACRO:  None      Signed by: Dario Sam 10/30/2024 4:48 PM  Dictation workstation:   [id number]      Physical Exam  Vitals reviewed.   Constitutional:       Appearance: Normal appearance.   HENT:      Head: Normocephalic and atraumatic.   Eyes:      Extraocular Movements: Extraocular movements intact.      Conjunctiva/sclera: Conjunctivae normal.   Cardiovascular:      Rate and Rhythm: Normal rate and regular rhythm.   Pulmonary:      Effort: Pulmonary effort is normal.      Breath sounds: No wheezing, rhonchi or rales.      Comments: Breath sounds diminished T/O  Abdominal:      General: Bowel sounds are normal.      Palpations: Abdomen is soft.      Tenderness: There is no abdominal tenderness.   Genitourinary:     Comments: Campbell with cloudy urine and sediment  Skin:     General: Skin is warm and dry.   Neurological:      General: No focal deficit present.      Mental Status: She is alert and oriented to person, place, and time.         Relevant Results  Lab Results   Component Value Date    GLUCOSE 103 (H) 11/01/2024    CALCIUM 8.6 11/01/2024     (L) 11/01/2024    K 3.7 11/01/2024    CO2 33 (H) 11/01/2024    CL 97 (L) 11/01/2024    BUN 12 11/01/2024    CREATININE 0.51 11/01/2024      Lab Results   Component Value Date    WBC 5.1 11/01/2024    HGB 9.6 (L) 11/01/2024    HCT 29.6 (L) 11/01/2024    MCV 88 11/01/2024     11/01/2024    Carotid duplex bilateral  Result Date: 10/29/2024  PRELIMINARY CONCLUSIONS:    Right Carotid: Findings are consistent  with less than 50% stenosis of the right proximal internal carotid artery. The internal carotid artery appears tortuous. Right external carotid artery appears patent with no evidence of stenosis. The right vertebral artery is patent with antegrade flow. No evidence of hemodynamically significant stenosis in the right subclavian artery. There is turbulent flow noted in the proximal common carotid artery which may indicate a more proximal stenosis.   Left Carotid: Findings are consistent with less than 50% stenosis of the left proximal internal carotid artery. The internal carotid artery appears tortuous. There is turbulent flow noted in mid and distal internal carotid artery possibly due to vessel tortuosity. Left external carotid artery appears patent with no evidence of stenosis. The left vertebral artery is patent with antegrade flow. No evidence of hemodynamically significant stenosis in the left subclavian artery.     ECG 12 Lead  Result Date: 10/28/2024  Supraventricular tachycardia Marked ST abnormality, possible inferolateral subendocardial injury Abnormal ECG When compared with ECG of 26-SEP-2024 14:27, T wave inversion no longer evident in Inferior leads T wave inversion more evident in Lateral leads Confirmed by Sergey Bond ([id number]) on 10/28/2024 7:51:36 PM    Lower extremity venous duplex bilateral  Result Date: 10/28/2024  No sonographic evidence DVT in the visualized vessels of bilateral lower extremities.   MACRO: None   Signed by: Dino De Paz 10/28/2024 7:48 PM Dictation workstation:   [id number]    CT angio chest for pulmonary embolism  Result Date: 10/28/2024  1.  No evidence of pulmonary emboli. Severe coronary artery atherosclerotic calcifications. 2. Mild cardiomegaly with biatrial enlargement. Small to moderate-size right greater than left pleural effusions and bibasilar subsegmental atelectasis. Diffuse interseptal thickening and scattered central predominant ground glass opacification of  the lungs suggestive of pulmonary edema. 3. Chronic patulous esophagus with new air-fluid throughout the thoracic esophagus suggestive of reflux esophagitis versus achalasia. Clinical correlation is recommended.     Signed by: Richmond Barbosa 10/28/2024 2:41 PM Dictation workstation:   [id number]    CT cervical spine wo IV contrast  Result Date: 10/28/2024  No evidence for an acute fracture or subluxation of the cervical spine.   There is chronic anterolisthesis of C3 on C4 and chronic retrolisthesis of C4 on C5 with multilevel degenerative disc disease and cervical spondylosis with central canal stenosis at C4-5 and multilevel foraminal stenosis bilaterally.   Bone island within left pedicle and lamina of T2 extending into the spinous process of T2.   Hypodense thyroid nodules.   MACRO: None   Signed by: Sherlyn Jackson 10/28/2024 11:00 AM Dictation workstation:   [id number]    CT head wo IV contrast  Result Date: 10/28/2024  Age-appropriate atrophy.   Encephalomalacia within the right posterior parietal lobe unchanged consistent with an old infarct at this site. Old lacunar infarct of the right insula is also identified.   No acute intracranial process.   MACRO: None     Signed by: Sherlyn Jackson 10/28/2024 10:46 AM Dictation workstation:   [id number]    XR chest 2 views  Result Date: 10/28/2024  Cardiomegaly without acute cardiopulmonary process. Low lung volumes.   Advanced osteoarthritis of the left shoulder affecting glenohumeral joint.   Signed by: Sherlyn Jackson 10/28/2024 10:33 AM Dictation workstation:   [id number]    XR pelvis 1-2 views  Result Date: 10/28/2024  No pelvic fracture.   Signed by: Sherlyn Jackson 10/28/2024 10:29 AM Dictation workstation:   [id number]    XR lumbar spine 2-3 views  Result Date: 10/28/2024  Status post midline decompressive laminectomy with posterior spinal fusion from L2 through S1   Lumbar levoscoliosis with multilevel degenerative disc disease and spondylosis most pronounced in the  "lower thoracic region and at the T12-L1 level.   Signed by: Sherlyn Jackson 10/28/2024 10:27 AM Dictation workstation:   [id number]         Assessment/Plan      Assessment & Plan  CVA (cerebral vascular accident) (Multi)  -Acute ischemic infarct in the left corona radiata (x2) and right aspect of the splenium of the corpus callosum  -left anterior cerebral artery has severe stenosis  CT imaging and MRI as above  TTE pending results  US carotid as above  Neurochecks  Consult neurology, appreciate recs-would change Plavix t Brilinta however would place patient at higher bleeding risk, due to high risk of falls and declining SNF, does not recommend changing.  Stable from neuro standpoint-follow up outpatient  NSTEMI (non-ST elevated myocardial infarction) (Multi)  Troponin 43, 96, 600  Check lipid panel in the morning  Continue eliquis/statin/Plavix  Recent heart cath with stent placed in September of this year  TTE pending results  Cardiology consulted, appreciate recs-recommending medical management, cardiac medications have been adjusted  Acute heart failure  Resume home cardiac medications  Daily weights  Strict I/Os    Echocardiogram  Diuresis on hold due to hypotension which has resolved  Defer resuming lasix to cardiology  Consult cardiology, appreciate recommendations  Urinary retention  Straight cath yesterday, bladder scan revealed recurring urinary retention and ultimately del valle catheter was placed  Noted cloudy urine with sediment, check UA  Consult urology, appreciate recs  Syncope  Patient reports \"blacking out\" multiple times at home resulting in falls  Fall precautions  Check Ortho's  Echocardiogram  Ultrasound of the carotids as above  No further episodes  Bilateral pleural effusion  Seen on CT imaging  Diuresis currently on hold due to hypotension  Respiratory status has improved  Cardiology and pulmonary consulted, appreciate recommendations  Sinus tachycardia (Resolved: 11/1/2024)  resolved  PAF " (paroxysmal atrial fibrillation) (Multi)  Monitor on telemetry  Continue antiarrhythmics and Eliquis  Erosive esophagitis  CT imaging showed chronic patulous esophagus with new air-fluid throughout the thoracic esophagus suggestive of reflux esophagitis versus achalasia  Consult GI, appreciate recs-signed off  Continue PPI  Essential hypertension  Resume home antihypertensives  Monitor blood pressure close    Plan  Wean oxygen as tolerated  Diuresis on hold, monitor urine output close  Check Ortho's, TTE  Campbell catheter placed, check UA, urology consulted  DVT prophylaxis: Eliquis  Fall precautions, PT/OT consult  Cardiology, neurology, palliative, and pulmonary on consult, appreciate recs  GI signed off  CBC and BMP in the morning      Patient is a DNRCCA, DNI, no ICU. Family and patient met with pall/med yesterday. Patient declining SNF. Plan for home with [facility], referrals placed for Hospice navigator program, house calls, and healthy at home.                 Shiela eRyes, APRN-CNP

## 2024-11-01 NOTE — ASSESSMENT & PLAN NOTE
Resume home cardiac medications  Daily weights  Strict I/Os    Echocardiogram  Diuresis on hold due to hypotension which has resolved  Defer resuming lasix to cardiology  Consult cardiology, appreciate recommendations

## 2024-11-01 NOTE — PROGRESS NOTES
Occupational Therapy    OT Treatment    Patient Name: Rakan Cervantes  MRN: [id number]  Department: [address]  Room: 10/10-A  Today's Date: 11/1/2024  Time Calculation  Start Time: 1510  Stop Time: 1544  Time Calculation (min): 34 min        Assessment:  OT Assessment: Patient demonstrated gradual progress towards OT goals. Continue with OT POC to increase strength and functional mobility to maximize independence for ADLs in the least restrictive environment. Patient was seen by OTR and no changes to OT POC record in regards to CVA.  End of Session Communication: Bedside nurse  End of Session Patient Position: Up in chair, Alarm on ( and children present. All needs within reach.)  OT Assessment Results: Decreased ADL status, Decreased upper extremity strength, Decreased safe judgment during ADL, Decreased functional mobility  Plan:  Treatment Interventions:  (ADL retraining; Functional transfer training; UE strengthening/ROM; Endurance training; Cognitive reorientation; Patient/family training; Equipment evaluation/education)  OT Frequency: 3 times per week  OT Discharge Recommendations: Moderate intensity level of continued care  Equipment Recommended upon Discharge: Wheeled walker  OT Recommended Transfer Status: Moderate assist  OT - OK to Discharge: Yes  Treatment Interventions:  (ADL retraining; Functional transfer training; UE strengthening/ROM; Endurance training; Cognitive reorientation; Patient/family training; Equipment evaluation/education)    Subjective   Previous Visit Info:  OT Last Visit  OT Received On: 11/01/24  General:  General  Reason for Referral: Impaired ADLs and functional mobility due to sinus tachycardia  Past Medical History Relevant to Rehab: asthma, anxiety, NSTEMI, A-fib on anticoagulation, Jonas's esophagus, IBS, bilateral carotid artery occlusion, COPD, erosive esophagitis, HTN, CKD, TIA/CVA, CAD s/p stent placement (Sept 2024), hyperparathyroidism, hypercholesteremia, macular  degeneration, post laminectomy syndrome (lumbar), B KESHIA,B IOC placement, L carpel tunnel release, osteoporosis.  Prior to Session Communication: Bedside nurse  Patient Position Received: Bed, 3 rail up, Alarm off, caregiver present  General Comment: Patient cleared for OT services by nursing staff. Upon arrival, patient was supine with  at bedside and agreeable to OT session. Noted with movement that pulse ox readings were not accurate due to cold fingers, warmed finger with touch and readings were stable, nurse notified. Education on OT POC and importance/role of OT. Patient was seen by OTR and no changes to OT POC record in regards to CVA.  Precautions:  Hearing/Visual Limitations: Bilateral hearing aids (not with her). Reading glasses (present).  Medical Precautions: Fall precautions, Oxygen therapy device and L/min (0.5L NC)  Precautions Comment: +Tele, +0.5L NC, +Campbell    Vital Signs (Past 2hrs)        Date/Time Vitals Session Patient Position Pulse Resp SpO2 BP MAP (mmHg)    11/01/24 1612 --  --  64  --  95 %  160/61  91                         Pain:  Pain Assessment  Pain Assessment: 0-10  0-10 (Numeric) Pain Score: 0 - No pain  Clinical Progression: Not changed    Objective    Cognition:  Cognition  Overall Cognitive Status: Within Functional Limits  Safety/Judgement: Exceptions to WFL  Insight: Mild  Impulsive: Mildly  Task Initiation: Initiates with cues  Processing Speed: Delayed  Coordination:  Movements are Fluid and Coordinated: Yes  Activities of Daily Living: Grooming  Grooming Level of Assistance: Close supervision  Grooming Comments: Patient performed oral hygiene and hair combing seated in bedside chair with setup under close supervision.    UE Bathing  UE Bathing Level of Assistance: Minimum assistance  UE Bathing Comments: Patient performed UE bathing seated in bedside chair with Abilio for line management.    LE Bathing  LE Bathing Level of Assistance: Close supervision  LE Bathing  Comments: Patient performed LE bathing seated on bedside chair under close supervision for safety.    UE Dressing  UE Dressing Level of Assistance: Minimum assistance  UE Dressing Comments: Patient performed UE dressing of don/doffing gown seated in bedside chair with Gregory for line management.    LE Dressing  LE Dressing: Yes  Sock Level of Assistance: Close supervision  LE Dressing Comments: Patient performed LE dressing of don/dofffing socks using figure four technique seated on bedside chair under close supervision for safety.  Functional Standing Tolerance:     Bed Mobility/Transfers: Bed Mobility  Bed Mobility: Yes  Bed Mobility 1  Bed Mobility 1: Supine to sitting  Level of Assistance 1: Minimum assistance  Bed Mobility Comments 1: Patient performed supine to sit from moderately elevated HOB needing Gregory for trunk elevation and min cueing for BLE management to EOB.    Transfers  Transfer: Yes  Transfer 1  Transfer From 1: Bed to  Transfer to 1: Stand  Technique 1: Sit to stand  Transfer Level of Assistance 1: Minimum assistance  Trials/Comments 1: Patient performed transfer of sit to stand from standard level EOB needing Gregory for VC of hand placement and line management.  Transfers 2  Transfer From 2: Stand to  Transfer to 2: Chair with arms  Technique 2: Stand to sit  Transfer Level of Assistance 2: Minimum assistance  Trials/Comments 2: Patient performed transfer of stand to sit of bedside chair needing Gregory for VC of hand placement and line management.      Functional Mobility:  Functional Mobility 1  Surface 1: Level tile  Device 1: No device  Assistance 1: Minimum assistance  Comments 1: Patient attempted functional mobility with no AD as she stated someone came and took her FWW but uses a cane at home. Decreased strength and functional tolerance observed, recommended use of AD (FWW) to decrease risk of falls.    Outcome Measures:[facility] Daily Activity  Putting on and taking off regular lower body clothing:  A little  Bathing (including washing, rinsing, drying): A little  Putting on and taking off regular upper body clothing: A little  Toileting, which includes using toilet, bedpan or urinal: A lot  Taking care of personal grooming such as brushing teeth: A little  Eating Meals: A little  Daily Activity - Total Score: 17    Education Documentation  ADL Training, taught by MAGGIE Quintana at 11/1/2024  4:41 PM.  Learner: Family, Patient  Readiness: Acceptance  Method: Explanation, Demonstration  Response: Needs Reinforcement    Education Comments  Education on OT POC and importance/role of OT.         OP EDUCATION:       Goals:  Encounter Problems       Encounter Problems (Active)       OT Goals       ADLs (Progressing)       Start:  10/29/24    Expected End:  11/15/24       Pt will complete ADL tasks with Mod I, using AE as needed, in order to complete self-care tasks.           Functional mobility (Progressing)       Start:  10/29/24    Expected End:  11/15/24       Pt will perform functional mobility household distance at mod ind level with RW           Functional transfers (Progressing)       Start:  10/29/24    Expected End:  11/15/24       Pt will perform functional transfers at mod ind level with RW                Treatment and documentation completed by YOBANY Quintana under the direct Supervision of EVY Rizzo

## 2024-11-01 NOTE — ASSESSMENT & PLAN NOTE
"Patient reports \"blacking out\" multiple times at home resulting in falls  Fall precautions  Check Ortho's  Echocardiogram  Ultrasound of the carotids as above  No further episodes  "

## 2024-11-01 NOTE — PROGRESS NOTES
Pulmonary Daily Progress Note   Subjective    Rakan Cervantes is a 89 y.o. year old female patient known with CAD c/b NSTEMI s/p PCI, Afib on OAC, DLP, hyperparathyroidism, CVA, CKD, possible asthma hx , Jonas's esophagitis  admitted on 10/28/2024 with following p/w L arm numbness and tingling after falling 2 days earlier. In the ED hypoxia, requiring 5L NC.  Post admission work up revealed b/l pleural effusion. Pulmonary is consulted for hypoxia and pleural effusions.     Interval History:  Patient feels better her lower extremity swelling has resolved according to her.  She is surrounded by her family who mentioned that she was diagnosed with asthma when she was 70 years old.  Not on home oxygen.  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    Meds    Scheduled medications  acetaminophen, 650 mg, oral, TID  amLODIPine, 5 mg, oral, Daily  apixaban, 2.5 mg, oral, BID  ascorbic acid, 125 mg, oral, Daily  atorvastatin, 80 mg, oral, Nightly  carvedilol, 12.5 mg, oral, BID  cholecalciferol, 2,000 Units, oral, Daily  clopidogrel, 75 mg, oral, Daily  docusate sodium, 100 mg, oral, BID  DULoxetine, 30 mg, oral, Daily with evening meal  ferrous sulfate (325 mg ferrous sulfate), 65 mg of iron, oral, Daily with breakfast  [Held by provider] furosemide, 20 mg, intravenous, Daily  gabapentin, 300 mg, oral, BID  hydroxychloroquine, 200 mg, oral, Daily  isosorbide mononitrate ER, 120 mg, oral, Daily  lidocaine, 3 patch, transdermal, Daily  losartan, 100 mg, oral, Daily  magnesium oxide, 400 mg, oral, BID  magnesium sulfate, 1 g, intravenous, Once  oxygen, , inhalation, Continuous - Inhalation  pantoprazole, 40 mg, oral, BID  polyethylene glycol, 17 g, oral, Daily  ranolazine, 500 mg, oral, BID  sucralfate, 1 g, oral, q6h ZEINAB  sulfaSALAzine, 500 mg, oral,  "BID      Continuous medications     PRN medications  PRN medications: benzocaine-menthol, bisacodyl, cyclobenzaprine     Objective    Blood pressure 180/71, pulse 67, temperature 36.3 °C (97.3 °F), temperature source Temporal, resp. rate 23, height 1.499 m (4' 11\"), weight 54.7 kg (120 lb 9.5 oz), SpO2 96%.   Physical Exam   GENERAL: No respiratory distress  HEAD/SINUSES: On room air  LUNGS: Mild bibasilar crackles, no wheezing  CARDIAC: Regular rate and rhythm  EXTREMITIES: No edema,   NEURO: grossly normal mental status,  SKIN: Skin turgor normal.   PSYCH: Normal affect    Intake/Output Summary (Last 24 hours) at 11/1/2024 1608  Last data filed at 11/1/2024 1102  Gross per 24 hour   Intake --   Output 2725 ml   Net -2725 ml     Labs:   Results from last 72 hours   Lab Units 11/01/24  0503 10/31/24  0833 10/30/24  0548   SODIUM mmol/L 134* 134* 135*   POTASSIUM mmol/L 3.7 3.6 3.8   CHLORIDE mmol/L 97* 98 99   CO2 mmol/L 33* 30 31   BUN mg/dL 12 11 12   CREATININE mg/dL 0.51 0.62 0.63   GLUCOSE mg/dL 103* 141* 98   CALCIUM mg/dL 8.6 8.7 8.4*   ANION GAP mmol/L 8* 10 9*   EGFR mL/min/1.73m*2 89 85 85      Results from last 72 hours   Lab Units 11/01/24  0503 10/31/24  0833 10/30/24  0548   WBC AUTO x10*3/uL 5.1 5.9 4.6   HEMOGLOBIN g/dL 9.6* 9.6* 7.7*   HEMATOCRIT % 29.6* 30.8* 24.9*   PLATELETS AUTO x10*3/uL 221 266 192               Micro/ID:   No results found for: \"URINECULTURE\", \"BLOODCULT\", \"CSFCULTSMEAR\"  Summary of key imaging results from the last 24 hours  Chest x-ray with interstitial edema.    CT scan done earlier in the admission with mosaicism, pulmonary edema scarring, atelectasis, pleural effusion    Impression   Rakan Cervantes is a 89 y.o. year old female patient known with CAD c/b NSTEMI s/p PCI, Afib on OAC, DLP, hyperparathyroidism, CVA, CKD, possible asthma hx , Jonas's esophagitis/ Patulous on imaging admitted on 10/28/2024 with following p/w L arm numbness and tingling after falling 2 days " earlier. In the ED hypoxia, requiring 5L NC.  Post admission work up revealed b/l pleural effusion. Pulmonary is consulted for hypoxia and pleural effusions.   Acute hypoxic respiratory failure due to pulmonary edema and pleural effusion, seems to have resolved as patient is on room air today, diuresis held due to low BP in last two days  Possible history of asthma in an older adult, no history of personal smoking, no PFTs in chart not on current inhalers but  used to have a rescue inhaler  Atrial fibrillation paroxysmal  CAD history  Heart failure decompensated, diastolic, mild to moderate MR  Elevated RVSP however normal RV   Bilateral pleural effusions to be.    Recommendations   As follows:  Recommend continuing diuresis  Oxygen walk test tomorrow if remains on room air tomorrow  Will need repeat imaging in 4 to 6 weeks  Will need pulmonary outpatient follow-up for PFTs and follow-up imaging  Bronchopulmonary hygiene with Incentive spirometry and Acapella       Carolin Norris MD   11/01/24 at 4:08 PM     -Only the Medical problems listed under impression were addressed today.   -Please contact primary team for all other concerns and medical problem  -Thank you for your consult       Disclaimer: Documentation completed with the information available at the time of input. Parts of this note may have been scribed or generated using voice dictation software, Dragon.  Homophonic errors may exist.  Please contact me directly if clarification is needed. The times in the chart may not be reflective of actual patient care times, interventions, or procedures. Documentation occurs after the physical care of the patient.

## 2024-11-01 NOTE — CARE PLAN
The patient's goals for the shift include go home    The clinical goals for the shift include stable resp status, monitor urinary retention

## 2024-11-01 NOTE — PROGRESS NOTES
Subjective Data:  Patient is doing good.  Nuys having chest pain.  No shortness of breath.  Sitting comfortable in bed.    Overnight Events:    Events overnight reviewed no events     Objective Data:  Last Recorded Vitals:  Vitals:    10/31/24 2009 11/01/24 0045 11/01/24 0323 11/01/24 0819   BP: 170/61 158/57 158/66 180/71   BP Location: Right arm Right arm Right arm Right arm   Patient Position: Lying Lying Lying Lying   Pulse: 71 64 68 67   Resp: 22 22 26 23   Temp: 36.1 °C (97 °F) 36.2 °C (97.2 °F) 36.2 °C (97.2 °F) 36.3 °C (97.3 °F)   TempSrc: Temporal Temporal Temporal Temporal   SpO2: 96% 96% 94% 96%   Weight:   54.7 kg (120 lb 9.5 oz)    Height:           Last Labs:  CBC - 11/1/2024:  5:03 AM  5.1 9.6 221    29.6      CMP - 11/1/2024:  5:03 AM  8.6 6.2 17 --- 0.6   2.7 3.6 13 105      PTT - 9/27/2024:  4:44 AM  _   _ 52.6     TROPHS   Date/Time Value Ref Range Status   10/28/2024 06:04  0 - 13 ng/L Final     Comment:     Previous result verified on 10/28/2024 1104 on specimen/case [id number] called with component [facility] for procedure Troponin, High Sensitivity, 1 Hour with value 96 ng/L.   10/28/2024 10:20 AM 96 0 - 13 ng/L Final   10/28/2024 09:19 AM 43 0 - 13 ng/L Final     BNP   Date/Time Value Ref Range Status   10/28/2024 09:19  0 - 99 pg/mL Final   09/26/2024 02:21  0 - 99 pg/mL Final     HGBA1C   Date/Time Value Ref Range Status   09/10/2024 09:14 AM 5.8 See below % Final   03/14/2024 11:10 AM 6.3 See below % Final     LDLCALC   Date/Time Value Ref Range Status   10/29/2024 05:34 AM 25 <=99 mg/dL Final     Comment:                                 Near   Borderline      AGE      Desirable  Optimal    High     High     Very High     0-19 Y     0 - 109     ---    110-129   >/= 130     ----    20-24 Y     0 - 119     ---    120-159   >/= 160     ----      >24 Y     0 -  99   100-129  130-159   160-189     >/=190     09/10/2024 09:14 AM 50 65 - 130 mg/dL Final   03/14/2024 11:10 AM 44  "65 - 130 mg/dL Final     VLDL   Date/Time Value Ref Range Status   10/29/2024 05:34 AM 12 0 - 40 mg/dL Final      Last I/O:  I/O last 3 completed shifts:  In: 410 (7.5 mL/kg) [P.O.:410]  Out: 2550 (46.6 mL/kg) [Urine:2550 (1.3 mL/kg/hr)]  Weight: 54.7 kg     Past Cardiology Tests (Last 3 Years):  EKG:  ECG 12 Lead 10/28/2024      ECG 12 lead 09/26/2024      ECG 12 lead 10/27/2023    Echo:  Transthoracic Echo (TTE) Limited 10/27/2023    Ejection Fractions:  No results found for: \"EF\"  Cath:  Cardiac Catheterization Procedure 09/27/2024    Stress Test:  No results found for this or any previous visit from the past 1095 days.    Cardiac Imaging:  No results found for this or any previous visit from the past 1095 days.      Inpatient Medications:  Scheduled medications   Medication Dose Route Frequency    acetaminophen  650 mg oral TID    amLODIPine  5 mg oral Daily    apixaban  2.5 mg oral BID    ascorbic acid  125 mg oral Daily    atorvastatin  80 mg oral Nightly    carvedilol  12.5 mg oral BID    cholecalciferol  2,000 Units oral Daily    clopidogrel  75 mg oral Daily    docusate sodium  100 mg oral BID    DULoxetine  30 mg oral Daily with evening meal    ferrous sulfate (325 mg ferrous sulfate)  65 mg of iron oral Daily with breakfast    [Held by provider] furosemide  20 mg intravenous Daily    gabapentin  300 mg oral BID    hydroxychloroquine  200 mg oral Daily    isosorbide mononitrate ER  120 mg oral Daily    lidocaine  2 patch transdermal Daily    [Held by provider] losartan  100 mg oral Daily    magnesium oxide  400 mg oral BID    magnesium sulfate  1 g intravenous Once    oxygen   inhalation Continuous - Inhalation    pantoprazole  40 mg oral BID    polyethylene glycol  17 g oral Daily    ranolazine  500 mg oral BID    sucralfate  1 g oral q6h ZEINAB    sulfaSALAzine  500 mg oral BID     PRN medications   Medication    benzocaine-menthol    bisacodyl    cyclobenzaprine     Continuous Medications   Medication Dose " Last Rate       Physical Exam:  General: Patient is in no acute distress.  HEENT: atraumatic normocephalic.  Neck: is supple jugular venous pressure within normal limits no thyromegaly.  Cardiovascular regular rate and rhythm normal heart sounds no murmurs rubs or gallops.  Lungs: Decreased breathing sounds at bases abdomen: is soft nontender.  Extremities warm to touch trace edema.  Neurologic examination: patient is awake alert oriented to person, place, date/time.  Psychiatric examination: patient has good insight denies feeling suicidal and depressed.  Pulses 2+ intact bilaterally        Assessment/Plan  1 chest pain.  Patient with angina in the setting of hypertension and poorly controlled heart rate.  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  Had PCI to distal left circumflex but distal to the stent there is some moderate distal disease.  Likely demand ischemia from hypertension.  Her LAD looked fine on the cardiac catheterization from September 27, 2024.  At this point recommend conservative management.  We will continue Plavix.  She will be also on Eliquis.  Remains high will reinitiate also losartan.  Very labile blood pressure.    2.  Syncope.  MRI Showing new Lackan or infarct.  Not sure if her syncopal episode is related to stroke or syncope.  At this point we will arrange for monitor before discharge.     3.  Hypertension increase isosorbide to 120 mg daily.  Continue beta-blockers.  Continue beta-blockers.     4.  Hyperlipidemia continue high intensity statin.     5.  Shortness of breath.  Acute on chronic diastolic heart failure.  Hold diuretics since blood pressure decreased   Thank you for the consult  Peripheral IV 10/28/24 20 G Right Antecubital (Active)   Site Assessment Clean;Intact;Dry 11/01/24 0850   Dressing Status Clean;Dry;Occlusive 11/01/24 0850   Number of days: 4       Urethral Catheter 16 Fr. (Active)   Reason for Continuing Urinary  Catheterization acute urinary retention 11/01/24 1102   Number of days: 0       Code Status:  DNR and No Intubation and No ICU          Nessa Callaway MD

## 2024-11-01 NOTE — PROGRESS NOTES
11/01/24 1607   Discharge Planning   Expected Discharge Disposition Home H     Patient is declining SNF.  Accepted by [facility].  Referrals have  been sent to [facility] and the [facility] program. Palliative is following. Will need to confirm SOC with [facility] prior to discharge.     PLEASE NOTIFY CARE COORDINATION PRIOR TO DISCHARGE

## 2024-11-01 NOTE — ASSESSMENT & PLAN NOTE
Straight cath yesterday, bladder scan revealed recurring urinary retention and ultimately del valle catheter was placed  Noted cloudy urine with sediment, check UA  Consult urology, appreciate recs

## 2024-11-02 PROBLEM — N30.00 ACUTE CYSTITIS WITHOUT HEMATURIA: Status: ACTIVE | Noted: 2024-11-02

## 2024-11-02 LAB
ANION GAP SERPL CALCULATED.3IONS-SCNC: 10 MMOL/L (ref 10–20)
BUN SERPL-MCNC: 11 MG/DL (ref 6–23)
CALCIUM SERPL-MCNC: 8.5 MG/DL (ref 8.6–10.3)
CHLORIDE SERPL-SCNC: 95 MMOL/L (ref 98–107)
CO2 SERPL-SCNC: 30 MMOL/L (ref 21–32)
CREAT SERPL-MCNC: 0.43 MG/DL (ref 0.5–1.05)
EGFRCR SERPLBLD CKD-EPI 2021: >90 ML/MIN/1.73M*2
ERYTHROCYTE [DISTWIDTH] IN BLOOD BY AUTOMATED COUNT: 14.9 % (ref 11.5–14.5)
GLUCOSE SERPL-MCNC: 100 MG/DL (ref 74–99)
HCT VFR BLD AUTO: 26.8 % (ref 36–46)
HGB BLD-MCNC: 8.8 G/DL (ref 12–16)
HOLD SPECIMEN: NORMAL
MCH RBC QN AUTO: 28.1 PG (ref 26–34)
MCHC RBC AUTO-ENTMCNC: 32.8 G/DL (ref 32–36)
MCV RBC AUTO: 86 FL (ref 80–100)
NRBC BLD-RTO: 0 /100 WBCS (ref 0–0)
PLATELET # BLD AUTO: 254 X10*3/UL (ref 150–450)
POTASSIUM SERPL-SCNC: 3.4 MMOL/L (ref 3.5–5.3)
RBC # BLD AUTO: 3.13 X10*6/UL (ref 4–5.2)
SODIUM SERPL-SCNC: 132 MMOL/L (ref 136–145)
WBC # BLD AUTO: 4.8 X10*3/UL (ref 4.4–11.3)

## 2024-11-02 PROCEDURE — 2500000001 HC RX 250 WO HCPCS SELF ADMINISTERED DRUGS (ALT 637 FOR MEDICARE OP): Performed by: NURSE PRACTITIONER

## 2024-11-02 PROCEDURE — 99232 SBSQ HOSP IP/OBS MODERATE 35: CPT | Performed by: NURSE PRACTITIONER

## 2024-11-02 PROCEDURE — 36415 COLL VENOUS BLD VENIPUNCTURE: CPT | Performed by: NURSE PRACTITIONER

## 2024-11-02 PROCEDURE — 99231 SBSQ HOSP IP/OBS SF/LOW 25: CPT | Performed by: STUDENT IN AN ORGANIZED HEALTH CARE EDUCATION/TRAINING PROGRAM

## 2024-11-02 PROCEDURE — 99221 1ST HOSP IP/OBS SF/LOW 40: CPT | Performed by: SURGERY

## 2024-11-02 PROCEDURE — 2500000001 HC RX 250 WO HCPCS SELF ADMINISTERED DRUGS (ALT 637 FOR MEDICARE OP)

## 2024-11-02 PROCEDURE — 2500000005 HC RX 250 GENERAL PHARMACY W/O HCPCS: Performed by: NURSE PRACTITIONER

## 2024-11-02 PROCEDURE — 2500000002 HC RX 250 W HCPCS SELF ADMINISTERED DRUGS (ALT 637 FOR MEDICARE OP, ALT 636 FOR OP/ED)

## 2024-11-02 PROCEDURE — 2500000001 HC RX 250 WO HCPCS SELF ADMINISTERED DRUGS (ALT 637 FOR MEDICARE OP): Performed by: INTERNAL MEDICINE

## 2024-11-02 PROCEDURE — 2500000004 HC RX 250 GENERAL PHARMACY W/ HCPCS (ALT 636 FOR OP/ED): Performed by: NURSE PRACTITIONER

## 2024-11-02 PROCEDURE — 2060000001 HC INTERMEDIATE ICU ROOM DAILY

## 2024-11-02 PROCEDURE — 82374 ASSAY BLOOD CARBON DIOXIDE: CPT | Performed by: NURSE PRACTITIONER

## 2024-11-02 PROCEDURE — 85027 COMPLETE CBC AUTOMATED: CPT | Performed by: NURSE PRACTITIONER

## 2024-11-02 RX ORDER — POTASSIUM CHLORIDE 1.5 G/1.58G
20 POWDER, FOR SOLUTION ORAL ONCE
Status: COMPLETED | OUTPATIENT
Start: 2024-11-02 | End: 2024-11-02

## 2024-11-02 RX ORDER — FUROSEMIDE 20 MG/1
20 TABLET ORAL DAILY
Status: DISCONTINUED | OUTPATIENT
Start: 2024-11-02 | End: 2024-11-02

## 2024-11-02 RX ORDER — FUROSEMIDE 40 MG/1
40 TABLET ORAL DAILY
Status: DISCONTINUED | OUTPATIENT
Start: 2024-11-02 | End: 2024-11-04 | Stop reason: HOSPADM

## 2024-11-02 ASSESSMENT — COGNITIVE AND FUNCTIONAL STATUS - GENERAL
STANDING UP FROM CHAIR USING ARMS: A LITTLE
WALKING IN HOSPITAL ROOM: A LITTLE
WALKING IN HOSPITAL ROOM: A LITTLE
DRESSING REGULAR UPPER BODY CLOTHING: A LITTLE
DAILY ACTIVITIY SCORE: 18
EATING MEALS: A LITTLE
TURNING FROM BACK TO SIDE WHILE IN FLAT BAD: A LITTLE
MOVING TO AND FROM BED TO CHAIR: A LITTLE
DRESSING REGULAR UPPER BODY CLOTHING: A LITTLE
MOVING FROM LYING ON BACK TO SITTING ON SIDE OF FLAT BED WITH BEDRAILS: A LITTLE
DRESSING REGULAR LOWER BODY CLOTHING: A LITTLE
TURNING FROM BACK TO SIDE WHILE IN FLAT BAD: A LITTLE
DRESSING REGULAR LOWER BODY CLOTHING: A LITTLE
MOVING TO AND FROM BED TO CHAIR: A LITTLE
CLIMB 3 TO 5 STEPS WITH RAILING: A LITTLE
HELP NEEDED FOR BATHING: A LITTLE
MOBILITY SCORE: 18
MOVING FROM LYING ON BACK TO SITTING ON SIDE OF FLAT BED WITH BEDRAILS: A LITTLE
HELP NEEDED FOR BATHING: A LITTLE
CLIMB 3 TO 5 STEPS WITH RAILING: A LITTLE
TOILETING: A LITTLE
STANDING UP FROM CHAIR USING ARMS: A LITTLE
PERSONAL GROOMING: A LITTLE
EATING MEALS: A LITTLE
TOILETING: A LITTLE
DAILY ACTIVITIY SCORE: 19
MOBILITY SCORE: 18

## 2024-11-02 ASSESSMENT — ENCOUNTER SYMPTOMS
WEAKNESS: 1
CHANGE IN BOWEL HABIT: 1
VOMITING: 0
FLANK PAIN: 0
NAUSEA: 0
SHORTNESS OF BREATH: 1
DYSURIA: 0
CARDIOVASCULAR NEGATIVE: 1

## 2024-11-02 ASSESSMENT — PAIN SCALES - GENERAL
PAINLEVEL_OUTOF10: 0 - NO PAIN

## 2024-11-02 ASSESSMENT — PAIN - FUNCTIONAL ASSESSMENT
PAIN_FUNCTIONAL_ASSESSMENT: FLACC (FACE, LEGS, ACTIVITY, CRY, CONSOLABILITY)
PAIN_FUNCTIONAL_ASSESSMENT: 0-10
PAIN_FUNCTIONAL_ASSESSMENT: FLACC (FACE, LEGS, ACTIVITY, CRY, CONSOLABILITY)

## 2024-11-02 NOTE — CARE PLAN
The patient's goals for the shift include      The clinical goals for the shift include Void trial

## 2024-11-02 NOTE — CONSULTS
Reason For Consult  Urinary retention    History Of Present Illness  Rakan Cervantes is a 89 y.o. female presenting with hypoxia, respiratory issues.  She was admitted to step down unit last week.  During her admission a Campbell was placed.  She does not report any real voiding issues at home.  She does have some urinary incontinence, wears pads occasionally.  No urine culture done.  No hematuria.         Past Medical History  She has a past medical history of Acute non-ST segment elevation myocardial infarction (Multi) (11/26/2023), Anemia, Arteriosclerosis of coronary artery (05/14/2023), Arthritis, Asthma, Atrial fibrillation (Multi) (11/26/2023), Jonas esophagus, Bilateral carotid artery occlusion (05/23/2023), CAD (coronary artery disease), Cardiac rhythm disorder or disturbance or change (11/26/2023), Cervical radiculopathy, Chest pain (11/26/2023), Chronic obstructive pulmonary disease (Multi) (11/26/2023), Constipation, Coronary artery disease (11/26/2023), Degenerative joint disease, Dyslipidemia, Dysuria, Erosive esophagitis, GERD (gastroesophageal reflux disease), Hypercholesterolemia (05/23/2023), Hyperparathyroidism (Multi), Hypertensive urgency (11/26/2023), Impaired fasting glucose (11/26/2023), Irritable bowel syndrome (IBS), Labile essential hypertension (11/26/2023), Labile hypertension, Left foot pain, Low blood pressure (11/26/2023), Macular degeneration, Mixed hyperlipidemia (11/26/2023), Neck pain, Osteoporosis (11/26/2023), Paroxysmal atrial fibrillation (Multi), Polyarthritis with negative rheumatoid factor (Multi), Post menopausal syndrome, Rapid atrial fibrillation (Multi) (05/14/2023), Spinal stenosis, Stage 3 chronic kidney disease (Multi) (11/26/2023), Stroke (Multi), and Transient ischemic attack (11/26/2023).    Surgical History  She has a past surgical history that includes Other surgical history (02/14/2022); MR angio head wo IV contrast (04/23/2013); CT angio head w and wo IV  "contrast (05/08/2013); MR angio head wo IV contrast (07/20/2016); Cholecystectomy; Cardiovascular stress test (2017); Cardiovascular stress test (2004); Cataract extraction (Bilateral, 2011); Carpal tunnel release (Left, 2014); Total hip arthroplasty (Right, 2016); Revision total hip arthroplasty (Left, 2013); Coronary stent placement (05/2023); MR angio head wo IV contrast (10/27/2023); MR angio neck wo IV contrast (10/27/2023); Cardiac catheterization (Left, 9/27/2024); and Cardiac catheterization (N/A, 9/27/2024).     Social History  She reports that she has never smoked. She has never been exposed to tobacco smoke. She has never used smokeless tobacco. She reports that she does not currently use alcohol. She reports that she does not use drugs.    Family History  Family History   Problem Relation Name Age of Onset    No Known Problems Mother      No Known Problems Father      No Known Problems Sister          Allergies  Meperidine, Morphine, Opioids - morphine analogues, Pregabalin, and Tramadol    Review of Systems  Review of Systems   Constitutional: Positive for malaise/fatigue.   Cardiovascular: Negative.    Respiratory:  Positive for shortness of breath.    Gastrointestinal:  Positive for change in bowel habit. Negative for nausea and vomiting.   Genitourinary:  Positive for bladder incontinence. Negative for dysuria and flank pain.   Neurological:  Positive for weakness.          Physical Exam  Awake, alert, no distress  Breathing comfortably  Abdomen soft, ND, NT  Campbell - urine clear       Last Recorded Vitals  Blood pressure 175/78, pulse 73, temperature 36.7 °C (98.1 °F), temperature source Temporal, resp. rate 26, height 1.499 m (4' 11\"), weight 55.8 kg (123 lb 0.3 oz), SpO2 95%.    Relevant Results      Results for orders placed or performed during the hospital encounter of 10/28/24 (from the past 24 hours)   Urinalysis with Reflex Culture and Microscopic   Result Value Ref Range    Color, Urine " Light-Orange (N) Light-Yellow, Yellow, Dark-Yellow    Appearance, Urine Ex.Turbid (N) Clear    Specific Gravity, Urine 1.035 1.005 - 1.035    pH, Urine 6.0 5.0, 5.5, 6.0, 6.5, 7.0, 7.5, 8.0    Protein, Urine 100 (2+) (A) NEGATIVE, 10 (TRACE), 20 (TRACE) mg/dL    Glucose, Urine Normal Normal mg/dL    Blood, Urine OVER (3+) (A) NEGATIVE    Ketones, Urine NEGATIVE NEGATIVE mg/dL    Bilirubin, Urine NEGATIVE NEGATIVE    Urobilinogen, Urine Normal Normal mg/dL    Nitrite, Urine NEGATIVE NEGATIVE    Leukocyte Esterase, Urine 75 John/µL (A) NEGATIVE   Extra Urine Gray Tube   Result Value Ref Range    Extra Tube Hold for add-ons.    Microscopic Only, Urine   Result Value Ref Range    WBC, Urine 6-10 (A) 1-5, NONE /HPF    RBC, Urine >20 (A) NONE, 1-2, 3-5 /HPF    Squamous Epithelial Cells, Urine 1-9 (SPARSE) Reference range not established. /HPF   CBC   Result Value Ref Range    WBC 4.8 4.4 - 11.3 x10*3/uL    nRBC 0.0 0.0 - 0.0 /100 WBCs    RBC 3.13 (L) 4.00 - 5.20 x10*6/uL    Hemoglobin 8.8 (L) 12.0 - 16.0 g/dL    Hematocrit 26.8 (L) 36.0 - 46.0 %    MCV 86 80 - 100 fL    MCH 28.1 26.0 - 34.0 pg    MCHC 32.8 32.0 - 36.0 g/dL    RDW 14.9 (H) 11.5 - 14.5 %    Platelets 254 150 - 450 x10*3/uL   Basic Metabolic Panel   Result Value Ref Range    Glucose 100 (H) 74 - 99 mg/dL    Sodium 132 (L) 136 - 145 mmol/L    Potassium 3.4 (L) 3.5 - 5.3 mmol/L    Chloride 95 (L) 98 - 107 mmol/L    Bicarbonate 30 21 - 32 mmol/L    Anion Gap 10 10 - 20 mmol/L    Urea Nitrogen 11 6 - 23 mg/dL    Creatinine 0.43 (L) 0.50 - 1.05 mg/dL    eGFR >90 >60 mL/min/1.73m*2    Calcium 8.5 (L) 8.6 - 10.3 mg/dL     *Note: Due to a large number of results and/or encounters for the requested time period, some results have not been displayed. A complete set of results can be found in Results Review.          Assessment/Plan     Urinary retention.      Will order voiding trial today.   Recommend good bowel regimen.          I spent 45 minutes in the professional  and overall care of this patient.      Cruz Rush MD

## 2024-11-02 NOTE — ASSESSMENT & PLAN NOTE
Resume home cardiac medications  Daily weights  Strict I/Os    Echocardiogram  Cardiology resumed oral lasix at increased dose  Consult cardiology, appreciate recommendations

## 2024-11-02 NOTE — PROGRESS NOTES
"Rakan Cervantes is a 89 y.o. female on day 5 of admission presenting with Sinus tachycardia.    Subjective   Patient seen. Sitting up in chair. She denies any SOB, chest pain or pressure, palpitations.        Objective     Physical Exam  Vitals reviewed.   Constitutional:       General: She is not in acute distress.     Appearance: Normal appearance. She is normal weight. She is not ill-appearing, toxic-appearing or diaphoretic.   HENT:      Head: Normocephalic and atraumatic.      Nose: Nose normal.      Mouth/Throat:      Mouth: Mucous membranes are moist.   Eyes:      Extraocular Movements: Extraocular movements intact.   Cardiovascular:      Rate and Rhythm: Normal rate and regular rhythm.      Pulses: Normal pulses.      Heart sounds: Normal heart sounds.   Pulmonary:      Effort: Pulmonary effort is normal.      Comments: Diminished in bases bilaterally  Abdominal:      General: Bowel sounds are normal. There is no distension.      Palpations: Abdomen is soft.      Tenderness: There is no abdominal tenderness.   Musculoskeletal:         General: Normal range of motion.      Cervical back: Normal range of motion and neck supple.      Right lower leg: No edema.      Left lower leg: No edema.   Skin:     General: Skin is warm and dry.      Capillary Refill: Capillary refill takes less than 2 seconds.   Neurological:      General: No focal deficit present.      Mental Status: She is alert. Mental status is at baseline.   Psychiatric:         Mood and Affect: Mood normal.         Behavior: Behavior normal.         Last Recorded Vitals  Blood pressure 175/78, pulse 73, temperature 36.7 °C (98.1 °F), temperature source Temporal, resp. rate 26, height 1.499 m (4' 11\"), weight 55.8 kg (123 lb 0.3 oz), SpO2 95%.  Intake/Output last 3 Shifts:  I/O last 3 completed shifts:  In: 1730 (31 mL/kg) [P.O.:1680; IV Piggyback:50]  Out: 3525 (63.2 mL/kg) [Urine:3525 (1.8 mL/kg/hr)]  Weight: 55.8 kg     Relevant Results       "   Scheduled medications  acetaminophen, 650 mg, oral, TID  amLODIPine, 5 mg, oral, Daily  apixaban, 2.5 mg, oral, BID  ascorbic acid, 125 mg, oral, Daily  atorvastatin, 80 mg, oral, Nightly  carvedilol, 12.5 mg, oral, BID  cefTRIAXone, 1 g, intravenous, Nightly  cholecalciferol, 2,000 Units, oral, Daily  clopidogrel, 75 mg, oral, Daily  docusate sodium, 100 mg, oral, BID  DULoxetine, 30 mg, oral, Daily with evening meal  ferrous sulfate (325 mg ferrous sulfate), 65 mg of iron, oral, Daily with breakfast  furosemide, 20 mg, oral, Daily  gabapentin, 300 mg, oral, BID  hydroxychloroquine, 200 mg, oral, Daily  isosorbide mononitrate ER, 120 mg, oral, Daily  lidocaine, 3 patch, transdermal, Daily  losartan, 100 mg, oral, Daily  magnesium oxide, 400 mg, oral, BID  magnesium sulfate, 1 g, intravenous, Once  oxygen, , inhalation, Continuous - Inhalation  pantoprazole, 40 mg, oral, BID  polyethylene glycol, 17 g, oral, Daily  potassium chloride, 20 mEq, oral, Once  ranolazine, 500 mg, oral, BID  sucralfate, 1 g, oral, q6h ZEINAB  sulfaSALAzine, 500 mg, oral, BID      Continuous medications     PRN medications  PRN medications: albuterol, benzocaine-menthol, bisacodyl, cyclobenzaprine      Results for orders placed or performed during the hospital encounter of 10/28/24 (from the past 24 hours)   Urinalysis with Reflex Culture and Microscopic   Result Value Ref Range    Color, Urine Light-Orange (N) Light-Yellow, Yellow, Dark-Yellow    Appearance, Urine Ex.Turbid (N) Clear    Specific Gravity, Urine 1.035 1.005 - 1.035    pH, Urine 6.0 5.0, 5.5, 6.0, 6.5, 7.0, 7.5, 8.0    Protein, Urine 100 (2+) (A) NEGATIVE, 10 (TRACE), 20 (TRACE) mg/dL    Glucose, Urine Normal Normal mg/dL    Blood, Urine OVER (3+) (A) NEGATIVE    Ketones, Urine NEGATIVE NEGATIVE mg/dL    Bilirubin, Urine NEGATIVE NEGATIVE    Urobilinogen, Urine Normal Normal mg/dL    Nitrite, Urine NEGATIVE NEGATIVE    Leukocyte Esterase, Urine 75 John/µL (A) NEGATIVE   Extra  Urine Gray Tube   Result Value Ref Range    Extra Tube Hold for add-ons.    Microscopic Only, Urine   Result Value Ref Range    WBC, Urine 6-10 (A) 1-5, NONE /HPF    RBC, Urine >20 (A) NONE, 1-2, 3-5 /HPF    Squamous Epithelial Cells, Urine 1-9 (SPARSE) Reference range not established. /HPF   CBC   Result Value Ref Range    WBC 4.8 4.4 - 11.3 x10*3/uL    nRBC 0.0 0.0 - 0.0 /100 WBCs    RBC 3.13 (L) 4.00 - 5.20 x10*6/uL    Hemoglobin 8.8 (L) 12.0 - 16.0 g/dL    Hematocrit 26.8 (L) 36.0 - 46.0 %    MCV 86 80 - 100 fL    MCH 28.1 26.0 - 34.0 pg    MCHC 32.8 32.0 - 36.0 g/dL    RDW 14.9 (H) 11.5 - 14.5 %    Platelets 254 150 - 450 x10*3/uL   Basic Metabolic Panel   Result Value Ref Range    Glucose 100 (H) 74 - 99 mg/dL    Sodium 132 (L) 136 - 145 mmol/L    Potassium 3.4 (L) 3.5 - 5.3 mmol/L    Chloride 95 (L) 98 - 107 mmol/L    Bicarbonate 30 21 - 32 mmol/L    Anion Gap 10 10 - 20 mmol/L    Urea Nitrogen 11 6 - 23 mg/dL    Creatinine 0.43 (L) 0.50 - 1.05 mg/dL    eGFR >90 >60 mL/min/1.73m*2    Calcium 8.5 (L) 8.6 - 10.3 mg/dL     *Note: Due to a large number of results and/or encounters for the requested time period, some results have not been displayed. A complete set of results can be found in Results Review.      Transthoracic Echo (TTE) Complete    Result Date: 11/1/2024           [place] [id number]            Phone [phone] TRANSTHORACIC ECHOCARDIOGRAM REPORT Patient Name:       NICOLE Lock Physician:    [id number] Nessa Callaway MD Study Date:         10/31/2024           Ordering Provider:    [id number] SONJA BATISTA MRN/PID:            [id number]             Fellow: Accession#:         [id number]         Nurse: Date of Birth/Age:  1935 / 89 years Sonographer:          Beka Nj [facility] Gender Assigned at  F                     Additional Staff: Birth: Height:             149.86 cm            Admit Date: Weight:             51.26 kg             Admission Status:     Inpatient -                                                                Routine BSA / BMI:          1.45 m2 / 22.82      Department Location:  [facility]                     kg/m2 Blood Pressure: 155 /67 mmHg Study Type:    TRANSTHORACIC ECHO (TTE) COMPLETE Diagnosis/ICD: Acute combined systolic (congestive) and diastolic (congestive)                heart failure (CHF)-I50.41 Indication:    Congestive Heart Failure CPT Codes:     Echo Complete w Full Doppler-[id number] Patient History: Pertinent History: CHF, A-Fib, CAD, CVA, HTN, Hyperlipidemia and TIA. MI. Study Detail: The following Echo studies were performed: 2D, M-Mode, Doppler and               color flow. Technically challenging study due to body habitus and               patient lying in supine position.  PHYSICIAN INTERPRETATION: Left Ventricle: The left ventricular systolic function is normal, with a visually estimated ejection fraction of 60-65%. There is moderate eccentric left ventricular hypertrophy. There are no regional wall motion abnormalities. The left ventricular cavity size is normal. There is mild increased septal and normal posterior left ventricular wall thickness. Spectral Doppler shows a normal pattern of left ventricular diastolic filling. Left Atrium: The left atrium is moderately dilated. Right Ventricle: The right ventricle is normal in size. There is normal right ventricular global systolic function. Right Atrium: The right atrium is mildly dilated. Aortic Valve: The aortic valve is trileaflet. There is mild aortic valve thickening. There is evidence of mild aortic valve stenosis. There is mild aortic valve regurgitation. The peak instantaneous gradient of the aortic valve is 14 mmHg. Mitral Valve: The mitral valve is normal in structure. There is mild mitral annular calcification. The peak  instantaneous gradient of the mitral valve is 15 mmHg. Echo findings are consistent with normal mitral valve prosthesis structure and function. There is mild to moderate mitral valve regurgitation. Tricuspid Valve: The tricuspid valve is structurally normal. There is mild tricuspid regurgitation. The Doppler estimated RVSP is mild to moderately elevated at 52.3 mmHg. Pulmonic Valve: The pulmonic valve is structurally normal. There is mild pulmonic valve regurgitation. Pericardium: No pericardial effusion noted. Aorta: The aortic root is normal. Systemic Veins: The inferior vena cava appears normal in size, with IVC inspiratory collapse greater than 50%.  CONCLUSIONS:  1. The left ventricular systolic function is normal, with a visually estimated ejection fraction of 60-65%.  2. There is normal right ventricular global systolic function.  3. The left atrium is moderately dilated.  4. Mild to moderate mitral valve regurgitation.  5. Mild to moderately elevated right ventricular systolic pressure.  6. Mild tricuspid regurgitation is visualized.  7. Mild aortic valve stenosis.  8. Mild aortic valve regurgitation.  9. Echo findings are consistent with normal mitral valve prosthesis structure and function. QUANTITATIVE DATA SUMMARY:  2D MEASUREMENTS:            Normal Ranges: Ao Root d:       3.60 cm    (2.0-3.7cm) LAs:             4.70 cm    (2.7-4.0cm) IVSd:            0.99 cm    (0.6-1.1cm) LVPWd:           0.84 cm    (0.6-1.1cm) LVIDd:           4.99 cm    (3.9-5.9cm) LVIDs:           3.55 cm LV Mass Index:   111.7 g/m2 LV % FS          28.9 %  LA VOLUME:                  Normal Ranges: LA Volume Index: 58.0 ml/m2  RA VOLUME BY A/L METHOD:          Normal Ranges: RA Area A4C:             15.0 cm2  M-MODE MEASUREMENTS:         Normal Ranges: Ao Root:             3.50 cm (2.0-3.7cm) LAs:                 4.80 cm (2.7-4.0cm)  AORTA MEASUREMENTS:         Normal Ranges: Asc Ao, d:          3.10 cm (2.1-3.4cm)  LV SYSTOLIC  FUNCTION BY 2D PLANIMETRY (MOD):                      Normal Ranges: EF-A4C View:    58 % (>=55%) EF-A2C View:    63 % EF-Biplane:     59 % EF-Visual:      63 % LV EF Reported: 63 %  LV DIASTOLIC FUNCTION:           Normal Ranges: MV Peak E:             1.71 m/s  (0.7-1.2 m/s) MV Peak A:             1.14 m/s  (0.42-0.7 m/s) E/A Ratio:             1.50      (1.0-2.2) MV e'                  0.088 m/s (>8.0) MV lateral e'          0.10 m/s MV medial e'           0.07 m/s E/e' Ratio:            19.32     (<8.0)  MITRAL VALVE:           Normal Ranges: MV Vmax:      1.92 m/s  (<=1.3m/s) MV peak P.7 mmHg (<5mmHg) MV mean P.0 mmHg  (<48mmHg) MV DT:        218 msec  (150-240msec)  AORTIC VALVE:            Normal Ranges: AoV Vmax:      1.90 m/s  (<=1.7m/s) AoV Peak P.4 mmHg (<20mmHg) LVOT Max Herbert:  1.21 m/s  (<=1.1m/s) LVOT VTI:      27.80 cm LVOT Diameter: 2.30 cm   (1.8-2.4cm) AoV Area,Vmax: 2.65 cm2  (2.5-4.5cm2)  RIGHT VENTRICLE: RV Basal 4.48 cm RV Mid   3.15 cm RV Major 6.9 cm TAPSE:   21.2 mm RV s'    0.15 m/s  TRICUSPID VALVE/RVSP:          Normal Ranges: Peak TR Velocity:     3.51 m/s RV Syst Pressure:     52 mmHg  (< 30mmHg)  PULMONIC VALVE:          Normal Ranges: PV Max Herbert:     1.2 m/s  (0.6-0.9m/s) PV Max P.0 mmHg  [id number] Nessa Callaway MD Electronically signed on 2024 at 2:38:54 PM  ** Final **     XR chest 1 view    Result Date: 2024  Interpreted By:  Marilyn Aguayo, STUDY: XR CHEST 1 VIEW 2024 6:05 am   INDICATION: Signs/Symptoms:Pleural effusion.   COMPARISON: 10/28/2024   ACCESSION NUMBER(S): [id number]   ORDERING CLINICIAN: OZ EDEN   TECHNIQUE: Single AP view chest   FINDINGS: Cardiac silhouette is mildly enlarged. There is generalized increased interstitial prominence with scattered areas of infiltrate throughout bilateral lung fields appearing slightly more conspicuous on the current examination. Mild blunting of the left costophrenic angle with  subtle left basilar effusion. There is postoperative fixation of the right shoulder. Severe left shoulder osteoarthritis.             1. Generalized increased interstitial prominence with component of scattered infiltrate throughout bilateral lung fields. Correlate with patient's symptoms.   Signed by: Marilyn Aguayo 11/1/2024 10:51 AM Dictation workstation:   [id number]              Assessment/Plan   Assessment & Plan  Chest pain    Erosive esophagitis    Syncope    NSTEMI (non-ST elevated myocardial infarction) (Multi)    PAF (paroxysmal atrial fibrillation) (Multi)    Essential hypertension    Acute heart failure    Bilateral pleural effusion    Left arm numbness    CVA (cerebral vascular accident) (Multi)    Urinary retention    1 Chest pain.  Patient with angina in the setting of hypertension and poorly controlled heart rate.  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  Had PCI to distal left circumflex but distal to the stent there is some moderate distal disease.  Likely demand ischemia from hypertension.  Her LAD looked fine on the cardiac catheterization from September 27, 2024.  At this point recommend conservative management.  We will continue Plavix, Eliquis, statin.       2.  Syncope.  MRI with new acute ischemic infarct left corona radiata   (x2) and right aspect of the splenium of the corpus callosum.    Not sure if her syncopal episode is related to stroke or syncope.  TTE 10/28/24 with EF 60-65%, mild-mod MVR, mild-mod elevated right vent. systolic pressure, mild TVR, mild AVR, and mild AVS. At this point we will arrange for monitor before discharge.     3.  Hypertension. BP continues to be elevated. Continue carvedilol, Lasix, losartan, isosorbide. Will increase Lasix dose. Continue to monitor.      4.  Hyperlipidemia. Continue high intensity statin.     5.  Shortness of breath.  Acute on chronic diastolic heart failure.  Bilateral pleural effusions. -550  mL fluid balance. Continue Lasix; will increase dose to 40 mg daily PO.  Currently on 2L NC, SpO2 95%. Pulmonology is following as well.     6. Paroxymal atrial fibrillation. Telemetry with sinus rhythm, rate in the 60's. On Eliquis.     7. Acute CVA. Stable from neurology standpoint. Follow up outpatient.        I spent 30 minutes in the professional and overall care of this patient.      Nell Stewart, APRN-CNP

## 2024-11-02 NOTE — PROGRESS NOTES
Palliative Care Progress Note    Date of Admission: 10/28/2024    Patient is a 89 y.o. female admitted with Sinus tachycardia.     Reports controlled hip pain currently. Started on IV antibiotics for hazy appearing urine, urinary retention.     Scheduled medications  acetaminophen, 650 mg, oral, TID  amLODIPine, 5 mg, oral, Daily  apixaban, 2.5 mg, oral, BID  ascorbic acid, 125 mg, oral, Daily  atorvastatin, 80 mg, oral, Nightly  carvedilol, 12.5 mg, oral, BID  cefTRIAXone, 1 g, intravenous, Nightly  cholecalciferol, 2,000 Units, oral, Daily  clopidogrel, 75 mg, oral, Daily  docusate sodium, 100 mg, oral, BID  DULoxetine, 30 mg, oral, Daily with evening meal  ferrous sulfate (325 mg ferrous sulfate), 65 mg of iron, oral, Daily with breakfast  furosemide, 40 mg, oral, Daily  gabapentin, 300 mg, oral, BID  hydroxychloroquine, 200 mg, oral, Daily  isosorbide mononitrate ER, 120 mg, oral, Daily  lidocaine, 3 patch, transdermal, Daily  losartan, 100 mg, oral, Daily  magnesium oxide, 400 mg, oral, BID  oxygen, , inhalation, Continuous - Inhalation  pantoprazole, 40 mg, oral, BID  polyethylene glycol, 17 g, oral, Daily  ranolazine, 500 mg, oral, BID  sucralfate, 1 g, oral, q6h ZEINAB  sulfaSALAzine, 500 mg, oral, BID      Continuous medications     PRN medications  PRN medications: albuterol, benzocaine-menthol, bisacodyl, cyclobenzaprine     Results for orders placed or performed during the hospital encounter of 10/28/24 (from the past 24 hours)   CBC   Result Value Ref Range    WBC 4.8 4.4 - 11.3 x10*3/uL    nRBC 0.0 0.0 - 0.0 /100 WBCs    RBC 3.13 (L) 4.00 - 5.20 x10*6/uL    Hemoglobin 8.8 (L) 12.0 - 16.0 g/dL    Hematocrit 26.8 (L) 36.0 - 46.0 %    MCV 86 80 - 100 fL    MCH 28.1 26.0 - 34.0 pg    MCHC 32.8 32.0 - 36.0 g/dL    RDW 14.9 (H) 11.5 - 14.5 %    Platelets 254 150 - 450 x10*3/uL   Basic Metabolic Panel   Result Value Ref Range    Glucose 100 (H) 74 - 99 mg/dL    Sodium 132 (L) 136 - 145 mmol/L    Potassium 3.4  (L) 3.5 - 5.3 mmol/L    Chloride 95 (L) 98 - 107 mmol/L    Bicarbonate 30 21 - 32 mmol/L    Anion Gap 10 10 - 20 mmol/L    Urea Nitrogen 11 6 - 23 mg/dL    Creatinine 0.43 (L) 0.50 - 1.05 mg/dL    eGFR >90 >60 mL/min/1.73m*2    Calcium 8.5 (L) 8.6 - 10.3 mg/dL     *Note: Due to a large number of results and/or encounters for the requested time period, some results have not been displayed. A complete set of results can be found in Results Review.        Carotid duplex bilateral    Result Date: 10/30/2024           [place] [address]            Phone [phone]  Vascular Lab Report  [facility] US CAROTID ARTERY DUPLEX BILATERAL Patient Name:      NICOLEMACK PRICE       Reading Physician:  [id number] Elías Winter MD, RPVI Study Date:        10/29/2024           Ordering Provider:  [id number] SONJA BATISTA MRN/PID:           [id number]             Fellow: Accession#:        [id number]         Technologist:       Isabell Vidal RVYOAV Date of Birth/Age: 1935 / 89 years Technologist 2: Gender:            F                    Encounter#:         [id number] Admission Status:  Inpatient            Location Performed: [facility]  Diagnosis/ICD: Syncope and collapse-R55 CPT Codes:     56625 Cerebrovascular Carotid Duplex scan complete  CONCLUSIONS: Right Carotid: Findings are consistent with less than 50% stenosis of the right proximal internal carotid artery. The internal carotid artery appears tortuous. Right external carotid artery appears patent with no evidence of stenosis. The right vertebral artery is patent with antegrade flow. No evidence of hemodynamically significant stenosis in the right subclavian artery. There is turbulent flow noted in the proximal common carotid artery which may indicate a more proximal stenosis. Left Carotid: Findings are  consistent with less than 50% stenosis of the left proximal internal carotid artery. The internal carotid artery appears tortuous. There is turbulent flow noted in mid and distal internal carotid artery possibly due to vessel tortuosity. Left external carotid artery appears patent with no evidence of stenosis. The left vertebral artery is patent with antegrade flow. No evidence of hemodynamically significant stenosis in the left subclavian artery.  Imaging & Doppler Findings: Right Plaque Morph: The proximal right internal carotid artery demonstrates heterogenous plaque. The proximal right external carotid artery demonstrates heterogenous and calcified plaque. The distal right common carotid artery demonstrates heterogenous plaque. Left Plaque Morph: The proximal left internal carotid artery demonstrates heterogenous plaque. The proximal left external carotid artery demonstrates heterogenous plaque. The distal left common carotid artery demonstrates heterogenous plaque.   Right                        Left   PSV      EDV                PSV       cm/s           CCA P    79 cm/s 86 cm/s            CCA D    75 cm/s 83 cm/s  18 cm/s   ICA P    102 cm/s 16 cm/s 83 cm/s  22 cm/s   ICA M    124 cm/s 25 cm/s 82 cm/s  21 cm/s   ICA D    80 cm/s  16 cm/s 105 cm/s            ECA     192 cm/s 75 cm/s  14 cm/s Vertebral  84 cm/s  18 cm/s 80 cm/s          Subclavian 122 cm/s                Right Left ICA/CCA Ratio  1.0  1.4   [id number] Elías Winter MD, RPVI Electronically signed by [id number] Elías Winter MD, RPVI on 10/30/2024 at 9:00:56 PM  ** Final **     CT angio head and neck w and wo IV contrast    Result Date: 10/30/2024  Interpreted By:  Dario Sam, STUDY: CT ANGIO HEAD AND NECK W AND WO IV CONTRAST;  10/30/2024 4:15 pm   INDICATION: Signs/Symptoms:stroke on MRI on L corona radiata.     COMPARISON: 10/28/2024 brain CT and 10/29/2024 brain MR   ACCESSION NUMBER(S): [id number]   ORDERING CLINICIAN: ИРИНА LOPEZ   TECHNIQUE:  Unenhanced CT images of the head were obtained. Subsequently, 75 ML of Omnipaque 350 was administered intravenously and axial images of the head and neck were acquired.  Coronal, sagittal, and 3-D reconstructions were provided for review. Carotid stenoses were determined using modified NASCET criteria.   FINDINGS:     CTA HEAD FINDINGS:   Anterior circulation: The left M1 segment has mild stenoses with moderate stenosis at the origin of the superior branch of the left middle cerebral artery.  The pre callosal segment of the left anterior cerebral artery has moderate to severe stenosis.   Posterior circulation: The right posterior cerebral is supplied primarily through the posterior communicating artery with moderate stenosis in the anterior P2 segment. The more distal left posterior cerebral artery has moderate stenosis as well.   CTA NECK FINDINGS:   Right carotid vessels: The distal right common carotid artery is non stenotic atherosclerotic plaque. The right common carotid bifurcation has calcified non stenotic plaque extending into the carotid bulb with calcified plaque causing 60% stenosis of the right external carotid artery origin. The internal carotid artery in the neck is normal. There is % stenosis  .   Left carotid vessels: The common carotid artery is normal. The left common carotid bifurcation has non stenotic plaque extending into the internal carotid bulb. The internal carotid artery in the neck is normal. There is 0% stenosis  .   Vertebral vessels:  The visualized segments of the cervical vertebral arteries are normal in caliber.         1. the left anterior cerebral artery has severe stenosis adjacent to the genu of the corpus callosum with mild to moderate stenoses elsewhere in the cerebral arteries as noted.   2. No significant stenoses of the cervical carotid or vertebral arteries are noted.   MACRO: None   Signed by: Dario Sam 10/30/2024 4:48 PM Dictation workstation:   [id number]      brain wo IV contrast    Result Date: 10/29/2024  Interpreted By:  Surendra Hylton, STUDY: MR BRAIN WO IV CONTRAST;  10/29/2024 7:57 pm   INDICATION: Signs/Symptoms:TIA eval, arm numbness.     COMPARISON: CT head without contrast 10/28/2024; MRI brain 10/27/2023   ACCESSION NUMBER(S): [id number]   ORDERING CLINICIAN: ИРИНА LOPEZ   TECHNIQUE: Multiplanar, multi-sequence images of the brain were obtained without contrast.   FINDINGS:   Diffusion weighted images show new subcentimeter foci of diffusion restriction in the left corona radiata (x2) consistent with acute ischemic infarct. There is a new subcentimeter focus of diffusion restriction within the splenium of the corpus callosum just right of midline consistent with acute ischemic infarct.   There is redemonstration of cystic encephalomalacia in the posterior right temporal and parietal lobes. There is background moderate periventricular and subcortical hemispheric T2/FLAIR white matter hyperintensities are most compatible with chronic small vessel ischemic disease.   The major intracranial flow voids are preserved.   There is no acute intraparenchymal hemorrhage, mass, mass-effect, or an extra-axial fluid collection. On GRE images, there is susceptibility artifact within the posterior limb of the right internal capsule that does not correspond to any hyperdense abnormality on the CT and likely represents sequela of remote hemorrhage..   The ventricular size and cerebral volume are age-concordant.   Normal morphology of midline structures. The craniovertebral junction is normal.   The orbits and globes are unremarkable.   The paranasal sinuses show no air-fluid levels or hemorrhage.   The mastoid air cells are clear.   There is hyperostosis frontalis.   Severe degenerative disc disease in the mid cervical spine.       New subcentimeter acute ischemic infarct in the left corona radiata (x2) and right aspect of the splenium of the corpus callosum.   The  demonstrated large area of encephalomalacia in the right posterior temporal and parietal lobes and chronic focus of hemorrhage in the posterior limb of the right internal capsule.   MACRO: None.   Signed by: Surendra Hylton 10/29/2024 9:41 PM Dictation workstation:   [id number]    ECG 12 Lead    Result Date: 10/28/2024  Supraventricular tachycardia Marked ST abnormality, possible inferolateral subendocardial injury Abnormal ECG When compared with ECG of 26-SEP-2024 14:27, T wave inversion no longer evident in Inferior leads T wave inversion more evident in Lateral leads Confirmed by Sergey Bond ([id number]) on 10/28/2024 7:51:36 PM    Lower extremity venous duplex bilateral    Result Date: 10/28/2024  Interpreted By:  Dino De Paz, STUDY: [facility] LOWER EXTREMITY VENOUS DUPLEX BILATERAL;  10/28/2024 7:15 pm   INDICATION: Signs/Symptoms:extremity edema.   COMPARISON: None.   ACCESSION NUMBER(S): [id number]   ORDERING CLINICIAN: SONJA BATISTA   TECHNIQUE: Grayscale, color and spectral Doppler sonographic images of the bilateral lower extremity deep venous system.   FINDINGS: RIGHT: There is normal compressibility of the common femoral vein, saphenous femoral junction, profunda femoral vein and popliteal vein. The posterior tibial and peroneal veins  demonstrate normal color flow and compressibility. There is normal spontaneous and phasic variation throughout the leg by spectral doppler.   LEFT: There is normal compressibility of the common femoral vein, saphenous femoral junction, profunda femoral vein and popliteal vein. The posterior tibial and peroneal veins  demonstrate normal color flow and compressibility.  There is normal spontaneous and phasic variation throughout the leg by spectral doppler.   OTHER FINDINGS: None.       No sonographic evidence DVT in the visualized vessels of bilateral lower extremities.   MACRO: None   Signed by: Dino De Paz 10/28/2024 7:48 PM Dictation workstation:    [id number]    CT angio chest for pulmonary embolism    Result Date: 10/28/2024  Interpreted By:  Richmond Barbosa, STUDY: CT ANGIO CHEST FOR PULMONARY EMBOLISM;  10/28/2024 2:05 pm   INDICATION: Signs/Symptoms:hypoxic, tachy.   COMPARISON: Chest CT 03/07/2022.   ACCESSION NUMBER(S): [id number]   ORDERING CLINICIAN: KARMA HAIDER   TECHNIQUE: Helical data acquisition of the chest was obtained contrast volume: 75 mL IV contrast Omnipaque 350.   Axial contiguous images were reformatted in coronal and sagittal planes. Axial and coronal MIP images were created and reviewed.   FINDINGS: POTENTIAL LIMITATIONS OF THE STUDY: Motion and mixing artifact which limits evaluation of the distal branch vessels.   HEART AND VESSELS: No discrete filling defects within the main pulmonary artery or its branches.   Main pulmonary trunk is upper normal limits, measuring 2.9 cm in size.   The thoracic aorta is normal in caliber. It is not well evaluated due to the phase of the contrast. Mild aortic valve atherosclerotic calcifications are present.   Severe coronary artery atherosclerotic calcifications and/or stents.   Mild cardiomegaly with biatrial enlargement.   No evidence of pericardial effusion.   MEDIASTINUM AND DONATO, LOWER NECK AND AXILLA: The visualized thyroid gland is within normal limits.   No evidence of thoracic lymphadenopathy by CT criteria.   There is moderate-size hiatal hernia and there is air-fluid level and mild circumferential wall thickening throughout the thoracic esophagus which is patulous. This is suggestive of achalasia versus reflux esophagitis.   LUNGS AND AIRWAYS: The trachea and central airways are patent. No endobronchial lesion.   Small to moderate-sized right greater than left pleural effusions. There is interseptal thickening with scattered ground-glass opacities suggestive of pulmonary venous congestion/interstitial edema. Mild bibasilar compression atelectasis.   UPPER ABDOMEN: Cholecystectomy change.  Diffuse hepatic steatosis.   CHEST WALL AND OSSEOUS STRUCTURES: There are no suspicious osseous lesions. There is endplate reactive change in the lower thoracic spine with partially visualized posterior spinal fusions hardware and laminectomy in the lumbar spine.       1.  No evidence of pulmonary emboli. Severe coronary artery atherosclerotic calcifications. 2. Mild cardiomegaly with biatrial enlargement. Small to moderate-size right greater than left pleural effusions and bibasilar subsegmental atelectasis. Diffuse interseptal thickening and scattered central predominant ground glass opacification of the lungs suggestive of pulmonary edema. 3. Chronic patulous esophagus with new air-fluid throughout the thoracic esophagus suggestive of reflux esophagitis versus achalasia. Clinical correlation is recommended.     Signed by: Richmond Barbosa 10/28/2024 2:41 PM Dictation workstation:   [id number]    CT cervical spine wo IV contrast    Result Date: 10/28/2024  Interpreted By:  Sherlyn Jackson, STUDY: CT CERVICAL SPINE WO IV CONTRAST;  10/28/2024 10:16 am   INDICATION: Signs/Symptoms:fall.     COMPARISON: 07/20/2022   ACCESSION NUMBER(S): [id number]   ORDERING CLINICIAN: ANDREY COX   TECHNIQUE: Axial CT images of the cervical spine are obtained. Axial, coronal and sagittal reconstructions are provided for review.   FINDINGS:     Fractures: There is no evidence for an acute fracture of the cervical spine.   Vertebral Alignment: There is a 2-1/2 mm anterolisthesis of C3 with respect to C4 and a 2-1/2 mm retrolisthesis of C4 with respect to C5.   Craniocervical Junction: The odontoid process and craniocervical junction are intact.   Vertebrae/Disc Spaces:  The cervical vertebral body heights are intact. There is marked disc space narrowing at C4-5, C5-6, and C6-7 levels with moderately severe disc space narrowing at the C3-4 level. Vacuum disc is present at all of these levels. There is multilevel degenerative  spondyloarthropathy seen bilaterally. There is also uncovertebral joint spurring seen on the right and on the left from C3-4 through the C6-7 levels with bilateral foraminal stenosis at C4-5, C5-6, and C6-7 levels. Central canal stenosis is present at the C4-5 level. A 1.2 cm sclerotic lesion is seen within the left pedicle and lamina of T2 extending into the spinous process without interval change.   Prevertebral/Paraspinal Soft Tissues: There are several hypodense thyroid nodules with the largest visualized measuring 1.1 cm in diameter within left thyroid lobe.         No evidence for an acute fracture or subluxation of the cervical spine.   There is chronic anterolisthesis of C3 on C4 and chronic retrolisthesis of C4 on C5 with multilevel degenerative disc disease and cervical spondylosis with central canal stenosis at C4-5 and multilevel foraminal stenosis bilaterally.   Bone island within left pedicle and lamina of T2 extending into the spinous process of T2.   Hypodense thyroid nodules.   MACRO: None   Signed by: Sherlyn Jackson 10/28/2024 11:00 AM Dictation workstation:   [id number]    CT head wo IV contrast    Result Date: 10/28/2024  Interpreted By:  Sherlyn aJckson, STUDY: CT HEAD WO IV CONTRAST;  10/28/2024 10:16 am   INDICATION: Signs/Symptoms: Fall several days earlier striking the head     COMPARISON: 10/26/2023   ACCESSION NUMBER(S): [id number]   ORDERING CLINICIAN: ANDREY COX   TECHNIQUE: Noncontrast axial CT scan of head was performed. Angled reformats in brain and bone windows were generated. The images were reviewed in bone, brain, blood and soft tissue windows.   FINDINGS: CSF Spaces: The ventricles, sulci and basal cisterns are prominent indicating age-appropriate atrophy.. There is no extraaxial fluid collection.   Parenchyma: There is encephalomalacia and gliosis seen within the right parietal lobe posteriorly with transcortical extension. There is also diminished density within the right insula  which is stable. No hyperdense MCA sign is present. There is calcified plaque within each carotid siphon. There is no mass effect or midline shift.  There is no intracranial hemorrhage.   Calvarium: The calvarium is unremarkable.   Paranasal sinuses and mastoids: Visualized paranasal sinuses and mastoids are clear.       Age-appropriate atrophy.   Encephalomalacia within the right posterior parietal lobe unchanged consistent with an old infarct at this site. Old lacunar infarct of the right insula is also identified.   No acute intracranial process.   MACRO: None     Signed by: Sherlyn Jackson 10/28/2024 10:46 AM Dictation workstation:   [id number]    XR chest 2 views    Result Date: 10/28/2024  Interpreted By:  Sherlyn Jackson, STUDY: XR CHEST 2 VIEWS 10/28/2024 9:45 am   INDICATION: Fall with left arm pain   COMPARISON: 09/26/2024   ACCESSION NUMBER(S): [id number]   ORDERING CLINICIAN: ANDREY COX   TECHNIQUE: AP erect view of the chest   FINDINGS: The heart is enlarged with atherosclerosis of the thoracic aorta noted. Low lung volumes are observed with crowding of the bronchovascular markings. No acute infiltrate is seen. No pleural abnormality is identified.   There is postoperative change from reverse shoulder arthroplasty on the right. There is osteoarthritis of the left shoulder affecting glenohumeral joint with joint space narrowing, sclerosis, osteophytosis, and subchondral cyst formation.       Cardiomegaly without acute cardiopulmonary process. Low lung volumes.   Advanced osteoarthritis of the left shoulder affecting glenohumeral joint.   Signed by: Sherlyn Jackson 10/28/2024 10:33 AM Dictation workstation:   [id number]    XR pelvis 1-2 views    Result Date: 10/28/2024  Interpreted By:  Sherlyn Jackson, STUDY: XR PELVIS 1-2 VIEWS 10/28/2024 9:45 am   INDICATION: Pain and fall   COMPARISON: 05/22/2023   ACCESSION NUMBER(S): [id number]   ORDERING CLINICIAN: ANDREY COX   TECHNIQUE: AP view of the pelvis    FINDINGS: Postoperative change is seen as a result bilateral bipolar hip arthroplasties. There is no fracture or dislocation of either hip identified. Pelvic ring is intact. There are degenerative cystic changes within the pubic bones adjacent to the pubic symphysis unchanged. There is a stable 1 cm sclerotic lesion along the right SI joint most likely bone island.       No pelvic fracture.   Signed by: Sherlyn Jackson 10/28/2024 10:29 AM Dictation workstation:   [id number]    XR lumbar spine 2-3 views    Result Date: 10/28/2024  Interpreted By:  Sherlyn Jackson, STUDY: XR LUMBAR SPINE 2-3 VIEWS 10/28/2024 9:45 am   INDICATION: Fall several days ago with low back pain.   COMPARISON: None available.   ACCESSION NUMBER(S): [id number]   ORDERING CLINICIAN: ANDREY COX   TECHNIQUE: Three views of the lumbosacral spine are performed.   FINDINGS: There is a lumbar levoscoliosis observed with postoperative change consisting of midline decompressive laminectomy and posterior spinal fusion from L2 through S1. Bilateral pedicle screws are seen at L2, L3, L4, L5, and S1 with bilateral vertical stabilizing lodged and crosslink bar. The examination shows no evidence for loosening of the hardware or hardware fracture.   There is multilevel degenerative disc space narrowing with vacuum disc phenomenon. There is sclerosis and osteophytosis of the endplates at T10-11, and T12-L1 levels. The vertebral bodies are of normal height. There is no anterolisthesis or retrolisthesis seen.   There is calcified plaque within the wall of the abdominal aorta and iliac arteries without aneurysmal dilatation.   There is postoperative change from bipolar hip arthroplasty bilaterally.       Status post midline decompressive laminectomy with posterior spinal fusion from L2 through S1   Lumbar levoscoliosis with multilevel degenerative disc disease and spondylosis most pronounced in the lower thoracic region and at the T12-L1 level.   Signed by: Sherlyn  Manuel 10/28/2024 10:27 AM Dictation workstation:   [id number]     Visit Vitals  /63 (BP Location: Right arm, Patient Position: Lying)   Pulse 61   Temp 36.5 °C (97.7 °F) (Temporal)   Resp 20        Physical Exam  Vitals and nursing note reviewed.   Constitutional:       General: She is not in acute distress.     Appearance: She is not ill-appearing.      Comments: Frail elderly sitting up in chair   HENT:      Mouth/Throat:      Mouth: Mucous membranes are moist.      Pharynx: Oropharynx is clear.   Eyes:      General: No scleral icterus.     Extraocular Movements: Extraocular movements intact.   Cardiovascular:      Rate and Rhythm: Normal rate. Rhythm irregular.      Pulses: Normal pulses.   Pulmonary:      Effort: Pulmonary effort is normal. No respiratory distress.      Breath sounds: Normal breath sounds.   Abdominal:      General: There is no distension.      Palpations: Abdomen is soft.      Tenderness: There is no abdominal tenderness. There is no guarding.   Genitourinary:     Comments: Campbell to GD, hazy, lilly  Musculoskeletal:      Right lower leg: No edema.      Left lower leg: No edema.      Comments: Couple areas of DJD and crepitus, prior shoulder replacement and hip replacement noted.  Patient's back is kyphotic in appearance   Skin:     General: Skin is warm and dry.      Findings: Bruising present.   Neurological:      General: No focal deficit present.      Mental Status: She is alert and oriented to person, place, and time.   Psychiatric:         Mood and Affect: Mood normal.         Behavior: Behavior normal.         Thought Content: Thought content normal.         Judgment: Judgment normal.          Assessment/Plan   IMP:    Acute CVA - on ASA, Eliquis and Plavix. Neuro consulted.   Acute Respiratory Failure with Hypoxia - improving - 2/2 CHF effusions, diuretic on hold due to hypotension, Bps are better today actually on the higher side  NSTEMI/CAD - on Plavix, ASA, statin  Acute  Diastolic Heart Failure - improving, had echo today, interpretation pending  Syncope/Falls - 2/2 stroke vs hypotension?   PAF - hr controlled, on Eliquis  Esophagitis/Mod Hiatel Hernia - on PPI, hgb stable  Chronic Pain-followed by Dr Morales outpatient. Pt reports adequate pain control.   Urinary Retention-sample ordered, has catheter in place    Palliative Care Encounter   DNRCCA/DNI/No ICU  Capable  Son Eduar is stated HPOA, alternate is daughter Tita.  Requested copy of documents from daughter, Tita.      11/2  Awaiting urine culture, ordered voiding trial by urology. Treatment model of care, plan for H with [facility], house calls, navigator, [facility] at home.   Spoke to spouse at bedside.     11/1  Plan is to discharge home with home health care, referrals were placed for navigator and housecalls for further home support.  Increased amount of lidocaine patches for pain control, recommend follow-up with pain management upon discharge.    10/31/2024  No changes today from a palliative perspective. Pain is adequately controlled. Goals of care established. Planning to dc home soon once specialty services sign off. Pt agreeable to [facility] for PT. Palliative will sign off. Please feel free to reach out if change in condition or other pall care needs arise.     Family meeting today with patient, spouse, daughter and son in law. Reviewed current conditions, falls, repeated hospitalizations. Reviewed frailty. Discussed concerns about plavix/eliquis use with esophagitis, anemia and despite use, patient with new findings of acute CVA. Concern that patient may continue to have repeat hospitalizations due to her risk. We also discussed her new unsteady gait and increased risk of falls. Patient is determined to go home and does not want to go to rehab for gait training. Patient reports a previously bad experience at former [facility]. She also stated that she was essentially her spouse's eyes as he is legally blind. She is  accepting of [facility], she has a medical alert button at home, and son cooks them meals and checks in routinely. I requested PT work with patient today with family present to demonstrate unsteady gait and discuss increased risk of falls/trauma.   Patient very clear in her goal to return home and achieve her prior level of function. She is also very clear that she would not want to live in constant pain, does not want to live in a facility, does not want to live with significant loss of function, she does not want to suffer repeated hospitalizations. She had previously established a DNRCCA with her PCP and confirmed this again during our conversation. She values quality of life. We did briefly discuss hospice, and patient is not ready for this as she feels she was doing ok prior to admission. She is interested in house calls and the navigator program through the [facility] for additional support at home.     Advance Directives Info: Patient has advance directive, copy not in chart  Discharge Planning: as above  Palliative Care Team will continue to follow patient: as needed      Ruth Vasquez, APRN-CNP

## 2024-11-02 NOTE — TREATMENT PLAN
Subjective  Patient was seen and evaluated. She is Alert and oriented x3. States she is feeling well over all. She denies chest pain, shortness of breath, N&V. She stated that she is having runny stools with pebble-like pieces, but does not feel any abdominal discomfort.     Objective  Vital Signs  36.7 Temp  73 HR  20 Resp  175/78 BP  95 NC 2L    AMY: Completed on 11/1/2024 showed an estimated ejection fraction of 60-65%    XR chest completed on 11/1/2024 shows generalized increased interstitial prominence with component of scattered infiltrate throughout bilateral lung fields.     Carotid Duplex Bilateral: Completed 10/29/2024 showed less than 50% stenosis of the right proximal internal carotid artery and no evidence of hemodynamically significant stenosis in right subclavian artery. There is turbulent flow noted in the proximal common carotid artery, possibly indicating a more proximal stenosis The left carotid are consistent with less than 50% stenosis of the left proximal internal carotid artery possibly due to vessel tortuosity. Left external carotid appears patent with no evidence of stenosis. Left vertebral artery is patent with antegrade flow. No evidence of hemodynamically significant stenosis in the left subclavian artery.     ECG 12-Lead: 10/28/2024:  Supraventricular tachycardia Marked ST abnormality, possible inferolateral subendocardial injury    Lower Extremity Venous Duplex Bilateral:  10/28/2024  Negative for DVT in bilateral lower extremities    CT angio chest  10/28/2024  Negative for Pulmonary emboli. Severe coronary artery atherosclerotic calcifications, Mild cardiomegaly with biatrial enlargement. Small to moderate right greater than left pleural effusions and bibasilar subsegmental atelectasis.     MRI Brain  10/29/2024  New subcentimete acute ischemic infarct in the left corna radiata and right aspect of the splenium of the corpus callosum.    General: Pleasant, well appearing  HEENT:  "Normocephalic, trachea midline  EYES: No redness or irritation, normal gaze and eye movements. No abnormalities noted.  Cardiovascular: Normal rate and rhythm, no murmurs or rubs noted  Pulmonary: Diminished throughout lung fields, mild crackles present posteriorly. No wheezing present. Breathing is unlabored, equal chest rise and fall.   Abdominal: Normoactive bowel sounds, no abdominal tenderness to palpation.   Genitourinary: Campbell present and draining   Skin: Warm, dry and intact.   Neurological: Intact, patient has equal strength in both hands and feet. Peripheral vision is intact.     Relevant Labs  Calcium 8.5  Sodium 132  Potassium 3.4  BUN 11  Creatinine 0.43    WBC 4.8  HGB 8.8  Hematocrit 26.8       Assessment/Plan    Cerebral Vascular Accident-CVA  CT and MRI completed   Patient is clear from a neurological standpoint and should be followed as an outpatient  No changes to her blood thinners at this time    NSTEMI  Troponin climbed to 608 on 10/28/2024 were 43 on admission on 10/28/2024  Cardiology is following patient and is recommending to continue on eliquis and plavix.   Check lipid panel with morning labs    Acute Heart Failure  Strict I&O  Patient was placed back on Lasix PO per Cardiology at an increased dose  Monitor daily weights  Monitor for edema   on 10/28/24    Urinary Retention  Patient urine was cloudy with sediment, UA was positive, pt. Started on IV antibiotic and urology was consulted. Plan to do voiding trial per Dr. Rush     Syncope  Patient reported \"blacking out\" prior to admission, resulting to falls in the homes  Ortho-checks completed, echocardiogram completed. Patient has not experienced these events while in the hospital.    Bilateral Pleural Effusion  Results were seen on imaging-CT  Lasix was resumed by Cardiology at increased dose. Today patient has no SOB and breathing is easy and unlabored.     Acute Cystitis without hematuria  Culture pending, patient starting on " IV antibiotics    Paroxysmal Atrial Fibrillation  Cardiac monitoring and continue on antiarrhythmics and Eliquis    Essential Hypertension  Routine blood pressure checks, continue antihypertensives.       Plan    Wean oxygen as tolerated-currently on 2L NC.  Cardiology resumed and increased patients oral lasix  Urology ordered voiding trial  Continue to monitor orthostatic vital signs, continue to implement fall precautions.  Orthostatic vitals  I&O documentation  Repeat labs in morning          This patient was seen in collaboration with Shiela KHAN, please see progress note for detailed evaluation.    Randi Paul RN, MSN  CNP Student

## 2024-11-02 NOTE — CARE PLAN
Problem: Discharge Planning  Goal: Discharge to home or other facility with appropriate resources  Outcome: Progressing     Problem: Chronic Conditions and Co-morbidities  Goal: Patient's chronic conditions and co-morbidity symptoms are monitored and maintained or improved  Outcome: Progressing     Problem: Heart Failure  Goal: Weight from fluid excess reduced over 2-3 days, then stabilize  Outcome: Progressing  Goal: Increase self care and/or family involvement in 24 hours  Outcome: Progressing   The patient's goals for the shift include      The clinical goals for the shift include vss    Over the shift, the patient did not make progress toward the following goals. Barriers to progression include . Recommendations to address these barriers include .

## 2024-11-02 NOTE — NURSING NOTE
Assumed care of pt. Pt alert and oriented sitting at the edge of the bed. Denies any pain or discomfort at this time. 2L NC intact with O2 sat of 100%. NSR on the monitor with HR of 73. Bed low with call bell in reach. Care ongoing.

## 2024-11-02 NOTE — ASSESSMENT & PLAN NOTE
Troponin 43, 96, 600  Check lipid panel in the morning  Continue eliquis/statin/Plavix  Recent heart cath with stent placed in September of this year  TTE results as above  Cardiology consulted, appreciate recs-recommending medical management, cardiac medications have been adjusted

## 2024-11-02 NOTE — ASSESSMENT & PLAN NOTE
Resume home antihypertensives  Monitor blood pressure close    Plan  Wean oxygen as tolerated  Oral Lasix resumed at increased dose per cardiology  Orthostatic vitals  Voiding trial, urology following  DVT prophylaxis: Eliquis  Fall precautions, PT/OT consult  Cardiology, neurology, palliative, and pulmonary on consult, appreciate recs  GI signed off  CBC and BMP in the morning      Patient is a DNRCCA, DNI, no ICU. Family and patient met with pall/med yesterday. Patient declining SNF. Plan for home with [facility], referrals placed for Hospice navigator program, house calls, and healthy at home.

## 2024-11-02 NOTE — ASSESSMENT & PLAN NOTE
-Acute ischemic infarct in the left corona radiata (x2) and right aspect of the splenium of the corpus callosum  -left anterior cerebral artery has severe stenosis  CT imaging and MRI as above  TTE and US carotid results as above  Consult neurology, appreciate recs-would change Plavix   Brilinta however would place patient at higher bleeding risk, due to high risk of falls and declining SNF, does not recommend changing.  Stable from neuro standpoint-follow up outpatient

## 2024-11-02 NOTE — PROGRESS NOTES
"Pulmonary Daily Progress Note   Subjective    Rakan Cervantes is a 89 y.o. year old female patient known with CAD c/b NSTEMI s/p PCI, Afib on OAC, DLP, hyperparathyroidism, CVA, CKD, possible asthma hx , Jonas's esophagitis  admitted on 10/28/2024 with following p/w L arm numbness and tingling after falling 2 days earlier. In the ED hypoxia, requiring 5L NC.  Post admission work up revealed b/l pleural effusion. Pulmonary is consulted for hypoxia and pleural effusions.     Interval History:  Currently on 2L oxygen. Cardiology increased po diuretic dosing.     Meds    Scheduled medications  acetaminophen, 650 mg, oral, TID  amLODIPine, 5 mg, oral, Daily  apixaban, 2.5 mg, oral, BID  ascorbic acid, 125 mg, oral, Daily  atorvastatin, 80 mg, oral, Nightly  carvedilol, 12.5 mg, oral, BID  cefTRIAXone, 1 g, intravenous, Nightly  cholecalciferol, 2,000 Units, oral, Daily  clopidogrel, 75 mg, oral, Daily  docusate sodium, 100 mg, oral, BID  DULoxetine, 30 mg, oral, Daily with evening meal  ferrous sulfate (325 mg ferrous sulfate), 65 mg of iron, oral, Daily with breakfast  furosemide, 40 mg, oral, Daily  gabapentin, 300 mg, oral, BID  hydroxychloroquine, 200 mg, oral, Daily  isosorbide mononitrate ER, 120 mg, oral, Daily  lidocaine, 3 patch, transdermal, Daily  losartan, 100 mg, oral, Daily  magnesium oxide, 400 mg, oral, BID  magnesium sulfate, 1 g, intravenous, Once  oxygen, , inhalation, Continuous - Inhalation  pantoprazole, 40 mg, oral, BID  polyethylene glycol, 17 g, oral, Daily  potassium chloride, 20 mEq, oral, Once  ranolazine, 500 mg, oral, BID  sucralfate, 1 g, oral, q6h ZEINAB  sulfaSALAzine, 500 mg, oral, BID      Continuous medications     PRN medications  PRN medications: albuterol, benzocaine-menthol, bisacodyl, cyclobenzaprine     Objective    Blood pressure 175/78, pulse 73, temperature 36.7 °C (98.1 °F), temperature source Temporal, resp. rate 26, height 1.499 m (4' 11\"), weight 55.8 kg (123 lb 0.3 oz), " "SpO2 95%.   Physical Exam   GENERAL: No respiratory distress  HEAD/SINUSES: On room air  LUNGS: Mild bibasilar crackles, no wheezing  CARDIAC: Regular rate and rhythm  EXTREMITIES: No edema,   NEURO: grossly normal mental status,  SKIN: Skin turgor normal.   PSYCH: Normal affect    Intake/Output Summary (Last 24 hours) at 11/2/2024 1042  Last data filed at 11/2/2024 0737  Gross per 24 hour   Intake 1370 ml   Output 1925 ml   Net -555 ml     Labs:   Results from last 72 hours   Lab Units 11/02/24  0520 11/01/24  0503 10/31/24  0833   SODIUM mmol/L 132* 134* 134*   POTASSIUM mmol/L 3.4* 3.7 3.6   CHLORIDE mmol/L 95* 97* 98   CO2 mmol/L 30 33* 30   BUN mg/dL 11 12 11   CREATININE mg/dL 0.43* 0.51 0.62   GLUCOSE mg/dL 100* 103* 141*   CALCIUM mg/dL 8.5* 8.6 8.7   ANION GAP mmol/L 10 8* 10   EGFR mL/min/1.73m*2 >90 89 85      Results from last 72 hours   Lab Units 11/02/24  0520 11/01/24  0503 10/31/24  0833   WBC AUTO x10*3/uL 4.8 5.1 5.9   HEMOGLOBIN g/dL 8.8* 9.6* 9.6*   HEMATOCRIT % 26.8* 29.6* 30.8*   PLATELETS AUTO x10*3/uL 254 221 266               Micro/ID:   No results found for: \"URINECULTURE\", \"BLOODCULT\", \"CSFCULTSMEAR\"  Summary of key imaging results from the last 24 hours  Chest x-ray with interstitial edema.    CT scan done earlier in the admission with mosaicism, pulmonary edema scarring, atelectasis, pleural effusion    Impression   Rakan Cervantes is a 89 y.o. year old female patient known with CAD c/b NSTEMI s/p PCI, Afib on OAC, DLP, hyperparathyroidism, CVA, CKD, possible asthma hx , Jonas's esophagitis/ Patulous on imaging admitted on 10/28/2024 with following p/w L arm numbness and tingling after falling 2 days earlier. In the ED hypoxia, requiring 5L NC.  Post admission work up revealed b/l pleural effusion. Pulmonary is consulted for hypoxia and pleural effusions.   Acute hypoxic respiratory failure due to pulmonary edema and pleural effusion, seems to have resolved as patient is on room air " today, diuresis held due to low BP in last two days  Possible history of asthma in an older adult, no history of personal smoking, no PFTs in chart not on current inhalers but  used to have a rescue inhaler  Atrial fibrillation paroxysmal  CAD history  Heart failure decompensated, diastolic, mild to moderate MR  Elevated RVSP however normal RV   Bilateral pleural effusions to be.    Recommendations   As follows:  Recommend continuing diuresis  Oxygen walk test prior to discharge to determine discharge o2 needs  Will need repeat imaging in 4 to 6 weeks  Will need pulmonary outpatient follow-up for PFTs and follow-up imaging  Bronchopulmonary hygiene with Incentive spirometry and Acapella       Leonardo Sanon MD   11/02/24 at 10:42 AM     -Only the Medical problems listed under impression were addressed today.   -Please contact primary team for all other concerns and medical problem  -Thank you for your consult       Disclaimer: Documentation completed with the information available at the time of input. Parts of this note may have been scribed or generated using voice dictation software, Dragon.  Homophonic errors may exist.  Please contact me directly if clarification is needed. The times in the chart may not be reflective of actual patient care times, interventions, or procedures. Documentation occurs after the physical care of the patient.

## 2024-11-02 NOTE — ASSESSMENT & PLAN NOTE
Noted cloudy urine with sediment, check UA-positive, started IV ATB  Consult urology, appreciate recs-voiding trial today

## 2024-11-02 NOTE — PROGRESS NOTES
Rakan Price is a 89 y.o. female on day 5 of admission presenting with Sinus tachycardia.      Subjective   Patient seen and examined. Awake/alert/oriented. Denies chest pain, shortness of breath, fevers, chills, nausea, or vomiting. Patient to have voiding trial today.       Objective     Last Recorded Vitals  /78 (BP Location: Right arm, Patient Position: Lying)   Pulse 73   Temp 36.7 °C (98.1 °F) (Temporal)   Resp 26   Wt 55.8 kg (123 lb 0.3 oz)   SpO2 95%   Intake/Output last 3 Shifts:    Intake/Output Summary (Last 24 hours) at 11/2/2024 1121  Last data filed at 11/2/2024 0737  Gross per 24 hour   Intake 1370 ml   Output 1350 ml   Net 20 ml       Admission Weight  Weight: 50.8 kg (112 lb) (10/28/24 0856)    Daily Weight  11/02/24 : 55.8 kg (123 lb 0.3 oz)    Image Results  Transthoracic Echo (TTE) [place] [address]             Phone [phone]    TRANSTHORACIC ECHOCARDIOGRAM REPORT    Patient Name:       RAKAN PRICE       Reading Physician:    [id number] Nessa Callaway MD  Study Date:         10/31/2024           Ordering Provider:    [id number] SONJA BATISTA  MRN/PID:            [id number]             Fellow:  Accession#:         [id number]         Nurse:  Date of Birth/Age:  1935 / 89 years Sonographer:          Beka Nj GERARDO  Gender Assigned at  F                    Additional Staff:  Birth:  Height:             149.86 cm            Admit Date:  Weight:             51.26 kg             Admission Status:     Inpatient -                                                                 Routine  BSA / BMI:          1.45 m2 / 22.82      Department Location:  [facility]                      kg/m2  Blood Pressure: 155 /67 mmHg    Study Type:    TRANSTHORACIC ECHO (TTE) COMPLETE  Diagnosis/ICD: Acute  combined systolic (congestive) and diastolic (congestive)                 heart failure (CHF)-I50.41  Indication:    Congestive Heart Failure  CPT Codes:     Echo Complete w Full Doppler-[id number]    Patient History:  Pertinent History: CHF, A-Fib, CAD, CVA, HTN, Hyperlipidemia and TIA. MI.    Study Detail: The following Echo studies were performed: 2D, M-Mode, Doppler and                color flow. Technically challenging study due to body habitus and                patient lying in supine position.       PHYSICIAN INTERPRETATION:  Left Ventricle: The left ventricular systolic function is normal, with a visually estimated ejection fraction of 60-65%. There is moderate eccentric left ventricular hypertrophy. There are no regional wall motion abnormalities. The left ventricular cavity size is normal. There is mild increased septal and normal posterior left ventricular wall thickness. Spectral Doppler shows a normal pattern of left ventricular diastolic filling.  Left Atrium: The left atrium is moderately dilated.  Right Ventricle: The right ventricle is normal in size. There is normal right ventricular global systolic function.  Right Atrium: The right atrium is mildly dilated.  Aortic Valve: The aortic valve is trileaflet. There is mild aortic valve thickening. There is evidence of mild aortic valve stenosis.  There is mild aortic valve regurgitation. The peak instantaneous gradient of the aortic valve is 14 mmHg.  Mitral Valve: The mitral valve is normal in structure. There is mild mitral annular calcification. The peak instantaneous gradient of the mitral valve is 15 mmHg. Echo findings are consistent with normal mitral valve prosthesis structure and function. There is mild to moderate mitral valve regurgitation.  Tricuspid Valve: The tricuspid valve is structurally normal. There is mild tricuspid regurgitation. The Doppler estimated RVSP is mild to moderately elevated at 52.3 mmHg.  Pulmonic Valve: The pulmonic valve  is structurally normal. There is mild pulmonic valve regurgitation.  Pericardium: No pericardial effusion noted.  Aorta: The aortic root is normal.  Systemic Veins: The inferior vena cava appears normal in size, with IVC inspiratory collapse greater than 50%.       CONCLUSIONS:   1. The left ventricular systolic function is normal, with a visually estimated ejection fraction of 60-65%.   2. There is normal right ventricular global systolic function.   3. The left atrium is moderately dilated.   4. Mild to moderate mitral valve regurgitation.   5. Mild to moderately elevated right ventricular systolic pressure.   6. Mild tricuspid regurgitation is visualized.   7. Mild aortic valve stenosis.   8. Mild aortic valve regurgitation.   9. Echo findings are consistent with normal mitral valve prosthesis structure and function.    QUANTITATIVE DATA SUMMARY:     2D MEASUREMENTS:            Normal Ranges:  Ao Root d:       3.60 cm    (2.0-3.7cm)  LAs:             4.70 cm    (2.7-4.0cm)  IVSd:            0.99 cm    (0.6-1.1cm)  LVPWd:           0.84 cm    (0.6-1.1cm)  LVIDd:           4.99 cm    (3.9-5.9cm)  LVIDs:           3.55 cm  LV Mass Index:   111.7 g/m2  LV % FS          28.9 %       LA VOLUME:                  Normal Ranges:  LA Volume Index: 58.0 ml/m2       RA VOLUME BY A/L METHOD:          Normal Ranges:  RA Area A4C:             15.0 cm2       M-MODE MEASUREMENTS:         Normal Ranges:  Ao Root:             3.50 cm (2.0-3.7cm)  LAs:                 4.80 cm (2.7-4.0cm)       AORTA MEASUREMENTS:         Normal Ranges:  Asc Ao, d:          3.10 cm (2.1-3.4cm)       LV SYSTOLIC FUNCTION BY 2D PLANIMETRY (MOD):                       Normal Ranges:  EF-A4C View:    58 % (>=55%)  EF-A2C View:    63 %  EF-Biplane:     59 %  EF-Visual:      63 %  LV EF Reported: 63 %       LV DIASTOLIC FUNCTION:           Normal Ranges:  MV Peak E:             1.71 m/s  (0.7-1.2 m/s)  MV Peak A:             1.14 m/s  (0.42-0.7 m/s)  E/A  Ratio:             1.50      (1.0-2.2)  MV e'                  0.088 m/s (>8.0)  MV lateral e'          0.10 m/s  MV medial e'           0.07 m/s  E/e' Ratio:            19.32     (<8.0)       MITRAL VALVE:           Normal Ranges:  MV Vmax:      1.92 m/s  (<=1.3m/s)  MV peak P.7 mmHg (<5mmHg)  MV mean P.0 mmHg  (<48mmHg)  MV DT:        218 msec  (150-240msec)       AORTIC VALVE:            Normal Ranges:  AoV Vmax:      1.90 m/s  (<=1.7m/s)  AoV Peak P.4 mmHg (<20mmHg)  LVOT Max Herbert:  1.21 m/s  (<=1.1m/s)  LVOT VTI:      27.80 cm  LVOT Diameter: 2.30 cm   (1.8-2.4cm)  AoV Area,Vmax: 2.65 cm2  (2.5-4.5cm2)       RIGHT VENTRICLE:  RV Basal 4.48 cm  RV Mid   3.15 cm  RV Major 6.9 cm  TAPSE:   21.2 mm  RV s'    0.15 m/s       TRICUSPID VALVE/RVSP:          Normal Ranges:  Peak TR Velocity:     3.51 m/s  RV Syst Pressure:     52 mmHg  (< 30mmHg)       PULMONIC VALVE:          Normal Ranges:  PV Max Herbert:     1.2 m/s  (0.6-0.9m/s)  PV Max P.0 mmHg       [id number] Nessa Callaway MD  Electronically signed on 2024 at 2:38:54 PM       ** Final **  XR chest 1 view  Narrative: Interpreted By:  Marilyn Aguayo,   STUDY:  XR CHEST 1 VIEW 2024 6:05 am      INDICATION:  Signs/Symptoms:Pleural effusion.      COMPARISON:  10/28/2024      ACCESSION NUMBER(S):  [id number]      ORDERING CLINICIAN:  OZ EDEN      TECHNIQUE:  Single AP view chest      FINDINGS:  Cardiac silhouette is mildly enlarged. There is generalized increased  interstitial prominence with scattered areas of infiltrate throughout  bilateral lung fields appearing slightly more conspicuous on the  current examination. Mild blunting of the left costophrenic angle  with subtle left basilar effusion. There is postoperative fixation of  the right shoulder. Severe left shoulder osteoarthritis.                  Impression: 1. Generalized increased interstitial prominence with component of  scattered infiltrate throughout bilateral  lung fields. Correlate with  patient's symptoms.      Signed by: Marilyn Aguayo 11/1/2024 10:51 AM  Dictation workstation:   [id number]      Physical Exam  Vitals reviewed.   Constitutional:       Appearance: Normal appearance.   HENT:      Head: Normocephalic and atraumatic.   Eyes:      Extraocular Movements: Extraocular movements intact.      Conjunctiva/sclera: Conjunctivae normal.   Cardiovascular:      Rate and Rhythm: Normal rate and regular rhythm.   Pulmonary:      Effort: Pulmonary effort is normal.      Breath sounds: No wheezing, rhonchi or rales.      Comments: Breath sounds diminished T/O, occasional mild crackles heard posteriorly  Abdominal:      General: Bowel sounds are normal.      Palpations: Abdomen is soft.      Tenderness: There is no abdominal tenderness.   Genitourinary:     Comments: Campbell draining yellow urine with sediment  Skin:     General: Skin is warm and dry.   Neurological:      General: No focal deficit present.      Mental Status: She is alert and oriented to person, place, and time.       Relevant Results  Lab Results   Component Value Date    GLUCOSE 100 (H) 11/02/2024    CALCIUM 8.5 (L) 11/02/2024     (L) 11/02/2024    K 3.4 (L) 11/02/2024    CO2 30 11/02/2024    CL 95 (L) 11/02/2024    BUN 11 11/02/2024    CREATININE 0.43 (L) 11/02/2024      Lab Results   Component Value Date    WBC 4.8 11/02/2024    HGB 8.8 (L) 11/02/2024    HCT 26.8 (L) 11/02/2024    MCV 86 11/02/2024     11/02/2024    Carotid duplex bilateral  Result Date: 10/29/2024  PRELIMINARY CONCLUSIONS:    Right Carotid: Findings are consistent with less than 50% stenosis of the right proximal internal carotid artery. The internal carotid artery appears tortuous. Right external carotid artery appears patent with no evidence of stenosis. The right vertebral artery is patent with antegrade flow. No evidence of hemodynamically significant stenosis in the right subclavian artery. There is turbulent flow  noted in the proximal common carotid artery which may indicate a more proximal stenosis.   Left Carotid: Findings are consistent with less than 50% stenosis of the left proximal internal carotid artery. The internal carotid artery appears tortuous. There is turbulent flow noted in mid and distal internal carotid artery possibly due to vessel tortuosity. Left external carotid artery appears patent with no evidence of stenosis. The left vertebral artery is patent with antegrade flow. No evidence of hemodynamically significant stenosis in the left subclavian artery.     ECG 12 Lead  Result Date: 10/28/2024  Supraventricular tachycardia Marked ST abnormality, possible inferolateral subendocardial injury Abnormal ECG When compared with ECG of 26-SEP-2024 14:27, T wave inversion no longer evident in Inferior leads T wave inversion more evident in Lateral leads Confirmed by Sergey Bond ([id number]) on 10/28/2024 7:51:36 PM    Lower extremity venous duplex bilateral  Result Date: 10/28/2024  No sonographic evidence DVT in the visualized vessels of bilateral lower extremities.   MACRO: None   Signed by: Dino De Paz 10/28/2024 7:48 PM Dictation workstation:   [id number]    CT angio chest for pulmonary embolism  Result Date: 10/28/2024  1.  No evidence of pulmonary emboli. Severe coronary artery atherosclerotic calcifications. 2. Mild cardiomegaly with biatrial enlargement. Small to moderate-size right greater than left pleural effusions and bibasilar subsegmental atelectasis. Diffuse interseptal thickening and scattered central predominant ground glass opacification of the lungs suggestive of pulmonary edema. 3. Chronic patulous esophagus with new air-fluid throughout the thoracic esophagus suggestive of reflux esophagitis versus achalasia. Clinical correlation is recommended.     Signed by: Richmond Barbosa 10/28/2024 2:41 PM Dictation workstation:   [id number]    CT cervical spine wo IV contrast  Result Date: 10/28/2024  No  evidence for an acute fracture or subluxation of the cervical spine.   There is chronic anterolisthesis of C3 on C4 and chronic retrolisthesis of C4 on C5 with multilevel degenerative disc disease and cervical spondylosis with central canal stenosis at C4-5 and multilevel foraminal stenosis bilaterally.   Bone island within left pedicle and lamina of T2 extending into the spinous process of T2.   Hypodense thyroid nodules.   MACRO: None   Signed by: Sherlyn Jackson 10/28/2024 11:00 AM Dictation workstation:   [id number]    CT head wo IV contrast  Result Date: 10/28/2024  Age-appropriate atrophy.   Encephalomalacia within the right posterior parietal lobe unchanged consistent with an old infarct at this site. Old lacunar infarct of the right insula is also identified.   No acute intracranial process.   MACRO: None     Signed by: Sherlyn Jackson 10/28/2024 10:46 AM Dictation workstation:   [id number]    XR chest 2 views  Result Date: 10/28/2024  Cardiomegaly without acute cardiopulmonary process. Low lung volumes.   Advanced osteoarthritis of the left shoulder affecting glenohumeral joint.   Signed by: Sherlyn Jackson 10/28/2024 10:33 AM Dictation workstation:   [id number]    XR pelvis 1-2 views  Result Date: 10/28/2024  No pelvic fracture.   Signed by: Sherlyn Jackson 10/28/2024 10:29 AM Dictation workstation:   [id number]    XR lumbar spine 2-3 views  Result Date: 10/28/2024  Status post midline decompressive laminectomy with posterior spinal fusion from L2 through S1   Lumbar levoscoliosis with multilevel degenerative disc disease and spondylosis most pronounced in the lower thoracic region and at the T12-L1 level.   Signed by: Sherlyn Jackson 10/28/2024 10:27 AM Dictation workstation:   [id number]         Assessment/Plan      Assessment & Plan  CVA (cerebral vascular accident) (Multi)  -Acute ischemic infarct in the left corona radiata (x2) and right aspect of the splenium of the corpus callosum  -left anterior cerebral artery  "has severe stenosis  CT imaging and MRI as above  TTE and US carotid results as above  Consult neurology, appreciate recs-would change Plavix   Brilinta however would place patient at higher bleeding risk, due to high risk of falls and declining SNF, does not recommend changing.  Stable from neuro standpoint-follow up outpatient  NSTEMI (non-ST elevated myocardial infarction) (Multi)  Troponin 43, 96, 600  Check lipid panel in the morning  Continue eliquis/statin/Plavix  Recent heart cath with stent placed in September of this year  TTE results as above  Cardiology consulted, appreciate recs-recommending medical management, cardiac medications have been adjusted  Acute heart failure  Resume home cardiac medications  Daily weights  Strict I/Os    Echocardiogram  Cardiology resumed oral lasix at increased dose  Consult cardiology, appreciate recommendations  Urinary retention  Noted cloudy urine with sediment, check UA-positive, started IV ATB  Consult urology, appreciate recs-voiding trial today  Syncope  Patient reports \"blacking out\" multiple times at home resulting in falls  Fall precautions  Check Ortho's  Echocardiogram  Ultrasound of the carotids as above  No further episodes  Bilateral pleural effusion  Seen on CT imaging  Diuresis currently on hold due to hypotension  Respiratory status has improved  Cardiology and pulmonary consulted, appreciate recommendations  Acute cystitis without hematuria  UA positive, culture pending  IV ceftriaxone  PAF (paroxysmal atrial fibrillation) (Multi)  Monitor on telemetry  Continue antiarrhythmics and Eliquis  Erosive esophagitis  CT imaging showed chronic patulous esophagus with new air-fluid throughout the thoracic esophagus suggestive of reflux esophagitis versus achalasia  Consult GI, appreciate recs-signed off  Continue PPI  Essential hypertension  Resume home antihypertensives  Monitor blood pressure close    Plan  Wean oxygen as tolerated  Oral Lasix resumed " at increased dose per cardiology  Orthostatic vitals  Voiding trial, urology following  DVT prophylaxis: Eliquis  Fall precautions, PT/OT consult  Cardiology, neurology, palliative, and pulmonary on consult, appreciate recs  GI signed off  CBC and BMP in the morning      Patient is a DNRCCA, DNI, no ICU. Family and patient met with pall/med yesterday. Patient declining SNF. Plan for [facility], referrals placed for Hospice navigator program, house calls, and healthy at home.                 Shiela Reyes, APRN-CNP

## 2024-11-03 VITALS
OXYGEN SATURATION: 98 % | BODY MASS INDEX: 24.76 KG/M2 | HEIGHT: 59 IN | TEMPERATURE: 96.4 F | HEART RATE: 73 BPM | WEIGHT: 122.8 LBS | RESPIRATION RATE: 23 BRPM | SYSTOLIC BLOOD PRESSURE: 137 MMHG | DIASTOLIC BLOOD PRESSURE: 47 MMHG

## 2024-11-03 LAB
ANION GAP SERPL CALCULATED.3IONS-SCNC: 10 MMOL/L (ref 10–20)
BACTERIA UR CULT: ABNORMAL
BUN SERPL-MCNC: 10 MG/DL (ref 6–23)
CALCIUM SERPL-MCNC: 8.5 MG/DL (ref 8.6–10.3)
CHLORIDE SERPL-SCNC: 95 MMOL/L (ref 98–107)
CO2 SERPL-SCNC: 29 MMOL/L (ref 21–32)
CREAT SERPL-MCNC: 0.55 MG/DL (ref 0.5–1.05)
EGFRCR SERPLBLD CKD-EPI 2021: 88 ML/MIN/1.73M*2
ERYTHROCYTE [DISTWIDTH] IN BLOOD BY AUTOMATED COUNT: 14.9 % (ref 11.5–14.5)
FERRITIN SERPL-MCNC: 58 NG/ML (ref 8–150)
GLUCOSE SERPL-MCNC: 100 MG/DL (ref 74–99)
HCT VFR BLD AUTO: 29.2 % (ref 36–46)
HGB BLD-MCNC: 9.3 G/DL (ref 12–16)
IRON SATN MFR SERPL: 9 % (ref 25–45)
IRON SERPL-MCNC: 25 UG/DL (ref 35–150)
MCH RBC QN AUTO: 28.4 PG (ref 26–34)
MCHC RBC AUTO-ENTMCNC: 31.8 G/DL (ref 32–36)
MCV RBC AUTO: 89 FL (ref 80–100)
NRBC BLD-RTO: 0 /100 WBCS (ref 0–0)
PLATELET # BLD AUTO: 238 X10*3/UL (ref 150–450)
POTASSIUM SERPL-SCNC: 3.6 MMOL/L (ref 3.5–5.3)
RBC # BLD AUTO: 3.28 X10*6/UL (ref 4–5.2)
SODIUM SERPL-SCNC: 130 MMOL/L (ref 136–145)
TIBC SERPL-MCNC: 272 UG/DL (ref 240–445)
UIBC SERPL-MCNC: 247 UG/DL (ref 110–370)
WBC # BLD AUTO: 4.5 X10*3/UL (ref 4.4–11.3)

## 2024-11-03 PROCEDURE — 2500000001 HC RX 250 WO HCPCS SELF ADMINISTERED DRUGS (ALT 637 FOR MEDICARE OP)

## 2024-11-03 PROCEDURE — 2500000004 HC RX 250 GENERAL PHARMACY W/ HCPCS (ALT 636 FOR OP/ED): Performed by: NURSE PRACTITIONER

## 2024-11-03 PROCEDURE — 2500000001 HC RX 250 WO HCPCS SELF ADMINISTERED DRUGS (ALT 637 FOR MEDICARE OP): Performed by: INTERNAL MEDICINE

## 2024-11-03 PROCEDURE — 36415 COLL VENOUS BLD VENIPUNCTURE: CPT | Performed by: INTERNAL MEDICINE

## 2024-11-03 PROCEDURE — 36415 COLL VENOUS BLD VENIPUNCTURE: CPT | Performed by: NURSE PRACTITIONER

## 2024-11-03 PROCEDURE — 2500000001 HC RX 250 WO HCPCS SELF ADMINISTERED DRUGS (ALT 637 FOR MEDICARE OP): Performed by: NURSE PRACTITIONER

## 2024-11-03 PROCEDURE — 99231 SBSQ HOSP IP/OBS SF/LOW 25: CPT | Performed by: STUDENT IN AN ORGANIZED HEALTH CARE EDUCATION/TRAINING PROGRAM

## 2024-11-03 PROCEDURE — 51701 INSERT BLADDER CATHETER: CPT

## 2024-11-03 PROCEDURE — 99232 SBSQ HOSP IP/OBS MODERATE 35: CPT | Performed by: NURSE PRACTITIONER

## 2024-11-03 PROCEDURE — 82728 ASSAY OF FERRITIN: CPT | Performed by: INTERNAL MEDICINE

## 2024-11-03 PROCEDURE — 2500000005 HC RX 250 GENERAL PHARMACY W/O HCPCS: Performed by: NURSE PRACTITIONER

## 2024-11-03 PROCEDURE — 2060000001 HC INTERMEDIATE ICU ROOM DAILY

## 2024-11-03 PROCEDURE — 85027 COMPLETE CBC AUTOMATED: CPT | Performed by: NURSE PRACTITIONER

## 2024-11-03 PROCEDURE — 82746 ASSAY OF FOLIC ACID SERUM: CPT | Mod: WESLAB | Performed by: INTERNAL MEDICINE

## 2024-11-03 PROCEDURE — 2500000002 HC RX 250 W HCPCS SELF ADMINISTERED DRUGS (ALT 637 FOR MEDICARE OP, ALT 636 FOR OP/ED)

## 2024-11-03 PROCEDURE — 83540 ASSAY OF IRON: CPT | Performed by: INTERNAL MEDICINE

## 2024-11-03 PROCEDURE — 80048 BASIC METABOLIC PNL TOTAL CA: CPT | Performed by: NURSE PRACTITIONER

## 2024-11-03 PROCEDURE — 82607 VITAMIN B-12: CPT | Mod: WESLAB | Performed by: INTERNAL MEDICINE

## 2024-11-03 RX ORDER — CLONIDINE HYDROCHLORIDE 0.1 MG/1
0.1 TABLET ORAL EVERY 12 HOURS SCHEDULED
Status: DISCONTINUED | OUTPATIENT
Start: 2024-11-03 | End: 2024-11-04

## 2024-11-03 RX ORDER — AMLODIPINE BESYLATE 10 MG/1
10 TABLET ORAL DAILY
Status: DISCONTINUED | OUTPATIENT
Start: 2024-11-03 | End: 2024-11-04 | Stop reason: HOSPADM

## 2024-11-03 RX ORDER — FUROSEMIDE 10 MG/ML
40 INJECTION INTRAMUSCULAR; INTRAVENOUS ONCE
Status: COMPLETED | OUTPATIENT
Start: 2024-11-03 | End: 2024-11-03

## 2024-11-03 ASSESSMENT — COGNITIVE AND FUNCTIONAL STATUS - GENERAL
MOVING TO AND FROM BED TO CHAIR: A LITTLE
EATING MEALS: A LITTLE
PERSONAL GROOMING: A LITTLE
TOILETING: A LITTLE
DRESSING REGULAR LOWER BODY CLOTHING: A LITTLE
STANDING UP FROM CHAIR USING ARMS: A LITTLE
MOBILITY SCORE: 20
DRESSING REGULAR UPPER BODY CLOTHING: A LITTLE
CLIMB 3 TO 5 STEPS WITH RAILING: A LITTLE
WALKING IN HOSPITAL ROOM: A LITTLE
DAILY ACTIVITIY SCORE: 18
HELP NEEDED FOR BATHING: A LITTLE

## 2024-11-03 ASSESSMENT — PAIN SCALES - GENERAL
PAINLEVEL_OUTOF10: 0 - NO PAIN

## 2024-11-03 ASSESSMENT — PAIN - FUNCTIONAL ASSESSMENT
PAIN_FUNCTIONAL_ASSESSMENT: 0-10
PAIN_FUNCTIONAL_ASSESSMENT: FLACC (FACE, LEGS, ACTIVITY, CRY, CONSOLABILITY)

## 2024-11-03 NOTE — PROGRESS NOTES
Palliative Care Progress Note    Date of Admission: 10/28/2024    Patient is a 89 y.o. female admitted with Sinus tachycardia.     No complaints today.     Scheduled medications  acetaminophen, 650 mg, oral, TID  amLODIPine, 10 mg, oral, Daily  apixaban, 2.5 mg, oral, BID  ascorbic acid, 125 mg, oral, Daily  atorvastatin, 80 mg, oral, Nightly  carvedilol, 12.5 mg, oral, BID  cefTRIAXone, 1 g, intravenous, Nightly  cholecalciferol, 2,000 Units, oral, Daily  cloNIDine, 0.1 mg, oral, q12h ZEINAB  clopidogrel, 75 mg, oral, Daily  docusate sodium, 100 mg, oral, BID  DULoxetine, 30 mg, oral, Daily with evening meal  ferrous sulfate (325 mg ferrous sulfate), 65 mg of iron, oral, Daily with breakfast  furosemide, 40 mg, intravenous, Once  furosemide, 40 mg, oral, Daily  gabapentin, 300 mg, oral, BID  hydroxychloroquine, 200 mg, oral, Daily  isosorbide mononitrate ER, 120 mg, oral, Daily  lidocaine, 3 patch, transdermal, Daily  losartan, 100 mg, oral, Daily  magnesium oxide, 400 mg, oral, BID  oxygen, , inhalation, Continuous - Inhalation  pantoprazole, 40 mg, oral, BID  polyethylene glycol, 17 g, oral, Daily  ranolazine, 500 mg, oral, BID  sucralfate, 1 g, oral, q6h ZEINAB  sulfaSALAzine, 500 mg, oral, BID      Continuous medications     PRN medications  PRN medications: albuterol, benzocaine-menthol, bisacodyl, cyclobenzaprine     Results for orders placed or performed during the hospital encounter of 10/28/24 (from the past 24 hours)   CBC   Result Value Ref Range    WBC 4.5 4.4 - 11.3 x10*3/uL    nRBC 0.0 0.0 - 0.0 /100 WBCs    RBC 3.28 (L) 4.00 - 5.20 x10*6/uL    Hemoglobin 9.3 (L) 12.0 - 16.0 g/dL    Hematocrit 29.2 (L) 36.0 - 46.0 %    MCV 89 80 - 100 fL    MCH 28.4 26.0 - 34.0 pg    MCHC 31.8 (L) 32.0 - 36.0 g/dL    RDW 14.9 (H) 11.5 - 14.5 %    Platelets 238 150 - 450 x10*3/uL   Basic Metabolic Panel   Result Value Ref Range    Glucose 100 (H) 74 - 99 mg/dL    Sodium 130 (L) 136 - 145 mmol/L    Potassium 3.6 3.5 - 5.3  mmol/L    Chloride 95 (L) 98 - 107 mmol/L    Bicarbonate 29 21 - 32 mmol/L    Anion Gap 10 10 - 20 mmol/L    Urea Nitrogen 10 6 - 23 mg/dL    Creatinine 0.55 0.50 - 1.05 mg/dL    eGFR 88 >60 mL/min/1.73m*2    Calcium 8.5 (L) 8.6 - 10.3 mg/dL   Iron and TIBC   Result Value Ref Range    Iron 25 (L) 35 - 150 ug/dL    UIBC 247 110 - 370 ug/dL    TIBC 272 240 - 445 ug/dL    % Saturation 9 (L) 25 - 45 %   Ferritin   Result Value Ref Range    Ferritin 58 8 - 150 ng/mL     *Note: Due to a large number of results and/or encounters for the requested time period, some results have not been displayed. A complete set of results can be found in Results Review.        Carotid duplex bilateral    Result Date: 10/30/2024           [place] [address]            Phone [phone]  Vascular Lab Report  VASC US CAROTID ARTERY DUPLEX BILATERAL Patient Name:      NICOLE PRICE       Reading Physician:  [id number] Elías Winter MD, RPVI Study Date:        10/29/2024           Ordering Provider:  [id number] SONJA BATISTA MRN/PID:           [id number]             Fellow: Accession#:        [id number]         Technologist:       Isabell Vidal RVYOAV Date of Birth/Age: 1935 / 89 years Technologist 2: Gender:            F                    Encounter#:         [id number] Admission Status:  Inpatient            Location Performed: [facility]  Diagnosis/ICD: Syncope and collapse-R55 CPT Codes:     57475 Cerebrovascular Carotid Duplex scan complete  CONCLUSIONS: Right Carotid: Findings are consistent with less than 50% stenosis of the right proximal internal carotid artery. The internal carotid artery appears tortuous. Right external carotid artery appears patent with no evidence of stenosis. The right vertebral artery is patent with antegrade flow. No evidence of  hemodynamically significant stenosis in the right subclavian artery. There is turbulent flow noted in the proximal common carotid artery which may indicate a more proximal stenosis. Left Carotid: Findings are consistent with less than 50% stenosis of the left proximal internal carotid artery. The internal carotid artery appears tortuous. There is turbulent flow noted in mid and distal internal carotid artery possibly due to vessel tortuosity. Left external carotid artery appears patent with no evidence of stenosis. The left vertebral artery is patent with antegrade flow. No evidence of hemodynamically significant stenosis in the left subclavian artery.  Imaging & Doppler Findings: Right Plaque Morph: The proximal right internal carotid artery demonstrates heterogenous plaque. The proximal right external carotid artery demonstrates heterogenous and calcified plaque. The distal right common carotid artery demonstrates heterogenous plaque. Left Plaque Morph: The proximal left internal carotid artery demonstrates heterogenous plaque. The proximal left external carotid artery demonstrates heterogenous plaque. The distal left common carotid artery demonstrates heterogenous plaque.   Right                        Left   PSV      EDV                PSV       cm/s           CCA P    79 cm/s 86 cm/s            CCA D    75 cm/s 83 cm/s  18 cm/s   ICA P    102 cm/s 16 cm/s 83 cm/s  22 cm/s   ICA M    124 cm/s 25 cm/s 82 cm/s  21 cm/s   ICA D    80 cm/s  16 cm/s 105 cm/s            ECA     192 cm/s 75 cm/s  14 cm/s Vertebral  84 cm/s  18 cm/s 80 cm/s          Subclavian 122 cm/s                Right Left ICA/CCA Ratio  1.0  1.4   [id number] Elías Winter MD, OSCAR Electronically signed by [id number] Elías Winter MD, OSCAR on 10/30/2024 at 9:00:56 PM  ** Final **     CT angio head and neck w and wo IV contrast    Result Date: 10/30/2024  Interpreted By:  Dario Sam, STUDY: CT ANGIO HEAD AND NECK W AND WO IV CONTRAST;  10/30/2024 4:15 pm    INDICATION: Signs/Symptoms:stroke on MRI on L corona radiata.     COMPARISON: 10/28/2024 brain CT and 10/29/2024 brain MR   ACCESSION NUMBER(S): [id number]   ORDERING CLINICIAN: ИРИНА LOPEZ   TECHNIQUE: Unenhanced CT images of the head were obtained. Subsequently, 75 ML of Omnipaque 350 was administered intravenously and axial images of the head and neck were acquired.  Coronal, sagittal, and 3-D reconstructions were provided for review. Carotid stenoses were determined using modified NASCET criteria.   FINDINGS:     CTA HEAD FINDINGS:   Anterior circulation: The left M1 segment has mild stenoses with moderate stenosis at the origin of the superior branch of the left middle cerebral artery.  The pre callosal segment of the left anterior cerebral artery has moderate to severe stenosis.   Posterior circulation: The right posterior cerebral is supplied primarily through the posterior communicating artery with moderate stenosis in the anterior P2 segment. The more distal left posterior cerebral artery has moderate stenosis as well.   CTA NECK FINDINGS:   Right carotid vessels: The distal right common carotid artery is non stenotic atherosclerotic plaque. The right common carotid bifurcation has calcified non stenotic plaque extending into the carotid bulb with calcified plaque causing 60% stenosis of the right external carotid artery origin. The internal carotid artery in the neck is normal. There is % stenosis  .   Left carotid vessels: The common carotid artery is normal. The left common carotid bifurcation has non stenotic plaque extending into the internal carotid bulb. The internal carotid artery in the neck is normal. There is 0% stenosis  .   Vertebral vessels:  The visualized segments of the cervical vertebral arteries are normal in caliber.         1. the left anterior cerebral artery has severe stenosis adjacent to the genu of the corpus callosum with mild to moderate stenoses elsewhere in the cerebral  arteries as noted.   2. No significant stenoses of the cervical carotid or vertebral arteries are noted.   MACRO: None   Signed by: Dario Sam 10/30/2024 4:48 PM Dictation workstation:   [id number]    MR brain wo IV contrast    Result Date: 10/29/2024  Interpreted By:  Surendra Hylton, STUDY: MR BRAIN WO IV CONTRAST;  10/29/2024 7:57 pm   INDICATION: Signs/Symptoms:TIA eval, arm numbness.     COMPARISON: CT head without contrast 10/28/2024; MRI brain 10/27/2023   ACCESSION NUMBER(S): [id number]   ORDERING CLINICIAN: ИРИНА LOPEZ   TECHNIQUE: Multiplanar, multi-sequence images of the brain were obtained without contrast.   FINDINGS:   Diffusion weighted images show new subcentimeter foci of diffusion restriction in the left corona radiata (x2) consistent with acute ischemic infarct. There is a new subcentimeter focus of diffusion restriction within the splenium of the corpus callosum just right of midline consistent with acute ischemic infarct.   There is redemonstration of cystic encephalomalacia in the posterior right temporal and parietal lobes. There is background moderate periventricular and subcortical hemispheric T2/FLAIR white matter hyperintensities are most compatible with chronic small vessel ischemic disease.   The major intracranial flow voids are preserved.   There is no acute intraparenchymal hemorrhage, mass, mass-effect, or an extra-axial fluid collection. On GRE images, there is susceptibility artifact within the posterior limb of the right internal capsule that does not correspond to any hyperdense abnormality on the CT and likely represents sequela of remote hemorrhage..   The ventricular size and cerebral volume are age-concordant.   Normal morphology of midline structures. The craniovertebral junction is normal.   The orbits and globes are unremarkable.   The paranasal sinuses show no air-fluid levels or hemorrhage.   The mastoid air cells are clear.   There is hyperostosis frontalis.    Severe degenerative disc disease in the mid cervical spine.       New subcentimeter acute ischemic infarct in the left corona radiata (x2) and right aspect of the splenium of the corpus callosum.   The demonstrated large area of encephalomalacia in the right posterior temporal and parietal lobes and chronic focus of hemorrhage in the posterior limb of the right internal capsule.   MACRO: None.   Signed by: Surendra Hylton 10/29/2024 9:41 PM Dictation workstation:   [id number]    ECG 12 Lead    Result Date: 10/28/2024  Supraventricular tachycardia Marked ST abnormality, possible inferolateral subendocardial injury Abnormal ECG When compared with ECG of 26-SEP-2024 14:27, T wave inversion no longer evident in Inferior leads T wave inversion more evident in Lateral leads Confirmed by Sergey Bond ([id number]) on 10/28/2024 7:51:36 PM    Lower extremity venous duplex bilateral    Result Date: 10/28/2024  Interpreted By:  Dino De Paz, STUDY: [facility] LOWER EXTREMITY VENOUS DUPLEX BILATERAL;  10/28/2024 7:15 pm   INDICATION: Signs/Symptoms:extremity edema.   COMPARISON: None.   ACCESSION NUMBER(S): [id number]   ORDERING CLINICIAN: SONJA BATISTA   TECHNIQUE: Grayscale, color and spectral Doppler sonographic images of the bilateral lower extremity deep venous system.   FINDINGS: RIGHT: There is normal compressibility of the common femoral vein, saphenous femoral junction, profunda femoral vein and popliteal vein. The posterior tibial and peroneal veins  demonstrate normal color flow and compressibility. There is normal spontaneous and phasic variation throughout the leg by spectral doppler.   LEFT: There is normal compressibility of the common femoral vein, saphenous femoral junction, profunda femoral vein and popliteal vein. The posterior tibial and peroneal veins  demonstrate normal color flow and compressibility.  There is normal spontaneous and phasic variation throughout the leg by spectral doppler.   OTHER  FINDINGS: None.       No sonographic evidence DVT in the visualized vessels of bilateral lower extremities.   MACRO: None   Signed by: Dino De Paz 10/28/2024 7:48 PM Dictation workstation:   [id number]    CT angio chest for pulmonary embolism    Result Date: 10/28/2024  Interpreted By:  Richmond Barbosa, STUDY: CT ANGIO CHEST FOR PULMONARY EMBOLISM;  10/28/2024 2:05 pm   INDICATION: Signs/Symptoms:hypoxic, tachy.   COMPARISON: Chest CT 03/07/2022.   ACCESSION NUMBER(S): [id number]   ORDERING CLINICIAN: KARMA HAIDER   TECHNIQUE: Helical data acquisition of the chest was obtained contrast volume: 75 mL IV contrast Omnipaque 350.   Axial contiguous images were reformatted in coronal and sagittal planes. Axial and coronal MIP images were created and reviewed.   FINDINGS: POTENTIAL LIMITATIONS OF THE STUDY: Motion and mixing artifact which limits evaluation of the distal branch vessels.   HEART AND VESSELS: No discrete filling defects within the main pulmonary artery or its branches.   Main pulmonary trunk is upper normal limits, measuring 2.9 cm in size.   The thoracic aorta is normal in caliber. It is not well evaluated due to the phase of the contrast. Mild aortic valve atherosclerotic calcifications are present.   Severe coronary artery atherosclerotic calcifications and/or stents.   Mild cardiomegaly with biatrial enlargement.   No evidence of pericardial effusion.   MEDIASTINUM AND DONATO, LOWER NECK AND AXILLA: The visualized thyroid gland is within normal limits.   No evidence of thoracic lymphadenopathy by CT criteria.   There is moderate-size hiatal hernia and there is air-fluid level and mild circumferential wall thickening throughout the thoracic esophagus which is patulous. This is suggestive of achalasia versus reflux esophagitis.   LUNGS AND AIRWAYS: The trachea and central airways are patent. No endobronchial lesion.   Small to moderate-sized right greater than left pleural effusions. There is interseptal  thickening with scattered ground-glass opacities suggestive of pulmonary venous congestion/interstitial edema. Mild bibasilar compression atelectasis.   UPPER ABDOMEN: Cholecystectomy change. Diffuse hepatic steatosis.   CHEST WALL AND OSSEOUS STRUCTURES: There are no suspicious osseous lesions. There is endplate reactive change in the lower thoracic spine with partially visualized posterior spinal fusions hardware and laminectomy in the lumbar spine.       1.  No evidence of pulmonary emboli. Severe coronary artery atherosclerotic calcifications. 2. Mild cardiomegaly with biatrial enlargement. Small to moderate-size right greater than left pleural effusions and bibasilar subsegmental atelectasis. Diffuse interseptal thickening and scattered central predominant ground glass opacification of the lungs suggestive of pulmonary edema. 3. Chronic patulous esophagus with new air-fluid throughout the thoracic esophagus suggestive of reflux esophagitis versus achalasia. Clinical correlation is recommended.     Signed by: Richmond Barbosa 10/28/2024 2:41 PM Dictation workstation:   [id number]    CT cervical spine wo IV contrast    Result Date: 10/28/2024  Interpreted By:  Sherlyn Jackson, STUDY: CT CERVICAL SPINE WO IV CONTRAST;  10/28/2024 10:16 am   INDICATION: Signs/Symptoms:fall.     COMPARISON: 07/20/2022   ACCESSION NUMBER(S): [id number]   ORDERING CLINICIAN: ANDREY COX   TECHNIQUE: Axial CT images of the cervical spine are obtained. Axial, coronal and sagittal reconstructions are provided for review.   FINDINGS:     Fractures: There is no evidence for an acute fracture of the cervical spine.   Vertebral Alignment: There is a 2-1/2 mm anterolisthesis of C3 with respect to C4 and a 2-1/2 mm retrolisthesis of C4 with respect to C5.   Craniocervical Junction: The odontoid process and craniocervical junction are intact.   Vertebrae/Disc Spaces:  The cervical vertebral body heights are intact. There is marked disc space  narrowing at C4-5, C5-6, and C6-7 levels with moderately severe disc space narrowing at the C3-4 level. Vacuum disc is present at all of these levels. There is multilevel degenerative spondyloarthropathy seen bilaterally. There is also uncovertebral joint spurring seen on the right and on the left from C3-4 through the C6-7 levels with bilateral foraminal stenosis at C4-5, C5-6, and C6-7 levels. Central canal stenosis is present at the C4-5 level. A 1.2 cm sclerotic lesion is seen within the left pedicle and lamina of T2 extending into the spinous process without interval change.   Prevertebral/Paraspinal Soft Tissues: There are several hypodense thyroid nodules with the largest visualized measuring 1.1 cm in diameter within left thyroid lobe.         No evidence for an acute fracture or subluxation of the cervical spine.   There is chronic anterolisthesis of C3 on C4 and chronic retrolisthesis of C4 on C5 with multilevel degenerative disc disease and cervical spondylosis with central canal stenosis at C4-5 and multilevel foraminal stenosis bilaterally.   Bone island within left pedicle and lamina of T2 extending into the spinous process of T2.   Hypodense thyroid nodules.   MACRO: None   Signed by: Sherlyn Jackson 10/28/2024 11:00 AM Dictation workstation:   [id number]    CT head wo IV contrast    Result Date: 10/28/2024  Interpreted By:  Sherlyn Jackson, STUDY: CT HEAD WO IV CONTRAST;  10/28/2024 10:16 am   INDICATION: Signs/Symptoms: Fall several days earlier striking the head     COMPARISON: 10/26/2023   ACCESSION NUMBER(S): [id number]   ORDERING CLINICIAN: ANDREY COX   TECHNIQUE: Noncontrast axial CT scan of head was performed. Angled reformats in brain and bone windows were generated. The images were reviewed in bone, brain, blood and soft tissue windows.   FINDINGS: CSF Spaces: The ventricles, sulci and basal cisterns are prominent indicating age-appropriate atrophy.. There is no extraaxial fluid collection.    Parenchyma: There is encephalomalacia and gliosis seen within the right parietal lobe posteriorly with transcortical extension. There is also diminished density within the right insula which is stable. No hyperdense MCA sign is present. There is calcified plaque within each carotid siphon. There is no mass effect or midline shift.  There is no intracranial hemorrhage.   Calvarium: The calvarium is unremarkable.   Paranasal sinuses and mastoids: Visualized paranasal sinuses and mastoids are clear.       Age-appropriate atrophy.   Encephalomalacia within the right posterior parietal lobe unchanged consistent with an old infarct at this site. Old lacunar infarct of the right insula is also identified.   No acute intracranial process.   MACRO: None     Signed by: Sherlyn Jackson 10/28/2024 10:46 AM Dictation workstation:   [id number]    XR chest 2 views    Result Date: 10/28/2024  Interpreted By:  Sherlyn Jackson, STUDY: XR CHEST 2 VIEWS 10/28/2024 9:45 am   INDICATION: Fall with left arm pain   COMPARISON: 09/26/2024   ACCESSION NUMBER(S): [id number]   ORDERING CLINICIAN: ANDREY COX   TECHNIQUE: AP erect view of the chest   FINDINGS: The heart is enlarged with atherosclerosis of the thoracic aorta noted. Low lung volumes are observed with crowding of the bronchovascular markings. No acute infiltrate is seen. No pleural abnormality is identified.   There is postoperative change from reverse shoulder arthroplasty on the right. There is osteoarthritis of the left shoulder affecting glenohumeral joint with joint space narrowing, sclerosis, osteophytosis, and subchondral cyst formation.       Cardiomegaly without acute cardiopulmonary process. Low lung volumes.   Advanced osteoarthritis of the left shoulder affecting glenohumeral joint.   Signed by: Sherlyn Jackson 10/28/2024 10:33 AM Dictation workstation:   [id number]    XR pelvis 1-2 views    Result Date: 10/28/2024  Interpreted By:  Sherlyn Jackson, STUDY: XR PELVIS 1-2 VIEWS  10/28/2024 9:45 am   INDICATION: Pain and fall   COMPARISON: 05/22/2023   ACCESSION NUMBER(S): [id number]   ORDERING CLINICIAN: ANDREY COX   TECHNIQUE: AP view of the pelvis   FINDINGS: Postoperative change is seen as a result bilateral bipolar hip arthroplasties. There is no fracture or dislocation of either hip identified. Pelvic ring is intact. There are degenerative cystic changes within the pubic bones adjacent to the pubic symphysis unchanged. There is a stable 1 cm sclerotic lesion along the right SI joint most likely bone island.       No pelvic fracture.   Signed by: Sherlyn Jackson 10/28/2024 10:29 AM Dictation workstation:   [id number]    XR lumbar spine 2-3 views    Result Date: 10/28/2024  Interpreted By:  Sherlyn Jackson, STUDY: XR LUMBAR SPINE 2-3 VIEWS 10/28/2024 9:45 am   INDICATION: Fall several days ago with low back pain.   COMPARISON: None available.   ACCESSION NUMBER(S): [id number]   ORDERING CLINICIAN: ANDREY COX   TECHNIQUE: Three views of the lumbosacral spine are performed.   FINDINGS: There is a lumbar levoscoliosis observed with postoperative change consisting of midline decompressive laminectomy and posterior spinal fusion from L2 through S1. Bilateral pedicle screws are seen at L2, L3, L4, L5, and S1 with bilateral vertical stabilizing lodged and crosslink bar. The examination shows no evidence for loosening of the hardware or hardware fracture.   There is multilevel degenerative disc space narrowing with vacuum disc phenomenon. There is sclerosis and osteophytosis of the endplates at T10-11, and T12-L1 levels. The vertebral bodies are of normal height. There is no anterolisthesis or retrolisthesis seen.   There is calcified plaque within the wall of the abdominal aorta and iliac arteries without aneurysmal dilatation.   There is postoperative change from bipolar hip arthroplasty bilaterally.       Status post midline decompressive laminectomy with posterior spinal fusion from L2  through S1   Lumbar levoscoliosis with multilevel degenerative disc disease and spondylosis most pronounced in the lower thoracic region and at the T12-L1 level.   Signed by: Sherlyn Jackson 10/28/2024 10:27 AM Dictation workstation:   [id number]     Visit Vitals  BP (!) 186/79 (BP Location: Right arm, Patient Position: Sitting)   Pulse 80   Temp 35.9 °C (96.6 °F) (Temporal)   Resp 26        Physical Exam  Vitals and nursing note reviewed.   Constitutional:       General: She is not in acute distress.     Appearance: She is not ill-appearing.      Comments: Frail elderly sitting up in chair   HENT:      Mouth/Throat:      Mouth: Mucous membranes are moist.      Pharynx: Oropharynx is clear.   Eyes:      General: No scleral icterus.     Extraocular Movements: Extraocular movements intact.   Cardiovascular:      Rate and Rhythm: Normal rate. Rhythm irregular.      Pulses: Normal pulses.   Pulmonary:      Effort: Pulmonary effort is normal. No respiratory distress.      Breath sounds: Normal breath sounds.   Abdominal:      General: There is no distension.      Palpations: Abdomen is soft.      Tenderness: There is no abdominal tenderness. There is no guarding.   Genitourinary:     Comments: Campbell to GD, hazy, lilly  Musculoskeletal:      Right lower leg: No edema.      Left lower leg: No edema.      Comments: Couple areas of DJD and crepitus, prior shoulder replacement and hip replacement noted.  Patient's back is kyphotic in appearance   Skin:     General: Skin is warm and dry.      Findings: Bruising present.   Neurological:      General: No focal deficit present.      Mental Status: She is alert and oriented to person, place, and time.   Psychiatric:         Mood and Affect: Mood normal.         Behavior: Behavior normal.         Thought Content: Thought content normal.         Judgment: Judgment normal.          Assessment/Plan   IMP:    Acute CVA - on ASA, Eliquis and Plavix. Neuro consulted.   Acute Respiratory  Failure with Hypoxia - improving - 2/2 CHF effusions, diuretic on hold due to hypotension, Bps are better today actually on the higher side  NSTEMI/CAD - on Plavix, ASA, statin  Acute Diastolic Heart Failure - improving, had echo today, interpretation pending  Syncope/Falls - 2/2 stroke vs hypotension?   PAF - hr controlled, on Eliquis  Esophagitis/Mod Hiatel Hernia - on PPI, hgb stable  Chronic Pain-followed by Dr Morales outpatient. Pt reports adequate pain control.   Urinary Retention-sample ordered, has catheter in place    Palliative Care Encounter   DNRCCA/DNI/No ICU  Capable  Son Eduar is stated HPOA, alternate is daughter Tita.  Requested copy of documents from daughter, Tita.      11/3  Goals established, no acute palliative needs. Will sign off.     11/2  Awaiting urine culture, ordered voiding trial by urology. Treatment model of care, plan for H with [facility], house calls, navigator, [facility] at home.   Spoke to spouse at bedside.     11/1  Plan is to discharge home with home health care, referrals were placed for navigator and housecalls for further home support.  Increased amount of lidocaine patches for pain control, recommend follow-up with pain management upon discharge.    10/31/2024  No changes today from a palliative perspective. Pain is adequately controlled. Goals of care established. Planning to dc home soon once specialty services sign off. Pt agreeable to [facility] for PT. Palliative will sign off. Please feel free to reach out if change in condition or other pall care needs arise.     Family meeting today with patient, spouse, daughter and son in law. Reviewed current conditions, falls, repeated hospitalizations. Reviewed frailty. Discussed concerns about plavix/eliquis use with esophagitis, anemia and despite use, patient with new findings of acute CVA. Concern that patient may continue to have repeat hospitalizations due to her risk. We also discussed her new unsteady gait and increased risk of  falls. Patient is determined to go home and does not want to go to rehab for gait training. Patient reports a previously bad experience at former [facility]. She also stated that she was essentially her spouse's eyes as he is legally blind. She is accepting of [facility], she has a medical alert button at home, and son cooks them meals and checks in routinely. I requested PT work with patient today with family present to demonstrate unsteady gait and discuss increased risk of falls/trauma.   Patient very clear in her goal to return home and achieve her prior level of function. She is also very clear that she would not want to live in constant pain, does not want to live in a facility, does not want to live with significant loss of function, she does not want to suffer repeated hospitalizations. She had previously established a DNRCCA with her PCP and confirmed this again during our conversation. She values quality of life. We did briefly discuss hospice, and patient is not ready for this as she feels she was doing ok prior to admission. She is interested in house calls and the navigator program through the [facility] for additional support at home.     Advance Directives Info: Patient has advance directive, copy not in chart  Discharge Planning: as above  Palliative Care Team will continue to follow patient: as needed      Ruth Vasquez, APRN-CNP

## 2024-11-03 NOTE — CARE PLAN
Problem: Discharge Planning  Goal: Discharge to home or other facility with appropriate resources  Outcome: Progressing     Problem: Chronic Conditions and Co-morbidities  Goal: Patient's chronic conditions and co-morbidity symptoms are monitored and maintained or improved  Outcome: Progressing     Problem: Heart Failure  Goal: Weight from fluid excess reduced over 2-3 days, then stabilize  Outcome: Progressing  Goal: Increase self care and/or family involvement in 24 hours  Outcome: Progressing   The patient's goals for the shift include      The clinical goals for the shift include Void trial    Over the shift, the patient did not make progress toward the following goals. Barriers to progression include . Recommendations to address these barriers include .

## 2024-11-03 NOTE — ASSESSMENT & PLAN NOTE
Resume home cardiac medications-medications have been being adjusted by cardiology  Daily weights  Strict I/Os    Echocardiogram  Cardiology resumed oral lasix at increased dose  Consult cardiology, appreciate recommendations

## 2024-11-03 NOTE — PROGRESS NOTES
"Rakan Cervantes is a 89 y.o. female on day 6 of admission presenting with Sinus tachycardia.    Subjective   Patient seen. Sitting up at edge of bed. She denies any chest pain, palpitations, SOB.        Objective     Physical Exam  Constitutional:       General: She is not in acute distress.     Appearance: Normal appearance. She is normal weight. She is not ill-appearing, toxic-appearing or diaphoretic.   HENT:      Head: Normocephalic and atraumatic.   Cardiovascular:      Rate and Rhythm: Normal rate and regular rhythm.      Pulses: Normal pulses.      Heart sounds: Normal heart sounds.   Pulmonary:      Effort: Pulmonary effort is normal.      Comments: Diminished bibasilar  Abdominal:      General: Bowel sounds are normal. There is no distension.      Palpations: Abdomen is soft.      Tenderness: There is no abdominal tenderness.   Musculoskeletal:         General: Normal range of motion.      Cervical back: Normal range of motion and neck supple.      Right lower leg: No edema.      Left lower leg: No edema.   Skin:     General: Skin is warm and dry.      Capillary Refill: Capillary refill takes less than 2 seconds.   Neurological:      General: No focal deficit present.      Mental Status: She is alert. Mental status is at baseline.   Psychiatric:         Mood and Affect: Mood normal.         Behavior: Behavior normal.         Last Recorded Vitals  Blood pressure 175/79, pulse 80, temperature 36.5 °C (97.7 °F), temperature source Temporal, resp. rate 26, height 1.499 m (4' 11\"), weight 55.7 kg (122 lb 12.7 oz), SpO2 94%.  Intake/Output last 3 Shifts:  I/O last 3 completed shifts:  In: 1220 (21.9 mL/kg) [P.O.:1120; IV Piggyback:100]  Out: 1550 (27.8 mL/kg) [Urine:1550 (0.8 mL/kg/hr)]  Weight: 55.7 kg     Relevant Results            Scheduled medications  acetaminophen, 650 mg, oral, TID  amLODIPine, 5 mg, oral, Daily  apixaban, 2.5 mg, oral, BID  ascorbic acid, 125 mg, oral, Daily  atorvastatin, 80 mg, oral, " Nightly  carvedilol, 12.5 mg, oral, BID  cefTRIAXone, 1 g, intravenous, Nightly  cholecalciferol, 2,000 Units, oral, Daily  clopidogrel, 75 mg, oral, Daily  docusate sodium, 100 mg, oral, BID  DULoxetine, 30 mg, oral, Daily with evening meal  ferrous sulfate (325 mg ferrous sulfate), 65 mg of iron, oral, Daily with breakfast  furosemide, 40 mg, oral, Daily  gabapentin, 300 mg, oral, BID  hydroxychloroquine, 200 mg, oral, Daily  isosorbide mononitrate ER, 120 mg, oral, Daily  lidocaine, 3 patch, transdermal, Daily  losartan, 100 mg, oral, Daily  magnesium oxide, 400 mg, oral, BID  oxygen, , inhalation, Continuous - Inhalation  pantoprazole, 40 mg, oral, BID  polyethylene glycol, 17 g, oral, Daily  ranolazine, 500 mg, oral, BID  sucralfate, 1 g, oral, q6h ZEINAB  sulfaSALAzine, 500 mg, oral, BID      Continuous medications     PRN medications  PRN medications: albuterol, benzocaine-menthol, bisacodyl, cyclobenzaprine     Results for orders placed or performed during the hospital encounter of 10/28/24 (from the past 24 hours)   CBC   Result Value Ref Range    WBC 4.5 4.4 - 11.3 x10*3/uL    nRBC 0.0 0.0 - 0.0 /100 WBCs    RBC 3.28 (L) 4.00 - 5.20 x10*6/uL    Hemoglobin 9.3 (L) 12.0 - 16.0 g/dL    Hematocrit 29.2 (L) 36.0 - 46.0 %    MCV 89 80 - 100 fL    MCH 28.4 26.0 - 34.0 pg    MCHC 31.8 (L) 32.0 - 36.0 g/dL    RDW 14.9 (H) 11.5 - 14.5 %    Platelets 238 150 - 450 x10*3/uL   Basic Metabolic Panel   Result Value Ref Range    Glucose 100 (H) 74 - 99 mg/dL    Sodium 130 (L) 136 - 145 mmol/L    Potassium 3.6 3.5 - 5.3 mmol/L    Chloride 95 (L) 98 - 107 mmol/L    Bicarbonate 29 21 - 32 mmol/L    Anion Gap 10 10 - 20 mmol/L    Urea Nitrogen 10 6 - 23 mg/dL    Creatinine 0.55 0.50 - 1.05 mg/dL    eGFR 88 >60 mL/min/1.73m*2    Calcium 8.5 (L) 8.6 - 10.3 mg/dL     *Note: Due to a large number of results and/or encounters for the requested time period, some results have not been displayed. A complete set of results can be found  in Results Review.             Assessment/Plan   Assessment & Plan  Chest pain    Erosive esophagitis    Syncope    NSTEMI (non-ST elevated myocardial infarction) (Multi)    PAF (paroxysmal atrial fibrillation) (Multi)    Essential hypertension    Acute heart failure    Bilateral pleural effusion    Left arm numbness    CVA (cerebral vascular accident) (Multi)    Urinary retention    Acute cystitis without hematuria    1 Chest pain.  Patient with angina in the setting of hypertension and poorly controlled heart rate.  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  Had PCI to distal left circumflex but distal to the stent there is some moderate distal disease.  Likely demand ischemia from hypertension.  Her LAD looked fine on the cardiac catheterization from September 27, 2024.  At this point recommend conservative management.  We will continue Plavix, Eliquis, statin.        2.  Syncope.  MRI with new acute ischemic infarct left corona radiata   (x2) and right aspect of the splenium of the corpus callosum.    Not sure if her syncopal episode is related to stroke or syncope.  TTE 10/28/24 with EF 60-65%, mild-mod MVR, mild-mod elevated right vent. systolic pressure, mild TVR, mild AVR, and mild AVS. At this point we will arrange for monitor before discharge.     3.  Hypertension. BP continues to be elevated. Continue carvedilol, Lasix, losartan, isosorbide.    4.  Hyperlipidemia. Continue high intensity statin.     5.  Shortness of breath.  Acute on chronic diastolic heart failure.  Bilateral pleural effusions. Continue Lasix; will increase dose to 40 mg daily PO.  Currently on 2L NC, SpO2 95%. Pulmonology is following as well.      6. Paroxymal atrial fibrillation. Telemetry with sinus rhythm, rate in the 60's. On Eliquis.      7. Acute CVA. Stable from neurology standpoint. Follow up outpatient.     Overall Impression / Plan    11/3: As above. She reports she is feeling well.  Denies any chest pain or pressure, palpitations, SOB. BP continues to be elevated with most recent reading of 175/79 this AM prior to medications. Telemetry with sinus rhythm, rate in 60s-70s with a lot of artifact.  SpO2 94% on 2L. Will give a dose of IV Lasix this afternoon; hope to diurese her enough to be able to wean off O2. Will increase Norvasc dose, add clonidine and continue to monitor BP. Check iron studies.  Palliative Med is following; plan for home with HHC at [place]. Continue Plavix, Eliquis, statin as above. Recommend cardiac monitor at [place].       I spent 30 minutes in the professional and overall care of this patient.      Nell Stewart, APRN-CNP

## 2024-11-03 NOTE — NURSING NOTE
Ambulate with assist to bathorrm per walker, Voided. Back to bed without breathing difficulty. Pain free. Assessment the same.

## 2024-11-03 NOTE — PROGRESS NOTES
Rakan Price is a 89 y.o. female on day 6 of admission presenting with Sinus tachycardia.      Subjective   Patient seen and examined. Awake/alert/oriented. Denies chest pain, shortness of breath, fevers, chills, nausea, or vomiting. Passed voiding trial, urinating without difficulty. Walking pulse ox complete, patient did not qualify for oxygen. Spoke with Dr. Callaway and cardiology NP, one more day of diuresis and hopeful discharge tomorrow. Discussed with the patient and she is agreeable.        Objective     Last Recorded Vitals  BP (!) 186/79 (BP Location: Right arm, Patient Position: Sitting)   Pulse 80   Temp 35.9 °C (96.6 °F) (Temporal)   Resp 26   Wt 55.7 kg (122 lb 12.7 oz)   SpO2 95%   Intake/Output last 3 Shifts:    Intake/Output Summary (Last 24 hours) at 11/3/2024 1149  Last data filed at 11/3/2024 0837  Gross per 24 hour   Intake 1070 ml   Output 300 ml   Net 770 ml       Admission Weight  Weight: 50.8 kg (112 lb) (10/28/24 0856)    Daily Weight  11/03/24 : 55.7 kg (122 lb 12.7 oz)    Image Results  Transthoracic Echo (TTE) [place] [address]             Phone [phone]    TRANSTHORACIC ECHOCARDIOGRAM REPORT    Patient Name:       RAKAN PRICE       Reading Physician:    [id number] Nessa Callaway MD  Study Date:         10/31/2024           Ordering Provider:    [id number] SONJA BATISTA  MRN/PID:            [id number]             Fellow:  Accession#:         [id number]         Nurse:  Date of Birth/Age:  1935 / 89 years Sonographer:          Beka Nj RDCS  Gender Assigned at  F                    Additional Staff:  Birth:  Height:             149.86 cm            Admit Date:  Weight:             51.26 kg             Admission Status:     Inpatient -                                                                  Routine  BSA / BMI:          1.45 m2 / 22.82      Department Location:  [facility]                      kg/m2  Blood Pressure: 155 /67 mmHg    Study Type:    TRANSTHORACIC ECHO (TTE) COMPLETE  Diagnosis/ICD: Acute combined systolic (congestive) and diastolic (congestive)                 heart failure (CHF)-I50.41  Indication:    Congestive Heart Failure  CPT Codes:     Echo Complete w Full Doppler-[id number]    Patient History:  Pertinent History: CHF, A-Fib, CAD, CVA, HTN, Hyperlipidemia and TIA. MI.    Study Detail: The following Echo studies were performed: 2D, M-Mode, Doppler and                color flow. Technically challenging study due to body habitus and                patient lying in supine position.       PHYSICIAN INTERPRETATION:  Left Ventricle: The left ventricular systolic function is normal, with a visually estimated ejection fraction of 60-65%. There is moderate eccentric left ventricular hypertrophy. There are no regional wall motion abnormalities. The left ventricular cavity size is normal. There is mild increased septal and normal posterior left ventricular wall thickness. Spectral Doppler shows a normal pattern of left ventricular diastolic filling.  Left Atrium: The left atrium is moderately dilated.  Right Ventricle: The right ventricle is normal in size. There is normal right ventricular global systolic function.  Right Atrium: The right atrium is mildly dilated.  Aortic Valve: The aortic valve is trileaflet. There is mild aortic valve thickening. There is evidence of mild aortic valve stenosis.  There is mild aortic valve regurgitation. The peak instantaneous gradient of the aortic valve is 14 mmHg.  Mitral Valve: The mitral valve is normal in structure. There is mild mitral annular calcification. The peak instantaneous gradient of the mitral valve is 15 mmHg. Echo findings are consistent with normal mitral valve prosthesis structure and function. There is mild to moderate  mitral valve regurgitation.  Tricuspid Valve: The tricuspid valve is structurally normal. There is mild tricuspid regurgitation. The Doppler estimated RVSP is mild to moderately elevated at 52.3 mmHg.  Pulmonic Valve: The pulmonic valve is structurally normal. There is mild pulmonic valve regurgitation.  Pericardium: No pericardial effusion noted.  Aorta: The aortic root is normal.  Systemic Veins: The inferior vena cava appears normal in size, with IVC inspiratory collapse greater than 50%.       CONCLUSIONS:   1. The left ventricular systolic function is normal, with a visually estimated ejection fraction of 60-65%.   2. There is normal right ventricular global systolic function.   3. The left atrium is moderately dilated.   4. Mild to moderate mitral valve regurgitation.   5. Mild to moderately elevated right ventricular systolic pressure.   6. Mild tricuspid regurgitation is visualized.   7. Mild aortic valve stenosis.   8. Mild aortic valve regurgitation.   9. Echo findings are consistent with normal mitral valve prosthesis structure and function.    QUANTITATIVE DATA SUMMARY:     2D MEASUREMENTS:            Normal Ranges:  Ao Root d:       3.60 cm    (2.0-3.7cm)  LAs:             4.70 cm    (2.7-4.0cm)  IVSd:            0.99 cm    (0.6-1.1cm)  LVPWd:           0.84 cm    (0.6-1.1cm)  LVIDd:           4.99 cm    (3.9-5.9cm)  LVIDs:           3.55 cm  LV Mass Index:   111.7 g/m2  LV % FS          28.9 %       LA VOLUME:                  Normal Ranges:  LA Volume Index: 58.0 ml/m2       RA VOLUME BY A/L METHOD:          Normal Ranges:  RA Area A4C:             15.0 cm2       M-MODE MEASUREMENTS:         Normal Ranges:  Ao Root:             3.50 cm (2.0-3.7cm)  LAs:                 4.80 cm (2.7-4.0cm)       AORTA MEASUREMENTS:         Normal Ranges:  Asc Ao, d:          3.10 cm (2.1-3.4cm)       LV SYSTOLIC FUNCTION BY 2D PLANIMETRY (MOD):                       Normal Ranges:  EF-A4C View:    58 % (>=55%)  EF-A2C  View:    63 %  EF-Biplane:     59 %  EF-Visual:      63 %  LV EF Reported: 63 %       LV DIASTOLIC FUNCTION:           Normal Ranges:  MV Peak E:             1.71 m/s  (0.7-1.2 m/s)  MV Peak A:             1.14 m/s  (0.42-0.7 m/s)  E/A Ratio:             1.50      (1.0-2.2)  MV e'                  0.088 m/s (>8.0)  MV lateral e'          0.10 m/s  MV medial e'           0.07 m/s  E/e' Ratio:            19.32     (<8.0)       MITRAL VALVE:           Normal Ranges:  MV Vmax:      1.92 m/s  (<=1.3m/s)  MV peak P.7 mmHg (<5mmHg)  MV mean P.0 mmHg  (<48mmHg)  MV DT:        218 msec  (150-240msec)       AORTIC VALVE:            Normal Ranges:  AoV Vmax:      1.90 m/s  (<=1.7m/s)  AoV Peak P.4 mmHg (<20mmHg)  LVOT Max Herbert:  1.21 m/s  (<=1.1m/s)  LVOT VTI:      27.80 cm  LVOT Diameter: 2.30 cm   (1.8-2.4cm)  AoV Area,Vmax: 2.65 cm2  (2.5-4.5cm2)       RIGHT VENTRICLE:  RV Basal 4.48 cm  RV Mid   3.15 cm  RV Major 6.9 cm  TAPSE:   21.2 mm  RV s'    0.15 m/s       TRICUSPID VALVE/RVSP:          Normal Ranges:  Peak TR Velocity:     3.51 m/s  RV Syst Pressure:     52 mmHg  (< 30mmHg)       PULMONIC VALVE:          Normal Ranges:  PV Max Herbert:     1.2 m/s  (0.6-0.9m/s)  PV Max P.0 mmHg       [id number] Nessa Callaway MD  Electronically signed on 2024 at 2:38:54 PM       ** Final **  XR chest 1 view  Narrative: Interpreted By:  Marilyn Aguayo,   STUDY:  XR CHEST 1 VIEW 2024 6:05 am      INDICATION:  Signs/Symptoms:Pleural effusion.      COMPARISON:  10/28/2024      ACCESSION NUMBER(S):  [id number]      ORDERING CLINICIAN:  OZ EDEN      TECHNIQUE:  Single AP view chest      FINDINGS:  Cardiac silhouette is mildly enlarged. There is generalized increased  interstitial prominence with scattered areas of infiltrate throughout  bilateral lung fields appearing slightly more conspicuous on the  current examination. Mild blunting of the left costophrenic angle  with subtle left basilar  effusion. There is postoperative fixation of  the right shoulder. Severe left shoulder osteoarthritis.                  Impression: 1. Generalized increased interstitial prominence with component of  scattered infiltrate throughout bilateral lung fields. Correlate with  patient's symptoms.      Signed by: Marilyn Aguayo 11/1/2024 10:51 AM  Dictation workstation:   [id number]      Physical Exam  Vitals reviewed.   Constitutional:       Appearance: Normal appearance.   HENT:      Head: Normocephalic and atraumatic.   Eyes:      Extraocular Movements: Extraocular movements intact.      Conjunctiva/sclera: Conjunctivae normal.   Cardiovascular:      Rate and Rhythm: Normal rate and regular rhythm.   Pulmonary:      Effort: Pulmonary effort is normal.      Breath sounds: No wheezing, rhonchi or rales.      Comments: Breath sounds diminished T/O, occasional mild crackles heard posteriorly  Abdominal:      General: Bowel sounds are normal.      Palpations: Abdomen is soft.      Tenderness: There is no abdominal tenderness.   Skin:     General: Skin is warm and dry.   Neurological:      General: No focal deficit present.      Mental Status: She is alert and oriented to person, place, and time.         Relevant Results  Lab Results   Component Value Date    GLUCOSE 100 (H) 11/03/2024    CALCIUM 8.5 (L) 11/03/2024     (L) 11/03/2024    K 3.6 11/03/2024    CO2 29 11/03/2024    CL 95 (L) 11/03/2024    BUN 10 11/03/2024    CREATININE 0.55 11/03/2024      Lab Results   Component Value Date    WBC 4.5 11/03/2024    HGB 9.3 (L) 11/03/2024    HCT 29.2 (L) 11/03/2024    MCV 89 11/03/2024     11/03/2024    Carotid duplex bilateral  Result Date: 10/29/2024  PRELIMINARY CONCLUSIONS:    Right Carotid: Findings are consistent with less than 50% stenosis of the right proximal internal carotid artery. The internal carotid artery appears tortuous. Right external carotid artery appears patent with no evidence of stenosis. The  right vertebral artery is patent with antegrade flow. No evidence of hemodynamically significant stenosis in the right subclavian artery. There is turbulent flow noted in the proximal common carotid artery which may indicate a more proximal stenosis.   Left Carotid: Findings are consistent with less than 50% stenosis of the left proximal internal carotid artery. The internal carotid artery appears tortuous. There is turbulent flow noted in mid and distal internal carotid artery possibly due to vessel tortuosity. Left external carotid artery appears patent with no evidence of stenosis. The left vertebral artery is patent with antegrade flow. No evidence of hemodynamically significant stenosis in the left subclavian artery.     ECG 12 Lead  Result Date: 10/28/2024  Supraventricular tachycardia Marked ST abnormality, possible inferolateral subendocardial injury Abnormal ECG When compared with ECG of 26-SEP-2024 14:27, T wave inversion no longer evident in Inferior leads T wave inversion more evident in Lateral leads Confirmed by Sergey Bond ([id number]) on 10/28/2024 7:51:36 PM    Lower extremity venous duplex bilateral  Result Date: 10/28/2024  No sonographic evidence DVT in the visualized vessels of bilateral lower extremities.   MACRO: None   Signed by: Dino De Paz 10/28/2024 7:48 PM Dictation workstation:   [id number]    CT angio chest for pulmonary embolism  Result Date: 10/28/2024  1.  No evidence of pulmonary emboli. Severe coronary artery atherosclerotic calcifications. 2. Mild cardiomegaly with biatrial enlargement. Small to moderate-size right greater than left pleural effusions and bibasilar subsegmental atelectasis. Diffuse interseptal thickening and scattered central predominant ground glass opacification of the lungs suggestive of pulmonary edema. 3. Chronic patulous esophagus with new air-fluid throughout the thoracic esophagus suggestive of reflux esophagitis versus achalasia. Clinical correlation is  recommended.     Signed by: Richmond Barbosa 10/28/2024 2:41 PM Dictation workstation:   [id number]    CT cervical spine wo IV contrast  Result Date: 10/28/2024  No evidence for an acute fracture or subluxation of the cervical spine.   There is chronic anterolisthesis of C3 on C4 and chronic retrolisthesis of C4 on C5 with multilevel degenerative disc disease and cervical spondylosis with central canal stenosis at C4-5 and multilevel foraminal stenosis bilaterally.   Bone island within left pedicle and lamina of T2 extending into the spinous process of T2.   Hypodense thyroid nodules.   MACRO: None   Signed by: Sherlyn Jackson 10/28/2024 11:00 AM Dictation workstation:   [id number]    CT head wo IV contrast  Result Date: 10/28/2024  Age-appropriate atrophy.   Encephalomalacia within the right posterior parietal lobe unchanged consistent with an old infarct at this site. Old lacunar infarct of the right insula is also identified.   No acute intracranial process.   MACRO: None     Signed by: Sherlyn Jackson 10/28/2024 10:46 AM Dictation workstation:   [id number]    XR chest 2 views  Result Date: 10/28/2024  Cardiomegaly without acute cardiopulmonary process. Low lung volumes.   Advanced osteoarthritis of the left shoulder affecting glenohumeral joint.   Signed by: Sherlyn Jackson 10/28/2024 10:33 AM Dictation workstation:   [id number]    XR pelvis 1-2 views  Result Date: 10/28/2024  No pelvic fracture.   Signed by: Sherlyn Jackson 10/28/2024 10:29 AM Dictation workstation:   [id number]    XR lumbar spine 2-3 views  Result Date: 10/28/2024  Status post midline decompressive laminectomy with posterior spinal fusion from L2 through S1   Lumbar levoscoliosis with multilevel degenerative disc disease and spondylosis most pronounced in the lower thoracic region and at the T12-L1 level.   Signed by: Sherlyn Jackson 10/28/2024 10:27 AM Dictation workstation:   [id number]         Assessment/Plan      Assessment & Plan  CVA (cerebral vascular  "accident) (Multi)  -Acute ischemic infarct in the left corona radiata (x2) and right aspect of the splenium of the corpus callosum  -left anterior cerebral artery has severe stenosis  CT imaging and MRI as above  TTE and US carotid results as above  Consult neurology, appreciate recs-would change Plavix   Brilinta however would place patient at higher bleeding risk, due to high risk of falls and declining SNF, does not recommend changing.  Stable from neuro standpoint-follow up outpatient  NSTEMI (non-ST elevated myocardial infarction) (Multi)  Troponin 43, 96, 600  Check lipid panel reviewed  Continue eliquis/statin/Plavix  Recent heart cath with stent placed in September of this year  TTE results as above  Cardiology consulted, appreciate recs-recommending medical management, cardiac medications have been adjusted  Acute heart failure  Resume home cardiac medications-medications have been being adjusted by cardiology  Daily weights  Strict I/Os    Echocardiogram  Cardiology resumed oral lasix at increased dose  Consult cardiology, appreciate recommendations  Urinary retention  Noted cloudy urine with sediment, check UA-positive, started IV ATB  Consult urology, appreciate recs-voiding trial today-passed  Syncope  Patient reports \"blacking out\" multiple times at home resulting in falls  Fall precautions  Check Ortho's  Echocardiogram  Ultrasound of the carotids as above  No further episodes  Bilateral pleural effusion  Seen on CT imaging  Diuresis   Respiratory status has improved-now on room air  Cardiology and pulmonary consulted, appreciate recommendations  Acute cystitis without hematuria  UA positive, culture grew multiple organism, probable contaminate  I recommend treating as UTI as urine cloudy with large amounts of sediment and acute urinary retention which has improved since starting ATB  IV ceftriaxone-transition to oral ceftin for a total of 7 days  PAF (paroxysmal atrial fibrillation) " (Multi)  Monitor on telemetry  Continue antiarrhythmics and Eliquis  Erosive esophagitis  CT imaging showed chronic patulous esophagus with new air-fluid throughout the thoracic esophagus suggestive of reflux esophagitis versus achalasia  Consult GI, appreciate recs-signed off  Continue PPI  Essential hypertension  Resume home antihypertensives  Monitor blood pressure close    Plan  Wean oxygen as tolerated-walking pulse ox done today, Pox 96% on RA, does not qualify for oxygen  Diuresis  Orthostatic vitals  Voiding trial, urology following-passed   DVT prophylaxis: Eliquis  Fall precautions, PT/OT consult  Cardiology, neurology, palliative, and pulmonary on consult, appreciate recs  GI signed off  CBC and BMP in the morning      Patient is a DNRCCA, DNI, no ICU. Family and patient met with pall/med yesterday. Patient declining SNF. Plan for home with [facility], referrals placed for Hospice navigator program, house calls, and healthy at home.                 Shiela Reyes, APRN-CNP

## 2024-11-03 NOTE — NURSING NOTE
Received patient Up to chair reading book. Report done at bedside. Denies pain or shortness of breath. Assessment as charted.

## 2024-11-03 NOTE — SIGNIFICANT EVENT
On RA at rest Spo2 was 96% HR70 bpm. On ambulation on RA spO2 was 95% HR 77 bpm. Pt does not qualify for home oxygen at this time.

## 2024-11-03 NOTE — ASSESSMENT & PLAN NOTE
Resume home antihypertensives  Monitor blood pressure close    Plan  Wean oxygen as tolerated-walking pulse ox done today, Pox 96% on RA, does not qualify for oxygen  Diuresis  Orthostatic vitals  Voiding trial, urology following-passed   DVT prophylaxis: Eliquis  Fall precautions, PT/OT consult  Cardiology, neurology, palliative, and pulmonary on consult, appreciate recs  GI signed off  CBC and BMP in the morning      Patient is a DNRCCA, DNI, no ICU. Family and patient met with pall/med yesterday. Patient declining SNF. Plan for home with [facility], referrals placed for Hospice navigator program, house calls, and healthy at home.

## 2024-11-03 NOTE — NURSING NOTE
BSSR received. Assumed care of pt, Pt alert and oriented lying in bed reading the menu to order breakfast. Pt denies any pain or discomfort. NSR on the monitor with HR of 68. Bed low with call bell in reach care ongoing.

## 2024-11-03 NOTE — ASSESSMENT & PLAN NOTE
UA positive, culture grew multiple organism, probable contaminate  I recommend treating as UTI as urine cloudy with large amounts of sediment and acute urinary retention which has improved since starting ATB  IV ceftriaxone-transition to oral ceftin for a total of 7 days

## 2024-11-03 NOTE — ASSESSMENT & PLAN NOTE
Seen on CT imaging  Diuresis   Respiratory status has improved-now on room air  Cardiology and pulmonary consulted, appreciate recommendations

## 2024-11-03 NOTE — ASSESSMENT & PLAN NOTE
Pt reports nausea, vomiting, and right flank pain that started last night. Troponin 43, 96, 600  Check lipid panel reviewed  Continue eliquis/statin/Plavix  Recent heart cath with stent placed in September of this year  TTE results as above  Cardiology consulted, appreciate recs-recommending medical management, cardiac medications have been adjusted

## 2024-11-03 NOTE — PROGRESS NOTES
"Pulmonary Daily Progress Note   Subjective    Rakan Cervantes is a 89 y.o. year old female patient known with CAD c/b NSTEMI s/p PCI, Afib on OAC, DLP, hyperparathyroidism, CVA, CKD, possible asthma hx , Jonas's esophagitis  admitted on 10/28/2024 with following p/w L arm numbness and tingling after falling 2 days earlier. In the ED hypoxia, requiring 5L NC.  Post admission work up revealed b/l pleural effusion. Pulmonary is consulted for hypoxia and pleural effusions.     Interval History:  Currently on 2L oxygen. Receiving IV diuretic in an attempt to wean off oxygen per cardiology.     Meds    Scheduled medications  acetaminophen, 650 mg, oral, TID  amLODIPine, 10 mg, oral, Daily  apixaban, 2.5 mg, oral, BID  ascorbic acid, 125 mg, oral, Daily  atorvastatin, 80 mg, oral, Nightly  carvedilol, 12.5 mg, oral, BID  cefTRIAXone, 1 g, intravenous, Nightly  cholecalciferol, 2,000 Units, oral, Daily  clopidogrel, 75 mg, oral, Daily  docusate sodium, 100 mg, oral, BID  DULoxetine, 30 mg, oral, Daily with evening meal  ferrous sulfate (325 mg ferrous sulfate), 65 mg of iron, oral, Daily with breakfast  furosemide, 40 mg, intravenous, Once  furosemide, 40 mg, oral, Daily  gabapentin, 300 mg, oral, BID  hydroxychloroquine, 200 mg, oral, Daily  isosorbide mononitrate ER, 120 mg, oral, Daily  lidocaine, 3 patch, transdermal, Daily  losartan, 100 mg, oral, Daily  magnesium oxide, 400 mg, oral, BID  oxygen, , inhalation, Continuous - Inhalation  pantoprazole, 40 mg, oral, BID  polyethylene glycol, 17 g, oral, Daily  ranolazine, 500 mg, oral, BID  sucralfate, 1 g, oral, q6h ZEINAB  sulfaSALAzine, 500 mg, oral, BID      Continuous medications     PRN medications  PRN medications: albuterol, benzocaine-menthol, bisacodyl, cyclobenzaprine     Objective    Blood pressure 175/79, pulse 80, temperature 36.5 °C (97.7 °F), temperature source Temporal, resp. rate 26, height 1.499 m (4' 11\"), weight 55.7 kg (122 lb 12.7 oz), SpO2 94%. "   Physical Exam   GENERAL: No respiratory distress  HEAD/SINUSES: On room air  LUNGS: Mild bibasilar crackles, no wheezing  CARDIAC: Regular rate and rhythm  EXTREMITIES: No edema,   NEURO: grossly normal mental status,  SKIN: Skin turgor normal.   PSYCH: Normal affect    Intake/Output Summary (Last 24 hours) at 11/3/2024 0958  Last data filed at 11/3/2024 0837  Gross per 24 hour   Intake 1070 ml   Output 300 ml   Net 770 ml     Labs:   Results from last 72 hours   Lab Units 11/03/24  0638 11/02/24  0520 11/01/24  0503   SODIUM mmol/L 130* 132* 134*   POTASSIUM mmol/L 3.6 3.4* 3.7   CHLORIDE mmol/L 95* 95* 97*   CO2 mmol/L 29 30 33*   BUN mg/dL 10 11 12   CREATININE mg/dL 0.55 0.43* 0.51   GLUCOSE mg/dL 100* 100* 103*   CALCIUM mg/dL 8.5* 8.5* 8.6   ANION GAP mmol/L 10 10 8*   EGFR mL/min/1.73m*2 88 >90 89      Results from last 72 hours   Lab Units 11/03/24  0638 11/02/24  0520 11/01/24  0503   WBC AUTO x10*3/uL 4.5 4.8 5.1   HEMOGLOBIN g/dL 9.3* 8.8* 9.6*   HEMATOCRIT % 29.2* 26.8* 29.6*   PLATELETS AUTO x10*3/uL 238 254 221               Micro/ID:   Lab Results   Component Value Date    URINECULTURE (A) 11/01/2024     Multiple organisms present, probable contamination. Repeat culture if clinically indicated.     Summary of key imaging results from the last 24 hours  Chest x-ray with interstitial edema.    CT scan done earlier in the admission with mosaicism, pulmonary edema scarring, atelectasis, pleural effusion    Impression   Rakan Cervantes is a 89 y.o. year old female patient known with CAD c/b NSTEMI s/p PCI, Afib on OAC, DLP, hyperparathyroidism, CVA, CKD, possible asthma hx , Jonas's esophagitis/ Patulous on imaging admitted on 10/28/2024 with following p/w L arm numbness and tingling after falling 2 days earlier. In the ED hypoxia, requiring 5L NC.  Post admission work up revealed b/l pleural effusion. Pulmonary is consulted for hypoxia and pleural effusions.   Acute hypoxic respiratory failure due to  pulmonary edema and pleural effusion, seems to have resolved as patient is on room air today, diuresis held due to low BP in last two days  Possible history of asthma in an older adult, no history of personal smoking, no PFTs in chart not on current inhalers but  used to have a rescue inhaler  Atrial fibrillation paroxysmal  CAD history  Heart failure decompensated, diastolic, mild to moderate MR  Elevated RVSP however normal RV   Bilateral pleural effusions to be.    Recommendations   As follows:  Recommend continuing diuresis  Oxygen walk test prior to discharge to determine discharge o2 needs. If she is unable to come off oxygen prior to discharge, ok to discharge with oxygen.   Will need repeat imaging in 4 to 6 weeks  Will need pulmonary outpatient follow-up for PFTs and follow-up imaging  Bronchopulmonary hygiene with Incentive spirometry and Acapella       Leonardo Sanon MD   11/03/24 at 9:58 AM     -Only the Medical problems listed under impression were addressed today.   -Please contact primary team for all other concerns and medical problem  -Thank you for your consult       Disclaimer: Documentation completed with the information available at the time of input. Parts of this note may have been scribed or generated using voice dictation software, Dragon.  Homophonic errors may exist.  Please contact me directly if clarification is needed. The times in the chart may not be reflective of actual patient care times, interventions, or procedures. Documentation occurs after the physical care of the patient.

## 2024-11-03 NOTE — TREATMENT PLAN
"This patient is a 88 y/o female who came in to the ER with numbness and tingling of the left arm. She had a fall on Friday October 25 and had hit her head, she has been \"blacking out\" and falling at home. September of this year pt. Had an NSTEMI and had been following up with Dr. Callaway. She is usually independent.     Subjective  Patient was seen and evaluated. She is Alert and oriented x3. States she is feeling well over all. She denies chest pain, shortness of breath, N&V. She stated that her bowel movements have improved, she has gone multiple times since yesterday. They are no longer pebble-like and are more formed. Denies abdominal discomfort on palpation. Denies irritation with urination. States she has voided multiple times since catheter has been removed.       Objective  Vital Signs  36.5 C Temporal  80 HR  26 Resp  175/79 BP  94 NC 2L     AMY: Completed on 11/1/2024 showed an estimated ejection fraction of 60-65%     XR chest completed on 11/1/2024 shows generalized increased interstitial prominence with component of scattered infiltrate throughout bilateral lung fields.      Carotid Duplex Bilateral: Completed 10/29/2024 showed less than 50% stenosis of the right proximal internal carotid artery and no evidence of hemodynamically significant stenosis in right subclavian artery. There is turbulent flow noted in the proximal common carotid artery, possibly indicating a more proximal stenosis The left carotid are consistent with less than 50% stenosis of the left proximal internal carotid artery possibly due to vessel tortuosity. Left external carotid appears patent with no evidence of stenosis. Left vertebral artery is patent with antegrade flow. No evidence of hemodynamically significant stenosis in the left subclavian artery.      ECG 12-Lead: 10/28/2024:  Supraventricular tachycardia Marked ST abnormality, possible inferolateral subendocardial injury     Lower Extremity Venous Duplex " Bilateral:  10/28/2024  Negative for DVT in bilateral lower extremities     CT angio chest  10/28/2024  Negative for Pulmonary emboli. Severe coronary artery atherosclerotic calcifications, Mild cardiomegaly with biatrial enlargement. Small to moderate right greater than left pleural effusions and bibasilar subsegmental atelectasis.      MRI Brain  10/29/2024  New subcentimete acute ischemic infarct in the left corna radiata and right aspect of the splenium of the corpus callosum.     General: Pleasant, well appearing  HENT: Normocephalic, trachea midline, no nasal drainage present, pt. Is hard of hearing-baseline  EYES: No redness or irritation, normal gaze and eye movements. No abnormalities noted.  Cardiovascular: Normal rate and rhythm, no murmurs, clicks or rubs noted, no edema noted.  Pulmonary: Diminished throughout lung fields, mild crackles present posteriorly. No wheezing present. Breathing is unlabored, equal chest rise and fall. Pt. Does not appear to be in any respiratory distress  Abdominal: No abdominal distention present. Normoactive bowel sounds, no abdominal tenderness to palpation.   Genitourinary: Campbell has been removed   Skin: Warm, dry and intact.   Neurological: Alert and oriented to person, place, time, and event. Smile is equal on both sides, eyebrow rise is even. Peripheral vision is intact. Patient denies dizziness and does not appear confused. Pt. Answers questions appropriately.  Upper Extremities: hand grasps equal on both sides, arm raises equal, push/pulls equal on left and right.  Lower extremities: equal leg strength present. Push/pulls equal in both legs. Heel to shin-test- completed on both sides without difficulty. - no neurological deficit noted     Relevant Labs  Calcium 8.5  Sodium 130  Potassium 3.6  BUN 10  Creatinine 0.55     WBC 4.5  HGB 9.3  Hematocrit 29.2         Assessment/Plan     Cerebral Vascular Accident-CVA  CT and MRI completed   Patient is clear from a  "neurological standpoint and should be followed as an outpatient- no ongoing neurological deficits are present  No changes to her blood thinners at this time     NSTEMI  Troponin climbed to 608 on 10/28/2024 were 43 on admission on 10/28/2024  Cardiology is following patient and is recommending to continue on eliquis and plavix.   Lipid panel-Reviewed     Acute Heart Failure  Strict I&O  Patient was placed back on Lasix PO per Cardiology at an increased dose  Monitor daily weights  Monitor for edema-negative today   on 10/28/24     Urinary Retention  Patient urine was cloudy with sediment, UA was positive, pt. Started on IV antibiotic and urology was consulted. Voiding trial completed-patient was able to void multiple times without difficult once del valle was removed     Syncope  Patient reported \"blacking out\" prior to admission, resulting to falls in the homes  Ortho-checks completed, echocardiogram completed. Patient has not experienced these events while in the hospital.  -continues to have no episodes while in the hospital     Bilateral Pleural Effusion  Results were seen on imaging-CT  Lasix was resumed by Cardiology at increased dose. Today patient has no SOB and breathing is easy and unlabored- mild crackles heard posteriorly.      Acute Cystitis without hematuria  Culture pending, patient starting on IV antibiotics, del valle has been removed on 11/2, pt. Has been able to void     Paroxysmal Atrial Fibrillation  Cardiac monitoring and continue on antiarrhythmics and Eliquis     Essential Hypertension  Routine blood pressure checks 175/79-today, continue antihypertensives. Cardiology following and managing antihypertensive agents        Plan    Walking O2 trial   Wean oxygen as tolerated-currently on 2L NC.  Cardiology planning for IV lasix to help diuresis and help get pt. Off O2  Continue to monitor patient urinary output-watch for urinary retention.  Continue to monitor orthostatic vital signs Continue to " implement fall precautions.  Orthostatic vitals  I&O documentation  Repeat labs in morning              This patient was seen in collaboration with Shiela KHAN, please see progress note for detailed evaluation.     Randi Paul RN, MSN  CNP Student

## 2024-11-03 NOTE — ASSESSMENT & PLAN NOTE
Noted cloudy urine with sediment, check UA-positive, started IV ATB  Consult urology, appreciate recs-voiding trial today-passed

## 2024-11-04 ENCOUNTER — DOCUMENTATION (OUTPATIENT)
Dept: HOME HEALTH SERVICES | Facility: HOME HEALTH | Age: 89
End: 2024-11-04
Payer: COMMERCIAL

## 2024-11-04 VITALS
OXYGEN SATURATION: 96 % | HEIGHT: 59 IN | DIASTOLIC BLOOD PRESSURE: 60 MMHG | SYSTOLIC BLOOD PRESSURE: 168 MMHG | WEIGHT: 123.24 LBS | HEART RATE: 65 BPM | TEMPERATURE: 97.5 F | RESPIRATION RATE: 18 BRPM | BODY MASS INDEX: 24.84 KG/M2

## 2024-11-04 PROBLEM — I21.4 NSTEMI (NON-ST ELEVATED MYOCARDIAL INFARCTION) (MULTI): Status: RESOLVED | Noted: 2024-09-26 | Resolved: 2024-11-04

## 2024-11-04 PROBLEM — R20.0 LEFT ARM NUMBNESS: Status: RESOLVED | Noted: 2024-10-28 | Resolved: 2024-11-04

## 2024-11-04 PROBLEM — R55 SYNCOPE: Status: RESOLVED | Noted: 2023-11-26 | Resolved: 2024-11-04

## 2024-11-04 PROBLEM — R33.9 URINARY RETENTION: Status: RESOLVED | Noted: 2024-11-01 | Resolved: 2024-11-04

## 2024-11-04 PROBLEM — N30.00 ACUTE CYSTITIS WITHOUT HEMATURIA: Status: RESOLVED | Noted: 2024-11-02 | Resolved: 2024-11-04

## 2024-11-04 PROBLEM — R07.9 CHEST PAIN: Status: RESOLVED | Noted: 2023-11-26 | Resolved: 2024-11-04

## 2024-11-04 PROBLEM — I50.9 ACUTE HEART FAILURE: Status: RESOLVED | Noted: 2024-10-28 | Resolved: 2024-11-04

## 2024-11-04 LAB
ANION GAP SERPL CALCULATED.3IONS-SCNC: 9 MMOL/L (ref 10–20)
BUN SERPL-MCNC: 11 MG/DL (ref 6–23)
CALCIUM SERPL-MCNC: 8.5 MG/DL (ref 8.6–10.3)
CHLORIDE SERPL-SCNC: 97 MMOL/L (ref 98–107)
CO2 SERPL-SCNC: 31 MMOL/L (ref 21–32)
CREAT SERPL-MCNC: 0.65 MG/DL (ref 0.5–1.05)
EGFRCR SERPLBLD CKD-EPI 2021: 84 ML/MIN/1.73M*2
ERYTHROCYTE [DISTWIDTH] IN BLOOD BY AUTOMATED COUNT: 15 % (ref 11.5–14.5)
FOLATE SERPL-MCNC: 17.4 NG/ML
GLUCOSE SERPL-MCNC: 106 MG/DL (ref 74–99)
HCT VFR BLD AUTO: 25.6 % (ref 36–46)
HGB BLD-MCNC: 8.4 G/DL (ref 12–16)
MCH RBC QN AUTO: 28.6 PG (ref 26–34)
MCHC RBC AUTO-ENTMCNC: 32.8 G/DL (ref 32–36)
MCV RBC AUTO: 87 FL (ref 80–100)
NRBC BLD-RTO: 0 /100 WBCS (ref 0–0)
PLATELET # BLD AUTO: 230 X10*3/UL (ref 150–450)
POTASSIUM SERPL-SCNC: 3.2 MMOL/L (ref 3.5–5.3)
RBC # BLD AUTO: 2.94 X10*6/UL (ref 4–5.2)
SODIUM SERPL-SCNC: 134 MMOL/L (ref 136–145)
VIT B12 SERPL-MCNC: >2000 PG/ML (ref 211–911)
WBC # BLD AUTO: 3.4 X10*3/UL (ref 4.4–11.3)

## 2024-11-04 PROCEDURE — 2500000001 HC RX 250 WO HCPCS SELF ADMINISTERED DRUGS (ALT 637 FOR MEDICARE OP): Performed by: INTERNAL MEDICINE

## 2024-11-04 PROCEDURE — 99232 SBSQ HOSP IP/OBS MODERATE 35: CPT | Performed by: NURSE PRACTITIONER

## 2024-11-04 PROCEDURE — 97535 SELF CARE MNGMENT TRAINING: CPT | Mod: GO,CO

## 2024-11-04 PROCEDURE — 2500000002 HC RX 250 W HCPCS SELF ADMINISTERED DRUGS (ALT 637 FOR MEDICARE OP, ALT 636 FOR OP/ED): Performed by: NURSE PRACTITIONER

## 2024-11-04 PROCEDURE — 2500000002 HC RX 250 W HCPCS SELF ADMINISTERED DRUGS (ALT 637 FOR MEDICARE OP, ALT 636 FOR OP/ED)

## 2024-11-04 PROCEDURE — 97530 THERAPEUTIC ACTIVITIES: CPT | Mod: GO,CO

## 2024-11-04 PROCEDURE — 2500000001 HC RX 250 WO HCPCS SELF ADMINISTERED DRUGS (ALT 637 FOR MEDICARE OP)

## 2024-11-04 PROCEDURE — 99239 HOSP IP/OBS DSCHRG MGMT >30: CPT | Performed by: NURSE PRACTITIONER

## 2024-11-04 PROCEDURE — 36415 COLL VENOUS BLD VENIPUNCTURE: CPT | Performed by: NURSE PRACTITIONER

## 2024-11-04 PROCEDURE — 2500000001 HC RX 250 WO HCPCS SELF ADMINISTERED DRUGS (ALT 637 FOR MEDICARE OP): Performed by: NURSE PRACTITIONER

## 2024-11-04 PROCEDURE — 97110 THERAPEUTIC EXERCISES: CPT | Mod: GP,CQ

## 2024-11-04 PROCEDURE — 97116 GAIT TRAINING THERAPY: CPT | Mod: GP,CQ

## 2024-11-04 PROCEDURE — 2500000004 HC RX 250 GENERAL PHARMACY W/ HCPCS (ALT 636 FOR OP/ED): Performed by: NURSE PRACTITIONER

## 2024-11-04 PROCEDURE — 85027 COMPLETE CBC AUTOMATED: CPT | Performed by: NURSE PRACTITIONER

## 2024-11-04 PROCEDURE — 2500000005 HC RX 250 GENERAL PHARMACY W/O HCPCS: Performed by: NURSE PRACTITIONER

## 2024-11-04 PROCEDURE — 80048 BASIC METABOLIC PNL TOTAL CA: CPT | Performed by: NURSE PRACTITIONER

## 2024-11-04 RX ORDER — LIDOCAINE 560 MG/1
3 PATCH PERCUTANEOUS; TOPICAL; TRANSDERMAL DAILY
Qty: 30 PATCH | Refills: 1 | Status: SHIPPED | OUTPATIENT
Start: 2024-11-05

## 2024-11-04 RX ORDER — FUROSEMIDE 40 MG/1
40 TABLET ORAL DAILY
Qty: 30 TABLET | Refills: 1 | Status: SHIPPED | OUTPATIENT
Start: 2024-11-05

## 2024-11-04 RX ORDER — SUCRALFATE 1 G/10ML
1 SUSPENSION ORAL EVERY 6 HOURS SCHEDULED
Qty: 1200 ML | Refills: 1 | Status: SHIPPED | OUTPATIENT
Start: 2024-11-04

## 2024-11-04 RX ORDER — HYDROXYCHLOROQUINE SULFATE 200 MG/1
200 TABLET, FILM COATED ORAL DAILY
Start: 2024-11-04 | End: 2024-11-06 | Stop reason: SDUPTHER

## 2024-11-04 RX ORDER — ISOSORBIDE MONONITRATE 120 MG/1
120 TABLET, EXTENDED RELEASE ORAL DAILY
Qty: 30 TABLET | Refills: 1 | Status: SHIPPED | OUTPATIENT
Start: 2024-11-05

## 2024-11-04 RX ORDER — LANOLIN ALCOHOL/MO/W.PET/CERES
400 CREAM (GRAM) TOPICAL 2 TIMES DAILY
Qty: 60 TABLET | Refills: 1 | Status: SHIPPED | OUTPATIENT
Start: 2024-11-04

## 2024-11-04 RX ORDER — POTASSIUM CHLORIDE 20 MEQ/1
20 TABLET, EXTENDED RELEASE ORAL ONCE
Status: COMPLETED | OUTPATIENT
Start: 2024-11-04 | End: 2024-11-04

## 2024-11-04 RX ORDER — AMLODIPINE BESYLATE 10 MG/1
10 TABLET ORAL DAILY
Qty: 30 TABLET | Refills: 1 | Status: SHIPPED | OUTPATIENT
Start: 2024-11-05

## 2024-11-04 RX ORDER — CARVEDILOL 25 MG/1
25 TABLET ORAL
Status: DISCONTINUED | OUTPATIENT
Start: 2024-11-04 | End: 2024-11-04 | Stop reason: HOSPADM

## 2024-11-04 RX ORDER — RANOLAZINE 500 MG/1
500 TABLET, EXTENDED RELEASE ORAL 2 TIMES DAILY
Qty: 60 TABLET | Refills: 1 | Status: SHIPPED | OUTPATIENT
Start: 2024-11-04

## 2024-11-04 ASSESSMENT — COGNITIVE AND FUNCTIONAL STATUS - GENERAL
DAILY ACTIVITIY SCORE: 19
MOBILITY SCORE: 17
EATING MEALS: A LITTLE
WALKING IN HOSPITAL ROOM: A LITTLE
DRESSING REGULAR UPPER BODY CLOTHING: A LITTLE
CLIMB 3 TO 5 STEPS WITH RAILING: A LOT
DRESSING REGULAR UPPER BODY CLOTHING: A LITTLE
MOVING TO AND FROM BED TO CHAIR: A LITTLE
DAILY ACTIVITIY SCORE: 16
PERSONAL GROOMING: A LITTLE
MOBILITY SCORE: 21
EATING MEALS: A LITTLE
STANDING UP FROM CHAIR USING ARMS: A LITTLE
DAILY ACTIVITIY SCORE: 19
EATING MEALS: A LITTLE
PERSONAL GROOMING: A LITTLE
MOVING FROM LYING ON BACK TO SITTING ON SIDE OF FLAT BED WITH BEDRAILS: A LITTLE
STANDING UP FROM CHAIR USING ARMS: A LITTLE
TOILETING: A LITTLE
DRESSING REGULAR UPPER BODY CLOTHING: A LITTLE
CLIMB 3 TO 5 STEPS WITH RAILING: A LITTLE
TOILETING: A LITTLE
TOILETING: A LOT
HELP NEEDED FOR BATHING: A LITTLE
WALKING IN HOSPITAL ROOM: A LITTLE
HELP NEEDED FOR BATHING: A LITTLE
PERSONAL GROOMING: A LITTLE
HELP NEEDED FOR BATHING: A LITTLE
DRESSING REGULAR LOWER BODY CLOTHING: A LOT
TURNING FROM BACK TO SIDE WHILE IN FLAT BAD: A LITTLE

## 2024-11-04 ASSESSMENT — PAIN SCALES - GENERAL
PAINLEVEL_OUTOF10: 0 - NO PAIN

## 2024-11-04 ASSESSMENT — PAIN - FUNCTIONAL ASSESSMENT
PAIN_FUNCTIONAL_ASSESSMENT: FLACC (FACE, LEGS, ACTIVITY, CRY, CONSOLABILITY)
PAIN_FUNCTIONAL_ASSESSMENT: 0-10
PAIN_FUNCTIONAL_ASSESSMENT: 0-10
PAIN_FUNCTIONAL_ASSESSMENT: FLACC (FACE, LEGS, ACTIVITY, CRY, CONSOLABILITY)

## 2024-11-04 ASSESSMENT — ACTIVITIES OF DAILY LIVING (ADL): HOME_MANAGEMENT_TIME_ENTRY: 13

## 2024-11-04 NOTE — NURSING NOTE
Assumed care of patient. Bedside Report done. Resting comfortably with continuous oxygen @ 2 liters/ minute on. No signs of distress or pain. Assessment as charted. Will monitor.

## 2024-11-04 NOTE — NURSING NOTE
Pulse ox=86% on room air. Oxygen 2 liters/minute applied Resting comfortably, No complaint made. Possible discharge today if syable.

## 2024-11-04 NOTE — DISCHARGE SUMMARY
Discharge Diagnosis  Sinus tachycardia    Issues Requiring Follow-Up  Lab work later this week as ordered     Discharge Meds     Medication List      START taking these medications     furosemide 40 mg tablet; Commonly known as: Lasix; Take 1 tablet (40 mg)   by mouth once daily.; Start taking on: November 5, 2024   lidocaine 4 % patch; Place 3 patches over 12 hours on the skin once   daily. Remove & discard patch within 12 hours or as directed by MD.; Start   taking on: November 5, 2024   magnesium oxide 400 mg (241.3 mg magnesium) tablet; Commonly known as:   Mag-Ox; Take 1 tablet (400 mg) by mouth 2 times a day.   ranolazine 500 mg 12 hr tablet; Commonly known as: Ranexa; Take 1 tablet   (500 mg) by mouth 2 times a day. Do not crush, chew, or split.   sucralfate 100 mg/mL suspension; Commonly known as: Carafate; Take 10 mL   (1 g) by mouth every 6 hours.     CHANGE how you take these medications     amLODIPine 10 mg tablet; Commonly known as: Norvasc; Take 1 tablet (10   mg) by mouth once daily.; Start taking on: November 5, 2024; What changed:   medication strength, how much to take   hydroxychloroquine 200 mg tablet; Commonly known as: Plaquenil; Take 1   tablet (200 mg) by mouth once daily.; What changed: how much to take, when   to take this   isosorbide mononitrate  mg 24 hr tablet; Commonly known as: Imdur;   Take 1 tablet (120 mg) by mouth once daily. Do not crush or chew.; Start   taking on: November 5, 2024; What changed: medication strength, how much   to take     CONTINUE taking these medications     apixaban 2.5 mg tablet; Commonly known as: Eliquis; Take 1 tablet (2.5   mg) by mouth 2 times a day.   ascorbic acid 100 mg tablet; Commonly known as: Vitamin C   atorvastatin 80 mg tablet; Commonly known as: Lipitor; Take 1 tablet (80   mg) by mouth once daily at bedtime.   carvedilol 25 mg tablet; Commonly known as: Coreg; Take 1 tablet (25 mg)   by mouth 2 times daily (morning and late  afternoon).   cholecalciferol 50 mcg (2,000 unit) capsule; Commonly known as: Vitamin   D-3   clopidogrel 75 mg tablet; Commonly known as: Plavix; TAKE 1 TABLET BY   MOUTH ONCE DAILY   docusate sodium 100 mg capsule; Commonly known as: Colace; TAKE 1   CAPSULE BY MOUTH ONCE DAILY AS NEEDED to prevent constipation from iron   DULoxetine 30 mg DR capsule; Commonly known as: Cymbalta; TAKE 1 CAPSULE   BY MOUTH ONCE DAILY at dinner   ferrous sulfate (325 mg ferrous sulfate) tablet; Take 1 tablet by mouth   once daily with breakfast. M-W-F   gabapentin 300 mg capsule; Commonly known as: Neurontin; Take 1 capsule   (300 mg) by mouth 2 times a day. Resume as needed   losartan 100 mg tablet; Commonly known as: Cozaar; Take 1 tablet (100   mg) by mouth once daily.   pantoprazole 40 mg EC tablet; Commonly known as: ProtoNix; TAKE 1 TABLET   BY MOUTH TWICE DAILY   PRESERVISION AREDS-2 ORAL   sulfaSALAzine 500 mg tablet; Commonly known as: Azulfidine       Test Results Pending At Discharge  Pending Labs       Order Current Status    Extra Urine Gray Tube Collected (11/01/24 1129)            Hospital Course   Presented to ER 10/28 with chest pain and arm tingling. Evaluation revealed bilateral pleural effusions and acute on chronic combined heart failure. Troponin elevated in NSTEMI pattern. Cardiology consulted. No intervention indicated. Medications adjusted and patient cleared for discharge to home. Pulmonology consulted. Recommending outpatient follow up. Referral placed. CT scan revealed constipation. GI consulted. No indication for endoscopy, recommendations made and cleared for discharge. Neurology consulted for arm tingling. MRI revealed new sub-centimeter ischemic infarct. No changes to medications recommended. Palliative medicine consulted for goals of care and patient is DNR/DNI - no ICU transfer. Urology consulted for urinary retention. Trial of void successful and patient cleared for discharge. Completed 3 days IV  antibiotics for abnormal urinalysis.     Pertinent Physical Exam At Time of Discharge  Physical Exam  Constitutional:       Appearance: Normal appearance.   HENT:      Head: Normocephalic and atraumatic.      Mouth/Throat:      Mouth: Mucous membranes are moist.      Pharynx: Oropharynx is clear.   Eyes:      Extraocular Movements: Extraocular movements intact.      Conjunctiva/sclera: Conjunctivae normal.      Pupils: Pupils are equal, round, and reactive to light.   Cardiovascular:      Rate and Rhythm: Normal rate and regular rhythm.      Heart sounds: Normal heart sounds.   Pulmonary:      Effort: Pulmonary effort is normal.      Breath sounds: Normal breath sounds.   Abdominal:      General: Bowel sounds are normal.      Palpations: Abdomen is soft.      Tenderness: There is no abdominal tenderness.   Musculoskeletal:         General: Normal range of motion.      Cervical back: Normal range of motion and neck supple.   Skin:     General: Skin is warm and dry.      Capillary Refill: Capillary refill takes less than 2 seconds.   Neurological:      General: No focal deficit present.      Mental Status: She is alert.   Psychiatric:         Mood and Affect: Mood normal.         Behavior: Behavior normal.         Outpatient Follow-Up  Future Appointments   Date Time Provider Department Center   11/20/2024  9:15 AM Campos Stevenson MD [id number]   12/3/2024  1:00 PM Nessa Callaway MD [id number] [facility]   4/22/2025 11:30 AM Samuel Sky MD [id number] [facility]         Josselin Mars, APRN-CNP

## 2024-11-04 NOTE — PROGRESS NOTES
Pt has written discharge order. Pt is agreeable to [facility]. Pt will return home with [facility]. SOC 24-48hrs. Sent aVS and dc summary via careTalkTo. Pt is set up with [facility] and hospice navigator program.     DISCHARGE PLAN TO RETURN HOME WITH [facility], SOC 24-48HRS.        11/04/24 3457   Discharge Planning   Living Arrangements Spouse/significant other   Support Systems Spouse/significant other;Children;Family members   Type of Residence Private residence   Home or Post Acute Services In home services   Type of Home Care Services Home OT;Home PT   Expected Discharge Disposition Home H   Does the patient need discharge transport arranged? No

## 2024-11-04 NOTE — PROGRESS NOTES
"Rakan Cervantes is a 89 y.o. female on day 7 of admission presenting with Sinus tachycardia.    Subjective   Alert and oriented x 3, sitting up in chair at bedside, denies complaints chest pain or pressure palpitations or feeling of rapid heart rate.        Objective     Physical Exam  Vitals and nursing note reviewed.   Constitutional:       General: She is not in acute distress.     Appearance: Normal appearance. She is not ill-appearing or toxic-appearing.   HENT:      Head: Normocephalic and atraumatic.      Nose: Nose normal.      Mouth/Throat:      Mouth: Mucous membranes are moist.      Pharynx: Oropharynx is clear.   Cardiovascular:      Rate and Rhythm: Normal rate and regular rhythm.      Pulses: Normal pulses.      Heart sounds: Normal heart sounds. No murmur heard.     No friction rub. No gallop.   Pulmonary:      Effort: Pulmonary effort is normal.      Breath sounds: No wheezing, rhonchi or rales.   Abdominal:      General: Bowel sounds are normal.      Palpations: Abdomen is soft.   Musculoskeletal:         General: Normal range of motion.      Cervical back: Normal range of motion.      Right lower leg: No edema.      Left lower leg: No edema.   Skin:     General: Skin is warm and dry.      Capillary Refill: Capillary refill takes less than 2 seconds.   Neurological:      Mental Status: She is alert and oriented to person, place, and time. Mental status is at baseline.   Psychiatric:         Mood and Affect: Mood normal.         Behavior: Behavior normal.         Thought Content: Thought content normal.         Judgment: Judgment normal.         Last Recorded Vitals  Blood pressure 168/60, pulse 69, temperature 36.4 °C (97.5 °F), temperature source Temporal, resp. rate 18, height 1.499 m (4' 11\"), weight 55.9 kg (123 lb 3.8 oz), SpO2 96%.  Intake/Output last 3 Shifts:  I/O last 3 completed shifts:  In: 1220 (21.8 mL/kg) [P.O.:1120; IV Piggyback:100]  Out: 900 (16.1 mL/kg) [Urine:900 (0.4 " mL/kg/hr)]  Weight: 55.9 kg     Relevant Results  Results for orders placed or performed during the hospital encounter of 10/28/24 (from the past 24 hours)   Folate   Result Value Ref Range    Folate, Serum 17.4 >5.0 ng/mL   Vitamin B12   Result Value Ref Range    Vitamin B12 >2,000 (H) 211 - 911 pg/mL   CBC   Result Value Ref Range    WBC 3.4 (L) 4.4 - 11.3 x10*3/uL    nRBC 0.0 0.0 - 0.0 /100 WBCs    RBC 2.94 (L) 4.00 - 5.20 x10*6/uL    Hemoglobin 8.4 (L) 12.0 - 16.0 g/dL    Hematocrit 25.6 (L) 36.0 - 46.0 %    MCV 87 80 - 100 fL    MCH 28.6 26.0 - 34.0 pg    MCHC 32.8 32.0 - 36.0 g/dL    RDW 15.0 (H) 11.5 - 14.5 %    Platelets 230 150 - 450 x10*3/uL   Basic Metabolic Panel   Result Value Ref Range    Glucose 106 (H) 74 - 99 mg/dL    Sodium 134 (L) 136 - 145 mmol/L    Potassium 3.2 (L) 3.5 - 5.3 mmol/L    Chloride 97 (L) 98 - 107 mmol/L    Bicarbonate 31 21 - 32 mmol/L    Anion Gap 9 (L) 10 - 20 mmol/L    Urea Nitrogen 11 6 - 23 mg/dL    Creatinine 0.65 0.50 - 1.05 mg/dL    eGFR 84 >60 mL/min/1.73m*2    Calcium 8.5 (L) 8.6 - 10.3 mg/dL     *Note: Due to a large number of results and/or encounters for the requested time period, some results have not been displayed. A complete set of results can be found in Results Review.         Assessment/Plan   Assessment & Plan  Chest pain    Erosive esophagitis    Syncope    NSTEMI (non-ST elevated myocardial infarction) (Multi)    PAF (paroxysmal atrial fibrillation) (Multi)    Essential hypertension    Acute heart failure    Bilateral pleural effusion    Left arm numbness    CVA (cerebral vascular accident) (Multi)    Urinary retention    Acute cystitis without hematuria      1 Chest pain.  Patient with angina in the setting of hypertension and poorly controlled heart rate.  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  Had PCI to distal left circumflex but distal to the stent there is some moderate distal disease.  Likely demand ischemia  from hypertension.  Her LAD looked fine on the cardiac catheterization from September 27, 2024.  At this point recommend conservative management.  We will continue Plavix, Eliquis, statin.        2.  Syncope.  MRI with new acute ischemic infarct left corona radiata   (x2) and right aspect of the splenium of the corpus callosum.    Not sure if her syncopal episode is related to stroke or syncope.  TTE 10/28/24 with EF 60-65%, mild-mod MVR, mild-mod elevated right vent. systolic pressure, mild TVR, mild AVR, and mild AVS. At this point we will arrange for monitor before discharge.     3.  Hypertension. BP continues to be elevated. Continue carvedilol, Lasix, losartan, isosorbide.    4.  Hyperlipidemia. Continue high intensity statin.     5.  Shortness of breath.  Acute on chronic diastolic heart failure.  Bilateral pleural effusions. Continue Lasix; will increase dose to 40 mg daily PO.  Currently on 2L NC, SpO2 95%. Pulmonology is following as well.      6. Paroxymal atrial fibrillation. Telemetry with sinus rhythm, rate in the 60's. On Eliquis.      7. Acute CVA. Stable from neurology standpoint. Follow up outpatient.      Overall Impression / Plan     11/3: As above. She reports she is feeling well. Denies any chest pain or pressure, palpitations, SOB. BP continues to be elevated with most recent reading of 175/79 this AM prior to medications. Telemetry with sinus rhythm, rate in 60s-70s with a lot of artifact.  SpO2 94% on 2L. Will give a dose of IV Lasix this afternoon; hope to diurese her enough to be able to wean off O2. Will increase Norvasc dose, add clonidine and continue to monitor BP. Check iron studies.  Palliative Med is following; plan for home with HHC at [place]. Continue Plavix, Eliquis, statin as above. Recommend cardiac monitor at [place].    11/4: Stable overnight.  States overall she is feeling well.  Denies dizziness, near-syncope or syncope.  Did have a near syncopal episode at home.  MRI brain reveals a  acute stroke.  Patient appears generally asymptomatic of this.  On the telemetry monitor she has remained in a sinus rhythm.  Have a history of paroxysmal atrial fibrillation and she does remain compliant with apixaban.  Neurology has noted the patient is stable for discharge with plan to follow-up in the outpatient setting.  She has remained hypertensive with permissive  hypertension after her acute stroke.  Systolic in the 160s.  She is on max dose beta-blocker as well as losartan her amlodipine has been increased to 10 mg daily and clonidine has been added.  Concerning her coronary artery disease she did undergo cardiac catheterization and stent placement in September of this year.  Did have some elevated troponins on admission which were thought to be relative to her acute stroke. Going forward there are no plans for further intervention and we will focus on a medical strategy only.  At this point the patient does appear to be stable overall from the cardiac perspective for discharge.  Will sign off.  Patient to follow-up with Dr. Callaway in the office within the next 2 to 3 weeks for reassessment.    I spent 35 minutes in the professional and overall care of this patient.      Esteban Adams, DANIA-CNP

## 2024-11-04 NOTE — CARE PLAN
Problem: Discharge Planning  Goal: Discharge to home or other facility with appropriate resources  Outcome: Progressing     Problem: Chronic Conditions and Co-morbidities  Goal: Patient's chronic conditions and co-morbidity symptoms are monitored and maintained or improved  Outcome: Progressing     Problem: Heart Failure  Goal: Weight from fluid excess reduced over 2-3 days, then stabilize  Outcome: Progressing  Goal: Increase self care and/or family involvement in 24 hours  Outcome: Progressing   The patient's goals for the shift include      The clinical goals for the shift include Voiding trial    Over the shift, the patient did not make progress toward the following goals. Barriers to progression include . Recommendations to address these barriers include .

## 2024-11-04 NOTE — NURSING NOTE
No changes in assessment,Care continued. Awake, Calls and needs attended. No complaint presented. Call light in reach.

## 2024-11-04 NOTE — NURSING NOTE
IV removed. Patient assisted to get dressed. Patient denies pain. Patient son and  verbalize understanding of discharge instructions and to pick medications at the pharmacy. Patient assisted in wheelchair to vehicle. No distress noted.

## 2024-11-04 NOTE — PROGRESS NOTES
Occupational Therapy    OT Treatment    Patient Name: Rakan Cervantes  MRN: [id number]  Department: [address]  Room: 10/10-A  Today's Date: 11/4/2024  Time Calculation  Start Time: 1117  Stop Time: 1140  Time Calculation (min): 23 min        Assessment:  OT Assessment: Tolerated session well, demonstrating continued progression towards POC with cues required throughout for safety awareness. Pt would benefit from continued skilled OT services to improve strength, balance, and functional tolerance to increase independence with ADL tasks  End of Session Communication: Bedside nurse  End of Session Patient Position: Up in chair, Alarm on  OT Assessment Results: Decreased ADL status, Decreased upper extremity strength, Decreased safe judgment during ADL, Decreased functional mobility  Plan:  Treatment Interventions:  (ADL retraining; Functional transfer training; UE strengthening/ROM; Endurance training; Cognitive reorientation; Patient/family training; Equipment evaluation/education)  OT Frequency: 3 times per week  OT Discharge Recommendations: Moderate intensity level of continued care  Equipment Recommended upon Discharge: Wheeled walker  OT Recommended Transfer Status: Moderate assist  OT - OK to Discharge: Yes  Treatment Interventions:  (ADL retraining; Functional transfer training; UE strengthening/ROM; Endurance training; Cognitive reorientation; Patient/family training; Equipment evaluation/education)    Subjective   Previous Visit Info:  OT Last Visit  OT Received On: 11/04/24  General:  General  Prior to Session Communication: Bedside nurse  Patient Position Received: Bed, 3 rail up, Alarm off, not on at start of session  General Comment: Cleared for therapy per RN. Pt supine in bed upon arrival and agreeable to tx  Precautions:  Hearing/Visual Limitations: Bilateral hearing aids (not with her). Reading glasses (present).  Medical Precautions: Fall precautions    Pain:  Pain Assessment  Pain Assessment: 0-10  0-10  (Numeric) Pain Score: 0 - No pain    Objective    Cognition:  Cognition  Orientation Level: Oriented X4  Following Commands: Follows one step commands with repetition  Safety Judgment: Decreased awareness of need for assistance  Safety/Judgement: Exceptions to WFL  Insight: Mild  Impulsive: Mildly    Activities of Daily Living:    Grooming  Grooming Level of Assistance: Close supervision  Grooming Where Assessed: Standing sinkside, Sitting sinkside  Grooming Comments: face + hand washing tasks    UE Dressing  UE Dressing Level of Assistance: Minimum assistance  UE Dressing Comments: assist to don/doff gown    Toileting  Toileting Level of Assistance: Moderate assistance  Where Assessed: Toilet  Toileting Comments: increased time required for toileting task with pt able to initiate pericare hygiene in sitting, requiring assist for task completion/efficiency    Bed Mobility/Transfers: Bed Mobility  Bed Mobility: Yes  Bed Mobility 1  Bed Mobility 1: Supine to sitting  Level of Assistance 1: Close supervision  Bed Mobility Comments 1: increased time + effort to complete supine>sit with cues to scoot hips to EOB    Transfers  Transfer: Yes  Transfer 1  Technique 1: Sit to stand, Stand to sit  Transfer Device 1: Walker  Transfer Level of Assistance 1: Contact guard, Minimal verbal cues  Trials/Comments 1: assist for balance with cues for proper hand placement and eccentric lowering to chair    Toilet Transfers  Toilet Transfer Type: To and from  Toilet Transfer to: Standard toilet  Toilet Transfers: Contact guard  Toilet Transfers Comments: assist for balance with cues for use of bed rail for UE support    Functional Mobility:  Functional Mobility  Functional Mobility Performed: Yes  Functional Mobility 1  Device 1: Rolling walker  Assistance 1: Minimum assistance  Comments 1: functional mobility short household distance at W with cues for pacing for safety awareness, demonstrating fair- balance throughout  task    Standing Balance:  Dynamic Standing Balance  Dynamic Standing-Level of Assistance: Close supervision, Contact guard  Dynamic Standing-Balance: Forward lean, Reaching for objects, Reaching across midline  Dynamic Standing-Comments: fair balance standing sinkside during grooming tasks    Outcome Measures:[facility] Daily [facility]  Putting on and taking off regular lower body clothing: A lot  Bathing (including washing, rinsing, drying): A little  Putting on and taking off regular upper body clothing: A little  Toileting, which includes using toilet, bedpan or urinal: A lot  Taking care of personal grooming such as brushing teeth: A little  Eating Meals: A little  Daily Activity - Total Score: 16    Education Documentation  ADL Training, taught by CHANO Barakat at 11/4/2024 12:14 PM.  Learner: Patient  Readiness: Acceptance  Method: Explanation  Response: Verbalizes Understanding, Needs Reinforcement    Education Comments  No comments found.      Problem: OT Goals  Goal: ADLs  Description: Pt will complete ADL tasks with Mod I, using AE as needed, in order to complete self-care tasks.    Outcome: Progressing  Goal: Functional mobility  Description: Pt will perform functional mobility household distance at mod ind level with RW    Outcome: Progressing  Goal: Functional transfers  Description: Pt will perform functional transfers at mod ind level with RW    Outcome: Progressing

## 2024-11-04 NOTE — PROGRESS NOTES
Physical Therapy    Physical Therapy Treatment    Patient Name: Rakan Cervantes  MRN: [id number]  Department: [address]  Room: 10/10A  Today's Date: 11/4/2024  Time Calculation  Start Time: 1143  Stop Time: 1206  Time Calculation (min): 23 min         Assessment/Plan   PT Assessment  End of Session Communication: Bedside nurse  Assessment Comment: Pt continues to further amb distances and requires less assist for transfers ths session. Pt would benefit from continued skilled PT services for maximizing independence and safety prior to & after discharge.  End of Session Patient Position: Up in chair, Alarm on  PT Plan  Inpatient/Swing Bed or Outpatient: Inpatient  PT Plan  Treatment/Interventions: Bed mobility, Transfer training, Gait training, Endurance training, Therapeutic exercise, Therapeutic activity  PT Plan: Ongoing PT  PT Frequency: 4 times per week  PT Discharge Recommendations: Moderate intensity level of continued care  Equipment Recommended upon Discharge: Wheeled walker  PT Recommended Transfer Status: Assist x1, Assistive device (RW)  PT - OK to Discharge: Yes      General Visit Information:   PT  Visit  PT Received On: 11/04/24  General  Prior to Session Communication: Bedside nurse  Patient Position Received: Up in chair, Alarm on  General Comment: Pt cleared for PT by nursing. Pt agreeable to PT services.    Subjective   Precautions:  Precautions  Medical Precautions: Fall precautions    Objective   Pain:  Pain Assessment  Pain Assessment: 0-10  0-10 (Numeric) Pain Score: 0 - No pain  Cognition:  Cognition  Overall Cognitive Status: Within Functional Limits     Postural Control:  Static Sitting Balance  Static Sitting-Balance Support: Feet supported, Bilateral upper extremity supported  Static Sitting-Level of Assistance: Modified independent  Static Sitting-Comment/Number of Minutes: good seated static balance  Static Standing Balance  Static Standing-Balance Support: Bilateral upper extremity  supported  Static Standing-Level of Assistance: Close supervision  Static Standing-Comment/Number of Minutes: good static stand balance    Treatments:  Therapeutic Exercise  Therapeutic Exercise Performed: Yes  Therapeutic Exercise Activity 1: Seated B marches x15  Therapeutic Exercise Activity 2: Seated B LAQ x15  Therapeutic Exercise Activity 3: Seated B hip abd/add with light resist x15  Therapeutic Exercise Activity 4: Seated B ankle pumps x15    Ambulation/Gait Training  Ambulation/Gait Training Performed: Yes  Ambulation/Gait Training 1  Surface 1: Level tile  Device 1: Rolling walker  Assistance 1: Close supervision  Quality of Gait 1: Narrow base of support, Forward flexed posture, Decreased step length, Diminished heel strike  Comments/Distance (ft) 1: 150 feet x2. One brief standing rest break d/t LE fatigue. Reciprocal pattern, improved posture with cues and inc FWW height.  Transfers  Transfer: Yes  Transfer 1  Transfer From 1: Sit to, Stand to  Transfer to 1: Sit, Stand  Technique 1: Sit to stand, Stand to sit  Transfer Device 1: Walker  Transfer Level of Assistance 1: Close supervision  Trials/Comments 1: Pt able to navigate obstacles and directional turns in hallway with no sway/LOB.    Outcome Measures:  [facility] Basic Mobility  Turning from your back to your side while in a flat bed without using bedrails: A little  Moving from lying on your back to sitting on the side of a flat bed without using bedrails: A little  Moving to and from bed to chair (including a wheelchair): A little  Standing up from a chair using your arms (e.g. wheelchair or bedside chair): A little  To walk in hospital room: A little  Climbing 3-5 steps with railing: A lot  Basic Mobility - Total Score: 17    Encounter Problems       Encounter Problems (Active)       PT Problem       Pt will transition supine<>sit at a flat bed with mod I.  (Progressing)       Start:  10/29/24    Expected End:  11/23/24            Pt will transfer  sit<>stand with Jr FWW & mod I.  (Progressing)       Start:  10/29/24    Expected End:  11/23/24            Pt will ambulate >/=35 ft with Jr FWW & distant S (Progressing)       Start:  10/29/24    Expected End:  11/23/24            Pt will maintain standing balance while engaging in bimanual activity standing at sink or other environmental support for >/=45 sec with distant S. (Progressing)       Start:  10/29/24    Expected End:  11/23/24

## 2024-11-05 NOTE — TELEPHONE ENCOUNTER
Patient discharged from [facility] 11/4/24 with [facility]. Phoned patient in follow up and scheduled a House Calls visit with Renita Guzman NP 11/13/24 at 12:00 pm.

## 2024-11-06 ENCOUNTER — PATIENT OUTREACH (OUTPATIENT)
Dept: HOME HEALTH SERVICES | Age: 89
End: 2024-11-06
Payer: COMMERCIAL

## 2024-11-06 DIAGNOSIS — M30.0: ICD-10-CM

## 2024-11-06 LAB
ATRIAL RATE: 71 BPM
P AXIS: 68 DEGREES
P OFFSET: 196 MS
P ONSET: 132 MS
PR INTERVAL: 170 MS
Q ONSET: 217 MS
QRS COUNT: 12 BEATS
QRS DURATION: 92 MS
QT INTERVAL: 424 MS
QTC CALCULATION(BAZETT): 460 MS
QTC FREDERICIA: 448 MS
R AXIS: 48 DEGREES
T AXIS: 49 DEGREES
T OFFSET: 429 MS
VENTRICULAR RATE: 71 BPM

## 2024-11-06 RX ORDER — SULFASALAZINE 500 MG/1
500 TABLET ORAL 2 TIMES DAILY
Qty: 180 TABLET | Refills: 1 | Status: SHIPPED | OUTPATIENT
Start: 2024-11-06

## 2024-11-06 RX ORDER — HYDROXYCHLOROQUINE SULFATE 200 MG/1
200 TABLET, FILM COATED ORAL DAILY
Qty: 90 TABLET | Refills: 1 | Status: SHIPPED | OUTPATIENT
Start: 2024-11-06

## 2024-11-06 SDOH — ECONOMIC STABILITY: HOUSING INSECURITY: AT ANY TIME IN THE PAST 12 MONTHS, WERE YOU HOMELESS OR LIVING IN A SHELTER (INCLUDING NOW)?: NO

## 2024-11-06 SDOH — SOCIAL STABILITY: SOCIAL NETWORK: HOW OFTEN DO YOU ATTEND CHURCH OR RELIGIOUS SERVICES?: NEVER

## 2024-11-06 SDOH — SOCIAL STABILITY: SOCIAL INSECURITY: ARE YOU MARRIED, WIDOWED, DIVORCED, SEPARATED, NEVER MARRIED, OR LIVING WITH A PARTNER?: MARRIED

## 2024-11-06 SDOH — SOCIAL STABILITY: SOCIAL INSECURITY: WITHIN THE LAST YEAR, HAVE YOU BEEN HUMILIATED OR EMOTIONALLY ABUSED IN OTHER WAYS BY YOUR PARTNER OR EX-PARTNER?: NO

## 2024-11-06 SDOH — HEALTH STABILITY: PHYSICAL HEALTH: ON AVERAGE, HOW MANY DAYS PER WEEK DO YOU ENGAGE IN MODERATE TO STRENUOUS EXERCISE (LIKE A BRISK WALK)?: 0 DAYS

## 2024-11-06 SDOH — ECONOMIC STABILITY: HOUSING INSECURITY: IN THE LAST 12 MONTHS, WAS THERE A TIME WHEN YOU WERE NOT ABLE TO PAY THE MORTGAGE OR RENT ON TIME?: NO

## 2024-11-06 SDOH — ECONOMIC STABILITY: FOOD INSECURITY: WITHIN THE PAST 12 MONTHS, THE FOOD YOU BOUGHT JUST DIDN'T LAST AND YOU DIDN'T HAVE MONEY TO GET MORE.: NEVER TRUE

## 2024-11-06 SDOH — HEALTH STABILITY: MENTAL HEALTH: HOW OFTEN DO YOU HAVE SIX OR MORE DRINKS ON ONE OCCASION?: NEVER

## 2024-11-06 SDOH — ECONOMIC STABILITY: FOOD INSECURITY: WITHIN THE PAST 12 MONTHS, YOU WORRIED THAT YOUR FOOD WOULD RUN OUT BEFORE YOU GOT THE MONEY TO BUY MORE.: NEVER TRUE

## 2024-11-06 SDOH — ECONOMIC STABILITY: FOOD INSECURITY: HOW HARD IS IT FOR YOU TO PAY FOR THE VERY BASICS LIKE FOOD, HOUSING, MEDICAL CARE, AND HEATING?: NOT HARD AT ALL

## 2024-11-06 SDOH — ECONOMIC STABILITY: INCOME INSECURITY: IN THE PAST 12 MONTHS HAS THE ELECTRIC, GAS, OIL, OR WATER COMPANY THREATENED TO SHUT OFF SERVICES IN YOUR HOME?: NO

## 2024-11-06 SDOH — SOCIAL STABILITY: SOCIAL INSECURITY: WITHIN THE LAST YEAR, HAVE YOU BEEN AFRAID OF YOUR PARTNER OR EX-PARTNER?: NO

## 2024-11-06 SDOH — HEALTH STABILITY: MENTAL HEALTH: HOW OFTEN DO YOU HAVE A DRINK CONTAINING ALCOHOL?: NEVER

## 2024-11-06 SDOH — SOCIAL STABILITY: SOCIAL NETWORK: HOW OFTEN DO YOU ATTEND MEETINGS OF THE CLUBS OR ORGANIZATIONS YOU BELONG TO?: NEVER

## 2024-11-06 SDOH — ECONOMIC STABILITY: TRANSPORTATION INSECURITY: IN THE PAST 12 MONTHS, HAS LACK OF TRANSPORTATION KEPT YOU FROM MEDICAL APPOINTMENTS OR FROM GETTING MEDICATIONS?: NO

## 2024-11-06 SDOH — SOCIAL STABILITY: SOCIAL NETWORK
DO YOU BELONG TO ANY CLUBS OR ORGANIZATIONS SUCH AS CHURCH GROUPS, UNIONS, FRATERNAL OR ATHLETIC GROUPS, OR SCHOOL GROUPS?: NO

## 2024-11-06 SDOH — SOCIAL STABILITY: SOCIAL NETWORK: IN A TYPICAL WEEK, HOW MANY TIMES DO YOU TALK ON THE PHONE WITH FAMILY, FRIENDS, OR NEIGHBORS?: THREE TIMES A WEEK

## 2024-11-06 SDOH — HEALTH STABILITY: MENTAL HEALTH
DO YOU FEEL STRESS - TENSE, RESTLESS, NERVOUS, OR ANXIOUS, OR UNABLE TO SLEEP AT NIGHT BECAUSE YOUR MIND IS TROUBLED ALL THE TIME - THESE DAYS?: NOT AT ALL

## 2024-11-06 SDOH — HEALTH STABILITY: MENTAL HEALTH: HOW MANY DRINKS CONTAINING ALCOHOL DO YOU HAVE ON A TYPICAL DAY WHEN YOU ARE DRINKING?: PATIENT DOES NOT DRINK

## 2024-11-06 SDOH — ECONOMIC STABILITY: HOUSING INSECURITY: IN THE PAST 12 MONTHS, HOW MANY TIMES HAVE YOU MOVED WHERE YOU WERE LIVING?: 0

## 2024-11-06 SDOH — HEALTH STABILITY: PHYSICAL HEALTH: ON AVERAGE, HOW MANY MINUTES DO YOU ENGAGE IN EXERCISE AT THIS LEVEL?: 0 MIN

## 2024-11-06 SDOH — SOCIAL STABILITY: SOCIAL NETWORK: HOW OFTEN DO YOU GET TOGETHER WITH FRIENDS OR RELATIVES?: TWICE A WEEK

## 2024-11-06 ASSESSMENT — LIFESTYLE VARIABLES
AUDIT-C TOTAL SCORE: 0
SKIP TO QUESTIONS 9-10: 1

## 2024-11-06 ASSESSMENT — ACTIVITIES OF DAILY LIVING (ADL): LACK_OF_TRANSPORTATION: NO

## 2024-11-06 NOTE — TELEPHONE ENCOUNTER
[facility] 9/20/24, [place] 4/22/25    Hydroxychloroquine 200mg  Azulfidine 500mg    [facility] [address]

## 2024-11-06 NOTE — PROGRESS NOTES
[facility] enrollment call complete.  Pt w recent hospitalization due to sinus tachycardia.    SDOH reviewed no acute needs identified.  Is active w her PCP  Prefers daily calls after 0800  [facility] weekly  call 11/7/24 at  1700

## 2024-11-07 ENCOUNTER — PATIENT OUTREACH (OUTPATIENT)
Dept: CARE COORDINATION | Age: 89
End: 2024-11-07

## 2024-11-07 ENCOUNTER — TELEMEDICINE (OUTPATIENT)
Dept: CARE COORDINATION | Age: 89
End: 2024-11-07
Payer: COMMERCIAL

## 2024-11-07 VITALS — WEIGHT: 112 LBS | BODY MASS INDEX: 22.62 KG/M2

## 2024-11-07 DIAGNOSIS — I67.9 CEREBROVASCULAR DISEASE: Primary | ICD-10-CM

## 2024-11-07 NOTE — PROGRESS NOTES
"Daily Call Note:   1700 - Initial [facility] completed with pt, Dr. Jeff Corona and this RN. Pt states \"I'm fine - dong good.\" Denies having any questions regarding her medications. She states that she has a friend that helps her with them.  She is not currently monitoring her BP or other VS - she will ask her friend to assist. Today's weight - 112.  She denies CP and SOB. Does endorse some bilat pedal edema.  Next [facility] scheduled for 11/14 at 1730.  No other questions or concerns at this time.    Pt Education: POC  Barriers: none  Topics for Daily Review: Initial [facility]  Pt demonstrates clear understanding: Yes    Daily Weight:  Vitals:    11/07/24 0900   Weight: 50.8 kg (112 lb)      Last 3 Weights:  Wt Readings from Last 7 Encounters:   11/07/24 50.8 kg (112 lb)   11/04/24 55.9 kg (123 lb 3.8 oz)   10/22/24 50.8 kg (112 lb)   10/09/24 50.8 kg (112 lb)   09/29/24 45 kg (99 lb 3.3 oz)   09/20/24 51.7 kg (114 lb)   07/16/24 50.8 kg (112 lb)       Masimo Device: No   Masimo Clinical Impression: n/a    Virtual Visits--Scheduled (Most Recent Date at Top)  Follow up Appointments  Recent Visits  No visits were found meeting these conditions.  Showing recent visits within past 30 days and meeting all other requirements  Future Appointments  No visits were found meeting these conditions.  Showing future appointments within next 90 days and meeting all other requirements       Frequency of RN Calls & Virtual Visits per Team Agreement: Healthy at Home Frequency: Daily    Medication issues Addressed (what was done): none    Follow up appointments scheduled by [facility] Staff: none  Referrals made by [facility] staff: none        "

## 2024-11-11 ENCOUNTER — PATIENT OUTREACH (OUTPATIENT)
Dept: CARE COORDINATION | Age: 89
End: 2024-11-11
Payer: COMMERCIAL

## 2024-11-11 DIAGNOSIS — E78.00 HYPERCHOLESTEROLEMIA: ICD-10-CM

## 2024-11-11 NOTE — PROGRESS NOTES
"Daily Call Note:   0825 - Daily call completed with pt this morning. She sates that she is \"good\" today.Denies SOB and edema. Endorses back pain, but indicates that this is nothing new.  Next [facility] 11/14 at 1730.  No other questions or concerns at this time.    Pt Education: POC  Barriers: none  Topics for Daily Review: .  Pt demonstrates clear understanding: Yes    Daily Weight:  There were no vitals filed for this visit.   Last 3 Weights:  Wt Readings from Last 7 Encounters:   11/07/24 50.8 kg (112 lb)   11/04/24 55.9 kg (123 lb 3.8 oz)   10/22/24 50.8 kg (112 lb)   10/09/24 50.8 kg (112 lb)   09/29/24 45 kg (99 lb 3.3 oz)   09/20/24 51.7 kg (114 lb)   07/16/24 50.8 kg (112 lb)       Masimo Device: No   Masimo Clinical Impression: n/a    Virtual Visits--Scheduled (Most Recent Date at Top)  Follow up Appointments  Recent Visits  No visits were found meeting these conditions.  Showing recent visits within past 30 days and meeting all other requirements  Future Appointments  No visits were found meeting these conditions.  Showing future appointments within next 90 days and meeting all other requirements       Frequency of RN Calls & Virtual Visits per Team Agreement: Healthy at Home Frequency: Daily    Medication issues Addressed (what was done): none    Follow up appointments scheduled by [facility] Staff: none  Referrals made by [facility] staff: none        "

## 2024-11-12 ENCOUNTER — TELEPHONE (OUTPATIENT)
Dept: PRIMARY CARE | Facility: CLINIC | Age: 89
End: 2024-11-12
Payer: COMMERCIAL

## 2024-11-12 RX ORDER — POTASSIUM CHLORIDE 1500 MG/1
20 TABLET, EXTENDED RELEASE ORAL DAILY
COMMUNITY
Start: 2024-10-05

## 2024-11-12 RX ORDER — ATORVASTATIN CALCIUM 80 MG/1
80 TABLET, FILM COATED ORAL NIGHTLY
Qty: 90 TABLET | Refills: 1 | Status: SHIPPED | OUTPATIENT
Start: 2024-11-12

## 2024-11-12 RX ORDER — VERAPAMIL HYDROCHLORIDE 180 MG/1
TABLET, EXTENDED RELEASE ORAL
COMMUNITY
Start: 2024-10-04

## 2024-11-13 ENCOUNTER — APPOINTMENT (OUTPATIENT)
Dept: PRIMARY CARE | Facility: CLINIC | Age: 89
End: 2024-11-13
Payer: COMMERCIAL

## 2024-11-13 NOTE — TELEPHONE ENCOUNTER
BATSHEVA Maravilla - Contacted patient and reschedule her House Calls visit to 11/14/24 at 10:00 am.

## 2024-11-13 NOTE — TELEPHONE ENCOUNTER
BATSHEVA Maravilla - Attempted x 3 to confirm today's House Calls visit, no answer and unable to leave a message. Visit was confirmed via Rupinder 11/6/24. Per [facility] she also has a visit scheduled at [facility] with Michael Moya/  [facility] at 10:40 am today. This visit was confirmed via Rupinder. I will attempt to contact patient again this morning.

## 2024-11-14 ENCOUNTER — PATIENT OUTREACH (OUTPATIENT)
Dept: HOME HEALTH SERVICES | Age: 89
End: 2024-11-14

## 2024-11-14 ENCOUNTER — APPOINTMENT (OUTPATIENT)
Dept: CARE COORDINATION | Age: 89
End: 2024-11-14
Payer: COMMERCIAL

## 2024-11-14 VITALS — SYSTOLIC BLOOD PRESSURE: 127 MMHG | DIASTOLIC BLOOD PRESSURE: 70 MMHG

## 2024-11-14 DIAGNOSIS — I10 LABILE ESSENTIAL HYPERTENSION: ICD-10-CM

## 2024-11-14 DIAGNOSIS — I25.10 ARTERIOSCLEROSIS OF CORONARY ARTERY: ICD-10-CM

## 2024-11-14 DIAGNOSIS — Z86.73 HISTORY OF CEREBROVASCULAR ACCIDENT: Primary | ICD-10-CM

## 2024-11-14 NOTE — PROGRESS NOTES
Daily Call Note:   Pt doing good. Has no voiced concerns. Denies symptoms of chest pain, shortness of breath, dizziness or lightheadedness. Pt stated she has no complaints, no     Topics for Daily Review:   Pt demonstrates clear understanding: Yes    Daily VS:    Last 3 Weights:  Wt Readings from Last 7 Encounters:   11/07/24 50.8 kg (112 lb)   11/04/24 55.9 kg (123 lb 3.8 oz)   10/22/24 50.8 kg (112 lb)   10/09/24 50.8 kg (112 lb)   09/29/24 45 kg (99 lb 3.3 oz)   09/20/24 51.7 kg (114 lb)   07/16/24 50.8 kg (112 lb)       Masimo Device: No       Virtual Visits--Scheduled (Most Recent Date at Top)  Follow up Appointments  Recent Visits  No visits were found meeting these conditions.  Showing recent visits within past 30 days and meeting all other requirements  Future Appointments  No visits were found meeting these conditions.  Showing future appointments within next 90 days and meeting all other requirements       Frequency of RN Calls & Virtual Visits per Team Agreement: Healthy at Home Frequency: Daily    Medication issues Addressed (what was done):     Follow up appointments scheduled by [facility] Staff: [facility] 1021 @1800  Referrals made by [facility] staff:

## 2024-11-15 ENCOUNTER — PATIENT OUTREACH (OUTPATIENT)
Dept: CASE MANAGEMENT | Facility: HOSPITAL | Age: 89
End: 2024-11-15
Payer: COMMERCIAL

## 2024-11-15 ENCOUNTER — PATIENT OUTREACH (OUTPATIENT)
Dept: CARE COORDINATION | Age: 89
End: 2024-11-15
Payer: COMMERCIAL

## 2024-11-15 ENCOUNTER — TELEPHONE (OUTPATIENT)
Dept: PULMONOLOGY | Facility: CLINIC | Age: 89
End: 2024-11-15

## 2024-11-15 ENCOUNTER — TELEPHONE (OUTPATIENT)
Dept: PRIMARY CARE | Facility: CLINIC | Age: 89
End: 2024-11-15
Payer: COMMERCIAL

## 2024-11-15 NOTE — PROGRESS NOTES
Heart Failure Nurse Navigator Transition of Care Phone Call    The role of the HF nurse navigator is to (1) characterize risk profiles of patients with heart failure transitioning from hxcfherl-nv-efxcsmgvl after hospitalization, (2) recommend interventions to improve care and reduce risks of worse post-hospitalization outcomes.     Assessment  Call out to patient . No answer, unable to leave a msg as mailbox is full.       Medications  Is the patient prescribed the following medications?  ACEi/ARB/ARNi? Yes losartan 100 mg qd  BB? Yes carvedilol 25 mg BID  MRA? No  SGLT2i? No  Diuretic? Yes lasix 40 mg qd      Snehal HERNANDEZ RN  [facility] Clinical Nurse Navigator, CHF  [phone]

## 2024-11-15 NOTE — TELEPHONE ENCOUNTER
Patient had a post acute visit scheduled with [facility] provider Renita Guzman NP 11/13/24 and visit was rescheduled to 11/14/24 because she also had a pulmonary visit scheduled. On 11/14/24 provider arrived at patient's home and a palliative NP was present for her initial visit. Attempted to call patient at this time to offer to reschedule a [facility] visit, no answer, unable to leave a message as voicemail box is full.

## 2024-11-15 NOTE — PROGRESS NOTES
"11/15/2024 - 1130 - attempted daily call. Asked pt how she was feeling today. Pt stated, \"I'd be a lot better if I didn't have to answer all these calls.\" And hung up.  Disenrolled.  "

## 2024-11-18 NOTE — TELEPHONE ENCOUNTER
Called and spoke to Ms. Cervatnes 11/18 @9:20a to reschedule her appt. From 11/13 with ELICEO Moya, but she refused to reschedule and come again

## 2024-11-20 ENCOUNTER — PREP FOR PROCEDURE (OUTPATIENT)
Dept: PAIN MEDICINE | Facility: CLINIC | Age: 89
End: 2024-11-20

## 2024-11-20 ENCOUNTER — OFFICE VISIT (OUTPATIENT)
Dept: PAIN MEDICINE | Facility: CLINIC | Age: 89
End: 2024-11-20
Payer: COMMERCIAL

## 2024-11-20 ENCOUNTER — TELEPHONE (OUTPATIENT)
Dept: CARDIOLOGY | Facility: CLINIC | Age: 89
End: 2024-11-20

## 2024-11-20 VITALS
RESPIRATION RATE: 22 BRPM | WEIGHT: 112 LBS | SYSTOLIC BLOOD PRESSURE: 120 MMHG | HEART RATE: 87 BPM | DIASTOLIC BLOOD PRESSURE: 40 MMHG | BODY MASS INDEX: 22.58 KG/M2 | HEIGHT: 59 IN | OXYGEN SATURATION: 98 %

## 2024-11-20 DIAGNOSIS — M79.10 MYALGIA: ICD-10-CM

## 2024-11-20 DIAGNOSIS — M96.1 POSTLAMINECTOMY SYNDROME, LUMBAR REGION: Primary | ICD-10-CM

## 2024-11-20 PROCEDURE — 1036F TOBACCO NON-USER: CPT | Performed by: ANESTHESIOLOGY

## 2024-11-20 PROCEDURE — 99214 OFFICE O/P EST MOD 30 MIN: CPT | Performed by: ANESTHESIOLOGY

## 2024-11-20 PROCEDURE — 1111F DSCHRG MED/CURRENT MED MERGE: CPT | Performed by: ANESTHESIOLOGY

## 2024-11-20 PROCEDURE — 1125F AMNT PAIN NOTED PAIN PRSNT: CPT | Performed by: ANESTHESIOLOGY

## 2024-11-20 PROCEDURE — 1157F ADVNC CARE PLAN IN RCRD: CPT | Performed by: ANESTHESIOLOGY

## 2024-11-20 PROCEDURE — 3078F DIAST BP <80 MM HG: CPT | Performed by: ANESTHESIOLOGY

## 2024-11-20 PROCEDURE — 3074F SYST BP LT 130 MM HG: CPT | Performed by: ANESTHESIOLOGY

## 2024-11-20 PROCEDURE — 1159F MED LIST DOCD IN RCRD: CPT | Performed by: ANESTHESIOLOGY

## 2024-11-20 PROCEDURE — 99417 PROLNG OP E/M EACH 15 MIN: CPT | Performed by: ANESTHESIOLOGY

## 2024-11-20 RX ORDER — LIDOCAINE 50 MG/G
1 PATCH TOPICAL DAILY
Qty: 30 PATCH | Refills: 2 | Status: SHIPPED | OUTPATIENT
Start: 2024-11-20

## 2024-11-20 RX ORDER — ACETAMINOPHEN AND CODEINE PHOSPHATE 300; 30 MG/1; MG/1
1 TABLET ORAL EVERY 6 HOURS PRN
Qty: 20 TABLET | Refills: 0 | Status: SHIPPED | OUTPATIENT
Start: 2024-11-20 | End: 2024-11-27

## 2024-11-20 ASSESSMENT — PAIN SCALES - GENERAL: PAINLEVEL_OUTOF10: 5

## 2024-11-20 ASSESSMENT — PAIN - FUNCTIONAL ASSESSMENT: PAIN_FUNCTIONAL_ASSESSMENT: 0-10

## 2024-11-20 ASSESSMENT — PATIENT HEALTH QUESTIONNAIRE - PHQ9
1. LITTLE INTEREST OR PLEASURE IN DOING THINGS: NOT AT ALL
2. FEELING DOWN, DEPRESSED OR HOPELESS: NOT AT ALL
SUM OF ALL RESPONSES TO PHQ9 QUESTIONS 1 & 2: 0

## 2024-11-20 ASSESSMENT — ENCOUNTER SYMPTOMS: BACK PAIN: 1

## 2024-11-20 NOTE — TELEPHONE ENCOUNTER
BATSHEVA Maravilla - no return call from patient. Letter to patient's home in attempt to contact.

## 2024-11-20 NOTE — PROGRESS NOTES
The patient is an 89-year-old female with low back and bilateral leg pain.  The patient was scheduled for a caudal epidural steroid injection last month.  Unfortunately, the patient had a fall and was hospitalized.  The patient would like to go forward with the injection now that she has been discharged.    Review of Systems   Musculoskeletal:  Positive for back pain and gait problem.   All other systems reviewed and are negative.    GENERAL: alert and appropriate, in no distress, well-hydrated, well-nourished and happy, smiling, interactive  SKIN: no rash noted  RESPIRATORY: breathing non-labored and no grunting/flaring/retractions  CHEST: equal chest rise with normal respiratory effort  ABDOMEN: soft and non-tender  BACK: back normal in appearance, spine with reduced ROM  EXTREMITIES: strength intact  NEUROLOGIC: gait antalgic, SLR negative, sensation grossly intact.    Assessment and Plan    -Chronicity--chronic spinal pain    -Diagnostics--no new imaging ordered    -Pharmacologic--the patient may have a short course of acetaminophen with codeine for acute on chronic pain.  The [facility] was reviewed and was appropriate.  She may also have a prescription for lidocaine patches.    -Psychologic--no need for psychologic intervention from my standpoint.  There are no mental health issues of which I am aware that are contributing to the patient's pain.  There are no substance abuse or alcohol abuse issues of which I am aware that are contributing to the patient's pain.    -Physical--we discussed the importance of physical therapy and exercise.  We discussed avoidance and modification techniques.    -Intervention--the patient is a candidate for a caudal epidural steroid injection.  I explained the risks benefits and alternatives of the procedure to the patient.  The patient wishes to proceed.    I spent time educating the patient on the condition including the treatment and the prognosis.  I invited the patient to call  at anytime with any questions.

## 2024-11-21 ENCOUNTER — APPOINTMENT (OUTPATIENT)
Dept: CARE COORDINATION | Age: 89
End: 2024-11-21
Payer: COMMERCIAL

## 2024-11-22 ENCOUNTER — PATIENT OUTREACH (OUTPATIENT)
Dept: CASE MANAGEMENT | Facility: HOSPITAL | Age: 89
End: 2024-11-22
Payer: COMMERCIAL

## 2024-11-22 NOTE — PROGRESS NOTES
Heart Failure Nurse Navigator Transition of Care Phone Call     The role of the HF nurse navigator is to (1) characterize risk profiles of patients with heart failure transitioning from ynygkytp-am-poeattdrg after hospitalization, (2) recommend interventions to improve care and reduce risks of worse post-hospitalization outcomes.      Assessment  Call out to patient . No answer, unable to leave a msg.   Medications  Is the patient prescribed the following medications?  ACEi/ARB/ARNi? Yes losartan 100 mg qd  BB? Yes carvedilol 25 mg BID  MRA? No  SGLT2i? No  Diuretic? Yes lasix 40 mg qd        Snehal HERNANDEZ RN  [facility] Clinical Nurse Navigator, CHF  [phone]

## 2024-11-25 ENCOUNTER — APPOINTMENT (OUTPATIENT)
Dept: CARDIOLOGY | Facility: CLINIC | Age: 89
End: 2024-11-25
Payer: COMMERCIAL

## 2024-11-26 ENCOUNTER — TELEPHONE (OUTPATIENT)
Dept: PRIMARY CARE | Facility: CLINIC | Age: 89
End: 2024-11-26
Payer: COMMERCIAL

## 2024-11-26 DIAGNOSIS — R30.0 DYSURIA: Primary | ICD-10-CM

## 2024-11-26 NOTE — TELEPHONE ENCOUNTER
Pt states she has burning while urinating, x 1 day.  Please advice    [phone]    [facility] [place] [address]

## 2024-12-02 ENCOUNTER — PATIENT OUTREACH (OUTPATIENT)
Dept: CASE MANAGEMENT | Facility: HOSPITAL | Age: 89
End: 2024-12-02
Payer: COMMERCIAL

## 2024-12-02 NOTE — PROGRESS NOTES
Heart Failure Nurse Navigator Transition of Care Phone Call    The role of the HF nurse navigator is to (1) characterize risk profiles of patients with heart failure transitioning from nxplnlvx-gq-ipzhjmazv after hospitalization, (2) recommend interventions to improve care and reduce risks of worse post-hospitalization outcomes.     Assessment  Call out to patient .     HF Symptoms  Chest pain? No  Shortness of breath? none  Orthopnea? No  Paroxysmal nocturnal dyspnea? No  Edema? No  Weight gain >2lbs in 3 days or 5lbs in 1 week? No    Medications  Is the patient prescribed the following medications?  ACEi/ARB/ARNi? Yes  BB? Yes  MRA? No  SGLT2i? No  Diuretic? Yes  Is the patient adherent to prescribed medications? YES    Management    Is the patient obtaining daily weights? YES  Is the patient following diet limitations (2-3g Na, fluid restriction)? YES  Does the patient have a  cardiology follow-up scheduled? YES  If yes, appointment details? Appt was rescheduled per office from 12/3 to 1/28/25 with Dr. Callaway  If no, what is the reason?   Does the patient require HF education reinforcement? No  If yes, what was discussed?     Recommendations/Comments:  Pt was DC from [facility] on 11/4/24. She states she is doing well. Has [facility] and [facility] PT[facility] coming in. I am closing her case at this time.      Snehal HERNANDEZ RN  [facility] Clinical Nurse Navigator, CHF  [phone]

## 2024-12-03 ENCOUNTER — APPOINTMENT (OUTPATIENT)
Dept: CARDIOLOGY | Facility: CLINIC | Age: 89
End: 2024-12-03
Payer: COMMERCIAL

## 2024-12-10 ENCOUNTER — TELEPHONE (OUTPATIENT)
Dept: PAIN MEDICINE | Facility: CLINIC | Age: 89
End: 2024-12-10
Payer: COMMERCIAL

## 2024-12-11 ENCOUNTER — TELEPHONE (OUTPATIENT)
Dept: CARDIOLOGY | Facility: CLINIC | Age: 89
End: 2024-12-11
Payer: COMMERCIAL

## 2024-12-11 NOTE — TELEPHONE ENCOUNTER
Is patient cleared to hold Eliquis for 3 days for interventional pain management procedure along with her Plavix for 7 days for caudal epidural steroid injection with Dr. Campos Karimi?    Fax:[phone]

## 2024-12-11 NOTE — TELEPHONE ENCOUNTER
Pt. Is not cleared to hold Plavix for 7 days only 5 and Eliquis for 2 days not 3.  She is a moderate to high risk for stroke while holding Plavix or Eliquis per Dr. Pantoja, cardiology.  Faxed and scanned into chart.

## 2024-12-12 NOTE — TELEPHONE ENCOUNTER
Do you want her to hold the eliquis for 48 hours and the plavix for 5 days?     Looks like Kit okay'd these lengths.

## 2024-12-23 DIAGNOSIS — I10 ESSENTIAL (PRIMARY) HYPERTENSION: ICD-10-CM

## 2024-12-23 DIAGNOSIS — I10 ESSENTIAL HYPERTENSION: ICD-10-CM

## 2024-12-23 RX ORDER — POTASSIUM CHLORIDE 20 MEQ/1
20 TABLET, EXTENDED RELEASE ORAL DAILY
Qty: 90 TABLET | Refills: 2 | OUTPATIENT
Start: 2024-12-23

## 2024-12-23 RX ORDER — POTASSIUM CHLORIDE 1500 MG/1
20 TABLET, EXTENDED RELEASE ORAL DAILY
Qty: 90 TABLET | Refills: 2 | OUTPATIENT
Start: 2024-12-23

## 2024-12-26 ENCOUNTER — TELEPHONE (OUTPATIENT)
Dept: PAIN MEDICINE | Facility: CLINIC | Age: 89
End: 2024-12-26

## 2024-12-26 ENCOUNTER — LAB (OUTPATIENT)
Dept: LAB | Facility: LAB | Age: 89
End: 2024-12-26
Payer: COMMERCIAL

## 2024-12-26 DIAGNOSIS — R30.0 DYSURIA: ICD-10-CM

## 2024-12-26 LAB
APPEARANCE UR: ABNORMAL
BACTERIA #/AREA URNS AUTO: ABNORMAL /HPF
BILIRUB UR STRIP.AUTO-MCNC: NEGATIVE MG/DL
COLOR UR: ABNORMAL
GLUCOSE UR STRIP.AUTO-MCNC: NORMAL MG/DL
HYALINE CASTS #/AREA URNS AUTO: ABNORMAL /LPF
KETONES UR STRIP.AUTO-MCNC: NEGATIVE MG/DL
LEUKOCYTE ESTERASE UR QL STRIP.AUTO: ABNORMAL
NITRITE UR QL STRIP.AUTO: NEGATIVE
PH UR STRIP.AUTO: 7 [PH]
PROT UR STRIP.AUTO-MCNC: NEGATIVE MG/DL
RBC # UR STRIP.AUTO: NEGATIVE /UL
RBC #/AREA URNS AUTO: ABNORMAL /HPF
SP GR UR STRIP.AUTO: 1.01
SQUAMOUS #/AREA URNS AUTO: ABNORMAL /HPF
UROBILINOGEN UR STRIP.AUTO-MCNC: NORMAL MG/DL
WBC #/AREA URNS AUTO: ABNORMAL /HPF
WBC CLUMPS #/AREA URNS AUTO: ABNORMAL /HPF

## 2024-12-26 PROCEDURE — 81001 URINALYSIS AUTO W/SCOPE: CPT

## 2024-12-26 NOTE — TELEPHONE ENCOUNTER
Pt. Came to the office and stated that she was supposed to get a procedure scheduled. Pt. Notified that a call for scheduling was made on 12/20 and that pt. Will have to call the office on Monday to schedule procedure.

## 2024-12-27 ENCOUNTER — PATIENT OUTREACH (OUTPATIENT)
Dept: PRIMARY CARE | Facility: CLINIC | Age: 89
End: 2024-12-27
Payer: COMMERCIAL

## 2024-12-27 ENCOUNTER — TELEPHONE (OUTPATIENT)
Dept: PRIMARY CARE | Facility: CLINIC | Age: 89
End: 2024-12-27
Payer: COMMERCIAL

## 2024-12-27 DIAGNOSIS — N30.00 ACUTE CYSTITIS WITHOUT HEMATURIA: Primary | ICD-10-CM

## 2024-12-27 RX ORDER — CEPHALEXIN 500 MG/1
500 CAPSULE ORAL 3 TIMES DAILY
Qty: 21 CAPSULE | Refills: 0 | Status: SHIPPED | OUTPATIENT
Start: 2024-12-27 | End: 2025-01-03

## 2024-12-27 NOTE — PROGRESS NOTES
Patient has met target of no readmission for (90) days post (hospital, SNF, rehab) discharge and is graduated from Transitional Care Management program at this time.    Alexei Peace LPN

## 2024-12-27 NOTE — TELEPHONE ENCOUNTER
----- Message from Samuel Sky sent at 12/27/2024  3:59 PM EST -----  UA is positive for UTI.  Prescription sent for antibiotics to the pharmacy.

## 2025-01-07 NOTE — TELEPHONE ENCOUNTER
Phone call to the pt to schedule Caudal. VM left asking that pt call the office to schedule, contact information given. (Eliquis hold x3 days, no Plavix hold- per Dr. Pantoja, ok per PJM)

## 2025-01-10 NOTE — TELEPHONE ENCOUNTER
Alana-     Patient is scheduled for Caudal PRESTON with Dr. Stevenson at [facility] on 1/21/25 at 11:15 am.     Per Dr. Stevenson she can continue the plavix and have this procedure without holding it. (Clearance said ok to hold x 5 days but we are not holding)     I did advise her that her last dose of the Eliquis would be 1-18. (2 days per coag clearance) She voiced understanding.     Can you call her and remind her next week please?     Thanks!

## 2025-01-15 ENCOUNTER — TELEPHONE (OUTPATIENT)
Dept: PAIN MEDICINE | Facility: CLINIC | Age: OVER 89
End: 2025-01-15
Payer: MEDICARE

## 2025-01-15 NOTE — TELEPHONE ENCOUNTER
I called Rakan to remind her not to take any Ellquis after the 18th of January as a part of the prep for her upcoming surgery on 1/21/25.  I left a message as Rakan was not available.

## 2025-01-21 ENCOUNTER — HOSPITAL ENCOUNTER (OUTPATIENT)
Dept: OPERATING ROOM | Facility: HOSPITAL | Age: OVER 89
Discharge: HOME | End: 2025-01-21
Payer: MEDICARE

## 2025-01-21 VITALS
OXYGEN SATURATION: 99 % | HEART RATE: 74 BPM | DIASTOLIC BLOOD PRESSURE: 57 MMHG | BODY MASS INDEX: 22.58 KG/M2 | WEIGHT: 112 LBS | HEIGHT: 59 IN | SYSTOLIC BLOOD PRESSURE: 167 MMHG | TEMPERATURE: 98.1 F | RESPIRATION RATE: 16 BRPM

## 2025-01-21 DIAGNOSIS — M96.1 POSTLAMINECTOMY SYNDROME, LUMBAR REGION: ICD-10-CM

## 2025-01-21 PROCEDURE — 62323 NJX INTERLAMINAR LMBR/SAC: CPT | Performed by: ANESTHESIOLOGY

## 2025-01-21 PROCEDURE — 2550000001 HC RX 255 CONTRASTS: Performed by: ANESTHESIOLOGY

## 2025-01-21 PROCEDURE — 3600000006 HC OR TIME - EACH INCREMENTAL 1 MINUTE - PROCEDURE LEVEL ONE

## 2025-01-21 PROCEDURE — 2500000005 HC RX 250 GENERAL PHARMACY W/O HCPCS: Performed by: ANESTHESIOLOGY

## 2025-01-21 PROCEDURE — 2500000004 HC RX 250 GENERAL PHARMACY W/ HCPCS (ALT 636 FOR OP/ED): Performed by: ANESTHESIOLOGY

## 2025-01-21 PROCEDURE — 7100000010 HC PHASE TWO TIME - EACH INCREMENTAL 1 MINUTE

## 2025-01-21 PROCEDURE — 3600000001 HC OR TIME - INITIAL BASE CHARGE - PROCEDURE LEVEL ONE

## 2025-01-21 PROCEDURE — 7100000009 HC PHASE TWO TIME - INITIAL BASE CHARGE

## 2025-01-21 RX ORDER — SODIUM CHLORIDE 9 MG/ML
INJECTION, SOLUTION INTRAMUSCULAR; INTRAVENOUS; SUBCUTANEOUS AS NEEDED
Status: COMPLETED | OUTPATIENT
Start: 2025-01-21 | End: 2025-01-21

## 2025-01-21 RX ORDER — LIDOCAINE HYDROCHLORIDE 5 MG/ML
INJECTION, SOLUTION INFILTRATION; INTRAVENOUS AS NEEDED
Status: COMPLETED | OUTPATIENT
Start: 2025-01-21 | End: 2025-01-21

## 2025-01-21 RX ORDER — TRIAMCINOLONE ACETONIDE 40 MG/ML
INJECTION, SUSPENSION INTRA-ARTICULAR; INTRAMUSCULAR AS NEEDED
Status: COMPLETED | OUTPATIENT
Start: 2025-01-21 | End: 2025-01-21

## 2025-01-21 RX ADMIN — LIDOCAINE HYDROCHLORIDE 10 ML: 5 INJECTION, SOLUTION INFILTRATION at 11:58

## 2025-01-21 RX ADMIN — SODIUM CHLORIDE 4 ML: 9 INJECTION INTRAMUSCULAR; INTRAVENOUS; SUBCUTANEOUS at 11:58

## 2025-01-21 RX ADMIN — TRIAMCINOLONE ACETONIDE 60 MG: 40 INJECTION, SUSPENSION INTRA-ARTICULAR; INTRAMUSCULAR at 11:58

## 2025-01-21 RX ADMIN — IOHEXOL 1 ML: 240 INJECTION, SOLUTION INTRATHECAL; INTRAVASCULAR; INTRAVENOUS; ORAL at 11:58

## 2025-01-21 ASSESSMENT — ENCOUNTER SYMPTOMS
DEPRESSION: 0
OCCASIONAL FEELINGS OF UNSTEADINESS: 1
LOSS OF SENSATION IN FEET: 1

## 2025-01-21 ASSESSMENT — COLUMBIA-SUICIDE SEVERITY RATING SCALE - C-SSRS
6. HAVE YOU EVER DONE ANYTHING, STARTED TO DO ANYTHING, OR PREPARED TO DO ANYTHING TO END YOUR LIFE?: NO
1. IN THE PAST MONTH, HAVE YOU WISHED YOU WERE DEAD OR WISHED YOU COULD GO TO SLEEP AND NOT WAKE UP?: NO
2. HAVE YOU ACTUALLY HAD ANY THOUGHTS OF KILLING YOURSELF?: NO

## 2025-01-21 ASSESSMENT — PAIN DESCRIPTION - DESCRIPTORS: DESCRIPTORS: SHARP

## 2025-01-21 ASSESSMENT — PAIN SCALES - GENERAL
PAINLEVEL_OUTOF10: 0 - NO PAIN
PAINLEVEL_OUTOF10: 7

## 2025-01-21 ASSESSMENT — PAIN - FUNCTIONAL ASSESSMENT
PAIN_FUNCTIONAL_ASSESSMENT: 0-10
PAIN_FUNCTIONAL_ASSESSMENT: 0-10

## 2025-01-21 NOTE — H&P
History Of Present Illness  Rakan Cervantes is a 89 y.o. female presenting with back and bilateral leg pain.     Past Medical History  Past Medical History:   Diagnosis Date    Acute non-ST segment elevation myocardial infarction (Multi) 11/26/2023    Anemia     Arteriosclerosis of coronary artery 05/14/2023    Arthritis     Asthma     Atrial fibrillation (Multi) 11/26/2023    Jonas esophagus     Bilateral carotid artery occlusion 05/23/2023    CAD (coronary artery disease)     Cardiac rhythm disorder or disturbance or change 11/26/2023    Cervical radiculopathy     Chest pain 11/26/2023    Chronic obstructive pulmonary disease (Multi) 11/26/2023    Constipation     Coronary artery disease 11/26/2023    Degenerative joint disease     Dyslipidemia     Dysuria     Erosive esophagitis     GERD (gastroesophageal reflux disease)     Hypercholesterolemia 05/23/2023    Hyperparathyroidism (Multi)     Hypertensive urgency 11/26/2023    Impaired fasting glucose 11/26/2023    Irritable bowel syndrome (IBS)     Labile essential hypertension 11/26/2023    Labile hypertension     Left foot pain     Low blood pressure 11/26/2023    Macular degeneration     Mixed hyperlipidemia 11/26/2023    Neck pain     Osteoporosis 11/26/2023    REclast '19 Cr 1.4 switch to calcitonin.    Paroxysmal atrial fibrillation (Multi)     Polyarthritis with negative rheumatoid factor (Multi)     Post menopausal syndrome     Rapid atrial fibrillation (Multi) 05/14/2023    Spinal stenosis     Stage 3 chronic kidney disease (Multi) 11/26/2023    Stroke (Multi)     Transient ischemic attack 11/26/2023       Surgical History  Past Surgical History:   Procedure Laterality Date    CARDIAC CATHETERIZATION Left 9/27/2024    Procedure: Left Heart Cath;  Surgeon: Nessa Callaway MD;  Location: [facility] Cardiac Cath Lab;  Service: Cardiovascular;  Laterality: Left;    CARDIAC CATHETERIZATION N/A 9/27/2024    Procedure: PCI;  Surgeon: Nessa Callaway MD;  Location: [facility]  Cardiac Cath Lab;  Service: Cardiovascular;  Laterality: N/A;    CARDIOVASCULAR STRESS TEST  2017    Dr. Khalil    CARDIOVASCULAR STRESS TEST  2004    Dr. Pantoja    CARPAL TUNNEL RELEASE Left 2014    CATARACT EXTRACTION Bilateral 2011    CHOLECYSTECTOMY      CORONARY STENT PLACEMENT  05/2023    LAD    CT HEAD ANGIO W AND WO IV CONTRAST  05/08/2013    CT HEAD ANGIO W AND WO IV CONTRAST [facility]    MR HEAD ANGIO WO IV CONTRAST  04/23/2013    MR HEAD ANGIO WO IV CONTRAST [facility]    MR HEAD ANGIO WO IV CONTRAST  07/20/2016    MR HEAD ANGIO WO IV CONTRAST [facility]    MR HEAD ANGIO WO IV CONTRAST  10/27/2023    MR HEAD ANGIO WO IV CONTRAST 10/27/2023 [facility]    MR NECK ANGIO WO IV CONTRAST  10/27/2023    MR NECK ANGIO WO IV CONTRAST 10/27/2023 [facility]    OTHER SURGICAL HISTORY  02/14/2022    No history of surgery    REVISION TOTAL HIP ARTHROPLASTY Left 2013    Conversion from left ORIF    TOTAL HIP ARTHROPLASTY Right 2016        Social History  She reports that she has never smoked. She has never been exposed to tobacco smoke. She has never used smokeless tobacco. She reports that she does not currently use alcohol. She reports that she does not use drugs.    Family History  Family History   Problem Relation Name Age of Onset    No Known Problems Mother      No Known Problems Father      No Known Problems Sister          Allergies  Meperidine, Morphine, Opioids - morphine analogues, Pregabalin, and Tramadol    Review of Systems     Physical Exam     Last Recorded Vitals  There were no vitals taken for this visit.    Relevant Results             Assessment/Plan   Assessment & Plan  Postlaminectomy syndrome, lumbar region      Caudal epidural steroid injection       I spent 10 minutes in the professional and overall care of this patient.      Campos Stevenson MD

## 2025-01-21 NOTE — DISCHARGE INSTRUCTIONS
You underwent a procedure today    After most procedures, it is recommended that you relax and limit your activity for the remainder of the day unless you have been told otherwise by your pain physician.  You should not drive a car, operate machinery, or make important legal decisions unless otherwise directed by your pain physician.  You may resume your normal activity, including exercise, tomorrow.      Keep a written pain diary of how much pain relief you experienced following the procedure and the length of time of pain relief you experienced pain relief. Following diagnostic injections like medial branch nerve blocks, sacroiliac joint blocks, stellate ganglion injections and other blocks, it is very important you record the specific amount of pain relief you experienced immediately after the injectionand how long it lasted. Your doctor will ask you for this information at your follow up visit.     For all injections, please keep the injection site dry and inspect the site for a couple of days. You may remove the Band-Aid the day of the injection at any time.     Some discomfort, bruising or slight swelling may occur at the injection site. This is not abnormal if it occurs.  If needed you may:    -Take over the counter medication such as Tylenol or Motrin.   -Apply an ice pack for 30 minutes, 2 to 3 times a day for the first 24 hours.     You may shower today; no soaking baths, hot tubs, whirlpools or swimming pools for two days.      If you are given steroids in your injection, it may take 3-5 days for the steroid medication to take effect. You may notice a worsening of your symptoms for 1-2 days after the injection. This is not abnormal.  You may use acetaminophen, ibuprofen, or prescription medication that your doctor may have prescribed for you if you need to do so.     A few common side effects of steroids include facial flushing, sweating, restlessness, irritability,difficulty sleeping, increase in blood  sugar, and increased blood pressure. If you have diabetes, please monitor your blood sugar at least once a day for at least 5 days. If you have poorly controlled high blood pressure, monitoryour blood pressure for at least 2 days and contact your primary care physician if these numbers are unusually high for you.      If you take aspirin or non-steroidal anti-inflammatory drugs (examples are Motrin, Advil, ibuprofen, Naprosyn, Voltaren, Relafen, etc.) you may restart these this evening.    You do not need to discontinue non-aspirin-containing pain medications prior to an injection (examples: Celebrex, tramadol, hydrocodone and acetaminophen).      If you take a blood thinning medication (Coumadin, Lovenox, Fragmin,Ticlid, Plavix, Pradaxa, etc.), please discuss this with your primary care physician/cardiologist and your pain physician. These medications MUST be discontinued before you can have an injection safely, without the risk of uncontrolled bleeding. If these medications are not discontinued for an appropriate period of time, you will not be able to receivean injection.      If you are taking Coumadin, please have your INR checked the morning of your procedure and bringthe result to your appointment unless otherwise instructed. If your INR is over 1.2, your injection may need to be rescheduled to avoid uncontrolled bleeding from the needle placement.     Call [facility] at [phone] between 8am-4pm Monday - Friday if you are experiencing the following:    If you received an epidural or spinal injection:    -Headache that doesnot go away with medicine, is worse when sitting or standing up, and is greatly relieved upon lying down.   -Severe pain worse than or different than your baseline pain.   -Chills or fever (101º F or greater).   -Drainage or signs of infection at the injection site     Go directly to the Emergency Department if you are experiencing the following and received an  epidural or spinal injection:   -Abrupt weakness or progressive weakness in your legs that starts after you leave the clinic.   -Abrupt severe or worsening numbness in your legs.   -Inability to urinate after the injection or loss of bowel or bladder control without the urge to defecate or urinate.     If you have a clinical question that cannot wait until your next appointment, please call [phone] between 8am-4pm Monday - Friday or send a [employer] message. We do our best to return all non-emergency messages within 24 hours, Monday - Friday. A nurse or physician will return your message.      If you need to cancel an appointment, please call the scheduling staff at [phone] during normal business hours or leave a message at least 24 hours in advance.     If you are going to be sedated for your next procedure, you MUST have responsible adult who can legally drive accompany you home. You cannot eat or drink for eight hours prior to the planned procedure if you are going to receive sedation. You may take your non-blood thinning medications with a small sip of water.

## 2025-01-26 ENCOUNTER — OFFICE VISIT (OUTPATIENT)
Dept: URGENT CARE | Age: OVER 89
End: 2025-01-26
Payer: MEDICARE

## 2025-01-26 VITALS
HEART RATE: 72 BPM | RESPIRATION RATE: 20 BRPM | TEMPERATURE: 97.3 F | DIASTOLIC BLOOD PRESSURE: 60 MMHG | SYSTOLIC BLOOD PRESSURE: 132 MMHG

## 2025-01-26 DIAGNOSIS — N30.00 ACUTE CYSTITIS WITHOUT HEMATURIA: Primary | ICD-10-CM

## 2025-01-26 LAB
POC APPEARANCE, URINE: ABNORMAL
POC BILIRUBIN, URINE: NEGATIVE
POC BLOOD, URINE: ABNORMAL
POC COLOR, URINE: YELLOW
POC GLUCOSE, URINE: NEGATIVE MG/DL
POC KETONES, URINE: NEGATIVE MG/DL
POC LEUKOCYTES, URINE: ABNORMAL
POC NITRITE,URINE: NEGATIVE
POC PH, URINE: 7.5 PH
POC PROTEIN, URINE: ABNORMAL MG/DL
POC SPECIFIC GRAVITY, URINE: 1.01
POC UROBILINOGEN, URINE: 0.2 EU/DL

## 2025-01-26 RX ORDER — NITROFURANTOIN 25; 75 MG/1; MG/1
100 CAPSULE ORAL 2 TIMES DAILY
Qty: 14 CAPSULE | Refills: 0 | Status: SHIPPED | OUTPATIENT
Start: 2025-01-26 | End: 2025-02-02

## 2025-01-26 ASSESSMENT — PAIN SCALES - GENERAL: PAINLEVEL_OUTOF10: 7

## 2025-01-26 NOTE — PROGRESS NOTES
Chief Complaint   Patient presents with    Difficulty Urinating     Frequency and burning with urination. Started on  Friday.       Physical Exam:     Abdomen is soft, non-tender, and non-distended. Mild suprapubic tenderness. No CVA tenderness.     POC Labs:     Office Visit on 01/26/2025   Component Date Value Ref Range Status    POC Color, Urine 01/26/2025 Yellow  Straw, Yellow, Light-Yellow Final    POC Appearance, Urine 01/26/2025 Cloudy (A)  Clear Final    POC Glucose, Urine 01/26/2025 NEGATIVE  NEGATIVE mg/dl Final    POC Bilirubin, Urine 01/26/2025 NEGATIVE  NEGATIVE Final    POC Ketones, Urine 01/26/2025 NEGATIVE  NEGATIVE mg/dl Final    POC Specific Gravity, Urine 01/26/2025 1.015  1.005 - 1.035 Final    POC Blood, Urine 01/26/2025 LARGE (3+) (A)  NEGATIVE Final    POC PH, Urine 01/26/2025 7.5  No Reference Range Established PH Final    POC Protein, Urine 01/26/2025 30 (1+) (A)  NEGATIVE mg/dl Final    POC Urobilinogen, Urine 01/26/2025 0.2  0.2, 1.0 EU/DL Final    Poc Nitrite, Urine 01/26/2025 NEGATIVE  NEGATIVE Final    POC Leukocytes, Urine 01/26/2025 SMALL (1+) (A)  NEGATIVE Final            Encounter Diagnosis   Name Primary?    Acute cystitis without hematuria Yes            Medical Decision Making & Plan:       Macrobid    Specimen contaminated unable to send for culture          01/26/25 at 1:16 PM - Sabi Valenzuela, DO

## 2025-01-28 ENCOUNTER — APPOINTMENT (OUTPATIENT)
Dept: CARDIOLOGY | Facility: CLINIC | Age: OVER 89
End: 2025-01-28
Payer: COMMERCIAL

## 2025-01-28 VITALS
BODY MASS INDEX: 22.38 KG/M2 | HEIGHT: 59 IN | TEMPERATURE: 98.6 F | WEIGHT: 111 LBS | SYSTOLIC BLOOD PRESSURE: 130 MMHG | HEART RATE: 72 BPM | RESPIRATION RATE: 16 BRPM | DIASTOLIC BLOOD PRESSURE: 78 MMHG | OXYGEN SATURATION: 97 %

## 2025-01-28 DIAGNOSIS — I16.0 HYPERTENSIVE URGENCY: ICD-10-CM

## 2025-01-28 DIAGNOSIS — I65.23 BILATERAL CAROTID ARTERY OCCLUSION: ICD-10-CM

## 2025-01-28 DIAGNOSIS — I25.10 ARTERIOSCLEROSIS OF CORONARY ARTERY: ICD-10-CM

## 2025-01-28 DIAGNOSIS — I10 ESSENTIAL HYPERTENSION: ICD-10-CM

## 2025-01-28 DIAGNOSIS — I21.4 ACUTE NON-ST SEGMENT ELEVATION MYOCARDIAL INFARCTION (MULTI): Primary | ICD-10-CM

## 2025-01-28 DIAGNOSIS — I10 LABILE ESSENTIAL HYPERTENSION: ICD-10-CM

## 2025-01-28 DIAGNOSIS — E78.00 HYPERCHOLESTEROLEMIA: ICD-10-CM

## 2025-01-28 DIAGNOSIS — I47.29 OTHER VENTRICULAR TACHYCARDIA: ICD-10-CM

## 2025-01-28 PROCEDURE — 3078F DIAST BP <80 MM HG: CPT | Performed by: INTERNAL MEDICINE

## 2025-01-28 PROCEDURE — 99214 OFFICE O/P EST MOD 30 MIN: CPT | Performed by: INTERNAL MEDICINE

## 2025-01-28 PROCEDURE — 1159F MED LIST DOCD IN RCRD: CPT | Performed by: INTERNAL MEDICINE

## 2025-01-28 PROCEDURE — 1160F RVW MEDS BY RX/DR IN RCRD: CPT | Performed by: INTERNAL MEDICINE

## 2025-01-28 PROCEDURE — 1126F AMNT PAIN NOTED NONE PRSNT: CPT | Performed by: INTERNAL MEDICINE

## 2025-01-28 PROCEDURE — 1157F ADVNC CARE PLAN IN RCRD: CPT | Performed by: INTERNAL MEDICINE

## 2025-01-28 PROCEDURE — 3075F SYST BP GE 130 - 139MM HG: CPT | Performed by: INTERNAL MEDICINE

## 2025-01-28 ASSESSMENT — PATIENT HEALTH QUESTIONNAIRE - PHQ9
2. FEELING DOWN, DEPRESSED OR HOPELESS: NOT AT ALL
SUM OF ALL RESPONSES TO PHQ9 QUESTIONS 1 AND 2: 0
1. LITTLE INTEREST OR PLEASURE IN DOING THINGS: NOT AT ALL

## 2025-01-28 ASSESSMENT — LIFESTYLE VARIABLES
AUDIT-C TOTAL SCORE: 1
AUDIT TOTAL SCORE: 1
HOW MANY STANDARD DRINKS CONTAINING ALCOHOL DO YOU HAVE ON A TYPICAL DAY: 1 OR 2
HOW OFTEN DO YOU HAVE A DRINK CONTAINING ALCOHOL: MONTHLY OR LESS
HOW OFTEN DO YOU HAVE SIX OR MORE DRINKS ON ONE OCCASION: NEVER
SKIP TO QUESTIONS 9-10: 1
HAS A RELATIVE, FRIEND, DOCTOR, OR ANOTHER HEALTH PROFESSIONAL EXPRESSED CONCERN ABOUT YOUR DRINKING OR SUGGESTED YOU CUT DOWN: NO
HAVE YOU OR SOMEONE ELSE BEEN INJURED AS A RESULT OF YOUR DRINKING: NO

## 2025-01-28 ASSESSMENT — ENCOUNTER SYMPTOMS
LOSS OF SENSATION IN FEET: 0
OCCASIONAL FEELINGS OF UNSTEADINESS: 1
DEPRESSION: 0

## 2025-01-28 ASSESSMENT — PAIN SCALES - GENERAL: PAINLEVEL_OUTOF10: 0-NO PAIN

## 2025-01-28 NOTE — PROGRESS NOTES
History of present illness:  This is a very pleasant 89-year-old female with history of TIA, questionable atrial fibrillation on Eliquis, coronary disease status post PCI to distal left dominant circumflex in September 2024.  Patient also has history of SVT.  Patient returns to my office for follow-up.  Feeling overall good.  Nuys any chest pain shortness of breath palpitations lightheadedness or syncope.    Past Medical History:   Diagnosis Date    Acute non-ST segment elevation myocardial infarction (Multi) 11/26/2023    Anemia     Arteriosclerosis of coronary artery 05/14/2023    Arthritis     Asthma     Atrial fibrillation (Multi) 11/26/2023    Jonas esophagus     Bilateral carotid artery occlusion 05/23/2023    CAD (coronary artery disease)     Cardiac rhythm disorder or disturbance or change 11/26/2023    Cervical radiculopathy     Chest pain 11/26/2023    Chronic obstructive pulmonary disease (Multi) 11/26/2023    Constipation     Coronary artery disease 11/26/2023    Degenerative joint disease     Dyslipidemia     Dysuria     Erosive esophagitis     GERD (gastroesophageal reflux disease)     Hypercholesterolemia 05/23/2023    Hyperparathyroidism (Multi)     Hypertensive urgency 11/26/2023    Impaired fasting glucose 11/26/2023    Irritable bowel syndrome (IBS)     Labile essential hypertension 11/26/2023    Labile hypertension     Left foot pain     Low blood pressure 11/26/2023    Macular degeneration     Mixed hyperlipidemia 11/26/2023    Neck pain     Osteoporosis 11/26/2023    REclast '19 Cr 1.4 switch to calcitonin.    Paroxysmal atrial fibrillation (Multi)     Polyarthritis with negative rheumatoid factor (Multi)     Post menopausal syndrome     Rapid atrial fibrillation (Multi) 05/14/2023    Spinal stenosis     Stage 3 chronic kidney disease (Multi) 11/26/2023    Stroke (Multi)     Transient ischemic attack 11/26/2023       Past Surgical History:   Procedure Laterality Date    CARDIAC  Patient brought to [facility] for MANJINDER, allergies reviewed, consent signed. Patient received Versed 2 mg for sedation. Patient tolerated procedure well. "CATHETERIZATION Left 9/27/2024    Procedure: Left Heart Cath;  Surgeon: Nessa Callaway MD;  Location: [facility];  Service: Cardiovascular;  Laterality: Left;    CARDIAC CATHETERIZATION N/A 9/27/2024    Procedure: PCI;  Surgeon: Nessa Callaway MD;  Location: [facility];  Service: Cardiovascular;  Laterality: N/A;    CARDIOVASCULAR STRESS TEST  2017    Dr. Khalil    CARDIOVASCULAR STRESS TEST  2004    Dr. Pantoja    CARPAL TUNNEL RELEASE Left 2014    CATARACT EXTRACTION Bilateral 2011    CHOLECYSTECTOMY      CORONARY STENT PLACEMENT  05/2023    LAD    CT HEAD ANGIO W AND WO IV CONTRAST  05/08/2013    CT HEAD ANGIO W AND WO IV CONTRAST [facility]    MR HEAD ANGIO WO IV CONTRAST  04/23/2013    MR HEAD ANGIO WO IV CONTRAST [facility]    MR HEAD ANGIO WO IV CONTRAST  07/20/2016    MR HEAD ANGIO WO IV CONTRAST [facility]    MR HEAD ANGIO WO IV CONTRAST  10/27/2023    MR HEAD ANGIO WO IV CONTRAST 10/27/2023 [facility]    MR NECK ANGIO WO IV CONTRAST  10/27/2023    MR NECK ANGIO WO IV CONTRAST 10/27/2023 [facility]    OTHER SURGICAL HISTORY  02/14/2022    No history of surgery    REVISION TOTAL HIP ARTHROPLASTY Left 2013    Conversion from left ORIF    TOTAL HIP ARTHROPLASTY Right 2016       Allergies   Allergen Reactions    Meperidine Hallucinations     Other reaction(s): Mental Status Change    Morphine Hallucinations    Opioids - Morphine Analogues Hallucinations and Confusion    Pregabalin Rash, Other and Unknown     \"Flu like symptoms\"    Flu like symtoms    Tramadol Rash, Itching and Unknown        reports that she has never smoked. She has never been exposed to tobacco smoke. She has never used smokeless tobacco. She reports current alcohol use. She reports that she does not use drugs.    Family History   Problem Relation Name Age of Onset    No Known Problems Mother      No Known Problems Father      No Known Problems Sister         Patient's Medications   New Prescriptions "    No medications on file   Previous Medications    AMLODIPINE (NORVASC) 10 MG TABLET    Take 1 tablet (10 mg) by mouth once daily.    APIXABAN (ELIQUIS) 2.5 MG TABLET    Take 1 tablet (2.5 mg) by mouth 2 times a day.    ASCORBIC ACID (VITAMIN C) 100 MG TABLET    Take 1 tablet (100 mg) by mouth once daily.    ATORVASTATIN (LIPITOR) 80 MG TABLET    Take 1 tablet (80 mg) by mouth once daily at bedtime.    CARVEDILOL (COREG) 25 MG TABLET    Take 1 tablet (25 mg) by mouth 2 times daily (morning and late afternoon).    CHOLECALCIFEROL (VITAMIN D-3) 50 MCG (2,000 UNIT) CAPSULE    Take 1 capsule (50 mcg) by mouth early in the morning..    CLOPIDOGREL (PLAVIX) 75 MG TABLET    TAKE 1 TABLET BY MOUTH ONCE DAILY    DOCUSATE SODIUM (COLACE) 100 MG CAPSULE    TAKE 1 CAPSULE BY MOUTH ONCE DAILY AS NEEDED to prevent constipation from iron    DULOXETINE (CYMBALTA) 30 MG DR CAPSULE    TAKE 1 CAPSULE BY MOUTH ONCE DAILY at dinner    FERROUS SULFATE, 325 MG FERROUS SULFATE, TABLET    Take 1 tablet by mouth once daily with breakfast. M-W-F    FUROSEMIDE (LASIX) 40 MG TABLET    Take 1 tablet (40 mg) by mouth once daily.    GABAPENTIN (NEURONTIN) 300 MG CAPSULE    Take 1 capsule (300 mg) by mouth 2 times a day. Resume as needed    HYDROXYCHLOROQUINE (PLAQUENIL) 200 MG TABLET    Take 1 tablet (200 mg) by mouth once daily.    ISOSORBIDE MONONITRATE ER (IMDUR) 120 MG 24 HR TABLET    Take 1 tablet (120 mg) by mouth once daily. Do not crush or chew.    LIDOCAINE (LIDODERM) 5 % PATCH    Place 1 patch over 12 hours on the skin once daily. Remove & discard patch within 12 hours or as directed by MD.    LIDOCAINE 4 % PATCH    Place 3 patches over 12 hours on the skin once daily. Remove & discard patch within 12 hours or as directed by MD.    LIDOCAINE HCL 4 % PADS, MEDICATED    Place 3 patches over 12 hours on the skin once daily. Remove & discard patch within 12 hours or as directed by MD.    LOSARTAN (COZAAR) 100 MG TABLET    Take 1 tablet (100  mg) by mouth once daily.    MAGNESIUM OXIDE (MAG-OX) 400 MG (241.3 MG MAGNESIUM) TABLET    Take 1 tablet (400 mg) by mouth 2 times a day.    NITROFURANTOIN, MACROCRYSTAL-MONOHYDRATE, (MACROBID) 100 MG CAPSULE    Take 1 capsule (100 mg) by mouth 2 times a day for 7 days.    PANTOPRAZOLE (PROTONIX) 40 MG EC TABLET    TAKE 1 TABLET BY MOUTH TWICE DAILY    POTASSIUM CHLORIDE CR 20 MEQ ER TABLET    Take 1 tablet (20 mEq) by mouth once daily. Take with food.    RANOLAZINE (RANEXA) 500 MG 12 HR TABLET    Take 1 tablet (500 mg) by mouth 2 times a day. Do not crush, chew, or split.    SUCRALFATE (CARAFATE) 100 MG/ML SUSPENSION    Take 10 mL (1 g) by mouth every 6 hours.    SULFASALAZINE (AZULFIDINE) 500 MG TABLET    Take 1 tablet (500 mg) by mouth 2 times a day.    VERAPAMIL SR (CALAN-SR) 180 MG ER TABLET    Take 1 tablet (180 mg) by mouth once daily. Do not crush or chew.    VIT C/E/ZN/COPPR/LUTEIN/ZEAXAN (PRESERVISION AREDS-2 ORAL)    As directed orally   Modified Medications    No medications on file   Discontinued Medications    No medications on file       Objective   Physical Exam  General: Patient in no acute distress   HEENT: Atraumatic normocephalic.  Neck: Supple, jugular venous pressure within normal limit.  No bruits  Lungs: Clear to auscultation bilaterally  Cardiovascular: Regular rate and rhythm, normal heart sounds, no murmurs rubs or gallops  Abdomen: Soft nontender nondistended.  Normal bowel sounds.  Extremities: Warm to touch, no edema.    Assessment/Plan   Patient Active Problem List   Diagnosis    Cerebrovascular disease    Dizziness and giddiness    Difficulty using verbal communication    Slurred speech    At risk for bleeding    Abnormal gait    Cardiac rhythm disorder or disturbance or change    Acute lower urinary tract infection    Acute non-ST segment elevation myocardial infarction (Multi)    Anemia    Ankle pain    Anxiety    Weakness    Asthma    Rapid atrial fibrillation (Multi)    Cervical  radiculopathy    Chronic obstructive pulmonary disease (Multi)    Arteriosclerosis of coronary artery    Cough    Dehydration    Dysfunction of both eustachian tubes    Dyspnea    Dysuria    Erosive esophagitis    Failed back syndrome    Fall    Fever    Fracture of patella    Gastroesophageal reflux disease    History of cerebrovascular accident    Hypercalcemia    Hyperparathyroidism (Multi)    Hypertensive urgency    Hypokalemia    Hyponatremia    Impaired fasting glucose    Arthritis of left glenohumeral joint    Injury of knee    Injury of neck    Irritable bowel syndrome    Leucopenia    Macular degeneration    Infectious disease    Hypercholesterolemia    Muscle weakness    Neck pain    Osteoporosis    Pain in left foot    Pneumonia    Osteoarthritis    Rib pain    Bilateral hearing loss    Sensorineural hearing loss (SNHL) of both ears    Somnolence    Spinal stenosis    Chronic kidney disease due to hypertension    Labile essential hypertension    Low blood pressure    Transient ischemic attack    Vitamin D deficiency    Bilateral carotid artery occlusion    Chronic pain of left upper extremity    Complication of surgical procedure    Constipation    Do not resuscitate    Dysfunction of eustachian tube    History of anemia    Labral tear of long head of left biceps tendon    Osteoarthritis of left glenohumeral joint    Restless legs syndrome    Polyarteritis (Multi)    Sacroiliitis (CMS-HCC)    Postlaminectomy syndrome of lumbar region    Lumbar radiculopathy    Hypocalcemia    Troponin level elevated    Other chest pain    PAF (paroxysmal atrial fibrillation) (Multi)    Essential hypertension    Bilateral pleural effusion    CVA (cerebral vascular accident) (Multi)      This is a very pleasant 89-year-old female with history of TIA, questionable atrial fibrillation on Eliquis, coronary disease status post PCI to distal left dominant circumflex in September 2024.  Patient also has history of SVT.  Patient  returns to my office for follow-up.  Feeling overall good.  Nuys any chest pain shortness of breath palpitations lightheadedness or syncope.    Patient stable.  Blood pressure and heart rate well-controlled.  Currently on Eliquis and clopidogrel.  Will continue clopidogrel for 3 more months and then we will switch her to baby aspirin with the Eliquis.  Otherwise she is not on medical therapy for coronary artery disease hyperlipidemia and SVT.  Blood pressure and heart rate well-controlled.  Will follow-up in 6 months.      Nessa Callaway MD

## 2025-01-29 DIAGNOSIS — G45.9 TIA (TRANSIENT ISCHEMIC ATTACK): ICD-10-CM

## 2025-01-29 RX ORDER — FERROUS SULFATE 325(65) MG
325 TABLET ORAL 3 TIMES WEEKLY
Qty: 36 TABLET | Refills: 1 | Status: SHIPPED | OUTPATIENT
Start: 2025-01-29

## 2025-03-03 ENCOUNTER — OFFICE VISIT (OUTPATIENT)
Dept: PAIN MEDICINE | Facility: CLINIC | Age: OVER 89
End: 2025-03-03
Payer: MEDICARE

## 2025-03-03 VITALS
WEIGHT: 111 LBS | DIASTOLIC BLOOD PRESSURE: 88 MMHG | OXYGEN SATURATION: 99 % | RESPIRATION RATE: 22 BRPM | HEART RATE: 79 BPM | SYSTOLIC BLOOD PRESSURE: 140 MMHG | BODY MASS INDEX: 22.38 KG/M2 | HEIGHT: 59 IN

## 2025-03-03 DIAGNOSIS — M96.1 POSTLAMINECTOMY SYNDROME, LUMBAR REGION: Primary | ICD-10-CM

## 2025-03-03 PROCEDURE — 1159F MED LIST DOCD IN RCRD: CPT | Performed by: ANESTHESIOLOGY

## 2025-03-03 PROCEDURE — 99213 OFFICE O/P EST LOW 20 MIN: CPT | Performed by: ANESTHESIOLOGY

## 2025-03-03 PROCEDURE — 1125F AMNT PAIN NOTED PAIN PRSNT: CPT | Performed by: ANESTHESIOLOGY

## 2025-03-03 PROCEDURE — 1157F ADVNC CARE PLAN IN RCRD: CPT | Performed by: ANESTHESIOLOGY

## 2025-03-03 PROCEDURE — 1036F TOBACCO NON-USER: CPT | Performed by: ANESTHESIOLOGY

## 2025-03-03 PROCEDURE — G2211 COMPLEX E/M VISIT ADD ON: HCPCS | Performed by: ANESTHESIOLOGY

## 2025-03-03 PROCEDURE — 3077F SYST BP >= 140 MM HG: CPT | Performed by: ANESTHESIOLOGY

## 2025-03-03 PROCEDURE — 3079F DIAST BP 80-89 MM HG: CPT | Performed by: ANESTHESIOLOGY

## 2025-03-03 ASSESSMENT — ENCOUNTER SYMPTOMS
BACK PAIN: 1
ACTIVITY CHANGE: 1

## 2025-03-03 ASSESSMENT — PAIN SCALES - GENERAL
PAINLEVEL_OUTOF10: 7
PAINLEVEL_OUTOF10: 7

## 2025-03-03 ASSESSMENT — PAIN DESCRIPTION - DESCRIPTORS: DESCRIPTORS: ACHING

## 2025-03-03 ASSESSMENT — PAIN - FUNCTIONAL ASSESSMENT: PAIN_FUNCTIONAL_ASSESSMENT: 0-10

## 2025-03-03 NOTE — PROGRESS NOTES
The patient is an 89-year-old female with low back and bilateral leg pain.  The patient reports that her pain is improved after her caudal epidural steroid injection a month and a half ago.  Since I saw the patient last she was hospitalized for pneumonia as well as thrombosis.  The patient was discharged recently and is receiving home care including physical therapy.    Review of Systems   Constitutional:  Positive for activity change.   Musculoskeletal:  Positive for back pain and gait problem.   All other systems reviewed and are negative.    GENERAL: alert and appropriate, in no distress, well-hydrated, well-nourished and happy, smiling, interactive  SKIN: no rash noted  RESPIRATORY: breathing non-labored and no grunting/flaring/retractions  CHEST: equal chest rise with normal respiratory effort  ABDOMEN: soft and non-tender  BACK: back normal in appearance, spine with reduced ROM  EXTREMITIES: strength intact  NEUROLOGIC: Patient seated in a wheelchair, SLR negative, sensation grossly intact.    Assessment and Plan    -Chronicity--chronic spinal pain    -Diagnostics--no new imaging ordered    -Pharmacologic--no change    -Psychologic--no need for psychologic intervention from my standpoint.  There are no mental health issues of which I am aware that are contributing to the patient's pain.  There are no substance abuse or alcohol abuse issues of which I am aware that are contributing to the patient's pain.    -Physical--we discussed the importance of physical therapy and exercise.  We discussed avoidance and modification techniques.    -Intervention--we may consider repeating the caudal epidural steroid injection in the future if necessary    I spent time educating the patient on the condition including the treatment and the prognosis.  I invited the patient to call at anytime with any questions.

## 2025-03-05 DIAGNOSIS — I50.41 ACUTE COMBINED SYSTOLIC (CONGESTIVE) AND DIASTOLIC (CONGESTIVE) HEART FAILURE: ICD-10-CM

## 2025-03-05 DIAGNOSIS — J90 BILATERAL PLEURAL EFFUSION: ICD-10-CM

## 2025-03-05 NOTE — TELEPHONE ENCOUNTER
LV 9/20/24, NV 4/22/25    Amlodipine 10mg  Furosemide 40mg  Isosorbide mononitrate ER 120mg  Ronolazine 500mg    [facility] [address]

## 2025-03-06 PROBLEM — N18.31 STAGE 3A CHRONIC KIDNEY DISEASE (MULTI): Status: RESOLVED | Noted: 2025-02-21 | Resolved: 2025-03-06

## 2025-03-06 PROBLEM — R00.1 BRADYCARDIA: Status: RESOLVED | Noted: 2025-02-21 | Resolved: 2025-03-06

## 2025-03-06 PROBLEM — R65.21 SEPTIC SHOCK (MULTI): Status: RESOLVED | Noted: 2025-02-21 | Resolved: 2025-03-06

## 2025-03-06 PROBLEM — A41.9 SEPTIC SHOCK (MULTI): Status: RESOLVED | Noted: 2025-02-21 | Resolved: 2025-03-06

## 2025-03-06 PROBLEM — R91.8 LUNG INFILTRATE ON CT: Status: RESOLVED | Noted: 2025-02-24 | Resolved: 2025-03-06

## 2025-03-06 PROBLEM — I50.32 CHRONIC HEART FAILURE WITH PRESERVED EJECTION FRACTION: Status: RESOLVED | Noted: 2025-02-21 | Resolved: 2025-03-06

## 2025-03-06 PROBLEM — J18.9 PNEUMONIA OF LEFT UPPER LOBE DUE TO INFECTIOUS ORGANISM: Status: RESOLVED | Noted: 2025-02-21 | Resolved: 2025-03-06

## 2025-03-06 PROBLEM — D73.5 SPLENIC INFARCT: Status: RESOLVED | Noted: 2025-02-23 | Resolved: 2025-03-06

## 2025-03-06 PROBLEM — A41.9 SEPSIS (MULTI): Status: RESOLVED | Noted: 2025-02-21 | Resolved: 2025-03-06

## 2025-03-07 RX ORDER — FUROSEMIDE 40 MG/1
40 TABLET ORAL DAILY
Qty: 30 TABLET | Refills: 1 | Status: SHIPPED | OUTPATIENT
Start: 2025-03-07

## 2025-03-07 RX ORDER — ISOSORBIDE MONONITRATE 120 MG/1
120 TABLET, EXTENDED RELEASE ORAL DAILY
Qty: 30 TABLET | Refills: 1 | Status: SHIPPED | OUTPATIENT
Start: 2025-03-07

## 2025-03-07 RX ORDER — AMLODIPINE BESYLATE 10 MG/1
10 TABLET ORAL DAILY
Qty: 30 TABLET | Refills: 1 | Status: SHIPPED | OUTPATIENT
Start: 2025-03-07

## 2025-03-07 RX ORDER — RANOLAZINE 500 MG/1
500 TABLET, EXTENDED RELEASE ORAL 2 TIMES DAILY
Qty: 60 TABLET | Refills: 1 | Status: SHIPPED | OUTPATIENT
Start: 2025-03-07

## 2025-03-11 NOTE — CONSULTS
Heart Failure Nurse Navigator    The role of the HF nurse navigator is to (1) characterize risk profiles of patients with heart failure transitioning from [id number] after hospitalization, (2) recommend interventions to improve care and reduce risks of worse post-hospitalization outcomes. Potential recommendations may touch base on patient/family education, additional consults that may bring value, and appointment scheduling.    Assessment    I met with Rakan Cervantes and family at the bedside, and my impressions include: Pt admitted for fall, sob/edema and L arm numbness.     Patients Cardiologist(s): Cesia Vee Primary Care Provider: Asya Crane. [facility]  What is the patient's most recent LVEF? pending  HFrEF (LVEF <= 40%) Quadruple therapy recommended  HFmrEF (LVEF 41-49%) Quadruple therapy recommended  HFpEF (LVEF >= 50%) Minimum recommendations: SGLT2i and MRA  Is the patient on GDMT for their condition?   ARNI/ACEI/ARB: Yes Losartan 100 mg daily  BB: Yes Carvedilol 25 mg BID  MRA: No None  SGLT2i: No None  Is the patient prescribed a diuretic? No  None  Does this patient have an implanted device (ie cardioMEMS, ICD, CRT-D)?  Device type: No  Could this patient have advanced heart failure (Stage D heart failure)?: No   If yes, the potential markers of advanced heart failure include:     REFERENCE: Potential markers of advanced HF   Inotrope (dobutamine or milrinone) used during this admission?   LVEF<=25%?   >=2 hospitalizations for ADHF in the last year?   Severe symptoms of HF (fatigue, dyspnea, confusion, edema) despite medical therapy?   Downtitration of GDMT as compared to home medications?   Discontinuation of GDMT because of hypotension or renal intolerance?   Recurrent arrhythmias (AF, VT with ICD shocks)?   Cardiac cachexia (i.e., unintentional weight loss due to HF)?   High-risk biomarker profile (e.g., hyponatremia [Na<135], very elevated BNP, worsening kidney function)  "  Escalating doses of diuretics or persistent edema despite escalation     2. Mind and Emotion  Does this patient have possible cognitive impairment?: No (The Mini-Cog score /5)  Ask the patient to memorize these 3 words: banana, sunrise, chair  Ask the patient to draw a clock with hands pointing at \"20 minutes after 8\"  Ask the patient to recall the 3 words  Score: Add number of words recalled + clock drawing (0 points for any errors, 2 points if correct)  Interpretation: A score of 0-2 suggests cognitive impairment is present, a score of 3-5 suggests cognitive impairment is absent  Does this patient have major depression?: No (PH-Q2 score /6)  Over the last 2 weeks: Little interest or pleasure in doing things? (Not at all +0, Several days +1, More than half the days +2, Nearly every day +3)  Over the last 2 weeks: Feeling down, depressed or hopeless? (Not at all +0, Several days +1, More than half the days +2, Nearly every day +3)  Score: Add points  Interpretation: A score of 3 or more suggests that major depression is likely.     3. Physical Function  Could this patient be frail?: No   Defined by presence of all of these: slowness, weakness, shrinking, inactivity, exhaustion  Is this patient at risk for falling?: Yes   Defined by having experienced a fall in the last 12 months.    4. Social Determinants of Health  Transportation deficits?: No   Lack of insurance?: No   Poor financial resources?: No   Living conditions (homelessness, unstable home)?: No   Poor family/social support?: No   Poor personal care?: No   Food insecurity?: No   Lack of HCPOA?: No        Heart Failure Education   - Living With Heart Failure book provided.  - HF signs and symptoms, heart failure zones and when to call cardiologist.   - Controlling Heart Failure at Home: medication adherence, following up with cardiologist at least once yearly, staying healthy and active, limiting sodium and fluid intake as directed by cardiologist.  - " Daily Weight Education    Snehal PIERREN RN  [facility] Clinical Nurse Navigator, [facility]  [phone]     [FreeTextEntry1] : 70 year old female former smoker with history of diabetes, hypertension, status post left 2nd digit amputation for osteomyelitis, and chronic venous insufficiency with recurrent venous ulcerations who presents the office today for follow-up.  Patient is currently being treated with bilateral lower extremity Unna boots. Patient reports recent hospital admission for bacterial infection.  Denies rest pain or claudication symptoms.  Denies fever or chills.

## 2025-03-17 ENCOUNTER — PATIENT OUTREACH (OUTPATIENT)
Dept: PRIMARY CARE | Facility: CLINIC | Age: OVER 89
End: 2025-03-17
Payer: MEDICARE

## 2025-03-17 RX ORDER — ASPIRIN 81 MG/1
81 TABLET ORAL DAILY
COMMUNITY
Start: 2025-03-14 | End: 2025-03-24

## 2025-03-17 NOTE — PROGRESS NOTES
Discharge Facility: [facility]  Discharge Diagnosis: Fall at Home  Admission Date: 3/11/25  Discharge Date: 3/14/25    PCP Appointment Date: No available appts. Task to office  Specialist Appointment Date: Unknown  Hospital Encounter and Summary Linked: Yes    Hospital Encounter    See discharge assessment below for further details    Wrap Up  Wrap Up Additional Comments: This CM spoke with pts daughter via phone. Daughter reports pt isn't doing well at home since discharge. Pt is c/o bad neck pain. New meds reviewed. denies CP and SOB. Daughter provided my contact info for any additional concerns or questions. wound care discussed with son at length. Son aware of s/s to report to provider. (3/17/2025 11:24 AM)    Engagement  Call Start Time: 1113 (spoke with patients daughter) (3/17/2025 11:24 AM)    Medications  Medications reviewed with patient/caregiver?: Yes (3/17/2025 11:24 AM)  Is the patient having any side effects they believe may be caused by any medication additions or changes?: No (3/17/2025 11:24 AM)  Does the patient have all medications ordered at discharge?: Yes (3/17/2025 11:24 AM)  Care Management Interventions: No intervention needed (3/17/2025 11:24 AM)  Prescription Comments: pt sent home with script (3/17/2025 11:24 AM)  Is the patient taking all medications as directed (includes completed medication regime)?: Yes (3/17/2025 11:24 AM)  Care Management Interventions: Provided patient education (3/17/2025 11:24 AM)  Medication Comments: Pt denies problems obtaining or affording medication (3/17/2025 11:24 AM)    Appointments  Does the patient have a primary care provider?: Yes (no available appts. task to office) (3/17/2025 11:24 AM)  Care Management Interventions: Educated patient on importance of making appointment (3/17/2025 11:24 AM)  Has the patient kept scheduled appointments due by today?: Yes (3/17/2025 11:24 AM)  Care Management Interventions: Educated on importance of keeping  appointment (3/17/2025 11:24 AM)    Self Management  What is the home health agency?: [facility] (3/17/2025 11:24 AM)  Has home health visited the patient within 72 hours of discharge?: No (3/17/2025 11:24 AM)  Home Health Interventions: Called home health agency (Agency will be calling patient today (3/17/25) to set patient up) (3/17/2025 11:24 AM)    Patient Teaching  Does the patient have access to their discharge instructions?: Yes (3/17/2025 11:24 AM)  Care Management Interventions: Reviewed instructions with patient (3/17/2025 11:24 AM)  What is the patient's perception of their health status since discharge?: Same (3/17/2025 11:24 AM)  Is the patient/caregiver able to teach back the hierarchy of who to call/visit for symptoms/problems? PCP, Specialist, Home Health nurse, Urgent Care, ED, 911: Yes (3/17/2025 11:24 AM)      Alexei Peace LPN

## 2025-03-18 ENCOUNTER — TELEPHONE (OUTPATIENT)
Dept: PRIMARY CARE | Facility: CLINIC | Age: OVER 89
End: 2025-03-18
Payer: MEDICARE

## 2025-03-18 NOTE — TELEPHONE ENCOUNTER
----- Message from Alexei Peace sent at 3/17/2025 11:37 AM EDT -----    Can you please contact pt to schedule hosp follow up within 13 days of discharge.    Discharge Facility: [facility]  Discharge Diagnosis: Fall at Home  Admission Date: 3/11/25  Discharge Date: 3/14/25    Thank you       Alexei Peace LPN

## 2025-03-18 NOTE — TELEPHONE ENCOUNTER
----- Message from Perla KURTZ sent at 3/18/2025  8:47 AM EDT -----  1045 on the 27th  ----- Message -----  From: Josselin Ardon MA  Sent: 3/18/2025   8:30 AM EDT  To: Perla Mejia MA      ----- Message -----  From: Alexei Peace LPN  Sent: 3/17/2025  11:38 AM EDT  To: Adilson [id number] [email or web address] Clerical      Can you please contact pt to schedule hosp follow up within 13 days of discharge.    Discharge Facility: [facility]  Discharge Diagnosis: Fall at Home  Admission Date: 3/11/25  Discharge Date: 3/14/25    Thank you       Alexei Peace LPN

## 2025-03-19 ENCOUNTER — TELEPHONE (OUTPATIENT)
Dept: PRIMARY CARE | Facility: CLINIC | Age: OVER 89
End: 2025-03-19
Payer: MEDICARE

## 2025-03-19 NOTE — TELEPHONE ENCOUNTER
LV 9/20/24, NV 3/27/25    Tita (pt's daughter) is curious if pt is supposed be getting her INR checked regularly since she is on warfarin since being in the hospital.  Pt is taking 5mg warfarin daily.  Hospital told pt to take 10mg daily.  Please advice what she should be taking.     [phone]    [facility] [address]

## 2025-03-19 NOTE — TELEPHONE ENCOUNTER
----- Message from Alexei Peace sent at 3/17/2025 11:37 AM EDT -----    Can you please contact pt to schedule hosp follow up within 13 days of discharge.    Discharge Facility: [facility]  Discharge Diagnosis: Fall at Home  Admission Date: 3/11/25  Discharge Date: 3/14/25    Thank you       Alexei Peace LPN

## 2025-03-25 DIAGNOSIS — I10 ESSENTIAL HYPERTENSION: ICD-10-CM

## 2025-03-25 DIAGNOSIS — M06.09 RHEUMATOID ARTHRITIS WITHOUT RHEUMATOID FACTOR, MULTIPLE SITES (MULTI): ICD-10-CM

## 2025-03-25 DIAGNOSIS — G45.9 TRANSIENT CEREBRAL ISCHEMIC ATTACK, UNSPECIFIED: ICD-10-CM

## 2025-03-25 DIAGNOSIS — E78.00 HYPERCHOLESTEROLEMIA: ICD-10-CM

## 2025-03-25 DIAGNOSIS — D64.9 ANEMIA, UNSPECIFIED: ICD-10-CM

## 2025-03-25 DIAGNOSIS — M30.0: ICD-10-CM

## 2025-03-25 DIAGNOSIS — M48.07 SPINAL STENOSIS, LUMBOSACRAL REGION: ICD-10-CM

## 2025-03-25 RX ORDER — GABAPENTIN 300 MG/1
CAPSULE ORAL
Qty: 180 CAPSULE | Refills: 1 | Status: SHIPPED | OUTPATIENT
Start: 2025-03-25

## 2025-03-25 RX ORDER — PANTOPRAZOLE SODIUM 40 MG/1
40 TABLET, DELAYED RELEASE ORAL 2 TIMES DAILY
Qty: 180 TABLET | Refills: 1 | Status: SHIPPED | OUTPATIENT
Start: 2025-03-25

## 2025-03-25 RX ORDER — ATORVASTATIN CALCIUM 80 MG/1
80 TABLET, FILM COATED ORAL NIGHTLY
Qty: 90 TABLET | Refills: 1 | Status: SHIPPED | OUTPATIENT
Start: 2025-03-25

## 2025-03-25 RX ORDER — DULOXETIN HYDROCHLORIDE 30 MG/1
30 CAPSULE, DELAYED RELEASE ORAL
Qty: 90 CAPSULE | Refills: 1 | Status: SHIPPED | OUTPATIENT
Start: 2025-03-25

## 2025-03-25 RX ORDER — LOSARTAN POTASSIUM 100 MG/1
100 TABLET ORAL DAILY
Qty: 90 TABLET | Refills: 3 | Status: SHIPPED | OUTPATIENT
Start: 2025-03-25 | End: 2026-03-25

## 2025-03-25 RX ORDER — HYDROXYCHLOROQUINE SULFATE 200 MG/1
200 TABLET, FILM COATED ORAL DAILY
Qty: 90 TABLET | Refills: 1 | Status: SHIPPED | OUTPATIENT
Start: 2025-03-25

## 2025-03-25 RX ORDER — CLOPIDOGREL BISULFATE 75 MG/1
75 TABLET ORAL DAILY
Qty: 90 TABLET | Refills: 1 | Status: SHIPPED | OUTPATIENT
Start: 2025-03-25

## 2025-03-25 RX ORDER — VERAPAMIL HYDROCHLORIDE 180 MG/1
TABLET, EXTENDED RELEASE ORAL
Qty: 90 TABLET | Refills: 2 | Status: SHIPPED | OUTPATIENT
Start: 2025-03-25

## 2025-03-25 RX ORDER — SULFASALAZINE 500 MG/1
500 TABLET ORAL 2 TIMES DAILY
Qty: 180 TABLET | Refills: 1 | Status: SHIPPED | OUTPATIENT
Start: 2025-03-25

## 2025-03-25 RX ORDER — CALCIUM CARBONATE 300MG(750)
TABLET,CHEWABLE ORAL
Qty: 90 CAPSULE | Refills: 1 | Status: SHIPPED | OUTPATIENT
Start: 2025-03-25

## 2025-03-25 NOTE — TELEPHONE ENCOUNTER
NV: 3/27/25  LV: 9/20/24   Restorative Specialist Mobility Note       Activity: Ambulate in lerma, Ambulate in room, Bathroom privileges, Chair, Dangle, Stand at bedside (Educated/encouraged pt to ambulate with assistance 3-4 x's/day  Chair alarm on   Pt callbell, phone/tray within reach )     Assistive Device: Front wheel walker          Derek DAVIDSON, Restorative Technician, [employer] [facility]

## 2025-03-26 PROBLEM — R79.1 SUPRATHERAPEUTIC INR: Status: RESOLVED | Noted: 2025-03-12 | Resolved: 2025-03-26
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
## 2025-03-26 PROBLEM — N17.9 ACUTE KIDNEY INJURY: Status: RESOLVED | Noted: 2025-03-12 | Resolved: 2025-03-26

## 2025-03-26 PROBLEM — R77.9 ABNORMALITY OF PLASMA PROTEIN: Status: RESOLVED | Noted: 2025-03-13 | Resolved: 2025-03-26

## 2025-03-27 ENCOUNTER — PATIENT OUTREACH (OUTPATIENT)
Dept: PRIMARY CARE | Facility: CLINIC | Age: OVER 89
End: 2025-03-27

## 2025-03-27 ENCOUNTER — OFFICE VISIT (OUTPATIENT)
Dept: PRIMARY CARE | Facility: CLINIC | Age: OVER 89
End: 2025-03-27
Payer: MEDICARE

## 2025-03-27 VITALS
BODY MASS INDEX: 23.99 KG/M2 | DIASTOLIC BLOOD PRESSURE: 80 MMHG | WEIGHT: 119 LBS | SYSTOLIC BLOOD PRESSURE: 158 MMHG | TEMPERATURE: 96.8 F | HEIGHT: 59 IN | OXYGEN SATURATION: 96 % | HEART RATE: 85 BPM

## 2025-03-27 DIAGNOSIS — I21.4 ACUTE NON-ST SEGMENT ELEVATION MYOCARDIAL INFARCTION (MULTI): ICD-10-CM

## 2025-03-27 DIAGNOSIS — R26.9 ABNORMAL GAIT: ICD-10-CM

## 2025-03-27 DIAGNOSIS — I48.0 PAF (PAROXYSMAL ATRIAL FIBRILLATION) (MULTI): ICD-10-CM

## 2025-03-27 DIAGNOSIS — R29.6 FREQUENT FALLS: ICD-10-CM

## 2025-03-27 DIAGNOSIS — Z86.73 HISTORY OF CEREBROVASCULAR ACCIDENT: ICD-10-CM

## 2025-03-27 DIAGNOSIS — E78.00 HYPERCHOLESTEROLEMIA: ICD-10-CM

## 2025-03-27 DIAGNOSIS — I67.9 CEREBROVASCULAR DISEASE: Primary | ICD-10-CM

## 2025-03-27 DIAGNOSIS — I48.91 RAPID ATRIAL FIBRILLATION (MULTI): ICD-10-CM

## 2025-03-27 DIAGNOSIS — I21.4 ACUTE NON-ST SEGMENT ELEVATION MYOCARDIAL INFARCTION (MULTI): Primary | ICD-10-CM

## 2025-03-27 DIAGNOSIS — I10 ESSENTIAL HYPERTENSION: ICD-10-CM

## 2025-03-27 DIAGNOSIS — K21.00 GASTROESOPHAGEAL REFLUX DISEASE WITH ESOPHAGITIS WITHOUT HEMORRHAGE: ICD-10-CM

## 2025-03-27 RX ORDER — DOCUSATE SODIUM 100 MG/1
CAPSULE, LIQUID FILLED ORAL
COMMUNITY
Start: 2025-03-27

## 2025-03-27 RX ORDER — WARFARIN SODIUM 5 MG/1
5 TABLET ORAL NIGHTLY
Qty: 90 TABLET | Refills: 0 | Status: SHIPPED | OUTPATIENT
Start: 2025-03-27 | End: 2025-06-25

## 2025-03-27 ASSESSMENT — ENCOUNTER SYMPTOMS
OCCASIONAL FEELINGS OF UNSTEADINESS: 1
DEPRESSION: 0
LOSS OF SENSATION IN FEET: 0

## 2025-03-27 ASSESSMENT — PAIN SCALES - GENERAL: PAINLEVEL_OUTOF10: 7

## 2025-03-27 NOTE — PROGRESS NOTES
[facility]: [facility]  PROGRESS NOTE      Rakan Cervantes is a 89 y.o. female that is being seen  today for Hospital Follow-up.  Subjective   HPI  ROS  Negative for fever or chills  Negative for sore throat, ear pain, nasal discharge  Negative for cough, shortness of breath or wheezing  Negative for chest pain, palpitations, swelling of legs  Negative for abdominal pain, constipation, diarrhea, blood in the stools  Negative for urinary complaints  Negative for headache, dizziness, weakness or numbness  Negative for joint pain  Negative for depression or anxiety  All other systems reviewed and were negative   Vitals:    03/27/25 1041   BP: 158/80   Pulse: 85   Temp: 36 °C (96.8 °F)   SpO2: 96%      Vitals:    03/27/25 1041   Weight: 54 kg (119 lb)     Body mass index is 24.04 kg/m².  Physical Exam  Constitutional: Patient does not appear to be in any acute distress  Head and Face: NCAT  Eyes: Normal external exam, EOMI  ENT: Normal external inspection of ears and nose. Oropharynx normal.  Cardiovascular: RRR, S1/S2, no murmurs, rubs, or gallops, radial pulses +2, no edema of extremities  Pulmonary: CTAB, no respiratory distress.  Abdomen: +BS, soft, non-tender, nondistended, no guarding or rebound, no masses noted  MSK: No joint swelling, normal movements of all extremities. Range of motion- normal.  Skin- No lesions, contusions, or erythema.  Peripheral puslses palpable bilaterally 2+  Neuro: AAO X3, Cranial nerves 2-12 grossly intact,DTR 2+ in all 4 limbs   Psychiatric: Judgment intact. Appropriate mood and behavior    LABS   [unfilled]  Lab Results   Component Value Date    GLUCOSE 106 (H) 11/04/2024    CALCIUM 8.5 (L) 11/04/2024     (L) 11/04/2024    K 3.2 (L) 11/04/2024    CO2 31 11/04/2024    CL 97 (L) 11/04/2024    BUN 11 11/04/2024    CREATININE 0.65 11/04/2024     Lab Results   Component Value Date    ALT 13 10/28/2024    AST 17 10/28/2024    ALKPHOS 105 10/28/2024    BILITOT  0.6 10/28/2024     Lab Results   Component Value Date    WBC 3.4 (L) 11/04/2024    HGB 8.4 (L) 11/04/2024    HCT 25.6 (L) 11/04/2024    MCV 87 11/04/2024     11/04/2024     Lab Results   Component Value Date    CHOL 80 10/29/2024    CHOL 146 09/10/2024    CHOL 154 03/14/2024     Lab Results   Component Value Date    HDL 43.0 10/29/2024    HDL 75.0 09/10/2024    HDL 83.0 03/14/2024     Lab Results   Component Value Date    LDLCALC 25 10/29/2024    LDLCALC 50 (L) 09/10/2024    LDLCALC 44 (L) 03/14/2024     Lab Results   Component Value Date    TRIG 59 10/29/2024    TRIG 104 09/10/2024    TRIG 136 03/14/2024     Lab Results   Component Value Date    HGBA1C 5.8 (H) 09/10/2024     Other labs not included in the list above were reviewed either before or during this encounter.    History    Past Medical History:   Diagnosis Date    Abnormality of plasma protein 03/13/2025    Acute kidney injury (CMS-HCC) 03/12/2025    Acute non-ST segment elevation myocardial infarction (Multi) 11/26/2023    Anemia     Arteriosclerosis of coronary artery 05/14/2023    Arthritis     Asthma     Atrial fibrillation (Multi) 11/26/2023    Jonas esophagus     Bilateral carotid artery occlusion 05/23/2023    Bradycardia 02/21/2025    CAD (coronary artery disease)     Cardiac rhythm disorder or disturbance or change 11/26/2023    Cervical radiculopathy     Chest pain 11/26/2023    Chronic heart failure with preserved ejection fraction 02/21/2025    Chronic obstructive pulmonary disease (Multi) 11/26/2023    Constipation     Coronary artery disease 11/26/2023    Degenerative joint disease     Dyslipidemia     Dysuria     Erosive esophagitis     Fall at home, initial encounter 03/11/2025    GERD (gastroesophageal reflux disease)     Hypercholesterolemia 05/23/2023    Hyperparathyroidism (Multi)     Hypertensive urgency 11/26/2023    Impaired fasting glucose 11/26/2023    Irritable bowel syndrome (IBS)     Labile essential hypertension  11/26/2023    Labile hypertension     Left foot pain     Low blood pressure 11/26/2023    Lung infiltrate on CT 02/24/2025    Macular degeneration     Mixed hyperlipidemia 11/26/2023    Neck pain     Osteoporosis 11/26/2023    REclast '19 Cr 1.4 switch to calcitonin.    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    Stage 3 chronic kidney disease (Multi) 11/26/2023    Stage 3a chronic kidney disease (Multi) 02/21/2025    Stroke (Multi)     Supratherapeutic INR 03/12/2025    Transient ischemic attack 11/26/2023     Past Surgical History:   Procedure Laterality Date    CARDIAC CATHETERIZATION Left 9/27/2024    Procedure: Left Heart Cath;  Surgeon: Nessa Callaway MD;  Location: [facility] Cardiac Cath Lab;  Service: Cardiovascular;  Laterality: Left;    CARDIAC CATHETERIZATION N/A 9/27/2024    Procedure: PCI;  Surgeon: Nessa Callaway MD;  Location: [facility] Cardiac Cath Lab;  Service: Cardiovascular;  Laterality: N/A;    CARDIOVASCULAR STRESS TEST  2017    Dr. Khalil    CARDIOVASCULAR STRESS TEST  2004    Dr. Pantoja    CARPAL TUNNEL RELEASE Left 2014    CATARACT EXTRACTION Bilateral 2011    CHOLECYSTECTOMY      CORONARY STENT PLACEMENT  05/2023    LAD    CT HEAD ANGIO W AND WO IV CONTRAST  05/08/2013    CT HEAD ANGIO W AND WO IV CONTRAST [facility]    MR HEAD ANGIO WO IV CONTRAST  04/23/2013    MR HEAD ANGIO WO IV CONTRAST [facility]    MR HEAD ANGIO WO IV CONTRAST  07/20/2016    MR HEAD ANGIO WO IV CONTRAST [facility]    MR HEAD ANGIO WO IV CONTRAST  10/27/2023    MR HEAD ANGIO WO IV CONTRAST 10/27/2023 VERITO MRI    MR NECK ANGIO WO IV CONTRAST  10/27/2023    MR NECK ANGIO WO IV CONTRAST  "10/27/2023 VERITO MRI    OTHER SURGICAL HISTORY  02/14/2022    No history of surgery    REVISION TOTAL HIP ARTHROPLASTY Left 2013    Conversion from left ORIF    TOTAL HIP ARTHROPLASTY Right 2016     Family History   Problem Relation Name Age of Onset    No Known Problems Mother      No Known Problems Father      No Known Problems Sister       Allergies   Allergen Reactions    Meperidine Hallucinations     Other reaction(s): Mental Status Change    Morphine Hallucinations    Opioids - Morphine Analogues Hallucinations and Confusion    Pregabalin Rash, Other and Unknown     \"Flu like symptoms\"    Flu like symtoms    Tramadol Rash, Itching and Unknown     Current Outpatient Medications on File Prior to Visit   Medication Sig Dispense Refill    amLODIPine (Norvasc) 10 mg tablet Take 1 tablet (10 mg) by mouth once daily. 30 tablet 1    ascorbic acid (Vitamin C) 100 mg tablet Take 1 tablet (100 mg) by mouth once daily.      atorvastatin (Lipitor) 80 mg tablet Take 1 tablet (80 mg) by mouth once daily at bedtime. 90 tablet 1    carvedilol (Coreg) 25 mg tablet Take 1 tablet (25 mg) by mouth 2 times daily (morning and late afternoon). 60 tablet 1    cholecalciferol (Vitamin D-3) 50 mcg (2,000 unit) capsule Take 1 capsule (50 mcg) by mouth early in the morning..      clopidogrel (Plavix) 75 mg tablet TAKE 1 TABLET BY MOUTH ONCE DAILY 90 tablet 1    DULoxetine (Cymbalta) 30 mg DR capsule TAKE 1 CAPSULE BY MOUTH ONCE DAILY at dinner 90 capsule 1    ferrous sulfate, 325 mg ferrous sulfate, (FeroSuL) tablet Take 1 tablet (325 mg) by mouth 3 times a week. Monday, Wednesday, Friday 36 tablet 1    furosemide (Lasix) 40 mg tablet Take 1 tablet (40 mg) by mouth once daily. 30 tablet 1    gabapentin (Neurontin) 300 mg capsule TAKE 1 CAPSULE BY MOUTH AT NOON AND BEDTIME 180 capsule 1    hydroxychloroquine (Plaquenil) 200 mg tablet Take 1 tablet (200 mg) by mouth once daily. 90 tablet 1    isosorbide mononitrate ER (Imdur) 120 mg 24 hr " tablet Take 1 tablet (120 mg) by mouth once daily. Do not crush or chew. 30 tablet 1    lidocaine (Lidoderm) 5 % patch Place 1 patch over 12 hours on the skin once daily. Remove & discard patch within 12 hours or as directed by MD. 30 patch 2    lidocaine 4 % patch Place 3 patches over 12 hours on the skin once daily. Remove & discard patch within 12 hours or as directed by MD. 30 patch 1    lidocaine HCL 4 % pads, medicated Place 3 patches over 12 hours on the skin once daily. Remove & discard patch within 12 hours or as directed by MD.      losartan (Cozaar) 100 mg tablet Take 1 tablet (100 mg) by mouth once daily. 90 tablet 3    magnesium oxide (Mag-Ox) 400 mg (241.3 mg magnesium) tablet Take 1 tablet (400 mg) by mouth 2 times a day. 60 tablet 1    pantoprazole (ProtoNix) 40 mg EC tablet TAKE 1 TABLET BY MOUTH TWICE DAILY 180 tablet 1    potassium chloride CR 20 mEq ER tablet Take 1 tablet (20 mEq) by mouth once daily. Take with food.      ranolazine (Ranexa) 500 mg 12 hr tablet Take 1 tablet (500 mg) by mouth 2 times a day. Do not crush, chew, or split. 60 tablet 1    Stool Softener 100 mg capsule TAKE 1 CAPSULE BY MOUTH ONCE DAILY AS NEEDED to prevent constipation from iron 90 capsule 1    sucralfate (Carafate) 100 mg/mL suspension Take 10 mL (1 g) by mouth every 6 hours. 1200 mL 1    sulfaSALAzine (Azulfidine) 500 mg tablet Take 1 tablet (500 mg) by mouth 2 times a day. 180 tablet 1    verapamil SR (Calan-SR) 180 mg ER tablet Take 1 tablet (180 mg) by mouth once daily. Do not crush or chew. 90 tablet 2    vit C/E/Zn/coppr/lutein/zeaxan (PRESERVISION AREDS-2 ORAL) As directed orally      docusate sodium (Colace) 100 mg capsule       [DISCONTINUED] aspirin 81 mg EC tablet Take 1 tablet (81 mg) by mouth once daily. (Patient not taking: Reported on 3/27/2025)      [DISCONTINUED] atorvastatin (Lipitor) 80 mg tablet Take 1 tablet (80 mg) by mouth once daily at bedtime. 90 tablet 1    [DISCONTINUED] clopidogrel  (Plavix) 75 mg tablet TAKE 1 TABLET BY MOUTH ONCE DAILY 90 tablet 1    [DISCONTINUED] docusate sodium (Colace) 100 mg capsule TAKE 1 CAPSULE BY MOUTH ONCE DAILY AS NEEDED to prevent constipation from iron 90 capsule 2    [DISCONTINUED] DULoxetine (Cymbalta) 30 mg DR capsule TAKE 1 CAPSULE BY MOUTH ONCE DAILY at dinner 90 capsule 2    [DISCONTINUED] gabapentin (Neurontin) 300 mg capsule Take 1 capsule (300 mg) by mouth 2 times a day. Resume as needed      [DISCONTINUED] hydroxychloroquine (Plaquenil) 200 mg tablet Take 1 tablet (200 mg) by mouth once daily. 90 tablet 1    [DISCONTINUED] losartan (Cozaar) 100 mg tablet Take 1 tablet (100 mg) by mouth once daily. 90 tablet 3    [DISCONTINUED] pantoprazole (ProtoNix) 40 mg EC tablet TAKE 1 TABLET BY MOUTH TWICE DAILY 180 tablet 2    [DISCONTINUED] sulfaSALAzine (Azulfidine) 500 mg tablet Take 1 tablet (500 mg) by mouth 2 times a day. 180 tablet 1    [DISCONTINUED] verapamil SR (Calan-SR) 180 mg ER tablet Take 1 tablet (180 mg) by mouth once daily. Do not crush or chew.       No current facility-administered medications on file prior to visit.     Immunization History   Administered Date(s) Administered    Flu vaccine, quadrivalent, high-dose, preservative free, age 65y+ (FLUZONE) 09/28/2020, 09/29/2021, 11/10/2022, 09/15/2023    Flu vaccine, trivalent, preservative free, HIGH-DOSE, age 65y+ (Fluzone) 10/12/2016, 10/16/2017, 10/05/2018, 10/09/2019, 09/20/2024    Flu vaccine, trivalent, preservative free, age 6 months and greater (Fluarix/Fluzone/Flulaval) 09/23/2015    Influenza, seasonal, injectable 11/01/2008, 10/21/2010, 09/27/2011, 10/03/2012, 10/28/2013, 09/11/2014    Moderna SARS-CoV-2 Vaccination 01/30/2021, 02/27/2021, 11/12/2021    Pneumococcal conjugate vaccine, 13-valent (PREVNAR 13) 08/04/2015    Pneumococcal polysaccharide vaccine, 23-valent, age 2 years and older (PNEUMOVAX 23) 10/01/2006, 01/01/2010    Tdap vaccine, age 7 year and older (BOOSTRIX, ADACEL)  09/28/2020    Zoster vaccine, recombinant, adult (SHINGRIX) 05/14/2019, 07/31/2019    Zoster, live 05/27/2015     Patient's medical history was reviewed and updated either before or during this encounter.  ASSESSMENT / PLAN:  Diagnoses and all orders for this visit:  Cerebrovascular disease  -     warfarin (Coumadin) 5 mg tablet; Take 1 tablet (5 mg) by mouth once daily at bedtime. Take as directed per After Visit Summary.  Acute non-ST segment elevation myocardial infarction (Multi)  Rapid atrial fibrillation (Multi)  Hypercholesterolemia  PAF (paroxysmal atrial fibrillation) (Multi)          Samuel Sky MD

## 2025-03-27 NOTE — PATIENT INSTRUCTIONS
Follow up with cardiologist for coumadin check monthly   See geriatrician Dr. Jesus Silva who saw her in the hospital.

## 2025-03-27 NOTE — PROGRESS NOTES
Call regarding appt. with PCP on (3/27/25) after hospitalization.  At time of outreach call the patient feels as if their condition has (improved) since last visit.  Reviewed the PCP appointment with the pt and addressed any questions or concerns.    Alexei Peace LPN

## 2025-04-03 ENCOUNTER — TELEPHONE (OUTPATIENT)
Dept: PRIMARY CARE | Facility: CLINIC | Age: OVER 89
End: 2025-04-03
Payer: MEDICARE

## 2025-04-03 DIAGNOSIS — I48.91 RAPID ATRIAL FIBRILLATION (MULTI): Primary | ICD-10-CM

## 2025-04-03 NOTE — TELEPHONE ENCOUNTER
Called and LM for [facility] nurse about INR and to say she should call [employer] to put in report.    I called myself to [employer]. Spoke with Beulah and filed a report.

## 2025-04-03 NOTE — TELEPHONE ENCOUNTER
LV 3/27/25, NV 4/22/25    Lynette states her INR is 5.8.  Pt is taking 5mg daily.  Pt has already taken her coumadin. Please advice    Lynette states she sees the pt once a week.   She states pt doesn't have most of the meds at her house on her med list.  She also states pt is very confused.  Lynette states that there are mixed up pills in different pill bottles.  Please advice    [phone]

## 2025-04-04 ENCOUNTER — TELEPHONE (OUTPATIENT)
Dept: PRIMARY CARE | Facility: CLINIC | Age: OVER 89
End: 2025-04-04
Payer: MEDICARE

## 2025-04-04 DIAGNOSIS — I48.0 PAF (PAROXYSMAL ATRIAL FIBRILLATION) (MULTI): Primary | ICD-10-CM

## 2025-04-04 DIAGNOSIS — I48.91 RAPID ATRIAL FIBRILLATION (MULTI): ICD-10-CM

## 2025-04-04 NOTE — TELEPHONE ENCOUNTER
LV 3/27/25, NV 4/22/25    Pt's INR is at 6 today. Pt took a dose yesterday before Lynette got there.  Pt did not take it today.  Please advice    [phone]

## 2025-04-05 PROBLEM — N39.0 ACUTE LOWER URINARY TRACT INFECTION: Status: RESOLVED | Noted: 2023-11-26 | Resolved: 2025-04-05

## 2025-04-05 PROBLEM — R50.9 FEVER: Status: RESOLVED | Noted: 2023-11-26 | Resolved: 2025-04-05

## 2025-04-05 PROBLEM — T81.9XXA COMPLICATION OF SURGICAL PROCEDURE: Status: RESOLVED | Noted: 2023-11-26 | Resolved: 2025-04-05

## 2025-04-05 PROBLEM — R42 DIZZINESS AND GIDDINESS: Status: RESOLVED | Noted: 2023-10-26 | Resolved: 2025-04-05

## 2025-04-05 PROBLEM — B99.9 INFECTIOUS DISEASE: Status: RESOLVED | Noted: 2023-11-26 | Resolved: 2025-04-05

## 2025-04-10 ENCOUNTER — TELEPHONE (OUTPATIENT)
Dept: VASCULAR SURGERY | Facility: CLINIC | Age: OVER 89
End: 2025-04-10
Payer: MEDICARE

## 2025-04-10 NOTE — TELEPHONE ENCOUNTER
Left vm informing patient that the April 29th appt with Dr. Callaway was changed to 6/18/25 at 10:45 in the [facility].

## 2025-04-14 ENCOUNTER — PATIENT OUTREACH (OUTPATIENT)
Dept: PRIMARY CARE | Facility: CLINIC | Age: OVER 89
End: 2025-04-14
Payer: MEDICARE

## 2025-04-14 NOTE — PROGRESS NOTES
Discharge facility: [facility]  Discharge diagnosis: Bright red rectal bleeding    Admission date: 4/5/25  Discharge date: 4/11/25    PCP Appointment Date: 4/22/25 GREGG, Unsuccessful attempts x2 to reach patient for PCP Follow-up, message sent to office  Specialist Appointment Date: 4/29/25 cardiology, 6/16/25 pain med,6/18/25 cardiology, 6/27/25 geriatrics  Hospital Encounter and Summary: Linked    Hospital Encounter      Two attempts were made to reach patient within two business days after discharge. Voicemail left with contact information for patient to call back with any non-emergent questions or concerns.

## 2025-04-15 DIAGNOSIS — I50.41 ACUTE COMBINED SYSTOLIC (CONGESTIVE) AND DIASTOLIC (CONGESTIVE) HEART FAILURE: ICD-10-CM

## 2025-04-15 DIAGNOSIS — J90 BILATERAL PLEURAL EFFUSION: ICD-10-CM

## 2025-04-15 PROBLEM — K92.2 GIB (GASTROINTESTINAL BLEEDING): Status: RESOLVED | Noted: 2025-04-07 | Resolved: 2025-04-15

## 2025-04-15 PROBLEM — L53.9 ERYTHEMA OF SKIN: Status: RESOLVED | Noted: 2025-04-07 | Resolved: 2025-04-15

## 2025-04-15 PROBLEM — M25.531 RIGHT WRIST PAIN: Status: RESOLVED | Noted: 2025-04-08 | Resolved: 2025-04-15

## 2025-04-15 PROBLEM — K92.1 HEMATOCHEZIA: Status: RESOLVED | Noted: 2025-04-08 | Resolved: 2025-04-15

## 2025-04-15 PROBLEM — D50.0 IRON DEFICIENCY ANEMIA DUE TO CHRONIC BLOOD LOSS: Status: RESOLVED | Noted: 2025-04-08 | Resolved: 2025-04-15

## 2025-04-15 RX ORDER — PREDNISONE 5 MG/1
TABLET ORAL
COMMUNITY
Start: 2025-04-11 | End: 2025-04-18

## 2025-04-15 RX ORDER — FUROSEMIDE 40 MG/1
40 TABLET ORAL DAILY
Qty: 90 TABLET | Refills: 1 | Status: SHIPPED | OUTPATIENT
Start: 2025-04-15

## 2025-04-15 RX ORDER — AMLODIPINE BESYLATE 10 MG/1
10 TABLET ORAL DAILY
Qty: 90 TABLET | Refills: 1 | Status: SHIPPED | OUTPATIENT
Start: 2025-04-15

## 2025-04-15 NOTE — TELEPHONE ENCOUNTER
Lynette from [facility] called, she is at patient's house and is helping set up her pill box and is concerned that the patient hasn't been given her coumadin since she was d/c'd home Friday from Hospital. Lynette states patient INR is 1.1 today.    Patient was also started on Prednisone 5mg for 7 days     Patient was given 5mg coumadin on discharge but it isn't in her pill box. Lynette wants to know if patient should take it everyday since before it seemed to drop her low before at that dosage.    Patient also needs refills for the lasix and amlodipine sent in

## 2025-04-22 ENCOUNTER — APPOINTMENT (OUTPATIENT)
Dept: PRIMARY CARE | Facility: CLINIC | Age: OVER 89
End: 2025-04-22
Payer: COMMERCIAL

## 2025-04-22 VITALS
HEART RATE: 94 BPM | DIASTOLIC BLOOD PRESSURE: 90 MMHG | OXYGEN SATURATION: 99 % | TEMPERATURE: 96.5 F | SYSTOLIC BLOOD PRESSURE: 158 MMHG | HEIGHT: 59 IN | BODY MASS INDEX: 23.18 KG/M2 | WEIGHT: 115 LBS

## 2025-04-22 DIAGNOSIS — Z86.73 HISTORY OF CEREBROVASCULAR ACCIDENT: ICD-10-CM

## 2025-04-22 DIAGNOSIS — E21.3 HYPERPARATHYROIDISM (MULTI): ICD-10-CM

## 2025-04-22 DIAGNOSIS — I48.91 RAPID ATRIAL FIBRILLATION (MULTI): ICD-10-CM

## 2025-04-22 DIAGNOSIS — H90.3 SENSORINEURAL HEARING LOSS (SNHL) OF BOTH EARS: ICD-10-CM

## 2025-04-22 DIAGNOSIS — I25.10 ARTERIOSCLEROSIS OF CORONARY ARTERY: ICD-10-CM

## 2025-04-22 DIAGNOSIS — D64.9 ANEMIA, UNSPECIFIED TYPE: ICD-10-CM

## 2025-04-22 DIAGNOSIS — F03.B0 MODERATE DEMENTIA, UNSPECIFIED DEMENTIA TYPE, UNSPECIFIED WHETHER BEHAVIORAL, PSYCHOTIC, OR MOOD DISTURBANCE OR ANXIETY: ICD-10-CM

## 2025-04-22 DIAGNOSIS — I10 LABILE ESSENTIAL HYPERTENSION: ICD-10-CM

## 2025-04-22 DIAGNOSIS — R53.1 WEAKNESS: ICD-10-CM

## 2025-04-22 DIAGNOSIS — K21.00 GASTROESOPHAGEAL REFLUX DISEASE WITH ESOPHAGITIS WITHOUT HEMORRHAGE: ICD-10-CM

## 2025-04-22 DIAGNOSIS — Z91.199 NON COMPLIANCE WITH MEDICAL TREATMENT: ICD-10-CM

## 2025-04-22 DIAGNOSIS — I12.9 CHRONIC KIDNEY DISEASE DUE TO HYPERTENSION: ICD-10-CM

## 2025-04-22 DIAGNOSIS — Z00.00 ROUTINE GENERAL MEDICAL EXAMINATION AT HEALTH CARE FACILITY: Primary | ICD-10-CM

## 2025-04-22 PROBLEM — K92.1 GASTROINTESTINAL HEMORRHAGE WITH MELENA: Status: RESOLVED | Noted: 2025-04-08 | Resolved: 2025-04-22

## 2025-04-22 PROCEDURE — 3077F SYST BP >= 140 MM HG: CPT | Performed by: INTERNAL MEDICINE

## 2025-04-22 PROCEDURE — 99215 OFFICE O/P EST HI 40 MIN: CPT | Performed by: INTERNAL MEDICINE

## 2025-04-22 PROCEDURE — G0439 PPPS, SUBSEQ VISIT: HCPCS | Performed by: INTERNAL MEDICINE

## 2025-04-22 PROCEDURE — 1126F AMNT PAIN NOTED NONE PRSNT: CPT | Performed by: INTERNAL MEDICINE

## 2025-04-22 PROCEDURE — 3080F DIAST BP >= 90 MM HG: CPT | Performed by: INTERNAL MEDICINE

## 2025-04-22 PROCEDURE — 99214 OFFICE O/P EST MOD 30 MIN: CPT | Mod: 25 | Performed by: INTERNAL MEDICINE

## 2025-04-22 PROCEDURE — 99214 OFFICE O/P EST MOD 30 MIN: CPT | Performed by: INTERNAL MEDICINE

## 2025-04-22 PROCEDURE — 1159F MED LIST DOCD IN RCRD: CPT | Performed by: INTERNAL MEDICINE

## 2025-04-22 PROCEDURE — 1124F ACP DISCUSS-NO DSCNMKR DOCD: CPT | Performed by: INTERNAL MEDICINE

## 2025-04-22 PROCEDURE — 1157F ADVNC CARE PLAN IN RCRD: CPT | Performed by: INTERNAL MEDICINE

## 2025-04-22 PROCEDURE — 1036F TOBACCO NON-USER: CPT | Performed by: INTERNAL MEDICINE

## 2025-04-22 ASSESSMENT — ENCOUNTER SYMPTOMS
LOSS OF SENSATION IN FEET: 1
OCCASIONAL FEELINGS OF UNSTEADINESS: 1
DEPRESSION: 0

## 2025-04-22 ASSESSMENT — PAIN SCALES - GENERAL: PAINLEVEL_OUTOF10: 0-NO PAIN

## 2025-04-22 NOTE — PROGRESS NOTES
[facility]: [facility]  PROGRESS NOTE      Rakan Cervantes is a 89 y.o. female that is being seen  today for Medicare Annual Wellness Visit Subsequent.  Subjective   Pt. Is being seen for annual physical exam.Pt. has been doing well.Advance directives discussed with patient .  Patient is DNR with no intubation.  Previous Labs reviewed .  Pt. Is upto date on screening exams and vaccination  Denies any falls    Patient is a 89-year-old female with history of dementia, f atrial fibrillation, coronary artery disease, congestive heart failure, on Coumadin and hypertension who is being seen for annual physical exam patient as well as for follow-up from the hospital.  Patient has been noncompliant with her medications.  Patient is saying that she takes the medication whenever she feels like.  Patient's son has been trying to help her with medications at home.  Patient has been getting home health care nurse from the care has been trying to organize her medications but patient refuses to take medications.  Patient's blood pressure is not well-controlled.  Patient recently was admitted to the hospital with elevated PT/INR rectal bleed.  Patient was given blood transfusion in the hospital.  Patient's repeat labs shows stable hemoglobin.  Patient was restarted on Coumadin after talking with the GI at the hospital.  Patient does need help with her medication as well as with her daily activities.      ROS  Negative for fever or chills  Negative for sore throat, ear pain, nasal discharge  Negative for cough, shortness of breath or wheezing  Negative for chest pain, palpitations, swelling of legs  Negative for abdominal pain, constipation, diarrhea, blood in the stools  Negative for urinary complaints  Negative for headache, dizziness, weakness or numbness  Negative for joint pain  Negative for depression or anxiety.  Positive for dementia.  All other systems reviewed and were negative   Vitals:    04/22/25  1042   BP: 158/90   Pulse: 94   Temp: 35.8 °C (96.5 °F)   SpO2: 99%      Vitals:    04/22/25 1042   Weight: 52.2 kg (115 lb)     Body mass index is 23.23 kg/m².  Physical Exam  Constitutional: Patient does not appear to be in any acute distress  Head and Face: NCAT  Eyes: Normal external exam, EOMI  ENT: Normal external inspection of ears and nose. Oropharynx normal.  Cardiovascular: RRR, S1/S2, no murmurs, rubs, or gallops, radial pulses +2, no edema of extremities  Pulmonary: CTAB, no respiratory distress.  Abdomen: +BS, soft, non-tender, nondistended, no guarding or rebound, no masses noted  MSK: No joint swelling, normal movements of all extremities. Range of motion- normal.  Skin- No lesions, contusions, or erythema.  Peripheral puslses palpable bilaterally 2+  Neuro: AAO X1, Cranial nerves 2-12 grossly intact,DTR 2+ in all 4 limbs     LABS   [unfilled]  Lab Results   Component Value Date    GLUCOSE 106 (H) 11/04/2024    CALCIUM 8.5 (L) 11/04/2024     (L) 11/04/2024    K 3.2 (L) 11/04/2024    CO2 31 11/04/2024    CL 97 (L) 11/04/2024    BUN 11 11/04/2024    CREATININE 0.65 11/04/2024     Lab Results   Component Value Date    ALT 13 10/28/2024    AST 17 10/28/2024    ALKPHOS 105 10/28/2024    BILITOT 0.6 10/28/2024     Lab Results   Component Value Date    WBC 3.4 (L) 11/04/2024    HGB 8.4 (L) 11/04/2024    HCT 25.6 (L) 11/04/2024    MCV 87 11/04/2024     11/04/2024     Lab Results   Component Value Date    CHOL 80 10/29/2024    CHOL 146 09/10/2024    CHOL 154 03/14/2024     Lab Results   Component Value Date    HDL 43.0 10/29/2024    HDL 75.0 09/10/2024    HDL 83.0 03/14/2024     Lab Results   Component Value Date    LDLCALC 25 10/29/2024    LDLCALC 50 (L) 09/10/2024    LDLCALC 44 (L) 03/14/2024     Lab Results   Component Value Date    TRIG 59 10/29/2024    TRIG 104 09/10/2024    TRIG 136 03/14/2024     Lab Results   Component Value Date    HGBA1C 5.8 (H) 09/10/2024     Other labs not included in  the list above were reviewed either before or during this encounter.    History    Medical History[1]  Surgical History[2]  Family History[3]  Allergies[4]  Medications Ordered Prior to Encounter[5]  Immunization History   Administered Date(s) Administered    Flu vaccine, quadrivalent, high-dose, preservative free, age 65y+ (FLUZONE) 09/28/2020, 09/29/2021, 11/10/2022, 09/15/2023    Flu vaccine, trivalent, preservative free, HIGH-DOSE, age 65y+ (Fluzone) 10/12/2016, 10/16/2017, 10/05/2018, 10/09/2019, 09/20/2024    Flu vaccine, trivalent, preservative free, age 6 months and greater (Fluarix/Fluzone/Flulaval) 09/23/2015    Influenza, seasonal, injectable 11/01/2008, 10/21/2010, 09/27/2011, 10/03/2012, 10/28/2013, 09/11/2014    Moderna SARS-CoV-2 Vaccination 01/30/2021, 02/27/2021, 11/12/2021    Pneumococcal conjugate vaccine, 13-valent (PREVNAR 13) 08/04/2015    Pneumococcal polysaccharide vaccine, 23-valent, age 2 years and older (PNEUMOVAX 23) 10/01/2006, 01/01/2010    Tdap vaccine, age 7 year and older (BOOSTRIX, ADACEL) 09/28/2020    Zoster vaccine, recombinant, adult (SHINGRIX) 05/14/2019, 07/31/2019    Zoster, live 05/27/2015     Patient's medical history was reviewed and updated either before or during this encounter.  ASSESSMENT / PLAN:  Diagnoses and all orders for this visit:  Routine general medical examination at health care facility  Moderate dementia, unspecified dementia type, unspecified whether behavioral, psychotic, or mood disturbance or anxiety  -     Follow Up In [facility] - Care Manager; Future  Non compliance with medical treatment  -     Follow Up In [facility] - Care Manager; Future  Rapid atrial fibrillation (Multi)  Arteriosclerosis of coronary artery  Labile essential hypertension  Sensorineural hearing loss (SNHL) of both ears  Hyperparathyroidism (Multi)  Gastroesophageal reflux disease with esophagitis without hemorrhage  Chronic kidney disease due to  hypertension  Anemia, unspecified type  History of cerebrovascular accident  Weakness    Patient is being seen for annual physical examination as well as for follow-up from the hospital.  Patient was admitted to the hospital with supratherapeutic INR as well as with rectal bleeding.  Patient was given blood transfusion and was seen by GI.  Initially Coumadin was held but patient was then discharged on Coumadin.  Patient is due to get her Coumadin levels checked tomorrow.  Patient has not been very compliant with medications.  Patient is stating that she does not want to take the medications.  The patient does have home health care and nurses are going to help her with [employer] [facility]'s organization.  Patient's son usually helps her with the medication but patient is clearly saying that she does not want to take her medications.  Her blood pressure has been elevated  Patient's son is on vacation and will come back in the next few days.  Will try to discuss with him regarding further action for the patient.  If patient does not want to take her medications she should be on hospice care with comfort cares.  Already in more detail protective care services  of already in patient's noncompliance with medications and need for help      Samuel Sky MD        [1]   Past Medical History:  Diagnosis Date    Abnormality of plasma protein 03/13/2025    Acute kidney injury 03/12/2025    Acute non-ST segment elevation myocardial infarction (Multi) 11/26/2023    Anemia     Arteriosclerosis of coronary artery 05/14/2023    Arthritis     Asthma     Atrial fibrillation (Multi) 11/26/2023    Jonas esophagus     Bilateral carotid artery occlusion 05/23/2023    Bradycardia 02/21/2025    CAD (coronary artery disease)     Cardiac rhythm disorder or disturbance or change 11/26/2023    Cervical radiculopathy     Chest pain 11/26/2023    Chronic heart failure with preserved ejection fraction 02/21/2025    Chronic  obstructive pulmonary disease (Multi) 11/26/2023    Constipation     Coronary artery disease 11/26/2023    Degenerative joint disease     Dyslipidemia     Dysuria     Erosive esophagitis     Erythema of skin 04/07/2025    Fall at home, initial encounter 03/11/2025    Gastrointestinal hemorrhage with melena 04/08/2025    GERD (gastroesophageal reflux disease)     GIB (gastrointestinal bleeding) 04/07/2025    Hematochezia 04/08/2025    Hypercholesterolemia 05/23/2023    Hyperparathyroidism (Multi)     Hypertensive urgency 11/26/2023    Impaired fasting glucose 11/26/2023    Iron deficiency anemia due to chronic blood loss 04/08/2025    Irritable bowel syndrome (IBS)     Labile essential hypertension 11/26/2023    Labile hypertension     Left foot pain     Low blood pressure 11/26/2023    Lung infiltrate on CT 02/24/2025    Macular degeneration     Mixed hyperlipidemia 11/26/2023    Neck pain     Osteoporosis 11/26/2023    REclast '19 Cr 1.4 switch to calcitonin.    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    Stage 3 chronic kidney disease (Multi) 11/26/2023    Stage 3a chronic kidney disease (Multi) 02/21/2025    Stroke (Multi)     Supratherapeutic INR 03/12/2025    Transient ischemic attack 11/26/2023   [2]   Past Surgical History:  Procedure Laterality Date    CARDIAC CATHETERIZATION Left 9/27/2024    Procedure: Left Heart Cath;  Surgeon: Nessa Callaway MD;  Location: [facility] Cardiac [facility] Lab;  Service: Cardiovascular;  Laterality: Left;    CARDIAC CATHETERIZATION N/A 9/27/2024    Procedure: PCI;  Surgeon: Nessa Callaway MD;  Location: [facility] Cardiac Cath Lab;  Service:  "Cardiovascular;  Laterality: N/A;    CARDIOVASCULAR STRESS TEST  2017    Dr. Khalil    CARDIOVASCULAR STRESS TEST  2004    Dr. Pantoja    CARPAL TUNNEL RELEASE Left 2014    CATARACT EXTRACTION Bilateral 2011    CHOLECYSTECTOMY      CORONARY STENT PLACEMENT  05/2023    LAD    CT HEAD ANGIO W AND WO IV CONTRAST  05/08/2013    CT HEAD ANGIO W AND WO IV CONTRAST [facility]    MR HEAD ANGIO WO IV CONTRAST  04/23/2013    MR HEAD ANGIO WO IV CONTRAST [facility]    MR HEAD ANGIO WO IV CONTRAST  07/20/2016    MR HEAD ANGIO WO IV CONTRAST [facility]    MR HEAD ANGIO WO IV CONTRAST  10/27/2023    MR HEAD ANGIO WO IV CONTRAST 10/27/2023 VER[facility] MRI    MR NECK ANGIO WO IV CONTRAST  10/27/2023    MR NECK ANGIO WO IV CONTRAST 10/27/2023 VERITO MRI    OTHER SURGICAL HISTORY  02/14/2022    No history of surgery    REVISION TOTAL HIP ARTHROPLASTY Left 2013    Conversion from left ORIF    TOTAL HIP ARTHROPLASTY Right 2016   [3]   Family History  Problem Relation Name Age of Onset    No Known Problems Mother      No Known Problems Father      No Known Problems Sister     [4]   Allergies  Allergen Reactions    Meperidine Hallucinations     Other reaction(s): Mental Status Change    Morphine Hallucinations    Opioids - Morphine Analogues Hallucinations and Confusion    Pregabalin Rash, Other and Unknown     \"Flu like symptoms\"    Flu like symtoms    Tramadol Rash, Itching and Unknown   [5]   Current Outpatient Medications on File Prior to Visit   Medication Sig Dispense Refill    amLODIPine (Norvasc) 10 mg tablet Take 1 tablet (10 mg) by mouth once daily. 90 tablet 1    ascorbic acid (Vitamin C) 100 mg tablet Take 1 tablet (100 mg) by mouth once daily.      atorvastatin (Lipitor) 80 mg tablet Take 1 tablet (80 mg) by mouth once daily at bedtime. 90 tablet 1    carvedilol (Coreg) 25 mg tablet Take 1 tablet (25 mg) by mouth 2 times daily (morning and late afternoon). 60 tablet 1    cholecalciferol (Vitamin D-3) 50 mcg " (2,000 unit) capsule Take 1 capsule (2,000 Units) by mouth early in the morning..      clopidogrel (Plavix) 75 mg tablet TAKE 1 TABLET BY MOUTH ONCE DAILY 90 tablet 1    docusate sodium (Colace) 100 mg capsule       DULoxetine (Cymbalta) 30 mg DR capsule TAKE 1 CAPSULE BY MOUTH ONCE DAILY at dinner 90 capsule 1    ferrous sulfate, 325 mg ferrous sulfate, (FeroSuL) tablet Take 1 tablet (325 mg) by mouth 3 times a week. Monday, Wednesday, Friday 36 tablet 1    furosemide (Lasix) 40 mg tablet Take 1 tablet (40 mg) by mouth once daily. 90 tablet 1    gabapentin (Neurontin) 300 mg capsule TAKE 1 CAPSULE BY MOUTH AT NOON AND BEDTIME 180 capsule 1    hydroxychloroquine (Plaquenil) 200 mg tablet Take 1 tablet (200 mg) by mouth once daily. 90 tablet 1    isosorbide mononitrate ER (Imdur) 120 mg 24 hr tablet Take 1 tablet (120 mg) by mouth once daily. Do not crush or chew. 30 tablet 1    lidocaine (Lidoderm) 5 % patch Place 1 patch over 12 hours on the skin once daily. Remove & discard patch within 12 hours or as directed by MD. 30 patch 2    lidocaine 4 % patch Place 3 patches over 12 hours on the skin once daily. Remove & discard patch within 12 hours or as directed by MD. 30 patch 1    losartan (Cozaar) 100 mg tablet Take 1 tablet (100 mg) by mouth once daily. 90 tablet 3    magnesium oxide (Mag-Ox) 400 mg (241.3 mg magnesium) tablet Take 1 tablet (400 mg) by mouth 2 times a day. 60 tablet 1    pantoprazole (ProtoNix) 40 mg EC tablet TAKE 1 TABLET BY MOUTH TWICE DAILY 180 tablet 1    potassium chloride CR 20 mEq ER tablet Take 1 tablet (20 mEq) by mouth once daily. Take with food.      ranolazine (Ranexa) 500 mg 12 hr tablet Take 1 tablet (500 mg) by mouth 2 times a day. Do not crush, chew, or split. 60 tablet 1    Stool Softener 100 mg capsule TAKE 1 CAPSULE BY MOUTH ONCE DAILY AS NEEDED to prevent constipation from iron 90 capsule 1    sucralfate (Carafate) 100 mg/mL suspension Take 10 mL (1 g) by mouth every 6 hours.  1200 mL 1    sulfaSALAzine (Azulfidine) 500 mg tablet Take 1 tablet (500 mg) by mouth 2 times a day. 180 tablet 1    verapamil SR (Calan-SR) 180 mg ER tablet Take 1 tablet (180 mg) by mouth once daily. Do not crush or chew. 90 tablet 2    vit C/E/Zn/coppr/lutein/zeaxan (PRESERVISION AREDS-2 ORAL) As directed orally      warfarin (Coumadin) 5 mg tablet Take 1 tablet (5 mg) by mouth once daily at bedtime. Take as directed per After Visit Summary. 90 tablet 0    lidocaine HCL 4 % pads, medicated Place 3 patches over 12 hours on the skin once daily. Remove & discard patch within 12 hours or as directed by MD. (Patient not taking: Reported on 2025)      [] predniSONE (Deltasone) 5 mg tablet PLEASE SEE ATTACHED FOR DETAILED DIRECTIONS       No current facility-administered medications on file prior to visit.

## 2025-04-23 NOTE — PROGRESS NOTES
Referral received from provider- patient not taking medications as prescribed- Has [facility] and they also are attempting to set up a med box but some meds not in the home. Patient lives with spouse, has a son in the area. Pt has a history of dementia, atrial fibrillation- ordered Coumadin, coronary artery disease, congestive heart failure, and hypertension.   Per provider- son out of town- will reach out upon his return.

## 2025-04-29 ENCOUNTER — APPOINTMENT (OUTPATIENT)
Dept: CARDIOLOGY | Facility: CLINIC | Age: OVER 89
End: 2025-04-29
Payer: MEDICARE

## 2025-04-30 ENCOUNTER — APPOINTMENT (OUTPATIENT)
Dept: LAB | Facility: CLINIC | Age: OVER 89
End: 2025-04-30
Payer: MEDICARE

## 2025-04-30 ENCOUNTER — PATIENT OUTREACH (OUTPATIENT)
Dept: PRIMARY CARE | Facility: CLINIC | Age: OVER 89
End: 2025-04-30
Payer: MEDICARE

## 2025-04-30 NOTE — PROGRESS NOTES
Confirmation of at least 2 patient identifiers.    Completed telephonic follow-up with patient after recent visit with PCP.  Spoke to patient's son during outreach call.    Son reports patient is  feeling: Improved    Patient has questions or concerns about medications: No    Have all prescribed medications been filled? Yes    Patient has necessary resources to manage their care? Yes  Referral was placed to CCM nurse .  Patient has questions or concerns? No      Spoke with patient's son. He feels his mom is doing better. She is taking all medications. No questions or concerns. Discussed CCM with son. He did not go to follow up appt with her. He is aware to expect a call from CCM nurse.   Next care management follow-up approximately within one month.  Care  information provided to patient.

## 2025-05-01 LAB
INR PPP: 1.2
PROTHROMBIN TIME: 12.8 SEC (ref 9–11.5)

## 2025-05-02 ENCOUNTER — PATIENT OUTREACH (OUTPATIENT)
Dept: PRIMARY CARE | Facility: CLINIC | Age: OVER 89
End: 2025-05-02
Payer: MEDICARE

## 2025-05-02 ENCOUNTER — TELEPHONE (OUTPATIENT)
Dept: PRIMARY CARE | Facility: CLINIC | Age: OVER 89
End: 2025-05-02
Payer: MEDICARE

## 2025-05-02 DIAGNOSIS — I48.91 ATRIAL FIBRILLATION, UNSPECIFIED TYPE (MULTI): Primary | ICD-10-CM

## 2025-05-02 NOTE — TELEPHONE ENCOUNTER
Lm for pt's son to call back, asking if he knows if pt is currently taking the coumadin/and if she is, what is the current dosage?    Also called pt's  but is not sure if she is, pt was not home at the time of the call

## 2025-05-02 NOTE — TELEPHONE ENCOUNTER
----- Message from Samuel Sky sent at 5/1/2025  4:59 PM EDT -----   need to know what dose of Coumadin is the patient taking at present.  Patient stated at the visit that she is not going to take her medications.    ----- Message -----  From: [employer] Results In  Sent: 5/1/2025   3:20 AM EDT  To: Samuel Sky MD

## 2025-05-07 DIAGNOSIS — I50.41 ACUTE COMBINED SYSTOLIC (CONGESTIVE) AND DIASTOLIC (CONGESTIVE) HEART FAILURE: ICD-10-CM

## 2025-05-07 RX ORDER — RANOLAZINE 500 MG/1
500 TABLET, EXTENDED RELEASE ORAL 2 TIMES DAILY
Qty: 180 TABLET | Refills: 1 | Status: SHIPPED | OUTPATIENT
Start: 2025-05-07

## 2025-05-07 RX ORDER — ISOSORBIDE MONONITRATE 120 MG/1
120 TABLET, EXTENDED RELEASE ORAL DAILY
Qty: 90 TABLET | Refills: 1 | Status: SHIPPED | OUTPATIENT
Start: 2025-05-07

## 2025-05-08 ENCOUNTER — PATIENT OUTREACH (OUTPATIENT)
Dept: PRIMARY CARE | Facility: CLINIC | Age: OVER 89
End: 2025-05-08
Payer: MEDICARE

## 2025-05-08 ENCOUNTER — TELEPHONE (OUTPATIENT)
Dept: PRIMARY CARE | Facility: CLINIC | Age: OVER 89
End: 2025-05-08
Payer: MEDICARE

## 2025-05-08 NOTE — PROGRESS NOTES
Son did return my call back- states he will consider CCM- asks if we can speak tomorrow am when he is at her home so we can review her medications also. I did verify correct meds with provider- per last office visit AVS.   He will call me in the morning.

## 2025-05-09 ENCOUNTER — PATIENT OUTREACH (OUTPATIENT)
Dept: PRIMARY CARE | Facility: CLINIC | Age: OVER 89
End: 2025-05-09
Payer: MEDICARE

## 2025-05-09 DIAGNOSIS — I48.91 ATRIAL FIBRILLATION, UNSPECIFIED TYPE (MULTI): ICD-10-CM

## 2025-05-09 DIAGNOSIS — F03.B0 MODERATE DEMENTIA, UNSPECIFIED DEMENTIA TYPE, UNSPECIFIED WHETHER BEHAVIORAL, PSYCHOTIC, OR MOOD DISTURBANCE OR ANXIETY: ICD-10-CM

## 2025-05-09 DIAGNOSIS — I10 ESSENTIAL HYPERTENSION: ICD-10-CM

## 2025-05-09 RX ORDER — CARVEDILOL 25 MG/1
25 TABLET ORAL
Qty: 60 TABLET | Refills: 1 | Status: SHIPPED | OUTPATIENT
Start: 2025-05-09

## 2025-05-09 RX ORDER — POTASSIUM CHLORIDE 1500 MG/1
20 TABLET, EXTENDED RELEASE ORAL DAILY
Qty: 30 TABLET | Refills: 2 | Status: SHIPPED | OUTPATIENT
Start: 2025-05-09 | End: 2025-08-07

## 2025-05-09 NOTE — PROGRESS NOTES
Patient referred to Chronic Care Management by PCP - Samuel Sky MD   Chart reviewed.    Qualifying Chronic Conditions:  Essential hypertension    Moderate dementia, unspecified dementia type, unspecified whether behavioral, psychotic, or mood disturbance or anxiety    Atrial fibrillation, unspecified type (Multi)    Confirmation of insurance? Yes    Last Office Visit with PCP: 4/22/2025   Next Office Visit with PCP: Visit date not found   APC Collaboration: n/a    New Enrollment Summary:   Confirmation of two patient identifiers.  Explained that in my role as Care Manager I will:  Be a point of contact with questions and concerns  Focus on chronic conditions and achieving health goals  Make sure patient is receiving all preventative care he/she is due for    Plan of Care derived from provider's comprehensive assessment and outlined plan of care found in AWV or other follow up.  Nature and availability of the program discussed with the patient, the patient's responsibility for potential cost sharing, only one practitioner furnishing services during a calendar month, and the right to stop services at any time.  Verbal consent received to enroll.    My contact info was provided for any needs that may arise.    Son Eduar did return my call back- we did review all medications she has in the home. He has taken over filling her medication boxes in the home- reports she is doing better now with taking them. He is in need of a couple, and message sent to provider for refill, and he will also  some refills available at [facility].   He reprots they had hired 2 ladies to come in and cook and clean- but his parents did not like their cooking, so they no longer come in to assist. He reports his spouse was doing some of the cooking, and Lilliana is also doing some cooking. We spoke of [facility] and I will make a referral for both patient and spouse per sons request.   Patient has a hx of falls- he states she has severe  arthritis, she does utilize a cane or walker in the home. We spoke of safety precautions, falls prevention, removing throw rugs, clearing pathways, and proper foot coverings. He states understanding.     Medications:   Are there medication concerns that need addressed? Yes - Pt had not been compliant with taking meds- son is now overseeing her taking her meds, places them in a medbox for her now.   Refills needed? Yes - Message to Provider for Carvedilol and Potassium. Son also to obtain refills on Duloxetine and Verapamil from [facility]    Care Gaps: Addressed in the last 6 months  General Assessment: Completed  Social Drivers of Health: Addressed in the last 6 months  Care Plan initiated: Yes    Upcoming Appointments:   Future Appointments       Date / Time Provider Department Dept Phone    6/16/2025 10:45 AM Campos Stevenson MD [facility] [phone]    6/18/2025 10:45 AM Nessa Callaway MD [facility] [phone]          Last filed Vital Signs Reviewed:  BP Readings from Last 3 Encounters:   04/22/25 158/90   03/27/25 158/80   03/03/25 140/88      Labs Reviewed:  Lab Results   Component Value Date    CREATININE 0.65 11/04/2024    GLUCOSE 106 (H) 11/04/2024    ALKPHOS 105 10/28/2024    K 3.2 (L) 11/04/2024    PROT 6.2 (L) 10/28/2024     (L) 11/04/2024    AST 17 10/28/2024    ALT 13 10/28/2024    BUN 11 11/04/2024    MG 1.88 10/31/2024    PHOS 2.7 09/28/2024     Lab Results   Component Value Date    TRIG 59 10/29/2024    CHOL 80 10/29/2024    LDLCALC 25 10/29/2024    HDL 43.0 10/29/2024     Lab Results   Component Value Date    HGBA1C 5.8 (H) 09/10/2024    HGBA1C 6.3 (H) 03/14/2024    HGBA1C 5.8 (H) 10/27/2023     Lab Results   Component Value Date    WBC 3.4 (L) 11/04/2024    RBC 2.94 (L) 11/04/2024    HGB 8.4 (L) 11/04/2024     11/04/2024   No other concerns at this time.  Agreeable to monthly outreaches.  Encouraged to call if questions  or concerns arise.    Diana Aly, RN

## 2025-05-16 DIAGNOSIS — I48.91 ATRIAL FIBRILLATION, UNSPECIFIED TYPE (MULTI): ICD-10-CM

## 2025-05-20 ENCOUNTER — DOCUMENTATION (OUTPATIENT)
Dept: PRIMARY CARE | Facility: CLINIC | Age: OVER 89
End: 2025-05-20
Payer: MEDICARE

## 2025-05-20 DIAGNOSIS — I10 ESSENTIAL HYPERTENSION: ICD-10-CM

## 2025-05-20 DIAGNOSIS — I48.91 ATRIAL FIBRILLATION, UNSPECIFIED TYPE (MULTI): ICD-10-CM

## 2025-05-20 DIAGNOSIS — F03.B0 MODERATE DEMENTIA, UNSPECIFIED DEMENTIA TYPE, UNSPECIFIED WHETHER BEHAVIORAL, PSYCHOTIC, OR MOOD DISTURBANCE OR ANXIETY: ICD-10-CM

## 2025-05-20 NOTE — PROGRESS NOTES
Received a call from Son Eduar- he states his parents have decided they would like meals onm wheels. I will make referral to [facility] today. He denies any further needs at this time.

## 2025-05-28 DIAGNOSIS — J90 BILATERAL PLEURAL EFFUSION: ICD-10-CM

## 2025-05-28 DIAGNOSIS — I50.41 ACUTE COMBINED SYSTOLIC (CONGESTIVE) AND DIASTOLIC (CONGESTIVE) HEART FAILURE: ICD-10-CM

## 2025-05-28 DIAGNOSIS — G45.9 TIA (TRANSIENT ISCHEMIC ATTACK): ICD-10-CM

## 2025-05-28 RX ORDER — FUROSEMIDE 40 MG/1
40 TABLET ORAL DAILY
Qty: 90 TABLET | Refills: 1 | Status: SHIPPED | OUTPATIENT
Start: 2025-05-28

## 2025-05-28 RX ORDER — FERROUS SULFATE 325(65) MG
1 TABLET ORAL 3 TIMES WEEKLY
Qty: 36 TABLET | Refills: 1 | Status: SHIPPED | OUTPATIENT
Start: 2025-05-28

## 2025-06-03 ENCOUNTER — TELEPHONE (OUTPATIENT)
Dept: PAIN MEDICINE | Facility: CLINIC | Age: OVER 89
End: 2025-06-03
Payer: MEDICARE

## 2025-06-03 NOTE — TELEPHONE ENCOUNTER
Pt wants to know can she get steroid injection in her neck at her appt on 6/16/2025. Pt said  told her she had a pinched nerve.

## 2025-06-04 ENCOUNTER — PREP FOR PROCEDURE (OUTPATIENT)
Dept: PAIN MEDICINE | Facility: CLINIC | Age: OVER 89
End: 2025-06-04
Payer: MEDICARE

## 2025-06-04 DIAGNOSIS — M79.10 MYALGIA: Primary | ICD-10-CM

## 2025-06-10 ENCOUNTER — PATIENT OUTREACH (OUTPATIENT)
Dept: PRIMARY CARE | Facility: CLINIC | Age: OVER 89
End: 2025-06-10
Payer: MEDICARE

## 2025-06-10 DIAGNOSIS — I10 ESSENTIAL HYPERTENSION: ICD-10-CM

## 2025-06-10 DIAGNOSIS — I48.91 ATRIAL FIBRILLATION, UNSPECIFIED TYPE (MULTI): ICD-10-CM

## 2025-06-10 DIAGNOSIS — F03.B0 MODERATE DEMENTIA, UNSPECIFIED DEMENTIA TYPE, UNSPECIFIED WHETHER BEHAVIORAL, PSYCHOTIC, OR MOOD DISTURBANCE OR ANXIETY: ICD-10-CM

## 2025-06-10 NOTE — PROGRESS NOTES
Care Management Monthly Outreach  Chart review completed  Confirmation of at least 2 patient identifiers  Change in insurance? No    Has patient been to ER/Urgent Care since last outreach? No    Last Office Visit with PCP: 4/22/2025   Next Office Visit with PCP: Visit date not found   APC Collaboration: n/a    Chronic Conditions and Outreach Summary:   Moderate dementia, unspecified dementia type, unspecified whether behavioral, psychotic, or mood disturbance or anxiety    Essential hypertension    Atrial fibrillation, unspecified type (Multi)    Spoke to son Eduar, he reports Rakan is doing fairly well at this time. States COA did come out and the meals actually started yesterday for both Rakan and her spouse. He reports she has a good intake. Denies any wt loss.  States she is compliant taking medications now. States she actually ,called him to remind him to fill the medbox as she did not have any meds remaining in the box. He states he told her he was coming over in the evening to do this and she forgot. We did discuss her Coumadin, that she is overdo for her INR check and he states he can take her this week to have it done. He denies any excessive bruising or any bleeding.   He states she has an appt on 6/27 with a geriatric provider from [facility]- he states he scheduled to see about having one provider to manage all medications. He states it was very difficult when he began managing her medications- she had many meds that had been discontinued, ordered, but not takem, etc. He is aware that if she takes new provider for PCP, we will need to close CCM services. He will call after appt to update me.   He currently denies any needs. Is encouraged to call with any questions or concerns I may assist with.     Medications:   Are there medication changes since last visit? No  Refills needed? No    Social Drivers of Health: Addressed in the last 6 months  Care Gaps Addressed? Addressed in the last 6 months  Care Plan  addressed: Yes    Upcoming Appointments:   Future Appointments       Date / Time Provider Department Dept Phone    6/16/2025 10:45 AM Campos Stevenson MD [facility] [phone]    6/18/2025 10:45 AM Nessa Callaway MD [facility] [phone]          Blood Pressures Reviewed  BP Readings from Last 3 Encounters:   04/22/25 158/90   03/27/25 158/80   03/03/25 140/88     Labs Reviewed:  Lab Results   Component Value Date    CREATININE 0.65 11/04/2024    GLUCOSE 106 (H) 11/04/2024    ALKPHOS 105 10/28/2024    K 3.2 (L) 11/04/2024    PROT 6.2 (L) 10/28/2024     (L) 11/04/2024    CALCIUM 8.5 (L) 11/04/2024    AST 17 10/28/2024    ALT 13 10/28/2024    BUN 11 11/04/2024    MG 1.88 10/31/2024    PHOS 2.7 09/28/2024     Lab Results   Component Value Date    TRIG 59 10/29/2024    CHOL 80 10/29/2024    LDLCALC 25 10/29/2024    HDL 43.0 10/29/2024     Lab Results   Component Value Date    HGBA1C 5.8 (H) 09/10/2024    HGBA1C 6.3 (H) 03/14/2024    HGBA1C 5.8 (H) 10/27/2023     Lab Results   Component Value Date    WBC 3.4 (L) 11/04/2024    RBC 2.94 (L) 11/04/2024    HGB 8.4 (L) 11/04/2024     11/04/2024   No other concerns at this time.  Agreeable to continue monthly outreaches.  Encouraged to call if questions or concerns arise.    Diana Aly RN

## 2025-06-16 ENCOUNTER — PREP FOR PROCEDURE (OUTPATIENT)
Dept: PAIN MEDICINE | Facility: CLINIC | Age: OVER 89
End: 2025-06-16

## 2025-06-16 ENCOUNTER — OFFICE VISIT (OUTPATIENT)
Dept: PAIN MEDICINE | Facility: CLINIC | Age: OVER 89
End: 2025-06-16
Payer: MEDICARE

## 2025-06-16 ENCOUNTER — TELEPHONE (OUTPATIENT)
Dept: PAIN MEDICINE | Facility: CLINIC | Age: OVER 89
End: 2025-06-16

## 2025-06-16 VITALS
WEIGHT: 112 LBS | HEART RATE: 78 BPM | HEIGHT: 59 IN | SYSTOLIC BLOOD PRESSURE: 160 MMHG | BODY MASS INDEX: 22.58 KG/M2 | DIASTOLIC BLOOD PRESSURE: 70 MMHG

## 2025-06-16 DIAGNOSIS — M79.10 MYALGIA: ICD-10-CM

## 2025-06-16 DIAGNOSIS — M96.1 POSTLAMINECTOMY SYNDROME, LUMBAR REGION: Primary | ICD-10-CM

## 2025-06-16 PROCEDURE — 96372 THER/PROPH/DIAG INJ SC/IM: CPT | Performed by: ANESTHESIOLOGY

## 2025-06-16 PROCEDURE — 99214 OFFICE O/P EST MOD 30 MIN: CPT | Mod: 25 | Performed by: ANESTHESIOLOGY

## 2025-06-16 PROCEDURE — 2500000004 HC RX 250 GENERAL PHARMACY W/ HCPCS (ALT 636 FOR OP/ED): Performed by: ANESTHESIOLOGY

## 2025-06-16 PROCEDURE — 20553 NJX 1/MLT TRIGGER POINTS 3/>: CPT | Performed by: ANESTHESIOLOGY

## 2025-06-16 RX ORDER — ACETAMINOPHEN AND CODEINE PHOSPHATE 300; 60 MG/1; MG/1
1 TABLET ORAL EVERY 6 HOURS PRN
Qty: 20 TABLET | Refills: 0 | Status: SHIPPED | OUTPATIENT
Start: 2025-06-16 | End: 2025-06-23

## 2025-06-16 RX ORDER — TRIAMCINOLONE ACETONIDE 40 MG/ML
40 INJECTION, SUSPENSION INTRA-ARTICULAR; INTRAMUSCULAR ONCE
Status: COMPLETED | OUTPATIENT
Start: 2025-06-16 | End: 2025-06-16

## 2025-06-16 RX ORDER — LIDOCAINE HYDROCHLORIDE 10 MG/ML
4 INJECTION, SOLUTION EPIDURAL; INFILTRATION; INTRACAUDAL; PERINEURAL ONCE
Status: COMPLETED | OUTPATIENT
Start: 2025-06-16 | End: 2025-06-16

## 2025-06-16 RX ADMIN — TRIAMCINOLONE ACETONIDE 40 MG: 40 SUSPENSION INTRA-ARTERIAL; INTRAMUSCULAR at 11:53

## 2025-06-16 RX ADMIN — LIDOCAINE HYDROCHLORIDE 40 MG: 10 SOLUTION INTRAVENOUS at 11:52

## 2025-06-16 ASSESSMENT — PAIN DESCRIPTION - DESCRIPTORS: DESCRIPTORS: SHARP

## 2025-06-16 ASSESSMENT — PATIENT HEALTH QUESTIONNAIRE - PHQ9
SUM OF ALL RESPONSES TO PHQ9 QUESTIONS 1 AND 2: 1
1. LITTLE INTEREST OR PLEASURE IN DOING THINGS: NOT AT ALL
2. FEELING DOWN, DEPRESSED OR HOPELESS: SEVERAL DAYS
1. LITTLE INTEREST OR PLEASURE IN DOING THINGS: NOT AT ALL

## 2025-06-16 ASSESSMENT — ENCOUNTER SYMPTOMS
MYALGIAS: 1
ACTIVITY CHANGE: 1
BACK PAIN: 1
NECK STIFFNESS: 1
NECK PAIN: 1

## 2025-06-16 ASSESSMENT — PAIN SCALES - GENERAL
PAINLEVEL_OUTOF10: 10-WORST PAIN EVER
PAINLEVEL_OUTOF10: 10 - WORST POSSIBLE PAIN

## 2025-06-16 ASSESSMENT — PAIN - FUNCTIONAL ASSESSMENT: PAIN_FUNCTIONAL_ASSESSMENT: 0-10

## 2025-06-16 NOTE — PROGRESS NOTES
The patient is an 89-year-old female with neck and left arm pain.  The pain is constant.  The pain is worse with range of motion of her neck as well as with use of her arm.  The patient is having difficulty sleeping because of the pain.  The patient also describes sore muscles in that area as well.  She requested trigger point injection as she has benefited from trigger point injections in the past.  The patient reports that her low back and leg pain is starting to return.  She has benefited from the occasional caudal epidural steroid injection.  Her last caudal epidural steroid injection was in January of this year she reports at least 75% relief for 4 months before the pain started to return.  The patient gets some relief with use of acetaminophen with codeine which she takes very sparingly.    Review of Systems   Constitutional:  Positive for activity change.   Musculoskeletal:  Positive for back pain, gait problem, myalgias, neck pain and neck stiffness.   All other systems reviewed and are negative.    GENERAL: alert and appropriate, in no distress, well-hydrated, well-nourished, interactive  SKIN: no rash noted  RESPIRATORY: breathing non-labored and no grunting/flaring/retractions  CHEST: equal chest rise with normal respiratory effort  ABDOMEN: soft and non-tender  BACK: back normal in appearance, spine with reduced ROM  EXTREMITIES: strength intact  NEUROLOGIC: Patient seated in a wheelchair, SLR negative, sensation grossly intact, Sharan sign negative, Spurling sign reproduced pain    Assessment and Plan    -Chronicity--chronic spinal pain    -Diagnostics--we reviewed the imaging of her spine    -Pharmacologic--the patient may have a short course of acetaminophen with codeine for acute on chronic pain.  The [facility] was reviewed and was appropriate.      -Psychologic--no need for psychologic intervention from my standpoint.  There are no mental health issues of which I am aware that are contributing to  the patient's pain.  There are no substance abuse or alcohol abuse issues of which I am aware that are contributing to the patient's pain.    -Physical--we discussed the importance of physical therapy and exercise.  We discussed avoidance and modification techniques.    -Intervention--the patient is a candidate for trigger point injections today.  I explained the risks benefits and alternatives of the procedure to the patient.  The patient wishes to proceed.  The patient is also a candidate for a caudal epidural steroid injection in the near future.    I spent time educating the patient on the condition including the treatment and the prognosis.  I invited the patient to call at anytime with any questions.    The patient was placed in the seated position.  Her skin was prepped with isopropyl alcohol.  A 25-gauge spinal needle was used to inject 40 mg of triamcinolone and 4 mL of 1% lidocaine into 4 muscles specifically the left cervical paraspinous muscles and the left trapezius muscle.  The needle was removed.  There were no complications.  The patient tolerated the procedure well.

## 2025-06-18 ENCOUNTER — APPOINTMENT (OUTPATIENT)
Facility: CLINIC | Age: OVER 89
End: 2025-06-18
Payer: MEDICARE

## 2025-06-18 VITALS — OXYGEN SATURATION: 95 % | SYSTOLIC BLOOD PRESSURE: 150 MMHG | HEART RATE: 75 BPM | DIASTOLIC BLOOD PRESSURE: 78 MMHG

## 2025-06-18 DIAGNOSIS — K92.1 GASTROINTESTINAL HEMORRHAGE WITH MELENA: ICD-10-CM

## 2025-06-18 DIAGNOSIS — I48.0 PAROXYSMAL ATRIAL FIBRILLATION (MULTI): Primary | ICD-10-CM

## 2025-06-18 DIAGNOSIS — G45.9 TRANSIENT CEREBRAL ISCHEMIC ATTACK, UNSPECIFIED: ICD-10-CM

## 2025-06-18 DIAGNOSIS — I16.0 HYPERTENSIVE URGENCY: ICD-10-CM

## 2025-06-18 DIAGNOSIS — I10 ESSENTIAL HYPERTENSION: ICD-10-CM

## 2025-06-18 DIAGNOSIS — I65.23 BILATERAL CAROTID ARTERY OCCLUSION: ICD-10-CM

## 2025-06-18 DIAGNOSIS — E78.00 HYPERCHOLESTEROLEMIA: ICD-10-CM

## 2025-06-18 DIAGNOSIS — I25.10 ARTERIOSCLEROSIS OF CORONARY ARTERY: ICD-10-CM

## 2025-06-18 DIAGNOSIS — I21.4 ACUTE NON-ST SEGMENT ELEVATION MYOCARDIAL INFARCTION (MULTI): ICD-10-CM

## 2025-06-18 PROCEDURE — 1160F RVW MEDS BY RX/DR IN RCRD: CPT | Performed by: INTERNAL MEDICINE

## 2025-06-18 PROCEDURE — 3078F DIAST BP <80 MM HG: CPT | Performed by: INTERNAL MEDICINE

## 2025-06-18 PROCEDURE — 3077F SYST BP >= 140 MM HG: CPT | Performed by: INTERNAL MEDICINE

## 2025-06-18 PROCEDURE — 1159F MED LIST DOCD IN RCRD: CPT | Performed by: INTERNAL MEDICINE

## 2025-06-18 PROCEDURE — 1126F AMNT PAIN NOTED NONE PRSNT: CPT | Performed by: INTERNAL MEDICINE

## 2025-06-18 PROCEDURE — 99214 OFFICE O/P EST MOD 30 MIN: CPT | Performed by: INTERNAL MEDICINE

## 2025-06-18 RX ORDER — VERAPAMIL HYDROCHLORIDE 180 MG/1
180 TABLET, EXTENDED RELEASE ORAL NIGHTLY
Start: 2025-06-18

## 2025-06-18 RX ORDER — HYDRALAZINE HYDROCHLORIDE 50 MG/1
50 TABLET, FILM COATED ORAL 2 TIMES DAILY
Qty: 180 TABLET | Refills: 3 | Status: SHIPPED | OUTPATIENT
Start: 2025-06-18 | End: 2026-06-18

## 2025-06-18 ASSESSMENT — LIFESTYLE VARIABLES: TOTAL SCORE: 0

## 2025-06-18 ASSESSMENT — ENCOUNTER SYMPTOMS
LOSS OF SENSATION IN FEET: 0
OCCASIONAL FEELINGS OF UNSTEADINESS: 1
DEPRESSION: 0

## 2025-06-18 ASSESSMENT — PAIN SCALES - GENERAL: PAINLEVEL_OUTOF10: 0-NO PAIN

## 2025-06-18 NOTE — PROGRESS NOTES
[facility]    Interventional Cardiology    History of present illness:  This is a very pleasant 89-year-old female with history of TIA,  atrial fibrillation on Eliquis, coronary disease status post PCI to distal left dominant circumflex in September 2024.  Patient also has history of SVT.  Patient returns to my office for follow-up.  Patient was hospitalized on April 5, 2025 to [facility] for weakness severe anemia underwent EGD and colonoscopy where she was found to have diverticulosis.  I do not have the results of EGD.  Eliquis was held for several days and restarted after.  Patient returns to my office for follow-up.  Feeling overall okay.  Still tired on iron supplements.  Patient required blood transfusion during her hospitalization at [place].  Denies any chest pain palpitation dizziness lightheadedness or syncope.    Medical History[1]    Surgical History[2]    Allergies[3]     reports that she has never smoked. She has never been exposed to tobacco smoke. She has never used smokeless tobacco. She reports current alcohol use. She reports that she does not use drugs.    Family History[4]    Patient's Medications   New Prescriptions    No medications on file   Previous Medications    ACETAMINOPHEN-CODEINE (TYLENOL W/ CODEINE #4) 300-60 MG TABLET    Take 1 tablet by mouth every 6 hours if needed for moderate pain (4 - 6) for up to 7 days.    AMLODIPINE (NORVASC) 10 MG TABLET    Take 1 tablet (10 mg) by mouth once daily.    ASCORBIC ACID (VITAMIN C) 100 MG TABLET    Take 1 tablet (100 mg) by mouth once daily.    ATORVASTATIN (LIPITOR) 80 MG TABLET    Take 1 tablet (80 mg) by mouth once daily at bedtime.    CARVEDILOL (COREG) 25 MG TABLET    Take 1 tablet (25 mg) by mouth 2 times daily (morning and late afternoon).    CHOLECALCIFEROL (VITAMIN D-3) 50 MCG (2,000 UNIT) CAPSULE    Take 1 capsule (2,000 Units) by mouth early in the morning..     CLOPIDOGREL (PLAVIX) 75 MG TABLET    TAKE 1 TABLET BY MOUTH ONCE DAILY    DOCUSATE SODIUM (COLACE) 100 MG CAPSULE        DULOXETINE (CYMBALTA) 30 MG DR CAPSULE    TAKE 1 CAPSULE BY MOUTH ONCE DAILY at dinner    FERROUS SULFATE (FEROSUL) 325 MG (65 MG ELEMENTAL) TABLET    Take 1 tablet by mouth 3 times a week. Monday, Wednesday, Friday    FUROSEMIDE (LASIX) 40 MG TABLET    Take 1 tablet (40 mg) by mouth once daily.    GABAPENTIN (NEURONTIN) 300 MG CAPSULE    TAKE 1 CAPSULE BY MOUTH AT NOON AND BEDTIME    HYDROXYCHLOROQUINE (PLAQUENIL) 200 MG TABLET    Take 1 tablet (200 mg) by mouth once daily.    ISOSORBIDE MONONITRATE ER (IMDUR) 120 MG 24 HR TABLET    TAKE 1 TABLET (120 MG) BY MOUTH ONCE DAILY. DO NOT CRUSH OR CHEW.    LIDOCAINE (LIDODERM) 5 % PATCH    Place 1 patch over 12 hours on the skin once daily. Remove & discard patch within 12 hours or as directed by MD.    LIDOCAINE 4 % PATCH    Place 3 patches over 12 hours on the skin once daily. Remove & discard patch within 12 hours or as directed by MD.    LOSARTAN (COZAAR) 100 MG TABLET    Take 1 tablet (100 mg) by mouth once daily.    MAGNESIUM OXIDE (MAG-OX) 400 MG (241.3 MG MAGNESIUM) TABLET    Take 1 tablet (400 mg) by mouth 2 times a day.    PANTOPRAZOLE (PROTONIX) 40 MG EC TABLET    TAKE 1 TABLET BY MOUTH TWICE DAILY    POTASSIUM CHLORIDE CR 20 MEQ ER TABLET    Take 1 tablet (20 mEq) by mouth once daily. Take with food.    RANOLAZINE (RANEXA) 500 MG 12 HR TABLET    TAKE 1 TABLET (500 MG) BY MOUTH 2 TIMES A DAY. DO NOT CRUSH, CHEW, OR SPLIT.    STOOL SOFTENER 100 MG CAPSULE    TAKE 1 CAPSULE BY MOUTH ONCE DAILY AS NEEDED to prevent constipation from iron    SUCRALFATE (CARAFATE) 100 MG/ML SUSPENSION    Take 10 mL (1 g) by mouth every 6 hours.    SULFASALAZINE (AZULFIDINE) 500 MG TABLET    Take 1 tablet (500 mg) by mouth 2 times a day.    VERAPAMIL SR (CALAN-SR) 180 MG ER TABLET    Take 1 tablet (180 mg) by mouth once daily. Do not crush or chew.    VIT  C/E/ZN/COPPR/LUTEIN/ZEAXAN (PRESERVISION AREDS-2 ORAL)    As directed orally    WARFARIN (COUMADIN) 5 MG TABLET    Take 1 tablet (5 mg) by mouth once daily at bedtime. Take as directed per After Visit Summary.   Modified Medications    No medications on file   Discontinued Medications    No medications on file       Objective   Physical Exam  General: Patient in no acute distress   HEENT: Atraumatic normocephalic.  Neck: Supple, jugular venous pressure within normal limit.  No bruits  Lungs: Clear to auscultation bilaterally  Cardiovascular: Regular rate and rhythm, normal heart sounds, no murmurs rubs or gallops  Abdomen: Soft nontender nondistended.  Normal bowel sounds.  Extremities: Warm to touch, no edema.    Lab Review   No visits with results within 2 Month(s) from this visit.   Latest known visit with results is:   Orders Only on 04/04/2025   Component Date Value    INR 04/30/2025 1.2 (H)     PT 04/30/2025 12.8 (H)         Assessment/Plan   Problem List[5]   This is a very pleasant 89-year-old female with history of TIA,  atrial fibrillation on Eliquis, coronary disease status post PCI to distal left dominant circumflex in September 2024.  Patient also has history of SVT.  Patient returns to my office for follow-up.  Patient was hospitalized on April 5, 2025 to [facility] for weakness severe anemia underwent EGD and colonoscopy where she was found to have diverticulosis.  I do not have the results of EGD.  Eliquis was held for several days and restarted after.  Patient returns to my office for follow-up.  Feeling overall okay.  Still tired on iron supplements.  Patient required blood transfusion during her hospitalization at [place].  Denies any chest pain palpitation dizziness lightheadedness or syncope.    Reviewed patient medications she is currently on verapamil amlodipine and beta-blockers.  I will keep the verapamil with carvedilol.  Patient has been having in the past episodes of SVT where  her heart rate goes up in the 180s.  Currently her heart rate is reasonably controlled.  I will stop the amlodipine and put her on hydralazine 50 mg oral twice daily for better blood pressure control.  Continue isosorbide and losartan.  Will monitor blood pressure and heart rate.  At this point she is a 89-year-old very functional lady with GI bleeding that required blood transfusion on Eliquis.  She had also history of stents in the past and she was on clopidogrel.  I will stop clopidogrel start aspirin in 5 days from now.  Continue Eliquis for now 1 we will refer for Watchman device evaluation with Dr. Green.  I do believe the benefit of Watchman device in her case with recent GI bleeding and need for antiplatelet therapy due to her stent will be very beneficial.  Discussed with patient and her son.  They are agreeable.  Will follow-up in 3 months.      Nessa Callaway MD                [1]   Past Medical History:  Diagnosis Date    Abnormality of plasma protein 03/13/2025    Acute kidney injury 03/12/2025    Acute non-ST segment elevation myocardial infarction (Multi) 11/26/2023    Anemia     Arteriosclerosis of coronary artery 05/14/2023    Arthritis     Asthma     Atrial fibrillation (Multi) 11/26/2023    Jonas esophagus     Bilateral carotid artery occlusion 05/23/2023    Bradycardia 02/21/2025    CAD (coronary artery disease)     Cardiac rhythm disorder or disturbance or change 11/26/2023    Cervical radiculopathy     Chest pain 11/26/2023    Chronic heart failure with preserved ejection fraction 02/21/2025    Chronic obstructive pulmonary disease (Multi) 11/26/2023    Constipation     Coronary artery disease 11/26/2023    Degenerative joint disease     Dyslipidemia     Dysuria     Erosive esophagitis     Erythema of skin 04/07/2025    Fall at home, initial encounter 03/11/2025    Gastrointestinal hemorrhage with melena 04/08/2025    GERD (gastroesophageal reflux disease)     GIB (gastrointestinal bleeding)  04/07/2025    Hematochezia 04/08/2025    Hypercholesterolemia 05/23/2023    Hyperparathyroidism (Multi)     Hypertensive urgency 11/26/2023    Impaired fasting glucose 11/26/2023    Iron deficiency anemia due to chronic blood loss 04/08/2025    Irritable bowel syndrome (IBS)     Labile essential hypertension 11/26/2023    Labile hypertension     Left foot pain     Low blood pressure 11/26/2023    Lung infiltrate on CT 02/24/2025    Macular degeneration     Mixed hyperlipidemia 11/26/2023    Neck pain     Osteoporosis 11/26/2023    REclast '19 Cr 1.4 switch to calcitonin.    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    Stage 3 chronic kidney disease (Multi) 11/26/2023    Stage 3a chronic kidney disease (Multi) 02/21/2025    Stroke (Multi)     Supratherapeutic INR 03/12/2025    Transient ischemic attack 11/26/2023   [2]   Past Surgical History:  Procedure Laterality Date    CARDIAC CATHETERIZATION Left 9/27/2024    Procedure: Left Heart Cath;  Surgeon: Nessa Callaway MD;  Location: [facility] Cardiac Cath Lab;  Service: Cardiovascular;  Laterality: Left;    CARDIAC CATHETERIZATION N/A 9/27/2024    Procedure: PCI;  Surgeon: Nessa Callaway MD;  Location: [facility] Cardiac Cath Lab;  Service: Cardiovascular;  Laterality: N/A;    CARDIOVASCULAR STRESS TEST  2017    Dr. Khalil    CARDIOVASCULAR STRESS TEST  2004    Dr. Pantoja    CARPAL TUNNEL RELEASE Left 2014    CATARACT EXTRACTION Bilateral 2011    CHOLECYSTECTOMY      CORONARY STENT PLACEMENT  05/2023    LAD    CT HEAD ANGIO W AND WO IV CONTRAST  05/08/2013    CT HEAD ANGIO W AND WO IV CONTRAST [facility]    MR HEAD ANGIO WO IV  "CONTRAST  04/23/2013    MR HEAD ANGIO WO IV CONTRAST [facility]    MR HEAD ANGIO WO IV CONTRAST  07/20/2016    MR HEAD ANGIO WO IV CONTRAST [facility]    MR HEAD ANGIO WO IV CONTRAST  10/27/2023    MR HEAD ANGIO WO IV CONTRAST 10/27/2023 [facility]    MR NECK ANGIO WO IV CONTRAST  10/27/2023    MR NECK ANGIO WO IV CONTRAST 10/27/2023 [facility] MRI    OTHER SURGICAL HISTORY  02/14/2022    No history of surgery    REVISION TOTAL HIP ARTHROPLASTY Left 2013    Conversion from left ORIF    TOTAL HIP ARTHROPLASTY Right 2016   [3]   Allergies  Allergen Reactions    Meperidine Hallucinations     Other reaction(s): Mental Status Change    Morphine Hallucinations    Opioids - Morphine Analogues Hallucinations and Confusion    Pregabalin Rash, Other and Unknown     \"Flu like symptoms\"    Flu like symtoms    Tramadol Rash, Itching and Unknown   [4]   Family History  Problem Relation Name Age of Onset    No Known Problems Mother      No Known Problems Father      No Known Problems Sister     [5]   Patient Active Problem List  Diagnosis    Cerebrovascular disease    Difficulty using verbal communication    Slurred speech    At risk for bleeding    Abnormal gait    Cardiac rhythm disorder or disturbance or change    Acute non-ST segment elevation myocardial infarction (Multi)    Anemia    Ankle pain    Anxiety    Weakness    Asthma    Rapid atrial fibrillation (Multi)    Cervical radiculopathy    Chronic obstructive pulmonary disease (Multi)    Arteriosclerosis of coronary artery    Cough    Dehydration    Dysfunction of both eustachian tubes    Dyspnea    Dysuria    Erosive esophagitis    Failed back syndrome    Fall    Fracture of patella    Gastroesophageal reflux disease    History of cerebrovascular accident    Hypercalcemia    Hyperparathyroidism (Multi)    Hypertensive urgency    Hypokalemia    Hyponatremia    Impaired fasting glucose    Arthritis of left glenohumeral joint    Injury of knee    Injury of neck    " Irritable bowel syndrome    Leucopenia    Macular degeneration    Hypercholesterolemia    Muscle weakness    Neck pain    Osteoporosis    Pain in left foot    Pneumonia    Osteoarthritis    Rib pain    Bilateral hearing loss    Sensorineural hearing loss (SNHL) of both ears    Somnolence    Spinal stenosis    Chronic kidney disease due to hypertension    Labile essential hypertension    Low blood pressure    Transient ischemic attack    Vitamin D deficiency    Bilateral carotid artery occlusion    Chronic pain of left upper extremity    Constipation    Do not resuscitate    Dysfunction of eustachian tube    History of anemia    Labral tear of long head of left biceps tendon    Osteoarthritis of left glenohumeral joint    Restless legs syndrome    Polyarteritis (Multi)    Sacroiliitis    Postlaminectomy syndrome of lumbar region    Lumbar radiculopathy    Hypocalcemia    Troponin level elevated    Other chest pain    PAF (paroxysmal atrial fibrillation) (Multi)    Essential hypertension    Bilateral pleural effusion    CVA (cerebral vascular accident) (Multi)

## 2025-07-05 DIAGNOSIS — I10 ESSENTIAL HYPERTENSION: ICD-10-CM

## 2025-07-07 ENCOUNTER — HOSPITAL ENCOUNTER (INPATIENT)
Facility: HOSPITAL | Age: OVER 89
LOS: 3 days | Discharge: HOME | DRG: 291 | End: 2025-07-10
Attending: STUDENT IN AN ORGANIZED HEALTH CARE EDUCATION/TRAINING PROGRAM | Admitting: INTERNAL MEDICINE
Payer: MEDICARE

## 2025-07-07 ENCOUNTER — APPOINTMENT (OUTPATIENT)
Dept: RADIOLOGY | Facility: HOSPITAL | Age: OVER 89
DRG: 291 | End: 2025-07-07
Payer: MEDICARE

## 2025-07-07 ENCOUNTER — HOSPITAL ENCOUNTER (INPATIENT)
Facility: HOSPITAL | Age: OVER 89
End: 2025-07-07
Attending: HOSPITALIST | Admitting: HOSPITALIST
Payer: MEDICARE

## 2025-07-07 ENCOUNTER — APPOINTMENT (OUTPATIENT)
Dept: CARDIOLOGY | Facility: HOSPITAL | Age: OVER 89
DRG: 291 | End: 2025-07-07
Payer: MEDICARE

## 2025-07-07 DIAGNOSIS — Z79.01 SUBTHERAPEUTIC ANTICOAGULATION: ICD-10-CM

## 2025-07-07 DIAGNOSIS — Z51.81 SUBTHERAPEUTIC ANTICOAGULATION: ICD-10-CM

## 2025-07-07 DIAGNOSIS — K92.1 GASTROINTESTINAL HEMORRHAGE WITH MELENA: ICD-10-CM

## 2025-07-07 DIAGNOSIS — R06.02 SHORTNESS OF BREATH: ICD-10-CM

## 2025-07-07 DIAGNOSIS — I50.9 CONGESTIVE HEART FAILURE, UNSPECIFIED HF CHRONICITY, UNSPECIFIED HEART FAILURE TYPE: ICD-10-CM

## 2025-07-07 DIAGNOSIS — I50.41 ACUTE COMBINED SYSTOLIC (CONGESTIVE) AND DIASTOLIC (CONGESTIVE) HEART FAILURE: ICD-10-CM

## 2025-07-07 DIAGNOSIS — I48.0 PAROXYSMAL ATRIAL FIBRILLATION (MULTI): ICD-10-CM

## 2025-07-07 DIAGNOSIS — R07.9 CHEST PAIN, UNSPECIFIED TYPE: ICD-10-CM

## 2025-07-07 DIAGNOSIS — I10 HYPERTENSION, UNSPECIFIED TYPE: ICD-10-CM

## 2025-07-07 DIAGNOSIS — I48.91 RAPID ATRIAL FIBRILLATION (MULTI): ICD-10-CM

## 2025-07-07 DIAGNOSIS — G45.9 TRANSIENT CEREBRAL ISCHEMIC ATTACK, UNSPECIFIED: ICD-10-CM

## 2025-07-07 DIAGNOSIS — I47.10 PAROXYSMAL SVT (SUPRAVENTRICULAR TACHYCARDIA): Primary | ICD-10-CM

## 2025-07-07 DIAGNOSIS — J90 BILATERAL PLEURAL EFFUSION: ICD-10-CM

## 2025-07-07 DIAGNOSIS — I50.31 ACUTE DIASTOLIC CONGESTIVE HEART FAILURE: ICD-10-CM

## 2025-07-07 LAB
ALBUMIN SERPL BCP-MCNC: 3.7 G/DL (ref 3.4–5)
ALP SERPL-CCNC: 71 U/L (ref 33–136)
ALT SERPL W P-5'-P-CCNC: 11 U/L (ref 7–45)
ANION GAP SERPL CALCULATED.3IONS-SCNC: 7 MMOL/L (ref 10–20)
AST SERPL W P-5'-P-CCNC: 13 U/L (ref 9–39)
BASOPHILS # BLD AUTO: 0.02 X10*3/UL (ref 0–0.1)
BASOPHILS NFR BLD AUTO: 0.3 %
BILIRUB SERPL-MCNC: 1 MG/DL (ref 0–1.2)
BNP SERPL-MCNC: 801 PG/ML (ref 0–99)
BUN SERPL-MCNC: 17 MG/DL (ref 6–23)
CALCIUM SERPL-MCNC: 9.5 MG/DL (ref 8.6–10.3)
CARDIAC TROPONIN I PNL SERPL HS: 14 NG/L (ref 0–13)
CARDIAC TROPONIN I PNL SERPL HS: 15 NG/L (ref 0–13)
CHLORIDE SERPL-SCNC: 103 MMOL/L (ref 98–107)
CO2 SERPL-SCNC: 31 MMOL/L (ref 21–32)
CREAT SERPL-MCNC: 0.78 MG/DL (ref 0.5–1.05)
EGFRCR SERPLBLD CKD-EPI 2021: 73 ML/MIN/1.73M*2
EOSINOPHIL # BLD AUTO: 0.12 X10*3/UL (ref 0–0.4)
EOSINOPHIL NFR BLD AUTO: 1.8 %
ERYTHROCYTE [DISTWIDTH] IN BLOOD BY AUTOMATED COUNT: 15.1 % (ref 11.5–14.5)
FLUAV RNA RESP QL NAA+PROBE: NOT DETECTED
FLUBV RNA RESP QL NAA+PROBE: NOT DETECTED
GLUCOSE SERPL-MCNC: 110 MG/DL (ref 74–99)
HCT VFR BLD AUTO: 28.3 % (ref 36–46)
HGB BLD-MCNC: 9.1 G/DL (ref 12–16)
IMM GRANULOCYTES # BLD AUTO: 0.02 X10*3/UL (ref 0–0.5)
IMM GRANULOCYTES NFR BLD AUTO: 0.3 % (ref 0–0.9)
INR PPP: 1 (ref 0.9–1.2)
LYMPHOCYTES # BLD AUTO: 0.69 X10*3/UL (ref 0.8–3)
LYMPHOCYTES NFR BLD AUTO: 10.6 %
MAGNESIUM SERPL-MCNC: 2.08 MG/DL (ref 1.6–2.4)
MCH RBC QN AUTO: 31.4 PG (ref 26–34)
MCHC RBC AUTO-ENTMCNC: 32.2 G/DL (ref 32–36)
MCV RBC AUTO: 98 FL (ref 80–100)
MONOCYTES # BLD AUTO: 0.59 X10*3/UL (ref 0.05–0.8)
MONOCYTES NFR BLD AUTO: 9 %
NEUTROPHILS # BLD AUTO: 5.09 X10*3/UL (ref 1.6–5.5)
NEUTROPHILS NFR BLD AUTO: 78 %
NRBC BLD-RTO: 0 /100 WBCS (ref 0–0)
PLATELET # BLD AUTO: 207 X10*3/UL (ref 150–450)
POTASSIUM SERPL-SCNC: 4.4 MMOL/L (ref 3.5–5.3)
PROT SERPL-MCNC: 6 G/DL (ref 6.4–8.2)
PROTHROMBIN TIME: 11.3 SECONDS (ref 9.3–12.7)
RBC # BLD AUTO: 2.9 X10*6/UL (ref 4–5.2)
RSV RNA RESP QL NAA+PROBE: NOT DETECTED
SARS-COV-2 RNA RESP QL NAA+PROBE: NOT DETECTED
SODIUM SERPL-SCNC: 137 MMOL/L (ref 136–145)
WBC # BLD AUTO: 6.5 X10*3/UL (ref 4.4–11.3)

## 2025-07-07 PROCEDURE — 2060000001 HC INTERMEDIATE ICU ROOM DAILY

## 2025-07-07 PROCEDURE — 84484 ASSAY OF TROPONIN QUANT: CPT | Performed by: CLINICAL NURSE SPECIALIST

## 2025-07-07 PROCEDURE — 83880 ASSAY OF NATRIURETIC PEPTIDE: CPT | Performed by: CLINICAL NURSE SPECIALIST

## 2025-07-07 PROCEDURE — 85025 COMPLETE CBC W/AUTO DIFF WBC: CPT | Performed by: CLINICAL NURSE SPECIALIST

## 2025-07-07 PROCEDURE — 2500000002 HC RX 250 W HCPCS SELF ADMINISTERED DRUGS (ALT 637 FOR MEDICARE OP, ALT 636 FOR OP/ED): Performed by: CLINICAL NURSE SPECIALIST

## 2025-07-07 PROCEDURE — 2500000001 HC RX 250 WO HCPCS SELF ADMINISTERED DRUGS (ALT 637 FOR MEDICARE OP): Performed by: CLINICAL NURSE SPECIALIST

## 2025-07-07 PROCEDURE — 96374 THER/PROPH/DIAG INJ IV PUSH: CPT

## 2025-07-07 PROCEDURE — 99291 CRITICAL CARE FIRST HOUR: CPT | Performed by: INTERNAL MEDICINE

## 2025-07-07 PROCEDURE — 93005 ELECTROCARDIOGRAM TRACING: CPT

## 2025-07-07 PROCEDURE — 71046 X-RAY EXAM CHEST 2 VIEWS: CPT | Performed by: RADIOLOGY

## 2025-07-07 PROCEDURE — 94640 AIRWAY INHALATION TREATMENT: CPT

## 2025-07-07 PROCEDURE — 83735 ASSAY OF MAGNESIUM: CPT | Performed by: CLINICAL NURSE SPECIALIST

## 2025-07-07 PROCEDURE — 2500000004 HC RX 250 GENERAL PHARMACY W/ HCPCS (ALT 636 FOR OP/ED): Performed by: CLINICAL NURSE SPECIALIST

## 2025-07-07 PROCEDURE — 80053 COMPREHEN METABOLIC PANEL: CPT | Performed by: CLINICAL NURSE SPECIALIST

## 2025-07-07 PROCEDURE — 36415 COLL VENOUS BLD VENIPUNCTURE: CPT | Performed by: CLINICAL NURSE SPECIALIST

## 2025-07-07 PROCEDURE — 85610 PROTHROMBIN TIME: CPT | Performed by: CLINICAL NURSE SPECIALIST

## 2025-07-07 PROCEDURE — 71046 X-RAY EXAM CHEST 2 VIEWS: CPT

## 2025-07-07 PROCEDURE — 99285 EMERGENCY DEPT VISIT HI MDM: CPT | Mod: 25 | Performed by: STUDENT IN AN ORGANIZED HEALTH CARE EDUCATION/TRAINING PROGRAM

## 2025-07-07 PROCEDURE — 87637 SARSCOV2&INF A&B&RSV AMP PRB: CPT | Performed by: CLINICAL NURSE SPECIALIST

## 2025-07-07 RX ORDER — CARVEDILOL 25 MG/1
25 TABLET ORAL
Qty: 60 TABLET | Refills: 1 | Status: SHIPPED | OUTPATIENT
Start: 2025-07-07 | End: 2025-07-10 | Stop reason: HOSPADM

## 2025-07-07 RX ORDER — CARVEDILOL 25 MG/1
25 TABLET ORAL ONCE
Status: COMPLETED | OUTPATIENT
Start: 2025-07-07 | End: 2025-07-07

## 2025-07-07 RX ORDER — HYDRALAZINE HYDROCHLORIDE 50 MG/1
50 TABLET, FILM COATED ORAL ONCE
Status: COMPLETED | OUTPATIENT
Start: 2025-07-07 | End: 2025-07-07

## 2025-07-07 RX ORDER — HYDROCHLOROTHIAZIDE 25 MG/1
25 TABLET ORAL DAILY
COMMUNITY

## 2025-07-07 RX ORDER — HYDRALAZINE HYDROCHLORIDE 50 MG/1
50 TABLET, FILM COATED ORAL 3 TIMES DAILY
Status: DISCONTINUED | OUTPATIENT
Start: 2025-07-07 | End: 2025-07-07

## 2025-07-07 RX ORDER — FUROSEMIDE 10 MG/ML
40 INJECTION INTRAMUSCULAR; INTRAVENOUS ONCE
Status: COMPLETED | OUTPATIENT
Start: 2025-07-07 | End: 2025-07-07

## 2025-07-07 RX ORDER — ASPIRIN 81 MG/1
81 TABLET ORAL DAILY
COMMUNITY

## 2025-07-07 RX ORDER — IPRATROPIUM BROMIDE AND ALBUTEROL SULFATE 2.5; .5 MG/3ML; MG/3ML
3 SOLUTION RESPIRATORY (INHALATION) ONCE
Status: COMPLETED | OUTPATIENT
Start: 2025-07-07 | End: 2025-07-07

## 2025-07-07 RX ORDER — ACETAMINOPHEN 325 MG/1
650 TABLET ORAL ONCE
Status: DISCONTINUED | OUTPATIENT
Start: 2025-07-07 | End: 2025-07-10 | Stop reason: HOSPADM

## 2025-07-07 RX ORDER — HYDRALAZINE HYDROCHLORIDE 20 MG/ML
10 INJECTION INTRAMUSCULAR; INTRAVENOUS EVERY 4 HOURS PRN
Status: DISCONTINUED | OUTPATIENT
Start: 2025-07-07 | End: 2025-07-10 | Stop reason: HOSPADM

## 2025-07-07 RX ADMIN — HYDRALAZINE HYDROCHLORIDE 50 MG: 50 TABLET ORAL at 14:44

## 2025-07-07 RX ADMIN — CARVEDILOL 25 MG: 25 TABLET, FILM COATED ORAL at 14:44

## 2025-07-07 RX ADMIN — IPRATROPIUM BROMIDE AND ALBUTEROL SULFATE 3 ML: 2.5; .5 SOLUTION RESPIRATORY (INHALATION) at 14:44

## 2025-07-07 RX ADMIN — FUROSEMIDE 40 MG: 10 INJECTION, SOLUTION INTRAMUSCULAR; INTRAVENOUS at 14:45

## 2025-07-07 ASSESSMENT — HEART SCORE
ECG: NON-SPECIFIC REPOLARIZATION DISTURBANCE
HEART SCORE: 5
RISK FACTORS: 1-2 RISK FACTORS
HISTORY: SLIGHTLY SUSPICIOUS
TROPONIN: 1-3 TIMES NORMAL LIMIT
AGE: 65+

## 2025-07-07 ASSESSMENT — PAIN - FUNCTIONAL ASSESSMENT: PAIN_FUNCTIONAL_ASSESSMENT: 0-10

## 2025-07-07 ASSESSMENT — PAIN SCALES - GENERAL
PAINLEVEL_OUTOF10: 0 - NO PAIN
PAINLEVEL_OUTOF10: 0 - NO PAIN

## 2025-07-07 NOTE — ED PROVIDER NOTES
"The patient was seen in conjunction with the advanced practice provider, and I performed a substantive portion of the encounter. The following is my supervisory note.    History:  Patient presents ED for reported shortness of breath, primarily exertional.  Notes symptoms have been progressively worse over the last few days.  Describes as difficulty to get a deep breath and.  Notes some chest tightness but is nonradiating, no alleviating factors.  Does not endorse any notable lightheadedness/dizziness, bilateral/general leg swelling.  Does not recall any history of DVT or PE and does not endorse any concerning red flags for DVT/PE.  States he been taking all of medications appropriately.  Does not appreciate any unexpected weight gain.  Notes no changes in urinary output.  Endorses feeling mildly improved since receiving breathing treatment by EMS.  Not experiencing any associated infectious symptoms to include fever/chills.  Denies any notable blood in stool or urine.  Denies any other significant systemic symptoms or complaints.    VS:  BP (!) 133/38 (BP Location: Left arm, Patient Position: Lying)   Pulse 63   Temp 36.3 °C (97.3 °F) (Temporal)   Resp 18   Ht (!) 1.499 m (4' 11\")   Wt 53.7 kg (118 lb 6.2 oz)   SpO2 93%   BMI 23.91 kg/m²      Physical exam:  CONST: alert, normal appearance, no acute distress, does not appear ill/toxic  NECK: no JVD  CV:  RRR, no murmurs, 2+ equal/symmetrical pulses x4, no BLE or unilateral leg swelling/edema  PULM: Kathy scattered expiratory wheezes without any appreciable decreased breath sounds/aeration, no evidence of rales/rhonchi,, no respiratory distress, not requiring supplemental O2  CHEST: non-tender, no crepitus/deformities, no paradoxical chest wall movement, no ecchymosis/erythema, no edema  ABD: soft, flat/non-tender/non-distended, no mass  SKIN: warm/dry, no pallor or jaundice, no rash  NEURO: A&Ox4, no focal neuro deficits      I personally reviewed and " interpreted the following studies: EKG is NSR 69, normal axis/intervals, no appreciable ischemia with nonspecific TW findings, labs are significant for elevated/interment stable/flat troponinemia, moderately elevated BNP, baseline normocytic anemia, images are notable for CXR moderate cardiomegaly with pulmonary vascular congestion and scattered opacities with evidence of emphysematous changes.      MDM:  Patient presented to the ED for shortness of breath worsening over the last few days and acutely become worsening today.  Concerning PMHx of CAD, COPD, HTN, HLD, A-fib, GERD, GIB.    Per Chart Review: Geriatrics office visit on 6/27/2025 for assessment, reviewed hospital course with patient was hospitalized for GIB found to have INR of 6.8, restarted Coumadin, no other concerning complaints during his visit and accompanied by her son who wants ensure that her medications are UTD, VSS, exam unremarkable/limited continue established medical management chronic comorbidities, continue multidisciplinary team management.    Assessment/evaluation multifactorial consistent with mild to moderate COPD exacerbation complicated by acute on chronic CHF. No concerning history, clinical evidence/work-up, or exam findings for the concerning differentials of ACS/MI, worsening anemia, significant lecture light/metabolic derangement, focal/multifocal lobar PNA, myocarditis/pericarditis, COVID/influenza/RSV viral syndrome. These conditions have been thoroughly evaluated and determined to be sufficiently unlikely to be the etiology of patient's presenting symptoms.     Scores: Heart 5 (12-16% MACE)    ED Course/Diagnosis:  ED Course as of 07/11/25 1539   Mon Jul 07, 2025   1330 HEMOGLOBIN(!): 9.1  Improved from baseline no signs of active bleeding [TB]   1345 I personally reviewed and interpreted the EKG @1312: NSR 69, normal axis/intervals and no appreciable ischemia, non-specific TW findings, and prior EKG on 10/28/2024 reviewed  without any appreciable specific/identifiable changes [BC]   1405 Reports she did not take any of her medications today.  Reporting that she feels short of breath again [TB]   1409 BNP(!): 801  Signs reassessed patient complaining of increased shortness of breath.  Fine crackles noted bilateral bases.  Will give Lasix.  Chest x-ray also showed Cardiomegaly and pulmonary vascular congestion on a background of  COPD.   [TB]   1412 Repeat blood pressure elevated 208/78 patient did not take her home medications Lasix ordered as well as her Coreg and hydralazine. [TB]   1501 Verified with patient's cardiology team okay to transfer to [facility] due to bed availability patient is scheduled to have a watchman placed by Dr. Green is still in the hospital can reschedule [TB]   1505 Patient reevaluated continues to have shortness of breath.  Worse with talking calms with rest.  Discussed admission versus going home patient does not feel comfortable going home at this time.  Due to her tachypnea with talking will discuss with admitting team discussed capacity with patient and she is amenable to transfer to [facility] for admission [TB]   1551 This case with hospitalist team at [facility] Dr. Wong has agreed to accept the patient to her service for further evaluation and treatment regular nursing floor telemetry [TB]   1628 Patient resting position of comfort.  No acute distress.  Family at bedside.  Sleeping awakens easily.  EMTALA form signed for transfer to [facility] [TB]      ED Course User Index  [BC] Jamil Gallegos MD  [TB] Sofi Sumner, APRN-CNP         Diagnoses as of 07/11/25 1539   Subtherapeutic anticoagulation   Congestive heart failure, unspecified HF chronicity, unspecified heart failure type   Shortness of breath   Hypertension, unspecified type   Chest pain, unspecified type       1. Paroxysmal SVT (supraventricular tachycardia)  carvedilol (Coreg) 6.25 mg tablet      2. Subtherapeutic anticoagulation         3. Congestive heart failure, unspecified HF chronicity, unspecified heart failure type        4. Shortness of breath        5. Hypertension, unspecified type        6. Chest pain, unspecified type  sucralfate (Carafate) 100 mg/mL suspension      7. Acute diastolic congestive heart failure  Transthoracic Echo (TTE) Limited    Transthoracic Echo (TTE) Limited    Oxygen therapy for home      8. Acute combined systolic (congestive) and diastolic (congestive) heart failure  furosemide (Lasix) 40 mg tablet      9. Bilateral pleural effusion  furosemide (Lasix) 40 mg tablet      10. Paroxysmal atrial fibrillation (Multi)  hydrALAZINE (Apresoline) 50 mg tablet      11. Gastrointestinal hemorrhage with melena  hydrALAZINE (Apresoline) 50 mg tablet      12. Transient cerebral ischemic attack, unspecified  hydrALAZINE (Apresoline) 50 mg tablet      13. Rapid atrial fibrillation (Multi)  warfarin (Coumadin) 5 mg tablet    DISCONTINUED: warfarin (Coumadin) 5 mg tablet               Jamil Gallegos MD  07/11/25 3778

## 2025-07-07 NOTE — PROGRESS NOTES
Rakan Cervantes is a 89 y.o. female admitted for No Principal Problem currently listed. Pharmacy reviewed the patient's [id number] medications and allergies for accuracy.    Medications ADDED:  aspirin 81 mg EC tablet  hydroCHLOROthiazide (HYDRODiuril) 25 mg tablet  Medications CHANGED:  sucralfate (Carafate) 100 mg/mL suspension  vit C/E/Zn/coppr/lutein/zeaxan (PRESERVISION AREDS-2 ORAL)  docusate sodium (Colace) 100 mg capsule  Medications REMOVED:   Stool Softener 100 mg capsule (duplicate)   atorvastatin (Lipitor) 80 mg tablet  furosemide (Lasix) 40 mg tablet  isosorbide mononitrate ER (Imdur) 120 mg 24 hr tablet  lidocaine (Lidoderm) 5 % patch  lidocaine 4 % patch  warfarin (Coumadin) 5 mg tablet    The list below reflects the updated PTA list. Comments regarding how patient may be taking medications differently can be found in the Admit Orders Activity  Prior to Admission Medications   Prescriptions Last Dose Informant   DULoxetine (Cymbalta) 30 mg DR capsule 7/6/2025 Evening Child   Sig: TAKE 1 CAPSULE BY MOUTH ONCE DAILY at dinner      ascorbic acid (Vitamin C) 100 mg tablet 7/6/2025 Morning Child   Sig: Take 1 tablet (100 mg) by mouth once daily.   aspirin 81 mg EC tablet 7/6/2025 Morning Child   Sig: Take 1 tablet (81 mg) by mouth once daily.      carvedilol (Coreg) 25 mg tablet 7/6/2025 Evening Child   Sig: TAKE 1 TABLET (25 MG) BY MOUTH 2 TIMES DAILY   (MORNING AND LATE AFTERNOON).   cholecalciferol (Vitamin D-3) 50 mcg (2,000 unit) capsule 7/6/2025 Morning Child   Sig: Take 1 capsule (2,000 Units) by mouth early in the   morning..   docusate sodium (Colace) 100 mg capsule PRN Child   Sig: Take 1 capsule (100 mg) by mouth if needed.   ferrous sulfate (FeroSuL) 325 mg (65 mg elemental) tablet 7/4/2025 Morning Child   Sig: Take 1 tablet by mouth 3 times a week. Monday,   Wednesday, Friday      gabapentin (Neurontin) 300 mg capsule 7/6/2025 Bedtime Child   Sig: TAKE 1 CAPSULE BY MOUTH AT NOON AND  BEDTIME   hydrALAZINE (Apresoline) 50 mg tablet 7/6/2025 Bedtime Child   Sig: Take 1 tablet (50 mg) by mouth 2 times a day.   hydroCHLOROthiazide (HYDRODiuril) 25 mg tablet 7/6/2025 Morning Child   Sig: Take 1 tablet (25 mg) by mouth once daily.   hydroxychloroquine (Plaquenil) 200 mg tablet 7/6/2025 Morning Child   Sig: Take 1 tablet (200 mg) by mouth once daily.      losartan (Cozaar) 100 mg tablet 7/6/2025 Morning Child   Sig: Take 1 tablet (100 mg) by mouth once daily.   magnesium oxide (Mag-Ox) 400 mg (241.3 mg magnesium) tablet 7/6/2025 Evening Child   Sig: Take 1 tablet (400 mg) by mouth 2 times a day.   pantoprazole (ProtoNix) 40 mg EC tablet 7/6/2025 Evening Child   Sig: TAKE 1 TABLET BY MOUTH TWICE DAILY   potassium chloride CR 20 mEq ER tablet 7/6/2025 Morning Child   Sig: Take 1 tablet (20 mEq) by mouth once daily.   Take with food.   ranolazine (Ranexa) 500 mg 12 hr tablet 7/6/2025 Evening Child   Sig: TAKE 1 TABLET (500 MG) BY MOUTH 2 TIMES A DAY.   DO NOT CRUSH, CHEW, OR SPLIT.   sucralfate (Carafate) 100 mg/mL suspension 7/6/2025 Morning Child   Sig: Take 10 mL (1 g) by mouth once daily   sulfaSALAzine (Azulfidine) 500 mg tablet 7/6/2025 Evening Child   Sig: Take 1 tablet (500 mg) by mouth 2 times a day.   verapamil SR (Calan-SR) 180 mg ER tablet 7/6/2025 Bedtime Child   Sig: Take 1 tablet (180 mg) by mouth once daily at   bedtime. Do not crush or chew.   vit C/E/Zn/coppr/lutein/zeaxan (PRESERVISION AREDS-2 ORAL) 7/6/2025 Evening Child   Sig: Take 1 capsule by mouth 2 times a day. As directed orally      Facility-Administered Medications: None        The list below reflects the updated allergy list. Please review each documented allergy for additional clarification and justification.  Allergies  Reviewed by Zeferino Danielle on 7/7/2025        Severity Reactions Comments    Meperidine High Hallucinations Other reaction(s): Mental Status Change    Morphine High Hallucinations     Opioids - Morphine  "Analogues Medium Hallucinations, Confusion     Pregabalin Low Rash, Other, Unknown \"Flu like symptoms\" Flu like symtoms    Tramadol Low Rash, Itching, Unknown             Pharmacy has been updated to [facility], [address] [phone].    Sources used to complete the med history include:   Patient interview with family at bedside, patient son able to confirm medications and doses, son was a good historian, dispense report, care everywhere, chart review history     Below are additional concerns with the patient's PTA list.  None    Zeferino Danielle CPhT  Please reach out via [employer] Secure Chat for questions    "

## 2025-07-07 NOTE — ED PROVIDER NOTES
Department of Emergency Medicine   ED  Provider Note  Admit Date/RoomTime: 7/7/2025 12:32 PM  ED Room: 16/16        History of Present Illness:  Chief Complaint   Patient presents with    Shortness of Breath     Pt to ED via EMS for SOB. History of asthma. From home with , Josemanuel. EMS gave duoneb x1.          Rakan Cervantes is a 89 y.o. female history of asthma, anemia, coronary artery disease, parathyroidism, hypercalcemia, macular degeneration, paroxysmal A-fib, on Coumadin,  CVA presents with shortness of breath.  Patient states he has been having shortness of breath for some time but today was worse.  She was having difficulty getting a breath in.  She had chest pain with the shortness of breath.  Described as tightness.  No nausea no sweating no lightheadedness or dizziness.  EMS was initiated due to the shortness of breath continue breathing treatment with improvement of the chest pain and the shortness of breath.  Patient is post to have a Watchman procedure done on Wednesday by Dr. Callaway cardiology for a malfunctioning valve per family.  Has a chronic runny nose no change.  No sore throat complains of headache no bodyaches.  No nausea vomiting or diarrhea no abdominal pain.  No pressure burning or frequency with urination no increased weight gain no swelling  Patient reports she has bruising to her left shoulder.  No injury    Review of Systems:   Pertinent positives and negatives are stated within HPI, all other systems reviewed and are negative.        --------------------------------------------- PAST HISTORY ---------------------------------------------  Past Medical History:  has a past medical history of Abnormality of plasma protein (03/13/2025), Acute kidney injury (03/12/2025), Acute non-ST segment elevation myocardial infarction (Multi) (11/26/2023), Anemia, Arteriosclerosis of coronary artery (05/14/2023), Arthritis, Asthma, Atrial fibrillation (Multi) (11/26/2023), Jonas esophagus,  Bilateral carotid artery occlusion (05/23/2023), Bradycardia (02/21/2025), CAD (coronary artery disease), Cardiac rhythm disorder or disturbance or change (11/26/2023), Cervical radiculopathy, Chest pain (11/26/2023), Chronic heart failure with preserved ejection fraction (02/21/2025), Chronic obstructive pulmonary disease (Multi) (11/26/2023), Constipation, Coronary artery disease (11/26/2023), Degenerative joint disease, Dyslipidemia, Dysuria, Erosive esophagitis, Erythema of skin (04/07/2025), Fall at home, initial encounter (03/11/2025), Gastrointestinal hemorrhage with melena (04/08/2025), GERD (gastroesophageal reflux disease), GIB (gastrointestinal bleeding) (04/07/2025), Hematochezia (04/08/2025), Hypercholesterolemia (05/23/2023), Hyperparathyroidism (Multi), Hypertensive urgency (11/26/2023), Impaired fasting glucose (11/26/2023), Iron deficiency anemia due to chronic blood loss (04/08/2025), Irritable bowel syndrome (IBS), Labile essential hypertension (11/26/2023), Labile hypertension, Left foot pain, Low blood pressure (11/26/2023), Lung infiltrate on CT (02/24/2025), Macular degeneration, Mixed hyperlipidemia (11/26/2023), Neck pain, Osteoporosis (11/26/2023), Paroxysmal atrial fibrillation (Multi), Pneumonia of left upper lobe due to infectious organism (02/21/2025), Polyarthritis with negative rheumatoid factor (Multi), Post menopausal syndrome, Rapid atrial fibrillation (Multi) (05/14/2023), Right wrist pain (04/08/2025), Sepsis (Multi) (02/21/2025), Septic shock (Multi) (02/21/2025), Spinal stenosis, 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  Past Surgical History:  has a past surgical history that includes Other surgical history (02/14/2022); MR angio head wo IV contrast (04/23/2013); CT angio head w and wo IV  contrast (05/08/2013); MR angio head wo IV contrast (07/20/2016); Cholecystectomy; Cardiovascular stress test (2017); Cardiovascular stress test (2004); Cataract extraction (Bilateral, 2011); Carpal tunnel release (Left, 2014); Total hip arthroplasty (Right, 2016); Revision total hip arthroplasty (Left, 2013); Coronary stent placement (05/2023); MR angio head wo IV contrast (10/27/2023); MR angio neck wo IV contrast (10/27/2023); Cardiac catheterization (Left, 9/27/2024); and Cardiac catheterization (N/A, 9/27/2024).  Social History:  reports that she has never smoked. She has never been exposed to tobacco smoke. She has never used smokeless tobacco. She reports current alcohol use. She reports that she does not use drugs.  Family History: family history includes No Known Problems in her father, mother, and sister.. Unless otherwise noted, family history is non contributory  The patient’s home medications have been reviewed.  Allergies: Meperidine, Morphine, Opioids - morphine analogues, Pregabalin, and Tramadol        ---------------------------------------------------PHYSICAL EXAM--------------------------------------    GENERAL APPEARANCE: Awake and alert.   VITAL SIGNS: As per the nurses' triage record.  Elevated blood pressure  HEENT: Normocephalic, atraumatic. Extraocular muscles are intact. Pupils equal round and reactive to light. Conjunctiva are pink. Negative scleral icterus. Mucous membranes are moist. Tongue in the midline. Pharynx was without erythema or exudates, uvula midline  NECK: Soft Nontender and supple, full gross ROM, no meningeal signs.  CHEST: Nontender to palpation.  Diminished bilateral posterior bases.  No use of accessory muscles or nasal flaring noted mild tachypnea noted with talking calms with rest.  Clear to auscultation bilaterally. No rales, rhonchi, or wheezing.   HEART: S1, S2. Regular rate and rhythm. No murmurs, gallops or rubs.  Strong and equal pulses in the extremities.  "  ABDOMEN: Soft, nontender, nondistended, positive bowel sounds, no palpable masses.  MUSCULCSKELETAL: The calves are nontender to palpation. Full gross active range of motion.  Peripheral pulses intact.  NEUROLOGICAL: Awake, alert and oriented x 3. Power intact in the upper and lower extremities. Sensation is intact to light touch in the upper and lower extremities.   IMMUNOLOGICAL: No lymphatic streaking noted   DERM: No petechiae, rashes,   Noted to the left anterior chest and left shoulder.  No complaints of pain.          ------------------------- NURSING NOTES AND VITALS REVIEWED ---------------------------  The nursing notes within the ED encounter and vital signs as below have been reviewed by myself  BP (!) 206/66   Pulse 69   Temp 36.7 °C (98.1 °F) (Temporal)   Resp 18   Ht (!) 1.499 m (4' 11\")   Wt 54 kg (119 lb)   SpO2 95%   BMI 24.04 kg/m²     Oxygen Saturation Interpretation: 93% room air    The cardiac monitor revealed sinus rhythm with a heart rate in the 60s as interpreted by me. The cardiac monitor was ordered secondary to the patient's heart rate and to monitor the patient for dysrhythmia.       The patient’s available past medical records and past encounters were reviewed.          -----------------------DIAGNOSTIC RESULTS------------------------  LABS:    Labs Reviewed   CBC WITH AUTO DIFFERENTIAL - Abnormal       Result Value    WBC 6.5      nRBC 0.0      RBC 2.90 (*)     Hemoglobin 9.1 (*)     Hematocrit 28.3 (*)     MCV 98      MCH 31.4      MCHC 32.2      RDW 15.1 (*)     Platelets 207      Neutrophils % 78.0      Immature Granulocytes %, Automated 0.3      Lymphocytes % 10.6      Monocytes % 9.0      Eosinophils % 1.8      Basophils % 0.3      Neutrophils Absolute 5.09      Immature Granulocytes Absolute, Automated 0.02      Lymphocytes Absolute 0.69 (*)     Monocytes Absolute 0.59      Eosinophils Absolute 0.12      Basophils Absolute 0.02     COMPREHENSIVE METABOLIC PANEL - " Abnormal    Glucose 110 (*)     Sodium 137      Potassium 4.4      Chloride 103      Bicarbonate 31      Anion Gap 7 (*)     Urea Nitrogen 17      Creatinine 0.78      eGFR 73      Calcium 9.5      Albumin 3.7      Alkaline Phosphatase 71      Total Protein 6.0 (*)     AST 13      Bilirubin, Total 1.0      ALT 11     SERIAL TROPONIN-INITIAL - Abnormal    Troponin I, High Sensitivity 15 (*)     Narrative:     Less than 99th percentile of normal range cutoff-  Female and children under 18 years old <14 ng/L; Male <21 ng/L: Negative  Repeat testing should be performed if clinically indicated.     Female and children under 18 years old 14-50 ng/L; Male 21-50 ng/L:  Consistent with possible cardiac damage and possible increased clinical   risk. Serial measurements may help to assess extent of myocardial damage.     >50 ng/L: Consistent with cardiac damage, increased clinical risk and  myocardial infarction. Serial measurements may help assess extent of   myocardial damage.      NOTE: Children less than 1 year old may have higher baseline troponin   levels and results should be interpreted in conjunction with the overall   clinical context.     NOTE: Troponin I testing is performed using a different   testing methodology at [facility] than at other   [facility]. Direct result comparisons should only   be made within the same method.   B-TYPE NATRIURETIC PEPTIDE - Abnormal     (*)     Narrative:        <100 pg/mL - Heart failure unlikely  100-299 pg/mL - Intermediate probability of acute heart                  failure exacerbation. Correlate with clinical                  context and patient history.    >=300 pg/mL - Heart Failure likely. Correlate with clinical                  context and patient history.    BNP testing is performed using different testing methodology at [facility] than at other [facility]. Direct result comparisons should only be made within the same  method.      SERIAL TROPONIN, 1 HOUR - Abnormal    Troponin I, High Sensitivity 14 (*)     Narrative:     Less than 99th percentile of normal range cutoff-  Female and children under 18 years old <14 ng/L; Male <21 ng/L: Negative  Repeat testing should be performed if clinically indicated.     Female and children under 18 years old 14-50 ng/L; Male 21-50 ng/L:  Consistent with possible cardiac damage and possible increased clinical   risk. Serial measurements may help to assess extent of myocardial damage.     >50 ng/L: Consistent with cardiac damage, increased clinical risk and  myocardial infarction. Serial measurements may help assess extent of   myocardial damage.      NOTE: Children less than 1 year old may have higher baseline troponin   levels and results should be interpreted in conjunction with the overall   clinical context.     NOTE: Troponin I testing is performed using a different   testing methodology at [facility] than at [facility]. Direct result comparisons should only   be made within the same method.   MAGNESIUM - Normal    Magnesium 2.08     SARS-COV-2, INFLUENZA A/B AND RSV PCR - Normal    Coronavirus 2019, PCR Not Detected      Flu A Result Not Detected      Flu B Result Not Detected      RSV PCR Not Detected      Narrative:     This assay is an FDA-cleared, in vitro diagnostic nucleic acid amplification test for the qualitative detection and differentiation of SARS CoV-2/ Influenza A/B/ RSV from nasopharyngeal specimens collected from individuals with signs and symptoms of respiratory tract infections, and has been validated for use at [facility]. Negative results do not preclude COVID-19/ Influenza A/B/ RSV infections and should not be used as the sole basis for diagnosis, treatment, or other management decisions. Testing for SARS CoV-2 is recommended only for patients who meet current clinical and/or epidemiological criteria defined by  federal, state, or local public health directives.   PROTIME-INR - Normal    Protime 11.3      INR 1.0      Narrative:     INR Therapeutic Range: 2.0-3.5   TROPONIN SERIES- (INITIAL, 1 HR)    Narrative:     The following orders were created for panel order Troponin I Series, High Sensitivity (0, 1 HR).  Procedure                               Abnormality         Status                     ---------                               -----------         ------                     Troponin I, High Sensiti...[[id number]]  Abnormal            Final result               Troponin, High Sensitivi...[[id number]]  Abnormal            Final result                 Please view results for these tests on the individual orders.       As interpreted by me, the above displayed labs are abnormal. All other labs obtained during this visit were within normal range or not returned as of this dictation.      EKG Interpretation    1345 I personally reviewed and interpreted the EKG @1312: NSR 69, normal axis/intervals and no appreciable ischemia, non-specific TW findings, and prior EKG on 10/28/2024 reviewed without any appreciable specific/identifiable changes [BC]           XR chest 2 views   Final Result   Cardiomegaly and pulmonary vascular congestion on a background of   COPD.        Signed by: Jl Foreman 7/7/2025 1:22 PM   Dictation workstation:   [id number]              XR chest 2 views   Final Result   Cardiomegaly and pulmonary vascular congestion on a background of   COPD.        Signed by: Jl Foreman 7/7/2025 1:22 PM   Dictation workstation:   [id number]              ------------------------------ ED COURSE/MEDICAL DECISION MAKING----------------------  Medical Decision Making:   Exam: A medically appropriate exam performed, outlined above, given the known history and presentation.    History obtained from: Review of medical record nursing notes patient      Social Determinants of Health considered during this visit: Lives  at home with family      PAST MEDICAL HISTORY/Chronic Conditions Affecting Care     has a past medical history of Abnormality of plasma protein (03/13/2025), Acute kidney injury (03/12/2025), Acute non-ST segment elevation myocardial infarction (Multi) (11/26/2023), Anemia, Arteriosclerosis of coronary artery (05/14/2023), Arthritis, Asthma, Atrial fibrillation (Multi) (11/26/2023), Jonas esophagus, Bilateral carotid artery occlusion (05/23/2023), Bradycardia (02/21/2025), CAD (coronary artery disease), Cardiac rhythm disorder or disturbance or change (11/26/2023), Cervical radiculopathy, Chest pain (11/26/2023), Chronic heart failure with preserved ejection fraction (02/21/2025), Chronic obstructive pulmonary disease (Multi) (11/26/2023), Constipation, Coronary artery disease (11/26/2023), Degenerative joint disease, Dyslipidemia, Dysuria, Erosive esophagitis, Erythema of skin (04/07/2025), Fall at home, initial encounter (03/11/2025), Gastrointestinal hemorrhage with melena (04/08/2025), GERD (gastroesophageal reflux disease), GIB (gastrointestinal bleeding) (04/07/2025), Hematochezia (04/08/2025), Hypercholesterolemia (05/23/2023), Hyperparathyroidism (Multi), Hypertensive urgency (11/26/2023), Impaired fasting glucose (11/26/2023), Iron deficiency anemia due to chronic blood loss (04/08/2025), Irritable bowel syndrome (IBS), Labile essential hypertension (11/26/2023), Labile hypertension, Left foot pain, Low blood pressure (11/26/2023), Lung infiltrate on CT (02/24/2025), Macular degeneration, Mixed hyperlipidemia (11/26/2023), Neck pain, Osteoporosis (11/26/2023), Paroxysmal atrial fibrillation (Multi), Pneumonia of left upper lobe due to infectious organism (02/21/2025), Polyarthritis with negative rheumatoid factor (Multi), Post menopausal syndrome, Rapid atrial fibrillation (Multi) (05/14/2023), Right wrist pain (04/08/2025), Sepsis (Multi) (02/21/2025), Septic shock (Multi) (02/21/2025), Spinal stenosis,  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       CC/HPI Summary, Social Determinants of health, Records Reviewed, DDx, testing done/not done, ED Course, Reassessment, disposition considerations/shared decision making with patient, consults, disposition:   Presents with shortness of breath cough chest pain  Plan  Chest x-ray, CBC, CMP, magnesium, COVID flu RSV, troponin, Tylenol, EKG, BNP, PT/INR    Medical Decision Making/Differential Diagnosis:  Differentials include but limited to viral illness versus pneumonia versus asthma exacerbation versus acute coronary syndrome versus electrolyte abnormality versus dehydration versus arrhythmia  Review  Magnesium 2.08  Troponin 15  - Second 6.5  Hemoglobin 9.1  Glucose 110  Electrolytes unremarkable  Normal renal send  LFTs unremarkable  Coag panel INR 1 on Coumadin  proBNP 801  COVID flu  Patient presents with shortness of breath worsening over the past couple of days today was worse.  Received a breathing treatment in squad with improvement.  On clinical exam lungs are clear she is 93% on room air.  No peripheral edema.  Chest x-ray showed Cardiomegaly and pulmonary vascular congestion on a background of  COPD.  No elevation white blood cell count.  Anemia history of the same improved from baseline no signs of active bleeding.  Electrolytes unremarkable.  Normal renal function.  Normal LFTs.  proBNP is elevated at 801.  INR 1 subtherapeutic on Coumadin.  Troponin 00425 repeat troponin 14 patient did complain of some chest pain earlier today.  EKG per attending note nonspecific T wave changes no ST elevation or arrhythmia noted.  Heart score of 5 no complaints of chest pain at this time  Upon reevaluation patient initially had improvement of breathing after receiving breathing treatment  and EMS.  Reports increased shortness of breath IV Lasix and repeat breathing treatment ordered patient had improvement of breath sounds and shortness of breath after second breathing treatment.  Still has tachypnea with talking.  Discussed admission with patient versus going home patient does not feel comfortable being discharged home at this time and with her tachypnea and elevated blood pressure discussed case with hospitalist team was agreed to admit for further evaluation and treatment.  Also notified her cardiologist okay for transfer to [facility] due to capacity  Patient seen and evaluated with attending physician Dr. Gallegos   PROCEDURES  Unless otherwise noted below, none      CONSULTS:   None  Consult cardiology Dr. Callaway     ED Course as of 07/07/25 1640   Mon Jul 07, 2025   1330 HEMOGLOBIN(!): 9.1  Improved from baseline no signs of active bleeding [TB]   1345 I personally reviewed and interpreted the EKG @1312: NSR 69, normal axis/intervals and no appreciable ischemia, non-specific TW findings, and prior EKG on 10/28/2024 reviewed without any appreciable specific/identifiable changes [BC]   1405 Reports she did not take any of her medications today.  Reporting that she feels short of breath again [TB]   1409 BNP(!): 801  Signs reassessed patient complaining of increased shortness of breath.  Fine crackles noted bilateral bases.  Will give Lasix.  Chest x-ray also showed Cardiomegaly and pulmonary vascular congestion on a background of  COPD.   [TB]   1412 Repeat blood pressure elevated 208/78 patient did not take her home medications Lasix ordered as well as her Coreg and hydralazine. [TB]   1501 Verified with patient's cardiology team okay to transfer to [facility] due to bed availability patient is scheduled to have a watchman placed by Dr. Green is still in the hospital can reschedule [TB]   1505 Patient reevaluated continues to have shortness of breath.  Worse with talking calms with rest.  Discussed  admission versus going home patient does not feel comfortable going home at this time.  Due to her tachypnea with talking will discuss with admitting team discussed capacity with patient and she is amenable to transfer to [facility] for admission [TB]   1551 This case with hospitalist team at [facility] Dr. Wong has agreed to accept the patient to her service for further evaluation and treatment regular nursing floor telemetry [TB]   1628 Patient resting position of comfort.  No acute distress.  Family at bedside.  Sleeping awakens easily.  EMTALA form signed for transfer to [facility] [TB]      ED Course User Index  [BC] Jamil Gallegos MD  [TB] Sofi Sumner, APRN-CNP         Diagnoses as of 07/07/25 1640   Subtherapeutic anticoagulation   Congestive heart failure, unspecified HF chronicity, unspecified heart failure type   Shortness of breath   Hypertension, unspecified type   Chest pain, unspecified type         This patient has remained hemodynamically stable during their ED course.      Critical Care:    Patient does not meet critical care criteria    Counseling:  The emergency provider has spoken with the patient family and discussed today’s results, in addition to providing specific details for the plan of care and counseling regarding the diagnosis and prognosis.  Questions are answered at this time and they are agreeable with the plan.         --------------------------------- IMPRESSION AND DISPOSITION ---------------------------------    IMPRESSION  1. Congestive heart failure, unspecified HF chronicity, unspecified heart failure type    2. Subtherapeutic anticoagulation    3. Shortness of breath    4. Hypertension, unspecified type    5. Chest pain, unspecified type        DISPOSITION  Disposition: Transfer to [facility] for admission under hospitalist service  Patient condition is regular nursing floor telemetry        NOTE: This report was transcribed using voice recognition software. Every effort  was made to ensure accuracy; however, inadvertent computerized transcription errors may be present      MERCED Santos  07/07/25 [id number]       MERCED Santos  07/07/25 0470

## 2025-07-08 LAB
HOLD SPECIMEN: NORMAL
HOLD SPECIMEN: NORMAL
INR PPP: 1.1 (ref 0.9–1.2)
PROTHROMBIN TIME: 11.8 SECONDS (ref 9.3–12.7)

## 2025-07-08 PROCEDURE — 2500000005 HC RX 250 GENERAL PHARMACY W/O HCPCS: Performed by: INTERNAL MEDICINE

## 2025-07-08 PROCEDURE — 2500000001 HC RX 250 WO HCPCS SELF ADMINISTERED DRUGS (ALT 637 FOR MEDICARE OP): Performed by: INTERNAL MEDICINE

## 2025-07-08 PROCEDURE — 97116 GAIT TRAINING THERAPY: CPT | Mod: GP

## 2025-07-08 PROCEDURE — 2500000004 HC RX 250 GENERAL PHARMACY W/ HCPCS (ALT 636 FOR OP/ED): Performed by: INTERNAL MEDICINE

## 2025-07-08 PROCEDURE — 99223 1ST HOSP IP/OBS HIGH 75: CPT | Performed by: INTERNAL MEDICINE

## 2025-07-08 PROCEDURE — 2060000001 HC INTERMEDIATE ICU ROOM DAILY

## 2025-07-08 PROCEDURE — 85610 PROTHROMBIN TIME: CPT | Performed by: INTERNAL MEDICINE

## 2025-07-08 PROCEDURE — 36415 COLL VENOUS BLD VENIPUNCTURE: CPT | Performed by: INTERNAL MEDICINE

## 2025-07-08 PROCEDURE — 97161 PT EVAL LOW COMPLEX 20 MIN: CPT | Mod: GP

## 2025-07-08 PROCEDURE — 2500000002 HC RX 250 W HCPCS SELF ADMINISTERED DRUGS (ALT 637 FOR MEDICARE OP, ALT 636 FOR OP/ED): Performed by: INTERNAL MEDICINE

## 2025-07-08 PROCEDURE — 99232 SBSQ HOSP IP/OBS MODERATE 35: CPT | Performed by: INTERNAL MEDICINE

## 2025-07-08 RX ORDER — HYDROXYCHLOROQUINE SULFATE 200 MG/1
200 TABLET, FILM COATED ORAL DAILY
Status: DISCONTINUED | OUTPATIENT
Start: 2025-07-08 | End: 2025-07-10 | Stop reason: HOSPADM

## 2025-07-08 RX ORDER — GABAPENTIN 100 MG/1
200 CAPSULE ORAL 2 TIMES DAILY
Status: DISCONTINUED | OUTPATIENT
Start: 2025-07-08 | End: 2025-07-10 | Stop reason: HOSPADM

## 2025-07-08 RX ORDER — POTASSIUM CHLORIDE 20 MEQ/1
20 TABLET, EXTENDED RELEASE ORAL
Status: DISCONTINUED | OUTPATIENT
Start: 2025-07-08 | End: 2025-07-10 | Stop reason: HOSPADM

## 2025-07-08 RX ORDER — FUROSEMIDE 40 MG/1
40 TABLET ORAL DAILY
Status: DISCONTINUED | OUTPATIENT
Start: 2025-07-08 | End: 2025-07-10 | Stop reason: HOSPADM

## 2025-07-08 RX ORDER — SUCRALFATE 1 G/10ML
1 SUSPENSION ORAL EVERY 6 HOURS SCHEDULED
Status: DISCONTINUED | OUTPATIENT
Start: 2025-07-08 | End: 2025-07-10 | Stop reason: HOSPADM

## 2025-07-08 RX ORDER — LOSARTAN POTASSIUM 100 MG/1
100 TABLET ORAL DAILY
Status: DISCONTINUED | OUTPATIENT
Start: 2025-07-08 | End: 2025-07-09

## 2025-07-08 RX ORDER — IPRATROPIUM BROMIDE AND ALBUTEROL SULFATE 2.5; .5 MG/3ML; MG/3ML
3 SOLUTION RESPIRATORY (INHALATION) EVERY 2 HOUR PRN
Status: DISCONTINUED | OUTPATIENT
Start: 2025-07-08 | End: 2025-07-10 | Stop reason: HOSPADM

## 2025-07-08 RX ORDER — WARFARIN SODIUM 5 MG/1
5 TABLET ORAL DAILY
Status: DISCONTINUED | OUTPATIENT
Start: 2025-07-08 | End: 2025-07-10 | Stop reason: HOSPADM

## 2025-07-08 RX ORDER — FERROUS SULFATE 325(65) MG
1 TABLET ORAL 3 TIMES WEEKLY
Status: DISCONTINUED | OUTPATIENT
Start: 2025-07-08 | End: 2025-07-10 | Stop reason: HOSPADM

## 2025-07-08 RX ORDER — ATORVASTATIN CALCIUM 80 MG/1
80 TABLET, FILM COATED ORAL NIGHTLY
Status: DISCONTINUED | OUTPATIENT
Start: 2025-07-08 | End: 2025-07-10 | Stop reason: HOSPADM

## 2025-07-08 RX ORDER — NITROGLYCERIN 20 MG/G
1 OINTMENT TOPICAL ONCE
Status: COMPLETED | OUTPATIENT
Start: 2025-07-08 | End: 2025-07-08

## 2025-07-08 RX ORDER — ASPIRIN 81 MG/1
81 TABLET ORAL DAILY
Status: DISCONTINUED | OUTPATIENT
Start: 2025-07-08 | End: 2025-07-10 | Stop reason: HOSPADM

## 2025-07-08 RX ORDER — PANTOPRAZOLE SODIUM 40 MG/1
40 TABLET, DELAYED RELEASE ORAL 2 TIMES DAILY
Status: DISCONTINUED | OUTPATIENT
Start: 2025-07-08 | End: 2025-07-10 | Stop reason: HOSPADM

## 2025-07-08 RX ORDER — VERAPAMIL HYDROCHLORIDE 180 MG/1
180 TABLET, EXTENDED RELEASE ORAL NIGHTLY
Status: DISCONTINUED | OUTPATIENT
Start: 2025-07-08 | End: 2025-07-10 | Stop reason: HOSPADM

## 2025-07-08 RX ORDER — DULOXETIN HYDROCHLORIDE 30 MG/1
30 CAPSULE, DELAYED RELEASE ORAL
Status: DISCONTINUED | OUTPATIENT
Start: 2025-07-08 | End: 2025-07-10 | Stop reason: HOSPADM

## 2025-07-08 RX ORDER — LABETALOL HYDROCHLORIDE 5 MG/ML
10 INJECTION, SOLUTION INTRAVENOUS ONCE
Status: COMPLETED | OUTPATIENT
Start: 2025-07-08 | End: 2025-07-08

## 2025-07-08 RX ORDER — DOCUSATE SODIUM 100 MG/1
100 CAPSULE, LIQUID FILLED ORAL DAILY PRN
Status: DISCONTINUED | OUTPATIENT
Start: 2025-07-08 | End: 2025-07-10 | Stop reason: HOSPADM

## 2025-07-08 RX ORDER — SULFASALAZINE 500 MG/1
500 TABLET ORAL 2 TIMES DAILY
Status: DISCONTINUED | OUTPATIENT
Start: 2025-07-08 | End: 2025-07-10 | Stop reason: HOSPADM

## 2025-07-08 RX ORDER — HYDRALAZINE HYDROCHLORIDE 50 MG/1
50 TABLET, FILM COATED ORAL 2 TIMES DAILY
Status: DISCONTINUED | OUTPATIENT
Start: 2025-07-08 | End: 2025-07-10 | Stop reason: HOSPADM

## 2025-07-08 RX ORDER — CARVEDILOL 25 MG/1
25 TABLET ORAL
Status: DISCONTINUED | OUTPATIENT
Start: 2025-07-08 | End: 2025-07-09

## 2025-07-08 RX ORDER — HYDROCHLOROTHIAZIDE 25 MG/1
25 TABLET ORAL DAILY
Status: DISCONTINUED | OUTPATIENT
Start: 2025-07-08 | End: 2025-07-10 | Stop reason: HOSPADM

## 2025-07-08 RX ORDER — RANOLAZINE 500 MG/1
500 TABLET, EXTENDED RELEASE ORAL 2 TIMES DAILY
Status: DISCONTINUED | OUTPATIENT
Start: 2025-07-08 | End: 2025-07-10 | Stop reason: HOSPADM

## 2025-07-08 RX ORDER — LANOLIN ALCOHOL/MO/W.PET/CERES
400 CREAM (GRAM) TOPICAL 2 TIMES DAILY
Status: DISCONTINUED | OUTPATIENT
Start: 2025-07-08 | End: 2025-07-10 | Stop reason: HOSPADM

## 2025-07-08 RX ADMIN — SULFASALAZINE 500 MG: 500 TABLET ORAL at 02:20

## 2025-07-08 RX ADMIN — HYDRALAZINE HYDROCHLORIDE 50 MG: 50 TABLET ORAL at 20:47

## 2025-07-08 RX ADMIN — SULFASALAZINE 500 MG: 500 TABLET ORAL at 20:47

## 2025-07-08 RX ADMIN — Medication 1 TABLET: at 01:40

## 2025-07-08 RX ADMIN — HYDRALAZINE HYDROCHLORIDE 10 MG: 20 INJECTION INTRAMUSCULAR; INTRAVENOUS at 00:28

## 2025-07-08 RX ADMIN — GABAPENTIN 200 MG: 100 CAPSULE ORAL at 20:46

## 2025-07-08 RX ADMIN — HYDRALAZINE HYDROCHLORIDE 50 MG: 50 TABLET ORAL at 01:40

## 2025-07-08 RX ADMIN — GABAPENTIN 200 MG: 100 CAPSULE ORAL at 10:18

## 2025-07-08 RX ADMIN — DULOXETINE HYDROCHLORIDE 30 MG: 30 CAPSULE, DELAYED RELEASE ORAL at 17:12

## 2025-07-08 RX ADMIN — SUCRALFATE 1 G: 1 SUSPENSION ORAL at 17:12

## 2025-07-08 RX ADMIN — IPRATROPIUM BROMIDE AND ALBUTEROL SULFATE 3 ML: 2.5; .5 SOLUTION RESPIRATORY (INHALATION) at 02:20

## 2025-07-08 RX ADMIN — RANOLAZINE 500 MG: 500 TABLET, FILM COATED, EXTENDED RELEASE ORAL at 10:25

## 2025-07-08 RX ADMIN — HYDRALAZINE HYDROCHLORIDE 50 MG: 50 TABLET ORAL at 10:25

## 2025-07-08 RX ADMIN — NITROGLYCERIN 1 INCH: 20 OINTMENT TOPICAL at 02:41

## 2025-07-08 RX ADMIN — FUROSEMIDE 40 MG: 40 TABLET ORAL at 10:18

## 2025-07-08 RX ADMIN — SULFASALAZINE 500 MG: 500 TABLET ORAL at 11:29

## 2025-07-08 RX ADMIN — FERROUS SULFATE TAB 325 MG (65 MG ELEMENTAL FE) 1 TABLET: 325 (65 FE) TAB at 01:40

## 2025-07-08 RX ADMIN — SUCRALFATE 1 G: 1 SUSPENSION ORAL at 23:35

## 2025-07-08 RX ADMIN — Medication 1 TABLET: at 10:17

## 2025-07-08 RX ADMIN — LABETALOL HYDROCHLORIDE 10 MG: 5 INJECTION INTRAVENOUS at 02:41

## 2025-07-08 RX ADMIN — LOSARTAN POTASSIUM 100 MG: 100 TABLET, FILM COATED ORAL at 10:33

## 2025-07-08 RX ADMIN — PANTOPRAZOLE SODIUM 40 MG: 40 TABLET, DELAYED RELEASE ORAL at 10:18

## 2025-07-08 RX ADMIN — GABAPENTIN 200 MG: 100 CAPSULE ORAL at 01:40

## 2025-07-08 RX ADMIN — RANOLAZINE 500 MG: 500 TABLET, FILM COATED, EXTENDED RELEASE ORAL at 20:47

## 2025-07-08 RX ADMIN — Medication 1 TABLET: at 20:46

## 2025-07-08 RX ADMIN — ATORVASTATIN CALCIUM 80 MG: 80 TABLET, FILM COATED ORAL at 20:46

## 2025-07-08 RX ADMIN — PANTOPRAZOLE SODIUM 40 MG: 40 TABLET, DELAYED RELEASE ORAL at 20:46

## 2025-07-08 RX ADMIN — WARFARIN SODIUM 5 MG: 5 TABLET ORAL at 17:12

## 2025-07-08 RX ADMIN — VERAPAMIL HYDROCHLORIDE 180 MG: 180 TABLET ORAL at 02:20

## 2025-07-08 RX ADMIN — ATORVASTATIN CALCIUM 80 MG: 80 TABLET, FILM COATED ORAL at 01:40

## 2025-07-08 RX ADMIN — PANTOPRAZOLE SODIUM 40 MG: 40 TABLET, DELAYED RELEASE ORAL at 01:40

## 2025-07-08 RX ADMIN — WARFARIN SODIUM 5 MG: 5 TABLET ORAL at 01:40

## 2025-07-08 RX ADMIN — POTASSIUM CHLORIDE EXTENDED-RELEASE 20 MEQ: 1500 TABLET ORAL at 10:33

## 2025-07-08 RX ADMIN — ASPIRIN 81 MG: 81 TABLET, DELAYED RELEASE ORAL at 10:19

## 2025-07-08 RX ADMIN — HYDROCHLOROTHIAZIDE 25 MG: 25 TABLET ORAL at 10:19

## 2025-07-08 RX ADMIN — VERAPAMIL HYDROCHLORIDE 180 MG: 180 TABLET ORAL at 20:47

## 2025-07-08 RX ADMIN — RANOLAZINE 500 MG: 500 TABLET, FILM COATED, EXTENDED RELEASE ORAL at 02:20

## 2025-07-08 RX ADMIN — HYDROXYCHLOROQUINE SULFATE 200 MG: 200 TABLET, FILM COATED ORAL at 10:17

## 2025-07-08 SDOH — ECONOMIC STABILITY: FOOD INSECURITY: WITHIN THE PAST 12 MONTHS, YOU WORRIED THAT YOUR FOOD WOULD RUN OUT BEFORE YOU GOT THE MONEY TO BUY MORE.: NEVER TRUE

## 2025-07-08 SDOH — SOCIAL STABILITY: SOCIAL INSECURITY: DOES ANYONE TRY TO KEEP YOU FROM HAVING/CONTACTING OTHER FRIENDS OR DOING THINGS OUTSIDE YOUR HOME?: NO

## 2025-07-08 SDOH — HEALTH STABILITY: MENTAL HEALTH: HOW MANY DRINKS CONTAINING ALCOHOL DO YOU HAVE ON A TYPICAL DAY WHEN YOU ARE DRINKING?: PATIENT DOES NOT DRINK

## 2025-07-08 SDOH — SOCIAL STABILITY: SOCIAL INSECURITY: DO YOU FEEL ANYONE HAS EXPLOITED OR TAKEN ADVANTAGE OF YOU FINANCIALLY OR OF YOUR PERSONAL PROPERTY?: NO

## 2025-07-08 SDOH — HEALTH STABILITY: MENTAL HEALTH: HOW OFTEN DO YOU HAVE SIX OR MORE DRINKS ON ONE OCCASION?: NEVER

## 2025-07-08 SDOH — SOCIAL STABILITY: SOCIAL NETWORK: HOW OFTEN DO YOU ATTEND CHURCH OR RELIGIOUS SERVICES?: MORE THAN 4 TIMES PER YEAR

## 2025-07-08 SDOH — SOCIAL STABILITY: SOCIAL NETWORK: HOW OFTEN DO YOU GET TOGETHER WITH FRIENDS OR RELATIVES?: ONCE A WEEK

## 2025-07-08 SDOH — ECONOMIC STABILITY: INCOME INSECURITY: IN THE PAST 12 MONTHS HAS THE ELECTRIC, GAS, OIL, OR WATER COMPANY THREATENED TO SHUT OFF SERVICES IN YOUR HOME?: NO

## 2025-07-08 SDOH — SOCIAL STABILITY: SOCIAL INSECURITY: ARE YOU MARRIED, WIDOWED, DIVORCED, SEPARATED, NEVER MARRIED, OR LIVING WITH A PARTNER?: MARRIED

## 2025-07-08 SDOH — ECONOMIC STABILITY: HOUSING INSECURITY: AT ANY TIME IN THE PAST 12 MONTHS, WERE YOU HOMELESS OR LIVING IN A SHELTER (INCLUDING NOW)?: NO

## 2025-07-08 SDOH — ECONOMIC STABILITY: FOOD INSECURITY: HOW HARD IS IT FOR YOU TO PAY FOR THE VERY BASICS LIKE FOOD, HOUSING, MEDICAL CARE, AND HEATING?: NOT HARD AT ALL

## 2025-07-08 SDOH — SOCIAL STABILITY: SOCIAL INSECURITY: WERE YOU ABLE TO COMPLETE ALL THE BEHAVIORAL HEALTH SCREENINGS?: YES

## 2025-07-08 SDOH — SOCIAL STABILITY: SOCIAL INSECURITY: HAVE YOU HAD ANY THOUGHTS OF HARMING ANYONE ELSE?: NO

## 2025-07-08 SDOH — SOCIAL STABILITY: SOCIAL NETWORK: HOW OFTEN DO YOU ATTEND MEETINGS OF THE CLUBS OR ORGANIZATIONS YOU BELONG TO?: NEVER

## 2025-07-08 SDOH — SOCIAL STABILITY: SOCIAL INSECURITY: HAS ANYONE EVER THREATENED TO HURT YOUR FAMILY OR YOUR PETS?: NO

## 2025-07-08 SDOH — HEALTH STABILITY: MENTAL HEALTH: HOW OFTEN DO YOU HAVE A DRINK CONTAINING ALCOHOL?: NEVER

## 2025-07-08 SDOH — ECONOMIC STABILITY: HOUSING INSECURITY: IN THE PAST 12 MONTHS, HOW MANY TIMES HAVE YOU MOVED WHERE YOU WERE LIVING?: 0

## 2025-07-08 SDOH — SOCIAL STABILITY: SOCIAL INSECURITY: ARE YOU OR HAVE YOU BEEN THREATENED OR ABUSED PHYSICALLY, EMOTIONALLY, OR SEXUALLY BY ANYONE?: NO

## 2025-07-08 SDOH — HEALTH STABILITY: PHYSICAL HEALTH: ON AVERAGE, HOW MANY DAYS PER WEEK DO YOU ENGAGE IN MODERATE TO STRENUOUS EXERCISE (LIKE A BRISK WALK)?: 0 DAYS

## 2025-07-08 SDOH — SOCIAL STABILITY: SOCIAL INSECURITY: DO YOU FEEL UNSAFE GOING BACK TO THE PLACE WHERE YOU ARE LIVING?: NO

## 2025-07-08 SDOH — ECONOMIC STABILITY: HOUSING INSECURITY: IN THE LAST 12 MONTHS, WAS THERE A TIME WHEN YOU WERE NOT ABLE TO PAY THE MORTGAGE OR RENT ON TIME?: NO

## 2025-07-08 SDOH — SOCIAL STABILITY: SOCIAL NETWORK
IN A TYPICAL WEEK, HOW MANY TIMES DO YOU TALK ON THE PHONE WITH FAMILY, FRIENDS, OR NEIGHBORS?: MORE THAN THREE TIMES A WEEK

## 2025-07-08 SDOH — ECONOMIC STABILITY: FOOD INSECURITY: WITHIN THE PAST 12 MONTHS, THE FOOD YOU BOUGHT JUST DIDN'T LAST AND YOU DIDN'T HAVE MONEY TO GET MORE.: NEVER TRUE

## 2025-07-08 SDOH — SOCIAL STABILITY: SOCIAL INSECURITY: WITHIN THE LAST YEAR, HAVE YOU BEEN AFRAID OF YOUR PARTNER OR EX-PARTNER?: NO

## 2025-07-08 SDOH — SOCIAL STABILITY: SOCIAL INSECURITY: WITHIN THE LAST YEAR, HAVE YOU BEEN HUMILIATED OR EMOTIONALLY ABUSED IN OTHER WAYS BY YOUR PARTNER OR EX-PARTNER?: NO

## 2025-07-08 SDOH — HEALTH STABILITY: PHYSICAL HEALTH: ON AVERAGE, HOW MANY MINUTES DO YOU ENGAGE IN EXERCISE AT THIS LEVEL?: 0 MIN

## 2025-07-08 SDOH — SOCIAL STABILITY: SOCIAL INSECURITY: ABUSE: ADULT

## 2025-07-08 SDOH — SOCIAL STABILITY: SOCIAL INSECURITY: HAVE YOU HAD THOUGHTS OF HARMING ANYONE ELSE?: NO

## 2025-07-08 SDOH — SOCIAL STABILITY: SOCIAL INSECURITY: ARE THERE ANY APPARENT SIGNS OF INJURIES/BEHAVIORS THAT COULD BE RELATED TO ABUSE/NEGLECT?: NO

## 2025-07-08 SDOH — ECONOMIC STABILITY: TRANSPORTATION INSECURITY: IN THE PAST 12 MONTHS, HAS LACK OF TRANSPORTATION KEPT YOU FROM MEDICAL APPOINTMENTS OR FROM GETTING MEDICATIONS?: NO

## 2025-07-08 ASSESSMENT — COGNITIVE AND FUNCTIONAL STATUS - GENERAL
DAILY ACTIVITIY SCORE: 18
DRESSING REGULAR LOWER BODY CLOTHING: A LITTLE
CLIMB 3 TO 5 STEPS WITH RAILING: A LOT
WALKING IN HOSPITAL ROOM: A LITTLE
WALKING IN HOSPITAL ROOM: A LITTLE
EATING MEALS: A LITTLE
PERSONAL GROOMING: A LITTLE
CLIMB 3 TO 5 STEPS WITH RAILING: A LOT
MOVING TO AND FROM BED TO CHAIR: A LITTLE
PATIENT BASELINE BEDBOUND: NO
TURNING FROM BACK TO SIDE WHILE IN FLAT BAD: A LITTLE
MOVING TO AND FROM BED TO CHAIR: A LITTLE
DRESSING REGULAR LOWER BODY CLOTHING: A LITTLE
DRESSING REGULAR UPPER BODY CLOTHING: A LITTLE
DAILY ACTIVITIY SCORE: 19
TOILETING: A LITTLE
STANDING UP FROM CHAIR USING ARMS: A LITTLE
HELP NEEDED FOR BATHING: A LITTLE
PERSONAL GROOMING: A LITTLE
MOVING TO AND FROM BED TO CHAIR: A LITTLE
STANDING UP FROM CHAIR USING ARMS: A LITTLE
MOBILITY SCORE: 18
HELP NEEDED FOR BATHING: A LITTLE
TURNING FROM BACK TO SIDE WHILE IN FLAT BAD: A LITTLE
TURNING FROM BACK TO SIDE WHILE IN FLAT BAD: A LITTLE
DRESSING REGULAR LOWER BODY CLOTHING: A LITTLE
HELP NEEDED FOR BATHING: A LITTLE
TOILETING: A LITTLE
TOILETING: A LITTLE
MOBILITY SCORE: 17
CLIMB 3 TO 5 STEPS WITH RAILING: A LOT
CLIMB 3 TO 5 STEPS WITH RAILING: A LOT
DAILY ACTIVITIY SCORE: 19
MOVING FROM LYING ON BACK TO SITTING ON SIDE OF FLAT BED WITH BEDRAILS: A LITTLE
MOBILITY SCORE: 17
STANDING UP FROM CHAIR USING ARMS: A LITTLE
WALKING IN HOSPITAL ROOM: A LITTLE
STANDING UP FROM CHAIR USING ARMS: A LITTLE
MOVING TO AND FROM BED TO CHAIR: A LITTLE
PERSONAL GROOMING: A LITTLE
DRESSING REGULAR UPPER BODY CLOTHING: A LITTLE
TURNING FROM BACK TO SIDE WHILE IN FLAT BAD: A LITTLE
MOBILITY SCORE: 18
WALKING IN HOSPITAL ROOM: A LITTLE
DRESSING REGULAR UPPER BODY CLOTHING: A LITTLE
MOVING FROM LYING ON BACK TO SITTING ON SIDE OF FLAT BED WITH BEDRAILS: A LITTLE

## 2025-07-08 ASSESSMENT — LIFESTYLE VARIABLES
HOW MANY STANDARD DRINKS CONTAINING ALCOHOL DO YOU HAVE ON A TYPICAL DAY: PATIENT DOES NOT DRINK
AUDIT-C TOTAL SCORE: 0
SKIP TO QUESTIONS 9-10: 1
AUDIT-C TOTAL SCORE: 0
AUDIT-C TOTAL SCORE: 0
HOW OFTEN DO YOU HAVE A DRINK CONTAINING ALCOHOL: NEVER
SKIP TO QUESTIONS 9-10: 1
SUBSTANCE_ABUSE_PAST_12_MONTHS: NO
HOW OFTEN DO YOU HAVE A DRINK CONTAINING ALCOHOL: NEVER
HOW OFTEN DO YOU HAVE 6 OR MORE DRINKS ON ONE OCCASION: NEVER
AUDIT-C TOTAL SCORE: 0
PRESCIPTION_ABUSE_PAST_12_MONTHS: NO
HOW MANY STANDARD DRINKS CONTAINING ALCOHOL DO YOU HAVE ON A TYPICAL DAY: PATIENT DOES NOT DRINK
AUDIT-C TOTAL SCORE: 0
SKIP TO QUESTIONS 9-10: 1
HOW OFTEN DO YOU HAVE 6 OR MORE DRINKS ON ONE OCCASION: NEVER

## 2025-07-08 ASSESSMENT — PATIENT HEALTH QUESTIONNAIRE - PHQ9
SUM OF ALL RESPONSES TO PHQ9 QUESTIONS 1 & 2: 0
1. LITTLE INTEREST OR PLEASURE IN DOING THINGS: NOT AT ALL
2. FEELING DOWN, DEPRESSED OR HOPELESS: NOT AT ALL
SUM OF ALL RESPONSES TO PHQ9 QUESTIONS 1 & 2: 0
1. LITTLE INTEREST OR PLEASURE IN DOING THINGS: NOT AT ALL
2. FEELING DOWN, DEPRESSED OR HOPELESS: NOT AT ALL

## 2025-07-08 ASSESSMENT — ACTIVITIES OF DAILY LIVING (ADL)
HEARING - LEFT EAR: HEARING AID
JUDGMENT_ADEQUATE_SAFELY_COMPLETE_DAILY_ACTIVITIES: YES
FEEDING YOURSELF: INDEPENDENT
DRESSING YOURSELF: NEEDS ASSISTANCE
GROOMING: NEEDS ASSISTANCE
DRESSING YOURSELF: NEEDS ASSISTANCE
HEARING - RIGHT EAR: HEARING AID
HEARING - LEFT EAR: HEARING AID
WALKS IN HOME: NEEDS ASSISTANCE
WALKS IN HOME: NEEDS ASSISTANCE
PATIENT'S MEMORY ADEQUATE TO SAFELY COMPLETE DAILY ACTIVITIES?: YES
ADEQUATE_TO_COMPLETE_ADL: YES
ADL_ASSISTANCE: NEEDS ASSISTANCE
JUDGMENT_ADEQUATE_SAFELY_COMPLETE_DAILY_ACTIVITIES: YES
TOILETING: NEEDS ASSISTANCE
BATHING: NEEDS ASSISTANCE
PATIENT'S MEMORY ADEQUATE TO SAFELY COMPLETE DAILY ACTIVITIES?: YES
BATHING: INDEPENDENT
GROOMING: NEEDS ASSISTANCE
LACK_OF_TRANSPORTATION: NO
ADEQUATE_TO_COMPLETE_ADL: YES
HEARING - RIGHT EAR: HEARING AID
ASSISTIVE_DEVICE: WALKER;OXYGEN
ASSISTIVE_DEVICE: WALKER
TOILETING: NEEDS ASSISTANCE
LACK_OF_TRANSPORTATION: NO

## 2025-07-08 ASSESSMENT — ENCOUNTER SYMPTOMS
ALLERGIC/IMMUNOLOGIC NEGATIVE: 1
ALTERED MENTAL STATUS: 0
PALPITATIONS: 0
NEUROLOGICAL NEGATIVE: 1
MUSCULOSKELETAL NEGATIVE: 1
ABDOMINAL PAIN: 0
DIZZINESS: 0
GASTROINTESTINAL NEGATIVE: 1
SYNCOPE: 0
NEAR-SYNCOPE: 0
CONSTITUTIONAL NEGATIVE: 1
ENDOCRINE NEGATIVE: 1
SHORTNESS OF BREATH: 1
PSYCHIATRIC NEGATIVE: 1
FEVER: 0
IRREGULAR HEARTBEAT: 0
HEMATOLOGIC/LYMPHATIC NEGATIVE: 1
EYES NEGATIVE: 1

## 2025-07-08 ASSESSMENT — PAIN SCALES - WONG BAKER
WONGBAKER_NUMERICALRESPONSE: NO HURT
WONGBAKER_NUMERICALRESPONSE: NO HURT

## 2025-07-08 ASSESSMENT — PAIN SCALES - GENERAL
PAINLEVEL_OUTOF10: 0 - NO PAIN

## 2025-07-08 ASSESSMENT — PAIN - FUNCTIONAL ASSESSMENT
PAIN_FUNCTIONAL_ASSESSMENT: 0-10
PAIN_FUNCTIONAL_ASSESSMENT: WONG-BAKER FACES

## 2025-07-08 NOTE — CONSULTS
Inpatient consult to Cardiology  Consult performed by: Natasha Mahan PA-C  Consult ordered by: Hazel Armando MD  Reason for consult: CHF        History Of Present Illness:    Rakan Cervantes is a 89 y.o. female presenting with history of TIA, parathyroidism, asthma, hypercalcemia, anemia, paroxysmal atrial fibrillation on warfarin, coronary disease status post PCI to distal left dominant circumflex in September 2024, hx of SVT with shortness of breath for the past few weeks.  Patient states she stopped taking her medications approximately 7-10 days ago due to difficulty swallowing pills.  Patient was given DuoNeb treatment by EMS during transport, and reports her shortness of breath significantly improved.  Patient denies any difficulty swallowing food, or liquids.  Chest x-ray reviewed showing cardiomegaly and pulmonary vascular congestion and COPD.  EKG reviewed by me showing normal sinus rhythm, no acute ischemia.  Labs reviewed, no electrolyte abnormalities, , troponins flat, 15, 14, hemoglobin 9.1, hematocrit 28.3.  Patient arrived significantly hypertensive 206/66, heart rate 69, 93% oxygen on room air.  Patient denies chest pain, palpitations, lower extremity edema, nausea, epigastric pain, or dizziness.  Patient does report chest tightness prior to DuoNeb treatment.  Patient lives at home with her son who takes care of her.  Patient was hospitalized on April 5, 2025 to [facility] for weakness severe anemia (blood transfusion required) and she  underwent EGD and colonoscopy where she was found to have diverticulosis. Blood thinner was held for several days and restarted after.  Patient still on iron supplements. She follows cardiology with Dr. Callaway.  Due to her GI bleed requiring transfusion patient was taken off clopidogrel for past stents (9/2024) and started on aspirin.  Patient was referred for Watchman device with Dr. Green, apt this Wednesday. Last echocardiogram 10/31/2024  reviewed showing an LVEF of 60-65%, moderate dilation of left atrium, mild to moderate MR, mild to moderate elevated right ventricular systolic pressure, and mild TR, AR and aortic stenosis.  Patient had a stress test 3/13/2025, no inducible cardiac ischemia.  Patient's heart rate during her past episodes of SVT in the 180's.    Review of Systems   Constitutional: Negative for fever.   Eyes:  Negative for visual disturbance.   Cardiovascular:  Positive for chest pain. Negative for irregular heartbeat, leg swelling, near-syncope, palpitations and syncope.   Respiratory:  Positive for shortness of breath.    Skin:  Negative for rash.   Gastrointestinal:  Negative for abdominal pain.   Neurological:  Negative for dizziness.   Psychiatric/Behavioral:  Negative for altered mental status.           Last Recorded Vitals:  Vitals:    07/08/25 0403 07/08/25 0516 07/08/25 0619 07/08/25 0751   BP: (!) 143/44 (!) 118/45 (!) 154/48 148/87   BP Location:    Right arm   Patient Position:    Lying   Pulse: 58 50 51 50   Resp: 18 18 16 18   Temp:    37 °C (98.6 °F)   TempSrc:    Oral   SpO2: 94% 96% 96% 98%   Weight:       Height:           Last Labs:  CBC - 7/7/2025: 12:50 PM  6.5 9.1 207    28.3      CMP - 7/7/2025: 12:50 PM  9.5 6.0 13 --- 1.0   2.7 3.7 11 71      PTT - 9/27/2024:  4:44 AM  1.1   11.8 52.6     Troponin I, High Sensitivity   Date/Time Value Ref Range Status   07/07/2025 01:39 PM 14 (H) 0 - 13 ng/L Final   07/07/2025 12:50 PM 15 (H) 0 - 13 ng/L Final   10/28/2024 06:04  (HH) 0 - 13 ng/L Final     Comment:     Previous result verified on 10/28/2024 1104 on specimen/case [id number] called with component [facility] for procedure Troponin, High Sensitivity, 1 Hour with value 96 ng/L.     BNP   Date/Time Value Ref Range Status   07/07/2025 12:50  (H) 0 - 99 pg/mL Final   10/28/2024 09:19  (H) 0 - 99 pg/mL Final     Hemoglobin A1C   Date/Time Value Ref Range Status   09/10/2024 09:14 AM 5.8 (H) See  below % Final   03/14/2024 11:10 AM 6.3 (H) See below % Final     LDL Calculated   Date/Time Value Ref Range Status   10/29/2024 05:34 AM 25 <=99 mg/dL Final     Comment:                                 Near   Borderline      AGE      Desirable  Optimal    High     High     Very High     0-19 Y     0 - 109     ---    110-129   >/= 130     ----    20-24 Y     0 - 119     ---    120-159   >/= 160     ----      >24 Y     0 -  99   100-129  130-159   160-189     >/=190     09/10/2024 09:14 AM 50 (L) 65 - 130 mg/dL Final   03/14/2024 11:10 AM 44 (L) 65 - 130 mg/dL Final     VLDL   Date/Time Value Ref Range Status   10/29/2024 05:34 AM 12 0 - 40 mg/dL Final      Last I/O:  No intake/output data recorded.    Past Cardiology Tests (Last 3 Years):  EKG:  Encounter Date: 07/07/25   ECG 12 lead   Result Value    Ventricular Rate 69    Atrial Rate 69    NM Interval 168    QRS Duration 92    QT Interval 398    QTC Calculation(Bazett) 426    P Axis 65    R Axis 19    T Axis 4    QRS Count 11    Q Onset 221    P Onset 137    P Offset 188    T Offset 420    QTC Fredericia 417    Narrative    Normal sinus rhythm  Nonspecific ST and T wave abnormality  Abnormal ECG  When compared with ECG of 28-OCT-2024 14:58,  ST no longer depressed in Anterior leads  T wave inversion no longer evident in Lateral leads         Echo:  Transthoracic Echo (TTE) Complete  Result Date: 11/1/2024           [place] [address]            Phone [phone] TRANSTHORACIC ECHOCARDIOGRAM REPORT Patient Name:       NICOLE Lock Physician:    [id number] Nessa Callaway MD Study Date:         10/31/2024           Ordering Provider:    [id number] SONJA BATISTA MRN/PID:            [id number]             Fellow: Accession#:         [id number]         Nurse: Date of Birth/Age:  1935 /  89 years Sonographer:          Beka N[facility] Gender Assigned at  F                    Additional Staff: Birth: Height:             149.86 cm            Admit Date: Weight:             51.26 kg             Admission Status:     Inpatient -                                                                Routine BSA / BMI:          1.45 m2 / 22.82      Department Location:  [facility]                     kg/m2 Blood Pressure: 155 /67 mmHg Study Type:    TRANSTHORACIC ECHO (TTE) COMPLETE Diagnosis/ICD: Acute combined systolic (congestive) and diastolic (congestive)                heart failure (CHF)-I50.41 Indication:    Congestive Heart Failure CPT Codes:     Echo Complete w Full Doppler-[id number] Patient History: Pertinent History: CHF, A-Fib, CAD, CVA, HTN, Hyperlipidemia and TIA. MI. Study Detail: The following Echo studies were performed: 2D, M-Mode, Doppler and               color flow. Technically challenging study due to body habitus and               patient lying in supine position.  PHYSICIAN INTERPRETATION: Left Ventricle: The left ventricular systolic function is normal, with a visually estimated ejection fraction of 60-65%. There is moderate eccentric left ventricular hypertrophy. There are no regional wall motion abnormalities. The left ventricular cavity size is normal. There is mild increased septal and normal posterior left ventricular wall thickness. Spectral Doppler shows a normal pattern of left ventricular diastolic filling. Left Atrium: The left atrium is moderately dilated. Right Ventricle: The right ventricle is normal in size. There is normal right ventricular global systolic function. Right Atrium: The right atrium is mildly dilated. Aortic Valve: The aortic valve is trileaflet. There is mild aortic valve thickening. There is evidence of mild aortic valve stenosis. There is mild aortic valve regurgitation. The peak instantaneous gradient of the aortic valve is 14 mmHg. Mitral Valve: The  mitral valve is normal in structure. There is mild mitral annular calcification. The peak instantaneous gradient of the mitral valve is 15 mmHg. Echo findings are consistent with normal mitral valve prosthesis structure and function. There is mild to moderate mitral valve regurgitation. Tricuspid Valve: The tricuspid valve is structurally normal. There is mild tricuspid regurgitation. The Doppler estimated RVSP is mild to moderately elevated at 52.3 mmHg. Pulmonic Valve: The pulmonic valve is structurally normal. There is mild pulmonic valve regurgitation. Pericardium: No pericardial effusion noted. Aorta: The aortic root is normal. Systemic Veins: The inferior vena cava appears normal in size, with IVC inspiratory collapse greater than 50%.  CONCLUSIONS:  1. The left ventricular systolic function is normal, with a visually estimated ejection fraction of 60-65%.  2. There is normal right ventricular global systolic function.  3. The left atrium is moderately dilated.  4. Mild to moderate mitral valve regurgitation.  5. Mild to moderately elevated right ventricular systolic pressure.  6. Mild tricuspid regurgitation is visualized.  7. Mild aortic valve stenosis.  8. Mild aortic valve regurgitation.  9. Echo findings are consistent with normal mitral valve prosthesis structure and function. QUANTITATIVE DATA SUMMARY:  2D MEASUREMENTS:            Normal Ranges: Ao Root d:       3.60 cm    (2.0-3.7cm) LAs:             4.70 cm    (2.7-4.0cm) IVSd:            0.99 cm    (0.6-1.1cm) LVPWd:           0.84 cm    (0.6-1.1cm) LVIDd:           4.99 cm    (3.9-5.9cm) LVIDs:           3.55 cm LV Mass Index:   111.7 g/m2 LV % FS          28.9 %  LA VOLUME:                  Normal Ranges: LA Volume Index: 58.0 ml/m2  RA VOLUME BY A/L METHOD:          Normal Ranges: RA Area A4C:             15.0 cm2  M-MODE MEASUREMENTS:         Normal Ranges: Ao Root:             3.50 cm (2.0-3.7cm) LAs:                 4.80 cm (2.7-4.0cm)  AORTA  MEASUREMENTS:         Normal Ranges: Asc Ao, d:          3.10 cm (2.1-3.4cm)  LV SYSTOLIC FUNCTION BY 2D PLANIMETRY (MOD):                      Normal Ranges: EF-A4C View:    58 % (>=55%) EF-A2C View:    63 % EF-Biplane:     59 % EF-Visual:      63 % LV EF Reported: 63 %  LV DIASTOLIC FUNCTION:           Normal Ranges: MV Peak E:             1.71 m/s  (0.7-1.2 m/s) MV Peak A:             1.14 m/s  (0.42-0.7 m/s) E/A Ratio:             1.50      (1.0-2.2) MV e'                  0.088 m/s (>8.0) MV lateral e'          0.10 m/s MV medial e'           0.07 m/s E/e' Ratio:            19.32     (<8.0)  MITRAL VALVE:           Normal Ranges: MV Vmax:      1.92 m/s  (<=1.3m/s) MV peak P.7 mmHg (<5mmHg) MV mean P.0 mmHg  (<48mmHg) MV DT:        218 msec  (150-240msec)  AORTIC VALVE:            Normal Ranges: AoV Vmax:      1.90 m/s  (<=1.7m/s) AoV Peak P.4 mmHg (<20mmHg) LVOT Max Herbert:  1.21 m/s  (<=1.1m/s) LVOT VTI:      27.80 cm LVOT Diameter: 2.30 cm   (1.8-2.4cm) AoV Area,Vmax: 2.65 cm2  (2.5-4.5cm2)  RIGHT VENTRICLE: RV Basal 4.48 cm RV Mid   3.15 cm RV Major 6.9 cm TAPSE:   21.2 mm RV s'    0.15 m/s  TRICUSPID VALVE/RVSP:          Normal Ranges: Peak TR Velocity:     3.51 m/s RV Syst Pressure:     52 mmHg  (< 30mmHg)  PULMONIC VALVE:          Normal Ranges: PV Max Herbert:     1.2 m/s  (0.6-0.9m/s) PV Max P.0 mmHg  [id number] Nessa Callaway MD Electronically signed on 2024 at 2:38:54 PM  ** Final **         Ejection Fractions:  EF   Date/Time Value Ref Range Status   10/31/2024 09:43 AM 63 %      Cath:  Cardiac Catheterization Procedure 2024    Stress Test:  Nuclear Stress Test 2025    Cardiac Imaging:  No results found for this or any previous visit from the past 1095 days.      Past Medical History:  She has a past medical history of Abnormality of plasma protein (2025), Acute kidney injury (2025), Acute non-ST segment elevation myocardial infarction (Multi) (2023),  Anemia, Arteriosclerosis of coronary artery (05/14/2023), Arthritis, Asthma, Atrial fibrillation (Multi) (11/26/2023), Jonas esophagus, Bilateral carotid artery occlusion (05/23/2023), Bradycardia (02/21/2025), CAD (coronary artery disease), Cardiac rhythm disorder or disturbance or change (11/26/2023), Cervical radiculopathy, Chest pain (11/26/2023), Chronic heart failure with preserved ejection fraction (02/21/2025), Chronic obstructive pulmonary disease (Multi) (11/26/2023), Constipation, Coronary artery disease (11/26/2023), Degenerative joint disease, Dyslipidemia, Dysuria, Erosive esophagitis, Erythema of skin (04/07/2025), Fall at home, initial encounter (03/11/2025), Gastrointestinal hemorrhage with melena (04/08/2025), GERD (gastroesophageal reflux disease), GIB (gastrointestinal bleeding) (04/07/2025), Hematochezia (04/08/2025), Hypercholesterolemia (05/23/2023), Hyperparathyroidism (Multi), Hypertensive urgency (11/26/2023), Impaired fasting glucose (11/26/2023), Iron deficiency anemia due to chronic blood loss (04/08/2025), Irritable bowel syndrome (IBS), Labile essential hypertension (11/26/2023), Labile hypertension, Left foot pain, Low blood pressure (11/26/2023), Lung infiltrate on CT (02/24/2025), Macular degeneration, Mixed hyperlipidemia (11/26/2023), Neck pain, Osteoporosis (11/26/2023), Paroxysmal atrial fibrillation (Multi), Pneumonia of left upper lobe due to infectious organism (02/21/2025), Polyarthritis with negative rheumatoid factor (Multi), Post menopausal syndrome, Rapid atrial fibrillation (Multi) (05/14/2023), Right wrist pain (04/08/2025), Sepsis (Multi) (02/21/2025), Septic shock (Multi) (02/21/2025), Spinal stenosis, 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    Past Surgical History:  She has  a past surgical history that includes Other surgical history (02/14/2022); MR angio head wo IV contrast (04/23/2013); CT angio head w and wo IV contrast (05/08/2013); MR angio head wo IV contrast (07/20/2016); Cholecystectomy; Cardiovascular stress test (2017); Cardiovascular stress test (2004); Cataract extraction (Bilateral, 2011); Carpal tunnel release (Left, 2014); Total hip arthroplasty (Right, 2016); Revision total hip arthroplasty (Left, 2013); Coronary stent placement (05/2023); MR angio head wo IV contrast (10/27/2023); MR angio neck wo IV contrast (10/27/2023); Cardiac catheterization (Left, 9/27/2024); and Cardiac catheterization (N/A, 9/27/2024).      Social History:  She reports that she has never smoked. She has never been exposed to tobacco smoke. She has never used smokeless tobacco. She reports current alcohol use. She reports that she does not use drugs.    Family History:  Family History[1]     Allergies:  Meperidine, Morphine, Opioids - morphine analogues, Pregabalin, and Tramadol    Inpatient Medications:  Scheduled Medications[2]  PRN Medications[3]  Continuous Medications[4]  Outpatient Medications:  Current Outpatient Medications   Medication Instructions    ascorbic acid (VITAMIN C) 100 mg, Daily    aspirin 81 mg, Daily    atorvastatin (LIPITOR) 80 mg, oral, Nightly    carvedilol (COREG) 25 mg, oral, 2 times daily (morning and late afternoon)    cholecalciferol (VITAMIN D-3) 2,000 Units, Daily    docusate sodium (Colace) 100 mg capsule 1 capsule, oral, As needed    DULoxetine (CYMBALTA) 30 mg, oral, Daily with evening meal    ferrous sulfate (FeroSuL) 325 mg (65 mg elemental) tablet 1 tablet, oral, 3 times weekly, Monday, Wednesday, Friday    furosemide (LASIX) 40 mg, oral, Daily    gabapentin (Neurontin) 300 mg capsule TAKE 1 CAPSULE BY MOUTH AT NOON AND BEDTIME    hydrALAZINE (APRESOLINE) 50 mg, oral, 2 times daily    hydroCHLOROthiazide (HYDRODIURIL) 25 mg, Daily    hydroxychloroquine  (PLAQUENIL) 200 mg, oral, Daily    isosorbide mononitrate ER (IMDUR) 120 mg, oral, Daily, Do not crush or chew.    lidocaine (Lidoderm) 5 % patch 1 patch, transdermal, Daily, Remove & discard patch within 12 hours or as directed by MD.    lidocaine 4 % patch 3 patches, transdermal, Daily, Remove & discard patch within 12 hours or as directed by MD.    losartan (COZAAR) 100 mg, oral, Daily    magnesium oxide (MAG-OX) 400 mg, oral, 2 times daily    pantoprazole (PROTONIX) 40 mg, oral, 2 times daily    potassium chloride CR 20 mEq ER tablet 20 mEq, oral, Daily, Take with food.    ranolazine (RANEXA) 500 mg, oral, 2 times daily, Do not crush, chew, or split.    Stool Softener 100 mg capsule TAKE 1 CAPSULE BY MOUTH ONCE DAILY AS NEEDED to prevent constipation from iron    sucralfate (CARAFATE) 1 g, oral, Every 6 hours scheduled    sulfaSALAzine (AZULFIDINE) 500 mg, oral, 2 times daily    verapamil SR (CALAN-SR) 180 mg, oral, Nightly, Do not crush or chew.    vit C/E/Zn/coppr/lutein/zeaxan (PRESERVISION AREDS-2 ORAL) 1 capsule, 2 times daily    warfarin (COUMADIN) 5 mg, oral, Nightly, Take as directed per After Visit Summary.       Physical Exam:  Appearance: Alert, oriented, cooperative, in no acute distress.     Eyes: Conjunctiva pink with no redness or exudates. Eyelids without lesions. No scleral icterus.     ENT: Hearing grossly intact. External inspection of ears without lesions or erythema. no nasal flaring. no tripoding, no drooling, no difficulty swallowing oral secretions.    Pulmonary: Faint expiratory wheezing in all lobes, no rales, rhonchi. No accessory muscle use or stridor. No difficulty completing a full sentence without taking a breath    Cardiac: regular rhythm, normal rate no rub, gallop. No JVD.    Musculoskeletal: No cyanosis, clubbing.  No lower extremity edema.  Capillary refill less than 2 seconds in lower extremities    Neurological: no focal findings identified.    Psychiatric: Appropriate mood  and affect.       Assessment/Plan     Acute diastolic heart failure-EF 60 to 65% 10/31/2024, no need to repeat at this time, restart patient on her home medications, monitor I/O's  Coronary artery disease-s/p stent Lt Circumflex 9/2024, Plavix stopped 6/18 due to GI bleed, continue on aspirin, atorvastatin  Hypertensive urgency-continue carvedilol, furosemide, hydralazine, hydrochlorothiazide, losartan, verapamil  Proximal atrial fibrillation-Watchman procedure re-scheduled for August, restarted on Warfarin by admitting   Anemia-approximately at patient's baseline hemoglobin, patient denies hematochezia.    Assessment and plan:    7/8: As above.  Patient is a 89 y.o. female presenting with history of TIA, parathyroidism, asthma, hypercalcemia, anemia, paroxysmal atrial fibrillation on warfarin, coronary disease status post PCI to distal left dominant circumflex in September 2024, hx of SVT with shortness of breath for the past few weeks.  Patient states she stopped taking her medications approximately 7-10 days ago due to difficulty swallowing pills, no difficulty with food or liquids.  She follows cardiology with Dr. Callaway.  Patient's chest x-ray, symptoms, and elevated BNP indicate mild CHF exacerbation, likely brought on by medication noncompliance.  Patient does not appear fluid overloaded on exam, no lower extremity edema.  Suspect patient's asthma may have also contributed to her shortness of breath, as her symptoms significantly improved with a DuoNeb treatment, and patient has faint expiratory wheezing in all lobes on exam.  Her troponins are mildly elevated but flat, level is consistent with a non-MI troponin elevation due to non-traumatic myocardial injury in the setting of anemia, and hypertensive urgency.  No ischemic changes on EKG.  Patient lives at home with her son who takes care of her.  Patient was scheduled to have Watchman procedure on Wednesday, rescheduled for August.  Patient was restarted on  Coumadin by admitting for stroke risk prevention in the setting of atrial fibrillation.  Patient was given 40 mg of IV Lasix in the ER, and was able to swallow her PO medications this morning atorvastatin 80 mg, hydralazine 50 mg twice daily, verapamil 180 mg, warfarin 5 mg.  Patient was given labetalol 10 mg IV this morning, for hypertension, rate controlled.  Monitor I's and O's.  Patient is feeling improved at this time.  We will continue to evaluate her ability to swallow her daily medications, and follow this patient with you.      Peripheral IV 07/07/25 20 G Left Antecubital (Active)   Site Assessment Clean;Dry;Intact 07/07/25 1222   Dressing Type Transparent 07/07/25 1222   Line Status Flushed 07/07/25 1222   Dressing Status Clean;Dry;Occlusive 07/07/25 1222   Number of days: 1       Code Status:  DNR and No Intubation and No ICU    I spent 60 minutes in the professional and overall care of this patient.        Natasha Mahan PA-C       [1]   Family History  Problem Relation Name Age of Onset    No Known Problems Mother      No Known Problems Father      No Known Problems Sister     [2]   Scheduled medications   Medication Dose Route Frequency    acetaminophen  650 mg oral Once    aspirin  81 mg oral Daily    atorvastatin  80 mg oral Nightly    carvedilol  25 mg oral BID    DULoxetine  30 mg oral Daily with evening meal    ferrous sulfate  1 tablet oral Once per day on Monday Wednesday Friday    furosemide  40 mg oral Daily    gabapentin  200 mg oral BID    hydrALAZINE  50 mg oral BID    hydroCHLOROthiazide  25 mg oral Daily    hydroxychloroquine  200 mg oral Daily    losartan  100 mg oral Daily    magnesium oxide  400 mg of magnesium oxide oral BID    pantoprazole  40 mg oral BID    potassium chloride CR  20 mEq oral Daily with breakfast    ranolazine  500 mg oral BID    sucralfate  1 g oral q6h ZEINAB    sulfaSALAzine  500 mg oral BID    verapamil SR  180 mg oral Nightly    warfarin  5 mg oral Daily   [3]    PRN medications   Medication    docusate sodium    hydrALAZINE    ipratropium-albuteroL   [4]   Continuous Medications   Medication Dose Last Rate

## 2025-07-08 NOTE — ED PROVIDER NOTES
Patient endorsed to me by the previous physician for disposition.  Patient's bed is no longer available at [facility].  The patient was admitted to [facility] to the hospitalist.     Peter Santos,   07/07/25 8554

## 2025-07-08 NOTE — PROGRESS NOTES
"Rakan Cervantes is a 89 y.o. female on day 1 of admission presenting with C this of breath, hypertensive urgency    Subjective   She presented with shortness of breath. She had an elevated blood pressure of 219/82 at presentation.  She has been started on antihypertensive medication.  She was on supplemental nasal oxygen.  Her  and son were at the bedside.  She had no acute complaints.  She reports feeling better overall.    Objective     Physical Exam  General: awake, alert, oriented, responsive  Cardiovascular: regular, normal S1 and S2  Lungs: good air entry bilaterally,  Extremities: no peripheral cyanosis, no pedal edema  Neuro: alert, oriented x 3, no focal weakness      Last Recorded Vitals  Blood pressure (!) 126/39, pulse 53, temperature 36.5 °C (97.7 °F), temperature source Temporal, resp. rate 17, height (!) 1.499 m (4' 11\"), weight 51.1 kg (112 lb 10.8 oz), SpO2 98%.  Intake/Output last 3 Shifts:  No intake/output data recorded.    Relevant Results  Lab Results   Component Value Date    WBC 6.5 07/07/2025    HGB 9.1 (L) 07/07/2025    HCT 28.3 (L) 07/07/2025    MCV 98 07/07/2025     07/07/2025     Lab Results   Component Value Date    GLUCOSE 110 (H) 07/07/2025    CALCIUM 9.5 07/07/2025     07/07/2025    K 4.4 07/07/2025    CO2 31 07/07/2025     07/07/2025    BUN 17 07/07/2025    CREATININE 0.78 07/07/2025     Scheduled medications  Scheduled Medications[1]  Continuous medications  Continuous Medications[2]  PRN medications  PRN Medications[3]      Assessment & Plan    Hypertensive urgency  Blood pressure improved  Continue carvedilol, hydralazine, hydrochlorothiazide, losartan, verapamil    Acute diastolic heart failure  She received IV furosemide, currently on oral    Paroxysmal atrial fibrillation  Heart rate controlled.  On carvedilol  Continue warfarin  Outpatient Watchman procedure planned    Coronary artery disease  Stable    Anemia  Hemoglobin is stable.  " Monitor    Plan  Continue antihypertensive medication  Wean supplemental nasal oxygen as tolerated    Panchito Mckee MD         [1] acetaminophen, 650 mg, oral, Once  aspirin, 81 mg, oral, Daily  atorvastatin, 80 mg, oral, Nightly  carvedilol, 25 mg, oral, BID  DULoxetine, 30 mg, oral, Daily with evening meal  ferrous sulfate, 1 tablet, oral, Once per day on Monday Wednesday Friday  furosemide, 40 mg, oral, Daily  gabapentin, 200 mg, oral, BID  hydrALAZINE, 50 mg, oral, BID  hydroCHLOROthiazide, 25 mg, oral, Daily  hydroxychloroquine, 200 mg, oral, Daily  losartan, 100 mg, oral, Daily  magnesium oxide, 400 mg of magnesium oxide, oral, BID  pantoprazole, 40 mg, oral, BID  potassium chloride CR, 20 mEq, oral, Daily with breakfast  ranolazine, 500 mg, oral, BID  sucralfate, 1 g, oral, q6h ZEINAB  sulfaSALAzine, 500 mg, oral, BID  verapamil SR, 180 mg, oral, Nightly  warfarin, 5 mg, oral, Daily  [2]    [3] PRN medications: docusate sodium, hydrALAZINE, ipratropium-albuteroL

## 2025-07-08 NOTE — PROGRESS NOTES
Physical Therapy    Physical Therapy Evaluation & Treatment    Patient Name: Rakan Cervantes  MRN: [id number]  Department: [address]  Room: 08/08-A  Today's Date: 7/8/2025   Time Calculation  Start Time: 1009  Stop Time: 1033  Time Calculation (min): 24 min    Assessment/Plan   PT Assessment  PT Assessment Results: Decreased strength, Decreased endurance, Impaired balance, Decreased mobility, Decreased coordination, Decreased safety awareness  Rehab Prognosis: Good  Barriers to Discharge Home: No anticipated barriers  Evaluation/Treatment Tolerance: Patient tolerated treatment well  Medical Staff Made Aware: Yes  Strengths: Ability to acquire knowledge, Attitude of self, Support of extended family/friends  Barriers to Participation: Comorbidities  End of Session Communication: Bedside nurse  Assessment Comment: pt demonstrates decline in functional mobility compared to baseline level. pt presents with impaired strength, balance and overall limited activity tolerance. pt would benefit from continued skilled PT prior to and upon discharge to address the above deficits in order to maximize functional performance and safety.  End of Session Patient Position: Up in chair, Alarm on (call bell in reach, all needs met. RN aware)   IP OR SWING BED PT PLAN  Inpatient or Swing Bed: Inpatient  PT Plan  Treatment/Interventions: Bed mobility, Transfer training, Gait training, Balance training, Neuromuscular re-education, Strengthening, Endurance training, Therapeutic exercise, Therapeutic activity, Home exercise program, Positioning, Postural re-education  PT Plan: Ongoing PT  PT Frequency: 3 times per week  PT Discharge Recommendations: Low intensity level of continued care  Equipment Recommended upon Discharge: Wheeled walker  PT Recommended Transfer Status: Assist x1, Assistive device (RW)  PT - OK to Discharge: Yes (PT POC initiated)      Subjective     PT Visit Info:  PT Received On: 07/08/25  General Visit  Information:  General  Reason for Referral: impaired mobility; pt is an 89 y/o female admitted to [facility] on 7/7 with SOB and chest pain  Referred By: Hazel Armando MD  Past Medical History Relevant to Rehab: Medical History[1]    Family/Caregiver Present: No  Prior to Session Communication: Bedside nurse  Patient Position Received: Bed, 3 rail up, Alarm on  Preferred Learning Style: verbal, visual  General Comment: pt cleared for therapy via RN, agreeable to participate; +tele, purewick (dislodged for mobility with brief donned- RN aware), +2L O2 via NC  Home Living:  Home Living  Type of Home: House  Lives With: Spouse  Home Adaptive Equipment: Walker rolling or standard, Cane, Other (Comment) (rollator)  Home Layout: One level  Home Access: Level entry, No concerns  Bathroom Shower/Tub: Tub/shower unit  Bathroom Toilet: Handicapped height  Bathroom Equipment: Grab bars in shower, Tub transfer bench, Grab bars around toilet  Home Living Comments: son lives nearby and checks in on patient often  Prior Level of Function:  Prior Function Per Pt/Caregiver Report  Level of [place]: Needs assistance with ADLs, Needs assistance with homemaking  Receives Help From: Family ( and son assist)  ADL Assistance: Needs assistance ( assists with dressing and bathing)  Homemaking Assistance: Needs assistance (pt receives meals on wheels; has cleaning help; husbands assists with laundry)  Driving/Transportation:  (son provides transportation)  Ambulatory Assistance: Independent (with RW)  Vocational: Retired  Prior Function Comments: hx of 1 falls in March  Precautions:  Precautions  Hearing/Visual Limitations: [place] - wears hearing aids (left at home); wears glasses  Medical Precautions: Fall precautions, Oxygen therapy device and L/min (2L O2 via NC)     Date/Time Vitals Session Patient Position Pulse Resp SpO2 BP MAP (mmHg)    07/08/25 0936 --  --  58  19  99 %  165/59  84     07/08/25 1009 During PT  --  64   --  97 %  --  --                Objective   Pain:  Pain Assessment  Pain Assessment: 0-10  0-10 (Numeric) Pain Score: 0 - No pain  Cognition:  Cognition  Overall Cognitive Status: Within Functional Limits  Orientation Level: Oriented X4  Following Commands: Follows one step commands without difficulty  Safety Judgment: Decreased awareness of need for safety precautions  Impulsive: Moderately  Processing Speed: Delayed    General Assessments:  General Observation  General Observation: pt is alert, pleasant and cooperative throughout session; [place]               Activity Tolerance  Endurance: Decreased tolerance for upright activites  Activity Tolerance Comments: limited by weakness and fatigue  Rate of Perceived Exertion (RPE): 4/10    Sensation  Light Touch: No apparent deficits  Sensation Comment: pt denies paresthesias    Strength  Strength Comments: B LEs >/= 3+/5; L LE appears weaker than R LE with ambulation as demonstrated via soft knee  Strength  Strength Comments: B LEs >/= 3+/5; L LE appears weaker than R LE with ambulation as demonstrated via soft knee           Coordination  Movements are Fluid and Coordinated: No  Coordination Comment: decreased rate and accuracy of movement    Postural Control  Postural Control: Impaired  Posture Comment: fwd head, head down posture, rounded shoulders; kyphotic posture    Static Sitting Balance  Static Sitting-Balance Support: Bilateral upper extremity supported, Feet supported  Static Sitting-Level of Assistance: Contact guard  Static Sitting-Comment/Number of Minutes: good+ sitting on EOB and in chair  Dynamic Sitting Balance  Dynamic Sitting-Balance Support: Bilateral upper extremity supported  Dynamic Sitting-Level of Assistance: Close supervision  Dynamic Sitting-Comments: good+ as MMT performed    Static Standing Balance  Static Standing-Balance Support: Bilateral upper extremity supported (on RW)  Static Standing-Level of Assistance: Contact guard  Static  Standing-Comment/Number of Minutes: good+  Dynamic Standing Balance  Dynamic Standing-Balance Support: Bilateral upper extremity supported (on RW)  Dynamic Standing-Level of Assistance: Contact guard  Dynamic Standing-Balance: Turning  Dynamic Standing-Comments: good-  Functional Assessments:  Bed Mobility  Bed Mobility: Yes  Bed Mobility 1  Bed Mobility 1: Supine to sitting, Scooting  Level of Assistance 1: Modified independent, Close supervision  Bed Mobility Comments 1: mod I with use of bedrail to elevate trunk; independently scooted toward EOB for improved alignment prior to stand    Transfers  Transfer: Yes  Transfer 1  Transfer From 1: Sit to, Stand to  Transfer to 1: Stand, Sit  Technique 1: Sit to stand, Stand to sit  Transfer Device 1: Walker  Transfer Level of Assistance 1: Minimum assistance  Trials/Comments 1: sit > stand from EOB with min A x 1 for trunk elevation; stand > sit in chair with min A x 1 for controlled eccentric lowering; VCs for safe hand placement    Ambulation/Gait Training  Ambulation/Gait Training Performed: Yes  Ambulation/Gait Training 1  Surface 1: Level tile  Device 1: Rolling walker  Assistance 1: Minimum assistance, Contact guard  Quality of Gait 1: Narrow base of support, Diminished heel strike, Inconsistent stride length, Decreased step length, Forward flexed posture, Soft knee(s) (reciprocal gait pattern, decreased sun)  Comments/Distance (ft) 1: pt amb 2x35' with use of RW and min A x 1 > CGA for safety and stability; +2 turns; pt with L LE soft knee demonstrating antalgic gait pattern/limp; however, pt denies any pain; VCs for head up with ambulation for improved safety awareness    Stairs  Stairs: No  Extremity/Trunk Assessments:  RLE   RLE :  (>/= 3+/5)  LLE   LLE :  (>/= 3+/5)  Treatments:            Bed Mobility  Bed Mobility: Yes  Bed Mobility 1  Bed Mobility 1: Supine to sitting, Scooting  Level of Assistance 1: Modified independent, Close supervision  Bed  Mobility Comments 1: mod I with use of bedrail to elevate trunk; independently scooted toward EOB for improved alignment prior to stand    Ambulation/Gait Training  Ambulation/Gait Training Performed: Yes  Ambulation/Gait Training 1  Surface 1: Level tile  Device 1: Rolling walker  Assistance 1: Minimum assistance, Contact guard  Quality of Gait 1: Narrow base of support, Diminished heel strike, Inconsistent stride length, Decreased step length, Forward flexed posture, Soft knee(s) (reciprocal gait pattern, decreased sun)  Comments/Distance (ft) 1: pt amb 2x35' with use of RW and min A x 1 > CGA for safety and stability; +2 turns; pt with L LE soft knee demonstrating antalgic gait pattern/limp; however, pt denies any pain; VCs for head up with ambulation for improved safety awareness  Transfers  Transfer: Yes  Transfer 1  Transfer From 1: Sit to, Stand to  Transfer to 1: Stand, Sit  Technique 1: Sit to stand, Stand to sit  Transfer Device 1: Walker  Transfer Level of Assistance 1: Minimum assistance  Trials/Comments 1: sit > stand from EOB with min A x 1 for trunk elevation; stand > sit in chair with min A x 1 for controlled eccentric lowering; VCs for safe hand placement    Stairs  Stairs: No       Outcome Measures:  [facility] Basic Mobility  Turning from your back to your side while in a flat bed without using bedrails: None  Moving from lying on your back to sitting on the side of a flat bed without using bedrails: A little  Moving to and from bed to chair (including a wheelchair): A little  Standing up from a chair using your arms (e.g. wheelchair or bedside chair): A little  To walk in hospital room: A little  Climbing 3-5 steps with railing: A lot  Basic Mobility - Total Score: 18    Encounter Problems       Encounter Problems (Active)       Mobility       STG - Patient will ambulate 200' with use of RW at an independent level, +2 turns, to facilitate safe mobility        Start:  07/08/25    Expected End:   07/22/25               Mobility       STG - pt will demonstrate improved B LE strength by 1 grade to facilitate safe mobility and transfers        Start:  07/08/25    Expected End:  07/22/25               PT Transfers       STG - Patient will perform bed mobility at an independent level to facilitate safe mobility        Start:  07/08/25    Expected End:  07/22/25            STG - Patient will transfer sit to and from stand at an independent level to facilitate safe mobility        Start:  07/08/25    Expected End:  07/22/25                   Education Documentation  Body Mechanics, taught by Bora Cardenas, PT at 7/8/2025 10:49 AM.  Learner: Patient  Readiness: Acceptance  Method: Explanation, Demonstration  Response: Verbalizes Understanding, Demonstrated Understanding  Comment: Reviewed POC, safe mobility techniques    Mobility Training, taught by Bora Cardenas, PT at 7/8/2025 10:49 AM.  Learner: Patient  Readiness: Acceptance  Method: Explanation, Demonstration  Response: Verbalizes Understanding, Demonstrated Understanding  Comment: Reviewed POC, safe mobility techniques    Education Comments  No comments found.           [1]   Past Medical History:  Diagnosis Date    Abnormality of plasma protein 03/13/2025    Acute kidney injury 03/12/2025    Acute non-ST segment elevation myocardial infarction (Multi) 11/26/2023    Anemia     Arteriosclerosis of coronary artery 05/14/2023    Arthritis     Asthma     Atrial fibrillation (Multi) 11/26/2023    Jonas esophagus     Bilateral carotid artery occlusion 05/23/2023    Bradycardia 02/21/2025    CAD (coronary artery disease)     Cardiac rhythm disorder or disturbance or change 11/26/2023    Cervical radiculopathy     Chest pain 11/26/2023    Chronic heart failure with preserved ejection fraction 02/21/2025    Chronic obstructive pulmonary disease (Multi) 11/26/2023    Constipation     Coronary artery disease 11/26/2023    Degenerative joint disease      Dyslipidemia     Dysuria     Erosive esophagitis     Erythema of skin 04/07/2025    Fall at home, initial encounter 03/11/2025    Gastrointestinal hemorrhage with melena 04/08/2025    GERD (gastroesophageal reflux disease)     GIB (gastrointestinal bleeding) 04/07/2025    Hematochezia 04/08/2025    Hypercholesterolemia 05/23/2023    Hyperparathyroidism (Multi)     Hypertensive urgency 11/26/2023    Impaired fasting glucose 11/26/2023    Iron deficiency anemia due to chronic blood loss 04/08/2025    Irritable bowel syndrome (IBS)     Labile essential hypertension 11/26/2023    Labile hypertension     Left foot pain     Low blood pressure 11/26/2023    Lung infiltrate on CT 02/24/2025    Macular degeneration     Mixed hyperlipidemia 11/26/2023    Neck pain     Osteoporosis 11/26/2023    REclast '19 Cr 1.4 switch to calcitonin.    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

## 2025-07-08 NOTE — H&P
History Of Present Illness  Rakan Cervantes is a 89 y.o. female presenting with shortness of breath and chest pain.  Patient has a past medical history of paroxysmal atrial fibrillation, TIA, coronary artery disease, non-STEMI, bilateral carotid artery occlusion, hypertension, hypertensive urgency.  I reviewed records from his visit to Dr. Callaway and recent visit to gerontologist.  I also discussed patient's case with your son over the phone.  Patient used to be on anticoagulation with Eliquis and also she was on Plavix for coronary artery disease because she had PCI with stent to distal left circumflex in September 2022 for.  Patient was admitted to [facility] in April 2025 due to GI bleeding when she was found to have diverticulosis.  At that time, decision was made to stop the Plavix and start patient on aspirin.  Eliquis was discontinued and patient was started on Coumadin.  Patient is supposed to have a Watchman procedure by Dr. Green.  Initially she was supposed to have it done on July 9 so she stopped taking Coumadin 10 days ago which explains her low INR today when she presented to the hospital.  They were informed today that.  Procedure was moved to August 1 yes so patient was supposed to go back on Coumadin.  There is some confusion in the chart.  For example, Dr. Callaway's note still mentions Eliquis even though patient is not on Eliquis anymore.  I confirmed with patient's son that patient was taking Coumadin until 10 days ago and she stopped it only anticipating Watchman procedure.  Patient presented to the emergency department today complaining of shortness of breath and chest discomfort.  Her blood pressure was 200 systolic.  She was initially accepted to [facility] but they do not have beds and after long stay in the emergency department she finally stays in [facility] to be admitted.  During exam, patient denies chest pain or shortness of breath.  Past Medical History  She has a  past medical history of Abnormality of plasma protein (03/13/2025), Acute kidney injury (03/12/2025), Acute non-ST segment elevation myocardial infarction (Multi) (11/26/2023), Anemia, Arteriosclerosis of coronary artery (05/14/2023), Arthritis, Asthma, Atrial fibrillation (Multi) (11/26/2023), Jonas esophagus, Bilateral carotid artery occlusion (05/23/2023), Bradycardia (02/21/2025), CAD (coronary artery disease), Cardiac rhythm disorder or disturbance or change (11/26/2023), Cervical radiculopathy, Chest pain (11/26/2023), Chronic heart failure with preserved ejection fraction (02/21/2025), Chronic obstructive pulmonary disease (Multi) (11/26/2023), Constipation, Coronary artery disease (11/26/2023), Degenerative joint disease, Dyslipidemia, Dysuria, Erosive esophagitis, Erythema of skin (04/07/2025), Fall at home, initial encounter (03/11/2025), Gastrointestinal hemorrhage with melena (04/08/2025), GERD (gastroesophageal reflux disease), GIB (gastrointestinal bleeding) (04/07/2025), Hematochezia (04/08/2025), Hypercholesterolemia (05/23/2023), Hyperparathyroidism (Multi), Hypertensive urgency (11/26/2023), Impaired fasting glucose (11/26/2023), Iron deficiency anemia due to chronic blood loss (04/08/2025), Irritable bowel syndrome (IBS), Labile essential hypertension (11/26/2023), Labile hypertension, Left foot pain, Low blood pressure (11/26/2023), Lung infiltrate on CT (02/24/2025), Macular degeneration, Mixed hyperlipidemia (11/26/2023), Neck pain, Osteoporosis (11/26/2023), Paroxysmal atrial fibrillation (Multi), Pneumonia of left upper lobe due to infectious organism (02/21/2025), Polyarthritis with negative rheumatoid factor (Multi), Post menopausal syndrome, Rapid atrial fibrillation (Multi) (05/14/2023), Right wrist pain (04/08/2025), Sepsis (Multi) (02/21/2025), Septic shock (Multi) (02/21/2025), Spinal stenosis, Splenic infarct (02/23/2025), Stage 3 chronic kidney disease (Multi) (11/26/2023), Stage  3a chronic kidney disease (Multi) (02/21/2025), Stroke (Multi), Supratherapeutic INR (03/12/2025), and Transient ischemic attack (11/26/2023).    Surgical History  She has a past surgical history that includes Other surgical history (02/14/2022); MR angio head wo IV contrast (04/23/2013); CT angio head w and wo IV contrast (05/08/2013); MR angio head wo IV contrast (07/20/2016); Cholecystectomy; Cardiovascular stress test (2017); Cardiovascular stress test (2004); Cataract extraction (Bilateral, 2011); Carpal tunnel release (Left, 2014); Total hip arthroplasty (Right, 2016); Revision total hip arthroplasty (Left, 2013); Coronary stent placement (05/2023); MR angio head wo IV contrast (10/27/2023); MR angio neck wo IV contrast (10/27/2023); Cardiac catheterization (Left, 9/27/2024); and Cardiac catheterization (N/A, 9/27/2024).     Social History  She reports that she has never smoked. She has never been exposed to tobacco smoke. She has never used smokeless tobacco. She reports current alcohol use. She reports that she does not use drugs.    Family History  Family History[1]     Allergies  Meperidine, Morphine, Opioids - morphine analogues, Pregabalin, and Tramadol    Review of Systems   Constitutional: Negative.    HENT: Negative.     Eyes: Negative.    Respiratory:  Positive for shortness of breath.    Cardiovascular:  Positive for chest pain.   Gastrointestinal: Negative.    Endocrine: Negative.    Genitourinary: Negative.    Musculoskeletal: Negative.    Skin: Negative.    Allergic/Immunologic: Negative.    Neurological: Negative.    Hematological: Negative.    Psychiatric/Behavioral: Negative.          Physical Exam   Date of exam: 7/7/2025.  Location: Emergency department, room 20.  Pi is in no apparent distress.  During exam.   Cooperative with exam.  Nontoxic-appearing.  Comfortable at rest on room air.  Skin is clean, dry.  No skin lesions, rashes, ecchymosis.  Head is atraumatic, normocephalic.  Mouth  mucosa is pink and moist.  No mucosal lesions.  No nasal discharge.  Musculoskeletal: No deformities, no muscle swelling.  No point tenderness to palpation.  Neck is supple, no JVD, no carotid bruits.  No lymphadenopathy.  Chest is atraumatic.  No tenderness to palpation.  Lungs are clear to auscultation bilaterally.  Diminished at bases no wheezes, no rales, no rhonchi.  Heart: Regular rate and rhythm S1-S2.  Systolic murmur  Peripheral pulses are palpable in all extremities, equal.  Abdomen is soft, not tender, not distended.  Bowel sounds positive in all quadrants.  No rebound, no guarding.  Rectal exam deferred.  Extremities: No peripheral edema, cords, cyanosis, varices.  Neuro: Patient is alert, oriented x3.  Moves all extremities.  No gross focal neurological deficits.  Face is symmetrical.  Tongue is midline.  No visual abnormalities.  No dysarthria or aphasia.  Psychiatric: Patient is cooperative with exam, maintains good eye contact.  No evidence of psychosis, suicidal ideation or depression.     Last Recorded Vitals  BP (!) 168/96   Pulse 87   Temp 36.7 °C (98.1 °F) (Temporal)   Resp 20   Wt 54 kg (119 lb)   SpO2 96%     Relevant Results        ECG 12 lead  Result Date: 7/7/2025  Normal sinus rhythm Nonspecific ST and T wave abnormality Abnormal ECG When compared with ECG of 28-OCT-2024 14:58, ST no longer depressed in Anterior leads T wave inversion no longer evident in Lateral leads    XR chest 2 views  Result Date: 7/7/2025  Interpreted By:  Jl Foreman, STUDY: XR CHEST 2 VIEWS; 7/7/2025 1:17 pm   INDICATION: Signs/Symptoms:shortness of breath   COMPARISON: November 2020 and October 2024.   ACCESSION NUMBER(S): [id number]   ORDERING CLINICIAN: LUIS LITTLEJOHN   TECHNIQUE: Number of films: Two-view radiographs of the chest were obtained.   FINDINGS: The cardiac silhouette is mildly enlarged. Calcifications involve the aorta. The lungs are hyperinflated, with prominent interstitial markings  bilaterally. Mild bilateral perihilar interstitial infiltrates and possible small left effusion There is no pneumothorax. Degenerative changes again involve the spine and left shoulder. Reversed right shoulder arthroplasty is again in position.       Cardiomegaly and pulmonary vascular congestion on a background of COPD.   Signed by: Jl Foreman 7/7/2025 1:22 PM Dictation workstation:   [id number]     Results for orders placed or performed during the hospital encounter of 07/07/25 (from the past 24 hours)   CBC and Auto Differential   Result Value Ref Range    WBC 6.5 4.4 - 11.3 x10*3/uL    nRBC 0.0 0.0 - 0.0 /100 WBCs    RBC 2.90 (L) 4.00 - 5.20 x10*6/uL    Hemoglobin 9.1 (L) 12.0 - 16.0 g/dL    Hematocrit 28.3 (L) 36.0 - 46.0 %    MCV 98 80 - 100 fL    MCH 31.4 26.0 - 34.0 pg    MCHC 32.2 32.0 - 36.0 g/dL    RDW 15.1 (H) 11.5 - 14.5 %    Platelets 207 150 - 450 x10*3/uL    Neutrophils % 78.0 40.0 - 80.0 %    Immature Granulocytes %, Automated 0.3 0.0 - 0.9 %    Lymphocytes % 10.6 13.0 - 44.0 %    Monocytes % 9.0 2.0 - 10.0 %    Eosinophils % 1.8 0.0 - 6.0 %    Basophils % 0.3 0.0 - 2.0 %    Neutrophils Absolute 5.09 1.60 - 5.50 x10*3/uL    Immature Granulocytes Absolute, Automated 0.02 0.00 - 0.50 x10*3/uL    Lymphocytes Absolute 0.69 (L) 0.80 - 3.00 x10*3/uL    Monocytes Absolute 0.59 0.05 - 0.80 x10*3/uL    Eosinophils Absolute 0.12 0.00 - 0.40 x10*3/uL    Basophils Absolute 0.02 0.00 - 0.10 x10*3/uL   Comprehensive Metabolic Panel   Result Value Ref Range    Glucose 110 (H) 74 - 99 mg/dL    Sodium 137 136 - 145 mmol/L    Potassium 4.4 3.5 - 5.3 mmol/L    Chloride 103 98 - 107 mmol/L    Bicarbonate 31 21 - 32 mmol/L    Anion Gap 7 (L) 10 - 20 mmol/L    Urea Nitrogen 17 6 - 23 mg/dL    Creatinine 0.78 0.50 - 1.05 mg/dL    eGFR 73 >60 mL/min/1.73m*2    Calcium 9.5 8.6 - 10.3 mg/dL    Albumin 3.7 3.4 - 5.0 g/dL    Alkaline Phosphatase 71 33 - 136 U/L    Total Protein 6.0 (L) 6.4 - 8.2 g/dL    AST 13 9 - 39 U/L     Bilirubin, Total 1.0 0.0 - 1.2 mg/dL    ALT 11 7 - 45 U/L   Magnesium   Result Value Ref Range    Magnesium 2.08 1.60 - 2.40 mg/dL   Troponin I, High Sensitivity, Initial   Result Value Ref Range    Troponin I, High Sensitivity 15 (H) 0 - 13 ng/L   B-type natriuretic peptide   Result Value Ref Range     (H) 0 - 99 pg/mL   Sars-CoV-2, Influenza A/B and RSV PCR   Result Value Ref Range    Coronavirus 2019, PCR Not Detected Not Detected    Flu A Result Not Detected Not Detected    Flu B Result Not Detected Not Detected    RSV PCR Not Detected Not Detected   Protime-INR   Result Value Ref Range    Protime 11.3 9.3 - 12.7 seconds    INR 1.0 0.9 - 1.2   ECG 12 lead   Result Value Ref Range    Ventricular Rate 69 BPM    Atrial Rate 69 BPM    NE Interval 168 ms    QRS Duration 92 ms    QT Interval 398 ms    QTC Calculation(Bazett) 426 ms    P Axis 65 degrees    R Axis 19 degrees    T Axis 4 degrees    QRS Count 11 beats    Q Onset 221 ms    P Onset 137 ms    P Offset 188 ms    T Offset 420 ms    QTC Fredericia 417 ms   Troponin, High Sensitivity, 1 Hour   Result Value Ref Range    Troponin I, High Sensitivity 14 (H) 0 - 13 ng/L     *Note: Due to a large number of results and/or encounters for the requested time period, some results have not been displayed. A complete set of results can be found in Results Review.         Assessment/Plan   Assessment & Plan  Congestive heart failure, unspecified HF chronicity, unspecified heart failure type    Chest pain    Hypertension    Congestive heart failure    Subtherapeutic anticoagulation      Hypertensive emergency.  On exam, patient does not look volume overloaded.  I believe, his symptoms are related to poorly controlled hypertension.  She is on multiple antihypertensive medications.  Will add IV hydralazine as needed.  Will monitor her blood pressure.  She may need increased doses of oral antihypertensive.  We still have some room in her oral medications.  Will consult  Dr. Callaway.  Paroxysmal atrial fibrillation.  Watchman procedure has been moved to August so we will restart Coumadin.  Patient was taking 5 mg daily.  He will monitor PT/INR.  Coronary artery disease.  Continue medications.  Patient had chest discomfort.  During exam, she denies chest pain.  Anemia, stable.  No evidence of active bleeding.       Hazel Armando MD         [1]   Family History  Problem Relation Name Age of Onset    No Known Problems Mother      No Known Problems Father      No Known Problems Sister

## 2025-07-09 ENCOUNTER — APPOINTMENT (OUTPATIENT)
Dept: CARDIOLOGY | Facility: HOSPITAL | Age: OVER 89
DRG: 291 | End: 2025-07-09
Payer: MEDICARE

## 2025-07-09 ENCOUNTER — APPOINTMENT (OUTPATIENT)
Dept: RADIOLOGY | Facility: HOSPITAL | Age: OVER 89
DRG: 291 | End: 2025-07-09
Payer: MEDICARE

## 2025-07-09 ENCOUNTER — APPOINTMENT (OUTPATIENT)
Facility: CLINIC | Age: OVER 89
End: 2025-07-09
Payer: MEDICARE

## 2025-07-09 PROBLEM — N17.9 AKI (ACUTE KIDNEY INJURY): Status: ACTIVE | Noted: 2025-03-12

## 2025-07-09 LAB
ANION GAP SERPL CALCULATED.3IONS-SCNC: 12 MMOL/L (ref 10–20)
BUN SERPL-MCNC: 29 MG/DL (ref 6–23)
CALCIUM SERPL-MCNC: 8.6 MG/DL (ref 8.6–10.3)
CHLORIDE SERPL-SCNC: 98 MMOL/L (ref 98–107)
CO2 SERPL-SCNC: 28 MMOL/L (ref 21–32)
CREAT SERPL-MCNC: 1.41 MG/DL (ref 0.5–1.05)
EGFRCR SERPLBLD CKD-EPI 2021: 36 ML/MIN/1.73M*2
EJECTION FRACTION APICAL 4 CHAMBER: 57.1
EJECTION FRACTION: 63 %
ERYTHROCYTE [DISTWIDTH] IN BLOOD BY AUTOMATED COUNT: 15.4 % (ref 11.5–14.5)
GLUCOSE SERPL-MCNC: 140 MG/DL (ref 74–99)
HCT VFR BLD AUTO: 27.6 % (ref 36–46)
HGB BLD-MCNC: 9 G/DL (ref 12–16)
INR PPP: 1.3 (ref 0.9–1.2)
LEFT ATRIUM VOLUME AREA LENGTH INDEX BSA: 47.8 ML/M2
LEFT VENTRICLE INTERNAL DIMENSION DIASTOLE: 3.57 CM (ref 3.5–6)
LV EJECTION FRACTION BIPLANE: 59 %
MCH RBC QN AUTO: 31.3 PG (ref 26–34)
MCHC RBC AUTO-ENTMCNC: 32.6 G/DL (ref 32–36)
MCV RBC AUTO: 96 FL (ref 80–100)
MITRAL VALVE E/A RATIO: 1.5
NRBC BLD-RTO: 0 /100 WBCS (ref 0–0)
PLATELET # BLD AUTO: 274 X10*3/UL (ref 150–450)
POTASSIUM SERPL-SCNC: 4.1 MMOL/L (ref 3.5–5.3)
PROTHROMBIN TIME: 14 SECONDS (ref 9.3–12.7)
RBC # BLD AUTO: 2.88 X10*6/UL (ref 4–5.2)
RIGHT VENTRICLE PEAK SYSTOLIC PRESSURE: 34 MMHG
SODIUM SERPL-SCNC: 134 MMOL/L (ref 136–145)
WBC # BLD AUTO: 10.3 X10*3/UL (ref 4.4–11.3)

## 2025-07-09 PROCEDURE — 2500000001 HC RX 250 WO HCPCS SELF ADMINISTERED DRUGS (ALT 637 FOR MEDICARE OP): Performed by: INTERNAL MEDICINE

## 2025-07-09 PROCEDURE — 71045 X-RAY EXAM CHEST 1 VIEW: CPT | Performed by: RADIOLOGY

## 2025-07-09 PROCEDURE — 93308 TTE F-UP OR LMTD: CPT

## 2025-07-09 PROCEDURE — 2500000002 HC RX 250 W HCPCS SELF ADMINISTERED DRUGS (ALT 637 FOR MEDICARE OP, ALT 636 FOR OP/ED): Performed by: INTERNAL MEDICINE

## 2025-07-09 PROCEDURE — 97166 OT EVAL MOD COMPLEX 45 MIN: CPT | Mod: GO

## 2025-07-09 PROCEDURE — 71045 X-RAY EXAM CHEST 1 VIEW: CPT

## 2025-07-09 PROCEDURE — 2060000001 HC INTERMEDIATE ICU ROOM DAILY

## 2025-07-09 PROCEDURE — 2500000001 HC RX 250 WO HCPCS SELF ADMINISTERED DRUGS (ALT 637 FOR MEDICARE OP): Performed by: PHYSICIAN ASSISTANT

## 2025-07-09 PROCEDURE — 2500000004 HC RX 250 GENERAL PHARMACY W/ HCPCS (ALT 636 FOR OP/ED): Performed by: INTERNAL MEDICINE

## 2025-07-09 PROCEDURE — 85610 PROTHROMBIN TIME: CPT | Performed by: INTERNAL MEDICINE

## 2025-07-09 PROCEDURE — 80048 BASIC METABOLIC PNL TOTAL CA: CPT | Performed by: INTERNAL MEDICINE

## 2025-07-09 PROCEDURE — 2500000001 HC RX 250 WO HCPCS SELF ADMINISTERED DRUGS (ALT 637 FOR MEDICARE OP): Performed by: NURSE PRACTITIONER

## 2025-07-09 PROCEDURE — 97116 GAIT TRAINING THERAPY: CPT | Mod: GP,CQ

## 2025-07-09 PROCEDURE — 99232 SBSQ HOSP IP/OBS MODERATE 35: CPT | Performed by: PHYSICIAN ASSISTANT

## 2025-07-09 PROCEDURE — 93308 TTE F-UP OR LMTD: CPT | Performed by: INTERNAL MEDICINE

## 2025-07-09 PROCEDURE — 97110 THERAPEUTIC EXERCISES: CPT | Mod: GP,CQ

## 2025-07-09 PROCEDURE — 36415 COLL VENOUS BLD VENIPUNCTURE: CPT | Performed by: INTERNAL MEDICINE

## 2025-07-09 PROCEDURE — 85027 COMPLETE CBC AUTOMATED: CPT | Performed by: INTERNAL MEDICINE

## 2025-07-09 PROCEDURE — 99232 SBSQ HOSP IP/OBS MODERATE 35: CPT | Performed by: NURSE PRACTITIONER

## 2025-07-09 RX ORDER — CARVEDILOL 6.25 MG/1
6.25 TABLET ORAL
Status: DISCONTINUED | OUTPATIENT
Start: 2025-07-09 | End: 2025-07-10 | Stop reason: HOSPADM

## 2025-07-09 RX ORDER — ACETAMINOPHEN 650 MG/1
650 SUPPOSITORY RECTAL EVERY 4 HOURS PRN
Status: DISCONTINUED | OUTPATIENT
Start: 2025-07-09 | End: 2025-07-10 | Stop reason: HOSPADM

## 2025-07-09 RX ORDER — ACETAMINOPHEN 325 MG/1
650 TABLET ORAL EVERY 4 HOURS PRN
Status: DISCONTINUED | OUTPATIENT
Start: 2025-07-09 | End: 2025-07-10 | Stop reason: HOSPADM

## 2025-07-09 RX ORDER — LOSARTAN POTASSIUM 50 MG/1
50 TABLET ORAL DAILY
Status: DISCONTINUED | OUTPATIENT
Start: 2025-07-09 | End: 2025-07-10 | Stop reason: HOSPADM

## 2025-07-09 RX ORDER — ACETAMINOPHEN 160 MG/5ML
650 SOLUTION ORAL EVERY 4 HOURS PRN
Status: DISCONTINUED | OUTPATIENT
Start: 2025-07-09 | End: 2025-07-10 | Stop reason: HOSPADM

## 2025-07-09 RX ADMIN — HYDRALAZINE HYDROCHLORIDE 50 MG: 50 TABLET ORAL at 09:00

## 2025-07-09 RX ADMIN — FUROSEMIDE 40 MG: 40 TABLET ORAL at 08:34

## 2025-07-09 RX ADMIN — PANTOPRAZOLE SODIUM 40 MG: 40 TABLET, DELAYED RELEASE ORAL at 21:08

## 2025-07-09 RX ADMIN — Medication 1 TABLET: at 08:38

## 2025-07-09 RX ADMIN — DULOXETINE HYDROCHLORIDE 30 MG: 30 CAPSULE, DELAYED RELEASE ORAL at 16:21

## 2025-07-09 RX ADMIN — ASPIRIN 81 MG: 81 TABLET, DELAYED RELEASE ORAL at 08:35

## 2025-07-09 RX ADMIN — VERAPAMIL HYDROCHLORIDE 180 MG: 180 TABLET ORAL at 21:08

## 2025-07-09 RX ADMIN — WARFARIN SODIUM 5 MG: 5 TABLET ORAL at 18:28

## 2025-07-09 RX ADMIN — HYDROCHLOROTHIAZIDE 25 MG: 25 TABLET ORAL at 08:37

## 2025-07-09 RX ADMIN — SUCRALFATE 1 G: 1 SUSPENSION ORAL at 11:14

## 2025-07-09 RX ADMIN — SULFASALAZINE 500 MG: 500 TABLET ORAL at 09:00

## 2025-07-09 RX ADMIN — FERROUS SULFATE TAB 325 MG (65 MG ELEMENTAL FE) 1 TABLET: 325 (65 FE) TAB at 08:33

## 2025-07-09 RX ADMIN — RANOLAZINE 500 MG: 500 TABLET, FILM COATED, EXTENDED RELEASE ORAL at 21:07

## 2025-07-09 RX ADMIN — ACETAMINOPHEN 650 MG: 325 TABLET ORAL at 11:15

## 2025-07-09 RX ADMIN — GABAPENTIN 200 MG: 100 CAPSULE ORAL at 21:08

## 2025-07-09 RX ADMIN — HYDROXYCHLOROQUINE SULFATE 200 MG: 200 TABLET, FILM COATED ORAL at 09:00

## 2025-07-09 RX ADMIN — SUCRALFATE 1 G: 1 SUSPENSION ORAL at 18:28

## 2025-07-09 RX ADMIN — GABAPENTIN 200 MG: 100 CAPSULE ORAL at 08:34

## 2025-07-09 RX ADMIN — SUCRALFATE 1 G: 1 SUSPENSION ORAL at 05:15

## 2025-07-09 RX ADMIN — PANTOPRAZOLE SODIUM 40 MG: 40 TABLET, DELAYED RELEASE ORAL at 08:34

## 2025-07-09 RX ADMIN — RANOLAZINE 500 MG: 500 TABLET, FILM COATED, EXTENDED RELEASE ORAL at 09:00

## 2025-07-09 RX ADMIN — SULFASALAZINE 500 MG: 500 TABLET ORAL at 21:08

## 2025-07-09 RX ADMIN — POTASSIUM CHLORIDE EXTENDED-RELEASE 20 MEQ: 1500 TABLET ORAL at 08:39

## 2025-07-09 RX ADMIN — Medication 1 TABLET: at 21:07

## 2025-07-09 RX ADMIN — ATORVASTATIN CALCIUM 80 MG: 80 TABLET, FILM COATED ORAL at 21:08

## 2025-07-09 ASSESSMENT — COGNITIVE AND FUNCTIONAL STATUS - GENERAL
DRESSING REGULAR LOWER BODY CLOTHING: A LITTLE
PERSONAL GROOMING: A LITTLE
DAILY ACTIVITIY SCORE: 19
TOILETING: A LITTLE
MOVING FROM LYING ON BACK TO SITTING ON SIDE OF FLAT BED WITH BEDRAILS: A LITTLE
DRESSING REGULAR LOWER BODY CLOTHING: A LITTLE
WALKING IN HOSPITAL ROOM: A LITTLE
STANDING UP FROM CHAIR USING ARMS: A LITTLE
PERSONAL GROOMING: A LITTLE
CLIMB 3 TO 5 STEPS WITH RAILING: A LOT
DRESSING REGULAR UPPER BODY CLOTHING: A LITTLE
EATING MEALS: A LITTLE
HELP NEEDED FOR BATHING: A LITTLE
MOVING TO AND FROM BED TO CHAIR: A LITTLE
STANDING UP FROM CHAIR USING ARMS: A LITTLE
MOBILITY SCORE: 18
DAILY ACTIVITIY SCORE: 18
DRESSING REGULAR UPPER BODY CLOTHING: A LITTLE
TURNING FROM BACK TO SIDE WHILE IN FLAT BAD: A LITTLE
HELP NEEDED FOR BATHING: A LITTLE
MOBILITY SCORE: 17
MOVING TO AND FROM BED TO CHAIR: A LITTLE
WALKING IN HOSPITAL ROOM: A LITTLE
TURNING FROM BACK TO SIDE WHILE IN FLAT BAD: A LITTLE
CLIMB 3 TO 5 STEPS WITH RAILING: A LOT
TOILETING: A LITTLE

## 2025-07-09 ASSESSMENT — ACTIVITIES OF DAILY LIVING (ADL)
LACK_OF_TRANSPORTATION: NO
ADL_ASSISTANCE: NEEDS ASSISTANCE
BATHING_ASSISTANCE: MINIMAL

## 2025-07-09 ASSESSMENT — PAIN - FUNCTIONAL ASSESSMENT
PAIN_FUNCTIONAL_ASSESSMENT: 0-10

## 2025-07-09 ASSESSMENT — PAIN SCALES - GENERAL
PAINLEVEL_OUTOF10: 0 - NO PAIN
PAINLEVEL_OUTOF10: 0 - NO PAIN
PAINLEVEL_OUTOF10: 5 - MODERATE PAIN

## 2025-07-09 ASSESSMENT — PAIN DESCRIPTION - LOCATION: LOCATION: NECK

## 2025-07-09 ASSESSMENT — PAIN DESCRIPTION - DESCRIPTORS: DESCRIPTORS: ACHING;DISCOMFORT;SHOOTING

## 2025-07-09 NOTE — PROGRESS NOTES
Occupational Therapy    Evaluation    Patient Name: Rakan Cervantes  MRN: [id number]  Department: [facility]  Room: 08/08-A  Today's Date: 7/9/2025  Time Calculation  Start Time: 0804  Stop Time: 0814  Time Calculation (min): 10 min        Assessment:  OT Assessment: Pt presents on eval with generalized weakness,  mildly decreased activity tolerance, and impaired standing balance affecting self-care and functional transfers/mobility. Pt will benefit from continued skilled OT to address these deficits and facilitate returning to functional baseline.  Prognosis: Good  Barriers to Discharge Home: No anticipated barriers  Evaluation/Treatment Tolerance: Patient tolerated treatment well  End of Session Communication: Bedside nurse  End of Session Patient Position: Up in chair, Alarm on (Needs in reach.)  OT Assessment Results: Decreased ADL status, Decreased upper extremity strength, Decreased endurance, Decreased functional mobility  Strengths: Premorbid level of function, Support of extended family/friends  Barriers to Participation: Comorbidities    Plan:  Treatment Interventions: ADL retraining, Functional transfer training, UE strengthening/ROM, Endurance training, Patient/family training, Equipment evaluation/education, Neuromuscular reeducation  OT Frequency: 4 times per week  OT Discharge Recommendations: Low intensity level of continued care  Equipment Recommended upon Discharge: Wheeled walker  OT Recommended Transfer Status:  (CGA)  OT - OK to Discharge: Yes      Subjective     OT Visit Info:  OT Received On: 07/09/25    General:  General  Reason for Referral: SOB, impaired ADL's/mobility  Referred By: Hazel Armando MD  Past Medical History Relevant to Rehab: asthma, anemia, a-fib, CAD, IBS, arthritis, COPD, OP, mac degen, CVA, spinal stenosis  Family/Caregiver Present: No  Prior to Session Communication: Bedside nurse  Patient Position Received: Up in chair, Alarm on  General Comment: Pt is an 88 yo female  admitted to the hospital with SOB and CP. Pt diagnosed with CHF.    Precautions:  Hearing/Visual Limitations: [place] - wears hearing aids (left at home); wears glasses  Medical Precautions: Fall precautions, Oxygen therapy device and L/min (2L 02)    Vital Signs Comment: Pulse ox 99% at rest and 96% after household distance functional mobility     Pain:  Pain Assessment  Pain Assessment: 0-10  0-10 (Numeric) Pain Score: 0 - No pain    Objective     Cognition:  Overall Cognitive Status: Within Functional Limits  Orientation Level: Oriented X4  Cognition Comments: Pt very pleasant and cooperative throughout eval.  Insight: Mild    Home Living:  Type of Home: House  Lives With: Spouse  Home Adaptive Equipment: Walker rolling or standard, Cane, Other (Comment) (rollator)  Home Layout: One level  Home Access: Level entry, No concerns  Bathroom Shower/Tub: Tub/shower unit  Bathroom Toilet: Handicapped height  Bathroom Equipment: Grab bars in shower, Tub transfer bench, Grab bars around toilet  Home Living Comments: son lives nearby and checks in on patient often    Prior Function:  Level of [place]: Needs assistance with ADLs, Needs assistance with homemaking  Receives Help From: Family ( and son assist)  ADL Assistance: Needs assistance ( assists with dressing and bathing)  Homemaking Assistance: Needs assistance (pt receives meals on wheels; has cleaning help;  assists with laundry)  Ambulatory Assistance: Independent (mod indep using RW)  Vocational: Retired  Hand Dominance: Left  Prior Function Comments: hx of 1 falls in March    ADL:  Eating Assistance: Independent  Grooming Assistance: Other (Comment) (CGA)  Grooming Deficit: Steadying, Standing with assistive device  Bathing Assistance: Minimal  UE Dressing Assistance: Stand by  UE Dressing Deficit: Setup  LE Dressing Assistance: Minimal  Toileting Assistance with Device: Other (Comment) (CGA)  Toileting Deficit: Steadying, Clothing  management up, Clothing management down    Activity Tolerance:  Activity Tolerance Comments: Fair+  Rate of Perceived Exertion (RPE): 3/10    Bed Mobility/Transfers: Bed Mobility  Bed Mobility: No    Transfers  Transfer:  (Pt completed sit<>stand with CGA for balance/safety and verbal cues for hand placement.)    Functional Mobility:  Functional Mobility  Functional Mobility Performed:  (Pt tolerated functional mobility for a household distance using a RW with CGA for balance/safety and verbal cues as needed.)    Standing Balance:  Static Standing Balance  Static Standing-Balance Support: Bilateral upper extremity supported  Static Standing-Level of Assistance: Contact guard     Sensation:  Sensation Comment: pt denies paresthesias    Strength:  Strength Comments: DAMON shoulders 3-/5, distally 3+/5    Coordination:  Coordination Comment: DAMON's WFL grossly     Hand Function:  Gross Grasp: Functional  Coordination: Functional      Outcome Measures:[facility] Daily Activity  Putting on and taking off regular lower body clothing: A little  Bathing (including washing, rinsing, drying): A little  Putting on and taking off regular upper body clothing: A little  Toileting, which includes using toilet, bedpan or urinal: A little  Taking care of personal grooming such as brushing teeth: A little  Eating Meals: None  Daily Activity - Total Score: 19    Education Documentation  ADL Training, taught by Omid Soriano Jr., OT at 7/9/2025 10:50 AM.  Learner: Patient  Readiness: Acceptance  Method: Explanation  Response: Verbalizes Understanding  Comment: Education and verbal cues provided throughout eval.    Education Comments  No comments found.    Goals:  Encounter Problems       Encounter Problems (Active)       OT Goals       Pt will complete all grooming tasks with mod indep in standing with walker. (Progressing)       Start:  07/09/25    Expected End:  08/09/25            Pt will complete all toileting tasks with mod indep.  (Progressing)       Start:  07/09/25    Expected End:  08/09/25            Pt will complete all functional transfers and mobility with mod indep using a RW. (Progressing)       Start:  07/09/25    Expected End:  08/09/25            Pt will tolerate functional mobility for a household distance using a RW with mod indep. (Progressing)       Start:  07/09/25    Expected End:  08/09/25

## 2025-07-09 NOTE — PROGRESS NOTES
Spiritual Care Visit  Spiritual Care Request    Reason for Visit:  Routine Visit: Introduction     Request Received From:  Referral From: Nurse    Focus of Care:  Visited With: Patient         Refer to :  Referral To:        Spiritual Care Assessment    Spiritual Assessment:                      Care Provided:  Intended Effects: Establish rapport and connectedness, Build relationship of care and support, Convey a calming presence, Demonstrate caring and concern, Lessen someone's feelings of isolation  Methods: Offer emotional support  Interventions: Explain  role    Sense of Community and or Buddhism Affiliation:  Voodoo         Addressed Needs/Concerns and/or [place] Through:  Buddhism Encounters  Buddhism Needs: Prayer       Outcome:  Outcome of Spiritual Care Visit: Identifying spiritual/emotional issues, Comfort/healing presence, Identifying patient's strengths/source of hope, Support system identified     Advance Directives:         Spiritual Care Annotation        Annotation:  provided patient support for Spiritual care Consult while rounding the Unit.  introduced  services of [facility].  explained the role of the  in providing emotional and spiritual support for patient's and family while in admitted to the hospital.     Patient was resting slightly inclined in the hospital bed, patient had oxygen and appears comfortable. Patient stated that she was brought to the hospital for breathing issues. Patient states that she is feeling much better.  inquired about spiritual support and the patient self identified as Voodoo, her Buddhist is [employer] in mentor.  Patient has a spouse and are  for 69 years.  Patient appears in good spirits. Patient welcomed prayer.     No other spiritual or Gnosticist needs were expressed. Spiritual care will remain available for support as requested.

## 2025-07-09 NOTE — PROGRESS NOTES
07/09/25 1133   Discharge Planning   Living Arrangements Spouse/significant other   Support Systems Spouse/significant other;Children   Assistance Needed spouse and son assst with ADL's and transportation   Type of Residence Private residence   Number of Stairs to Enter Residence 0   Number of Stairs Within Residence 0   Do you have animals or pets at home? No   Home or Post Acute Services None   Expected Discharge Disposition Home   Does the patient need discharge transport arranged? No   Financial Resource Strain   How hard is it for you to pay for the very basics like food, housing, medical care, and heating? Not hard   Housing Stability   In the last 12 months, was there a time when you were not able to pay the mortgage or rent on time? N   In the past 12 months, how many times have you moved where you were living? 0   At any time in the past 12 months, were you homeless or living in a shelter (including now)? N   Transportation Needs   In the past 12 months, has lack of transportation kept you from medical appointments or from getting medications? no   In the past 12 months, has lack of transportation kept you from meetings, work, or from getting things needed for daily living? No   Patient Choice   Patient / Family choosing to utilize agency / facility established prior to hospitalization No   Stroke Family Assessment   Stroke Family Assessment Needed No     Met with patient at bedside.  An explanation of discharge planning was provided.  Patient resides in a one level house with her spouse.  Son lives nearby and provides assistance and transportation when needed. Patient ambulates with a walker.  Patient does not require oxygen or cpap. Spouse assists with bathing and dressing.  Patient receives Meals on Wheels.  Spouse is able to make a light breakfast.   Patient has a person who cleans the house.   Wears bilateral hearing aides but does not have them here at the hospital.   There are grab bars and a  shower chair available in the bathroom of the home.   POA is son.  A copy of the paperwork was requested for hospital records.   PT recommendation id for low intensity rehab.  Patient is declining HHC.

## 2025-07-09 NOTE — PROGRESS NOTES
Physical Therapy    Physical Therapy Treatment    Patient Name: Rakan Cervantes  MRN: [id number]  Department: [address]  Room: 08/08-A  Today's Date: 7/9/2025  Time Calculation  Start Time: 0801  Stop Time: 0824  Time Calculation (min): 23 min         Assessment/Plan   PT Assessment  Barriers to Discharge Home: No anticipated barriers  End of Session Communication: Bedside nurse  Assessment Comment: Pt continues to present with mild strength, balance and endurance deficits. Pt would continued to benefit from skilled therapy services.  End of Session Patient Position: Up in chair, Alarm on  PT Plan  Inpatient/Swing Bed or Outpatient: Inpatient  PT Plan  Treatment/Interventions: Bed mobility, Transfer training, Gait training, Balance training, Neuromuscular re-education, Strengthening, Endurance training, Therapeutic exercise, Therapeutic activity, Home exercise program, Positioning, Postural re-education  PT Plan: Ongoing PT  PT Frequency: 3 times per week  PT Discharge Recommendations: Low intensity level of continued care  Equipment Recommended upon Discharge: Wheeled walker  PT Recommended Transfer Status: Assist x1, Assistive device (RW)  PT - OK to Discharge: Yes (PT POC initiated)    PT Visit Info:  PT Received On: 07/09/25     General Visit Information:   General  Prior to Session Communication: Bedside nurse  Patient Position Received: Up in chair, Alarm on  General Comment: Cleared by nursing to be seen for therapy, pt agreeable with tx, seated in chair upon arrival.    Subjective   Precautions:  Precautions  Medical Precautions: Fall precautions, Oxygen therapy device and L/min (2L oxygen)    Objective   Pain:  Pain Assessment  Pain Assessment: 0-10  0-10 (Numeric) Pain Score: 0 - No pain    Cognition:  Cognition  Overall Cognitive Status: Within Functional Limits  Orientation Level: Oriented X4  Following Commands: Follows all commands and directions without difficulty     Postural Control:  Static Sitting  Balance  Static Sitting-Balance Support: Bilateral upper extremity supported, Feet supported  Static Sitting-Level of Assistance: Close supervision  Static Sitting-Comment/Number of Minutes: Good seated static balance  Static Standing Balance  Static Standing-Balance Support: Bilateral upper extremity supported  Static Standing-Level of Assistance: Contact guard  Static Standing-Comment/Number of Minutes: Good- static standing balance with bilateral UE support on walker.     Treatments:  Therapeutic Exercise  Therapeutic Exercise Performed: Yes  Therapeutic Exercise Activity 1: Bilateral ankle pumps x15  Therapeutic Exercise Activity 2: Bilateral hip flexion x15  Therapeutic Exercise Activity 3: Bilateral knee extension x15  Therapeutic Exercise Activity 4: Resisted hip abd/add 15    Ambulation/Gait Training  Ambulation/Gait Training Performed: Yes  Ambulation/Gait Training 1  Surface 1: Level tile  Device 1: Rolling walker  Assistance 1: Contact guard  Quality of Gait 1: Narrow base of support, Diminished heel strike, Inconsistent stride length, Decreased step length, Forward flexed posture, Soft knee(s)  Comments/Distance (ft) 1: 50' with wheeled walker, CGA for balance and safety    Transfers  Transfer: Yes  Transfer 1  Transfer From 1: Sit to  Transfer to 1: Stand  Technique 1: Sit to stand, Stand to sit  Transfer Device 1: Walker  Transfer Level of Assistance 1: Contact guard  Trials/Comments 1: Increased time and effort, cues for proper hand placement.    Outcome Measures:  [facility] Basic Mobility  Turning from your back to your side while in a flat bed without using bedrails: None  Moving from lying on your back to sitting on the side of a flat bed without using bedrails: A little  Moving to and from bed to chair (including a wheelchair): A little  Standing up from a chair using your arms (e.g. wheelchair or bedside chair): A little  To walk in hospital room: A little  Climbing 3-5 steps with railing: A  lot  Basic Mobility - Total Score: 18    Education Documentation  Body Mechanics, taught by Ashley Garay PTA at 7/9/2025  9:11 AM.  Learner: Patient  Readiness: Acceptance  Method: Explanation  Response: Verbalizes Understanding    Mobility Training, taught by Ashley Garay PTA at 7/9/2025  9:11 AM.  Learner: Patient  Readiness: Acceptance  Method: Explanation  Response: Verbalizes Understanding    Education Comments  07/09/25 0911 Ashley Garay PTA  Patient educated on the importance of mobility and participation with therapy. Educated on safety and use of call light for assistance.           Encounter Problems       Encounter Problems (Active)       Mobility       STG - Patient will ambulate 200' with use of RW at an independent level, +2 turns, to facilitate safe mobility  (Progressing)       Start:  07/08/25    Expected End:  07/22/25               Mobility       STG - pt will demonstrate improved B LE strength by 1 grade to facilitate safe mobility and transfers  (Progressing)       Start:  07/08/25    Expected End:  07/22/25               PT Transfers       STG - Patient will perform bed mobility at an independent level to facilitate safe mobility  (Progressing)       Start:  07/08/25    Expected End:  07/22/25            STG - Patient will transfer sit to and from stand at an independent level to facilitate safe mobility  (Progressing)       Start:  07/08/25    Expected End:  07/22/25

## 2025-07-09 NOTE — ASSESSMENT & PLAN NOTE
BP was in low side 98/47  Keep holding hydralazine furosemide HCTZ and losartan, continue with carvedilol and verapamil with parameters

## 2025-07-09 NOTE — PROGRESS NOTES
07/09/25 1131   [facility] Disability Status   Are you deaf or do you have serious difficulty hearing? Y  (wears bilateral hearing aides)   Are you blind or do you have serious difficulty seeing, even when wearing glasses? N   Because of a physical, mental, or emotional condition, do you have serious difficulty concentrating, remembering, or making decisions? (5 years old or older) N   Do you have serious difficulty walking or climbing stairs? Y   Do you have serious difficulty dressing or bathing? Y  (spouse assists)   Because of a physical, mental, or emotional condition, do you have serious difficulty doing errands alone such as visiting the doctor? Y

## 2025-07-09 NOTE — ASSESSMENT & PLAN NOTE
No edema in both lower extremities.  Chest x-ray was done today on 7/9/2025 mild right perihilar infiltrate very small left pleural effusion

## 2025-07-09 NOTE — CARE PLAN
The patient's goals for the shift include      The clinical goals for the shift include control BP and HR

## 2025-07-09 NOTE — PROGRESS NOTES
Rakan Cervantes is a 89 y.o. female on day 2 of admission presenting with Congestive heart failure, unspecified HF chronicity, unspecified heart failure type.      Subjective   Seen and examined she was sitting on her chair denies any chest pain or shortness of breath on 2 L nasal cannula.  Tolerates well her diet without any issues.  Feeling slightly better.       Objective     Last Recorded Vitals  BP (!) 98/47 (BP Location: Right arm, Patient Position: Sitting) Comment: nurse notified  Pulse 52   Temp 36.2 °C (97.2 °F) (Temporal)   Resp 19   Wt 54.7 kg (120 lb 9.5 oz)   SpO2 99%   Intake/Output last 3 Shifts:    Intake/Output Summary (Last 24 hours) at 7/9/2025 1006  Last data filed at 7/9/2025 0830  Gross per 24 hour   Intake 240 ml   Output --   Net 240 ml       Admission Weight  Weight: 54 kg (119 lb) (07/07/25 1237)    Daily Weight  07/09/25 : 54.7 kg (120 lb 9.5 oz)    Image Results  XR chest 1 view  Narrative: Interpreted By:  Sherlyn Jackson,   STUDY:  XR CHEST 1 VIEW 7/9/2025 7:55 am      INDICATION:  CHF      COMPARISON:  07/07/2025      ACCESSION NUMBER(S):  [id number]      ORDERING CLINICIAN:  DANIELA SALAS      TECHNIQUE:  AP erect view of the chest      FINDINGS:  The cardiac enlargement is unchanged. Atherosclerosis of the thoracic  aorta is noted. Low lung volumes are observed with mild right  perihilar infiltrate seen. There is suggestion of a very small left  pleural effusion.      Postoperative change from reverse shoulder arthroplasty on the right  and lumbar spinal fusion is seen.      Impression: Stable cardiomegaly.      Low lung volumes with crowding of the bronchovascular markings. There  is some mild right perihilar infiltrate identified.      Very small left pleural effusion.      Signed by: Sherlyn Jackson 7/9/2025 8:05 AM  Dictation workstation:   [id number]      Physical Exam      I Reviewed chart, meds, labs and imaging  Medications Ordered Prior to Encounter[1]  Results for orders  placed or performed during the hospital encounter of 07/07/25 (from the past 24 hours)   Protime-INR   Result Value Ref Range    Protime 14.0 (H) 9.3 - 12.7 seconds    INR 1.3 (H) 0.9 - 1.2   Basic Metabolic Panel   Result Value Ref Range    Glucose 140 (H) 74 - 99 mg/dL    Sodium 134 (L) 136 - 145 mmol/L    Potassium 4.1 3.5 - 5.3 mmol/L    Chloride 98 98 - 107 mmol/L    Bicarbonate 28 21 - 32 mmol/L    Anion Gap 12 10 - 20 mmol/L    Urea Nitrogen 29 (H) 6 - 23 mg/dL    Creatinine 1.41 (H) 0.50 - 1.05 mg/dL    eGFR 36 (L) >60 mL/min/1.73m*2    Calcium 8.6 8.6 - 10.3 mg/dL   CBC   Result Value Ref Range    WBC 10.3 4.4 - 11.3 x10*3/uL    nRBC 0.0 0.0 - 0.0 /100 WBCs    RBC 2.88 (L) 4.00 - 5.20 x10*6/uL    Hemoglobin 9.0 (L) 12.0 - 16.0 g/dL    Hematocrit 27.6 (L) 36.0 - 46.0 %    MCV 96 80 - 100 fL    MCH 31.3 26.0 - 34.0 pg    MCHC 32.6 32.0 - 36.0 g/dL    RDW 15.4 (H) 11.5 - 14.5 %    Platelets 274 150 - 450 x10*3/uL     *Note: Due to a large number of results and/or encounters for the requested time period, some results have not been displayed. A complete set of results can be found in Results Review.     XR chest 1 view  Result Date: 7/9/2025  Interpreted By:  Sherlyn Jackson, STUDY: XR CHEST 1 VIEW 7/9/2025 7:55 am   INDICATION: CHF   COMPARISON: 07/07/2025   ACCESSION NUMBER(S): [id number]   ORDERING CLINICIAN: DANIELA SALAS   TECHNIQUE: AP erect view of the chest   FINDINGS: The cardiac enlargement is unchanged. Atherosclerosis of the thoracic aorta is noted. Low lung volumes are observed with mild right perihilar infiltrate seen. There is suggestion of a very small left pleural effusion.   Postoperative change from reverse shoulder arthroplasty on the right and lumbar spinal fusion is seen.       Stable cardiomegaly.   Low lung volumes with crowding of the bronchovascular markings. There is some mild right perihilar infiltrate identified.   Very small left pleural effusion.   Signed by: Sherlyn Jackson 7/9/2025  8:05 AM Dictation workstation:   [id number]    ECG 12 lead  Result Date: 7/7/2025  Normal sinus rhythm Nonspecific ST and T wave abnormality Abnormal ECG When compared with ECG of 28-OCT-2024 14:58, ST no longer depressed in Anterior leads T wave inversion no longer evident in Lateral leads    XR chest 2 views  Result Date: 7/7/2025  Interpreted By:  Jl Foreman, STUDY: XR CHEST 2 VIEWS; 7/7/2025 1:17 pm   INDICATION: Signs/Symptoms:shortness of breath   COMPARISON: November 2020 and October 2024.   ACCESSION NUMBER(S): [id number]   ORDERING CLINICIAN: LUIS LITTLEJOHN   TECHNIQUE: Number of films: Two-view radiographs of the chest were obtained.   FINDINGS: The cardiac silhouette is mildly enlarged. Calcifications involve the aorta. The lungs are hyperinflated, with prominent interstitial markings bilaterally. Mild bilateral perihilar interstitial infiltrates and possible small left effusion There is no pneumothorax. Degenerative changes again involve the spine and left shoulder. Reversed right shoulder arthroplasty is again in position.       Cardiomegaly and pulmonary vascular congestion on a background of COPD.   Signed by: Jl Foreman 7/7/2025 1:22 PM Dictation workstation:   [id number]          Assessment & Plan  ROSANA (acute kidney injury)  And increasing from 0.78 to 1.41 today BUN 29  Keep holding Lasix, HCTZ and losartan for now  Monitor kidney function  Congestive heart failure, unspecified HF chronicity, unspecified heart failure type  No edema in both lower extremities.  Chest x-ray was done today on 7/9/2025 mild right perihilar infiltrate very small left pleural effusion  Chest pain  Patient denies any chest pain  Hypertension  BP was in low side 98/47  Keep holding hydralazine furosemide HCTZ and losartan, continue with carvedilol and verapamil with parameters  Subtherapeutic anticoagulation  Patient is on Coumadin therapy 5 mg daily with INR 1.3  Discharge  plan:  Wean patient from O2 as  tolerated  Monitor kidney function  Monitor blood pressure was in low side this morning  Discharge home maybe tomorrow when cleared by cardiology    MERCED Gonsalves           [1]   No current facility-administered medications on file prior to encounter.     Current Outpatient Medications on File Prior to Encounter   Medication Sig Dispense Refill    ascorbic acid (Vitamin C) 100 mg tablet Take 1 tablet (100 mg) by mouth once daily.      aspirin 81 mg EC tablet Take 1 tablet (81 mg) by mouth once daily.      carvedilol (Coreg) 25 mg tablet TAKE 1 TABLET (25 MG) BY MOUTH 2 TIMES DAILY (MORNING AND LATE AFTERNOON). 60 tablet 1    cholecalciferol (Vitamin D-3) 50 mcg (2,000 unit) capsule Take 1 capsule (2,000 Units) by mouth early in the morning..      DULoxetine (Cymbalta) 30 mg DR capsule TAKE 1 CAPSULE BY MOUTH ONCE DAILY at dinner 90 capsule 1    ferrous sulfate (FeroSuL) 325 mg (65 mg elemental) tablet Take 1 tablet by mouth 3 times a week. Monday, Wednesday, Friday 36 tablet 1    gabapentin (Neurontin) 300 mg capsule TAKE 1 CAPSULE BY MOUTH AT NOON AND BEDTIME 180 capsule 1    hydrALAZINE (Apresoline) 50 mg tablet Take 1 tablet (50 mg) by mouth 2 times a day. 180 tablet 3    hydroCHLOROthiazide (HYDRODiuril) 25 mg tablet Take 1 tablet (25 mg) by mouth once daily.      hydroxychloroquine (Plaquenil) 200 mg tablet Take 1 tablet (200 mg) by mouth once daily. 90 tablet 1    losartan (Cozaar) 100 mg tablet Take 1 tablet (100 mg) by mouth once daily. 90 tablet 3    magnesium oxide (Mag-Ox) 400 mg (241.3 mg magnesium) tablet Take 1 tablet (400 mg) by mouth 2 times a day. 60 tablet 1    pantoprazole (ProtoNix) 40 mg EC tablet TAKE 1 TABLET BY MOUTH TWICE DAILY 180 tablet 1    potassium chloride CR 20 mEq ER tablet Take 1 tablet (20 mEq) by mouth once daily. Take with food. 30 tablet 2    ranolazine (Ranexa) 500 mg 12 hr tablet TAKE 1 TABLET (500 MG) BY MOUTH 2 TIMES A DAY. DO NOT CRUSH, CHEW, OR SPLIT. 180 tablet  1    sucralfate (Carafate) 100 mg/mL suspension Take 10 mL (1 g) by mouth every 6 hours. (Patient taking differently: Take 10 mL (1 g) by mouth once daily.) 1200 mL 1    sulfaSALAzine (Azulfidine) 500 mg tablet Take 1 tablet (500 mg) by mouth 2 times a day. 180 tablet 1    verapamil SR (Calan-SR) 180 mg ER tablet Take 1 tablet (180 mg) by mouth once daily at bedtime. Do not crush or chew.      vit C/E/Zn/coppr/lutein/zeaxan (PRESERVISION AREDS-2 ORAL) Take 1 capsule by mouth 2 times a day. As directed orally      atorvastatin (Lipitor) 80 mg tablet Take 1 tablet (80 mg) by mouth once daily at bedtime. (Patient not taking: Reported on 7/7/2025) 90 tablet 1    docusate sodium (Colace) 100 mg capsule Take 1 capsule (100 mg) by mouth if needed.      furosemide (Lasix) 40 mg tablet Take 1 tablet (40 mg) by mouth once daily. (Patient not taking: Reported on 7/7/2025) 90 tablet 1    isosorbide mononitrate ER (Imdur) 120 mg 24 hr tablet TAKE 1 TABLET (120 MG) BY MOUTH ONCE DAILY. DO NOT CRUSH OR CHEW. (Patient not taking: Reported on 7/7/2025) 90 tablet 1    lidocaine (Lidoderm) 5 % patch Place 1 patch over 12 hours on the skin once daily. Remove & discard patch within 12 hours or as directed by MD. (Patient not taking: Reported on 7/7/2025) 30 patch 2    lidocaine 4 % patch Place 3 patches over 12 hours on the skin once daily. Remove & discard patch within 12 hours or as directed by MD. (Patient not taking: Reported on 7/7/2025) 30 patch 1    Stool Softener 100 mg capsule TAKE 1 CAPSULE BY MOUTH ONCE DAILY AS NEEDED to prevent constipation from iron (Patient not taking: Reported on 7/7/2025) 90 capsule 1    warfarin (Coumadin) 5 mg tablet Take 1 tablet (5 mg) by mouth once daily at bedtime. Take as directed per After Visit Summary. (Patient not taking: Reported on 7/7/2025) 90 tablet 0    [DISCONTINUED] carvedilol (Coreg) 25 mg tablet Take 1 tablet (25 mg) by mouth 2 times daily (morning and late afternoon). 60 tablet 1

## 2025-07-09 NOTE — ASSESSMENT & PLAN NOTE
And increasing from 0.78 to 1.41 today BUN 29  Keep holding Lasix, HCTZ and losartan for now  Monitor kidney function

## 2025-07-09 NOTE — PROGRESS NOTES
"Rakan Cervantes is a 89 y.o. female on day 2 of admission presenting with Congestive heart failure, unspecified HF chronicity, unspecified heart failure type.    Subjective   Alert and oriented, sitting up in the chair at bedside.  Patient denies chest pain, palpitations, shortness of breath, lower extremity edema, nausea.       Objective     Physical Exam    Appearance: Alert, oriented, cooperative, in no acute distress.      Eyes: Conjunctiva pink with no redness or exudates. Eyelids without lesions. No scleral icterus.      ENT: Hearing grossly intact. External inspection of ears without lesions or erythema. no nasal flaring. no tripoding, no drooling, no difficulty swallowing oral secretions.     Pulmonary: Diminished breath sounds in all lobes, no rales, rhonchi. No accessory muscle use or stridor. No difficulty completing a full sentence without taking a breath     Cardiac: regular rhythm, bradycardic, no rub, gallop. No JVD.     Musculoskeletal: No cyanosis, clubbing.  No lower extremity edema.  Capillary refill less than 2 seconds in lower extremities     Neurological: no focal findings identified.     Psychiatric: Appropriate mood and affect.    Last Recorded Vitals  Blood pressure (!) 98/47, pulse 52, temperature 36.2 °C (97.2 °F), temperature source Temporal, resp. rate 19, height (!) 1.499 m (4' 11\"), weight 54.7 kg (120 lb 9.5 oz), SpO2 99%.  Intake/Output last 3 Shifts:  No intake/output data recorded.    Relevant Results  Results for orders placed or performed during the hospital encounter of 07/07/25 (from the past 24 hours)   Protime-INR   Result Value Ref Range    Protime 14.0 (H) 9.3 - 12.7 seconds    INR 1.3 (H) 0.9 - 1.2   Basic Metabolic Panel   Result Value Ref Range    Glucose 140 (H) 74 - 99 mg/dL    Sodium 134 (L) 136 - 145 mmol/L    Potassium 4.1 3.5 - 5.3 mmol/L    Chloride 98 98 - 107 mmol/L    Bicarbonate 28 21 - 32 mmol/L    Anion Gap 12 10 - 20 mmol/L    Urea Nitrogen 29 (H) 6 - 23 " mg/dL    Creatinine 1.41 (H) 0.50 - 1.05 mg/dL    eGFR 36 (L) >60 mL/min/1.73m*2    Calcium 8.6 8.6 - 10.3 mg/dL   CBC   Result Value Ref Range    WBC 10.3 4.4 - 11.3 x10*3/uL    nRBC 0.0 0.0 - 0.0 /100 WBCs    RBC 2.88 (L) 4.00 - 5.20 x10*6/uL    Hemoglobin 9.0 (L) 12.0 - 16.0 g/dL    Hematocrit 27.6 (L) 36.0 - 46.0 %    MCV 96 80 - 100 fL    MCH 31.3 26.0 - 34.0 pg    MCHC 32.6 32.0 - 36.0 g/dL    RDW 15.4 (H) 11.5 - 14.5 %    Platelets 274 150 - 450 x10*3/uL     *Note: Due to a large number of results and/or encounters for the requested time period, some results have not been displayed. A complete set of results can be found in Results Review.     Scheduled medications  Scheduled Medications[1]  Continuous medications  Continuous Medications[2]  PRN medications  PRN Medications[3]       Assessment & Plan  Congestive heart failure, unspecified HF chronicity, unspecified heart failure type    Chest pain    Hypertension    Congestive heart failure    Subtherapeutic anticoagulation    Acute diastolic heart failure-EF 60 to 65% 10/31/2024, limited echo ordered 7/9, restart patient on her home medications, monitor I/O's  Coronary artery disease-s/p stent Lt Circumflex 9/2024, Plavix stopped 6/18 due to GI bleed, continue on aspirin, atorvastatin  Hypertensive urgency-continue carvedilol, furosemide, hydralazine, hydrochlorothiazide, losartan, verapamil  Proximal atrial fibrillation-Watchman procedure re-scheduled for August, restarted on Warfarin by admitting   Anemia-approximately at patient's baseline hemoglobin, patient denies hematochezia.  Hypotension-hold verapamil, hydralazine to allow room for continued diuresis, reduced dose of losartan, and parameters in place on carvedilol  ROSANA-hold nephrotoxic medications, furosemide, hydrochlorothiazide, losartan, creatinine 1.41 > 0.78     Assessment and plan:     7/8: As above.  Patient is a 89 y.o. female presenting with history of TIA, parathyroidism, asthma,  hypercalcemia, anemia, paroxysmal atrial fibrillation on warfarin, coronary disease status post PCI to distal left dominant circumflex in September 2024, hx of SVT with shortness of breath for the past few weeks.  Patient states she stopped taking her medications approximately 7-10 days ago due to difficulty swallowing pills, no difficulty with food or liquids.  She follows cardiology with Dr. Callaway.  Patient's chest x-ray, symptoms, and elevated BNP indicate mild CHF exacerbation, likely brought on by medication noncompliance.  Patient does not appear fluid overloaded on exam, no lower extremity edema.  Suspect patient's asthma may have also contributed to her shortness of breath, as her symptoms significantly improved with a DuoNeb treatment, and patient has faint expiratory wheezing in all lobes on exam.  Her troponins are mildly elevated but flat, level is consistent with a non-MI troponin elevation due to non-traumatic myocardial injury in the setting of anemia, and hypertensive urgency.  No ischemic changes on EKG.  Patient lives at home with her son who takes care of her.  Patient was scheduled to have Watchman procedure on Wednesday, rescheduled for August.  Patient was restarted on Coumadin by admitting for stroke risk prevention in the setting of atrial fibrillation.  Patient was given 40 mg of IV Lasix in the ER, and was able to swallow her PO medications this morning atorvastatin 80 mg, hydralazine 50 mg twice daily, verapamil 180 mg, warfarin 5 mg.  Patient was given labetalol 10 mg IV this morning, for hypertension, rate controlled.  Monitor I's and O's.  Patient is feeling improved at this time.  We will continue to evaluate her ability to swallow her daily medications, and follow this patient with you.    7/9: Vital signs and labs reviewed this morning, patient is mildly hypotensive 98/47, heart rate 52, 99% oxygen on 2 L nasal cannula, sodium 134, potassium 4.1, creatinine increased 1.41, (prior  0.78) and BUN 29, INR 1.3, hemoglobin 9.0 consistent with a prior, hematocrit 27.6, WBC 10.3, increased from day prior 6.5.  I/O's not recorded. Given patient's ROSANA this morning we will hold furosemide, hydrochlorothiazide, and losartan.  Additionally patient is mildly hypotensive, parameters in place on carvedilol, verapamil, and hydralazine.  Chest x-ray was completed this morning and reviewed, mild right perihilar infiltrate, very small left pleural effusion.  Patient's WBC count elevated from day prior, unclear at this time if infiltrate is due to edema, or possibly early infection.  Patient appears euvolemic on exam.  Will continue diuresis once ROSANA has resolved.  Considered fluid resuscitation, will hold off at this time, reevaluate tomorrow.  Will continue to follow this patient with you.       I spent 35 minutes in the professional and overall care of this patient.      Natasha Mahan PA-C         [1] acetaminophen, 650 mg, oral, Once  aspirin, 81 mg, oral, Daily  atorvastatin, 80 mg, oral, Nightly  carvedilol, 25 mg, oral, BID  DULoxetine, 30 mg, oral, Daily with evening meal  ferrous sulfate, 1 tablet, oral, Once per day on Monday Wednesday Friday  furosemide, 40 mg, oral, Daily  gabapentin, 200 mg, oral, BID  [Held by provider] hydrALAZINE, 50 mg, oral, BID  hydroCHLOROthiazide, 25 mg, oral, Daily  hydroxychloroquine, 200 mg, oral, Daily  losartan, 50 mg, oral, Daily  magnesium oxide, 400 mg of magnesium oxide, oral, BID  pantoprazole, 40 mg, oral, BID  potassium chloride CR, 20 mEq, oral, Daily with breakfast  ranolazine, 500 mg, oral, BID  sucralfate, 1 g, oral, q6h ZEINAB  sulfaSALAzine, 500 mg, oral, BID  [Held by provider] verapamil SR, 180 mg, oral, Nightly  warfarin, 5 mg, oral, Daily  [2]    [3] PRN medications: docusate sodium, hydrALAZINE, ipratropium-albuteroL

## 2025-07-10 ENCOUNTER — PHARMACY VISIT (OUTPATIENT)
Dept: PHARMACY | Facility: CLINIC | Age: OVER 89
End: 2025-07-10
Payer: COMMERCIAL

## 2025-07-10 VITALS
SYSTOLIC BLOOD PRESSURE: 133 MMHG | HEIGHT: 59 IN | WEIGHT: 118.39 LBS | DIASTOLIC BLOOD PRESSURE: 38 MMHG | HEART RATE: 63 BPM | BODY MASS INDEX: 23.87 KG/M2 | RESPIRATION RATE: 18 BRPM | TEMPERATURE: 97.3 F | OXYGEN SATURATION: 93 %

## 2025-07-10 PROBLEM — I50.9 CONGESTIVE HEART FAILURE: Status: RESOLVED | Noted: 2024-10-28 | Resolved: 2025-07-10

## 2025-07-10 PROBLEM — I10 HYPERTENSION: Status: RESOLVED | Noted: 2024-09-27 | Resolved: 2025-07-10

## 2025-07-10 PROBLEM — I67.9 CEREBROVASCULAR DISEASE: Status: RESOLVED | Noted: 2023-10-26 | Resolved: 2025-07-10

## 2025-07-10 PROBLEM — I50.9 CONGESTIVE HEART FAILURE, UNSPECIFIED HF CHRONICITY, UNSPECIFIED HEART FAILURE TYPE: Status: RESOLVED | Noted: 2025-07-07 | Resolved: 2025-07-10

## 2025-07-10 LAB
ANION GAP SERPL CALCULATED.3IONS-SCNC: 10 MMOL/L (ref 10–20)
BUN SERPL-MCNC: 29 MG/DL (ref 6–23)
CALCIUM SERPL-MCNC: 8.7 MG/DL (ref 8.6–10.3)
CHLORIDE SERPL-SCNC: 99 MMOL/L (ref 98–107)
CO2 SERPL-SCNC: 30 MMOL/L (ref 21–32)
CREAT SERPL-MCNC: 1.2 MG/DL (ref 0.5–1.05)
EGFRCR SERPLBLD CKD-EPI 2021: 43 ML/MIN/1.73M*2
ERYTHROCYTE [DISTWIDTH] IN BLOOD BY AUTOMATED COUNT: 15 % (ref 11.5–14.5)
GLUCOSE SERPL-MCNC: 103 MG/DL (ref 74–99)
HCT VFR BLD AUTO: 27.6 % (ref 36–46)
HGB BLD-MCNC: 8.7 G/DL (ref 12–16)
HOLD SPECIMEN: NORMAL
HOLD SPECIMEN: NORMAL
INR PPP: 3.4 (ref 0.9–1.2)
MCH RBC QN AUTO: 31.4 PG (ref 26–34)
MCHC RBC AUTO-ENTMCNC: 31.5 G/DL (ref 32–36)
MCV RBC AUTO: 100 FL (ref 80–100)
NRBC BLD-RTO: 0 /100 WBCS (ref 0–0)
PLATELET # BLD AUTO: 234 X10*3/UL (ref 150–450)
POTASSIUM SERPL-SCNC: 3.7 MMOL/L (ref 3.5–5.3)
PROTHROMBIN TIME: 33.9 SECONDS (ref 9.3–12.7)
RBC # BLD AUTO: 2.77 X10*6/UL (ref 4–5.2)
SODIUM SERPL-SCNC: 135 MMOL/L (ref 136–145)
WBC # BLD AUTO: 9.5 X10*3/UL (ref 4.4–11.3)

## 2025-07-10 PROCEDURE — 2500000001 HC RX 250 WO HCPCS SELF ADMINISTERED DRUGS (ALT 637 FOR MEDICARE OP): Performed by: PHYSICIAN ASSISTANT

## 2025-07-10 PROCEDURE — 2500000001 HC RX 250 WO HCPCS SELF ADMINISTERED DRUGS (ALT 637 FOR MEDICARE OP): Performed by: INTERNAL MEDICINE

## 2025-07-10 PROCEDURE — 99239 HOSP IP/OBS DSCHRG MGMT >30: CPT | Performed by: NURSE PRACTITIONER

## 2025-07-10 PROCEDURE — 99232 SBSQ HOSP IP/OBS MODERATE 35: CPT | Performed by: PHYSICIAN ASSISTANT

## 2025-07-10 PROCEDURE — 85610 PROTHROMBIN TIME: CPT | Performed by: INTERNAL MEDICINE

## 2025-07-10 PROCEDURE — 97535 SELF CARE MNGMENT TRAINING: CPT | Mod: GO,CO

## 2025-07-10 PROCEDURE — 85027 COMPLETE CBC AUTOMATED: CPT | Performed by: NURSE PRACTITIONER

## 2025-07-10 PROCEDURE — 80048 BASIC METABOLIC PNL TOTAL CA: CPT | Performed by: NURSE PRACTITIONER

## 2025-07-10 PROCEDURE — 36415 COLL VENOUS BLD VENIPUNCTURE: CPT | Performed by: INTERNAL MEDICINE

## 2025-07-10 PROCEDURE — 9420000001 HC RT PATIENT EDUCATION 5 MIN

## 2025-07-10 PROCEDURE — 97530 THERAPEUTIC ACTIVITIES: CPT | Mod: GO,CO

## 2025-07-10 PROCEDURE — RXMED WILLOW AMBULATORY MEDICATION CHARGE

## 2025-07-10 PROCEDURE — 2500000002 HC RX 250 W HCPCS SELF ADMINISTERED DRUGS (ALT 637 FOR MEDICARE OP, ALT 636 FOR OP/ED): Performed by: INTERNAL MEDICINE

## 2025-07-10 PROCEDURE — 2500000004 HC RX 250 GENERAL PHARMACY W/ HCPCS (ALT 636 FOR OP/ED): Performed by: INTERNAL MEDICINE

## 2025-07-10 PROCEDURE — 36415 COLL VENOUS BLD VENIPUNCTURE: CPT | Performed by: NURSE PRACTITIONER

## 2025-07-10 RX ORDER — WARFARIN SODIUM 5 MG/1
5 TABLET ORAL NIGHTLY
Qty: 90 TABLET | Refills: 0 | Status: SHIPPED | OUTPATIENT
Start: 2025-07-11

## 2025-07-10 RX ORDER — HYDRALAZINE HYDROCHLORIDE 50 MG/1
50 TABLET, FILM COATED ORAL DAILY PRN
Qty: 100 TABLET | Refills: 3 | Status: SHIPPED | OUTPATIENT
Start: 2025-07-10

## 2025-07-10 RX ORDER — CARVEDILOL 6.25 MG/1
6.25 TABLET ORAL
Qty: 180 TABLET | Refills: 3 | Status: SHIPPED | OUTPATIENT
Start: 2025-07-10

## 2025-07-10 RX ORDER — WARFARIN SODIUM 5 MG/1
5 TABLET ORAL NIGHTLY
Qty: 90 TABLET | Refills: 0 | Status: SHIPPED | OUTPATIENT
Start: 2025-07-12 | End: 2025-07-10

## 2025-07-10 RX ORDER — FUROSEMIDE 40 MG/1
40 TABLET ORAL DAILY PRN
Qty: 90 TABLET | Refills: 3 | Status: SHIPPED | OUTPATIENT
Start: 2025-07-10

## 2025-07-10 RX ORDER — SUCRALFATE 1 G/10ML
1 SUSPENSION ORAL DAILY
Start: 2025-07-10

## 2025-07-10 RX ADMIN — Medication 1 TABLET: at 08:51

## 2025-07-10 RX ADMIN — ASPIRIN 81 MG: 81 TABLET, DELAYED RELEASE ORAL at 08:51

## 2025-07-10 RX ADMIN — GABAPENTIN 200 MG: 100 CAPSULE ORAL at 08:52

## 2025-07-10 RX ADMIN — HYDROXYCHLOROQUINE SULFATE 200 MG: 200 TABLET, FILM COATED ORAL at 08:51

## 2025-07-10 RX ADMIN — SULFASALAZINE 500 MG: 500 TABLET ORAL at 08:50

## 2025-07-10 RX ADMIN — POTASSIUM CHLORIDE EXTENDED-RELEASE 20 MEQ: 1500 TABLET ORAL at 08:51

## 2025-07-10 RX ADMIN — HYDRALAZINE HYDROCHLORIDE 50 MG: 50 TABLET ORAL at 08:50

## 2025-07-10 RX ADMIN — SUCRALFATE 1 G: 1 SUSPENSION ORAL at 05:47

## 2025-07-10 RX ADMIN — CARVEDILOL 6.25 MG: 6.25 TABLET, FILM COATED ORAL at 08:51

## 2025-07-10 RX ADMIN — PANTOPRAZOLE SODIUM 40 MG: 40 TABLET, DELAYED RELEASE ORAL at 08:51

## 2025-07-10 RX ADMIN — SUCRALFATE 1 G: 1 SUSPENSION ORAL at 00:11

## 2025-07-10 RX ADMIN — RANOLAZINE 500 MG: 500 TABLET, FILM COATED, EXTENDED RELEASE ORAL at 08:51

## 2025-07-10 ASSESSMENT — COGNITIVE AND FUNCTIONAL STATUS - GENERAL
DAILY ACTIVITIY SCORE: 18
MOBILITY SCORE: 18
TOILETING: A LITTLE
STANDING UP FROM CHAIR USING ARMS: A LITTLE
DAILY ACTIVITIY SCORE: 19
WALKING IN HOSPITAL ROOM: A LITTLE
HELP NEEDED FOR BATHING: A LITTLE
DRESSING REGULAR LOWER BODY CLOTHING: A LITTLE
DRESSING REGULAR UPPER BODY CLOTHING: A LITTLE
TURNING FROM BACK TO SIDE WHILE IN FLAT BAD: A LITTLE
PERSONAL GROOMING: A LITTLE
HELP NEEDED FOR BATHING: A LITTLE
EATING MEALS: A LITTLE
MOVING FROM LYING ON BACK TO SITTING ON SIDE OF FLAT BED WITH BEDRAILS: A LITTLE
DRESSING REGULAR LOWER BODY CLOTHING: A LITTLE
CLIMB 3 TO 5 STEPS WITH RAILING: A LITTLE
TOILETING: A LITTLE
MOVING TO AND FROM BED TO CHAIR: A LITTLE
DRESSING REGULAR UPPER BODY CLOTHING: A LITTLE
PERSONAL GROOMING: A LITTLE

## 2025-07-10 ASSESSMENT — PAIN SCALES - WONG BAKER
WONGBAKER_NUMERICALRESPONSE: NO HURT
WONGBAKER_NUMERICALRESPONSE: NO HURT

## 2025-07-10 ASSESSMENT — PAIN SCALES - GENERAL
PAINLEVEL_OUTOF10: 0 - NO PAIN

## 2025-07-10 ASSESSMENT — ACTIVITIES OF DAILY LIVING (ADL): HOME_MANAGEMENT_TIME_ENTRY: 15

## 2025-07-10 ASSESSMENT — PAIN - FUNCTIONAL ASSESSMENT: PAIN_FUNCTIONAL_ASSESSMENT: 0-10

## 2025-07-10 NOTE — PROGRESS NOTES
"Rakan Cervantes is a 89 y.o. female on day 3 of admission presenting with Congestive heart failure, unspecified HF chronicity, unspecified heart failure type.    Subjective   Alert and oriented sitting up in bed.  Patient denies any complaints, chest pain, palpitations, dizziness, lightheadedness, shortness of breath.  Patient is currently not on oxygen.    Objective     Physical Exam    Appearance: Alert, oriented, cooperative, in no acute distress.      Eyes: Conjunctiva pink with no redness or exudates. Eyelids without lesions. No scleral icterus.      ENT: Hearing grossly intact. External inspection of ears without lesions or erythema. no nasal flaring. no tripoding, no drooling, no difficulty swallowing oral secretions.     Pulmonary: Diminished breath sounds in all lobes, no rales, rhonchi. No accessory muscle use or stridor. No difficulty completing a full sentence without taking a breath     Cardiac: regular rhythm, bradycardic, very faint systolic ejection murmur heard best at right of sternum second intercostal space, no rub, gallop. No JVD.     Musculoskeletal: No cyanosis, clubbing.  No lower extremity edema.  Capillary refill less than 2 seconds in lower extremities     Neurological: no focal findings identified.     Psychiatric: Appropriate mood and affect.    Last Recorded Vitals  Blood pressure (!) 137/39, pulse 54, temperature 36 °C (96.8 °F), temperature source Temporal, resp. rate 18, height (!) 1.499 m (4' 11\"), weight 53.7 kg (118 lb 6.2 oz), SpO2 96%.  Intake/Output last 3 Shifts:  I/O last 3 completed shifts:  In: 240 (4.5 mL/kg) [P.O.:240]  Out: 200 (3.7 mL/kg) [Urine:200 (0.1 mL/kg/hr)]  Weight: 53.7 kg     Relevant Results  Results for orders placed or performed during the hospital encounter of 07/07/25 (from the past 24 hours)   Transthoracic Echo (TTE) Limited   Result Value Ref Range    MV E/A ratio 1.50     LV Biplane EF 59 %    LA vol index A/L 47.8 ml/m2    LV EF 63 %    RVSP 34 mmHg "    LVIDd 3.57 cm    LV A4C EF 57.1    Protime-INR   Result Value Ref Range    Protime 33.9 (H) 9.3 - 12.7 seconds    INR 3.4 (H) 0.9 - 1.2   Lavender Top   Result Value Ref Range    Extra Tube Hold for add-ons.    PST Top   Result Value Ref Range    Extra Tube Hold for add-ons.    Basic Metabolic Panel   Result Value Ref Range    Glucose 103 (H) 74 - 99 mg/dL    Sodium 135 (L) 136 - 145 mmol/L    Potassium 3.7 3.5 - 5.3 mmol/L    Chloride 99 98 - 107 mmol/L    Bicarbonate 30 21 - 32 mmol/L    Anion Gap 10 10 - 20 mmol/L    Urea Nitrogen 29 (H) 6 - 23 mg/dL    Creatinine 1.20 (H) 0.50 - 1.05 mg/dL    eGFR 43 (L) >60 mL/min/1.73m*2    Calcium 8.7 8.6 - 10.3 mg/dL   CBC   Result Value Ref Range    WBC 9.5 4.4 - 11.3 x10*3/uL    nRBC 0.0 0.0 - 0.0 /100 WBCs    RBC 2.77 (L) 4.00 - 5.20 x10*6/uL    Hemoglobin 8.7 (L) 12.0 - 16.0 g/dL    Hematocrit 27.6 (L) 36.0 - 46.0 %     80 - 100 fL    MCH 31.4 26.0 - 34.0 pg    MCHC 31.5 (L) 32.0 - 36.0 g/dL    RDW 15.0 (H) 11.5 - 14.5 %    Platelets 234 150 - 450 x10*3/uL     *Note: Due to a large number of results and/or encounters for the requested time period, some results have not been displayed. A complete set of results can be found in Results Review.     Scheduled medications  Scheduled Medications[1]  Continuous medications  Continuous Medications[2]  PRN medications  PRN Medications[3]    Assessment & Plan  Congestive heart failure, unspecified HF chronicity, unspecified heart failure type    Chest pain    Hypertension    Congestive heart failure    Subtherapeutic anticoagulation    ROSANA (acute kidney injury)    Acute diastolic heart failure-EF 60 to 65% 10/31/2024, limited echo ordered 7/9, restart patient on her home medications, monitor I/O's  Coronary artery disease-s/p stent Lt Circumflex 9/2024, Plavix stopped 6/18 due to GI bleed, continue on aspirin, atorvastatin  Hypertensive urgency-continue carvedilol, furosemide, hydralazine, hydrochlorothiazide, losartan,  verapamil  Proximal atrial fibrillation-Watchman procedure re-scheduled for August, restarted on Warfarin by admitting   Anemia-approximately at patient's baseline hemoglobin, patient denies hematochezia.  Hypotension-hold verapamil, hydralazine to allow room for continued diuresis, reduced dose of losartan, and parameters in place on carvedilol  ROSANA-hold nephrotoxic medications, furosemide, hydrochlorothiazide, losartan, creatinine 1.41 > 0.78     Assessment and plan:     7/8: As above.  Patient is a 89 y.o. female presenting with history of TIA, parathyroidism, asthma, hypercalcemia, anemia, paroxysmal atrial fibrillation on warfarin, coronary disease status post PCI to distal left dominant circumflex in September 2024, hx of SVT with shortness of breath for the past few weeks.  Patient states she stopped taking her medications approximately 7-10 days ago due to difficulty swallowing pills, no difficulty with food or liquids.  She follows cardiology with Dr. Callaway.  Patient's chest x-ray, symptoms, and elevated BNP indicate mild CHF exacerbation, likely brought on by medication noncompliance.  Patient does not appear fluid overloaded on exam, no lower extremity edema.  Suspect patient's asthma may have also contributed to her shortness of breath, as her symptoms significantly improved with a DuoNeb treatment, and patient has faint expiratory wheezing in all lobes on exam.  Her troponins are mildly elevated but flat, level is consistent with a non-MI troponin elevation due to non-traumatic myocardial injury in the setting of anemia, and hypertensive urgency.  No ischemic changes on EKG.  Patient lives at home with her son who takes care of her.  Patient was scheduled to have Watchman procedure on Wednesday, rescheduled for August.  Patient was restarted on Coumadin by admitting for stroke risk prevention in the setting of atrial fibrillation.  Patient was given 40 mg of IV Lasix in the ER, and was able to swallow  her PO medications this morning atorvastatin 80 mg, hydralazine 50 mg twice daily, verapamil 180 mg, warfarin 5 mg.  Patient was given labetalol 10 mg IV this morning, for hypertension, rate controlled.  Monitor I's and O's.  Patient is feeling improved at this time.  We will continue to evaluate her ability to swallow her daily medications, and follow this patient with you.     7/9: Vital signs and labs reviewed this morning, patient is mildly hypotensive 98/47, heart rate 52, 99% oxygen on 2 L nasal cannula, sodium 134, potassium 4.1, creatinine increased 1.41, (prior 0.78) and BUN 29, INR 1.3, hemoglobin 9.0 consistent with a prior, hematocrit 27.6, WBC 10.3, increased from day prior 6.5.  I/O's not recorded. Given patient's ROSANA this morning we will hold furosemide, hydrochlorothiazide, and losartan.  Additionally patient is mildly hypotensive, parameters in place on carvedilol, verapamil, and hydralazine.  Chest x-ray was completed this morning and reviewed, mild right perihilar infiltrate, very small left pleural effusion.  Patient's WBC count elevated from day prior, unclear at this time if infiltrate is due to edema, or possibly early infection.  Patient appears euvolemic on exam.  Will continue diuresis once ROSANA has resolved.  Considered fluid resuscitation, will hold off at this time, reevaluate tomorrow.  Will continue to follow this patient with you.     7/10: Telemetry reviewed sinus bradycardia without ectopy, rates predominantly in the 50s.  Blood pressure this morning 137/39, heart rate 54, 96% oxygen on 3 L, oxygen was removed, awaiting pulse ox reading.  Patient reports feeling well this morning, denies any complaints.  Labs reviewed, patient's kidney function improving, creatinine 1.2, BUN 29, GFR 43 no significant electrolyte abnormalities, hemoglobin mildly decreased from day prior 8.7, hematocrit 27.6.  Limited echocardiogram was completed yesterday, interpretation reviewed, showing a normal EF  of 60-65%, and moderately increased septal and mildly increased posterior left ventricular wall thickness, all other findings consistent with prior echo 10/2024.  Patient is considered stable from a cardiac perspective for discharge.  ROSANA is improving, would recommend continuing to hold losartan, until addressed at follow-up appointment in the outpatient setting with Dr. Callaway in 1 week, patient will continue on hydralazine as needed for systolic greater than 140, furosemide 40 mg as needed for lower extremity edema or weight gain, for hypertension she will continue on hydrochlorothiazide 25 mg, verapamil 180 mg, carvedilol 6.25 mg, and warfarin for stroke risk prophylaxis in the setting of paroxysmal atrial fibrillation.      I spent 35 minutes in the professional and overall care of this patient.      Natasha Mahan PA-C         [1] acetaminophen, 650 mg, oral, Once  aspirin, 81 mg, oral, Daily  atorvastatin, 80 mg, oral, Nightly  carvedilol, 6.25 mg, oral, BID  DULoxetine, 30 mg, oral, Daily with evening meal  ferrous sulfate, 1 tablet, oral, Once per day on Monday Wednesday Friday  [Held by provider] furosemide, 40 mg, oral, Daily  gabapentin, 200 mg, oral, BID  hydrALAZINE, 50 mg, oral, BID  [Held by provider] hydroCHLOROthiazide, 25 mg, oral, Daily  hydroxychloroquine, 200 mg, oral, Daily  [Held by provider] losartan, 50 mg, oral, Daily  magnesium oxide, 400 mg of magnesium oxide, oral, BID  pantoprazole, 40 mg, oral, BID  potassium chloride CR, 20 mEq, oral, Daily with breakfast  ranolazine, 500 mg, oral, BID  sucralfate, 1 g, oral, q6h ZEINAB  sulfaSALAzine, 500 mg, oral, BID  verapamil SR, 180 mg, oral, Nightly  [Held by provider] warfarin, 5 mg, oral, Daily  [2]    [3] PRN medications: acetaminophen **OR** acetaminophen **OR** acetaminophen, docusate sodium, hydrALAZINE, ipratropium-albuteroL

## 2025-07-10 NOTE — CARE PLAN
RT acknowledged order for Home O2 Eval. SpO2 94% at rest on RA. SpO2 as low as 86% while ambulating on RA. 1L titrated in. SpO2 recovered to 96%.     [facility] representative contacted, aware of pt. S/N: [phone].

## 2025-07-10 NOTE — CARE PLAN
Problem: Skin  Goal: Decreased wound size/increased tissue granulation at next dressing change  Outcome: Progressing     Problem: Skin  Goal: Participates in plan/prevention/treatment measures  Outcome: Progressing     Problem: Skin  Goal: Prevent/manage excess moisture  Outcome: Progressing     Problem: Skin  Goal: Decreased wound size/increased tissue granulation at next dressing change  Outcome: Progressing  Goal: Participates in plan/prevention/treatment measures  Outcome: Progressing  Goal: Prevent/manage excess moisture  Outcome: Progressing  Goal: Prevent/minimize sheer/friction injuries  Outcome: Progressing  Goal: Promote/optimize nutrition  Outcome: Progressing  Goal: Promote skin healing  Outcome: Progressing     Problem: Discharge Planning  Goal: Discharge to home or other facility with appropriate resources  Outcome: Progressing     Problem: Safety - Adult  Goal: Free from fall injury  Outcome: Progressing     Problem: Heart Failure  Goal: Improved gas exchange this shift  Outcome: Progressing  Goal: Improved urinary output this shift  Outcome: Progressing  Goal: Reduction in peripheral edema within 24 hours  Outcome: Progressing  Goal: Report improvement of dyspnea/breathlessness this shift  Outcome: Progressing  Goal: Weight from fluid excess reduced over 2-3 days, then stabilize  Outcome: Progressing  Goal: Increase self care and/or family involvement in 24 hours  Outcome: Progressing   The patient's goals for the shift include      The clinical goals for the shift include free from falls    Over the shift, the patient did not make progress toward the following goals. Barriers to progression include . Recommendations to address these barriers include .

## 2025-07-10 NOTE — CARE PLAN
The patient's goals for the shift include      The clinical goals for the shift include patient will not fall this shift    Over the shift, the patient did not make progress toward the following goals. Barriers to progression include the patient. Recommendations to address these barriers include educate patient on falls precautions.

## 2025-07-10 NOTE — PROGRESS NOTES
07/10/25 1155   Discharge Planning   Living Arrangements Spouse/significant other   Support Systems Spouse/significant other;Children   Expected Discharge Disposition Home   Does the patient need discharge transport arranged? No     Anticipate discharge soon.  Cleared by cardiology.  PT recommendation is for low intensity rehab.  Patient continues to decline HHC. Will discharge home with spouse.

## 2025-07-10 NOTE — NURSING NOTE
0730: Assumed care of patient. Patient is laying in be. A/OX3. All falls precautions are in place. Call light is within reach.

## 2025-07-10 NOTE — PROGRESS NOTES
Occupational Therapy    OT Treatment    Patient Name: Rakan Cervantes  MRN: [id number]  Department: [address]  Room: 08/08-A  Today's Date: 7/10/2025  Time Calculation  Start Time: 1320  Stop Time: 1343  Time Calculation (min): 23 min      Assessment:  OT Assessment: pt otlerated treatment well and is making gradual progress towards OT POC. pt displaying increased tolerance for toileting and grooming tasks wth CGA/Min A which displays progress. pt would benefit from continued Ot services to increase strentgh, balance, endurance, and activity tolerance for ADLs  Prognosis: Good  Barriers to Discharge Home: No anticipated barriers  Evaluation/Treatment Tolerance: Patient tolerated treatment well  Medical Staff Made Aware: Yes  End of Session Communication: Bedside nurse  End of Session Patient Position: Bed, 3 rail up, Alarm on (call light in reach and all needs met)  OT Assessment Results: Decreased ADL status, Decreased upper extremity strength, Decreased endurance, Decreased functional mobility  Prognosis: Good  Barriers to Discharge: None  Evaluation/Treatment Tolerance: Patient tolerated treatment well  Medical Staff Made Aware: Yes  Strengths: Ability to acquire knowledge, Support of Caregivers  Barriers to Participation: Comorbidities  Plan:  Treatment Interventions: ADL retraining, Functional transfer training, UE strengthening/ROM, Endurance training, Patient/family training, Equipment evaluation/education, Neuromuscular reeducation  OT Frequency: 4 times per week  OT Discharge Recommendations: Low intensity level of continued care  Equipment Recommended upon Discharge: Wheeled walker  OT Recommended Transfer Status:  (CGA)  OT - OK to Discharge: Yes  Treatment Interventions: ADL retraining, Functional transfer training, UE strengthening/ROM, Endurance training, Patient/family training, Equipment evaluation/education, Neuromuscular reeducation    Subjective   OT Visit Info:  OT Received On: 07/10/25  General Visit  Info:  General  Reason for Referral: SOB, impaired ADL's/mobility  Referred By: Hazel Armando MD  Past Medical History Relevant to Rehab: asthma, anemia, a-fib, CAD, IBS, arthritis, COPD, OP, mac degen, CVA, spinal stenosis  Family/Caregiver Present: Yes  Caregiver Feedback: very supportive   Prior to Session Communication: Bedside nurse  Patient Position Received: Bed, 3 rail up, Alarm off, not on at start of session  Preferred Learning Style: verbal, visual  General Comment: pt cleared by nursing for OT treatment; pt pleasant and agreeable to therapy    Precautions:  Hearing/Visual Limitations: [place] De[place] - wears hearing aids (left at home); wears glasses  Medical Precautions: Fall precautions    Pain:  Pain Assessment  Pain Assessment: 0-10  0-10 (Numeric) Pain Score: 0 - No pain    Objective    Cognition:  Cognition  Overall Cognitive Status: Within Functional Limits  Orientation Level: Oriented X4  Cognition Comments: pt very pleasant and cooperative  Insight: Mild  Impulsive: Mildly  Processing Speed: Delayed    Activities of Daily Living:    Grooming  Grooming Level of Assistance: Contact guard, Minimum assistance  Grooming Where Assessed: Standing sinkside  Grooming Comments: pt engaged in hand hygiene while in stance at sinkside with CGA for balance and safety; Min A to functionally reach paper towels to dry hand; effortful    UE Bathing  UE Bathing Comments: pt politely declining all other ADLs at this time stating she will complete them when she goes home    LE Dressing  LE Dressing: Yes  Adult Briefs Level of Assistance: Minimum assistance  LE Dressing Where Assessed: Toilet  LE Dressing Comments: pt able to doff underwear while in stance at toilet; pt able to thread RLE through clean underwear and required Min A to thread LLE through underwear; pt in stance for underwear management below/above hips while in stance at sinkside with CGA for balance and safety    Bed Mobility/Transfers:   Bed  Mobility  Bed Mobility: Yes  Bed Mobility 1  Bed Mobility 1: Supine to sitting, Scooting  Level of Assistance 1: Minimum assistance  Bed Mobility Comments 1: Min A for trunk elevation; pt able to bring BLEs towards EOB and into sitting position  Bed Mobility 2  Bed Mobility  2: Sitting to supine  Level of Assistance 2: Minimum assistance  Bed Mobility Comments 2: Min A to bring BLEs into supine position; pt able to guide trunk into supine    Transfers  Transfer: Yes  Transfer 1  Transfer From 1: Bed to  Transfer to 1: Stand  Technique 1: Sit to stand, Stand to sit  Transfer Device 1: Walker  Transfer Level of Assistance 1: Contact guard  Trials/Comments 1: good hand placement; effortful; CGA for balane and safety    Toilet Transfers  Toilet Transfer From: Bed  Toilet Transfer Type: To and from  Toilet Transfer to: Standard toilet  Toilet Transfer Technique: Ambulating  Toilet Transfers: Contact guard  Toilet Transfers Comments: CGA for controlled descent and trunk elevation; cues for grab bar use; effortful     Functional Mobility:  Functional Mobility  Functional Mobility Performed: Yes  Functional Mobility 1  Surface 1: Level tile  Device 1: Rolling walker  Assistance 1: Minimum assistance, Contact guard  Comments 1: pt able to functionally ambulate to and from bathroom with CGA and inermittment Min A due ot slight unsteadiess; cues for walker management; effortful and completed with increased time    Standing Balance:  Dynamic Standing Balance  Dynamic Standing-Balance Support: No upper extremity supported  Dynamic Standing-Level of Assistance: Contact guard, Minimum assistance  Dynamic Standing-Balance: Lateral lean, Forward lean, Reaching for objects, Reaching across midline  Dynamic Standing-Comments: toileting tasks and grooming tasks while in stance at sinkside with CGA and intermittment Min A: pt displaying fair- standing balance. completed to increase functional balance and tolerance for  ADLs    Therapy/Activity:    Therapeutic Activity  Therapeutic Activity Performed: Yes  Therapeutic Activity 1: see bed mobility, transfers, and functional mobility    Outcome Measures:  [facility] Daily Activity  Putting on and taking off regular lower body clothing: A little  Bathing (including washing, rinsing, drying): A little  Putting on and taking off regular upper body clothing: A little  Toileting, which includes using toilet, bedpan or urinal: A little  Taking care of personal grooming such as brushing teeth: A little  Eating Meals: None  Daily Activity - Total Score: 19      Education Documentation  ADL Training, taught by SHAAN Gaines at 7/10/2025  2:27 PM.  Learner: Patient  Readiness: Acceptance  Method: Explanation  Response: Verbalizes Understanding  Comment: pt educated on OT POC, body mechanics, balance/safety technqiues, fall prevention, and precautions during ADL retraining      Goals:  Encounter Problems       Encounter Problems (Active)       OT Goals       Pt will complete all grooming tasks with mod indep in standing with walker. (Progressing)       Start:  07/09/25    Expected End:  08/09/25            Pt will complete all toileting tasks with mod indep. (Progressing)       Start:  07/09/25    Expected End:  08/09/25            Pt will complete all functional transfers and mobility with mod indep using a RW. (Progressing)       Start:  07/09/25    Expected End:  08/09/25            Pt will tolerate functional mobility for a household distance using a RW with mod indep. (Progressing)       Start:  07/09/25    Expected End:  08/09/25

## 2025-07-11 ENCOUNTER — DOCUMENTATION (OUTPATIENT)
Dept: PRIMARY CARE | Facility: CLINIC | Age: OVER 89
End: 2025-07-11
Payer: MEDICARE

## 2025-07-11 ENCOUNTER — PATIENT OUTREACH (OUTPATIENT)
Dept: PRIMARY CARE | Facility: CLINIC | Age: OVER 89
End: 2025-07-11
Payer: MEDICARE

## 2025-07-11 LAB
ATRIAL RATE: 69 BPM
P AXIS: 65 DEGREES
P OFFSET: 188 MS
P ONSET: 137 MS
PR INTERVAL: 168 MS
Q ONSET: 221 MS
QRS COUNT: 11 BEATS
QRS DURATION: 92 MS
QT INTERVAL: 398 MS
QTC CALCULATION(BAZETT): 426 MS
QTC FREDERICIA: 417 MS
R AXIS: 19 DEGREES
T AXIS: 4 DEGREES
T OFFSET: 420 MS
VENTRICULAR RATE: 69 BPM

## 2025-07-11 NOTE — PROGRESS NOTES
Discharge facility: [facility]  Discharge diagnosis:  CHF  Admission date: 7/7/25  Discharge date: 7/10/25    PCP Appointment Date: RAJWINDER  Specialist Appointment Date: Dr Callaway- RAJWINDER within 1 week  Hospital Encounter and Summary: Linked   Discharge Summary by Laurie Avila APRN-CNP (07/10/2025 15:29)   See Discharge assessment below for further details      Wrap Up  Is the patient/caregiver familiar with Advance Care Planning?: Yes (7/11/2025 11:07 AM)  Would the patient like more information on Advance Care Planning?: No (7/11/2025 11:07 AM)  Wrap Up Additional Comments: Spoke to son- reviewed all medications. He was trying to compare old list of meds to new list- instructed to follow new list as this is most current list. He states understanding. Instructed on CHF, s/sx complications to report, daily weight monitoring, and when to take the Furosemide for leg swelling or wt gain. Also discussed low sodium diet, to monitor her snacks also. They do have MOW and are eating those at dinner time. He states understanding of all instructions. He will call and schedule Cards follow up- message sent to clerical team for PCP follow up appt. (7/11/2025 11:07 AM)    Engagement  Call Start Time: 1106 (7/11/2025 11:07 AM)    Medications  Medications reviewed with patient/caregiver?: Yes (7/11/2025 11:07 AM)  Is the patient having any side effects they believe may be caused by any medication additions or changes?: No (7/11/2025 11:07 AM)  Does the patient have all medications ordered at discharge?: Yes (7/11/2025 11:07 AM)  Care Management Interventions: Provided patient education (7/11/2025 11:07 AM)  Is the patient taking all medications as directed (includes completed medication regime)?: Yes (7/11/2025 11:07 AM)  Care Management Interventions: Provided patient education (7/11/2025 11:07 AM)  Medication Comments: START taking these medications     atorvastatin 80 mg tablet; Take 1 tablet (80   mg) by mouth once daily at bedtime.   CHANGE how you take these medications     carvedilol 6.25 mg tablet; Take 1 tablet (6.25   mg) by mouth 2 times daily (morning and late afternoon) furosemide 40 mg tablet; ; Take 1 tablet (40 mg)   by mouth once daily as needed (take as needed for lower leg edema or   weight gain).;   hydrALAZINE 50 mg tablet; Take 1 tablet   (50 mg) by mouth once daily as needed (Take if systolic blood pressure is   greater than 140).;  CONTINUE taking these medications     aspirin 81 mg EC tablet   DULoxetine 30 mg DR capsule; TAKE 1 CAPSULE   BY MOUTH ONCE DAILY at dinner   ferrous sulfate 325 mg (65 mg elemental) tablet;Take 1 tablet by mouth 3 times a week. Monday, Wednesday, Friday   gabapentin 300 mg capsule; TAKE 1 CAPSULE   BY MOUTH AT NOON AND BEDTIME   hydroCHLOROthiazide 25 mg    hydroxychloroquine 200 mg tablet;  Take 1   tablet (200 mg) by mouth once daily.   magnesium oxide 400 mg (241.3 mg elemental) tablet; Take 1 tablet (400 mg) by mouth 2 times a day.   pantoprazole 40 mg EC tablet; TAKE 1 TABLET   BY MOUTH TWICE DAILY   potassium chloride CR 20 mEq ER tablet; Take 1 tablet (20 mEq) by mouth once daily. Take with food.   ranolazine 500 mg 12 hr tablet;  TAKE 1 TABLET   (500 MG) BY MOUTH 2 TIMES A DAY. DO NOT CRUSH, CHEW, OR SPLIT.   * Stool Softener 100 mg capsule; Generic drug: docusate sodium; TAKE 1   CAPSULE BY MOUTH ONCE DAILY AS NEEDED to prevent constipation from iron   * docusate sodium 100 mg capsule; Commonly known as: Colace   sucralfate 100 mg/mL suspension; Commonly known as: Carafate; Take 10 mL   (1 g) by mouth once daily.   sulfaSALAzine 500 mg tablet;  Take 1   tablet (500 mg) by mouth 2 times a day.   verapamil  mg ER tablet; Take 1   tablet (180 mg) by mouth once daily at bedtime. Do not crush or chew.   warfarin 5 mg tablet; Commonly known as: Coumadin; Take 1 tablet (5 mg)   by mouth once daily at bedtime. Take as directed per After Visit Summary.    STOP taking these medications      ascorbic acid 100 mg tablet;   cholecalciferol 50 mcg (2,000 units) capsule;    isosorbide mononitrate  mg 24 hr tablet;  lidocaine 4 % patch   losartan 100 mg tablet;   PRESERVISION AREDS-2 ORAL (7/11/2025 11:07 AM)    Appointments  Does the patient have a primary care provider?: Yes (7/11/2025 11:07 AM)  Care Management Interventions: Educated patient on importance of making appointment (tasked to clerical for appt) (7/11/2025 11:07 AM)  Care Management Interventions: Advised to schedule with specialist (Dr Callaway for 1 week follow up post hospital stay) (7/11/2025 11:07 AM)    Self Management  What is the home health agency?: NA (refused C) (7/11/2025 11:07 AM)  What Durable Medical Equipment (DME) was ordered?: Oxygen 1 L with ambulation (7/11/2025 11:07 AM)  Has all Durable Medical Equipment (DME) been delivered?: Yes (7/11/2025 11:07 AM)  DME Interventions: -- (NA) (7/11/2025 11:07 AM)  Follow Up Tasks: -- (NA) (7/11/2025 11:07 AM)    Patient Teaching  Does the patient have access to their discharge instructions?: Yes (7/11/2025 11:07 AM)  Care Management Interventions: Reviewed instructions with patient (7/11/2025 11:07 AM)  What is the patient's perception of their health status since discharge?: Same (7/11/2025 11:07 AM)  Is the patient/caregiver able to teach back the hierarchy of who to call/visit for symptoms/problems? PCP, Specialist, Home Health nurse, Urgent Care, ED, 911: Yes (7/11/2025 11:07 AM)  If the patient is a current smoker, are they able to teach back resources for cessation?: -- (NA) (7/11/2025 11:07 AM)

## 2025-07-12 NOTE — DISCHARGE SUMMARY
Discharge Diagnosis  ROSANA (acute kidney injury)  Congestive heart failure, unspecified HF chronicity, unspecified heart failure type  Chest pain  Hypertension  Subtherapeutic anticoagulation    Issues Requiring Follow-Up  Follow-up with primary doctor within 1 week  Follow-up with cardiology    Discharge Meds     Medication List      START taking these medications     atorvastatin 80 mg tablet; Commonly known as: Lipitor; Take 1 tablet (80   mg) by mouth once daily at bedtime.     CHANGE how you take these medications     carvedilol 6.25 mg tablet; Commonly known as: Coreg; Take 1 tablet (6.25   mg) by mouth 2 times daily (morning and late afternoon).; What changed:   medication strength, how much to take   furosemide 40 mg tablet; Commonly known as: Lasix; Take 1 tablet (40 mg)   by mouth once daily as needed (take as needed for lower leg edema or   weight gain).; What changed: when to take this, reasons to take this   hydrALAZINE 50 mg tablet; Commonly known as: Apresoline; Take 1 tablet   (50 mg) by mouth once daily as needed (Take if systolic blood pressure is   greater than 140).; What changed: when to take this, reasons to take this   warfarin 5 mg tablet; Commonly known as: Coumadin; Take 1 tablet (5 mg)   by mouth once daily at bedtime. Take as directed per After Visit Summary.   Do not start before July 11, 2025.; What changed: additional instructions     CONTINUE taking these medications     aspirin 81 mg EC tablet   DULoxetine 30 mg DR capsule; Commonly known as: Cymbalta; TAKE 1 CAPSULE   BY MOUTH ONCE DAILY at dinner   ferrous sulfate 325 mg (65 mg elemental) tablet; Commonly known as:   FeroSuL; Take 1 tablet by mouth 3 times a week. Monday, Wednesday, Friday   gabapentin 300 mg capsule; Commonly known as: Neurontin; TAKE 1 CAPSULE   BY MOUTH AT NOON AND BEDTIME   hydroCHLOROthiazide 25 mg tablet; Commonly known as: HYDRODiuril   hydroxychloroquine 200 mg tablet; Commonly known as: Plaquenil; Take 1    tablet (200 mg) by mouth once daily.   magnesium oxide 400 mg (241.3 mg elemental) tablet; Commonly known as:   Mag-Ox; Take 1 tablet (400 mg) by mouth 2 times a day.   pantoprazole 40 mg EC tablet; Commonly known as: ProtoNix; TAKE 1 TABLET   BY MOUTH TWICE DAILY   potassium chloride CR 20 mEq ER tablet; Commonly known as: Klor-Con M20;   Take 1 tablet (20 mEq) by mouth once daily. Take with food.   ranolazine 500 mg 12 hr tablet; Commonly known as: Ranexa; TAKE 1 TABLET   (500 MG) BY MOUTH 2 TIMES A DAY. DO NOT CRUSH, CHEW, OR SPLIT.   * Stool Softener 100 mg capsule; Generic drug: docusate sodium; TAKE 1   CAPSULE BY MOUTH ONCE DAILY AS NEEDED to prevent constipation from iron   * docusate sodium 100 mg capsule; Commonly known as: Colace   sucralfate 100 mg/mL suspension; Commonly known as: Carafate; Take 10 mL   (1 g) by mouth once daily.   sulfaSALAzine 500 mg tablet; Commonly known as: Azulfidine; Take 1   tablet (500 mg) by mouth 2 times a day.   verapamil  mg ER tablet; Commonly known as: Calan-SR; Take 1   tablet (180 mg) by mouth once daily at bedtime. Do not crush or chew.  * This list has 2 medication(s) that are the same as other medications   prescribed for you. Read the directions carefully, and ask your doctor or   other care provider to review them with you.     STOP taking these medications     ascorbic acid 100 mg tablet; Commonly known as: Vitamin C   cholecalciferol 50 mcg (2,000 units) capsule; Commonly known as: Vitamin   D-3   isosorbide mononitrate  mg 24 hr tablet; Commonly known as: Imdur   lidocaine 4 % patch   lidocaine 5 % patch; Commonly known as: Lidoderm   losartan 100 mg tablet; Commonly known as: Cozaar   PRESERVISION AREDS-2 ORAL       Test Results Pending At Discharge  Pending Labs       Order Current Status    Extra Tubes Preliminary result    Extra Tubes Preliminary result    Lavender Top Preliminary result    Lavender Top Preliminary result    PST Top Preliminary  result    PST Top Preliminary result            Hospital Course  Ilia Cervantes is a 89 y.o. female presenting with shortness of breath and chest pain.  Patient has a past medical history of paroxysmal atrial fibrillation, TIA, coronary artery disease, non-STEMI, bilateral carotid artery occlusion, hypertension, hypertensive urgency.  I reviewed records from his visit to Dr. Callaway and recent visit to gerontologist.  I also discussed patient's case with your son over the phone.  Patient used to be on anticoagulation with Eliquis and also she was on Plavix for coronary artery disease because she had PCI with stent to distal left circumflex in September 2022 for.  Patient was admitted to [facility] in April 2025 due to GI bleeding when she was found to have diverticulosis.  At that time, decision was made to stop the Plavix and start patient on aspirin.  Eliquis was discontinued and patient was started on Coumadin.  Patient is supposed to have a Watchman procedure by Dr. Green.  Initially she was supposed to have it done on July 9 so she stopped taking Coumadin 10 days ago which explains her low INR today when she presented to the hospital.  They were informed today that.  Procedure was moved to August 1 yes so patient was supposed to go back on Coumadin.  There is some confusion in the chart.  For example, Dr. Callaway's note still mentions Eliquis even though patient is not on Eliquis anymore.  I confirmed with patient's son that patient was taking Coumadin until 10 days ago and she stopped it only anticipating Watchman procedure.  Patient presented to the emergency department today complaining of shortness of breath and chest discomfort.  Her blood pressure was 200 systolic.  She was initially accepted to [facility] but they do not have beds and after long stay in the emergency department she finally stays in [facility] to be admitted.  During exam, patient denies chest pain or shortness of  breath.  Hospital course  Patient admitted for evaluation of chest pain, ROSANA and supratherapeutic anticoagulation.  Evaluated by cardiology, adjust her medication.  Will follow-up with Dr. Callaway within 1 week.  Kidney function improved keep holding losartan will be addressed at at follow-up appointment with her cardiology..  Patient has history of A-fib on Coumadin her INR was 3 .4 today will hold Coumadin restart Coumadin tomorrow.  Patient has to follow-up with Coumadin clinic.  Evaluated by RT patient require 1 L of oxygen.  Patient is hemodynamically stable will be discharged home with no need.    Pertinent Physical Exam At Time of Discharge  Physical Exam  General: alert, no diaphoresis   HENT: mucous membranes moist, external ears normal, no rhinorrhea   Eyes: no icterus or injection, no discharge   Lungs: On 1 L nasal cannula SPO2 92%.  CTA BL   Heart: RRR, no murmurs, no LE edema BL   GI: abdomen soft, nontender, nondistended, BS present   MSK: no joint effusion or deformity   Skin: no rashes, erythema, or ecchymosis   Neuro: grossly normal cognition, motor strength, sensation    Outpatient Follow-Up  Future Appointments   Date Time Provider Department Center   7/15/2025 12:15 PM VERITO OR 04 ELANA [facility]   8/6/2025 10:15 AM Campos Stevenson MD [id number] [facility]   8/20/2025  8:00 AM Randall Green MD [id number] [facility]   10/13/2025  1:45 PM Nessa Callaway MD [id number] [facility]     I spent 35 minutes in overall care    Laurie Avila APRN-CNP

## 2025-07-14 ENCOUNTER — TELEPHONE (OUTPATIENT)
Dept: PRIMARY CARE | Facility: CLINIC | Age: OVER 89
End: 2025-07-14
Payer: MEDICARE

## 2025-07-14 NOTE — PROGRESS NOTES
Recuieved call from son Eduar- needing refills on 2 meds. Requested they be sent to [facility] now. He is aware that Sulfasalazine refill is available at [facility]. He requests all new medication scripts be sent to [facility]. Pharmacy of choice changed to [facility] per request.

## 2025-07-15 ENCOUNTER — OFFICE VISIT (OUTPATIENT)
Dept: PRIMARY CARE | Facility: CLINIC | Age: OVER 89
End: 2025-07-15
Payer: MEDICARE

## 2025-07-15 ENCOUNTER — APPOINTMENT (OUTPATIENT)
Dept: OPERATING ROOM | Facility: HOSPITAL | Age: OVER 89
End: 2025-07-15
Payer: MEDICARE

## 2025-07-15 VITALS
BODY MASS INDEX: 23.39 KG/M2 | DIASTOLIC BLOOD PRESSURE: 68 MMHG | SYSTOLIC BLOOD PRESSURE: 118 MMHG | HEIGHT: 59 IN | WEIGHT: 116 LBS | HEART RATE: 57 BPM | OXYGEN SATURATION: 96 % | TEMPERATURE: 96.6 F

## 2025-07-15 DIAGNOSIS — F03.B0 MODERATE DEMENTIA, UNSPECIFIED DEMENTIA TYPE, UNSPECIFIED WHETHER BEHAVIORAL, PSYCHOTIC, OR MOOD DISTURBANCE OR ANXIETY: ICD-10-CM

## 2025-07-15 DIAGNOSIS — M30.0: ICD-10-CM

## 2025-07-15 DIAGNOSIS — N18.32 STAGE 3B CHRONIC KIDNEY DISEASE (MULTI): ICD-10-CM

## 2025-07-15 DIAGNOSIS — I48.91 ATRIAL FIBRILLATION, UNSPECIFIED TYPE (MULTI): ICD-10-CM

## 2025-07-15 DIAGNOSIS — I10 ESSENTIAL HYPERTENSION: Primary | ICD-10-CM

## 2025-07-15 DIAGNOSIS — M06.09 RHEUMATOID ARTHRITIS WITHOUT RHEUMATOID FACTOR, MULTIPLE SITES (MULTI): ICD-10-CM

## 2025-07-15 DIAGNOSIS — G45.9 TRANSIENT CEREBRAL ISCHEMIC ATTACK, UNSPECIFIED: ICD-10-CM

## 2025-07-15 PROCEDURE — 3074F SYST BP LT 130 MM HG: CPT | Performed by: INTERNAL MEDICINE

## 2025-07-15 PROCEDURE — 1159F MED LIST DOCD IN RCRD: CPT | Performed by: INTERNAL MEDICINE

## 2025-07-15 PROCEDURE — 1126F AMNT PAIN NOTED NONE PRSNT: CPT | Performed by: INTERNAL MEDICINE

## 2025-07-15 PROCEDURE — 3078F DIAST BP <80 MM HG: CPT | Performed by: INTERNAL MEDICINE

## 2025-07-15 PROCEDURE — 1111F DSCHRG MED/CURRENT MED MERGE: CPT | Performed by: INTERNAL MEDICINE

## 2025-07-15 PROCEDURE — 99496 TRANSJ CARE MGMT HIGH F2F 7D: CPT | Performed by: INTERNAL MEDICINE

## 2025-07-15 RX ORDER — SULFASALAZINE 500 MG/1
500 TABLET ORAL 2 TIMES DAILY
Qty: 180 TABLET | Refills: 1 | Status: SHIPPED | OUTPATIENT
Start: 2025-07-15

## 2025-07-15 RX ORDER — VERAPAMIL HYDROCHLORIDE 180 MG/1
180 TABLET, EXTENDED RELEASE ORAL NIGHTLY
Qty: 90 TABLET | Refills: 1 | Status: SHIPPED | OUTPATIENT
Start: 2025-07-15 | End: 2025-07-25 | Stop reason: HOSPADM

## 2025-07-15 ASSESSMENT — PATIENT HEALTH QUESTIONNAIRE - PHQ9
SUM OF ALL RESPONSES TO PHQ9 QUESTIONS 1 AND 2: 0
1. LITTLE INTEREST OR PLEASURE IN DOING THINGS: NOT AT ALL
2. FEELING DOWN, DEPRESSED OR HOPELESS: NOT AT ALL

## 2025-07-15 ASSESSMENT — ENCOUNTER SYMPTOMS
OCCASIONAL FEELINGS OF UNSTEADINESS: 1
DEPRESSION: 0
LOSS OF SENSATION IN FEET: 0

## 2025-07-15 ASSESSMENT — PAIN SCALES - GENERAL: PAINLEVEL_OUTOF10: 0-NO PAIN

## 2025-07-15 NOTE — PROGRESS NOTES
[facility]: [facility]  PROGRESS NOTE      Rakan Cervantes is a 89 y.o. female that is being seen  today for Hospital Follow-up.  Subjective   Patient is a 89-year-old female with a history of coronary artery disease status post stent placement, atrial fibrillation, congestive heart failure, debility, hypertension, hyperlipidemia who is being seen for follow-up from the hospital.  Patient was admitted to the hospital with chest pain.  Patient has atrial fibrillation and was recently changed to Coumadin.  Patient was earlier taking Eliquis.  Patient was found to have elevated INR in the hospital.  Patient was seen by cardiologist and also found to have acute kidney injury and patient's medications were changed.  Patient was also found to have low oxygen levels and patient has been started on continuous oxygen.  Patient has atrial fibrillation and has been seen by cardiologist.  Patient is being scheduled for Watchman procedure  Patient was treated with diuretics and discharged home with plan to follow-up with the cardiologist.  Patient is due to get her repeat INR checked.  Patient denies any significant shortness of breath no leg swelling.  No PND or orthopnea      ROS  Negative for fever or chills  Negative for sore throat, ear pain, nasal discharge  Negative for cough, positive for shortness of breath negative for wheezing  Negative for chest pain, palpitations, swelling of legs  Negative for abdominal pain, constipation, diarrhea, blood in the stools  Negative for urinary complaints  Negative for headache, dizziness, weakness or numbness  Negative for joint pain.  Positive for difficulty in walking  Negative for depression or anxiety  All other systems reviewed and were negative   Vitals:    07/15/25 1001   BP: 118/68   Pulse: 57   Temp: 35.9 °C (96.6 °F)   SpO2: 96%      Vitals:    07/15/25 1001   Weight: 52.6 kg (116 lb)     Body mass index is 23.43 kg/m².  Physical Exam  Constitutional:  Patient does not appear to be in any acute distress  Head and Face: NCAT  Eyes: Normal external exam, EOMI  ENT: Normal external inspection of ears and nose. Oropharynx normal.  Cardiovascular: Irregularly irregular ,no edema of extremities  Pulmonary: CTAB, no respiratory distress.  Abdomen: +BS, soft, non-tender, nondistended, no guarding or rebound, no masses noted  MSK: No joint swelling, normal movements of all extremities. Range of motion- normal.  Skin- No lesions, contusions, or erythema.  Peripheral puslses palpable bilaterally 2+  Neuro: AAO X3, Cranial nerves 2-12 grossly intact,DTR 2+ in all 4 limbs   Psychiatric: Judgment intact. Appropriate mood and behavior    LABS   [unfilled]  Lab Results   Component Value Date    GLUCOSE 103 (H) 07/10/2025    CALCIUM 8.7 07/10/2025     (L) 07/10/2025    K 3.7 07/10/2025    CO2 30 07/10/2025    CL 99 07/10/2025    BUN 29 (H) 07/10/2025    CREATININE 1.20 (H) 07/10/2025     Lab Results   Component Value Date    ALT 11 07/07/2025    AST 13 07/07/2025    ALKPHOS 71 07/07/2025    BILITOT 1.0 07/07/2025     Lab Results   Component Value Date    WBC 9.5 07/10/2025    HGB 8.7 (L) 07/10/2025    HCT 27.6 (L) 07/10/2025     07/10/2025     07/10/2025     Lab Results   Component Value Date    CHOL 80 10/29/2024    CHOL 146 09/10/2024    CHOL 154 03/14/2024     Lab Results   Component Value Date    HDL 43.0 10/29/2024    HDL 75.0 09/10/2024    HDL 83.0 03/14/2024     Lab Results   Component Value Date    LDLCALC 25 10/29/2024    LDLCALC 50 (L) 09/10/2024    LDLCALC 44 (L) 03/14/2024     Lab Results   Component Value Date    TRIG 59 10/29/2024    TRIG 104 09/10/2024    TRIG 136 03/14/2024     Lab Results   Component Value Date    HGBA1C 5.8 (H) 09/10/2024     Other labs not included in the list above were reviewed either before or during this encounter.    History    Medical History[1]  Surgical History[2]  Family History[3]  Allergies[4]  Medications Ordered  Prior to Encounter[5]  Immunization History   Administered Date(s) Administered    Flu vaccine, quadrivalent, high-dose, preservative free, age 65y+ (FLUZONE) 09/28/2020, 09/29/2021, 11/10/2022, 09/15/2023    Flu vaccine, trivalent, preservative free, HIGH-DOSE, age 65y+ (Fluzone) 10/12/2016, 10/16/2017, 10/05/2018, 10/09/2019, 09/20/2024    Flu vaccine, trivalent, preservative free, age 6 months and greater (Fluarix/Fluzone/Flulaval) 09/23/2015    Influenza, seasonal, injectable 11/01/2008, 10/21/2010, 09/27/2011, 10/03/2012, 10/28/2013, 09/11/2014    Moderna SARS-CoV-2 Vaccination 01/30/2021, 02/27/2021, 11/12/2021    Pneumococcal conjugate vaccine, 13-valent (PREVNAR 13) 08/04/2015    Pneumococcal polysaccharide vaccine, 23-valent, age 2 years and older (PNEUMOVAX 23) 10/01/2006, 01/01/2010    Tdap vaccine, age 7 year and older (BOOSTRIX, ADACEL) 09/28/2020    Zoster vaccine, recombinant, adult (SHINGRIX) 05/14/2019, 07/31/2019    Zoster, live 05/27/2015     Patient's medical history was reviewed and updated either before or during this encounter.  ASSESSMENT / PLAN:  Diagnoses and all orders for this visit:  Essential hypertension  -     CBC; Future  -     Comprehensive Metabolic Panel; Future  Transient cerebral ischemic attack, unspecified  -     verapamil SR (Calan-SR) 180 mg ER tablet; Take 1 tablet (180 mg) by mouth once daily at bedtime. Do not crush or chew.  Polyarteritis (Multi)  -     sulfaSALAzine (Azulfidine) 500 mg tablet; Take 1 tablet (500 mg) by mouth 2 times a day.  Moderate dementia, unspecified dementia type, unspecified whether behavioral, psychotic, or mood disturbance or anxiety  Atrial fibrillation, unspecified type (Multi)  -     Protime-INR; Future  Stage 3b chronic kidney disease (Multi)    Patient is being seen for follow-up from the hospital.  Patient is on Coumadin and needs Coumadin levels to be checked.  Patient has congestive heart failure and is on diuretics.  Kidney functions  needs to be monitored.  Patient will follow-up cardiologist.  Patient also has been started on oxygen and will follow-up with pulmonologist.  Patient scheduled to be seen by cardiologist and have Watchman procedure done.  All hospital records reviewed      Samuel Sky MD          [1]   Past Medical History:  Diagnosis Date    Abnormality of plasma protein 03/13/2025    Acute kidney injury 03/12/2025    Acute non-ST segment elevation myocardial infarction (Multi) 11/26/2023    Anemia     Arteriosclerosis of coronary artery 05/14/2023    Arthritis     Asthma     Atrial fibrillation (Multi) 11/26/2023    Jonas esophagus     Bilateral carotid artery occlusion 05/23/2023    Bradycardia 02/21/2025    CAD (coronary artery disease)     Cardiac rhythm disorder or disturbance or change 11/26/2023    Cervical radiculopathy     Chest pain 11/26/2023    Chronic heart failure with preserved ejection fraction 02/21/2025    Chronic obstructive pulmonary disease (Multi) 11/26/2023    Constipation     Coronary artery disease 11/26/2023    Degenerative joint disease     Dyslipidemia     Dysuria     Erosive esophagitis     Erythema of skin 04/07/2025    Fall at home, initial encounter 03/11/2025    Gastrointestinal hemorrhage with melena 04/08/2025    GERD (gastroesophageal reflux disease)     GIB (gastrointestinal bleeding) 04/07/2025    Hematochezia 04/08/2025    Hypercholesterolemia 05/23/2023    Hyperparathyroidism (Multi)     Hypertensive urgency 11/26/2023    Impaired fasting glucose 11/26/2023    Iron deficiency anemia due to chronic blood loss 04/08/2025    Irritable bowel syndrome (IBS)     Labile essential hypertension 11/26/2023    Labile hypertension     Left foot pain     Low blood pressure 11/26/2023    Lung infiltrate on CT 02/24/2025    Macular degeneration     Mixed hyperlipidemia 11/26/2023    Neck pain     Osteoporosis 11/26/2023    REclast '19 Cr 1.4 switch to calcitonin.    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    Stage 3 chronic kidney disease (Multi) 11/26/2023    Stage 3a chronic kidney disease (Multi) 02/21/2025    Stroke (Multi)     Supratherapeutic INR 03/12/2025    Transient ischemic attack 11/26/2023   [2]   Past Surgical History:  Procedure Laterality Date    CARDIAC CATHETERIZATION Left 9/27/2024    Procedure: Left Heart Cath;  Surgeon: Nessa Callaway MD;  Location: [facility] Cardiac Cath Lab;  Service: Cardiovascular;  Laterality: Left;    CARDIAC CATHETERIZATION N/A 9/27/2024    Procedure: PCI;  Surgeon: Nessa Callaway MD;  Location: [facility] Cardiac Cath Lab;  Service: Cardiovascular;  Laterality: N/A;    CARDIOVASCULAR STRESS TEST  2017    Dr. Khalil    CARDIOVASCULAR STRESS TEST  2004    Dr. Pantoja    CARPAL TUNNEL RELEASE Left 2014    CATARACT EXTRACTION Bilateral 2011    CHOLECYSTECTOMY      CORONARY STENT PLACEMENT  05/2023    LAD    CT HEAD ANGIO W AND WO IV CONTRAST  05/08/2013    CT HEAD ANGIO W AND WO IV CONTRAST [facility]    MR HEAD ANGIO WO IV CONTRAST  04/23/2013    MR HEAD ANGIO WO IV CONTRAST [facility]    MR HEAD ANGIO WO IV CONTRAST  07/20/2016    MR HEAD ANGIO WO IV CONTRAST [facility]    MR HEAD ANGIO WO IV CONTRAST  10/27/2023    MR HEAD ANGIO WO IV CONTRAST 10/27/2023 [facility]    MR NECK ANGIO WO IV CONTRAST  10/27/2023    MR NECK ANGIO WO IV CONTRAST 10/27/2023 VERITO MRI    OTHER SURGICAL HISTORY  02/14/2022    No history of surgery    REVISION TOTAL HIP ARTHROPLASTY Left 2013    Conversion from left ORIF    TOTAL HIP ARTHROPLASTY Right 2016   [3]   Family History  Problem Relation Name Age of Onset    No Known Problems  "Mother      No Known Problems Father      No Known Problems Sister     [4]   Allergies  Allergen Reactions    Meperidine Hallucinations     Other reaction(s): Mental Status Change    Morphine Hallucinations    Opioids - Morphine Analogues Hallucinations and Confusion    Pregabalin Rash, Other and Unknown     \"Flu like symptoms\"    Flu like symtoms    Tramadol Rash, Itching and Unknown   [5]   Current Outpatient Medications on File Prior to Visit   Medication Sig Dispense Refill    aspirin 81 mg EC tablet Take 1 tablet (81 mg) by mouth once daily.      atorvastatin (Lipitor) 80 mg tablet Take 1 tablet (80 mg) by mouth once daily at bedtime. 90 tablet 1    carvedilol (Coreg) 6.25 mg tablet Take 1 tablet (6.25 mg) by mouth 2 times daily (morning and late afternoon). 180 tablet 3    docusate sodium (Colace) 100 mg capsule Take 1 capsule (100 mg) by mouth if needed.      DULoxetine (Cymbalta) 30 mg DR capsule TAKE 1 CAPSULE BY MOUTH ONCE DAILY at dinner 90 capsule 1    ferrous sulfate (FeroSuL) 325 mg (65 mg elemental) tablet Take 1 tablet by mouth 3 times a week. Monday, Wednesday, Friday 36 tablet 1    furosemide (Lasix) 40 mg tablet Take 1 tablet (40 mg) by mouth once daily as needed (take as needed for lower leg edema or weight gain). 90 tablet 3    gabapentin (Neurontin) 300 mg capsule TAKE 1 CAPSULE BY MOUTH AT NOON AND BEDTIME 180 capsule 1    hydrALAZINE (Apresoline) 50 mg tablet Take 1 tablet (50 mg) by mouth once daily as needed (Take if systolic blood pressure is greater than 140). 100 tablet 3    hydroCHLOROthiazide (HYDRODiuril) 25 mg tablet Take 1 tablet (25 mg) by mouth once daily.      hydroxychloroquine (Plaquenil) 200 mg tablet Take 1 tablet (200 mg) by mouth once daily. 90 tablet 1    magnesium oxide (Mag-Ox) 400 mg (241.3 mg magnesium) tablet Take 1 tablet (400 mg) by mouth 2 times a day. 60 tablet 1    pantoprazole (ProtoNix) 40 mg EC tablet TAKE 1 TABLET BY MOUTH TWICE DAILY 180 tablet 1    " potassium chloride CR 20 mEq ER tablet Take 1 tablet (20 mEq) by mouth once daily. Take with food. 30 tablet 2    ranolazine (Ranexa) 500 mg 12 hr tablet TAKE 1 TABLET (500 MG) BY MOUTH 2 TIMES A DAY. DO NOT CRUSH, CHEW, OR SPLIT. 180 tablet 1    Stool Softener 100 mg capsule TAKE 1 CAPSULE BY MOUTH ONCE DAILY AS NEEDED to prevent constipation from iron 90 capsule 1    sucralfate (Carafate) 100 mg/mL suspension Take 10 mL (1 g) by mouth once daily.      warfarin (Coumadin) 5 mg tablet Take 1 tablet (5 mg) by mouth once daily at bedtime. Take as directed per After Visit Summary. Do not start before July 11, 2025. 90 tablet 0    [DISCONTINUED] sulfaSALAzine (Azulfidine) 500 mg tablet Take 1 tablet (500 mg) by mouth 2 times a day. 180 tablet 1    [DISCONTINUED] verapamil SR (Calan-SR) 180 mg ER tablet Take 1 tablet (180 mg) by mouth once daily at bedtime. Do not crush or chew.      [DISCONTINUED] ascorbic acid (Vitamin C) 100 mg tablet Take 1 tablet (100 mg) by mouth once daily.      [DISCONTINUED] carvedilol (Coreg) 25 mg tablet TAKE 1 TABLET (25 MG) BY MOUTH 2 TIMES DAILY (MORNING AND LATE AFTERNOON). 60 tablet 1    [DISCONTINUED] cholecalciferol (Vitamin D-3) 50 mcg (2,000 unit) capsule Take 1 capsule (2,000 Units) by mouth early in the morning..      [DISCONTINUED] furosemide (Lasix) 40 mg tablet Take 1 tablet (40 mg) by mouth once daily. (Patient not taking: Reported on 7/7/2025) 90 tablet 1    [DISCONTINUED] hydrALAZINE (Apresoline) 50 mg tablet Take 1 tablet (50 mg) by mouth 2 times a day. 180 tablet 3    [DISCONTINUED] isosorbide mononitrate ER (Imdur) 120 mg 24 hr tablet TAKE 1 TABLET (120 MG) BY MOUTH ONCE DAILY. DO NOT CRUSH OR CHEW. 90 tablet 1    [DISCONTINUED] lidocaine (Lidoderm) 5 % patch Place 1 patch over 12 hours on the skin once daily. Remove & discard patch within 12 hours or as directed by MD. (Patient not taking: Reported on 7/7/2025) 30 patch 2    [DISCONTINUED] lidocaine 4 % patch Place 3  patches over 12 hours on the skin once daily. Remove & discard patch within 12 hours or as directed by MD. (Patient not taking: Reported on 7/7/2025) 30 patch 1    [DISCONTINUED] losartan (Cozaar) 100 mg tablet Take 1 tablet (100 mg) by mouth once daily. 90 tablet 3    [DISCONTINUED] sucralfate (Carafate) 100 mg/mL suspension Take 10 mL (1 g) by mouth every 6 hours. (Patient taking differently: Take 10 mL (1 g) by mouth once daily.) 1200 mL 1    [DISCONTINUED] vit C/E/Zn/coppr/lutein/zeaxan (PRESERVISION AREDS-2 ORAL) Take 1 capsule by mouth 2 times a day. As directed orally      [DISCONTINUED] warfarin (Coumadin) 5 mg tablet Take 1 tablet (5 mg) by mouth once daily at bedtime. Take as directed per After Visit Summary. (Patient not taking: Reported on 7/7/2025) 90 tablet 0    [DISCONTINUED] warfarin (Coumadin) 5 mg tablet Take 1 tablet (5 mg) by mouth once daily at bedtime. Take as directed per After Visit Summary. Do not fill before July 12, 2025. 90 tablet 0     No current facility-administered medications on file prior to visit.

## 2025-07-16 LAB
ALBUMIN SERPL-MCNC: 3.3 G/DL (ref 3.6–5.1)
ALP SERPL-CCNC: 74 U/L (ref 37–153)
ALT SERPL-CCNC: 10 U/L (ref 6–29)
ANION GAP SERPL CALCULATED.4IONS-SCNC: 6 MMOL/L (CALC) (ref 7–17)
AST SERPL-CCNC: 14 U/L (ref 10–35)
BILIRUB SERPL-MCNC: 0.4 MG/DL (ref 0.2–1.2)
BUN SERPL-MCNC: 26 MG/DL (ref 7–25)
CALCIUM SERPL-MCNC: 8.9 MG/DL (ref 8.6–10.4)
CHLORIDE SERPL-SCNC: 96 MMOL/L (ref 98–110)
CO2 SERPL-SCNC: 31 MMOL/L (ref 20–32)
CREAT SERPL-MCNC: 1.17 MG/DL (ref 0.6–0.95)
EGFRCR SERPLBLD CKD-EPI 2021: 45 ML/MIN/1.73M2
ERYTHROCYTE [DISTWIDTH] IN BLOOD BY AUTOMATED COUNT: 14 % (ref 11–15)
GLUCOSE SERPL-MCNC: 112 MG/DL (ref 65–139)
HCT VFR BLD AUTO: 25.5 % (ref 35–45)
HGB BLD-MCNC: 8.1 G/DL (ref 11.7–15.5)
INR PPP: 3.2
MCH RBC QN AUTO: 31.2 PG (ref 27–33)
MCHC RBC AUTO-ENTMCNC: 31.8 G/DL (ref 32–36)
MCV RBC AUTO: 98.1 FL (ref 80–100)
PLATELET # BLD AUTO: 322 THOUSAND/UL (ref 140–400)
PMV BLD REES-ECKER: 9.1 FL (ref 7.5–12.5)
POTASSIUM SERPL-SCNC: 5.1 MMOL/L (ref 3.5–5.3)
PROT SERPL-MCNC: 5.3 G/DL (ref 6.1–8.1)
PROTHROMBIN TIME: 31.4 SEC (ref 9–11.5)
RBC # BLD AUTO: 2.6 MILLION/UL (ref 3.8–5.1)
SODIUM SERPL-SCNC: 133 MMOL/L (ref 135–146)
WBC # BLD AUTO: 5.2 THOUSAND/UL (ref 3.8–10.8)

## 2025-07-17 ENCOUNTER — RESULTS FOLLOW-UP (OUTPATIENT)
Dept: PRIMARY CARE | Facility: CLINIC | Age: OVER 89
End: 2025-07-17
Payer: MEDICARE

## 2025-07-18 NOTE — TELEPHONE ENCOUNTER
----- Message from Samuel Sky sent at 7/17/2025  4:56 PM EDT -----  Patient's INR has been in the therapeutic range.  Should follow-up with the cardiologist.  Patient does have anemia and would recommend patient to take iron supplementation daily.  ----- Message -----  From: [facility] Results In  Sent: 7/15/2025  11:41 PM EDT  To: Samuel Sky MD

## 2025-07-20 ENCOUNTER — APPOINTMENT (OUTPATIENT)
Dept: RADIOLOGY | Facility: HOSPITAL | Age: OVER 89
End: 2025-07-20
Payer: MEDICARE

## 2025-07-20 ENCOUNTER — APPOINTMENT (OUTPATIENT)
Dept: CARDIOLOGY | Facility: HOSPITAL | Age: OVER 89
End: 2025-07-20
Payer: MEDICARE

## 2025-07-20 ENCOUNTER — HOSPITAL ENCOUNTER (EMERGENCY)
Facility: HOSPITAL | Age: OVER 89
Discharge: OTHER INSTITUTION | End: 2025-07-21
Payer: MEDICARE

## 2025-07-20 DIAGNOSIS — R57.0 CARDIOGENIC SHOCK (MULTI): Primary | ICD-10-CM

## 2025-07-20 DIAGNOSIS — I48.91 ATRIAL FIBRILLATION, UNSPECIFIED TYPE (MULTI): ICD-10-CM

## 2025-07-20 DIAGNOSIS — R00.1 BRADYCARDIA: ICD-10-CM

## 2025-07-20 LAB
BASOPHILS # BLD AUTO: 0.02 X10*3/UL (ref 0–0.1)
BASOPHILS NFR BLD AUTO: 0.4 %
EOSINOPHIL # BLD AUTO: 0.03 X10*3/UL (ref 0–0.4)
EOSINOPHIL NFR BLD AUTO: 0.5 %
ERYTHROCYTE [DISTWIDTH] IN BLOOD BY AUTOMATED COUNT: 14.7 % (ref 11.5–14.5)
HCT VFR BLD AUTO: 23.4 % (ref 36–46)
HGB BLD-MCNC: 7.2 G/DL (ref 12–16)
IMM GRANULOCYTES # BLD AUTO: 0.03 X10*3/UL (ref 0–0.5)
IMM GRANULOCYTES NFR BLD AUTO: 0.5 % (ref 0–0.9)
LYMPHOCYTES # BLD AUTO: 1.02 X10*3/UL (ref 0.8–3)
LYMPHOCYTES NFR BLD AUTO: 18.1 %
MCH RBC QN AUTO: 30.6 PG (ref 26–34)
MCHC RBC AUTO-ENTMCNC: 30.8 G/DL (ref 32–36)
MCV RBC AUTO: 100 FL (ref 80–100)
MONOCYTES # BLD AUTO: 0.52 X10*3/UL (ref 0.05–0.8)
MONOCYTES NFR BLD AUTO: 9.2 %
NEUTROPHILS # BLD AUTO: 4.03 X10*3/UL (ref 1.6–5.5)
NEUTROPHILS NFR BLD AUTO: 71.3 %
NRBC BLD-RTO: 0 /100 WBCS (ref 0–0)
PLATELET # BLD AUTO: 295 X10*3/UL (ref 150–450)
RBC # BLD AUTO: 2.35 X10*6/UL (ref 4–5.2)
WBC # BLD AUTO: 5.7 X10*3/UL (ref 4.4–11.3)

## 2025-07-20 PROCEDURE — 83735 ASSAY OF MAGNESIUM: CPT

## 2025-07-20 PROCEDURE — 71045 X-RAY EXAM CHEST 1 VIEW: CPT | Performed by: RADIOLOGY

## 2025-07-20 PROCEDURE — 85025 COMPLETE CBC W/AUTO DIFF WBC: CPT

## 2025-07-20 PROCEDURE — 36415 COLL VENOUS BLD VENIPUNCTURE: CPT

## 2025-07-20 PROCEDURE — 96374 THER/PROPH/DIAG INJ IV PUSH: CPT

## 2025-07-20 PROCEDURE — 84484 ASSAY OF TROPONIN QUANT: CPT

## 2025-07-20 PROCEDURE — 80053 COMPREHEN METABOLIC PANEL: CPT

## 2025-07-20 PROCEDURE — 83880 ASSAY OF NATRIURETIC PEPTIDE: CPT

## 2025-07-20 PROCEDURE — 93005 ELECTROCARDIOGRAM TRACING: CPT

## 2025-07-20 PROCEDURE — 2500000004 HC RX 250 GENERAL PHARMACY W/ HCPCS (ALT 636 FOR OP/ED)

## 2025-07-20 PROCEDURE — 71045 X-RAY EXAM CHEST 1 VIEW: CPT

## 2025-07-20 PROCEDURE — 85730 THROMBOPLASTIN TIME PARTIAL: CPT

## 2025-07-20 PROCEDURE — 2500000001 HC RX 250 WO HCPCS SELF ADMINISTERED DRUGS (ALT 637 FOR MEDICARE OP)

## 2025-07-20 PROCEDURE — 85610 PROTHROMBIN TIME: CPT

## 2025-07-20 RX ORDER — DOPAMINE HYDROCHLORIDE 160 MG/100ML
3 INJECTION, SOLUTION INTRAVENOUS CONTINUOUS
Status: DISCONTINUED | OUTPATIENT
Start: 2025-07-20 | End: 2025-07-21 | Stop reason: HOSPADM

## 2025-07-20 RX ORDER — ACETAMINOPHEN 325 MG/1
650 TABLET ORAL ONCE
Status: COMPLETED | OUTPATIENT
Start: 2025-07-20 | End: 2025-07-20

## 2025-07-20 RX ORDER — ATROPINE SULFATE 0.1 MG/ML
INJECTION INTRAVENOUS
Status: COMPLETED
Start: 2025-07-20 | End: 2025-07-20

## 2025-07-20 RX ORDER — ONDANSETRON HYDROCHLORIDE 2 MG/ML
4 INJECTION, SOLUTION INTRAVENOUS ONCE
Status: DISCONTINUED | OUTPATIENT
Start: 2025-07-21 | End: 2025-07-21 | Stop reason: HOSPADM

## 2025-07-20 RX ORDER — ONDANSETRON HYDROCHLORIDE 2 MG/ML
INJECTION, SOLUTION INTRAVENOUS
Status: COMPLETED
Start: 2025-07-20 | End: 2025-07-20

## 2025-07-20 RX ORDER — ATROPINE SULFATE 0.4 MG/ML
1 INJECTION, SOLUTION ENDOTRACHEAL; INTRAMEDULLARY; INTRAMUSCULAR; INTRAVENOUS; SUBCUTANEOUS ONCE
Status: DISCONTINUED | OUTPATIENT
Start: 2025-07-20 | End: 2025-07-21 | Stop reason: HOSPADM

## 2025-07-20 RX ADMIN — ACETAMINOPHEN 650 MG: 325 TABLET ORAL at 23:47

## 2025-07-20 RX ADMIN — ATROPINE SULFATE 1 MG: 0.1 INJECTION INTRAVENOUS at 23:53

## 2025-07-20 RX ADMIN — ONDANSETRON 4 MG: 2 INJECTION, SOLUTION INTRAMUSCULAR; INTRAVENOUS at 23:33

## 2025-07-20 RX ADMIN — DOPAMINE HYDROCHLORIDE 3 MCG/KG/MIN: 160 INJECTION, SOLUTION INTRAVENOUS at 23:18

## 2025-07-20 ASSESSMENT — PAIN SCALES - GENERAL: PAINLEVEL_OUTOF10: 0 - NO PAIN

## 2025-07-21 ENCOUNTER — HOSPITAL ENCOUNTER (INPATIENT)
Facility: HOSPITAL | Age: OVER 89
LOS: 4 days | Discharge: HOME | End: 2025-07-25
Attending: INTERNAL MEDICINE | Admitting: INTERNAL MEDICINE
Payer: MEDICARE

## 2025-07-21 VITALS
BODY MASS INDEX: 23.39 KG/M2 | SYSTOLIC BLOOD PRESSURE: 104 MMHG | TEMPERATURE: 97.6 F | WEIGHT: 116 LBS | OXYGEN SATURATION: 91 % | HEIGHT: 59 IN | HEART RATE: 58 BPM | RESPIRATION RATE: 18 BRPM | DIASTOLIC BLOOD PRESSURE: 45 MMHG

## 2025-07-21 DIAGNOSIS — R57.0 CARDIOGENIC SHOCK (MULTI): Primary | ICD-10-CM

## 2025-07-21 DIAGNOSIS — I49.8 OTHER CARDIAC ARRHYTHMIA: ICD-10-CM

## 2025-07-21 DIAGNOSIS — R53.1 WEAKNESS: ICD-10-CM

## 2025-07-21 PROBLEM — D68.9 MEDICATION INDUCED COAGULOPATHY (MULTI): Status: ACTIVE | Noted: 2025-07-21

## 2025-07-21 PROBLEM — R00.1 BRADYCARDIA: Status: ACTIVE | Noted: 2025-02-21

## 2025-07-21 PROBLEM — T50.905A MEDICATION INDUCED COAGULOPATHY (MULTI): Status: ACTIVE | Noted: 2025-07-21

## 2025-07-21 LAB
ALBUMIN SERPL BCP-MCNC: 3.2 G/DL (ref 3.4–5)
ALBUMIN SERPL BCP-MCNC: 3.5 G/DL (ref 3.4–5)
ALP SERPL-CCNC: 68 U/L (ref 33–136)
ALP SERPL-CCNC: 77 U/L (ref 33–136)
ALT SERPL W P-5'-P-CCNC: 14 U/L (ref 7–45)
ALT SERPL W P-5'-P-CCNC: 14 U/L (ref 7–45)
ANION GAP BLDV CALCULATED.4IONS-SCNC: 8 MMOL/L (ref 10–25)
ANION GAP SERPL CALC-SCNC: 15 MMOL/L (ref 10–20)
ANION GAP SERPL CALCULATED.3IONS-SCNC: 10 MMOL/L (ref 10–20)
APTT PPP: 46.9 SECONDS (ref 22–32.5)
AST SERPL W P-5'-P-CCNC: 17 U/L (ref 9–39)
AST SERPL W P-5'-P-CCNC: 21 U/L (ref 9–39)
BASE EXCESS BLDV CALC-SCNC: 4.4 MMOL/L (ref -2–3)
BILIRUB SERPL-MCNC: 0.4 MG/DL (ref 0–1.2)
BILIRUB SERPL-MCNC: 0.4 MG/DL (ref 0–1.2)
BNP SERPL-MCNC: 612 PG/ML (ref 0–99)
BODY TEMPERATURE: 37 DEGREES CELSIUS
BUN SERPL-MCNC: 26 MG/DL (ref 6–23)
BUN SERPL-MCNC: 29 MG/DL (ref 6–23)
CA-I BLDV-SCNC: 1.3 MMOL/L (ref 1.1–1.33)
CALCIUM SERPL-MCNC: 9 MG/DL (ref 8.6–10.3)
CALCIUM SERPL-MCNC: 9 MG/DL (ref 8.6–10.3)
CARDIAC TROPONIN I PNL SERPL HS: 110 NG/L (ref 0–13)
CARDIAC TROPONIN I PNL SERPL HS: 112 NG/L (ref 0–13)
CHLORIDE BLDV-SCNC: 99 MMOL/L (ref 98–107)
CHLORIDE SERPL-SCNC: 100 MMOL/L (ref 98–107)
CHLORIDE SERPL-SCNC: 101 MMOL/L (ref 98–107)
CO2 SERPL-SCNC: 23 MMOL/L (ref 21–32)
CO2 SERPL-SCNC: 30 MMOL/L (ref 21–32)
CREAT SERPL-MCNC: 1.17 MG/DL (ref 0.5–1.05)
CREAT SERPL-MCNC: 1.25 MG/DL (ref 0.5–1.05)
EGFRCR SERPLBLD CKD-EPI 2021: 41 ML/MIN/1.73M*2
EGFRCR SERPLBLD CKD-EPI 2021: 45 ML/MIN/1.73M*2
GLUCOSE BLDV-MCNC: 149 MG/DL (ref 74–99)
GLUCOSE SERPL-MCNC: 120 MG/DL (ref 74–99)
GLUCOSE SERPL-MCNC: 150 MG/DL (ref 74–99)
HCO3 BLDV-SCNC: 30.5 MMOL/L (ref 22–26)
HCT VFR BLD EST: 25 % (ref 36–46)
HGB BLDV-MCNC: 8.2 G/DL (ref 12–16)
INHALED O2 CONCENTRATION: 32 %
INR PPP: 7.7 (ref 0.9–1.2)
INR PPP: ABNORMAL
LACTATE BLDV-SCNC: 1.8 MMOL/L (ref 0.4–2)
MAGNESIUM SERPL-MCNC: 2.07 MG/DL (ref 1.6–2.4)
OXYHGB MFR BLDV: 61.8 % (ref 45–75)
PCO2 BLDV: 54 MM HG (ref 41–51)
PH BLDV: 7.36 PH (ref 7.33–7.43)
PO2 BLDV: 39 MM HG (ref 35–45)
POTASSIUM BLDV-SCNC: 5.2 MMOL/L (ref 3.5–5.3)
POTASSIUM SERPL-SCNC: 4.7 MMOL/L (ref 3.5–5.3)
POTASSIUM SERPL-SCNC: 5.2 MMOL/L (ref 3.5–5.3)
PROT SERPL-MCNC: 5.4 G/DL (ref 6.4–8.2)
PROT SERPL-MCNC: 5.5 G/DL (ref 6.4–8.2)
PROTHROMBIN TIME: 72.4 SECONDS (ref 9.3–12.7)
PROTHROMBIN TIME: >100 SECONDS (ref 9.8–12.4)
SAO2 % BLDV: 63 % (ref 45–75)
SODIUM BLDV-SCNC: 132 MMOL/L (ref 136–145)
SODIUM SERPL-SCNC: 134 MMOL/L (ref 136–145)
SODIUM SERPL-SCNC: 135 MMOL/L (ref 136–145)

## 2025-07-21 PROCEDURE — 36415 COLL VENOUS BLD VENIPUNCTURE: CPT | Performed by: INTERNAL MEDICINE

## 2025-07-21 PROCEDURE — 2500000002 HC RX 250 W HCPCS SELF ADMINISTERED DRUGS (ALT 637 FOR MEDICARE OP, ALT 636 FOR OP/ED)

## 2025-07-21 PROCEDURE — 2020000001 HC ICU ROOM DAILY

## 2025-07-21 PROCEDURE — 99291 CRITICAL CARE FIRST HOUR: CPT | Performed by: SURGERY

## 2025-07-21 PROCEDURE — 99291 CRITICAL CARE FIRST HOUR: CPT | Performed by: INTERNAL MEDICINE

## 2025-07-21 PROCEDURE — 84132 ASSAY OF SERUM POTASSIUM: CPT | Performed by: INTERNAL MEDICINE

## 2025-07-21 PROCEDURE — 2500000001 HC RX 250 WO HCPCS SELF ADMINISTERED DRUGS (ALT 637 FOR MEDICARE OP): Performed by: PHARMACIST

## 2025-07-21 PROCEDURE — 36415 COLL VENOUS BLD VENIPUNCTURE: CPT

## 2025-07-21 PROCEDURE — 99291 CRITICAL CARE FIRST HOUR: CPT

## 2025-07-21 PROCEDURE — 84484 ASSAY OF TROPONIN QUANT: CPT

## 2025-07-21 PROCEDURE — 94760 N-INVAS EAR/PLS OXIMETRY 1: CPT

## 2025-07-21 PROCEDURE — 2500000001 HC RX 250 WO HCPCS SELF ADMINISTERED DRUGS (ALT 637 FOR MEDICARE OP): Performed by: INTERNAL MEDICINE

## 2025-07-21 PROCEDURE — 2500000002 HC RX 250 W HCPCS SELF ADMINISTERED DRUGS (ALT 637 FOR MEDICARE OP, ALT 636 FOR OP/ED): Performed by: INTERNAL MEDICINE

## 2025-07-21 PROCEDURE — 96375 TX/PRO/DX INJ NEW DRUG ADDON: CPT

## 2025-07-21 PROCEDURE — 85610 PROTHROMBIN TIME: CPT | Performed by: SURGERY

## 2025-07-21 PROCEDURE — 2500000004 HC RX 250 GENERAL PHARMACY W/ HCPCS (ALT 636 FOR OP/ED)

## 2025-07-21 RX ORDER — SULFASALAZINE 500 MG/1
500 TABLET ORAL 2 TIMES DAILY
Status: DISCONTINUED | OUTPATIENT
Start: 2025-07-21 | End: 2025-07-25 | Stop reason: HOSPADM

## 2025-07-21 RX ORDER — SUCRALFATE 1 G/10ML
1 SUSPENSION ORAL DAILY
Status: DISCONTINUED | OUTPATIENT
Start: 2025-07-21 | End: 2025-07-22

## 2025-07-21 RX ORDER — METOCLOPRAMIDE HYDROCHLORIDE 5 MG/ML
10 INJECTION INTRAMUSCULAR; INTRAVENOUS ONCE
Status: COMPLETED | OUTPATIENT
Start: 2025-07-21 | End: 2025-07-21

## 2025-07-21 RX ORDER — HYDROXYCHLOROQUINE SULFATE 200 MG/1
200 TABLET, FILM COATED ORAL DAILY
Status: DISCONTINUED | OUTPATIENT
Start: 2025-07-21 | End: 2025-07-25 | Stop reason: HOSPADM

## 2025-07-21 RX ORDER — DOPAMINE HYDROCHLORIDE 160 MG/100ML
0-5 INJECTION, SOLUTION INTRAVENOUS CONTINUOUS
Status: DISCONTINUED | OUTPATIENT
Start: 2025-07-21 | End: 2025-07-21

## 2025-07-21 RX ORDER — PANTOPRAZOLE SODIUM 40 MG/1
40 TABLET, DELAYED RELEASE ORAL
Status: DISCONTINUED | OUTPATIENT
Start: 2025-07-21 | End: 2025-07-25 | Stop reason: HOSPADM

## 2025-07-21 RX ORDER — DOCUSATE SODIUM 100 MG/1
100 CAPSULE, LIQUID FILLED ORAL DAILY PRN
Status: DISCONTINUED | OUTPATIENT
Start: 2025-07-21 | End: 2025-07-25 | Stop reason: HOSPADM

## 2025-07-21 RX ORDER — PHYTONADIONE 5 MG/1
2.5 TABLET ORAL ONCE
Status: COMPLETED | OUTPATIENT
Start: 2025-07-21 | End: 2025-07-21

## 2025-07-21 RX ORDER — PANTOPRAZOLE SODIUM 40 MG/1
40 TABLET, DELAYED RELEASE ORAL 2 TIMES DAILY
Status: DISCONTINUED | OUTPATIENT
Start: 2025-07-21 | End: 2025-07-21

## 2025-07-21 RX ADMIN — PANTOPRAZOLE SODIUM 40 MG: 40 TABLET, DELAYED RELEASE ORAL at 08:19

## 2025-07-21 RX ADMIN — PHYTONADIONE 2.5 MG: 5 TABLET ORAL at 00:32

## 2025-07-21 RX ADMIN — METOCLOPRAMIDE 10 MG: 5 INJECTION, SOLUTION INTRAMUSCULAR; INTRAVENOUS at 00:32

## 2025-07-21 RX ADMIN — SODIUM CHLORIDE 500 ML: 900 INJECTION, SOLUTION INTRAVENOUS at 00:31

## 2025-07-21 RX ADMIN — PANTOPRAZOLE SODIUM 40 MG: 40 TABLET, DELAYED RELEASE ORAL at 15:55

## 2025-07-21 RX ADMIN — SULFASALAZINE 500 MG: 500 TABLET ORAL at 08:18

## 2025-07-21 RX ADMIN — HYDROXYCHLOROQUINE SULFATE 200 MG: 200 TABLET, FILM COATED ORAL at 08:18

## 2025-07-21 RX ADMIN — SULFASALAZINE 500 MG: 500 TABLET ORAL at 20:06

## 2025-07-21 RX ADMIN — SUCRALFATE 1 G: 1 SUSPENSION ORAL at 08:18

## 2025-07-21 SDOH — SOCIAL STABILITY: SOCIAL INSECURITY: ARE YOU OR HAVE YOU BEEN THREATENED OR ABUSED PHYSICALLY, EMOTIONALLY, OR SEXUALLY BY ANYONE?: NO

## 2025-07-21 SDOH — ECONOMIC STABILITY: FOOD INSECURITY: WITHIN THE PAST 12 MONTHS, YOU WORRIED THAT YOUR FOOD WOULD RUN OUT BEFORE YOU GOT THE MONEY TO BUY MORE.: NEVER TRUE

## 2025-07-21 SDOH — SOCIAL STABILITY: SOCIAL INSECURITY: WITHIN THE LAST YEAR, HAVE YOU BEEN AFRAID OF YOUR PARTNER OR EX-PARTNER?: NO

## 2025-07-21 SDOH — SOCIAL STABILITY: SOCIAL INSECURITY: WITHIN THE LAST YEAR, HAVE YOU BEEN HUMILIATED OR EMOTIONALLY ABUSED IN OTHER WAYS BY YOUR PARTNER OR EX-PARTNER?: NO

## 2025-07-21 SDOH — SOCIAL STABILITY: SOCIAL INSECURITY: DOES ANYONE TRY TO KEEP YOU FROM HAVING/CONTACTING OTHER FRIENDS OR DOING THINGS OUTSIDE YOUR HOME?: NO

## 2025-07-21 SDOH — SOCIAL STABILITY: SOCIAL INSECURITY: HAVE YOU HAD ANY THOUGHTS OF HARMING ANYONE ELSE?: NO

## 2025-07-21 SDOH — HEALTH STABILITY: MENTAL HEALTH: HOW OFTEN DO YOU HAVE SIX OR MORE DRINKS ON ONE OCCASION?: NEVER

## 2025-07-21 SDOH — SOCIAL STABILITY: SOCIAL INSECURITY: HAS ANYONE EVER THREATENED TO HURT YOUR FAMILY OR YOUR PETS?: NO

## 2025-07-21 SDOH — HEALTH STABILITY: PHYSICAL HEALTH: ON AVERAGE, HOW MANY DAYS PER WEEK DO YOU ENGAGE IN MODERATE TO STRENUOUS EXERCISE (LIKE A BRISK WALK)?: 0 DAYS

## 2025-07-21 SDOH — SOCIAL STABILITY: SOCIAL INSECURITY: DO YOU FEEL UNSAFE GOING BACK TO THE PLACE WHERE YOU ARE LIVING?: NO

## 2025-07-21 SDOH — ECONOMIC STABILITY: FOOD INSECURITY: WITHIN THE PAST 12 MONTHS, THE FOOD YOU BOUGHT JUST DIDN'T LAST AND YOU DIDN'T HAVE MONEY TO GET MORE.: NEVER TRUE

## 2025-07-21 SDOH — ECONOMIC STABILITY: INCOME INSECURITY: IN THE PAST 12 MONTHS HAS THE ELECTRIC, GAS, OIL, OR WATER COMPANY THREATENED TO SHUT OFF SERVICES IN YOUR HOME?: NO

## 2025-07-21 SDOH — ECONOMIC STABILITY: FOOD INSECURITY: HOW HARD IS IT FOR YOU TO PAY FOR THE VERY BASICS LIKE FOOD, HOUSING, MEDICAL CARE, AND HEATING?: NOT HARD AT ALL

## 2025-07-21 SDOH — SOCIAL STABILITY: SOCIAL NETWORK: HOW OFTEN DO YOU GET TOGETHER WITH FRIENDS OR RELATIVES?: ONCE A WEEK

## 2025-07-21 SDOH — ECONOMIC STABILITY: HOUSING INSECURITY: IN THE LAST 12 MONTHS, WAS THERE A TIME WHEN YOU WERE NOT ABLE TO PAY THE MORTGAGE OR RENT ON TIME?: NO

## 2025-07-21 SDOH — SOCIAL STABILITY: SOCIAL INSECURITY: ARE THERE ANY APPARENT SIGNS OF INJURIES/BEHAVIORS THAT COULD BE RELATED TO ABUSE/NEGLECT?: NO

## 2025-07-21 SDOH — HEALTH STABILITY: MENTAL HEALTH: HOW MANY DRINKS CONTAINING ALCOHOL DO YOU HAVE ON A TYPICAL DAY WHEN YOU ARE DRINKING?: PATIENT DOES NOT DRINK

## 2025-07-21 SDOH — SOCIAL STABILITY: SOCIAL NETWORK: HOW OFTEN DO YOU ATTEND MEETINGS OF THE CLUBS OR ORGANIZATIONS YOU BELONG TO?: NEVER

## 2025-07-21 SDOH — SOCIAL STABILITY: SOCIAL INSECURITY: ARE YOU MARRIED, WIDOWED, DIVORCED, SEPARATED, NEVER MARRIED, OR LIVING WITH A PARTNER?: MARRIED

## 2025-07-21 SDOH — ECONOMIC STABILITY: HOUSING INSECURITY: AT ANY TIME IN THE PAST 12 MONTHS, WERE YOU HOMELESS OR LIVING IN A SHELTER (INCLUDING NOW)?: NO

## 2025-07-21 SDOH — ECONOMIC STABILITY: TRANSPORTATION INSECURITY: IN THE PAST 12 MONTHS, HAS LACK OF TRANSPORTATION KEPT YOU FROM MEDICAL APPOINTMENTS OR FROM GETTING MEDICATIONS?: NO

## 2025-07-21 SDOH — HEALTH STABILITY: PHYSICAL HEALTH
HOW OFTEN DO YOU NEED TO HAVE SOMEONE HELP YOU WHEN YOU READ INSTRUCTIONS, PAMPHLETS, OR OTHER WRITTEN MATERIAL FROM YOUR DOCTOR OR PHARMACY?: RARELY

## 2025-07-21 SDOH — SOCIAL STABILITY: SOCIAL INSECURITY: ABUSE: ADULT

## 2025-07-21 SDOH — HEALTH STABILITY: MENTAL HEALTH: HOW OFTEN DO YOU HAVE A DRINK CONTAINING ALCOHOL?: NEVER

## 2025-07-21 SDOH — SOCIAL STABILITY: SOCIAL INSECURITY: HAVE YOU HAD THOUGHTS OF HARMING ANYONE ELSE?: NO

## 2025-07-21 SDOH — SOCIAL STABILITY: SOCIAL NETWORK: HOW OFTEN DO YOU ATTEND CHURCH OR RELIGIOUS SERVICES?: MORE THAN 4 TIMES PER YEAR

## 2025-07-21 SDOH — HEALTH STABILITY: PHYSICAL HEALTH: ON AVERAGE, HOW MANY MINUTES DO YOU ENGAGE IN EXERCISE AT THIS LEVEL?: 0 MIN

## 2025-07-21 SDOH — ECONOMIC STABILITY: HOUSING INSECURITY: IN THE PAST 12 MONTHS, HOW MANY TIMES HAVE YOU MOVED WHERE YOU WERE LIVING?: 0

## 2025-07-21 SDOH — SOCIAL STABILITY: SOCIAL INSECURITY: WERE YOU ABLE TO COMPLETE ALL THE BEHAVIORAL HEALTH SCREENINGS?: YES

## 2025-07-21 SDOH — SOCIAL STABILITY: SOCIAL INSECURITY: DO YOU FEEL ANYONE HAS EXPLOITED OR TAKEN ADVANTAGE OF YOU FINANCIALLY OR OF YOUR PERSONAL PROPERTY?: NO

## 2025-07-21 ASSESSMENT — ACTIVITIES OF DAILY LIVING (ADL)
WALKS IN HOME: NEEDS ASSISTANCE
ASSISTIVE_DEVICE: WALKER;OXYGEN
TOILETING: NEEDS ASSISTANCE
ADEQUATE_TO_COMPLETE_ADL: YES
LACK_OF_TRANSPORTATION: NO
HEARING - LEFT EAR: HEARING AID
LACK_OF_TRANSPORTATION: NO
FEEDING YOURSELF: NEEDS ASSISTANCE
HEARING - RIGHT EAR: HEARING AID
BATHING: NEEDS ASSISTANCE
LACK_OF_TRANSPORTATION: NO
DRESSING YOURSELF: NEEDS ASSISTANCE
GROOMING: NEEDS ASSISTANCE
PATIENT'S MEMORY ADEQUATE TO SAFELY COMPLETE DAILY ACTIVITIES?: YES
JUDGMENT_ADEQUATE_SAFELY_COMPLETE_DAILY_ACTIVITIES: YES

## 2025-07-21 ASSESSMENT — COGNITIVE AND FUNCTIONAL STATUS - GENERAL
PATIENT BASELINE BEDBOUND: NO
STANDING UP FROM CHAIR USING ARMS: A LOT
EATING MEALS: A LOT
MOVING TO AND FROM BED TO CHAIR: A LITTLE
WALKING IN HOSPITAL ROOM: A LOT
DAILY ACTIVITIY SCORE: 12
DRESSING REGULAR UPPER BODY CLOTHING: A LOT
CLIMB 3 TO 5 STEPS WITH RAILING: A LOT
TOILETING: A LOT
TURNING FROM BACK TO SIDE WHILE IN FLAT BAD: A LITTLE
PERSONAL GROOMING: A LOT
MOBILITY SCORE: 15
DRESSING REGULAR LOWER BODY CLOTHING: A LOT
HELP NEEDED FOR BATHING: A LOT
MOVING FROM LYING ON BACK TO SITTING ON SIDE OF FLAT BED WITH BEDRAILS: A LITTLE

## 2025-07-21 ASSESSMENT — LIFESTYLE VARIABLES
AUDIT-C TOTAL SCORE: 0
AUDIT-C TOTAL SCORE: 0
HOW OFTEN DO YOU HAVE A DRINK CONTAINING ALCOHOL: NEVER
SKIP TO QUESTIONS 9-10: 1
AUDIT-C TOTAL SCORE: 0
SKIP TO QUESTIONS 9-10: 1
HOW MANY STANDARD DRINKS CONTAINING ALCOHOL DO YOU HAVE ON A TYPICAL DAY: PATIENT DOES NOT DRINK
HOW OFTEN DO YOU HAVE 6 OR MORE DRINKS ON ONE OCCASION: NEVER

## 2025-07-21 ASSESSMENT — PAIN - FUNCTIONAL ASSESSMENT
PAIN_FUNCTIONAL_ASSESSMENT: 0-10

## 2025-07-21 ASSESSMENT — PAIN SCALES - GENERAL
PAINLEVEL_OUTOF10: 0 - NO PAIN

## 2025-07-21 NOTE — PROGRESS NOTES
Rakan Cervantes is a 89 y.o. female on day 0 of admission presenting with Cardiogenic shock (Multi).    Plan: admitted to hospital for cardiogenic shock related to hypotension and bradycardia, on 3L o2. Will wean patient down as tolerated. Spoke with patient's son who states he lives close and helps with his parent's needs. Would like to look into palliative care or other options to help with his parents at home. Will ask DR to place order for pal care to see patient and family.   Disposition: Home with C vs SNF?  Barrier: wean o2, therapy evals, cardio following, labs  ADOD:  3-5 days       07/21/25 1216   Discharge Planning   Living Arrangements Spouse/significant other   Support Systems Spouse/significant other;Children   Assistance Needed uses walker at baseline, wheelchair for long distances, 1L O2 at home, son drives to appointments   Type of Residence Private residence   Number of Stairs to Enter Residence 0   Number of Stairs Within Residence 0   Do you have animals or pets at home? No   Home or Post Acute Services In home services   Type of Home Care Services Home PT;Home OT;Home nursing visits   Expected Discharge Disposition Home H   Does the patient need discharge transport arranged? No   Patient Choice   Patient / Family choosing to utilize agency / facility established prior to hospitalization No   Stroke Family Assessment   Stroke Family Assessment Needed No   Intensity of Service   Intensity of Service 0-30 min       Isabel Lunsford RN

## 2025-07-21 NOTE — CARE PLAN
The patient's goals for the shift include To get some sleep by the end of shift    The clinical goals for the shift include Sustaining HR>50 over night      Problem: Chronic Conditions and Co-morbidities  Goal: Patient's chronic conditions and co-morbidity symptoms are monitored and maintained or improved  Outcome: Progressing     Problem: Skin  Goal: Decreased wound size/increased tissue granulation at next dressing change  Outcome: Progressing  Flowsheets (Taken 7/21/2025 0534)  Decreased wound size/increased tissue granulation at next dressing change:   Protective dressings over bony prominences   Promote sleep for wound healing   Utilize specialty bed per algorithm  Goal: Participates in plan/prevention/treatment measures  Outcome: Progressing  Flowsheets (Taken 7/21/2025 0534)  Participates in plan/prevention/treatment measures:   Elevate heels   Increase activity/out of bed for meals   Discuss with provider PT/OT consult  Goal: Prevent/manage excess moisture  Outcome: Progressing  Flowsheets (Taken 7/21/2025 0534)  Prevent/manage excess moisture:   Monitor for/manage infection if present   Follow provider orders for dressing changes   Cleanse incontinence/protect with barrier cream   Moisturize dry skin  Goal: Prevent/minimize sheer/friction injuries  Outcome: Progressing  Flowsheets (Taken 7/21/2025 0534)  Prevent/minimize sheer/friction injuries:   HOB 30 degrees or less   Turn/reposition every 2 hours/use positioning/transfer devices   Utilize specialty bed per algorithm   Use pull sheet   Increase activity/out of bed for meals   Complete micro-shifts as needed if patient unable. Adjust patient position to relieve pressure points, not a full turn  Goal: Promote/optimize nutrition  Outcome: Progressing  Flowsheets (Taken 7/21/2025 0534)  Promote/optimize nutrition:   Offer water/supplements/favorite foods   Reassess MST if dietician not consulted   Assist with feeding  Goal: Promote skin healing  Outcome:  Progressing  Flowsheets (Taken 7/21/2025 4314)  Promote skin healing:   Rotate device position/do not position patient on device   Ensure correct size (line/device) and apply per  instructions   Assess skin/pad under line(s)/device(s)   Protective dressings over bony prominences   Turn/reposition every 2 hours/use positioning/transfer devices     Problem: Fall/Injury  Goal: Not fall by end of shift  Outcome: Progressing  Goal: Be free from injury by end of the shift  Outcome: Progressing  Goal: Verbalize understanding of personal risk factors for fall in the hospital  Outcome: Progressing  Goal: Verbalize understanding of risk factor reduction measures to prevent injury from fall in the home  Outcome: Progressing  Goal: Use assistive devices by end of the shift  Outcome: Progressing  Goal: Pace activities to prevent fatigue by end of the shift  Outcome: Progressing

## 2025-07-21 NOTE — H&P
Impression/Plan:     Rakan Cervantes is a 89 y.o. 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  Underlying etiology is most likely explained by polypharmacy, if in-fact patient took Coreg in addition to Verapamil. Additional on-going issues include the following:    Medication induced coagulopathy in the setting of Coumadin   Hyponatremia - mild likely d/t diruetic therapy + poor intake   Hx of Afib       Neuro  - A-F bundle   - Sleep Hygiene measures: appropriate daytime stimulation/physical activity. Lights out at 2100, avoidance of night time lab draws/night baths, avoidance of delirium provoking medications      CV  - continue Dopamine gtt and wean as tolerated to achieve goal HR > 50 bpm and MAP 65 mm Hg   - hold Coreg and Verapamil       Pulm:   - goal spo2 >/= 92%    Renal/electrolyte/acid-base:   - avoid nephrotoxins as much as possible   - replace electrolytes as indicated     GI/Nutrition   - advance diet once swallow eval is passed   - GI ppx: PPI     ID  - nil     Heme   - AC: holding coumadin     Endocrine  - goal serum glucose 140-180 mg/dL     Musculoskeletal/skin  - skin protective measures   - PT/OT   - activity level: up with assist     Family contacts: Children (Eduar Cervantes, Tita Haywood), Spouse Philip     Critical Care time: 50 min         History Of Present Illness  Rakan Cervantes is a 89 y.o. female with a pmhx of CAD, AF (on coumadin) on Coreg and Verapamil (added on 7/15 according to med rec at the time of this writing), who presented to the [facility] ED with cardiogenic shock manifesting a bradycardia and hypotension (no lactic acid level obtained on presentation, however AG is normal on serum  chemistry), which improved bradycardia and hypotension significantly with a dopamine gtt at 5 mcg/kg/min.  Patient is accepted for transfer to the ICU at [facility] d/t lack of ICU bed availability at [facility], [place], operated by [facility].      Past Medical History  Medical History[1]    Surgical History  Surgical History[2]     Social History  She reports that she has never smoked. She has never been exposed to tobacco smoke. She has never used smokeless tobacco. She reports current alcohol use. She reports that she does not use drugs.    Family History  Family History[3]     Allergies  Meperidine, Morphine, Opioids - morphine analogues, Pregabalin, and Tramadol    Review of Systems    ROS:   Constitutional: neg fever, chills, night sweats, unintentional weight loss   Neuro: neg paresthesias, weakness in extremities, neg falls   Eyes: neg blurred vision, neg double vision , neg eye discharge  ENT: no dysphagia, + periodic nasal congestion   CV: + SOB, light headedness   Pulm: + SOB  GI: + nausea (while on dopamine gtt), neg Vomiting, neg diarrhea, neg constipation   Renal/: neg hematuria, neg dysuria, neg urine retention   Heme/lymph: neg new Lymphadenopathy, neg bruising   Endocrine: neg thirst, neg intolerance to cold or heat   Skin: no new rash, no skin color changes  Hepatobiliary: no yellowing of skin or eyes, no itching sensation   Psych: neg depression, neg suicidal ideation           Physical Exam  There were no vitals taken for this visit.  138/76, HR 50, SpO2 96%, RR 16    Gen: awake, alert cooperative, NAD  Neuro: GCS E4, V5, M6, no focal deficits   CV: s1,s2  Pulm: CTAB  Chest: neg accessory muscle recruitment   Abd: soft, non-tender   Ext: well perfused, cap refill < 2 sec  Skin: warm and dry, no mottling or rash     Last Recorded Vitals  There were no vitals taken for this visit.             [1]   Past Medical History:  Diagnosis Date    Abnormality of plasma protein 03/13/2025    Acute kidney injury 03/12/2025    Acute  non-ST segment elevation myocardial infarction (Multi) 11/26/2023    Anemia     Arteriosclerosis of coronary artery 05/14/2023    Arthritis     Asthma     Atrial fibrillation (Multi) 11/26/2023    Jonas esophagus     Bilateral carotid artery occlusion 05/23/2023    Bradycardia 02/21/2025    CAD (coronary artery disease)     Cardiac rhythm disorder or disturbance or change 11/26/2023    Cervical radiculopathy     Chest pain 11/26/2023    Chronic heart failure with preserved ejection fraction 02/21/2025    Chronic obstructive pulmonary disease (Multi) 11/26/2023    Constipation     Coronary artery disease 11/26/2023    Degenerative joint disease     Dyslipidemia     Dysuria     Erosive esophagitis     Erythema of skin 04/07/2025    Fall at home, initial encounter 03/11/2025    Gastrointestinal hemorrhage with melena 04/08/2025    GERD (gastroesophageal reflux disease)     GIB (gastrointestinal bleeding) 04/07/2025    Hematochezia 04/08/2025    Hypercholesterolemia 05/23/2023    Hyperparathyroidism (Multi)     Hypertensive urgency 11/26/2023    Impaired fasting glucose 11/26/2023    Iron deficiency anemia due to chronic blood loss 04/08/2025    Irritable bowel syndrome (IBS)     Labile essential hypertension 11/26/2023    Labile hypertension     Left foot pain     Low blood pressure 11/26/2023    Lung infiltrate on CT 02/24/2025    Macular degeneration     Mixed hyperlipidemia 11/26/2023    Neck pain     Osteoporosis 11/26/2023    REclast '19 Cr 1.4 switch to calcitonin.    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    Stage 3 chronic kidney disease (Multi) 11/26/2023    Stage  3a chronic kidney disease (Multi) 02/21/2025    Stroke (Multi)     Supratherapeutic INR 03/12/2025    Transient ischemic attack 11/26/2023   [2]   Past Surgical History:  Procedure Laterality Date    CARDIAC CATHETERIZATION Left 9/27/2024    Procedure: Left Heart Cath;  Surgeon: Nessa Callaway MD;  Location: [facility] Cardiac Cath Lab;  Service: Cardiovascular;  Laterality: Left;    CARDIAC CATHETERIZATION N/A 9/27/2024    Procedure: PCI;  Surgeon: Nessa Callaway MD;  Location: [facility] Cardiac Cath Lab;  Service: Cardiovascular;  Laterality: N/A;    CARDIOVASCULAR STRESS TEST  2017    Dr. Khalil    CARDIOVASCULAR STRESS TEST  2004    Dr. Pantoja    CARPAL TUNNEL RELEASE Left 2014    CATARACT EXTRACTION Bilateral 2011    CHOLECYSTECTOMY      CORONARY STENT PLACEMENT  05/2023    LAD    CT HEAD ANGIO W AND WO IV CONTRAST  05/08/2013    CT HEAD ANGIO W AND WO IV CONTRAST [facility]    MR HEAD ANGIO WO IV CONTRAST  04/23/2013    MR HEAD ANGIO WO IV CONTRAST [facility]    MR HEAD ANGIO WO IV CONTRAST  07/20/2016    MR HEAD ANGIO WO IV CONTRAST [facility]    MR HEAD ANGIO WO IV CONTRAST  10/27/2023    MR HEAD ANGIO WO IV CONTRAST 10/27/2023 [facility]    MR NECK ANGIO WO IV CONTRAST  10/27/2023    MR NECK ANGIO WO IV CONTRAST 10/27/2023 [facility] MRI    OTHER SURGICAL HISTORY  02/14/2022    No history of surgery    REVISION TOTAL HIP ARTHROPLASTY Left 2013    Conversion from left ORIF    TOTAL HIP ARTHROPLASTY Right 2016   [3]   Family History  Problem Relation Name Age of Onset    No Known Problems Mother      No Known Problems Father      No Known Problems Sister

## 2025-07-21 NOTE — PROGRESS NOTES
Rakan Cervantes is a 89 y.o. female on day 0 of admission presenting with Cardiogenic shock (Multi).    Subjective   HD # 1 for this 89 YO female admitted as a transfer from an OSH for bradycardia.    This 89 YO female was transferred from an OSH for bradycardia treated with a dopamine drip after receiving both beta blockade as well as calcium channel blockade. She has MMP including : paroxysmal atrial fibrillation, TIA, coronary artery disease, non-STEMI, bilateral carotid artery occlusion, hypertension, hypertensive urgency, s/p PCI with stent to distal left circumflex in September 2022,  diverticulosis and LGI bleeding ( complication of AC)  prompted discontinuation of Plavix, However despite failure of AC Coumadin was later begun? Scheduled but never completed Watchman procedure. Additional issues include: Abnormality of plasma protein? MGUS? , Jonas esophagus, Bilateral carotid artery occlusion, prior Bradycardia Cervical radiculopathy, Chronic heart failure with preserved ejection fraction, Chronic obstructive pulmonary disease, Degenerative joint disease, Dyslipidemia, Dysuria, Erosive esophagitis, frequent Falls Hyperparathyroidism, chronic blood loss, Irritable bowel syndrome (IBS), Macular degeneration, Stage 3 chronic kidney disease, Prior  Stroke, transient ischemic attack  Her PSH includes: Cholecystectomy, Cataract extraction (Bilateral); Carpal tunnel release Total hip arthroplasty (Right, 2016); Revision total hip arthroplasty (Left, 2013); Coronary stent placement .         Objective     Physical Exam  Awakens to name alert conversant no specific complaints. Symmetric chest expansion, bradycardia yet BP maintained on 5 mics /kg/min dopamine. ABD bland depressible hungry. Plan OOB to chair regular diet hold Beta blockade and calcium channel blockers review medications eliminate most recent agents which may affect heart rate. Trim polypharmacy multiple episodes of elevated INR (since Coumadin)  "frequent falls as well as history of GIB clear indication for dangers of ongoing AC.  PLAN reevalute rate agents,  medications multiple potential interactions Carafate--- Coumadin, Coumadin ---  Plavix, as well as multiple antihypertensives ( FIVE  in total )  Verapamil 180/d, Cozaaar 100 mg/day, Amlodipine 10 mg tablet, Carvedilol 25 mg tablet and  isosorbide mononitrate er 120 mg . Wean dopamine regular diet hold AC     Last Recorded Vitals  Blood pressure 177/63, pulse 58, temperature 35.7 °C (96.3 °F), temperature source Temporal, resp. rate 16, height (!) 1.499 m (4' 11\"), weight 53 kg (116 lb 13.5 oz), SpO2 (!) 80%.  Intake/Output last 3 Shifts:  No intake/output data recorded.           Assessment & Plan  Cardiogenic shock (Multi)    Bradycardia  Medication induced   Medication induced coagulopathy (Multi)          This critically ill patient continues  to be at-risk for clinically significant deterioration / failure due to the above mentioned dysfunctional, unstable organ systems.  I have personally identified and managed all complex critical care issues to prevent aforementioned clinical deterioration.  Critical care time is spent at bedside and/or the immediate area and has included, but is not limited to, the review of diagnostic tests, labs, radiographs, serial assessments of hemodynamics, respiratory status, ventilatory management, and family updates.  Time spent in procedures and teaching are reported separately.     CRITICAL CARE TIME:  45 minutes        Emerson Ruvalcaba MD    "

## 2025-07-21 NOTE — CARE PLAN
Problem: Safety - Adult  Goal: Free from fall injury  Outcome: Progressing     Problem: Chronic Conditions and Co-morbidities  Goal: Patient's chronic conditions and co-morbidity symptoms are monitored and maintained or improved  Outcome: Progressing     Problem: Nutrition  Goal: Nutrient intake appropriate for maintaining nutritional needs  Outcome: Progressing     Problem: Skin  Goal: Decreased wound size/increased tissue granulation at next dressing change  Outcome: Progressing  Goal: Participates in plan/prevention/treatment measures  Outcome: Progressing  Goal: Prevent/manage excess moisture  Outcome: Progressing     Problem: Fall/Injury  Goal: Not fall by end of shift  Outcome: Progressing

## 2025-07-22 ENCOUNTER — APPOINTMENT (OUTPATIENT)
Dept: CARDIOLOGY | Facility: CLINIC | Age: OVER 89
End: 2025-07-22
Payer: MEDICARE

## 2025-07-22 PROBLEM — R57.0 CARDIOGENIC SHOCK (MULTI): Status: RESOLVED | Noted: 2025-07-21 | Resolved: 2025-07-22

## 2025-07-22 LAB
ALBUMIN SERPL BCP-MCNC: 3 G/DL (ref 3.4–5)
ANION GAP SERPL CALC-SCNC: 9 MMOL/L (ref 10–20)
BUN SERPL-MCNC: 21 MG/DL (ref 6–23)
CALCIUM SERPL-MCNC: 8.7 MG/DL (ref 8.6–10.3)
CHLORIDE SERPL-SCNC: 101 MMOL/L (ref 98–107)
CO2 SERPL-SCNC: 28 MMOL/L (ref 21–32)
CREAT SERPL-MCNC: 1.02 MG/DL (ref 0.5–1.05)
EGFRCR SERPLBLD CKD-EPI 2021: 53 ML/MIN/1.73M*2
ERYTHROCYTE [DISTWIDTH] IN BLOOD BY AUTOMATED COUNT: 14.3 % (ref 11.5–14.5)
GLUCOSE SERPL-MCNC: 107 MG/DL (ref 74–99)
HCT VFR BLD AUTO: 23.2 % (ref 36–46)
HGB BLD-MCNC: 7.4 G/DL (ref 12–16)
INR PPP: 4.6 (ref 0.9–1.1)
MCH RBC QN AUTO: 31.2 PG (ref 26–34)
MCHC RBC AUTO-ENTMCNC: 31.9 G/DL (ref 32–36)
MCV RBC AUTO: 98 FL (ref 80–100)
NRBC BLD-RTO: 0 /100 WBCS (ref 0–0)
PHOSPHATE SERPL-MCNC: 3.6 MG/DL (ref 2.5–4.9)
PLATELET # BLD AUTO: 259 X10*3/UL (ref 150–450)
POTASSIUM SERPL-SCNC: 4.3 MMOL/L (ref 3.5–5.3)
PROTHROMBIN TIME: 51.2 SECONDS (ref 9.8–12.4)
Q ONSET: 218 MS
QRS COUNT: 7 BEATS
QRS DURATION: 96 MS
QT INTERVAL: 480 MS
QTC CALCULATION(BAZETT): 410 MS
QTC FREDERICIA: 432 MS
R AXIS: 51 DEGREES
RBC # BLD AUTO: 2.37 X10*6/UL (ref 4–5.2)
SODIUM SERPL-SCNC: 134 MMOL/L (ref 136–145)
T AXIS: 27 DEGREES
T OFFSET: 458 MS
VENTRICULAR RATE: 44 BPM
WBC # BLD AUTO: 5 X10*3/UL (ref 4.4–11.3)

## 2025-07-22 PROCEDURE — 85027 COMPLETE CBC AUTOMATED: CPT | Performed by: NURSE PRACTITIONER

## 2025-07-22 PROCEDURE — 2500000005 HC RX 250 GENERAL PHARMACY W/O HCPCS: Performed by: SURGERY

## 2025-07-22 PROCEDURE — 36415 COLL VENOUS BLD VENIPUNCTURE: CPT | Performed by: NURSE PRACTITIONER

## 2025-07-22 PROCEDURE — 99291 CRITICAL CARE FIRST HOUR: CPT | Performed by: SURGERY

## 2025-07-22 PROCEDURE — 2500000001 HC RX 250 WO HCPCS SELF ADMINISTERED DRUGS (ALT 637 FOR MEDICARE OP): Performed by: INTERNAL MEDICINE

## 2025-07-22 PROCEDURE — 80069 RENAL FUNCTION PANEL: CPT | Performed by: NURSE PRACTITIONER

## 2025-07-22 PROCEDURE — 2500000004 HC RX 250 GENERAL PHARMACY W/ HCPCS (ALT 636 FOR OP/ED)

## 2025-07-22 PROCEDURE — 85610 PROTHROMBIN TIME: CPT | Performed by: NURSE PRACTITIONER

## 2025-07-22 PROCEDURE — 2020000001 HC ICU ROOM DAILY

## 2025-07-22 PROCEDURE — 2500000002 HC RX 250 W HCPCS SELF ADMINISTERED DRUGS (ALT 637 FOR MEDICARE OP, ALT 636 FOR OP/ED): Performed by: INTERNAL MEDICINE

## 2025-07-22 PROCEDURE — 2500000001 HC RX 250 WO HCPCS SELF ADMINISTERED DRUGS (ALT 637 FOR MEDICARE OP): Performed by: PHARMACIST

## 2025-07-22 PROCEDURE — 99222 1ST HOSP IP/OBS MODERATE 55: CPT

## 2025-07-22 PROCEDURE — 2500000001 HC RX 250 WO HCPCS SELF ADMINISTERED DRUGS (ALT 637 FOR MEDICARE OP): Performed by: SURGERY

## 2025-07-22 PROCEDURE — 2500000004 HC RX 250 GENERAL PHARMACY W/ HCPCS (ALT 636 FOR OP/ED): Performed by: SURGERY

## 2025-07-22 RX ORDER — ATORVASTATIN CALCIUM 80 MG/1
80 TABLET, FILM COATED ORAL NIGHTLY
Status: DISCONTINUED | OUTPATIENT
Start: 2025-07-22 | End: 2025-07-25 | Stop reason: HOSPADM

## 2025-07-22 RX ORDER — FERROUS SULFATE 325(65) MG
1 TABLET ORAL 3 TIMES WEEKLY
Status: DISCONTINUED | OUTPATIENT
Start: 2025-07-22 | End: 2025-07-25 | Stop reason: HOSPADM

## 2025-07-22 RX ORDER — LANOLIN ALCOHOL/MO/W.PET/CERES
400 CREAM (GRAM) TOPICAL 2 TIMES DAILY
Status: DISCONTINUED | OUTPATIENT
Start: 2025-07-22 | End: 2025-07-25 | Stop reason: HOSPADM

## 2025-07-22 RX ORDER — GABAPENTIN 100 MG/1
100 CAPSULE ORAL 2 TIMES DAILY
Status: DISCONTINUED | OUTPATIENT
Start: 2025-07-22 | End: 2025-07-25 | Stop reason: HOSPADM

## 2025-07-22 RX ORDER — HYDRALAZINE HYDROCHLORIDE 25 MG/1
25 TABLET, FILM COATED ORAL 3 TIMES DAILY
Status: DISCONTINUED | OUTPATIENT
Start: 2025-07-22 | End: 2025-07-23

## 2025-07-22 RX ORDER — HYDRALAZINE HYDROCHLORIDE 20 MG/ML
10 INJECTION INTRAMUSCULAR; INTRAVENOUS ONCE
Status: COMPLETED | OUTPATIENT
Start: 2025-07-22 | End: 2025-07-22

## 2025-07-22 RX ORDER — ASPIRIN 81 MG/1
81 TABLET ORAL DAILY
Status: DISCONTINUED | OUTPATIENT
Start: 2025-07-22 | End: 2025-07-25 | Stop reason: HOSPADM

## 2025-07-22 RX ORDER — CARVEDILOL 12.5 MG/1
12.5 TABLET ORAL 2 TIMES DAILY
Status: DISCONTINUED | OUTPATIENT
Start: 2025-07-22 | End: 2025-07-25 | Stop reason: HOSPADM

## 2025-07-22 RX ADMIN — SULFASALAZINE 500 MG: 500 TABLET ORAL at 20:00

## 2025-07-22 RX ADMIN — SULFASALAZINE 500 MG: 500 TABLET ORAL at 08:40

## 2025-07-22 RX ADMIN — Medication 3 L/MIN: at 07:00

## 2025-07-22 RX ADMIN — GABAPENTIN 100 MG: 100 CAPSULE ORAL at 08:40

## 2025-07-22 RX ADMIN — GABAPENTIN 100 MG: 100 CAPSULE ORAL at 20:00

## 2025-07-22 RX ADMIN — Medication 1 TABLET: at 08:40

## 2025-07-22 RX ADMIN — HYDRALAZINE HYDROCHLORIDE 10 MG: 20 INJECTION INTRAMUSCULAR; INTRAVENOUS at 11:41

## 2025-07-22 RX ADMIN — SUCRALFATE 1 G: 1 SUSPENSION ORAL at 08:44

## 2025-07-22 RX ADMIN — PANTOPRAZOLE SODIUM 40 MG: 40 TABLET, DELAYED RELEASE ORAL at 16:02

## 2025-07-22 RX ADMIN — ASPIRIN 81 MG: 81 TABLET, DELAYED RELEASE ORAL at 08:40

## 2025-07-22 RX ADMIN — CARVEDILOL 12.5 MG: 12.5 TABLET, FILM COATED ORAL at 20:00

## 2025-07-22 RX ADMIN — PANTOPRAZOLE SODIUM 40 MG: 40 TABLET, DELAYED RELEASE ORAL at 06:24

## 2025-07-22 RX ADMIN — FERROUS SULFATE TAB 325 MG (65 MG ELEMENTAL FE) 1 TABLET: 325 (65 FE) TAB at 08:40

## 2025-07-22 RX ADMIN — ATORVASTATIN CALCIUM 80 MG: 80 TABLET, FILM COATED ORAL at 20:00

## 2025-07-22 RX ADMIN — HYDROXYCHLOROQUINE SULFATE 200 MG: 200 TABLET, FILM COATED ORAL at 08:40

## 2025-07-22 RX ADMIN — HYDRALAZINE HYDROCHLORIDE 25 MG: 25 TABLET ORAL at 20:00

## 2025-07-22 RX ADMIN — CARVEDILOL 12.5 MG: 12.5 TABLET, FILM COATED ORAL at 08:37

## 2025-07-22 RX ADMIN — Medication 1 TABLET: at 20:00

## 2025-07-22 ASSESSMENT — COGNITIVE AND FUNCTIONAL STATUS - GENERAL
TURNING FROM BACK TO SIDE WHILE IN FLAT BAD: A LITTLE
STANDING UP FROM CHAIR USING ARMS: A LOT
DAILY ACTIVITIY SCORE: 19
WALKING IN HOSPITAL ROOM: A LOT
DRESSING REGULAR UPPER BODY CLOTHING: A LITTLE
TURNING FROM BACK TO SIDE WHILE IN FLAT BAD: A LITTLE
DRESSING REGULAR LOWER BODY CLOTHING: A LOT
HELP NEEDED FOR BATHING: A LITTLE
TOILETING: A LITTLE
STANDING UP FROM CHAIR USING ARMS: A LOT
DAILY ACTIVITIY SCORE: 19
MOBILITY SCORE: 15
TOILETING: A LITTLE
MOVING TO AND FROM BED TO CHAIR: A LITTLE
MOVING FROM LYING ON BACK TO SITTING ON SIDE OF FLAT BED WITH BEDRAILS: A LITTLE
MOVING TO AND FROM BED TO CHAIR: A LITTLE
HELP NEEDED FOR BATHING: A LITTLE
DRESSING REGULAR UPPER BODY CLOTHING: A LITTLE
WALKING IN HOSPITAL ROOM: A LOT
CLIMB 3 TO 5 STEPS WITH RAILING: A LOT
MOBILITY SCORE: 15
MOVING FROM LYING ON BACK TO SITTING ON SIDE OF FLAT BED WITH BEDRAILS: A LITTLE
CLIMB 3 TO 5 STEPS WITH RAILING: A LOT
DRESSING REGULAR LOWER BODY CLOTHING: A LOT

## 2025-07-22 ASSESSMENT — PAIN - FUNCTIONAL ASSESSMENT
PAIN_FUNCTIONAL_ASSESSMENT: 0-10

## 2025-07-22 ASSESSMENT — PAIN SCALES - GENERAL
PAINLEVEL_OUTOF10: 0 - NO PAIN

## 2025-07-22 NOTE — PROGRESS NOTES
Rakan Cervantes is a 89 y.o. female on day 1 of admission presenting with medication induced bradycardia    Subjective     This 88 YO female was transferred from an OSH for bradycardia treated with a dopamine drip after receiving both beta blockade as well as calcium channel blockade. She has MMP including : paroxysmal atrial fibrillation, TIA, coronary artery disease, non-STEMI, bilateral carotid artery occlusion, hypertension, hypertensive urgency, s/p PCI with stent to distal left circumflex in September 2022, diverticulosis and LGI bleeding ( complication of AC) prompted discontinuation of Plavix, However despite failure of AC Coumadin was later begun? Scheduled but never completed Watchman procedure. Additional issues include: Abnormality of plasma protein? MGUS? , Jonas esophagus, Bilateral carotid artery occlusion, prior Bradycardia Cervical radiculopathy, Chronic heart failure with preserved ejection fraction, Chronic obstructive pulmonary disease, Degenerative joint disease, Dyslipidemia, Dysuria, Erosive esophagitis, frequent Falls Hyperparathyroidism, chronic blood loss, Irritable bowel syndrome (IBS), Macular degeneration, Stage 3 chronic kidney disease, Prior Stroke, transient ischemic attack Her PSH includes: Cholecystectomy, Cataract extraction (Bilateral); Carpal tunnel release Total hip arthroplasty (Right, 2016); Revision total hip arthroplasty (Left, 2013); Coronary stent placement .        Objective     Physical Exam       Awakens to name alert conversant no specific complaints. Symmetric chest expansion, bradycardia  resolved currently on Carvediolol 25 bid  BP maintained ABD bland depressible hungry. Plan OOB to chair regular diet continue to hold calcium channel blockers and  most recent agents which may affect heart rate. Trim polypharmacy multiple episodes of elevated INR (since Coumadin) frequent falls as well as history of GIB clear indication for dangers of ongoing AC. PLAN reevalute  "rate agents, medications discontinued  multiple potential interactions Carafate--- Coumadin, Coumadin --- Plavix, as well as multiple antihypertensives ( FIVE in total ) Verapamil 180/d, Cozaaar 100 mg/day, Amlodipine 10 mg tablet, Carvedilol 25 mg tablet and isosorbide mononitrate er 120 mg . regular diet  Cardiology consult for assistance with BP meds assistance to eliminate polypharmacy     Last Recorded Vitals  Blood pressure (!) 146/111, pulse 69, temperature 36.6 °C (97.8 °F), temperature source Temporal, resp. rate 26, height (!) 1.499 m (4' 11\"), weight 54.6 kg (120 lb 5.9 oz), SpO2 100%.  Intake/Output last 3 Shifts:  I/O last 3 completed shifts:  In: 700 (12.8 mL/kg) [P.O.:700]  Out: 0 (0 mL/kg)   Weight: 54.6 kg          Assessment & Plan  Cardiogenic shock (Multi) (Resolved: 7/22/2025)    Bradycardia  Medication induced   Medication induced coagulopathy (Multi)     .         This critically ill patient continues   to be at-risk for clinically significant deterioration / failure due to the above mentioned dysfunctional, unstable organ systems.  I have personally identified and managed all complex critical care issues to prevent aforementioned clinical deterioration.  Critical care time is spent at bedside and/or the immediate area and has included, but is not limited to, the review of diagnostic tests, labs, radiographs, serial assessments of hemodynamics, respiratory status, ventilatory management, and family updates.  Time spent in procedures and teaching are reported separately.     CRITICAL CARE TIME:  35 minutes          Emerson Ruvalcaba MD    "

## 2025-07-22 NOTE — ED PROVIDER NOTES
Emergency Department Provider Note       History of Present Illness     History provided by: Patient and Family Member  Limitations to History: Her family manages her medications however they also do not know exactly what she takes  External Records Reviewed with Brief Summary: Previous discharge summary    HPI:  Rakan Cervantes is a 89 y.o. female presents for lightheaded and dizziness.  She is chronically on 1 L oxygen.  She states over the past day or 2, she has felt short of breath and dizzy whenever she stands.  Her symptoms of not been improving so she comes ED for evaluation.  She otherwise denies any chest pain, she denies any fevers or chills    Physical Exam   Triage vitals:  T 36.4 °C (97.6 °F)  HR 52  /54  RR 12  O2 96 % Supplemental oxygen    Physical Exam  Vitals and nursing note reviewed.   Constitutional:       Appearance: Normal appearance.   HENT:      Head: Normocephalic and atraumatic.      Nose: Nose normal.      Mouth/Throat:      Mouth: Mucous membranes are moist.     Eyes:      Extraocular Movements: Extraocular movements intact.      Conjunctiva/sclera: Conjunctivae normal.       Cardiovascular:      Rate and Rhythm: Bradycardia present. Rhythm irregular.      Pulses: Normal pulses.      Heart sounds: Normal heart sounds.   Pulmonary:      Effort: Pulmonary effort is normal.      Breath sounds: Normal breath sounds.     Musculoskeletal:         General: Normal range of motion.      Cervical back: Normal range of motion and neck supple.     Skin:     General: Skin is warm and dry.      Capillary Refill: Capillary refill takes less than 2 seconds.      Coloration: Skin is pale.     Neurological:      General: No focal deficit present.      Mental Status: She is alert and oriented to person, place, and time. Mental status is at baseline.           Medical Decision Making & ED Course   Medical Decision Makin y.o. female patient who also presents bradycardic and what appears to  be A-fib.  No concerns for taking digoxin at all.  She does not take digoxin.  She was just discharged to our facility 10 days ago at that time she was discharged on carvedilol as well as verapamil.    Upon my initial evaluation she is bradycardic to the 30s to 40s,  hypotensive with a MAP of around 60, she otherwise tells me that she takes her medications as prescribed but she does not know what they are, her son fixes her medications for her    I did discuss the case with on-call cardiology Dr. Pantoja who recommends dopamine starting at 3mc/min    Patient with no improvement at 3 mcg however immediately improves in both heart rate as well as blood pressure at 5 however she poorly tolerates this, she gets having a headache as well as nausea and vomiting.  I did treat her nausea vomiting with Zofran and then Reglan with some improvement.  I did have to briefly turned off due to patient tolerance however she quickly became bradycardic again, she was responsive to 1 mg atropine at this time    Her labs are also concerning for supratherapeutic INR, all of these medications operate on CYP and could be some dysregulation of this causing supratherapeutic lab levels.  Possibly also underlying cardiac issue, she has a troponinemia however believe this is secondary to hypoperfusion.  I have also increased her oxygen to 2 L as she is just slightly bobbing around 90 to 91%.    Patient will warrant admission to an ICU, we are currently at capacity here at [facility].  We reached out to [facility] and they will graciously accept the patient.    At time of transfer, patient is stable, she is still on dopamine at 5 mics per minute, she is now fully awake and smiling, she still tells me she feels very lightheaded however she looks much better than we first came in, her blood pressure has a MAP greater than 65, her heart rate is right around 60    EMS arrived for transport and patient was taken for further evaluation in [facility]  condition      ----      Differential diagnoses considered include but are not limited to: Slow A-fib secondary to medication versus ACS    Social Determinants of Health which Significantly Impact Care: Social Determinants of Health which Significantly Impact Care: None identified     EKG Independent Interpretation: EKG interpreted by myself. Please see ED Course for full interpretation.    Independent Result Review and Interpretation: Relevant laboratory and radiographic results were reviewed and independently interpreted by myself.  As necessary, they are commented on in the ED Course.    Chronic conditions affecting the patient's care: As documented above in [facility]    The patient was discussed with the following consultants/services: Dr. Anand    Care Considerations: As documented above in [facility]    ED Course:  ED Course as of 07/21/25 2123   Sun Jul 20, 2025 2253 EKG interpreted by myself at 2250  Slow afib with rate of 44  No  QRS or QT prolongation.   No ST elevations or depressions concerning for STEMI.   When compared with 7/7/25, this EKG is shows new slow afib, previous was  NSR at a rate of 69.    [SY]   2259 Pt acutely symptomatic, spoke with dr. Pantoja who recommends dopaming at 3mc/min [SY]      ED Course User Index  [SY] Agustin Gamez MD       Disposition   As a result of their workup, the patient will require transfer to another facility.  The patient and/or her guardian/representative is agreeable to transfer at this time.   We will continue to monitor and manage the patient in the Emergency Department until transport for transfer can be arranged.    Procedures   Critical Care    Performed by: Agustin Gamez MD  Authorized by: Agustin Gamez MD    Critical care provider statement:     Critical care time (minutes):  50    Critical care time was exclusive of:  Separately billable procedures and treating other patients and teaching time    Critical care was necessary to treat or prevent imminent or  life-threatening deterioration of the following conditions:  Cardiac failure    Critical care was time spent personally by me on the following activities:  Ordering and review of laboratory studies, ordering and performing treatments and interventions, development of treatment plan with patient or surrogate, discussions with consultants, ordering and review of radiographic studies, pulse oximetry, discussions with primary provider, evaluation of patient's response to treatment, re-evaluation of patient's condition, examination of patient, review of old charts and obtaining history from patient or surrogate    Care discussed with: accepting provider at another facility            Agustin Gamez MD  Emergency Medicine                                                       Agustin Gamez MD  07/21/25 1045

## 2025-07-22 NOTE — CONSULTS
"Inpatient consult to Cardiology  Consult performed by: Rita Fernandes, DANIA-CNP  Consult ordered by: Emerson Ruvalcaba MD  Reason for consult: admitted with poly pharmaceutical bradycardia. Assistance with BP control 89 Years old OP 's to 160's systolic        HPI:  Rakan Cervantes is a 89 y.o. female, with a PMH of pAF on Eliquis, CAD s/p PCI to distal L circ 9/2024, SVT, TIA, HTN, HLD, BL carotid artery stenosis, diverticulosis, GIB, who presented [place] [facility] [place] [place] as a hospital transfer for cardiogenic shock. Patient presented to an OSH ED 7/20 for c/o lightheadedness and dizziness, with associated SOB. She was noted to be bradycardic to 30 and 40s, hypotensive with a MAP of 60. She was started on a Dopamine infusion with improvement at 5 mcg, though she did not tolerate the infusion well, developing n/v and headache. The infusion has to be stopped at one point and patient given Atropine. Labs at this time notable for elevated troponin and supratherapeutic INR.    Of note, she was recently admitted 7/7 to [facility] for SOB and CP. Her INR was subtherapeutic as she had been holding Coumadin in anticipation of planned Watchman procedure. She additionally had an ROSANA so Losartan was held and BP medications were adjusted for HTN urgency with SBP in the 200s.     Cardiology is consulted for \"admitted with poly pharmaceutical bradycardia. Assistance with BP control 89 Years old OP 's to 160's systolic\".     Home CV Medications:  Atorvastatin 80mg daily  Carvedilol 6.25mg BID  ASA 81mg daily  Lasix 40mg daily PRN  HCTZ 25mg daily  Ranexa 500mg BID   Verapamil 180mg daily  Hydralazine 50mg daily PRN for SBP >140  Coumadin  -Follows with Dr. Callaway as outpatient, last seen 6/2025    Patient History   Past Medical History:  She has a past medical history of Abnormality of plasma protein (03/13/2025), Acute kidney injury (03/12/2025), Acute non-ST segment elevation myocardial " infarction (Multi) (11/26/2023), Anemia, Arteriosclerosis of coronary artery (05/14/2023), Arthritis, Asthma, Atrial fibrillation (Multi) (11/26/2023), Jonas esophagus, Bilateral carotid artery occlusion (05/23/2023), Bradycardia (02/21/2025), CAD (coronary artery disease), Cardiac rhythm disorder or disturbance or change (11/26/2023), Cervical radiculopathy, Chest pain (11/26/2023), Chronic heart failure with preserved ejection fraction (02/21/2025), Chronic obstructive pulmonary disease (Multi) (11/26/2023), Constipation, Coronary artery disease (11/26/2023), Degenerative joint disease, Dyslipidemia, Dysuria, Erosive esophagitis, Erythema of skin (04/07/2025), Fall at home, initial encounter (03/11/2025), Gastrointestinal hemorrhage with melena (04/08/2025), GERD (gastroesophageal reflux disease), GIB (gastrointestinal bleeding) (04/07/2025), Hematochezia (04/08/2025), Hypercholesterolemia (05/23/2023), Hyperparathyroidism (Multi), Hypertensive urgency (11/26/2023), Impaired fasting glucose (11/26/2023), Iron deficiency anemia due to chronic blood loss (04/08/2025), Irritable bowel syndrome (IBS), Labile essential hypertension (11/26/2023), Labile hypertension, Left foot pain, Low blood pressure (11/26/2023), Lung infiltrate on CT (02/24/2025), Macular degeneration, Mixed hyperlipidemia (11/26/2023), Neck pain, Osteoporosis (11/26/2023), Paroxysmal atrial fibrillation (Multi), Pneumonia of left upper lobe due to infectious organism (02/21/2025), Polyarthritis with negative rheumatoid factor (Multi), Post menopausal syndrome, Rapid atrial fibrillation (Multi) (05/14/2023), Right wrist pain (04/08/2025), Sepsis (Multi) (02/21/2025), Septic shock (Multi) (02/21/2025), Spinal stenosis, 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  Past Surgical History:  She has a past surgical history that includes Other surgical history (02/14/2022); MR angio head wo IV contrast (04/23/2013); CT angio head w and wo IV contrast (05/08/2013); MR angio head wo IV contrast (07/20/2016); Cholecystectomy; Cardiovascular stress test (2017); Cardiovascular stress test (2004); Cataract extraction (Bilateral, 2011); Carpal tunnel release (Left, 2014); Total hip arthroplasty (Right, 2016); Revision total hip arthroplasty (Left, 2013); Coronary stent placement (05/2023); MR angio head wo IV contrast (10/27/2023); MR angio neck wo IV contrast (10/27/2023); Cardiac catheterization (Left, 9/27/2024); and Cardiac catheterization (N/A, 9/27/2024).  Social History:  She reports that she has never smoked. She has never been exposed to tobacco smoke. She has never used smokeless tobacco. She reports current alcohol use. She reports that she does not use drugs.  Family History: family history includes No Known Problems in her father, mother, and sister.     Allergies: Meperidine, Morphine, Opioids - morphine analogues, Pregabalin, and Tramadol    ROS: 10-point review of systems was performed and is otherwise negative except as noted in HPI.     Outpatient Medications:  Current Outpatient Medications   Medication Instructions    aspirin 81 mg, Daily    atorvastatin (LIPITOR) 80 mg, oral, Nightly    carvedilol (COREG) 6.25 mg, oral, 2 times daily (morning and late afternoon)    docusate sodium (Colace) 100 mg capsule 1 capsule, As needed    DULoxetine (CYMBALTA) 30 mg, oral, Daily with evening meal    ferrous sulfate (FeroSuL) 325 mg (65 mg elemental) tablet 1 tablet, oral, 3 times weekly, Monday, Wednesday, Friday    furosemide (LASIX) 40 mg, oral, Daily PRN    gabapentin (Neurontin) 300 mg capsule TAKE 1 CAPSULE BY MOUTH AT NOON AND BEDTIME    hydrALAZINE (APRESOLINE) 50 mg, oral, Daily PRN    hydroCHLOROthiazide (HYDRODIURIL) 25 mg, Daily    hydroxychloroquine  (PLAQUENIL) 200 mg, oral, Daily    magnesium oxide (MAG-OX) 400 mg, oral, 2 times daily    pantoprazole (PROTONIX) 40 mg, oral, 2 times daily    potassium chloride CR 20 mEq ER tablet 20 mEq, oral, Daily, Take with food.    ranolazine (RANEXA) 500 mg, oral, 2 times daily, Do not crush, chew, or split.    Stool Softener 100 mg capsule TAKE 1 CAPSULE BY MOUTH ONCE DAILY AS NEEDED to prevent constipation from iron    sucralfate (CARAFATE) 1 g, oral, Daily    sulfaSALAzine (AZULFIDINE) 500 mg, oral, 2 times daily    verapamil SR (CALAN-SR) 180 mg, oral, Nightly, Do not crush or chew.    warfarin (Coumadin) 5 mg tablet Take 1 tablet (5 mg) by mouth once daily at bedtime. Take as directed per After Visit Summary. Do not start before July 11, 2025.       Cardiovascular Testing:  EKG:   ECG 12 Lead 7/22/25    Encounter Date: 07/20/25   ECG 12 lead   Result Value    Ventricular Rate 44    QRS Duration 96    QT Interval 480    QTC Calculation(Bazett) 410    R Axis 51    T Axis 27    QRS Count 7    Q Onset 218    T Offset 458    QTC Fredericia 432    Narrative    Atrial fibrillation with slow ventricular response with a competing junctional pacemaker  Abnormal ECG  When compared with ECG of 07-JUL-2025 13:07,  Atrial fibrillation has replaced Sinus rhythm  Vent. rate has decreased BY  25 BPM  Minimal criteria for Anterior infarct are now Present  Nonspecific T wave abnormality has replaced inverted T waves in Inferior leads  T wave inversion no longer evident in Anterior leads  Confirmed by Sergey Bond ([id number]) on 7/22/2025 8:43:11 AM     ECG 12 Lead 7/7/25      Echo:  Transthoracic Echocardiogram 7/9/25   1. The left ventricular systolic function is normal with a visually estimated ejection fraction of 60-65%.   2. There is normal right ventricular global systolic function.   3. The left atrial size is moderately dilated.   4. The mitral valve is moderately thickened.   5. There is moderate mitral annular calcification.   6.  The Doppler estimated RVSP is slightly elevated at 34 mmHg.   7. Trace tricuspid regurgitation is visualized.   8. There is moderately increased septal and mildly increased posterior left ventricular wall thickness.    Transthoracic Echocardiogram 10/31/24   1. The left ventricular systolic function is normal, with a visually estimated ejection fraction of 60-65%.   2. There is normal right ventricular global systolic function.   3. The left atrium is moderately dilated.   4. Mild to moderate mitral valve regurgitation.   5. Mild to moderately elevated right ventricular systolic pressure.   6. Mild tricuspid regurgitation is visualized.   7. Mild aortic valve stenosis.   8. Mild aortic valve regurgitation.   9. Echo findings are consistent with normal mitral valve prosthesis structure and function.    Transthoracic Echocardiogram 10/2023   1. Left ventricular systolic function is normal with a 60-65% estimated ejection fraction.   2. No evidence of mitral valve regurgitation.   3. Slightly elevated RVSP.   4. Trace tricuspid regurgitation is visualized.   5. Aortic valve sclerosis.    Transthoracic Echocardiogram 5/2023  1. Left ventricular systolic function is normal with a 55-60% estimated  ejection fraction.  2. Spectral Doppler shows an impaired relaxation pattern of left ventricular  diastolic filling.  3. The left atrium is moderately dilated.  4. Aortic valve sclerosis.    Cath:  Left Heart Catheterization 9/2024   1. Severe distal left circumflex proximal left posterior descending artery lesion of 90% treated with drug-eluting stent with excellent result.   2. Patient is fully revascularized.   3. Mild elevated filling pressures.   4. Recommend aspirin 81 mg for 1 week. Plavix for 12 months. Eliquis 2.5 mg oral twice daily for A-fib.     Stress Test:  Nuclear Stress Test 3/13/25   1. SPECT Perfusion Study: Normal.    2. There is no scintigraphic evidence for inducible ischemia.    3. No evidence of scarred  myocardium.    4. Left ventricle is normal in size. The left ventricle systolic function is normal.    5. Right ventricle is normal in size. The right ventricle systolic function is normal.    6. This is a low risk scan.             Gated Stress FBP    LVEF % 63     Cardiac Imaging:   Carotid Duplex 3/13/25  Right Carotid: Findings are consistent with less than 50% stenosis of the right proximal internal carotid artery. The internal carotid artery appears tortuous. Right external carotid artery appears patent with no evidence of stenosis. The right vertebral artery is patent with antegrade flow. No evidence of hemodynamically significant stenosis in the right subclavian artery. There is turbulent flow noted in the proximal common carotid artery which may indicate a more proximal stenosis.    Left Carotid: Findings are consistent with less than 50% stenosis of the left proximal internal carotid artery. The internal carotid artery appears tortuous. There is turbulent flow noted in mid and distal internal carotid artery possibly due to vessel tortuosity. Left external carotid artery appears patent with no evidence of stenosis. The left vertebral artery is patent with antegrade flow. No evidence of hemodynamically significant stenosis in the left subclavian artery.     Imaging & Doppler Findings:  Right Plaque Morph: The proximal right internal carotid artery demonstrates heterogenous plaque. The proximal right external carotid artery demonstrates heterogenous and calcified plaque. The distal right common carotid artery demonstrates heterogenous plaque.    Left Plaque Morph: The proximal left internal carotid artery demonstrates heterogenous plaque. The proximal left external carotid artery demonstrates heterogenous plaque. The distal left common carotid artery demonstrates heterogenous plaque.      Diagnostic Results   Labs  CBC- 7/22/2025:  5:32 AM  5.0 7.4 259    23.2      BMP- 7/22/2025:  5:32 AM  134 21 101 107    4.3 1.02 28    Estimated Creatinine Clearance: 26.7 mL/min (by C-G formula based on SCr of 1.02 mg/dL).     CA: 8.7 PROTIEN: 5.5 ALT: 14 Total Bili: 0.4 M.07   PHOS: 3.6 ALBUMIN: 3.0 AST: 21   Alk Phos: 77      COAGS- 2025:  5:32 AM  4.6   51.2 46.9     CV Labs  Troponin I, High Sensitivity   Date/Time Value Ref Range Status   2025 01:03  (HH) 0 - 13 ng/L Final   2025 11:38  (HH) 0 - 13 ng/L Final   2025 01:39 PM 14 (H) 0 - 13 ng/L Final   2025 12:50 PM 15 (H) 0 - 13 ng/L Final   10/28/2024 06:04  (HH) 0 - 13 ng/L Final   10/28/2024 10:20 AM 96 (HH) 0 - 13 ng/L Final     BNP   Date/Time Value Ref Range Status   2025 11:38  (H) 0 - 99 pg/mL Final   2025 12:50  (H) 0 - 99 pg/mL Final   10/28/2024 09:19  (H) 0 - 99 pg/mL Final     Hemoglobin A1C   Date/Time Value Ref Range Status   09/10/2024 09:14 AM 5.8 (H) See below % Final   2024 11:10 AM 6.3 (H) See below % Final     LDL Calculated   Date/Time Value Ref Range Status   10/29/2024 05:34 AM 25 <=99 mg/dL Final   09/10/2024 09:14 AM 50 (L) 65 - 130 mg/dL Final   2024 11:10 AM 44 (L) 65 - 130 mg/dL Final     VLDL   Date/Time Value Ref Range Status   10/29/2024 05:34 AM 12 0 - 40 mg/dL Final       Pertinent Imaging  XR chest 1 view 2025  No radiographic evidence of acute cardiopulmonary pathology.       Last Recorded Vitals:  Vitals:    25 0806 25 0900 25 1000 25 1005   BP: (!) 204/66 (!) 182/55 (!) 190/176 (!) 190/57   BP Location: Right arm      Patient Position:   Sitting    Pulse: 68 66 65 64   Resp:    Temp:       TempSrc:       SpO2: 100% 100% 100% 100%   Weight:       Height:         Temp:  [36 °C (96.8 °F)-36.4 °C (97.5 °F)] 36.3 °C (97.3 °F)  Heart Rate:  [55-73] 64  Resp:  [15-34] 22  BP: (127-207)/() 190/57     Last I/O:  I/O last 3 completed shifts:  In: 700 (12.8 mL/kg) [P.O.:700]  Out: 0 (0 mL/kg)   Weight: 54.6 kg  "    Physical Exam:   Vitals and nursing notes reviewed.  BP (!) 190/57   Pulse 64   Temp 36.3 °C (97.3 °F) (Temporal)   Resp 22   Ht (!) 1.499 m (4' 11\")   Wt 54.6 kg (120 lb 5.9 oz)   SpO2 100%   BMI 24.31 kg/m²   GENERAL: Alert and awake, cooperative; in no acute distress; elderly female, frail appearing  SKIN: Warm and dry, cap refill <2  HEENT: Normocephalic, PEERL, mucous membranes pink and moist  NECK: No JVD or hepatojugular reflex  CARDIAC: Regular rate and rhythm, S1S2, no murmurs or abnormal heart sounds  CHEST: Normal respiratory effort, no abnormal breath sounds  ABDOMEN: Soft, non-distended, non-tender with palpation  EXTREMITIES: No lower extremity edema, normal pulses all 4 extremities  NEURO: Alert and oriented, mental status at baseline, no focal deficits  PSYCH: Behavior and affect as expected     Tele: NSR 60-70s  Code Status: DNR and No Intubation      Assessment/Plan   Rakan Cervantes is a 89 y.o. female, with a PMH of pAF on Eliquis, CAD s/p PCI to distal L circ 9/2024, SVT, TIA, HTN, HLD, BL carotid artery stenosis, diverticulosis, GIB, who presented to [facility] on 7/21/2025 as a hospital transfer for cardiogenic shock. Patient presented to an OSH ED 7/20 for c/o lightheadedness and dizziness, with associated SOB. She was noted to be bradycardic to 30 and 40s, hypotensive with a MAP of 60. She was started on a Dopamine infusion with improvement at 5 mcg, though she did not tolerate the infusion well, developing n/v and headache. The infusion has to be stopped at one point and patient given Atropine. Labs at this time notable for elevated troponin and supratherapeutic INR.    Of note, she was recently admitted 7/7 to [facility] for SOB and CP. Her INR was subtherapeutic as she had been holding Coumadin in anticipation of planned Watchman procedure. She additionally had an ROSANA so Losartan was held and BP medications were adjusted for HTN urgency with SBP in the " "200s.     Cardiology is consulted for \"admitted with poly pharmaceutical bradycardia. Assistance with BP control 89 Years old OP 's to 160's systolic\".     Dopamine infusion stopped yesterday at 1235. HR stable, BP now elevated. Patient reports improved sx but remains with intermittent lightheadedness/dizziness at rest.    Home CV Medications: Atorvastatin 80mg daily, Carvedilol 6.25mg BID, ASA 81mg daily, Lasix 40mg daily PRN, HCTZ 25mg daily, Ranexa 500mg BID, Verapamil 180mg daily, Hydralazine 50mg daily PRN for SBP >140, Coumadin  -Follows with Dr. Callaway as outpatient, last seen 6/2025    #Cardiogenic Shock- Likely in the setting of polypharmacy  #Hypertensive Urgency- SBP elevated in the 200s  #pAF/Hx of SVT- Currently NSR HR 60-70s  -AC with Warfarin, planning on Watchman next month d/t hx of GIB  #HLD- On statin therapy  #Hx of CAD s/p PCI- c/w ASA      RECOMMENDATIONS:  -Stop Verapamil indefinitely  -Uptitrate BB as tolerated  -Start HCTZ 12.5mg and Hydralazine 25mg TID, increase as needed  -Pharmacy to manage Coumadin  -Lasix PRN  -c/w ASA and statin, Ranexa  -Event monitor x2 weeks at discharge  -Continuous telemetry monitoring  -Monitor electrolytes, replete for K <4 and Mg <2  -FU with outpatient cardiologist within 2 weeks post-discharge      DANIA Wagoner-CNP  Advanced Practice Provider  Cardiology  [facility]  07/22/25 10:42 AM     ==========================  Attending note  ==========================  Both the TEOFILO and I have had a face to face encounter with the patient today. I have examined the patient and edited the documented physical examination as necessary.  I personally reviewed the patient's recent labs, medications, orders, EKGs, and pertinent cardiac imaging.  I have reviewed the TEOFILO's encounter note, approve the TEOFILO's documentation and have edited the note to reflect the diagnostic and therapeutic plan.    89 y.o. F with PMH of pAF on Coumadin (recently " "supratherapeutic), CAD s/p PCI (distal LCx 9/2024), SVT, TIA, HTN, HLD, diverticulosis, GIB, BL carotid stenosis, who presented 7/20 to [facility] ED with dizziness, SOB, bradycardia (HR 30-40s), hypotension (MAP 60), found to be in cardiogenic shock; transiently responsive to Dopamine (5 mcg) but poorly tolerated (n/v, HA), required Atropine; troponin elevated. Recent admission 7/7 to [facility] for SOB/CP, subtherapeutic INR due to held Coumadin for planned Watchman, ROSANA - held Losartan, BP meds adjusted for HTN urgency (SBP >200). Transferred to [facility] 7/21 for management of a possible polypharmacy induced bradycardia and BP control. Cardiology consulted. On carvedilol, verapamil, ranolazine, HCTZ, hydralazine PRN, ASA, statin, PRN Lasix, Coumadin. Recommendations: d/c verapamil, uptitrate BB as tolerated, resume HCTZ 12.5mg daily + hydralazine 25mg TID (titrate PRN), continue ASA, statin, Ranexa, Lasix PRN, pharmacy to manage anticoagulation, monitor lytes (replete K<4, Mg<2), event monitor x2 weeks post-discharge, continuous telemetry inpatient, cardiology f/u within 2 weeks.    Past medical history:  As above.    Medications were reviewed.    Allergies were reviewed.    Vital signs, telemetry, medications, labs, and imaging were reviewed as well.    Physical Exam:   Vitals and nursing notes reviewed.  BP (!) 190/57   Pulse 64   Temp 36.3 °C (97.3 °F) (Temporal)   Resp 22   Ht (!) 1.499 m (4' 11\")   Wt 54.6 kg (120 lb 5.9 oz)   SpO2 100%   BMI 24.31 kg/m²   GENERAL: Alert and awake, cooperative; in no acute distress; elderly female, frail appearing  SKIN: Warm and dry, cap refill <2  HEENT: Normocephalic, PEERL, mucous membranes pink and moist  NECK: No JVD or hepatojugular reflex  CARDIAC: Regular rate and rhythm, S1S2, no murmurs or abnormal heart sounds  CHEST: Normal respiratory effort, no abnormal breath sounds  ABDOMEN: Soft, non-distended, non-tender with palpation  EXTREMITIES: No lower extremity edema, " "normal pulses all 4 extremities  NEURO: Alert and oriented, mental status at baseline, no focal deficits  PSYCH: Behavior and affect as expected     Tele: NSR 60-70s  Code Status: DNR and No Intubation      Assessment/Plan   Rakan Cervantes is a 89 y.o. female, with a PMH of pAF on Eliquis, CAD s/p PCI to distal L circ 9/2024, SVT, TIA, HTN, HLD, BL carotid artery stenosis, diverticulosis, GIB, who presented [facility] on 7/21/2025 as a hospital transfer for cardiogenic shock. Patient presented to an OSH ED 7/20 for c/o lightheadedness and dizziness, with associated SOB. She was noted to be bradycardic to 30 and 40s, hypotensive with a MAP of 60. She was started on a Dopamine infusion with improvement at 5 mcg, though she did not tolerate the infusion well, developing n/v and headache. The infusion has to be stopped at one point and patient given Atropine. Labs at this time notable for elevated troponin and supratherapeutic INR.    Of note, she was recently admitted 7/7 to [facility] for SOB and CP. Her INR was subtherapeutic as she had been holding Coumadin in anticipation of planned Watchman procedure. She additionally had an ROSANA so Losartan was held and BP medications were adjusted for HTN urgency with SBP in the 200s.     Cardiology is consulted for \"admitted with poly pharmaceutical bradycardia. Assistance with BP control 89 Years old OP 's to 160's systolic\".     Dopamine infusion stopped yesterday at 1235. HR stable, BP now elevated. Patient reports improved sx but remains with intermittent lightheadedness/dizziness at rest.    Home CV Medications: Atorvastatin 80mg daily, Carvedilol 6.25mg BID, ASA 81mg daily, Lasix 40mg daily PRN, HCTZ 25mg daily, Ranexa 500mg BID, Verapamil 180mg daily, Hydralazine 50mg daily PRN for SBP >140, Coumadin  -Follows with Dr. Callaway as outpatient, last seen 6/2025    #Cardiogenic Shock- Likely in the setting of polypharmacy  #Hypertensive Urgency- " SBP elevated in the 200s  #pAF/Hx of SVT- Currently NSR HR 60-70s  -AC with Warfarin, planning on Watchman next month d/t hx of GIB  #HLD- On statin therapy  #Hx of CAD s/p PCI- c/w ASA    RECOMMENDATIONS:  -Stop Verapamil indefinitely  -Uptitrate BB as tolerated  -Start HCTZ 12.5mg and Hydralazine 25mg TID, increase as needed  -Pharmacy to manage Coumadin  -Lasix PRN  -c/w ASA and statin, Ranexa  -Event monitor x2 weeks at discharge  -Continuous telemetry monitoring  -Monitor electrolytes, replete for K <4 and Mg <2  -FU with outpatient cardiologist within 2 weeks post-discharge    Cordell Person MD

## 2025-07-23 LAB
ALBUMIN SERPL BCP-MCNC: 2.8 G/DL (ref 3.4–5)
ANION GAP SERPL CALC-SCNC: 9 MMOL/L (ref 10–20)
BUN SERPL-MCNC: 15 MG/DL (ref 6–23)
CALCIUM SERPL-MCNC: 8.7 MG/DL (ref 8.6–10.3)
CHLORIDE SERPL-SCNC: 104 MMOL/L (ref 98–107)
CO2 SERPL-SCNC: 29 MMOL/L (ref 21–32)
CREAT SERPL-MCNC: 0.85 MG/DL (ref 0.5–1.05)
EGFRCR SERPLBLD CKD-EPI 2021: 66 ML/MIN/1.73M*2
ERYTHROCYTE [DISTWIDTH] IN BLOOD BY AUTOMATED COUNT: 14.6 % (ref 11.5–14.5)
GLUCOSE SERPL-MCNC: 97 MG/DL (ref 74–99)
HCT VFR BLD AUTO: 21.5 % (ref 36–46)
HGB BLD-MCNC: 7 G/DL (ref 12–16)
MCH RBC QN AUTO: 31.1 PG (ref 26–34)
MCHC RBC AUTO-ENTMCNC: 32.6 G/DL (ref 32–36)
MCV RBC AUTO: 96 FL (ref 80–100)
NRBC BLD-RTO: 0 /100 WBCS (ref 0–0)
PHOSPHATE SERPL-MCNC: 3.3 MG/DL (ref 2.5–4.9)
PLATELET # BLD AUTO: 260 X10*3/UL (ref 150–450)
POTASSIUM SERPL-SCNC: 4 MMOL/L (ref 3.5–5.3)
RBC # BLD AUTO: 2.25 X10*6/UL (ref 4–5.2)
SODIUM SERPL-SCNC: 138 MMOL/L (ref 136–145)
WBC # BLD AUTO: 4.4 X10*3/UL (ref 4.4–11.3)

## 2025-07-23 PROCEDURE — 2500000001 HC RX 250 WO HCPCS SELF ADMINISTERED DRUGS (ALT 637 FOR MEDICARE OP): Performed by: STUDENT IN AN ORGANIZED HEALTH CARE EDUCATION/TRAINING PROGRAM

## 2025-07-23 PROCEDURE — 2500000004 HC RX 250 GENERAL PHARMACY W/ HCPCS (ALT 636 FOR OP/ED): Performed by: SURGERY

## 2025-07-23 PROCEDURE — 99291 CRITICAL CARE FIRST HOUR: CPT | Performed by: SURGERY

## 2025-07-23 PROCEDURE — 99232 SBSQ HOSP IP/OBS MODERATE 35: CPT | Performed by: NURSE PRACTITIONER

## 2025-07-23 PROCEDURE — 2500000002 HC RX 250 W HCPCS SELF ADMINISTERED DRUGS (ALT 637 FOR MEDICARE OP, ALT 636 FOR OP/ED): Performed by: INTERNAL MEDICINE

## 2025-07-23 PROCEDURE — 2500000001 HC RX 250 WO HCPCS SELF ADMINISTERED DRUGS (ALT 637 FOR MEDICARE OP): Performed by: INTERNAL MEDICINE

## 2025-07-23 PROCEDURE — 2500000001 HC RX 250 WO HCPCS SELF ADMINISTERED DRUGS (ALT 637 FOR MEDICARE OP): Performed by: PHARMACIST

## 2025-07-23 PROCEDURE — 80069 RENAL FUNCTION PANEL: CPT | Performed by: NURSE PRACTITIONER

## 2025-07-23 PROCEDURE — 36415 COLL VENOUS BLD VENIPUNCTURE: CPT | Performed by: NURSE PRACTITIONER

## 2025-07-23 PROCEDURE — 2500000001 HC RX 250 WO HCPCS SELF ADMINISTERED DRUGS (ALT 637 FOR MEDICARE OP): Performed by: SURGERY

## 2025-07-23 PROCEDURE — 85027 COMPLETE CBC AUTOMATED: CPT | Performed by: NURSE PRACTITIONER

## 2025-07-23 PROCEDURE — 1200000002 HC GENERAL ROOM WITH TELEMETRY DAILY

## 2025-07-23 RX ORDER — HYDRALAZINE HYDROCHLORIDE 50 MG/1
50 TABLET, FILM COATED ORAL 3 TIMES DAILY
Status: DISCONTINUED | OUTPATIENT
Start: 2025-07-23 | End: 2025-07-25

## 2025-07-23 RX ORDER — ACETAMINOPHEN 325 MG/1
650 TABLET ORAL EVERY 6 HOURS PRN
Status: DISCONTINUED | OUTPATIENT
Start: 2025-07-23 | End: 2025-07-25 | Stop reason: HOSPADM

## 2025-07-23 RX ADMIN — HYDROXYCHLOROQUINE SULFATE 200 MG: 200 TABLET, FILM COATED ORAL at 08:16

## 2025-07-23 RX ADMIN — ASPIRIN 81 MG: 81 TABLET, DELAYED RELEASE ORAL at 08:16

## 2025-07-23 RX ADMIN — SULFASALAZINE 500 MG: 500 TABLET ORAL at 20:00

## 2025-07-23 RX ADMIN — FERROUS SULFATE TAB 325 MG (65 MG ELEMENTAL FE) 1 TABLET: 325 (65 FE) TAB at 08:16

## 2025-07-23 RX ADMIN — PANTOPRAZOLE SODIUM 40 MG: 40 TABLET, DELAYED RELEASE ORAL at 07:45

## 2025-07-23 RX ADMIN — CARVEDILOL 12.5 MG: 12.5 TABLET, FILM COATED ORAL at 20:00

## 2025-07-23 RX ADMIN — ATORVASTATIN CALCIUM 80 MG: 80 TABLET, FILM COATED ORAL at 20:00

## 2025-07-23 RX ADMIN — PANTOPRAZOLE SODIUM 40 MG: 40 TABLET, DELAYED RELEASE ORAL at 15:38

## 2025-07-23 RX ADMIN — HYDRALAZINE HYDROCHLORIDE 50 MG: 50 TABLET ORAL at 15:38

## 2025-07-23 RX ADMIN — GABAPENTIN 100 MG: 100 CAPSULE ORAL at 08:16

## 2025-07-23 RX ADMIN — CARVEDILOL 12.5 MG: 12.5 TABLET, FILM COATED ORAL at 08:16

## 2025-07-23 RX ADMIN — ACETAMINOPHEN 650 MG: 325 TABLET ORAL at 12:46

## 2025-07-23 RX ADMIN — Medication 1 TABLET: at 08:16

## 2025-07-23 RX ADMIN — SULFASALAZINE 500 MG: 500 TABLET ORAL at 08:16

## 2025-07-23 RX ADMIN — HYDRALAZINE HYDROCHLORIDE 25 MG: 25 TABLET ORAL at 08:16

## 2025-07-23 RX ADMIN — GABAPENTIN 100 MG: 100 CAPSULE ORAL at 20:00

## 2025-07-23 RX ADMIN — Medication 1 TABLET: at 20:00

## 2025-07-23 ASSESSMENT — COGNITIVE AND FUNCTIONAL STATUS - GENERAL
MOBILITY SCORE: 15
DRESSING REGULAR LOWER BODY CLOTHING: A LOT
WALKING IN HOSPITAL ROOM: A LOT
TOILETING: A LITTLE
HELP NEEDED FOR BATHING: A LITTLE
MOVING TO AND FROM BED TO CHAIR: A LITTLE
CLIMB 3 TO 5 STEPS WITH RAILING: A LOT
DAILY ACTIVITIY SCORE: 19
MOVING FROM LYING ON BACK TO SITTING ON SIDE OF FLAT BED WITH BEDRAILS: A LITTLE
DRESSING REGULAR UPPER BODY CLOTHING: A LITTLE
TURNING FROM BACK TO SIDE WHILE IN FLAT BAD: A LITTLE
STANDING UP FROM CHAIR USING ARMS: A LOT

## 2025-07-23 ASSESSMENT — PAIN DESCRIPTION - DESCRIPTORS
DESCRIPTORS: DISCOMFORT
DESCRIPTORS: DISCOMFORT;ACHING
DESCRIPTORS: DISCOMFORT;ACHING

## 2025-07-23 ASSESSMENT — PAIN SCALES - GENERAL
PAINLEVEL_OUTOF10: 1
PAINLEVEL_OUTOF10: 0 - NO PAIN
PAINLEVEL_OUTOF10: 0 - NO PAIN
PAINLEVEL_OUTOF10: 3
PAINLEVEL_OUTOF10: 3
PAINLEVEL_OUTOF10: 0 - NO PAIN
PAINLEVEL_OUTOF10: 1
PAINLEVEL_OUTOF10: 0 - NO PAIN

## 2025-07-23 ASSESSMENT — PAIN - FUNCTIONAL ASSESSMENT
PAIN_FUNCTIONAL_ASSESSMENT: 0-10

## 2025-07-23 NOTE — PROGRESS NOTES
Rakan Cervantes is a 89 y.o. female on day 2 of admission presenting with Cardiogenic shock (Multi).    Plan: Continues treatment for cardiogenic shock related to hypotension and bradycardia. Will wean patient down on O2 as tolerated. Asked for therapy evals and pal care to be placed as these were requested the other day as well. Will need follow up depending on how well patient does with mobility.  Disposition: Home with [facility] vs SNF?  Barrier: wean o2, therapy evals, cardio following, labs  ADOD:  2-3 days       07/23/25 1432   Discharge Planning   Home or Post Acute Services In home services   Type of Home Care Services Home PT;Home OT;Home nursing visits   Expected Discharge Disposition Home H   Intensity of Service   Intensity of Service 0-30 min       Isabel Lunsford RN

## 2025-07-23 NOTE — PROGRESS NOTES
"Subjective Data:  Pt offers no complaints at this time. Pt states tentative plans to move to step down     Overnight Events:    None      Objective Data:  Last Recorded Vitals:  Vitals:    25 0500 25 0600 25 0700 25 0800   BP: 168/62 178/51 (!) 182/64 (!) 188/114   Pulse: 66 65 68 66   Resp:    Temp:       TempSrc:       SpO2: 99% 97% 98% 98%   Weight:       Height:         Medical Gas Therapy: Supplemental oxygen  Medical Gas Delivery Method: Nasal cannula  Weight  Av.3 kg (117 lb 8.2 oz)  Min: 52.6 kg (116 lb)  Max: 54.6 kg (120 lb 5.9 oz)  Vitals:    25 0600   Weight: 54.6 kg (120 lb 5.9 oz)      3 Day Weight Change: Unable to Calculate     Intake/Output Summary (Last 24 hours) at 2025  Last data filed at 2025 1834  Gross per 24 hour   Intake 360 ml   Output 500 ml   Net -140 ml      Net IO Since Admission: 860 mL [25 08]     Tele: SR     LABS:  CMP:  Results from last 7 days   Lab Units 25  2338   SODIUM mmol/L 138 134* 134* 135*   POTASSIUM mmol/L 4.0 4.3 5.2 4.7   CHLORIDE mmol/L 104 101 101 100   CO2 mmol/L 29 28 23 30   ANION GAP mmol/L 9* 9* 15 10   BUN mg/dL 15 21 29* 26*   CREATININE mg/dL 0.85 1.02 1.25* 1.17*   EGFR mL/min/1.73m*2 66 53* 41* 45*   MAGNESIUM mg/dL  --   --   --  2.07   ALBUMIN g/dL 2.8* 3.0* 3.5 3.2*   ALT U/L  --   --  14 14   AST U/L  --   --  21 17   BILIRUBIN TOTAL mg/dL  --   --  0.4 0.4     CBC:  Results from last 7 days   Lab Units 25  04525  0532 25  2338   WBC AUTO x10*3/uL 4.4 5.0 5.7   HEMOGLOBIN g/dL 7.0* 7.4* 7.2*   HEMATOCRIT % 21.5* 23.2* 23.4*   PLATELETS AUTO x10*3/uL 260 259 295   MCV fL 96 98 100     COAG:   Results from last 7 days   Lab Units 25  0532   INR  4.6*     ABO: No results found for: \"ABO\"  HEME/ENDO:     CARDIAC:   Results from last 7 days   Lab Units 25  0103 25  2338   TROPHS ng/L 110* 112*   BNP " pg/mL  --  612*            EKG:      TTE  7/9/2025   PHYSICIAN INTERPRETATION:  Left Ventricle: The left ventricular systolic function is normal with a visually estimated ejection fraction of 60-65%. There is mild concentric left ventricular hypertrophy. There are no regional wall motion abnormalities. The left ventricular cavity size is normal. There is moderately increased septal and mildly increased posterior left ventricular wall thickness. Spectral Doppler shows a normal pattern of left ventricular diastolic filling.  Left Atrium: The left atrial size is moderately dilated.  Right Ventricle: The right ventricle is normal in size. There is normal right ventricular global systolic function.  Right Atrium: The right atrial size is normal.  Aortic Valve: The aortic valve is trileaflet. Not assessed.  There is no evidence of aortic valve regurgitation. Patient has aortic valve stenosis with reduced leaflet motion. I cannot assess the severity of aortic valve stenosis. No gradients were obtained.  Mitral Valve: The mitral valve is moderately thickened. There is moderate mitral annular calcification. Mitral valve regurgitation was not assessed. The E Vmax is 1.54 m/s.  Tricuspid Valve: The tricuspid valve is structurally normal. There is trace tricuspid regurgitation. The Doppler estimated right ventricular systolic pressure (RVSP) is slightly elevated at 34 mmHg.  Pulmonic Valve: The pulmonic valve is structurally normal. The pulmonic valve regurgitation was not assessed.  Pericardium: No pericardial effusion noted.  Aorta: The aortic root is normal.  Systemic Veins: The inferior vena cava appears normal in size, with IVC inspiratory collapse greater than 50%.       CONCLUSIONS:   1. The left ventricular systolic function is normal with a visually estimated ejection fraction of 60-65%.   2. There is normal right ventricular global systolic function.   3. The left atrial size is moderately dilated.   4. The mitral  valve is moderately thickened.   5. There is moderate mitral annular calcification.   6. The Doppler estimated RVSP is slightly elevated at 34 mmHg.   7. Trace tricuspid regurgitation is visualized.   8. There is moderately increased septal and mildly increased posterior left ventricular wall thickness.     Cath: 9/2024   CONCLUSIONS:   1. Severe distal left circumflex proximal left posterior descending artery lesion of 90% treated with drug-eluting stent with excellent result.   2. Patient is fully revascularized.   3. Mild elevated filling pressures.   4. Recommend aspirin 81 mg for 1 week. Plavix for 12 months. Eliquis 2.5 mg oral twice daily for A-fib.    Nuclear Stress Test: 3/2025   CONCLUSIONS:    1. SPECT Perfusion Study: Normal.    2. There is no scintigraphic evidence for inducible ischemia.    3. No evidence of scarred myocardium.    4. Left ventricle is normal in size. The left ventricle systolic   function is normal.    5. Right ventricle is normal in size. The right ventricle systolic   function is normal.    6. This is a low risk scan.      Inpatient Medications:  Scheduled Medications[1]  PRN Medications[2]  Continuous Medications[3]    Outpatient Medications:  Current Outpatient Medications   Medication Instructions    aspirin 81 mg, Daily    atorvastatin (LIPITOR) 80 mg, oral, Nightly    carvedilol (COREG) 6.25 mg, oral, 2 times daily (morning and late afternoon)    docusate sodium (Colace) 100 mg capsule 1 capsule, As needed    DULoxetine (CYMBALTA) 30 mg, oral, Daily with evening meal    ferrous sulfate (FeroSuL) 325 mg (65 mg elemental) tablet 1 tablet, oral, 3 times weekly, Monday, Wednesday, Friday    furosemide (LASIX) 40 mg, oral, Daily PRN    gabapentin (Neurontin) 300 mg capsule TAKE 1 CAPSULE BY MOUTH AT NOON AND BEDTIME    hydrALAZINE (APRESOLINE) 50 mg, oral, Daily PRN    hydroCHLOROthiazide (HYDRODIURIL) 25 mg, Daily    hydroxychloroquine (PLAQUENIL) 200 mg, oral, Daily    magnesium  oxide (MAG-OX) 400 mg, oral, 2 times daily    pantoprazole (PROTONIX) 40 mg, oral, 2 times daily    potassium chloride CR 20 mEq ER tablet 20 mEq, oral, Daily, Take with food.    ranolazine (RANEXA) 500 mg, oral, 2 times daily, Do not crush, chew, or split.    Stool Softener 100 mg capsule TAKE 1 CAPSULE BY MOUTH ONCE DAILY AS NEEDED to prevent constipation from iron    sucralfate (CARAFATE) 1 g, oral, Daily    sulfaSALAzine (AZULFIDINE) 500 mg, oral, 2 times daily    verapamil SR (CALAN-SR) 180 mg, oral, Nightly, Do not crush or chew.    warfarin (Coumadin) 5 mg tablet Take 1 tablet (5 mg) by mouth once daily at bedtime. Take as directed per After Visit Summary. Do not start before July 11, 2025.       Physical Exam:  General:  Patient is awake, alert, and oriented.  Patient is in no acute distress.  HEENT:  Normocephalic.  Moist mucosa.    Neck:   Normal Jugular Venous Pressure.  Cardiovascular:  Regular rate and rhythm.  Normal S1 and S2.  Pulmonary:  Clear to auscultation bilaterally.  Abdomen:  Soft. Non-tender.   Non-distended.  Positive bowel sounds.  Lower Extremities:  BLE well perfused, No LE edema.  Neurologic:  Cranial nerves intact.  No focal deficit.   Skin: Skin warm and dry, normal skin turgor.   Psychiatric: Normal affect      Assessment/Plan   Rakan Cervantes is a 89 y.o. female, with a PMH of pAF on Eliquis, CAD s/p PCI to distal L circ 9/2024, SVT, TIA, HTN, HLD, BL carotid artery stenosis, diverticulosis, GIB, who presented to [facility] on 7/21/2025 as a hospital transfer for cardiogenic shock. Patient presented to an OSH ED 7/20 for c/o lightheadedness and dizziness, with associated SOB. She was noted to be bradycardic to 30 and 40s, hypotensive with a MAP of 60. She was started on a Dopamine infusion with improvement at 5 mcg, though she did not tolerate the infusion well, developing n/v and headache. The infusion has to be stopped at one point and patient given Atropine. Labs at  "this time notable for elevated troponin and supratherapeutic INR.     Of note, she was recently admitted 7/7 to [facility] for SOB and CP. Her INR was subtherapeutic as she had been holding Coumadin in anticipation of planned Watchman procedure. She additionally had an ROSANA so Losartan was held and BP medications were adjusted for HTN urgency with SBP in the 200s.      Cardiology is consulted for \"admitted with poly pharmaceutical bradycardia. Assistance with BP control 89 Years old OP 's to 160's systolic\".        Home CV Medications: Atorvastatin 80mg daily, Carvedilol 6.25mg BID, ASA 81mg daily, Lasix 40mg daily PRN, HCTZ 25mg daily, Ranexa 500mg BID, Verapamil 180mg daily, Hydralazine 50mg daily PRN for SBP >140, Coumadin  -Follows with Dr. Callaway as outpatient, last seen 6/2025     #Cardiogenic Shock- Likely in the setting of polypharmacy  #Hypertensive Urgency- SBP elevated in the 200s. Now SBP ranging from 151-188  #pAF/Hx of SVT- Currently NSR HR 60-70s  -AC with Warfarin, planning on Watchman next month d/t hx of GIB  #HLD- On statin therapy  #Hx of CAD s/p PCI- c/w ASA     RECOMMENDATIONS:  -Stop Verapamil indefinitely  -Uptitrate Coreg as tolerated  -Start HCTZ 12.5mg   -Hydralazine 25mg TID, increase as needed  -Pharmacy to manage Coumadin  -Lasix PRN  -c/w ASA and statin, Ranexa  -Event monitor x2 weeks at discharge  -Continuous telemetry monitoring  -Monitor electrolytes, replete for K <4 and Mg <2  -FU with outpatient cardiologist within 2 weeks post-discharge    DANIA Malone-CNP    Follow up with Dr Randall Green 8/20/2025 , Dr Callaway 10/13/2025          [1]   Scheduled medications   Medication Dose Route Frequency    aspirin  81 mg oral Daily    atorvastatin  80 mg oral Nightly    carvedilol  12.5 mg oral BID    ferrous sulfate  1 tablet oral Once per day on Monday Wednesday Friday    gabapentin  100 mg oral BID    hydrALAZINE  25 mg oral TID    hydroxychloroquine  200 mg " oral Daily    magnesium oxide  400 mg of magnesium oxide oral BID    pantoprazole  40 mg oral BID AC    sulfaSALAzine  500 mg oral BID   [2]   PRN medications   Medication    docusate sodium    oxygen   [3]   Continuous Medications   Medication Dose Last Rate

## 2025-07-23 NOTE — PROGRESS NOTES
Rakan Cervantes is a 89 y.o. female on day 2 of admission presenting with Cardiogenic shock (Multi).    Subjective   HD # 2 for this delightful  88 YO female was transferred from an OSH for bradycardia treated with a dopamine drip after receiving both beta blockade as well as calcium channel blockade. She has MMP including : paroxysmal atrial fibrillation, TIA, coronary artery disease, non-STEMI, bilateral carotid artery occlusion, hypertension, hypertensive urgency, s/p PCI with stent to distal left circumflex in September 2022, diverticulosis and LGI bleeding ( complication of AC) prompted discontinuation of Plavix, However despite failure of AC Coumadin was later begun? Scheduled but never completed Watchman procedure. Additional issues include: Abnormality of plasma protein? MGUS? , Jonas esophagus, Bilateral carotid artery occlusion, prior Bradycardia Cervical radiculopathy, Chronic heart failure with preserved ejection fraction, Chronic obstructive pulmonary disease, Degenerative joint disease, Dyslipidemia, Dysuria, Erosive esophagitis, frequent Falls Hyperparathyroidism, chronic blood loss, Irritable bowel syndrome (IBS), Macular degeneration, Stage 3 chronic kidney disease, Prior Stroke, transient ischemic attack Her PSH includes: Cholecystectomy, Cataract extraction (Bilateral); Carpal tunnel release Total hip arthroplasty (Right, 2016); Revision total hip arthroplasty (Left, 2013); Coronary stent placement .      She continues to do well. Awakens to name alert conversant no specific complaints. Symmetric chest expansion, bradycardia resolved currently on Carvedilol 25 / day in divided doses, occasional supplemental hydralazine 25 mg /day tid.  BP maintained generally within her Outpatient values. Sporadic elevated readings.  ABD bland depressible hungry. Plan regular diet continue to hold calcium channel blockers and most recent agents which may affect heart rate. Trim polypharmacy multiple episodes of  elevated INR (since Coumadin). Her history of frequent falls as well as history of GIB clearly represent no indication for the use of ongoing AC.  Risk exceed potential benefit PLAN appreciate input of cardiology and attempts to limit polypharmacy and multiple inputs to medications. reevaluate rate agents, medications discontinued multiple potential interactions Carafate--- Coumadin, Coumadin --- Plavix, as well as multiple antihypertensive ( FIVE in total ) Verapamil 180/d, Cozaaar 100 mg/day, Amlodipine 10 mg tablet, Carvedilol 25 mg tablet and isosorbide mononitrate er 120 mg . ICU issues resolved ready for RNF discussed with accepting hospitalist service GANGA Pantoja        Objective     Physical Exam    She continues to do well. Awakens to name alert conversant no specific complaints. Symmetric chest expansion, bradycardia resolved currently on Carvedilol 25 / day in divided doses, occasional supplemental hydralazine 25 mg /day tid.  BP maintained generally within her Outpatient values. Sporadic elevated readings.  ABD bland depressible hungry. Plan regular diet continue to hold calcium channel blockers and most recent agents which may affect heart rate. Trim polypharmacy multiple episodes of elevated INR (since Coumadin). Her history of frequent falls as well as history of GIB clearly represent no indication for the use of ongoing AC.  Risk exceed potential benefit PLAN appreciate input of cardiology and attempts to limit polypharmacy and multiple inputs to medications. reevaluate rate agents, medications discontinued multiple potential interactions Carafate--- Coumadin, Coumadin --- Plavix, as well as multiple antihypertensive ( FIVE in total ) Verapamil 180/d, Cozaaar 100 mg/day, Amlodipine 10 mg tablet, Carvedilol 25 mg tablet and isosorbide mononitrate er 120 mg . ICU issues resolved ready for RNF discussed with accepting hospitalist service GANGA Pantoja  Last Recorded Vitals  Blood pressure (!) 164/144, pulse 72,  "temperature 36.8 °C (98.2 °F), temperature source Temporal, resp. rate 24, height (!) 1.499 m (4' 11\"), weight 54.6 kg (120 lb 5.9 oz), SpO2 98%.  Intake/Output last 3 Shifts:  I/O last 3 completed shifts:  In: 660 (12.1 mL/kg) [P.O.:660]  Out: 500 (9.2 mL/kg) [Urine:500 (0.3 mL/kg/hr)]  Weight: 54.6 kg                Assessment & Plan  Bradycardia  Medication induced   Medication induced coagulopathy (Multi)       This critically ill patient  no longer continues  to be at-risk for clinically significant deterioration / failure due to the above mentioned dysfunctional, unstable organ systems.  I have personally identified and managed all complex critical care issues to prevent aforementioned clinical deterioration.  Critical care time is spent at bedside and/or the immediate area and has included, but is not limited to, the review of diagnostic tests, labs, radiographs, serial assessments of hemodynamics, respiratory status, ventilatory management, and family updates.  Time spent in procedures and teaching are reported separately.     CRITICAL CARE TIME: 35  minutes          Emerson Ruvalcaba MD    "

## 2025-07-23 NOTE — CARE PLAN
Problem: Pain - Adult  Goal: Verbalizes/displays adequate comfort level or baseline comfort level  Outcome: Progressing  Flowsheets (Taken 7/23/2025 1705)  Verbalizes/displays adequate comfort level or baseline comfort level:   Encourage patient to monitor pain and request assistance   Assess pain using appropriate pain scale   Administer analgesics based on type and severity of pain and evaluate response   Implement non-pharmacological measures as appropriate and evaluate response   Consider cultural and social influences on pain and pain management   Notify Licensed Independent Practitioner if interventions unsuccessful or patient reports new pain     Problem: Safety - Adult  Goal: Free from fall injury  Outcome: Progressing  Flowsheets (Taken 7/23/2025 1705)  Free from fall injury:   Instruct family/caregiver on patient safety   Based on caregiver fall risk screen, instruct family/caregiver to ask for assistance with transferring infant if caregiver noted to have fall risk factors     Problem: Skin  Goal: Prevent/manage excess moisture  Outcome: Progressing  Flowsheets (Taken 7/21/2025 0534 by Myah Chirinos RN)  Prevent/manage excess moisture:   Monitor for/manage infection if present   Follow provider orders for dressing changes   Cleanse incontinence/protect with barrier cream   Moisturize dry skin  Goal: Prevent/minimize sheer/friction injuries  Outcome: Progressing  Flowsheets (Taken 7/21/2025 0534 by Myah Chirinos RN)  Prevent/minimize sheer/friction injuries:   HOB 30 degrees or less   Turn/reposition every 2 hours/use positioning/transfer devices   Utilize specialty bed per algorithm   Use pull sheet   Increase activity/out of bed for meals   Complete micro-shifts as needed if patient unable. Adjust patient position to relieve pressure points, not a full turn  Goal: Promote/optimize nutrition  Outcome: Progressing  Flowsheets (Taken 7/21/2025 0534 by Myah Chirinos RN)  Promote/optimize nutrition:    Offer water/supplements/favorite foods   Reassess MST if dietician not consulted   Assist with feeding       The patient's goals for the shift include to have an uneventful and safe day     The clinical goals for the shift include patient will remain safe and HDS    Over the shift, the patient did  make progress toward the following goals.

## 2025-07-24 ENCOUNTER — APPOINTMENT (OUTPATIENT)
Dept: CARDIOLOGY | Facility: HOSPITAL | Age: OVER 89
End: 2025-07-24
Payer: MEDICARE

## 2025-07-24 PROCEDURE — 2500000002 HC RX 250 W HCPCS SELF ADMINISTERED DRUGS (ALT 637 FOR MEDICARE OP, ALT 636 FOR OP/ED): Performed by: INTERNAL MEDICINE

## 2025-07-24 PROCEDURE — 2500000001 HC RX 250 WO HCPCS SELF ADMINISTERED DRUGS (ALT 637 FOR MEDICARE OP): Performed by: INTERNAL MEDICINE

## 2025-07-24 PROCEDURE — 99232 SBSQ HOSP IP/OBS MODERATE 35: CPT | Performed by: STUDENT IN AN ORGANIZED HEALTH CARE EDUCATION/TRAINING PROGRAM

## 2025-07-24 PROCEDURE — 2500000001 HC RX 250 WO HCPCS SELF ADMINISTERED DRUGS (ALT 637 FOR MEDICARE OP): Performed by: NURSE PRACTITIONER

## 2025-07-24 PROCEDURE — 2500000004 HC RX 250 GENERAL PHARMACY W/ HCPCS (ALT 636 FOR OP/ED): Performed by: NURSE PRACTITIONER

## 2025-07-24 PROCEDURE — 2500000001 HC RX 250 WO HCPCS SELF ADMINISTERED DRUGS (ALT 637 FOR MEDICARE OP): Performed by: SURGERY

## 2025-07-24 PROCEDURE — 2500000001 HC RX 250 WO HCPCS SELF ADMINISTERED DRUGS (ALT 637 FOR MEDICARE OP): Performed by: PHARMACIST

## 2025-07-24 PROCEDURE — 2500000004 HC RX 250 GENERAL PHARMACY W/ HCPCS (ALT 636 FOR OP/ED): Performed by: SURGERY

## 2025-07-24 PROCEDURE — 1200000002 HC GENERAL ROOM WITH TELEMETRY DAILY

## 2025-07-24 PROCEDURE — 93005 ELECTROCARDIOGRAM TRACING: CPT

## 2025-07-24 PROCEDURE — 99232 SBSQ HOSP IP/OBS MODERATE 35: CPT | Performed by: NURSE PRACTITIONER

## 2025-07-24 PROCEDURE — 2500000001 HC RX 250 WO HCPCS SELF ADMINISTERED DRUGS (ALT 637 FOR MEDICARE OP): Performed by: STUDENT IN AN ORGANIZED HEALTH CARE EDUCATION/TRAINING PROGRAM

## 2025-07-24 RX ORDER — HYDRALAZINE HYDROCHLORIDE 20 MG/ML
10 INJECTION INTRAMUSCULAR; INTRAVENOUS EVERY 4 HOURS PRN
Status: DISCONTINUED | OUTPATIENT
Start: 2025-07-24 | End: 2025-07-25 | Stop reason: HOSPADM

## 2025-07-24 RX ORDER — HYDROCHLOROTHIAZIDE 12.5 MG/1
12.5 TABLET ORAL DAILY
Status: DISCONTINUED | OUTPATIENT
Start: 2025-07-24 | End: 2025-07-25 | Stop reason: HOSPADM

## 2025-07-24 RX ADMIN — GABAPENTIN 100 MG: 100 CAPSULE ORAL at 08:12

## 2025-07-24 RX ADMIN — HYDRALAZINE HYDROCHLORIDE 10 MG: 20 INJECTION INTRAMUSCULAR; INTRAVENOUS at 12:17

## 2025-07-24 RX ADMIN — SULFASALAZINE 500 MG: 500 TABLET ORAL at 08:12

## 2025-07-24 RX ADMIN — Medication 1 TABLET: at 08:12

## 2025-07-24 RX ADMIN — CARVEDILOL 12.5 MG: 12.5 TABLET, FILM COATED ORAL at 20:24

## 2025-07-24 RX ADMIN — PANTOPRAZOLE SODIUM 40 MG: 40 TABLET, DELAYED RELEASE ORAL at 15:34

## 2025-07-24 RX ADMIN — HYDROCHLOROTHIAZIDE 12.5 MG: 12.5 TABLET ORAL at 12:17

## 2025-07-24 RX ADMIN — HYDRALAZINE HYDROCHLORIDE 50 MG: 50 TABLET ORAL at 15:34

## 2025-07-24 RX ADMIN — PANTOPRAZOLE SODIUM 40 MG: 40 TABLET, DELAYED RELEASE ORAL at 06:59

## 2025-07-24 RX ADMIN — GABAPENTIN 100 MG: 100 CAPSULE ORAL at 20:24

## 2025-07-24 RX ADMIN — HYDRALAZINE HYDROCHLORIDE 50 MG: 50 TABLET ORAL at 08:11

## 2025-07-24 RX ADMIN — Medication 1 TABLET: at 20:23

## 2025-07-24 RX ADMIN — ACETAMINOPHEN 650 MG: 325 TABLET ORAL at 07:02

## 2025-07-24 RX ADMIN — SULFASALAZINE 500 MG: 500 TABLET ORAL at 20:23

## 2025-07-24 RX ADMIN — HYDROXYCHLOROQUINE SULFATE 200 MG: 200 TABLET, FILM COATED ORAL at 08:12

## 2025-07-24 RX ADMIN — ASPIRIN 81 MG: 81 TABLET, DELAYED RELEASE ORAL at 08:12

## 2025-07-24 RX ADMIN — ATORVASTATIN CALCIUM 80 MG: 80 TABLET, FILM COATED ORAL at 20:24

## 2025-07-24 RX ADMIN — CARVEDILOL 12.5 MG: 12.5 TABLET, FILM COATED ORAL at 08:12

## 2025-07-24 RX ADMIN — HYDRALAZINE HYDROCHLORIDE 50 MG: 50 TABLET ORAL at 20:23

## 2025-07-24 RX ADMIN — ACETAMINOPHEN 650 MG: 325 TABLET ORAL at 20:24

## 2025-07-24 ASSESSMENT — PAIN SCALES - GENERAL
PAINLEVEL_OUTOF10: 3
PAINLEVEL_OUTOF10: 0 - NO PAIN

## 2025-07-24 ASSESSMENT — COGNITIVE AND FUNCTIONAL STATUS - GENERAL
HELP NEEDED FOR BATHING: A LITTLE
DRESSING REGULAR UPPER BODY CLOTHING: A LITTLE
TOILETING: A LITTLE
MOBILITY SCORE: 15
MOVING FROM LYING ON BACK TO SITTING ON SIDE OF FLAT BED WITH BEDRAILS: A LITTLE
MOVING TO AND FROM BED TO CHAIR: A LITTLE
DRESSING REGULAR LOWER BODY CLOTHING: A LOT
WALKING IN HOSPITAL ROOM: A LOT
TURNING FROM BACK TO SIDE WHILE IN FLAT BAD: A LITTLE
STANDING UP FROM CHAIR USING ARMS: A LOT
CLIMB 3 TO 5 STEPS WITH RAILING: A LOT
DAILY ACTIVITIY SCORE: 19

## 2025-07-24 ASSESSMENT — PAIN - FUNCTIONAL ASSESSMENT
PAIN_FUNCTIONAL_ASSESSMENT: 0-10

## 2025-07-24 NOTE — PROGRESS NOTES
"Subjective Data:  Pt reporting nausea, she states similar to presentation.  SR noted on monitor. BP obtained and SBP noted at 196     Overnight Events:    None      Objective Data:  Last Recorded Vitals:  Vitals:    25 0926 25 1000 25 1100 25 1200   BP: 160/85   (S) (!) 196/65   BP Location:    Right arm   Patient Position:    Sitting   Pulse: 71 66 69 68   Resp:    Temp:    36.6 °C (97.9 °F)   TempSrc:    Temporal   SpO2:    97%   Weight:       Height:         Medical Gas Therapy: Supplemental oxygen  Medical Gas Delivery Method: Nasal cannula  Weight  Av.3 kg (117 lb 8.2 oz)  Min: 52.6 kg (116 lb)  Max: 54.6 kg (120 lb 5.9 oz)  Vitals:    25 0600   Weight: 54.6 kg (120 lb 5.9 oz)      3 Day Weight Change: Unable to Calculate   No intake or output data in the 24 hours ending 25 1214     Net IO Since Admission: 1,110 mL [25 1214]     Tele: SR     LABS:  CMP:  Results from last 7 days   Lab Units 25  0456 25  0532 25  0447 25  2338   SODIUM mmol/L 138 134* 134* 135*   POTASSIUM mmol/L 4.0 4.3 5.2 4.7   CHLORIDE mmol/L 104 101 101 100   CO2 mmol/L 29 28 23 30   ANION GAP mmol/L 9* 9* 15 10   BUN mg/dL 15 21 29* 26*   CREATININE mg/dL 0.85 1.02 1.25* 1.17*   EGFR mL/min/1.73m*2 66 53* 41* 45*   MAGNESIUM mg/dL  --   --   --  2.07   ALBUMIN g/dL 2.8* 3.0* 3.5 3.2*   ALT U/L  --   --  14 14   AST U/L  --   --  21 17   BILIRUBIN TOTAL mg/dL  --   --  0.4 0.4     CBC:  Results from last 7 days   Lab Units 25  0456 25  0532 25  2338   WBC AUTO x10*3/uL 4.4 5.0 5.7   HEMOGLOBIN g/dL 7.0* 7.4* 7.2*   HEMATOCRIT % 21.5* 23.2* 23.4*   PLATELETS AUTO x10*3/uL 260 259 295   MCV fL 96 98 100     COAG:   Results from last 7 days   Lab Units 25  0532   INR  4.6*     ABO: No results found for: \"ABO\"  HEME/ENDO:     CARDIAC:   Results from last 7 days   Lab Units 25  0103 25  2338   TROPHS ng/L 110* 112*   BNP pg/mL  " --  612*            EKG:      TTE  7/9/2025   PHYSICIAN INTERPRETATION:  Left Ventricle: The left ventricular systolic function is normal with a visually estimated ejection fraction of 60-65%. There is mild concentric left ventricular hypertrophy. There are no regional wall motion abnormalities. The left ventricular cavity size is normal. There is moderately increased septal and mildly increased posterior left ventricular wall thickness. Spectral Doppler shows a normal pattern of left ventricular diastolic filling.  Left Atrium: The left atrial size is moderately dilated.  Right Ventricle: The right ventricle is normal in size. There is normal right ventricular global systolic function.  Right Atrium: The right atrial size is normal.  Aortic Valve: The aortic valve is trileaflet. Not assessed.  There is no evidence of aortic valve regurgitation. Patient has aortic valve stenosis with reduced leaflet motion. I cannot assess the severity of aortic valve stenosis. No gradients were obtained.  Mitral Valve: The mitral valve is moderately thickened. There is moderate mitral annular calcification. Mitral valve regurgitation was not assessed. The E Vmax is 1.54 m/s.  Tricuspid Valve: The tricuspid valve is structurally normal. There is trace tricuspid regurgitation. The Doppler estimated right ventricular systolic pressure (RVSP) is slightly elevated at 34 mmHg.  Pulmonic Valve: The pulmonic valve is structurally normal. The pulmonic valve regurgitation was not assessed.  Pericardium: No pericardial effusion noted.  Aorta: The aortic root is normal.  Systemic Veins: The inferior vena cava appears normal in size, with IVC inspiratory collapse greater than 50%.    CONCLUSIONS:   1. The left ventricular systolic function is normal with a visually estimated ejection fraction of 60-65%.   2. There is normal right ventricular global systolic function.   3. The left atrial size is moderately dilated.   4. The mitral valve is  moderately thickened.   5. There is moderate mitral annular calcification.   6. The Doppler estimated RVSP is slightly elevated at 34 mmHg.   7. Trace tricuspid regurgitation is visualized.   8. There is moderately increased septal and mildly increased posterior left ventricular wall thickness.     Cath: 9/2024   CONCLUSIONS:   1. Severe distal left circumflex proximal left posterior descending artery lesion of 90% treated with drug-eluting stent with excellent result.   2. Patient is fully revascularized.   3. Mild elevated filling pressures.   4. Recommend aspirin 81 mg for 1 week. Plavix for 12 months. Eliquis 2.5 mg oral twice daily for A-fib.    Nuclear Stress Test: 3/2025   CONCLUSIONS:    1. SPECT Perfusion Study: Normal.    2. There is no scintigraphic evidence for inducible ischemia.    3. No evidence of scarred myocardium.    4. Left ventricle is normal in size. The left ventricle systolic   function is normal.    5. Right ventricle is normal in size. The right ventricle systolic   function is normal.    6. This is a low risk scan.      Inpatient Medications:  Scheduled Medications[1]  PRN Medications[2]  Continuous Medications[3]    Outpatient Medications:  Current Outpatient Medications   Medication Instructions    aspirin 81 mg, Daily    atorvastatin (LIPITOR) 80 mg, oral, Nightly    carvedilol (COREG) 6.25 mg, oral, 2 times daily (morning and late afternoon)    docusate sodium (Colace) 100 mg capsule 1 capsule, As needed    DULoxetine (CYMBALTA) 30 mg, oral, Daily with evening meal    ferrous sulfate (FeroSuL) 325 mg (65 mg elemental) tablet 1 tablet, oral, 3 times weekly, Monday, Wednesday, Friday    furosemide (LASIX) 40 mg, oral, Daily PRN    gabapentin (Neurontin) 300 mg capsule TAKE 1 CAPSULE BY MOUTH AT NOON AND BEDTIME    hydrALAZINE (APRESOLINE) 50 mg, oral, Daily PRN    hydroCHLOROthiazide (HYDRODIURIL) 25 mg, Daily    hydroxychloroquine (PLAQUENIL) 200 mg, oral, Daily    magnesium oxide  (MAG-OX) 400 mg, oral, 2 times daily    pantoprazole (PROTONIX) 40 mg, oral, 2 times daily    potassium chloride CR 20 mEq ER tablet 20 mEq, oral, Daily, Take with food.    ranolazine (RANEXA) 500 mg, oral, 2 times daily, Do not crush, chew, or split.    Stool Softener 100 mg capsule TAKE 1 CAPSULE BY MOUTH ONCE DAILY AS NEEDED to prevent constipation from iron    sucralfate (CARAFATE) 1 g, oral, Daily    sulfaSALAzine (AZULFIDINE) 500 mg, oral, 2 times daily    verapamil SR (CALAN-SR) 180 mg, oral, Nightly, Do not crush or chew.    warfarin (Coumadin) 5 mg tablet Take 1 tablet (5 mg) by mouth once daily at bedtime. Take as directed per After Visit Summary. Do not start before July 11, 2025.       Physical Exam:  General:  Patient is awake, alert, and oriented.  Patient is in no acute distress.  HEENT:  Normocephalic.  Moist mucosa.    Neck:   Normal Jugular Venous Pressure.  Cardiovascular:  Regular rate and rhythm.  Normal S1 and S2.  Pulmonary:  Clear to auscultation bilaterally.  Abdomen:  Soft. Non-tender.   Non-distended.  Positive bowel sounds.  Lower Extremities:  BLE well perfused, No LE edema.  Neurologic:  Cranial nerves intact.  No focal deficit.   Skin: Skin warm and dry, normal skin turgor.   Psychiatric: Normal affect      Assessment/Plan   Rakan Cervantes is a 89 y.o. female, with a PMH of pAF on Eliquis, CAD s/p PCI to distal L circ 9/2024, SVT, TIA, HTN, HLD, BL carotid artery stenosis, diverticulosis, GIB, who presented to [facility] on 7/21/2025 as a hospital transfer for cardiogenic shock. Patient presented to an OSH ED 7/20 for c/o lightheadedness and dizziness, with associated SOB. She was noted to be bradycardic to 30 and 40s, hypotensive with a MAP of 60. She was started on a Dopamine infusion with improvement at 5 mcg, though she did not tolerate the infusion well, developing n/v and headache. The infusion has to be stopped at one point and patient given Atropine. Labs at this time  "notable for elevated troponin and supratherapeutic INR.     Of note, she was recently admitted 7/7 to [facility] for SOB and CP. Her INR was subtherapeutic as she had been holding Coumadin in anticipation of planned Watchman procedure. She additionally had an ROSANA so Losartan was held and BP medications were adjusted for HTN urgency with SBP in the 200s.      Cardiology is consulted for \"admitted with poly pharmaceutical bradycardia. Assistance with BP control 89 Years old OP 's to 160's systolic\".        Home CV Medications: Atorvastatin 80mg daily, Carvedilol 6.25mg BID, ASA 81mg daily, Lasix 40mg daily PRN, HCTZ 25mg daily, Ranexa 500mg BID, Verapamil 180mg daily, Hydralazine 50mg daily PRN for SBP >140, Coumadin  -Follows with Dr. Callaway as outpatient, last seen 6/2025     #Cardiogenic Shock- Likely in the setting of polypharmacy  #Hypertensive Urgency- SBP elevated in the 200s. SBP remains elevated at times. Hydralazine has been increased to 50 mg TID, Coreg 12.5 mg BID.   #pAF/Hx of SVT- Currently NSR HR 60-70s  -AC with Warfarin, planning on Watchman next month d/t hx of GIB  #HLD- On statin therapy  #Hx of CAD s/p PCI- c/w ASA     RECOMMENDATIONS:  -Verapamil stopped indefinitely  -Uptitrate Coreg as tolerated  -HCTZ 12.5 mg ordered   -PRN IV hydralazine ordered   -Pharmacy to manage Coumadin  -Lasix PRN  -c/w ASA and statin, Ranexa  -Event monitor x2 weeks at discharge  -Continuous telemetry monitoring  -Monitor electrolytes, replete for K <4 and Mg <2  -FU with outpatient cardiologist within 2 weeks post-discharge    Chelita Senior, APRN-CNP    Follow up with Dr Randall Green 8/20/2025 , Dr Callaway 10/13/2025            [1]   Scheduled medications   Medication Dose Route Frequency    aspirin  81 mg oral Daily    atorvastatin  80 mg oral Nightly    carvedilol  12.5 mg oral BID    ferrous sulfate  1 tablet oral Once per day on Monday Wednesday Friday    gabapentin  100 mg oral BID    " hydrALAZINE  50 mg oral TID    hydroCHLOROthiazide  12.5 mg oral Daily    hydroxychloroquine  200 mg oral Daily    magnesium oxide  400 mg of magnesium oxide oral BID    pantoprazole  40 mg oral BID AC    sulfaSALAzine  500 mg oral BID   [2]   PRN medications   Medication    acetaminophen    docusate sodium    hydrALAZINE    oxygen   [3]   Continuous Medications   Medication Dose Last Rate

## 2025-07-24 NOTE — CARE PLAN
Problem: Pain - Adult  Goal: Verbalizes/displays adequate comfort level or baseline comfort level  Outcome: Progressing  Flowsheets (Taken 7/23/2025 1705)  Verbalizes/displays adequate comfort level or baseline comfort level:   Encourage patient to monitor pain and request assistance   Assess pain using appropriate pain scale   Administer analgesics based on type and severity of pain and evaluate response   Implement non-pharmacological measures as appropriate and evaluate response   Consider cultural and social influences on pain and pain management   Notify Licensed Independent Practitioner if interventions unsuccessful or patient reports new pain     Problem: Safety - Adult  Goal: Free from fall injury  Outcome: Met  Flowsheets (Taken 7/23/2025 1705)  Free from fall injury:   Instruct family/caregiver on patient safety   Based on caregiver fall risk screen, instruct family/caregiver to ask for assistance with transferring infant if caregiver noted to have fall risk factors     Problem: Skin  Goal: Participates in plan/prevention/treatment measures  Outcome: Met  Flowsheets (Taken 7/21/2025 0566 by Myah Chirinos RN)  Participates in plan/prevention/treatment measures:   Elevate heels   Increase activity/out of bed for meals   Discuss with provider PT/OT consult  Goal: Prevent/manage excess moisture  Outcome: Met  Flowsheets (Taken 7/21/2025 0534 by Myah Chirinos, RN)  Prevent/manage excess moisture:   Monitor for/manage infection if present   Follow provider orders for dressing changes   Cleanse incontinence/protect with barrier cream   Moisturize dry skin     Problem: Fall/Injury  Goal: Not fall by end of shift  Outcome: Met  Goal: Be free from injury by end of the shift  Outcome: Met  Goal: Verbalize understanding of risk factor reduction measures to prevent injury from fall in the home  Outcome: Met  Goal: Use assistive devices by end of the shift  Outcome: Met  Goal: Pace activities to prevent fatigue by  end of the shift  Outcome: Met       The patient's goals for the shift include to have an productive day     The clinical goals for the shift include patient will remain safe and mainatian a SBP <160    Over the shift, the patient did make progress toward the following goals.

## 2025-07-24 NOTE — PROGRESS NOTES
Transitional Care Coordination Progress Note:  Plan per Medical/Surgical team: treatment of shock, bradycardia, hypotension with cardio consult, oxygen @ 3 liters (baseline @ home 1L)  Status: Inpatient ICU 3  Payor source: [facility]  Discharge disposition: Home with    Potential Barriers: HR improved @ 70, /125, INR 4.6, H/H 7.0/21.5   ADOD: 7/26/2025   YAIMA Amaya RN, BSN Transitional Care Coordinator ED# [phone]      07/24/25 0755   Discharge Planning   Living Arrangements Spouse/significant other   Support Systems Spouse/significant other;Children   Stroke Family Assessment   Stroke Family Assessment Needed No   Intensity of Service   Intensity of Service 0-30 min

## 2025-07-24 NOTE — PROGRESS NOTES
Rakan Cervantes is a 89 y.o. female on day 3 of admission presenting with Cardiogenic shock (Multi).      Subjective   No acute events overnight. Patient does not have any complaints at this time.        Objective     Last Recorded Vitals  BP (!) 182/93   Pulse 84   Temp 36.6 °C (97.9 °F) (Temporal)   Resp (!) 35   Wt 54.6 kg (120 lb 5.9 oz)   SpO2 97%   Intake/Output last 3 Shifts:    Intake/Output Summary (Last 24 hours) at 7/24/2025 1712  Last data filed at 7/24/2025 1651  Gross per 24 hour   Intake 750 ml   Output --   Net 750 ml       Admission Weight  Weight: 53 kg (116 lb 13.5 oz) (07/21/25 0419)    Daily Weight  07/22/25 : 54.6 kg (120 lb 5.9 oz)    Image Results  ECG 12 lead  Atrial fibrillation with slow ventricular response with a competing junctional pacemaker  Abnormal ECG  When compared with ECG of 07-JUL-2025 13:07,  Atrial fibrillation has replaced Sinus rhythm  Vent. rate has decreased BY  25 BPM  Minimal criteria for Anterior infarct are now Present  Nonspecific T wave abnormality has replaced inverted T waves in Inferior leads  T wave inversion no longer evident in Anterior leads  Confirmed by Sergey Bond ([id number]) on 7/22/2025 8:43:11 AM      Physical Exam  Constitutional:       Appearance: Normal appearance.     Cardiovascular:      Rate and Rhythm: Normal rate and regular rhythm.      Pulses: Normal pulses.      Heart sounds: Normal heart sounds.   Pulmonary:      Effort: Pulmonary effort is normal.      Breath sounds: Normal breath sounds.     Musculoskeletal:         General: No swelling or tenderness.     Skin:     General: Skin is warm and dry.     Neurological:      Mental Status: She is alert.         Relevant Results               Assessment & Plan  Primary hypertension    Bradycardia    Medication induced coagulopathy (Multi)    Rakan Cervantes is a 89 y.o. female, with a PMH of pAF on warfarin ( not discontinued indefinitely), CAD s/p PCI to distal L circ 9/2024, SVT, TIA, HTN, HLD,  BL carotid artery stenosis, diverticulosis, GIB, who presented to [facility] on 7/21/2025 as a hospital transfer for cardiogenic shock. Patient was transferred to SDU today. Cardiology was consulted and has been titrated patient's BP medications     #Cardiogenic shock  :2/2 to polypharm  - cardiology following   -Verapamil stopped indefinitely  -Uptitrate Coreg as tolerated  -HCTZ 12.5 mg started today    -hydralazine 50 TID   -Pharmacy to manage Coumadin  -Lasix PRN  -c/w ASA and statin, Ranexa  -Event monitor x2 weeks at discharge     #HLD   - c/w atorvastatin     #CAD   :s/p PCI   -c/w ASA     #Afib   Will hold warfarin indefinitely as patient has had multiple falls, elevated INR, and hx of GI bleed     DNR/DNI      Full Code       Iona Mcnally MD

## 2025-07-25 ENCOUNTER — HOME HEALTH ADMISSION (OUTPATIENT)
Dept: HOME HEALTH SERVICES | Facility: HOME HEALTH | Age: OVER 89
End: 2025-07-25
Payer: MEDICARE

## 2025-07-25 ENCOUNTER — PHARMACY VISIT (OUTPATIENT)
Dept: PHARMACY | Facility: CLINIC | Age: OVER 89
End: 2025-07-25
Payer: COMMERCIAL

## 2025-07-25 ENCOUNTER — DOCUMENTATION (OUTPATIENT)
Dept: HOME HEALTH SERVICES | Facility: HOME HEALTH | Age: OVER 89
End: 2025-07-25
Payer: MEDICARE

## 2025-07-25 VITALS
OXYGEN SATURATION: 94 % | HEIGHT: 59 IN | HEART RATE: 76 BPM | BODY MASS INDEX: 24.27 KG/M2 | SYSTOLIC BLOOD PRESSURE: 150 MMHG | RESPIRATION RATE: 18 BRPM | TEMPERATURE: 98.6 F | DIASTOLIC BLOOD PRESSURE: 57 MMHG | WEIGHT: 120.37 LBS

## 2025-07-25 PROBLEM — M06.09 RHEUMATOID ARTHRITIS WITHOUT RHEUMATOID FACTOR, MULTIPLE SITES (MULTI): Status: ACTIVE | Noted: 2025-07-25

## 2025-07-25 PROCEDURE — 2500000001 HC RX 250 WO HCPCS SELF ADMINISTERED DRUGS (ALT 637 FOR MEDICARE OP): Performed by: PHARMACIST

## 2025-07-25 PROCEDURE — 2500000001 HC RX 250 WO HCPCS SELF ADMINISTERED DRUGS (ALT 637 FOR MEDICARE OP): Performed by: STUDENT IN AN ORGANIZED HEALTH CARE EDUCATION/TRAINING PROGRAM

## 2025-07-25 PROCEDURE — 2500000004 HC RX 250 GENERAL PHARMACY W/ HCPCS (ALT 636 FOR OP/ED): Performed by: NURSE PRACTITIONER

## 2025-07-25 PROCEDURE — RXMED WILLOW AMBULATORY MEDICATION CHARGE

## 2025-07-25 PROCEDURE — 2500000002 HC RX 250 W HCPCS SELF ADMINISTERED DRUGS (ALT 637 FOR MEDICARE OP, ALT 636 FOR OP/ED): Performed by: INTERNAL MEDICINE

## 2025-07-25 PROCEDURE — 99239 HOSP IP/OBS DSCHRG MGMT >30: CPT | Performed by: STUDENT IN AN ORGANIZED HEALTH CARE EDUCATION/TRAINING PROGRAM

## 2025-07-25 PROCEDURE — 2500000001 HC RX 250 WO HCPCS SELF ADMINISTERED DRUGS (ALT 637 FOR MEDICARE OP): Performed by: SURGERY

## 2025-07-25 PROCEDURE — 2500000004 HC RX 250 GENERAL PHARMACY W/ HCPCS (ALT 636 FOR OP/ED): Performed by: SURGERY

## 2025-07-25 PROCEDURE — 2500000001 HC RX 250 WO HCPCS SELF ADMINISTERED DRUGS (ALT 637 FOR MEDICARE OP): Performed by: INTERNAL MEDICINE

## 2025-07-25 PROCEDURE — 99232 SBSQ HOSP IP/OBS MODERATE 35: CPT

## 2025-07-25 PROCEDURE — 2500000001 HC RX 250 WO HCPCS SELF ADMINISTERED DRUGS (ALT 637 FOR MEDICARE OP): Performed by: NURSE PRACTITIONER

## 2025-07-25 RX ORDER — HYDROCHLOROTHIAZIDE 12.5 MG/1
12.5 CAPSULE ORAL DAILY
Qty: 90 CAPSULE | Refills: 0 | Status: ON HOLD | OUTPATIENT
Start: 2025-07-26

## 2025-07-25 RX ORDER — CARVEDILOL 12.5 MG/1
12.5 TABLET ORAL 2 TIMES DAILY
Qty: 180 TABLET | Refills: 0 | Status: ON HOLD | OUTPATIENT
Start: 2025-07-25

## 2025-07-25 RX ORDER — HYDROCORTISONE 1 %
CREAM (GRAM) TOPICAL 2 TIMES DAILY
Status: DISCONTINUED | OUTPATIENT
Start: 2025-07-25 | End: 2025-07-25 | Stop reason: HOSPADM

## 2025-07-25 RX ORDER — HYDRALAZINE HYDROCHLORIDE 50 MG/1
50 TABLET, FILM COATED ORAL 3 TIMES DAILY
Qty: 270 TABLET | Refills: 0 | Status: SHIPPED | OUTPATIENT
Start: 2025-07-25 | End: 2025-07-25 | Stop reason: HOSPADM

## 2025-07-25 RX ORDER — HYDRALAZINE HYDROCHLORIDE 25 MG/1
75 TABLET, FILM COATED ORAL 3 TIMES DAILY
Qty: 810 TABLET | Refills: 0 | Status: ON HOLD | OUTPATIENT
Start: 2025-07-25

## 2025-07-25 RX ADMIN — HYDRALAZINE HYDROCHLORIDE 50 MG: 50 TABLET ORAL at 08:05

## 2025-07-25 RX ADMIN — HYDRALAZINE HYDROCHLORIDE 10 MG: 20 INJECTION INTRAMUSCULAR; INTRAVENOUS at 06:36

## 2025-07-25 RX ADMIN — ASPIRIN 81 MG: 81 TABLET, DELAYED RELEASE ORAL at 08:04

## 2025-07-25 RX ADMIN — PANTOPRAZOLE SODIUM 40 MG: 40 TABLET, DELAYED RELEASE ORAL at 06:36

## 2025-07-25 RX ADMIN — Medication 1 TABLET: at 08:05

## 2025-07-25 RX ADMIN — SULFASALAZINE 500 MG: 500 TABLET ORAL at 08:05

## 2025-07-25 RX ADMIN — GABAPENTIN 100 MG: 100 CAPSULE ORAL at 08:05

## 2025-07-25 RX ADMIN — HYDROCHLOROTHIAZIDE 12.5 MG: 12.5 TABLET ORAL at 08:05

## 2025-07-25 RX ADMIN — FERROUS SULFATE TAB 325 MG (65 MG ELEMENTAL FE) 1 TABLET: 325 (65 FE) TAB at 08:05

## 2025-07-25 RX ADMIN — HYDROXYCHLOROQUINE SULFATE 200 MG: 200 TABLET, FILM COATED ORAL at 08:05

## 2025-07-25 RX ADMIN — CARVEDILOL 12.5 MG: 12.5 TABLET, FILM COATED ORAL at 08:05

## 2025-07-25 ASSESSMENT — PAIN - FUNCTIONAL ASSESSMENT
PAIN_FUNCTIONAL_ASSESSMENT: 0-10

## 2025-07-25 ASSESSMENT — PAIN SCALES - GENERAL
PAINLEVEL_OUTOF10: 0 - NO PAIN

## 2025-07-25 NOTE — CONSULTS
"Nutrition Initial Assessment Note  Nutrition Assessment      Reason for Assessment: Admission nursing screening    Late entry: pt sleeping, did not wake    History:  Energy Intake: Fair 50-75 %, Good > 75 %    Dietary Orders (From admission, onward)       Start     Ordered    07/21/25 0455  May Participate in Room Service With Assistance  ( ROOM SERVICE MAY PARTICIPATE WITH ASSISTANCE)  Once        Question:  .  Answer:  Yes    07/21/25 0454 07/21/25 0412  Adult diet Regular  Diet effective now        Question:  Diet type  Answer:  Regular    07/21/25 0411                    Anthropometrics:  Height: (!) 149.9 cm (4' 11.02\")  Weight: 54.6 kg (120 lb 5.9 oz)  BMI (Calculated): 24.3    Weight History / % Weight Change: no weight loss over past 12 months per chart review  Significant Weight Loss: No    IBW/kg (Dietitian Calculated): 45.5 kg  Percent of IBW: 120 %     Wt Readings from Last 20 Encounters:   07/25/25 54.6 kg (120 lb 5.9 oz)   07/20/25 52.6 kg (116 lb)   07/15/25 52.6 kg (116 lb)   07/10/25 53.7 kg (118 lb 6.2 oz)   06/16/25 50.8 kg (112 lb)   04/22/25 52.2 kg (115 lb)   03/27/25 54 kg (119 lb)   03/03/25 50.3 kg (111 lb)   01/28/25 50.3 kg (111 lb)   01/21/25 50.8 kg (112 lb)   11/20/24 50.8 kg (112 lb)   11/07/24 50.8 kg (112 lb)   11/04/24 55.9 kg (123 lb 3.8 oz)   10/22/24 50.8 kg (112 lb)   10/09/24 50.8 kg (112 lb)   09/29/24 45 kg (99 lb 3.3 oz)   09/20/24 51.7 kg (114 lb)   07/16/24 50.8 kg (112 lb)   06/17/24 50.8 kg (112 lb)   05/30/24 51.3 kg (113 lb)     Scheduled medications  Scheduled Medications[1]  Continuous medications  Continuous Medications[2]  PRN medications  PRN Medications[3]     Latest Reference Range & Units 07/23/25 04:56   GLUCOSE 74 - 99 mg/dL 97   SODIUM 136 - 145 mmol/L 138   POTASSIUM 3.5 - 5.3 mmol/L 4.0   CHLORIDE 98 - 107 mmol/L 104   Bicarbonate 21 - 32 mmol/L 29   Anion Gap 10 - 20 mmol/L 9 (L)   Blood Urea Nitrogen 6 - 23 mg/dL 15   Creatinine 0.50 - 1.05 mg/dL " "0.85   EGFR >60 mL/min/1.73m*2 66   Calcium 8.6 - 10.3 mg/dL 8.7   PHOSPHORUS 2.5 - 4.9 mg/dL 3.3   Albumin 3.4 - 5.0 g/dL 2.8 (L)   (L): Data is abnormally low     Latest Reference Range & Units 07/23/25 04:56   WBC 4.4 - 11.3 x10*3/uL 4.4   nRBC 0.0 - 0.0 /100 WBCs 0.0   RBC 4.00 - 5.20 x10*6/uL 2.25 (L)   HEMOGLOBIN 12.0 - 16.0 g/dL 7.0 (L)   HEMATOCRIT 36.0 - 46.0 % 21.5 (L)   MCV 80 - 100 fL 96   MCH 26.0 - 34.0 pg 31.1   MCHC 32.0 - 36.0 g/dL 32.6   RED CELL DISTRIBUTION WIDTH 11.5 - 14.5 % 14.6 (H)   Platelets 150 - 450 x10*3/uL 260   (L): Data is abnormally low  (H): Data is abnormally high      Energy Needs:  Height: (!) 149.9 cm (4' 11.02\")  Temp: 37 °C (98.6 °F)    Total Energy Estimated Needs in 24 hours (kCal): 1350 kCal  Energy Estimated Needs per kg Body Weight in 24 hours (kCal/kg): 1600 kCal/kg  Method for Estimating Needs: 25-30 kcal/kg    Total Protein Estimated Needs in 24 Hours (g): 45 g  Protein Estimated Needs per kg Body Weight in 24 Hours (g/kg): 60 g/kg  Method for Estimating 24 Hour Protein Needs: 1.0-1.3 g/kg IBW    Total Fluid Estimated Needs in 24 Hours (mL): 1350 mL  Total Fluid Estimated Needs in 24 hours (mL/kg): 25 mL/kg  Method for Estimating 24 Hour Fluid Needs: or as per MD  Patient on Order Fluid Restriction: No     I/O last 3 completed shifts:  In: 750 (13.7 mL/kg) [P.O.:750]  Out: - (0 mL/kg)   Weight: 54.6 kg   I/O this shift:  In: 240 [P.O.:240]  Out: -   X3 BM yesterday       Nutrition Focused Physical Findings:  Orbital Fat Pads: Mild-Moderate (slight dark circles and slight hollowing)  Buccal Fat Pads: Mild-Moderate (flat cheeks, minimal bounce)    Temporalis: Mild-Moderate (slight depression)  Pectoralis (Clavicular Region): Mild-Moderate (some protrusion of clavicle)  Deltoid/Trapezius: Mild-Moderate (slight protrusion of acromion process)  Interosseous: Mild-Moderate (slightly depressed area between thumb and forefinger)    Edema: none         Skin: " Negative  Respiratory : Negative       Nutrition Diagnosis   Malnutrition Diagnosis  Patient has Malnutrition Diagnosis: No    Patient has Nutrition Diagnosis: Yes  Nutrition Diagnosis 1: Altered nutrition related to laboratory values  Diagnosis Status (1): New  Related to (1): comorbidities  As Evidenced by (1): anemia, hemoglobin 7.0 g/dL this morning  Additional Assessment Information (1): suspect mild/moderately depleted adipose and skeletal tissue wasting is age related         Nutrition Interventions/Recommendations   Nutrition Prescription: Nutrition prescription for oral nutrition  Individualized Nutrition Prescription Provided for : continue well balanced, regular diet as ordered; add MVI with minerals supplement once daily; suggest check vitamin B12 and folic acid 2/2 anemia  - suggest add colace BID if indicated while take Fe orally   - monitor weight trend/oral intake         Food and/or Nutrient Delivery Interventions  Meals and Snacks: Other (Comment)  Goal: oral intake greater than 75% of meals           Collaboration and Referral of Nutrition Care: Collaboration by nutrition professional with other providers  Coordination of Care with Providers: Nursing, Provider    Education documentation: N/A    Nutrition Monitoring and Evaluation   Food and Nutrient Related History  Estimated Energy Intake: Energy intake greater or equal to 75% of estimated energy needs    Fluid Intake: Estimated fluid intake    Intake / Amount of food: Consumes at least 75% or more of meals/snacks/supplements, Meets > 75% estimated energy needs                        Anthropometrics: Body Composition/Growth/Weight History  Body Weight: Body weight - Weight reduction from fluids, as needed    Body Weight Change: Body weight change percentage              Biochemical Data, Medical Tests and Procedures  Electrolyte and Renal Panel: Electrolytes within normal limits, BUN, Calcium, ionized, Calcium, serum, Chloride, Creatinine,  Magnesium, GFR, Sodium, Potassium, Phosphorus  Criteria: daily or as per MD    Gastrointestinal Profile: Alanine aminotransferase (ALT), Alkaline phosphatase, Aspartate aminotransferase (AST), Bilirubin, total  Criteria: as clinically indicated    Glucose/Endocrine Profile: Glucose within normal limits ( mg/dL), Hemoglobin A1c (HgbA1c)  Criteria: as clincially indicated    Nutritional Anemia Profile: Hematocrit, Hemoglobin, Iron, serum, B12, Folate, serum  Criteria: as clinically indicated         Nutrition Focused Physical Findings  Adipose Finding: Other (Comment)  Criteria: monitor NFPE    Bones Finding: Other (Comment)  Criteria: monitor NFPE    Digestive System Finding: Other (Comment)  Criteria: monitor GI function closely    Muscle Finding: Other (Comment)  Criteria: monitor NFPE    Skin Finding: Other (Comment)  Criteria: monitor skin itegrity closely    Criteria: monitor +/- edema    Mouth Finding: Other (Comment)  Criteria: monitor    Teeth Finding: Other (Comment)  Criteria: monitor         Time Spent (min): 45 minutes  Last Date of Nutrition Visit: 07/25/25  Nutrition Follow-Up Needed?: Dietitian to reassess per policy  Follow up Comment: ANALILIA, ARMINDA            [1] aspirin, 81 mg, oral, Daily  atorvastatin, 80 mg, oral, Nightly  carvedilol, 12.5 mg, oral, BID  ferrous sulfate, 1 tablet, oral, Once per day on Monday Wednesday Friday  gabapentin, 100 mg, oral, BID  hydrALAZINE, 75 mg, oral, TID  hydroCHLOROthiazide, 12.5 mg, oral, Daily  hydrocortisone, , Topical, BID  hydroxychloroquine, 200 mg, oral, Daily  magnesium oxide, 400 mg of magnesium oxide, oral, BID  pantoprazole, 40 mg, oral, BID AC  sulfaSALAzine, 500 mg, oral, BID  [2]    [3] PRN medications: acetaminophen, docusate sodium, hydrALAZINE, oxygen

## 2025-07-25 NOTE — NURSING NOTE
Pt with orders to discharge home. Paperwork reviewed with patient and patient's family. All questions answered at this time. PIV removed, pt removed from telemetry. Plan for patient's daughter to drive patient home. Waiting on prescriptions from meds-to-beds.

## 2025-07-25 NOTE — PROGRESS NOTES
Subjective Data:  No chest pain, no chest pressure, no shortness of breath  No arrhythmias at telemetry - no episodes of bradycardia  Diuresis (+)  No labs available for today  Her BP still elevated - asymptomatic    Overnight Events:    None     Objective Data:  Last Recorded Vitals:  Vitals:    07/25/25 0720 07/25/25 0800 07/25/25 0900 07/25/25 1000   BP: 155/62 171/63     BP Location:  Right arm     Patient Position:  Lying     Pulse: 71 72 77 73   Resp: 23 19 21 25   Temp:  36.7 °C (98.1 °F)     TempSrc:  Temporal     SpO2:  95% 95% 93%   Weight:       Height:           Last Labs:  CBC - 7/23/2025:  4:56 AM  4.4 7.0 260    21.5      CMP - 7/23/2025:  4:56 AM  8.7 5.5 21 --- 0.4   3.3 2.8 14 77      PTT - 7/20/2025: 11:38 PM  4.6   51.2 46.9     TROPHS   Date/Time Value Ref Range Status   07/21/2025 01:03  0 - 13 ng/L Final     Comment:     Previous result verified on 7/21/2025 0029 on specimen/case [id number] called with component [facility] for procedure Troponin I, High Sensitivity with value 112 ng/L.   07/20/2025 11:38  0 - 13 ng/L Final   07/07/2025 01:39 PM 14 0 - 13 ng/L Final     BNP   Date/Time Value Ref Range Status   07/20/2025 11:38  0 - 99 pg/mL Final   07/07/2025 12:50  0 - 99 pg/mL Final     HGBA1C   Date/Time Value Ref Range Status   09/10/2024 09:14 AM 5.8 See below % Final   03/14/2024 11:10 AM 6.3 See below % Final     LDLCALC   Date/Time Value Ref Range Status   10/29/2024 05:34 AM 25 <=99 mg/dL Final     Comment:                                 Near   Borderline      AGE      Desirable  Optimal    High     High     Very High     0-19 Y     0 - 109     ---    110-129   >/= 130     ----    20-24 Y     0 - 119     ---    120-159   >/= 160     ----      >24 Y     0 -  99   100-129  130-159   160-189     >/=190     09/10/2024 09:14 AM 50 65 - 130 mg/dL Final   03/14/2024 11:10 AM 44 65 - 130 mg/dL Final     VLDL   Date/Time Value Ref Range Status   10/29/2024 05:34 AM 12 0  - 40 mg/dL Final      Last I/O:  I/O last 3 completed shifts:  In: 750 (13.7 mL/kg) [P.O.:750]  Out: - (0 mL/kg)   Weight: 54.6 kg     Past Cardiology Tests (Last 3 Years):  EKG:  ECG 12 lead 07/20/2025      ECG 12 lead 07/07/2025      Electrocardiogram, 12-lead PRN ACS symtpoms 10/31/2024      ECG 12 Lead 10/28/2024      ECG 12 lead 09/26/2024      ECG 12 lead 10/27/2023    Echo:  Transthoracic Echo (TTE) Limited 07/09/2025      Transthoracic Echo (TTE) Complete 10/31/2024      Transthoracic Echo (TTE) Limited 10/27/2023    Ejection Fractions:  EF   Date/Time Value Ref Range Status   07/09/2025 12:05 PM 63 %    10/31/2024 09:43 AM 63 %      Cath:  Cardiac Catheterization Procedure 09/27/2024    Stress Test:  Nuclear Stress Test 03/13/2025    Cardiac Imaging:  No results found for this or any previous visit from the past 1095 days.      Inpatient Medications:  Scheduled Medications[1]  PRN Medications[2]  Continuous Medications[3]    Physical Exam:  General:  Patient is awake, alert, and oriented.  Patient is in no acute distress.  HEENT:  Normocephalic.  Moist mucosa.    Neck:   Normal Jugular Venous Pressure.  Cardiovascular:  Regular rate and rhythm.  Normal S1 and S2.  Pulmonary:  Clear to auscultation bilaterally.  Abdomen:  Soft. Non-tender.   Non-distended.  Positive bowel sounds.  Lower Extremities:  BLE well perfused, No LE edema.  Neurologic:  Cranial nerves intact.  No focal deficit.   Skin: Skin warm and dry, normal skin turgor.   Psychiatric: Normal affect        Assessment/Plan   Rakan Cervantes is a 89 y.o. female, with a PMH of pAF on Eliquis, CAD s/p PCI to distal L circ 9/2024, SVT, TIA, HTN, HLD, BL carotid artery stenosis, diverticulosis, GIB, who presented to [facility] on 7/21/2025 as a hospital transfer for cardiogenic shock. Patient presented to an OSH ED 7/20 for c/o lightheadedness and dizziness, with associated SOB. She was noted to be bradycardic to 30 and 40s, hypotensive with a  "MAP of 60. She was started on a Dopamine infusion with improvement at 5 mcg, though she did not tolerate the infusion well, developing n/v and headache. The infusion has to be stopped at one point and patient given Atropine. Labs at this time notable for elevated troponin and supratherapeutic INR.     Of note, she was recently admitted 7/7 to [facility] for SOB and CP. Her INR was subtherapeutic as she had been holding Coumadin in anticipation of planned Watchman procedure. She additionally had an ROSANA so Losartan was held and BP medications were adjusted for HTN urgency with SBP in the 200s.   Cardiology is consulted for \"admitted with poly pharmaceutical bradycardia. Assistance with BP control 89 Years old OP 's to 160's systolic\".    Home CV Medications: Atorvastatin 80mg daily, Carvedilol 6.25mg BID, ASA 81mg daily, Lasix 40mg daily PRN, HCTZ 25mg daily, Ranexa 500mg BID, Verapamil 180mg daily, Hydralazine 50mg daily PRN for SBP >140, Coumadin  -Follows with Dr. Callaway as outpatient, last seen 6/2025     #Cardiogenic Shock- Resolved  #Hypertensive Urgency  -Titration BP meds  -Hydralazine has been increased to 75 mg TID today  -Continue with Coreg 12.5 mg BID.   #pAF/Hx of SVT- Currently NSR HR 60-70s  -AC with Warfarin, planning on Watchman next month d/t hx of GIB  -NO changes on this plan on 7/25/25  #HLD- On statin therapy  #Hx of CAD s/p PCI- c/w ASA  #Anemia  -I will contact internal medicine to evaluate the need for workup     RECOMMENDATIONS:  -Continue with coreg - HCTZ 12.5 mg   -Increase Hydralazine to 75mg TID  -PRN IV hydralazine  -Pharmacy to manage Coumadin  -Lasix PRN  -c/w ASA and statin, Ranexa  -Event monitor x2 weeks at discharge  -Continuous telemetry monitoring  -Monitor electrolytes, replete for K <4 and Mg <2  -FU with outpatient cardiologist within 2 weeks post-discharge    Code Status:  DNR and No Intubation    Cordell Person MD       [1]   Scheduled medications "   Medication Dose Route Frequency    aspirin  81 mg oral Daily    atorvastatin  80 mg oral Nightly    carvedilol  12.5 mg oral BID    ferrous sulfate  1 tablet oral Once per day on Monday Wednesday Friday    gabapentin  100 mg oral BID    hydrALAZINE  75 mg oral TID    hydroCHLOROthiazide  12.5 mg oral Daily    hydroxychloroquine  200 mg oral Daily    magnesium oxide  400 mg of magnesium oxide oral BID    pantoprazole  40 mg oral BID AC    sulfaSALAzine  500 mg oral BID   [2]   PRN medications   Medication    acetaminophen    docusate sodium    hydrALAZINE    oxygen   [3]   Continuous Medications   Medication Dose Last Rate

## 2025-07-25 NOTE — PROGRESS NOTES
07/25/25 6637   Discharge Planning   Living Arrangements Spouse/significant other   Support Systems Spouse/significant other;Children   Assistance Needed Assistance Needed  uses walker at baseline, wheelchair for long distances, 1L O2 at home, son drives to appointments. active with coumadin, goes to lab in [place], and her pcp,  dr jones, manages it     Rakan Cervantes is a 89 y.o. female on day 4 of admission presenting with Cardiogenic shock (Multi).    Suzanne Davey RN

## 2025-07-25 NOTE — HH CARE COORDINATION
Home Care received a Referral for Nursing, Physical Therapy, and Occupational Therapy. We have processed the referral for a Start of Care within 48 hours of 7.26.25.     If you have any questions or concerns, please feel free to contact us at [phone]. Follow the prompts, enter your five digit zip code, and you will be directed to your care team on EAST 1.       Perineural Invasion (For Histology - Be Specific If Possible): absent

## 2025-07-27 NOTE — DISCHARGE SUMMARY
Discharge Diagnosis  Cardiogenic shock (Multi)   Hypertension   Deconditioning     Issues Requiring Follow-Up  PCP follow up     Discharge Meds     Medication List      START taking these medications     hydroCHLOROthiazide 12.5 mg capsule; Commonly known as: Microzide; Take   1 capsule (12.5 mg) by mouth once daily.; Replaces: hydroCHLOROthiazide 25   mg tablet     CHANGE how you take these medications     carvedilol 12.5 mg tablet; Commonly known as: Coreg; Take 1 tablet (12.5   mg) by mouth 2 times a day.; What changed: medication strength, how much   to take, when to take this   hydrALAZINE 25 mg tablet; Commonly known as: Apresoline; Take 3 tablets   (75 mg) by mouth 3 times a day.; What changed: medication strength, how   much to take, when to take this, reasons to take this     CONTINUE taking these medications     aspirin 81 mg EC tablet   atorvastatin 80 mg tablet; Commonly known as: Lipitor; Take 1 tablet (80   mg) by mouth once daily at bedtime.   DULoxetine 30 mg DR capsule; Commonly known as: Cymbalta; TAKE 1 CAPSULE   BY MOUTH ONCE DAILY at dinner   ferrous sulfate 325 mg (65 mg elemental) tablet; Commonly known as:   FeroSuL; Take 1 tablet by mouth 3 times a week. Monday, Wednesday, Friday   furosemide 40 mg tablet; Commonly known as: Lasix; Take 1 tablet (40 mg)   by mouth once daily as needed (take as needed for lower leg edema or   weight gain).   gabapentin 300 mg capsule; Commonly known as: Neurontin; TAKE 1 CAPSULE   BY MOUTH AT NOON AND BEDTIME   hydroxychloroquine 200 mg tablet; Commonly known as: Plaquenil; Take 1   tablet (200 mg) by mouth once daily.   magnesium oxide 400 mg (241.3 mg elemental) tablet; Commonly known as:   Mag-Ox; Take 1 tablet (400 mg) by mouth 2 times a day.   pantoprazole 40 mg EC tablet; Commonly known as: ProtoNix; TAKE 1 TABLET   BY MOUTH TWICE DAILY   potassium chloride CR 20 mEq ER tablet; Commonly known as: Klor-Con M20;   Take 1 tablet (20 mEq) by mouth once  daily. Take with food.   ranolazine 500 mg 12 hr tablet; Commonly known as: Ranexa; TAKE 1 TABLET   (500 MG) BY MOUTH 2 TIMES A DAY. DO NOT CRUSH, CHEW, OR SPLIT.   * Stool Softener 100 mg capsule; Generic drug: docusate sodium; TAKE 1   CAPSULE BY MOUTH ONCE DAILY AS NEEDED to prevent constipation from iron   * docusate sodium 100 mg capsule; Commonly known as: Colace   sucralfate 100 mg/mL suspension; Commonly known as: Carafate; Take 10 mL   (1 g) by mouth once daily.   sulfaSALAzine 500 mg tablet; Commonly known as: Azulfidine; Take 1   tablet (500 mg) by mouth 2 times a day.  * This list has 2 medication(s) that are the same as other medications   prescribed for you. Read the directions carefully, and ask your doctor or   other care provider to review them with you.     STOP taking these medications     hydroCHLOROthiazide 25 mg tablet; Commonly known as: HYDRODiuril;   Replaced by: hydroCHLOROthiazide 12.5 mg capsule   verapamil  mg ER tablet; Commonly known as: Calan-SR   warfarin 5 mg tablet; Commonly known as: Coumadin       Test Results Pending At Discharge  Pending Labs       No current pending labs.            Hospital Course     Rakan Cervantes is a 89 y.o. female, with a PMH of pAF on warfarin ( not discontinued indefinitely), CAD s/p PCI to distal L circ 9/2024, SVT, TIA, HTN, HLD, BL carotid artery stenosis, diverticulosis, GIB, who presented to [facility] on 7/21/2025 as a hospital transfer for cardiogenic shock. Patient initially presented to OSH 7/20 for lightheadedness, dizziness and shortness of breath. She was bradycardic to the 30s and hypotensive. She was given dopamine infusion however this was discontinued due to headache. She was then given atropine.  Patient was transferred to SDU today. Cardiology was consulted and has been titrated patient's BP medications     Of note patient was at [facility], operated by [facility] 7/7 for CP. She was on coumadin at that time which was held given plan for future  scott. Cardiology recommended continuing with patient coreg and hydrochlorothiazide as is and her hydralazine was uptitrated to 75 TID. Given patient's hx of multiple falls and hx of diverticulosis with GI bleed ICU attending discontinued patient's warfarin. This plan will be continued on discharge indefinitely.  Patient discharged at her baseline state of health . Ziopatch mailed to patient. Patient has cardiology appointment 8/20     Spent > 30 minutes on clinical evaluation of patient on day of discharge     Pertinent Physical Exam At Time of Discharge  Physical Exam  Constitutional:       Appearance: Normal appearance.      Cardiovascular:      Rate and Rhythm: Normal rate and regular rhythm.      Pulses: Normal pulses.      Heart sounds: Normal heart sounds.   Pulmonary:      Effort: Pulmonary effort is normal.      Breath sounds: Normal breath sounds.      Musculoskeletal:         General: No swelling or tenderness.      Skin:     General: Skin is warm and dry.      Neurological:      Mental Status: She is alert.       Outpatient Follow-Up  Future Appointments   Date Time Provider Department Center   7/29/2025 To Be Determined Shannan Amaya RN [facility]   7/30/2025 To Be Determined Drea Álvarez, PT [facility]   7/30/2025 10:00 AM Rachael Winkler, JOE [facility]   8/19/2025  1:00 PM VERITO OR Angela HUITRON [facility]   8/20/2025  8:00 AM Randall Green MD [id number] [facility]   9/15/2025  9:45 AM Campos Stevenson MD [id number] [facility]   10/13/2025  1:45 PM Nessa Callaway MD [id number] [facility]   11/4/2025  3:30 PM Samuel Sky MD [id number] [facility]   4/28/2026 11:00 AM Samuel Sky MD [id number] [facility]         Iona Mcnally MD

## 2025-07-28 ENCOUNTER — DOCUMENTATION (OUTPATIENT)
Dept: PRIMARY CARE | Facility: CLINIC | Age: OVER 89
End: 2025-07-28
Payer: MEDICARE

## 2025-07-28 ENCOUNTER — PATIENT OUTREACH (OUTPATIENT)
Dept: PRIMARY CARE | Facility: CLINIC | Age: OVER 89
End: 2025-07-28
Payer: MEDICARE

## 2025-07-28 DIAGNOSIS — I48.0 PAF (PAROXYSMAL ATRIAL FIBRILLATION) (MULTI): ICD-10-CM

## 2025-07-28 DIAGNOSIS — F03.B0 MODERATE DEMENTIA, UNSPECIFIED DEMENTIA TYPE, UNSPECIFIED WHETHER BEHAVIORAL, PSYCHOTIC, OR MOOD DISTURBANCE OR ANXIETY: ICD-10-CM

## 2025-07-28 DIAGNOSIS — I10 ESSENTIAL HYPERTENSION: ICD-10-CM

## 2025-07-28 NOTE — PROGRESS NOTES
Discharge facility: [facility]  Discharge diagnosis: Cardiogenic shock, Hypertension, Deconditioning   Admission date: 7/20/25  Discharge date: 7/25/25    PCP Appointment Date: To be scheduled  Specialist Appointment Date: 8/20/25 Cardiology  Hospital Encounter and Summary: Linked   Admission (Discharged) with Iona Mcnally MD (07/21/2025)   See Discharge assessment below for further details      Wrap Up  Is the patient/caregiver familiar with Advance Care Planning?: Yes (7/28/2025 11:42 AM)  Would the patient like more information on Advance Care Planning?: No (7/28/2025 11:42 AM)  Wrap Up Additional Comments: Rakan Cervantes is a 89 y.o. female, with a PMH of pAF on warfarin ( not discontinued indefinitely), CAD s/p PCI to distal L circ 9/2024, SVT, TIA, HTN, HLD, BL carotid artery stenosis, diverticulosis, GIB, who presented to [facility] on 7/21/2025 as a hospital transfer for cardiogenic shock. Patient initially presented to OSH 7/20 for lightheadedness, dizziness and shortness of breath. She was bradycardic to the 30s and hypotensive. She was given dopamine infusion however this was discontinued due to headache. She was then given atropine.  Patient was transferred to SDU today. Cardiology was consulted and has been titrated patient's BP medications      Of note patient was at [facility] 7/7 for CP. She was on coumadin at that time which was held given plan for future watchman. Cardiology recommended continuing with patient coreg and hydrochlorothiazide as is and her hydralazine was uptitrated to 75 TID. Given patient's hx of multiple falls and hx of diverticulosis with GI bleed ICU attending discontinued patient's warfarin. This plan will be continued on discharge indefinitely.  Patient discharged at her baseline state of health . Ziopatch mailed to patient. Patient has cardiology appointment 8/20 (7/28/2025 11:42 AM)    Engagement  Call Start Time: 1141 (7/28/2025 11:42  AM)    Medications  Medications reviewed with patient/caregiver?: Yes (7/28/2025 11:42 AM)  Is the patient having any side effects they believe may be caused by any medication additions or changes?: No (7/28/2025 11:42 AM)  Does the patient have all medications ordered at discharge?: Yes (7/28/2025 11:42 AM)  Care Management Interventions: Provided patient education (7/28/2025 11:42 AM)  Prescription Comments: START taking these medications     hydroCHLOROthiazide 12.5 mg capsule; Take   1 capsule (12.5 mg) by mouth once daily.; Replaces: hydroCHLOROthiazide 25   mg tablet  carvedilol 12.5 mg tablet; Commonly known as: Coreg; Take 1 tablet (12.5   mg) by mouth 2 times a day.;   hydrALAZINE 25 mg tablet; Commonly known as: Apresoline; Take 3 tablets   (75 mg) by mouth 3 times a day.; CONTINUE taking these medications     aspirin 81 mg EC tablet   atorvastatin 80 mg tablet; Take 1 tablet (80   mg) by mouth once daily at bedtime.   DULoxetine 30 mg DR capsule; TAKE 1 CAPSULE   BY MOUTH ONCE DAILY at dinner   ferrous sulfate 325 mg (65 mg elemental) tablet; Take 1 tablet by mouth 3 times a week. Monday, Wednesday, Friday   furosemide 40 mg tablet; Take 1 tablet (40 mg)   by mouth once daily as needed (take as needed for lower leg edema or   weight gain).   gabapentin 300 mg capsule TAKE 1 CAPSULE   BY MOUTH AT NOON AND BEDTIME   hydroxychloroquine 200 mg tablet;  Take 1   tablet (200 mg) by mouth once daily.   magnesium oxide 400 mg (241.3 mg elemental) tablet; Take 1 tablet (400 mg) by mouth 2 times a day.   pantoprazole 40 mg EC tablet; ; TAKE 1 TABLET   BY MOUTH TWICE DAILY   potassium chloride CR 20 mEq ER tablet; Take 1 tablet (20 mEq) by mouth once daily. Take with food.   ranolazine 500 mg 12 hr tablet;  TAKE 1 TABLET   (500 MG) BY MOUTH 2 TIMES A DAY. DO NOT CRUSH, CHEW, OR SPLIT.   * Stool Softener 100 mg capsule; Generic drug: docusate sodium; TAKE 1   CAPSULE BY MOUTH ONCE DAILY AS NEEDED to prevent  constipation from iron   * docusate sodium 100 mg capsule; Commonly known as: Colace   sucralfate 100 mg/mL suspension; Commonly known as: Carafate; Take 10 mL   (1 g) by mouth once daily.   sulfaSALAzine 500 mg tablet; Commonly known as: Azulfidine; Take 1   tablet (500 mg) by mouth 2 times a day.  * This list has 2 medication(s) that are the same as other medications   prescribed for you. Read the directions carefully, and ask your doctor or   other care provider to review them with you.     STOP taking these medications     hydroCHLOROthiazide 25 mg tablet; Commonly known as: HYDRODiuril;   Replaced by: hydroCHLOROthiazide 12.5 mg capsule   verapamil  mg ER tablet; Commonly known as: Calan-SR   warfarin 5 mg tablet; Commonly known as: Coumadin (7/28/2025 11:42 AM)  Is the patient taking all medications as directed (includes completed medication regime)?: Yes (7/28/2025 11:42 AM)  Care Management Interventions: Provided patient education (7/28/2025 11:42 AM)  Medication Comments: Son is filling medboxes for patient and monitoring her taking them correctly (7/28/2025 11:42 AM)  Follow Up Tasks: -- (NA) (7/28/2025 11:42 AM)    Appointments  Does the patient have a primary care provider?: Yes (7/28/2025 11:42 AM)  Care Management Interventions: Educated patient on importance of making appointment (7/28/2025 11:42 AM)  Has the patient kept scheduled appointments due by today?: Yes (7/28/2025 11:42 AM)    Self Management  What is the home health agency?:  Home Care (7/28/2025 11:42 AM)  Has home health visited the patient within 72 hours of discharge?: No (RN and PT scheduled for tomorrow. Son instructed to call me should they not receive a call this afternoon/ evening to schedule time.) (7/28/2025 11:42 AM)  What Durable Medical Equipment (DME) was ordered?: NA (7/28/2025 11:42 AM)  Has all Durable Medical Equipment (DME) been delivered?: -- (NA) (7/28/2025 11:42 AM)    Patient Teaching  Does the patient have  access to their discharge instructions?: Yes (7/28/2025 11:42 AM)  Care Management Interventions: Reviewed instructions with patient (7/28/2025 11:42 AM)  What is the patient's perception of their health status since discharge?: Improving (7/28/2025 11:42 AM)  Is the patient/caregiver able to teach back the hierarchy of who to call/visit for symptoms/problems? PCP, Specialist, Home Health nurse, Urgent Care, ED, 911: Yes (7/28/2025 11:42 AM)  If the patient is a current smoker, are they able to teach back resources for cessation?: -- (NA) (7/28/2025 11:42 AM)  Patient/Caregiver Education Comments: SOn states understanding of medications- has medbox set up, is aware of  Home care and they should receive a call this eveing with appt times. We also spoke to Palliative care and he is interested in this. Will message PCP for order. (7/28/2025 11:42 AM)

## 2025-07-29 ENCOUNTER — HOME CARE VISIT (OUTPATIENT)
Dept: HOME HEALTH SERVICES | Facility: HOME HEALTH | Age: OVER 89
End: 2025-07-29
Payer: MEDICARE

## 2025-07-29 VITALS
TEMPERATURE: 97.8 F | WEIGHT: 112 LBS | OXYGEN SATURATION: 99 % | SYSTOLIC BLOOD PRESSURE: 164 MMHG | RESPIRATION RATE: 18 BRPM | BODY MASS INDEX: 22.58 KG/M2 | HEIGHT: 59 IN | DIASTOLIC BLOOD PRESSURE: 65 MMHG | HEART RATE: 67 BPM

## 2025-07-29 PROCEDURE — G0299 HHS/HOSPICE OF RN EA 15 MIN: HCPCS

## 2025-07-29 SDOH — HEALTH STABILITY: MENTAL HEALTH: SMOKING IN HOME: 0

## 2025-07-29 SDOH — ECONOMIC STABILITY: HOUSING INSECURITY: EVIDENCE OF SMOKING MATERIAL: 0

## 2025-07-29 ASSESSMENT — ENCOUNTER SYMPTOMS
PAIN LOCATION: LEFT SHOULDER
BOWEL PATTERN NORMAL: 1
ORTHOPNEA: 1
AGGRESSION WITHIN DEFINED LIMITS: 1
PAIN LOCATION - EXACERBATING FACTORS: MOVEMENT
LAST BOWEL MOVEMENT: 67413
PAIN LOCATION - PAIN QUALITY: SHARP
HIGHEST PAIN SEVERITY IN PAST 24 HOURS: 10/10
APPETITE LEVEL: POOR
PAIN LOCATION - RELIEVING FACTORS: REST
PERSON REPORTING PAIN: PATIENT
PAIN LOCATION - PAIN SEVERITY: 10/10
PAIN SEVERITY GOAL: 0/10
LOWEST PAIN SEVERITY IN PAST 24 HOURS: 0/10
DYSPNEA ON EXERTION: 1
ANGER WITHIN DEFINED LIMITS: 1
SLEEP QUALITY: ADEQUATE
DYSPNEA ACTIVITY LEVEL: AT REST
CHANGE IN APPETITE: DECREASED
SHORTNESS OF BREATH: 1
STOOL FREQUENCY: LESS THAN DAILY
SUBJECTIVE PAIN PROGRESSION: UNCHANGED

## 2025-07-29 ASSESSMENT — ACTIVITIES OF DAILY LIVING (ADL)
CURRENT_FUNCTION: ONE PERSON
AMBULATION ASSISTANCE: ONE PERSON
OASIS_M1830: 03
ENTERING_EXITING_HOME: NOT ASSESSED

## 2025-07-29 NOTE — CASE COMMUNICATION
Patient  Subjective: patient and  were present for visit. son, Eduar, is primary caregiver. he does not live with them, but comes by often. patient presents with hypertension. today her bp was elevated some. contacted attending doc, as well as both of her cardiologists. awaiting a reply from dr noguera to see if he wants to adjust any medications. patient was able to stand up and ambulate to kitchen and back with her walker.    Kat rodriguez Reason for Home Care: hypertension, post hospital stay  Skilled Needs: comprehensive nursing assessment, medication reconciliation, pain assessment   Precautions: universal  Instruction provided: medication instruction, instructed on taking blood pressure every day, instructed on daily weights   Patient response to instruction: verbalized understanding   Patient instructed on plan of care and visit frequency.   Patient in agreement  with plan of care and visit frequency.    Discipline Communication: md, pt   Plan for next visit: comprehensive nursing assessment, pain assessment, medication reconciliation    Medication Reconciliation:    Demonstrates Adherence : Yes  Issues/Concerns: Yes, describe: see above  Provider Notified of Issues/Concerns: Yes  Caregiver Notified of Issues/Concerns: No  patient  response to instructions Verbalized Understanding  Pill bottles  visualized during treatment Yes

## 2025-07-30 ENCOUNTER — HOME CARE VISIT (OUTPATIENT)
Dept: HOME HEALTH SERVICES | Facility: HOME HEALTH | Age: OVER 89
End: 2025-07-30
Payer: MEDICARE

## 2025-07-30 VITALS
HEART RATE: 73 BPM | SYSTOLIC BLOOD PRESSURE: 156 MMHG | RESPIRATION RATE: 22 BRPM | OXYGEN SATURATION: 95 % | DIASTOLIC BLOOD PRESSURE: 59 MMHG | TEMPERATURE: 99 F

## 2025-07-30 DIAGNOSIS — I10 PRIMARY HYPERTENSION: ICD-10-CM

## 2025-07-30 PROCEDURE — G0151 HHCP-SERV OF PT,EA 15 MIN: HCPCS

## 2025-07-30 RX ORDER — HYDRALAZINE HYDROCHLORIDE 100 MG/1
100 TABLET, FILM COATED ORAL 2 TIMES DAILY
Qty: 180 TABLET | Refills: 3 | Status: ON HOLD | OUTPATIENT
Start: 2025-07-30 | End: 2026-07-30

## 2025-07-30 ASSESSMENT — ENCOUNTER SYMPTOMS
PERSON REPORTING PAIN: PATIENT
DENIES PAIN: 1

## 2025-07-30 NOTE — TELEPHONE ENCOUNTER
Please send the attached prescription to the patient's pharmacy as soon as possible. Thank you!  Requested Prescriptions     Pending Prescriptions Disp Refills    hydrALAZINE (Apresoline) 100 mg tablet 180 tablet 3     Sig: Take 1 tablet (100 mg) by mouth 2 times a day.

## 2025-07-31 ENCOUNTER — HOME CARE VISIT (OUTPATIENT)
Dept: HOME HEALTH SERVICES | Facility: HOME HEALTH | Age: OVER 89
End: 2025-07-31
Payer: MEDICARE

## 2025-07-31 VITALS — HEART RATE: 76 BPM | OXYGEN SATURATION: 97 %

## 2025-07-31 PROCEDURE — G0152 HHCP-SERV OF OT,EA 15 MIN: HCPCS

## 2025-07-31 SDOH — ECONOMIC STABILITY: HOUSING INSECURITY: EVIDENCE OF SMOKING MATERIAL: 0

## 2025-07-31 SDOH — HEALTH STABILITY: MENTAL HEALTH: SMOKING IN HOME: 0

## 2025-07-31 ASSESSMENT — ENCOUNTER SYMPTOMS
PAIN LOCATION - PAIN SEVERITY: 4/10
PAIN LOCATION - PAIN SEVERITY: 3/10
PAIN: 1
PAIN LOCATION - PAIN SEVERITY: 4/10
PAIN SEVERITY GOAL: 1/10
PAIN LOCATION - PAIN FREQUENCY: INTERMITTENT
PAIN LOCATION - PAIN FREQUENCY: INTERMITTENT
PAIN LOCATION: LEFT SHOULDER
PAIN LOCATION: BACK
PAIN LOCATION - PAIN QUALITY: ACHING
PAIN LOCATION: NECK
PAIN LOCATION - PAIN FREQUENCY: INTERMITTENT
PAIN LOCATION - EXACERBATING FACTORS: MOVEMENT
LOWEST PAIN SEVERITY IN PAST 24 HOURS: 3/10
PAIN LOCATION - EXACERBATING FACTORS: MOVEMENT
PAIN LOCATION - PAIN QUALITY: ACHING
PAIN LOCATION - PAIN QUALITY: ACHING
PAIN LOCATION - EXACERBATING FACTORS: MOVEMENT
HIGHEST PAIN SEVERITY IN PAST 24 HOURS: 4/10
PERSON REPORTING PAIN: PATIENT

## 2025-07-31 ASSESSMENT — ACTIVITIES OF DAILY LIVING (ADL)
TOILETING: 1
PHYSICAL TRANSFERS ASSESSED: 1
GROOMING ASSESSED: 1
AMBULATION ASSISTANCE: 1
BATHING ASSESSED: 1

## 2025-08-01 DIAGNOSIS — I10 ESSENTIAL HYPERTENSION: ICD-10-CM

## 2025-08-01 PROBLEM — R20.2 PARESTHESIAS: Status: RESOLVED | Noted: 2025-08-01 | Resolved: 2025-08-01

## 2025-08-01 PROBLEM — M54.81 OCCIPITAL NEURALGIA OF RIGHT SIDE: Status: RESOLVED | Noted: 2025-08-01 | Resolved: 2025-08-01

## 2025-08-01 PROBLEM — G56.01 CARPAL TUNNEL SYNDROME OF RIGHT WRIST: Status: RESOLVED | Noted: 2025-08-01 | Resolved: 2025-08-01

## 2025-08-01 RX ORDER — POTASSIUM CHLORIDE 1500 MG/1
20 TABLET, EXTENDED RELEASE ORAL DAILY
Qty: 90 TABLET | Refills: 1 | Status: ON HOLD | OUTPATIENT
Start: 2025-08-01

## 2025-08-03 ENCOUNTER — APPOINTMENT (OUTPATIENT)
Dept: RADIOLOGY | Facility: HOSPITAL | Age: OVER 89
End: 2025-08-03
Payer: MEDICARE

## 2025-08-03 ENCOUNTER — APPOINTMENT (OUTPATIENT)
Dept: CARDIOLOGY | Facility: HOSPITAL | Age: OVER 89
End: 2025-08-03
Payer: MEDICARE

## 2025-08-03 ENCOUNTER — HOSPITAL ENCOUNTER (INPATIENT)
Facility: HOSPITAL | Age: OVER 89
End: 2025-08-03
Attending: EMERGENCY MEDICINE | Admitting: INTERNAL MEDICINE
Payer: MEDICARE

## 2025-08-03 VITALS
OXYGEN SATURATION: 98 % | HEIGHT: 59 IN | WEIGHT: 112 LBS | RESPIRATION RATE: 18 BRPM | TEMPERATURE: 98.1 F | SYSTOLIC BLOOD PRESSURE: 143 MMHG | BODY MASS INDEX: 22.58 KG/M2 | DIASTOLIC BLOOD PRESSURE: 57 MMHG | HEART RATE: 72 BPM

## 2025-08-03 DIAGNOSIS — J18.9 PNEUMONIA OF LOWER LOBE DUE TO INFECTIOUS ORGANISM, UNSPECIFIED LATERALITY: ICD-10-CM

## 2025-08-03 DIAGNOSIS — R26.9 ABNORMAL GAIT: ICD-10-CM

## 2025-08-03 DIAGNOSIS — R57.0 CARDIOGENIC SHOCK (MULTI): ICD-10-CM

## 2025-08-03 DIAGNOSIS — R07.9 CHEST PAIN, UNSPECIFIED TYPE: Primary | ICD-10-CM

## 2025-08-03 DIAGNOSIS — J90 BILATERAL PLEURAL EFFUSION: ICD-10-CM

## 2025-08-03 DIAGNOSIS — E87.1 HYPONATREMIA: ICD-10-CM

## 2025-08-03 DIAGNOSIS — I50.41 ACUTE COMBINED SYSTOLIC (CONGESTIVE) AND DIASTOLIC (CONGESTIVE) HEART FAILURE: ICD-10-CM

## 2025-08-03 LAB
ALBUMIN SERPL BCP-MCNC: 3.4 G/DL (ref 3.4–5)
ALP SERPL-CCNC: 86 U/L (ref 33–136)
ALT SERPL W P-5'-P-CCNC: 9 U/L (ref 7–45)
ANION GAP SERPL CALCULATED.3IONS-SCNC: 9 MMOL/L (ref 10–20)
APPEARANCE UR: CLEAR
AST SERPL W P-5'-P-CCNC: 14 U/L (ref 9–39)
BACTERIA #/AREA URNS AUTO: ABNORMAL /HPF
BASOPHILS # BLD AUTO: 0.02 X10*3/UL (ref 0–0.1)
BASOPHILS NFR BLD AUTO: 0.4 %
BILIRUB SERPL-MCNC: 0.5 MG/DL (ref 0–1.2)
BILIRUB UR STRIP.AUTO-MCNC: NEGATIVE MG/DL
BNP SERPL-MCNC: 426 PG/ML (ref 0–99)
BUN SERPL-MCNC: 15 MG/DL (ref 6–23)
CALCIUM SERPL-MCNC: 9 MG/DL (ref 8.6–10.3)
CARDIAC TROPONIN I PNL SERPL HS: 11 NG/L (ref 0–13)
CARDIAC TROPONIN I PNL SERPL HS: 12 NG/L (ref 0–13)
CARDIAC TROPONIN I PNL SERPL HS: 13 NG/L (ref 0–13)
CHLORIDE SERPL-SCNC: 97 MMOL/L (ref 98–107)
CO2 SERPL-SCNC: 31 MMOL/L (ref 21–32)
COLOR UR: ABNORMAL
CREAT SERPL-MCNC: 0.83 MG/DL (ref 0.5–1.05)
EGFRCR SERPLBLD CKD-EPI 2021: 67 ML/MIN/1.73M*2
EOSINOPHIL # BLD AUTO: 0.03 X10*3/UL (ref 0–0.4)
EOSINOPHIL NFR BLD AUTO: 0.6 %
ERYTHROCYTE [DISTWIDTH] IN BLOOD BY AUTOMATED COUNT: 15.5 % (ref 11.5–14.5)
GLUCOSE SERPL-MCNC: 153 MG/DL (ref 74–99)
GLUCOSE UR STRIP.AUTO-MCNC: NORMAL MG/DL
HCT VFR BLD AUTO: 26.3 % (ref 36–46)
HGB BLD-MCNC: 8 G/DL (ref 12–16)
HYALINE CASTS #/AREA URNS AUTO: ABNORMAL /LPF
IMM GRANULOCYTES # BLD AUTO: 0.02 X10*3/UL (ref 0–0.5)
IMM GRANULOCYTES NFR BLD AUTO: 0.4 % (ref 0–0.9)
KETONES UR STRIP.AUTO-MCNC: NEGATIVE MG/DL
LEUKOCYTE ESTERASE UR QL STRIP.AUTO: ABNORMAL
LIPASE SERPL-CCNC: 13 U/L (ref 9–82)
LYMPHOCYTES # BLD AUTO: 0.67 X10*3/UL (ref 0.8–3)
LYMPHOCYTES NFR BLD AUTO: 14.2 %
MCH RBC QN AUTO: 30 PG (ref 26–34)
MCHC RBC AUTO-ENTMCNC: 30.4 G/DL (ref 32–36)
MCV RBC AUTO: 99 FL (ref 80–100)
MONOCYTES # BLD AUTO: 0.56 X10*3/UL (ref 0.05–0.8)
MONOCYTES NFR BLD AUTO: 11.8 %
MUCOUS THREADS #/AREA URNS AUTO: ABNORMAL /LPF
NEUTROPHILS # BLD AUTO: 3.43 X10*3/UL (ref 1.6–5.5)
NEUTROPHILS NFR BLD AUTO: 72.6 %
NITRITE UR QL STRIP.AUTO: NEGATIVE
NRBC BLD-RTO: 0 /100 WBCS (ref 0–0)
PH UR STRIP.AUTO: 7.5 [PH]
PLATELET # BLD AUTO: 321 X10*3/UL (ref 150–450)
POTASSIUM SERPL-SCNC: 3.3 MMOL/L (ref 3.5–5.3)
PROT SERPL-MCNC: 5.7 G/DL (ref 6.4–8.2)
PROT UR STRIP.AUTO-MCNC: ABNORMAL MG/DL
RBC # BLD AUTO: 2.67 X10*6/UL (ref 4–5.2)
RBC # UR STRIP.AUTO: NEGATIVE MG/DL
RBC #/AREA URNS AUTO: ABNORMAL /HPF
SODIUM SERPL-SCNC: 134 MMOL/L (ref 136–145)
SP GR UR STRIP.AUTO: 1.05
SQUAMOUS #/AREA URNS AUTO: ABNORMAL /HPF
UROBILINOGEN UR STRIP.AUTO-MCNC: NORMAL MG/DL
WBC # BLD AUTO: 4.7 X10*3/UL (ref 4.4–11.3)
WBC #/AREA URNS AUTO: ABNORMAL /HPF

## 2025-08-03 PROCEDURE — 86738 MYCOPLASMA ANTIBODY: CPT | Performed by: INTERNAL MEDICINE

## 2025-08-03 PROCEDURE — 36415 COLL VENOUS BLD VENIPUNCTURE: CPT | Performed by: EMERGENCY MEDICINE

## 2025-08-03 PROCEDURE — 71045 X-RAY EXAM CHEST 1 VIEW: CPT

## 2025-08-03 PROCEDURE — 1200000002 HC GENERAL ROOM WITH TELEMETRY DAILY

## 2025-08-03 PROCEDURE — 2500000004 HC RX 250 GENERAL PHARMACY W/ HCPCS (ALT 636 FOR OP/ED): Performed by: INTERNAL MEDICINE

## 2025-08-03 PROCEDURE — 84145 PROCALCITONIN (PCT): CPT | Mod: WESLAB | Performed by: INTERNAL MEDICINE

## 2025-08-03 PROCEDURE — 84484 ASSAY OF TROPONIN QUANT: CPT | Performed by: EMERGENCY MEDICINE

## 2025-08-03 PROCEDURE — 2500000004 HC RX 250 GENERAL PHARMACY W/ HCPCS (ALT 636 FOR OP/ED)

## 2025-08-03 PROCEDURE — 2550000001 HC RX 255 CONTRASTS

## 2025-08-03 PROCEDURE — 74177 CT ABD & PELVIS W/CONTRAST: CPT | Performed by: INTERNAL MEDICINE

## 2025-08-03 PROCEDURE — 87081 CULTURE SCREEN ONLY: CPT | Mod: WESLAB | Performed by: INTERNAL MEDICINE

## 2025-08-03 PROCEDURE — 81001 URINALYSIS AUTO W/SCOPE: CPT

## 2025-08-03 PROCEDURE — 83690 ASSAY OF LIPASE: CPT

## 2025-08-03 PROCEDURE — 93005 ELECTROCARDIOGRAM TRACING: CPT

## 2025-08-03 PROCEDURE — 71260 CT THORAX DX C+: CPT | Performed by: INTERNAL MEDICINE

## 2025-08-03 PROCEDURE — 87449 NOS EACH ORGANISM AG IA: CPT | Mod: WESLAB | Performed by: INTERNAL MEDICINE

## 2025-08-03 PROCEDURE — 83880 ASSAY OF NATRIURETIC PEPTIDE: CPT | Performed by: EMERGENCY MEDICINE

## 2025-08-03 PROCEDURE — 2500000002 HC RX 250 W HCPCS SELF ADMINISTERED DRUGS (ALT 637 FOR MEDICARE OP, ALT 636 FOR OP/ED)

## 2025-08-03 PROCEDURE — 2500000005 HC RX 250 GENERAL PHARMACY W/O HCPCS: Performed by: EMERGENCY MEDICINE

## 2025-08-03 PROCEDURE — 99285 EMERGENCY DEPT VISIT HI MDM: CPT | Mod: 25 | Performed by: EMERGENCY MEDICINE

## 2025-08-03 PROCEDURE — 74177 CT ABD & PELVIS W/CONTRAST: CPT

## 2025-08-03 PROCEDURE — 80053 COMPREHEN METABOLIC PANEL: CPT | Performed by: EMERGENCY MEDICINE

## 2025-08-03 PROCEDURE — 87086 URINE CULTURE/COLONY COUNT: CPT | Mod: WESLAB

## 2025-08-03 PROCEDURE — 71045 X-RAY EXAM CHEST 1 VIEW: CPT | Performed by: INTERNAL MEDICINE

## 2025-08-03 PROCEDURE — 2500000001 HC RX 250 WO HCPCS SELF ADMINISTERED DRUGS (ALT 637 FOR MEDICARE OP): Performed by: EMERGENCY MEDICINE

## 2025-08-03 PROCEDURE — 84484 ASSAY OF TROPONIN QUANT: CPT | Performed by: INTERNAL MEDICINE

## 2025-08-03 PROCEDURE — 2500000004 HC RX 250 GENERAL PHARMACY W/ HCPCS (ALT 636 FOR OP/ED): Performed by: EMERGENCY MEDICINE

## 2025-08-03 PROCEDURE — 2500000002 HC RX 250 W HCPCS SELF ADMINISTERED DRUGS (ALT 637 FOR MEDICARE OP, ALT 636 FOR OP/ED): Performed by: INTERNAL MEDICINE

## 2025-08-03 PROCEDURE — 2500000001 HC RX 250 WO HCPCS SELF ADMINISTERED DRUGS (ALT 637 FOR MEDICARE OP): Performed by: INTERNAL MEDICINE

## 2025-08-03 PROCEDURE — 85025 COMPLETE CBC W/AUTO DIFF WBC: CPT | Performed by: EMERGENCY MEDICINE

## 2025-08-03 PROCEDURE — 87899 AGENT NOS ASSAY W/OPTIC: CPT | Mod: WESLAB | Performed by: INTERNAL MEDICINE

## 2025-08-03 PROCEDURE — 99223 1ST HOSP IP/OBS HIGH 75: CPT | Performed by: INTERNAL MEDICINE

## 2025-08-03 RX ORDER — FERROUS SULFATE 325(65) MG
1 TABLET ORAL 3 TIMES WEEKLY
Status: DISCONTINUED | OUTPATIENT
Start: 2025-08-04 | End: 2025-08-09 | Stop reason: HOSPADM

## 2025-08-03 RX ORDER — IPRATROPIUM BROMIDE AND ALBUTEROL SULFATE 2.5; .5 MG/3ML; MG/3ML
3 SOLUTION RESPIRATORY (INHALATION)
Status: DISCONTINUED | OUTPATIENT
Start: 2025-08-03 | End: 2025-08-03

## 2025-08-03 RX ORDER — LABETALOL HYDROCHLORIDE 5 MG/ML
10 INJECTION, SOLUTION INTRAVENOUS EVERY 6 HOURS PRN
Status: DISCONTINUED | OUTPATIENT
Start: 2025-08-03 | End: 2025-08-09 | Stop reason: HOSPADM

## 2025-08-03 RX ORDER — PANTOPRAZOLE SODIUM 40 MG/1
40 TABLET, DELAYED RELEASE ORAL 2 TIMES DAILY
Status: DISCONTINUED | OUTPATIENT
Start: 2025-08-03 | End: 2025-08-09 | Stop reason: HOSPADM

## 2025-08-03 RX ORDER — CARVEDILOL 12.5 MG/1
12.5 TABLET ORAL 2 TIMES DAILY
Status: DISCONTINUED | OUTPATIENT
Start: 2025-08-03 | End: 2025-08-06

## 2025-08-03 RX ORDER — SULFASALAZINE 500 MG/1
500 TABLET ORAL 2 TIMES DAILY
Status: DISCONTINUED | OUTPATIENT
Start: 2025-08-03 | End: 2025-08-09 | Stop reason: HOSPADM

## 2025-08-03 RX ORDER — FUROSEMIDE 40 MG/1
40 TABLET ORAL DAILY PRN
Status: DISCONTINUED | OUTPATIENT
Start: 2025-08-03 | End: 2025-08-09 | Stop reason: HOSPADM

## 2025-08-03 RX ORDER — POTASSIUM CHLORIDE 20 MEQ/1
40 TABLET, EXTENDED RELEASE ORAL DAILY
Status: DISCONTINUED | OUTPATIENT
Start: 2025-08-03 | End: 2025-08-09 | Stop reason: HOSPADM

## 2025-08-03 RX ORDER — HYDRALAZINE HYDROCHLORIDE 50 MG/1
100 TABLET, FILM COATED ORAL 2 TIMES DAILY
Status: DISCONTINUED | OUTPATIENT
Start: 2025-08-03 | End: 2025-08-06

## 2025-08-03 RX ORDER — ENOXAPARIN SODIUM 100 MG/ML
40 INJECTION SUBCUTANEOUS EVERY 24 HOURS
Status: DISCONTINUED | OUTPATIENT
Start: 2025-08-03 | End: 2025-08-09 | Stop reason: HOSPADM

## 2025-08-03 RX ORDER — RANOLAZINE 500 MG/1
500 TABLET, EXTENDED RELEASE ORAL 2 TIMES DAILY
Status: DISCONTINUED | OUTPATIENT
Start: 2025-08-03 | End: 2025-08-09 | Stop reason: HOSPADM

## 2025-08-03 RX ORDER — ASPIRIN 81 MG/1
81 TABLET ORAL DAILY
Status: DISCONTINUED | OUTPATIENT
Start: 2025-08-03 | End: 2025-08-09 | Stop reason: HOSPADM

## 2025-08-03 RX ORDER — HYDRALAZINE HYDROCHLORIDE 20 MG/ML
5 INJECTION INTRAMUSCULAR; INTRAVENOUS EVERY 6 HOURS PRN
Status: DISCONTINUED | OUTPATIENT
Start: 2025-08-03 | End: 2025-08-03

## 2025-08-03 RX ORDER — ATORVASTATIN CALCIUM 80 MG/1
80 TABLET, FILM COATED ORAL NIGHTLY
Status: DISCONTINUED | OUTPATIENT
Start: 2025-08-03 | End: 2025-08-09 | Stop reason: HOSPADM

## 2025-08-03 RX ORDER — CEFTRIAXONE 2 G/50ML
2 INJECTION, SOLUTION INTRAVENOUS ONCE
Status: COMPLETED | OUTPATIENT
Start: 2025-08-03 | End: 2025-08-03

## 2025-08-03 RX ORDER — DULOXETIN HYDROCHLORIDE 30 MG/1
30 CAPSULE, DELAYED RELEASE ORAL
Status: DISCONTINUED | OUTPATIENT
Start: 2025-08-03 | End: 2025-08-09 | Stop reason: HOSPADM

## 2025-08-03 RX ORDER — LIDOCAINE HYDROCHLORIDE 20 MG/ML
15 SOLUTION OROPHARYNGEAL ONCE
Status: COMPLETED | OUTPATIENT
Start: 2025-08-03 | End: 2025-08-03

## 2025-08-03 RX ORDER — ALUMINUM HYDROXIDE, MAGNESIUM HYDROXIDE, AND SIMETHICONE 1200; 120; 1200 MG/30ML; MG/30ML; MG/30ML
30 SUSPENSION ORAL ONCE
Status: COMPLETED | OUTPATIENT
Start: 2025-08-03 | End: 2025-08-03

## 2025-08-03 RX ORDER — POLYETHYLENE GLYCOL 3350 17 G/17G
17 POWDER, FOR SOLUTION ORAL DAILY PRN
Status: DISCONTINUED | OUTPATIENT
Start: 2025-08-03 | End: 2025-08-09 | Stop reason: HOSPADM

## 2025-08-03 RX ORDER — GABAPENTIN 300 MG/1
300 CAPSULE ORAL 2 TIMES DAILY
Status: DISCONTINUED | OUTPATIENT
Start: 2025-08-03 | End: 2025-08-09 | Stop reason: HOSPADM

## 2025-08-03 RX ORDER — ALUMINUM HYDROXIDE, MAGNESIUM HYDROXIDE, AND SIMETHICONE 1200; 120; 1200 MG/30ML; MG/30ML; MG/30ML
20 SUSPENSION ORAL 4 TIMES DAILY PRN
Status: DISCONTINUED | OUTPATIENT
Start: 2025-08-03 | End: 2025-08-09 | Stop reason: HOSPADM

## 2025-08-03 RX ORDER — PANTOPRAZOLE SODIUM 40 MG/10ML
40 INJECTION, POWDER, LYOPHILIZED, FOR SOLUTION INTRAVENOUS ONCE
Status: COMPLETED | OUTPATIENT
Start: 2025-08-03 | End: 2025-08-03

## 2025-08-03 RX ORDER — DOCUSATE SODIUM 100 MG/1
100 CAPSULE, LIQUID FILLED ORAL AS NEEDED
Status: DISCONTINUED | OUTPATIENT
Start: 2025-08-03 | End: 2025-08-03 | Stop reason: SDUPTHER

## 2025-08-03 RX ORDER — ALBUTEROL SULFATE 0.83 MG/ML
3 SOLUTION RESPIRATORY (INHALATION) EVERY 6 HOURS PRN
Status: DISCONTINUED | OUTPATIENT
Start: 2025-08-03 | End: 2025-08-09 | Stop reason: HOSPADM

## 2025-08-03 RX ORDER — DOCUSATE SODIUM 100 MG/1
100 CAPSULE, LIQUID FILLED ORAL 2 TIMES DAILY PRN
Status: DISCONTINUED | OUTPATIENT
Start: 2025-08-03 | End: 2025-08-09 | Stop reason: HOSPADM

## 2025-08-03 RX ORDER — AZITHROMYCIN 500 MG/1
500 TABLET, FILM COATED ORAL EVERY 24 HOURS
Status: DISCONTINUED | OUTPATIENT
Start: 2025-08-04 | End: 2025-08-05

## 2025-08-03 RX ORDER — FAMOTIDINE 10 MG/ML
20 INJECTION, SOLUTION INTRAVENOUS ONCE
Status: COMPLETED | OUTPATIENT
Start: 2025-08-03 | End: 2025-08-03

## 2025-08-03 RX ORDER — ONDANSETRON HYDROCHLORIDE 2 MG/ML
4 INJECTION, SOLUTION INTRAVENOUS ONCE
Status: COMPLETED | OUTPATIENT
Start: 2025-08-03 | End: 2025-08-03

## 2025-08-03 RX ORDER — HYDROXYCHLOROQUINE SULFATE 200 MG/1
200 TABLET, FILM COATED ORAL DAILY
Status: DISCONTINUED | OUTPATIENT
Start: 2025-08-03 | End: 2025-08-09 | Stop reason: HOSPADM

## 2025-08-03 RX ORDER — HYDROCHLOROTHIAZIDE 12.5 MG/1
12.5 CAPSULE ORAL DAILY
Status: DISCONTINUED | OUTPATIENT
Start: 2025-08-03 | End: 2025-08-09 | Stop reason: HOSPADM

## 2025-08-03 RX ORDER — HYDRALAZINE HYDROCHLORIDE 20 MG/ML
5 INJECTION INTRAMUSCULAR; INTRAVENOUS EVERY 6 HOURS PRN
Status: DISCONTINUED | OUTPATIENT
Start: 2025-08-03 | End: 2025-08-05

## 2025-08-03 RX ORDER — SUCRALFATE 1 G/10ML
1 SUSPENSION ORAL DAILY
Status: DISCONTINUED | OUTPATIENT
Start: 2025-08-03 | End: 2025-08-09 | Stop reason: HOSPADM

## 2025-08-03 RX ORDER — CEFTRIAXONE 1 G/50ML
1 INJECTION, SOLUTION INTRAVENOUS EVERY 24 HOURS
Status: DISCONTINUED | OUTPATIENT
Start: 2025-08-04 | End: 2025-08-09 | Stop reason: HOSPADM

## 2025-08-03 RX ADMIN — POTASSIUM CHLORIDE EXTENDED-RELEASE 40 MEQ: 1500 TABLET ORAL at 15:27

## 2025-08-03 RX ADMIN — PANTOPRAZOLE SODIUM 40 MG: 40 INJECTION, POWDER, FOR SOLUTION INTRAVENOUS at 16:51

## 2025-08-03 RX ADMIN — RANOLAZINE 500 MG: 500 TABLET, EXTENDED RELEASE ORAL at 21:13

## 2025-08-03 RX ADMIN — HYDRALAZINE HYDROCHLORIDE 100 MG: 50 TABLET ORAL at 18:45

## 2025-08-03 RX ADMIN — GABAPENTIN 300 MG: 300 CAPSULE ORAL at 20:29

## 2025-08-03 RX ADMIN — FAMOTIDINE 20 MG: 10 INJECTION, SOLUTION INTRAVENOUS at 14:29

## 2025-08-03 RX ADMIN — DULOXETINE 30 MG: 30 CAPSULE, DELAYED RELEASE ORAL at 18:45

## 2025-08-03 RX ADMIN — LIDOCAINE HYDROCHLORIDE 15 ML: 20 SOLUTION ORAL at 16:51

## 2025-08-03 RX ADMIN — ALUMINUM HYDROXIDE, MAGNESIUM HYDROXIDE, AND SIMETHICONE 30 ML: 200; 200; 20 SUSPENSION ORAL at 16:51

## 2025-08-03 RX ADMIN — ATORVASTATIN CALCIUM 80 MG: 80 TABLET, FILM COATED ORAL at 20:29

## 2025-08-03 RX ADMIN — IOHEXOL 75 ML: 350 INJECTION, SOLUTION INTRAVENOUS at 14:35

## 2025-08-03 RX ADMIN — CARVEDILOL 12.5 MG: 12.5 TABLET, FILM COATED ORAL at 20:29

## 2025-08-03 RX ADMIN — SULFASALAZINE 500 MG: 500 TABLET ORAL at 21:13

## 2025-08-03 RX ADMIN — HYDROCHLOROTHIAZIDE 12.5 MG: 12.5 CAPSULE ORAL at 18:45

## 2025-08-03 RX ADMIN — ONDANSETRON 4 MG: 2 INJECTION, SOLUTION INTRAMUSCULAR; INTRAVENOUS at 14:29

## 2025-08-03 RX ADMIN — ENOXAPARIN SODIUM 40 MG: 100 INJECTION SUBCUTANEOUS at 18:45

## 2025-08-03 RX ADMIN — AZITHROMYCIN MONOHYDRATE 500 MG: 500 INJECTION, POWDER, LYOPHILIZED, FOR SOLUTION INTRAVENOUS at 19:27

## 2025-08-03 RX ADMIN — CEFTRIAXONE 2 G: 2 INJECTION, SOLUTION INTRAVENOUS at 18:37

## 2025-08-03 SDOH — ECONOMIC STABILITY: FOOD INSECURITY: WITHIN THE PAST 12 MONTHS, THE FOOD YOU BOUGHT JUST DIDN'T LAST AND YOU DIDN'T HAVE MONEY TO GET MORE.: NEVER TRUE

## 2025-08-03 SDOH — ECONOMIC STABILITY: INCOME INSECURITY: IN THE PAST 12 MONTHS HAS THE ELECTRIC, GAS, OIL, OR WATER COMPANY THREATENED TO SHUT OFF SERVICES IN YOUR HOME?: NO

## 2025-08-03 SDOH — SOCIAL STABILITY: SOCIAL INSECURITY: HAVE YOU HAD ANY THOUGHTS OF HARMING ANYONE ELSE?: NO

## 2025-08-03 SDOH — SOCIAL STABILITY: SOCIAL INSECURITY: ABUSE: ADULT

## 2025-08-03 SDOH — SOCIAL STABILITY: SOCIAL INSECURITY: HAS ANYONE EVER THREATENED TO HURT YOUR FAMILY OR YOUR PETS?: NO

## 2025-08-03 SDOH — SOCIAL STABILITY: SOCIAL INSECURITY: WITHIN THE LAST YEAR, HAVE YOU BEEN AFRAID OF YOUR PARTNER OR EX-PARTNER?: NO

## 2025-08-03 SDOH — SOCIAL STABILITY: SOCIAL INSECURITY: DO YOU FEEL ANYONE HAS EXPLOITED OR TAKEN ADVANTAGE OF YOU FINANCIALLY OR OF YOUR PERSONAL PROPERTY?: NO

## 2025-08-03 SDOH — SOCIAL STABILITY: SOCIAL INSECURITY: DOES ANYONE TRY TO KEEP YOU FROM HAVING/CONTACTING OTHER FRIENDS OR DOING THINGS OUTSIDE YOUR HOME?: NO

## 2025-08-03 SDOH — ECONOMIC STABILITY: FOOD INSECURITY: WITHIN THE PAST 12 MONTHS, YOU WORRIED THAT YOUR FOOD WOULD RUN OUT BEFORE YOU GOT THE MONEY TO BUY MORE.: NEVER TRUE

## 2025-08-03 SDOH — SOCIAL STABILITY: SOCIAL INSECURITY: WITHIN THE LAST YEAR, HAVE YOU BEEN HUMILIATED OR EMOTIONALLY ABUSED IN OTHER WAYS BY YOUR PARTNER OR EX-PARTNER?: NO

## 2025-08-03 SDOH — SOCIAL STABILITY: SOCIAL INSECURITY: HAVE YOU HAD THOUGHTS OF HARMING ANYONE ELSE?: NO

## 2025-08-03 SDOH — SOCIAL STABILITY: SOCIAL INSECURITY: ARE YOU OR HAVE YOU BEEN THREATENED OR ABUSED PHYSICALLY, EMOTIONALLY, OR SEXUALLY BY ANYONE?: NO

## 2025-08-03 SDOH — ECONOMIC STABILITY: HOUSING INSECURITY: EVIDENCE OF SMOKING MATERIAL: 0

## 2025-08-03 SDOH — SOCIAL STABILITY: SOCIAL INSECURITY: DO YOU FEEL UNSAFE GOING BACK TO THE PLACE WHERE YOU ARE LIVING?: NO

## 2025-08-03 SDOH — SOCIAL STABILITY: SOCIAL INSECURITY: ARE THERE ANY APPARENT SIGNS OF INJURIES/BEHAVIORS THAT COULD BE RELATED TO ABUSE/NEGLECT?: NO

## 2025-08-03 SDOH — HEALTH STABILITY: MENTAL HEALTH: SMOKING IN HOME: 0

## 2025-08-03 ASSESSMENT — PAIN DESCRIPTION - LOCATION
LOCATION: BACK
LOCATION: CHEST
LOCATION: BACK

## 2025-08-03 ASSESSMENT — COGNITIVE AND FUNCTIONAL STATUS - GENERAL
DRESSING REGULAR LOWER BODY CLOTHING: A LITTLE
PERSONAL GROOMING: A LITTLE
TURNING FROM BACK TO SIDE WHILE IN FLAT BAD: A LITTLE
DRESSING REGULAR UPPER BODY CLOTHING: A LITTLE
EATING MEALS: A LITTLE
TOILETING: A LITTLE
MOBILITY SCORE: 17
CLIMB 3 TO 5 STEPS WITH RAILING: A LOT
DAILY ACTIVITIY SCORE: 18
HELP NEEDED FOR BATHING: A LITTLE
PATIENT BASELINE BEDBOUND: NO
PERSONAL GROOMING: A LITTLE
DRESSING REGULAR UPPER BODY CLOTHING: A LITTLE
TURNING FROM BACK TO SIDE WHILE IN FLAT BAD: A LITTLE
MOVING FROM LYING ON BACK TO SITTING ON SIDE OF FLAT BED WITH BEDRAILS: A LITTLE
DAILY ACTIVITIY SCORE: 18
STANDING UP FROM CHAIR USING ARMS: A LITTLE
DRESSING REGULAR LOWER BODY CLOTHING: A LITTLE
EATING MEALS: A LITTLE
MOVING FROM LYING ON BACK TO SITTING ON SIDE OF FLAT BED WITH BEDRAILS: A LITTLE
STANDING UP FROM CHAIR USING ARMS: A LITTLE
HELP NEEDED FOR BATHING: A LITTLE
MOVING TO AND FROM BED TO CHAIR: A LITTLE
MOBILITY SCORE: 17
WALKING IN HOSPITAL ROOM: A LITTLE
WALKING IN HOSPITAL ROOM: A LITTLE
CLIMB 3 TO 5 STEPS WITH RAILING: A LOT
TOILETING: A LITTLE
MOVING TO AND FROM BED TO CHAIR: A LITTLE

## 2025-08-03 ASSESSMENT — PAIN SCALES - GENERAL
PAINLEVEL_OUTOF10: 5 - MODERATE PAIN
PAINLEVEL_OUTOF10: 6
PAINLEVEL_OUTOF10: 2
PAINLEVEL_OUTOF10: 0 - NO PAIN
PAINLEVEL_OUTOF10: 5 - MODERATE PAIN
PAINLEVEL_OUTOF10: 5 - MODERATE PAIN

## 2025-08-03 ASSESSMENT — ACTIVITIES OF DAILY LIVING (ADL)
HEARING - RIGHT EAR: HEARING AID
TOILETING: NEEDS ASSISTANCE
GROOMING: NEEDS ASSISTANCE
JUDGMENT_ADEQUATE_SAFELY_COMPLETE_DAILY_ACTIVITIES: YES
AMBULATION ASSISTANCE ON FLAT SURFACES: 1
WALKS IN HOME: NEEDS ASSISTANCE
ASSISTIVE_DEVICE: OXYGEN;WALKER
ADEQUATE_TO_COMPLETE_ADL: YES
FEEDING YOURSELF: NEEDS ASSISTANCE
LACK_OF_TRANSPORTATION: NO
PATIENT'S MEMORY ADEQUATE TO SAFELY COMPLETE DAILY ACTIVITIES?: YES
LACK_OF_TRANSPORTATION: NO
DRESSING YOURSELF: NEEDS ASSISTANCE
BATHING: NEEDS ASSISTANCE
AMBULATION_DISTANCE/DURATION_TOLERATED: 75 FEET
HEARING - LEFT EAR: HEARING AID

## 2025-08-03 ASSESSMENT — PAIN - FUNCTIONAL ASSESSMENT
PAIN_FUNCTIONAL_ASSESSMENT: 0-10

## 2025-08-03 ASSESSMENT — HEART SCORE
TROPONIN: LESS THAN OR EQUAL TO NORMAL LIMIT
ECG: NON-SPECIFIC REPOLARIZATION DISTURBANCE
AGE: 65+
RISK FACTORS: >2 RISK FACTORS OR HX OF ATHEROSCLEROTIC DISEASE
HISTORY: MODERATELY SUSPICIOUS
HEART SCORE: 6

## 2025-08-03 ASSESSMENT — PAIN DESCRIPTION - DESCRIPTORS
DESCRIPTORS: STABBING;SHARP
DESCRIPTORS: STABBING;SHARP
DESCRIPTORS: PRESSURE;STABBING
DESCRIPTORS: PRESSURE;SHARP

## 2025-08-03 ASSESSMENT — PAIN DESCRIPTION - PROGRESSION
CLINICAL_PROGRESSION: NOT CHANGED
CLINICAL_PROGRESSION: GRADUALLY IMPROVING

## 2025-08-03 ASSESSMENT — PAIN DESCRIPTION - FREQUENCY
FREQUENCY: CONSTANT/CONTINUOUS
FREQUENCY: CONSTANT/CONTINUOUS

## 2025-08-03 ASSESSMENT — PAIN DESCRIPTION - ORIENTATION
ORIENTATION: MID;UPPER
ORIENTATION: MID;UPPER
ORIENTATION: MID

## 2025-08-03 ASSESSMENT — LIFESTYLE VARIABLES
HOW OFTEN DO YOU HAVE A DRINK CONTAINING ALCOHOL: NEVER
HOW MANY STANDARD DRINKS CONTAINING ALCOHOL DO YOU HAVE ON A TYPICAL DAY: PATIENT DOES NOT DRINK
HOW OFTEN DO YOU HAVE 6 OR MORE DRINKS ON ONE OCCASION: NEVER
AUDIT-C TOTAL SCORE: 0
AUDIT-C TOTAL SCORE: 0
SKIP TO QUESTIONS 9-10: 1

## 2025-08-03 ASSESSMENT — PAIN DESCRIPTION - PAIN TYPE
TYPE: ACUTE PAIN

## 2025-08-03 NOTE — ED PROVIDER NOTES
HPI   Chief Complaint   Patient presents with    Chest Pain     Started 1 hour ago       Patient is a 89-year-old female presenting to the emergency department for evaluation of chest pain.  Patient states chest pain started approximately an hour ago.  She describes it as a stabbing pain in the middle of her chest.  Does not radiate anywhere.  Nothing makes it better or worse.  She was given aspirin and nitro prior to arrival by EMS.  She also admits to some generalized abdominal pain and nausea.  She does admit to a history of heart attack in 2023 however states that this does not feel like her heart attack.  States she has been taking medications as prescribed.  Denies any shortness of breath, fevers, chills, vomiting, cough, congestion, headaches, numbness, tingling, hematuria, dysuria, constipation, diarrhea.              Patient History   Medical History[1]  Surgical History[2]  Family History[3]  Social History[4]    Physical Exam   ED Triage Vitals [08/03/25 1419]   Temperature Heart Rate Respirations BP   36.6 °C (97.9 °F) 64 20 177/67      Pulse Ox Temp Source Heart Rate Source Patient Position   95 % Temporal Monitor Sitting      BP Location FiO2 (%)     Left arm --       Physical Exam  Vitals and nursing note reviewed.   Constitutional:       General: She is not in acute distress.     Appearance: She is well-developed. She is not ill-appearing or toxic-appearing.   HENT:      Head: Normocephalic and atraumatic.     Eyes:      Pupils: Pupils are equal, round, and reactive to light.       Cardiovascular:      Rate and Rhythm: Normal rate and regular rhythm.      Pulses:           Radial pulses are 2+ on the right side and 2+ on the left side.      Heart sounds: Normal heart sounds.   Pulmonary:      Effort: Pulmonary effort is normal.      Breath sounds: Normal breath sounds. No decreased breath sounds, wheezing, rhonchi or rales.   Abdominal:      Palpations: Abdomen is soft.      Tenderness: There is  abdominal tenderness (Generalized). There is no guarding or rebound.     Musculoskeletal:         General: Normal range of motion.      Cervical back: Normal range of motion.      Right lower leg: No edema.      Left lower leg: No edema.     Skin:     General: Skin is warm and dry.     Neurological:      General: No focal deficit present.      Mental Status: She is alert.     Psychiatric:         Mood and Affect: Mood normal.         Behavior: Behavior normal.           ED Course & MDM   ED Course as of 08/03/25 1711   Sun Aug 03, 2025   1429 HR of    63     ,     NSR, NAD, no sign of STEMI or NSTEMI, no Q wave but there is nonspecific T wave abnormality noted.    Reviewed and interpreted by me at time performed   [ML]   1512 HEMOGLOBIN(!): 8.0  Improved from previous labs [AJ]   1624 Patient seen in conjunction advanced practice provider.  Hemodynamically stable.  Chest pain resolved with nitro.  Heart score 6.  Nonspecific ST-T wave changes noted.  Symptoms related likely to gastritis however given her risk factors she would be admitted.  Heart lung sounds are normal.  No murmurs. [ML]      ED Course User Index  [AJ] Lyn Hull PA-C  [ML] Marty R Lejeune, DO         Diagnoses as of 08/03/25 1711   Chest pain, unspecified type   Pneumonia of lower lobe due to infectious organism, unspecified laterality                 No data recorded     [place] Coma Scale Score: 15 (08/03/25 1538 : Ev Ruiz RN) HEART Score: 6 (08/03/25 1623 : Marty R Lejeune, DO)                         Medical Decision Making  **Disclaimer parts of this chart have been completed using voice recognition software. Please excuse any errors of transcription.     Patient seen in conjunction with attending physician Dr. Lejeune.    HPI: Detailed above.    Exam: A medically appropriate exam performed, outlined above, given the known history and presentation.    History obtained from: Patient    EKG: Reviewed by myself.  Reviewed and interpreted  by my attending physician.    Labs/Diagnostics:  Labs Reviewed   CBC WITH AUTO DIFFERENTIAL - Abnormal       Result Value    WBC 4.7      nRBC 0.0      RBC 2.67 (*)     Hemoglobin 8.0 (*)     Hematocrit 26.3 (*)     MCV 99      MCH 30.0      MCHC 30.4 (*)     RDW 15.5 (*)     Platelets 321      Neutrophils % 72.6      Immature Granulocytes %, Automated 0.4      Lymphocytes % 14.2      Monocytes % 11.8      Eosinophils % 0.6      Basophils % 0.4      Neutrophils Absolute 3.43      Immature Granulocytes Absolute, Automated 0.02      Lymphocytes Absolute 0.67 (*)     Monocytes Absolute 0.56      Eosinophils Absolute 0.03      Basophils Absolute 0.02     COMPREHENSIVE METABOLIC PANEL - Abnormal    Glucose 153 (*)     Sodium 134 (*)     Potassium 3.3 (*)     Chloride 97 (*)     Bicarbonate 31      Anion Gap 9 (*)     Urea Nitrogen 15      Creatinine 0.83      eGFR 67      Calcium 9.0      Albumin 3.4      Alkaline Phosphatase 86      Total Protein 5.7 (*)     AST 14      Bilirubin, Total 0.5      ALT 9     SERIAL TROPONIN-INITIAL - Normal    Troponin I, High Sensitivity 11      Narrative:     Less than 99th percentile of normal range cutoff-  Female and children under 18 years old <14 ng/L; Male <21 ng/L: Negative  Repeat testing should be performed if clinically indicated.     Female and children under 18 years old 14-50 ng/L; Male 21-50 ng/L:  Consistent with possible cardiac damage and possible increased clinical   risk. Serial measurements may help to assess extent of myocardial damage.     >50 ng/L: Consistent with cardiac damage, increased clinical risk and  myocardial infarction. Serial measurements may help assess extent of   myocardial damage.      NOTE: Children less than 1 year old may have higher baseline troponin   levels and results should be interpreted in conjunction with the overall   clinical context.     NOTE: Troponin I testing is performed using a different   testing methodology at [facility] than at other   system hospitals. Direct result comparisons should only   be made within the same method.   LIPASE - Normal    Lipase 13      Narrative:     Venipuncture immediately after or during the administration of Metamizole may lead to falsely low results. Testing should be performed immediately prior to Metamizole dosing.   SERIAL TROPONIN, 1 HOUR - Normal    Troponin I, High Sensitivity 12      Narrative:     Less than 99th percentile of normal range cutoff-  Female and children under 18 years old <14 ng/L; Male <21 ng/L: Negative  Repeat testing should be performed if clinically indicated.     Female and children under 18 years old 14-50 ng/L; Male 21-50 ng/L:  Consistent with possible cardiac damage and possible increased clinical   risk. Serial measurements may help to assess extent of myocardial damage.     >50 ng/L: Consistent with cardiac damage, increased clinical risk and  myocardial infarction. Serial measurements may help assess extent of   myocardial damage.      NOTE: Children less than 1 year old may have higher baseline troponin   levels and results should be interpreted in conjunction with the overall   clinical context.     NOTE: Troponin I testing is performed using a different   testing methodology at [facility] than at other   [facility]. Direct result comparisons should only   be made within the same method.   TROPONIN SERIES- (INITIAL, 1 HR)    Narrative:     The following orders were created for panel order Troponin I Series, High Sensitivity (0, 1 HR).  Procedure                               Abnormality         Status                     ---------                               -----------         ------                     Troponin I, High Sensiti...[[id number]]  Normal              Final result               Troponin, High Sensitivi...[[id number]]  Normal              Final result                 Please view results for these tests on the individual orders.    URINALYSIS WITH REFLEX CULTURE AND MICROSCOPIC    Narrative:     The following orders were created for panel order Urinalysis with Reflex Culture and Microscopic.  Procedure                               Abnormality         Status                     ---------                               -----------         ------                     Urinalysis with Reflex C...[[id number]]                      In process                 Extra Urine Gray Tube[[id number]]                            In process                   Please view results for these tests on the individual orders.   URINALYSIS WITH REFLEX CULTURE AND MICROSCOPIC   EXTRA URINE GRAY TUBE   B-TYPE NATRIURETIC PEPTIDE   MICROSCOPIC ONLY, URINE     XR chest 1 view   Final Result   Small bilateral pleural effusions with bibasilar opacification,   better evaluated on concurrent CT, reported separately.        Signed by: Kacy Bernal 8/3/2025 3:56 PM   Dictation workstation:   [id number]      CT chest abdomen pelvis w IV contrast   Final Result   1. Ectasia of the esophagus, moderate-sized hiatal hernia noted, and   circumferential esophageal wall thickening. Please correlate for   esophagitis/gastroesophageal reflux.   2. Small bilateral pleural effusions with mild interstitial edema,   improved from prior exam.   3. Mild dependent right-greater-than-left lower lobe ground-glass to   mild consolidative opacities, for which superimposed developing   pneumonia is not excluded.   4. Nonemergent thyroid ultrasound is recommended to further evaluate   thyroid nodules.   5. Additional chronic and incidental findings as detailed above.        MACRO:   None        Signed by: Kacy Bernal 8/3/2025 3:54 PM   Dictation workstation:   [id number]        EMERGENCY DEPARTMENT COURSE and DIFFERENTIAL DIAGNOSIS/MDM:  Patient is a 89-year-old female with a history of atrial fibrillation on warfarin, coronary artery disease, SVT, TIA, hypertension, hyperlipidemia, diverticulosis  presenting to the emergency department for evaluation of chest pain.  On physical exam vital signs remarkable for systolic hypertension but otherwise stable patient is no acute distress.  Patient is some generalized tenderness to palpation of the abdomen with no rebound or guarding.  Diagnostic labs ordered as well as chest x-ray and CT of the chest abdomen and pelvis.  Patient also given IV Pepcid and Zofran.  She has no increased work of breathing and is satting at 95% on her baseline oxygen.  Lipase normal.  CMP remarkable for sodium of 134, potassium of 3.3, chloride 97.  Patient given p.o. potassium.  Troponin 11 which is down from previous troponin.  Lipase normal.  CT of the chest abdomen and pelvis showed evidence of esophagitis as well as bilateral pleural effusions that are decreased from prior exam and evidence of pneumonia therefore ceftriaxone and azithromycin ordered IV.  Due to patient's heart score being 6 I do feel patient warrants admission for further management of chest pain.  She is asymptomatic at this time.  Patient admitted to the hospitalist Dr. Jesusita Daniel who agreed to admission for further management of chest pain and possible pneumonia.     The patient presented with a chief complaint of chest pain. The differential diagnosis associated with this patient's presentation includes ACS, GERD, electrolyte abnormalities, dissection.     Vitals:    Vitals:    08/03/25 1530 08/03/25 1538 08/03/25 1631 08/03/25 1631   BP: (!) 192/69  (!) 183/69 (!) 186/71   BP Location: Right arm  Left arm Right arm   Patient Position: Sitting  Lying Lying   Pulse: 68      Resp: 18      Temp:       TempSrc:       SpO2: 97% 97%     Weight:       Height:         History Limited by:    None    Independent history obtained from:    None    External records reviewed:    Inpatient Notes/Discharge Summary from 7/25/2025 when patient was admitted for cardiogenic shock    Diagnostics interpreted by me:    CT  Scan(s) see MDM    Discussions with other clinicians:    Hospitalist/Admitting Team Dr. Daniel    Chronic conditions impacting care:    Heart Disease    Social determinants of health affecting care:    None    Diagnostic tests considered but not performed: None    ED Medications managed:    Medications   potassium chloride CR (Klor-Con M20) ER tablet 40 mEq (40 mEq oral Given 8/3/25 1527)   cefTRIAXone (Rocephin) 2 g in dextrose (iso) IV 50 mL (has no administration in time range)   azithromycin (Zithromax) 500 mg in dextrose 5%  mL (has no administration in time range)   famotidine PF (Pepcid) injection 20 mg (20 mg intravenous Given 8/3/25 1429)   ondansetron (Zofran) injection 4 mg (4 mg intravenous Given 8/3/25 1429)   iohexol (OMNIPaque) 350 mg iodine/mL solution 75 mL (75 mL intravenous Given 8/3/25 1435)   alum-mag hydroxide-simeth (Mylanta) 200-200-20 mg/5 mL oral suspension 30 mL (30 mL oral Given 8/3/25 1651)   lidocaine (Xylocaine) 2 % mouth solution 15 mL (15 mL oral Given 8/3/25 1651)   pantoprazole (Protonix) injection 40 mg (40 mg intravenous Given 8/3/25 1651)       Prescription drugs considered:    None    Screenings:     HEART Score: 6          Procedure  Procedures         [1]   Past Medical History:  Diagnosis Date    Abnormality of plasma protein 03/13/2025    Acute kidney injury 03/12/2025    Acute non-ST segment elevation myocardial infarction (Multi) 11/26/2023    Anemia     Arteriosclerosis of coronary artery 05/14/2023    Arthritis     Asthma     Atrial fibrillation (Multi) 11/26/2023    Jonas esophagus     Bilateral carotid artery occlusion 05/23/2023    Bradycardia 02/21/2025    CAD (coronary artery disease)     Cardiac rhythm disorder or disturbance or change 11/26/2023    Carpal tunnel syndrome of right wrist 08/01/2025    Cervical radiculopathy     Chest pain 11/26/2023    Chronic heart failure with preserved ejection fraction 02/21/2025    Chronic obstructive pulmonary  disease (Multi) 11/26/2023    Constipation     Coronary artery disease 11/26/2023    Degenerative joint disease     Dyslipidemia     Dysuria     Erosive esophagitis     Erythema of skin 04/07/2025    Fall at home, initial encounter 03/11/2025    Gastrointestinal hemorrhage with melena 04/08/2025    GERD (gastroesophageal reflux disease)     GIB (gastrointestinal bleeding) 04/07/2025    Hematochezia 04/08/2025    Hypercholesterolemia 05/23/2023    Hyperparathyroidism (Multi)     Hypertensive urgency 11/26/2023    Impaired fasting glucose 11/26/2023    Iron deficiency anemia due to chronic blood loss 04/08/2025    Irritable bowel syndrome (IBS)     Labile essential hypertension 11/26/2023    Labile hypertension     Left foot pain     Low blood pressure 11/26/2023    Lung infiltrate on CT 02/24/2025    Macular degeneration     Mixed hyperlipidemia 11/26/2023    Neck pain     Occipital neuralgia of right side 08/01/2025    Osteoporosis 11/26/2023    REclast '19 Cr 1.4 switch to calcitonin.    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    Stage 3 chronic kidney disease (Multi) 11/26/2023    Stage 3a chronic kidney disease (Multi) 02/21/2025    Stroke (Multi)     Supratherapeutic INR 03/12/2025    Transient ischemic attack 11/26/2023   [2]   Past Surgical History:  Procedure Laterality Date    CARDIAC CATHETERIZATION Left 9/27/2024    Procedure: Left Heart Cath;  Surgeon: Nessa Callaway MD;  Location: [facility] Cardiac Cath Lab;  Service: Cardiovascular;  Laterality: Left;    CARDIAC CATHETERIZATION N/A 9/27/2024    Procedure: PCI;  Surgeon: Nessa Callaway,  MD;  Location: [facility] Cardiac Cath Lab;  Service: Cardiovascular;  Laterality: N/A;    CARDIOVASCULAR STRESS TEST  2017    Dr. Khalil    CARDIOVASCULAR STRESS TEST  2004    Dr. Pantoja    CARPAL TUNNEL RELEASE Left 2014    CATARACT EXTRACTION Bilateral 2011    CHOLECYSTECTOMY      CORONARY STENT PLACEMENT  05/2023    [facility]AD    CT HEAD ANGIO W AND WO IV CONTRAST  05/08/2013    CT HEAD ANGIO W AND WO IV CONTRAST [facility]    MR HEAD ANGIO WO IV CONTRAST  04/23/2013    MR HEAD ANGIO WO IV CONTRAST [facility]    MR HEAD ANGIO WO IV CONTRAST  07/20/2016    MR HEAD ANGIO WO IV CONTRAST [facility]    MR HEAD ANGIO WO IV CONTRAST  10/27/2023    MR HEAD ANGIO WO IV CONTRAST 10/27/2023 [facility]    MR NECK ANGIO WO IV CONTRAST  10/27/2023    MR NECK ANGIO WO IV CONTRAST 10/27/2023 [facility]    OTHER SURGICAL HISTORY  02/14/2022    No history of surgery    REVISION TOTAL HIP ARTHROPLASTY Left 2013    Conversion from left ORIF    TOTAL HIP ARTHROPLASTY Right 2016   [3]   Family History  Problem Relation Name Age of Onset    No Known Problems Mother      No Known Problems Father      No Known Problems Sister     [4]   Social History  Tobacco Use    Smoking status: Never     Passive exposure: Never    Smokeless tobacco: Never   Vaping Use    Vaping status: Never Used   Substance Use Topics    Alcohol use: Yes     Comment: occassional    Drug use: Never        Lyn Hull PA-C  08/03/25 [id number]

## 2025-08-03 NOTE — NURSING NOTE
I notified Dr Callahaned that pt arrived in her room from ED, pt high /68 pt has had high BP in ED but never treated.

## 2025-08-03 NOTE — CARE PLAN
Problem: Pain - Adult  Goal: Verbalizes/displays adequate comfort level or baseline comfort level  Outcome: Progressing   The patient's goals for the shift include no chest pain    The clinical goals for the shift include no chest pain

## 2025-08-03 NOTE — H&P
Chief Complaint   Patient presents with    Chest Pain     Started 1 hour ago       HPI    Rakan Cervantes is a 89 y.o. female with a PMHx of pAF on warfarin,(discontinued last admission), CAD s/p PCI to distal L circ 9/2024, CHF, SVT, TIA, HTN, HLD, BL carotid artery stenosis, diverticulosis, GIB, polyarteritis (on Hydroxychloroquine) , Jonas esophagus  who presented to [facility] on 8/3/2025 with a chief complaint of Chest pain.  Patient stated that pain started about noon time, when she started having continuous, sharp,10/10 pain in severity, slightly to the left of the sternum, with possible radiation between the shoulder blades.Patient was also having some nausea but no vomiting. She reported having chills(feeling cold) but no fever, no cough, OR SOB, she is on 1 L NC lately which has not changed. Patient also endorsed having dull abdominal pain, mainly epigastric, non radiating, started around the same time as the chest pain. She also mentioned having some numbness around her mouth, which happened before many times. She was having some headache as well. She reported dysuria on the day of admission.  She denies any  change in vision, difficulty swallowing, change in hearing, palpitation, weight change, change in bowel habits, joint pain/swelling, weakness in any of the extremities or swelling. She lives with her .    ROS: 10 point review of systems negative with the exception of above.    ED Course:  CBC was remarkable for hemoglobin of 8, hypokalemia 3.3, hyponatremia 134, , troponin 11 and 12, normal lipase 13, , UA 75 LE, WBCs 6-10,   CT chest abdomen pelvis  1. Ectasia of the esophagus, moderate-sized hiatal hernia noted, and circumferential esophageal wall thickening. Please correlate for esophagitis/gastroesophageal reflux.  2. Small bilateral pleural effusions with mild interstitial edema, improved from prior exam.  3. Mild dependent right-greater-than-left lower lobe ground-glass  to mild consolidative opacities, for which superimposed developing pneumonia is not excluded.  4. Nonemergent thyroid ultrasound is recommended to further evaluate thyroid nodules.  ED Triage Vitals [08/03/25 1419]   Temperature Heart Rate Respirations BP   36.6 °C (97.9 °F) 64 20 177/67      Pulse Ox Temp Source Heart Rate Source Patient Position   95 % Temporal Monitor Sitting      BP Location FiO2 (%)     Left arm --         Labs:  Abnormal Labs Reviewed   CBC WITH AUTO DIFFERENTIAL - Abnormal; Notable for the following components:       Result Value    RBC 2.67 (*)     Hemoglobin 8.0 (*)     Hematocrit 26.3 (*)     MCHC 30.4 (*)     RDW 15.5 (*)     Lymphocytes Absolute 0.67 (*)     All other components within normal limits   COMPREHENSIVE METABOLIC PANEL - Abnormal; Notable for the following components:    Glucose 153 (*)     Sodium 134 (*)     Potassium 3.3 (*)     Chloride 97 (*)     Anion Gap 9 (*)     Total Protein 5.7 (*)     All other components within normal limits        No orders to display       XR chest 1 view   Final Result   Small bilateral pleural effusions with bibasilar opacification,   better evaluated on concurrent CT, reported separately.        Signed by: Kacy Bernal 8/3/2025 3:56 PM   Dictation workstation:   [id number]      CT chest abdomen pelvis w IV contrast   Final Result   1. Ectasia of the esophagus, moderate-sized hiatal hernia noted, and   circumferential esophageal wall thickening. Please correlate for   esophagitis/gastroesophageal reflux.   2. Small bilateral pleural effusions with mild interstitial edema,   improved from prior exam.   3. Mild dependent right-greater-than-left lower lobe ground-glass to   mild consolidative opacities, for which superimposed developing   pneumonia is not excluded.   4. Nonemergent thyroid ultrasound is recommended to further evaluate   thyroid nodules.   5. Additional chronic and incidental findings as detailed above.        MACRO:   None         Signed by: Kacy Bernal 8/3/2025 3:54 PM   Dictation workstation:   [id number]        XR chest 1 view   Final Result   Small bilateral pleural effusions with bibasilar opacification,   better evaluated on concurrent CT, reported separately.        Signed by: Kacy Bernal 8/3/2025 3:56 PM   Dictation workstation:   [id number]      CT chest abdomen pelvis w IV contrast   Final Result   1. Ectasia of the esophagus, moderate-sized hiatal hernia noted, and   circumferential esophageal wall thickening. Please correlate for   esophagitis/gastroesophageal reflux.   2. Small bilateral pleural effusions with mild interstitial edema,   improved from prior exam.   3. Mild dependent right-greater-than-left lower lobe ground-glass to   mild consolidative opacities, for which superimposed developing   pneumonia is not excluded.   4. Nonemergent thyroid ultrasound is recommended to further evaluate   thyroid nodules.   5. Additional chronic and incidental findings as detailed above.        MACRO:   None        Signed by: Kacy Bernal 8/3/2025 3:54 PM   Dictation workstation:   [id number]              Intervention: In ED, patient received   Medications   potassium chloride CR (Klor-Con M20) ER tablet 40 mEq (40 mEq oral Given 8/3/25 1527)   cefTRIAXone (Rocephin) 2 g in dextrose (iso) IV 50 mL (has no administration in time range)   azithromycin (Zithromax) 500 mg in dextrose 5%  mL (has no administration in time range)   famotidine PF (Pepcid) injection 20 mg (20 mg intravenous Given 8/3/25 1429)   ondansetron (Zofran) injection 4 mg (4 mg intravenous Given 8/3/25 1429)   iohexol (OMNIPaque) 350 mg iodine/mL solution 75 mL (75 mL intravenous Given 8/3/25 1435)   alum-mag hydroxide-simeth (Mylanta) 200-200-20 mg/5 mL oral suspension 30 mL (30 mL oral Given 8/3/25 1651)   lidocaine (Xylocaine) 2 % mouth solution 15 mL (15 mL oral Given 8/3/25 1651)   pantoprazole (Protonix) injection 40 mg (40 mg intravenous Given  "8/3/25 9214)      Patient was then transferred to the floor for further management      Meds:   Modified Medications    No medications on file       Follows up with Dr. Samuel Sky MD      Past Medical History   Medical History[1]   Surgical History   Surgical History[2]  Family History   Family History[3]  Social History     Tobacco Use: Low Risk  (7/21/2025)    Patient History     Smoking Tobacco Use: Never     Smokeless Tobacco Use: Never     Passive Exposure: Never      Social History     Substance and Sexual Activity   Alcohol Use Yes    Comment: occassional      Allergies   RX Allergies[4]   Meds    Scheduled medications  Scheduled Medications[5]  Continuous medications  Continuous Medications[6]  PRN medications  PRN Medications[7]   Objective     Vitals  Visit Vitals  BP (!) 186/71 (BP Location: Right arm, Patient Position: Lying)   Pulse 68   Temp 36.6 °C (97.9 °F) (Temporal)   Resp 18   Ht (!) 1.499 m (4' 11\")   Wt 50.8 kg (112 lb)   SpO2 97% Comment: Pt wears 1L of O2 via NC at baseline.   BMI 22.62 kg/m²   OB Status Postmenopausal   Smoking Status Never   BSA 1.45 m²        Review of Systems  Temperature:  [36.6 °C (97.9 °F)] 36.6 °C (97.9 °F)  Heart Rate:  [64-68] 68  Respirations:  [18-20] 18  BP: (177-192)/(67-71) 186/71  No intake/output data recorded.  No intake/output data recorded.  Physical Exam  Constitutional:       General: She is not in acute distress.     Appearance: Normal appearance.   HENT:      Head: Normocephalic and atraumatic.      Nose: Nose normal.      Mouth/Throat:      Mouth: Mucous membranes are moist.      Pharynx: Oropharynx is clear.     Eyes:      Extraocular Movements: Extraocular movements intact.      Conjunctiva/sclera: Conjunctivae normal.      Pupils: Pupils are equal, round, and reactive to light.       Cardiovascular:      Rate and Rhythm: Normal rate. Rhythm irregular.      Pulses: Normal pulses.      Heart sounds: Normal heart sounds.   Pulmonary: " "     Effort: Pulmonary effort is normal.      Breath sounds: Normal breath sounds.   Abdominal:      General: Abdomen is flat.      Palpations: Abdomen is soft.      Tenderness: There is abdominal tenderness (Epigastric on palpation).     Musculoskeletal:         General: No swelling. Normal range of motion.      Cervical back: Normal range of motion and neck supple.      Right lower leg: No edema.      Left lower leg: No edema.     Skin:     General: Skin is warm and dry.     Neurological:      General: No focal deficit present.      Mental Status: She is alert and oriented to person, place, and time. Mental status is at baseline.     Psychiatric:         Mood and Affect: Mood normal.         Behavior: Behavior normal.       Assessment & Plan          I/Os  No intake or output data in the 24 hours ending 08/03/25 1710      Labs:   Results from last 72 hours   Lab Units 08/03/25  1426   SODIUM mmol/L 134*   POTASSIUM mmol/L 3.3*   CHLORIDE mmol/L 97*   CO2 mmol/L 31   BUN mg/dL 15   CREATININE mg/dL 0.83   GLUCOSE mg/dL 153*   CALCIUM mg/dL 9.0   ANION GAP mmol/L 9*   EGFR mL/min/1.73m*2 67      Results from last 72 hours   Lab Units 08/03/25  1426   WBC AUTO x10*3/uL 4.7   HEMOGLOBIN g/dL 8.0*   HEMATOCRIT % 26.3*   PLATELETS AUTO x10*3/uL 321   NEUTROS PCT AUTO % 72.6   LYMPHS PCT AUTO % 14.2   MONOS PCT AUTO % 11.8   EOS PCT AUTO % 0.6      Lab Results   Component Value Date    CALCIUM 9.0 08/03/2025    PHOS 3.3 07/23/2025      Lab Results   Component Value Date    CRP 0.3 10/21/2021      [unfilled]     Micro/ID:   No results found for the last 90 days.                   No lab exists for component: \"AGALPCRNB\"   .ID  Lab Results   Component Value Date    URINECULTURE (A) 11/01/2024     Multiple organisms present, probable contamination. Repeat culture if clinically indicated.     Images    XR chest 1 view  Narrative: Interpreted By:  Kacy Bernal,   STUDY:  XR CHEST 1 VIEW;  8/3/2025 2:54 pm    "   INDICATION:  Signs/Symptoms:Chest Pain.      COMPARISON:  07/20/2025      ACCESSION NUMBER(S):  [id number]      ORDERING CLINICIAN:  MARTY LEJEUNE      FINDINGS:  AP radiograph of the chest was provided.              CARDIOMEDIASTINAL SILHOUETTE:  Cardiomediastinal silhouette is stable in size and configuration.      LUNGS:  Small bilateral pleural effusions with bibasilar opacification,  better evaluated on concurrent CT, reported separately.      ABDOMEN:  No remarkable upper abdominal findings.      BONES:  Reverse right shoulder arthroplasty. Advanced osteoarthritis of the  left shoulder.      Impression: Small bilateral pleural effusions with bibasilar opacification,  better evaluated on concurrent CT, reported separately.      Signed by: Kacy Bernal 8/3/2025 3:56 PM  Dictation workstation:   [id number]  CT chest abdomen pelvis w IV contrast  Narrative: Interpreted By:  Kacy Bernal,   STUDY:  CT CHEST ABDOMEN PELVIS W IV CONTRAST;  8/3/2025 2:43 pm      INDICATION:  Signs/Symptoms:abdominal pain, epigastric pain.          COMPARISON:  10/28/2024      ACCESSION NUMBER(S):  [id number]      ORDERING CLINICIAN:  RANDI HERNÁNDEZ      TECHNIQUE:  CT of the chest, abdomen, and pelvis was performed.  Contiguous axial  images were obtained at 3 mm slice thickness through the chest,  abdomen and pelvis. Coronal and sagittal reconstructions at 3 mm  slice thickness were performed. 75 ML of Omnipaque 350 was  administered intravenously without immediate complication.      FINDINGS:  Marked beam hardening artifact from posterior fusion hardware, right  shoulder arthroplasty, bilateral hip prosthesis, and arms in the  downward position limits evaluation.      CHEST:      LUNG/PLEURA/LARGE AIRWAYS:  Small bilateral pleural effusions with mild interstitial edema,  improved from prior exam. Mild dependent right-greater-than-left  lower lobe ground-glass to mild consolidative opacities. Trachea and  right and left main  bronchi are patent.      VESSELS:  Aorta and pulmonary arteries are normal caliber.  Severe atherosclerotic calcifications of the thoracic aorta.  Severe coronary artery calcifications versus stent.      HEART:  The heart is normal in size.  No pericardial effusion.      MEDIASTINUM AND DONATO:  No mediastinal, hilar or axillary lymphadenopathy.  Ectasia of the esophagus, moderate-sized hiatal hernia noted, and  circumferential esophageal wall thickening.      CHEST WALL AND LOWER NECK:  No soft tissue masses in the chest wall.  Multinodular thyroid noted, measuring up to 16 mm on the left.      ABDOMEN:      LIVER:  The liver is normal in size without evidence of focal liver lesions.      BILE DUCTS:  No biliary dilatation.      GALLBLADDER:  Cholecystectomy      PANCREAS:  The pancreas appears unremarkable without evidence of ductal  dilatation or masses.      SPLEEN:  The spleen is normal in size.      ADRENAL GLANDS:  No adrenal nodule or thickening.      KIDNEYS AND URETERS:  The kidneys are normal in size. This exam is nondiagnostic for  evaluation of suspicious renal lesions due to marked beam hardening  artifact. No hydroureteronephrosis or nephroureterolithiasis.      PELVIS:      BLADDER:  Within normal limits.      REPRODUCTIVE ORGANS:  No pelvic masses.      BOWEL:  Moderate-sized hiatal hernia.  The small and large bowel are normal in caliber and demonstrate no  wall thickening. The appendix is not definitely visualized. There is  however no pericecal stranding or fluid. Prominent sigmoid  diverticulosis without acute diverticulitis.      VESSELS:  Severe atherosclerotic calcifications of the aortoiliac arteries.  The IVC appears normal.  Portal vein, splenic vein, and SMV are patent.      PERITONEUM/RETROPERITONEUM/LYMPH NODES:  No ascites or fluid collection.  No peritoneal nodularity or deposits.  The retroperitoneum is unremarkable.  No abdominopelvic lymphadenopathy is present.      BONE AND SOFT  TISSUE:  No acute fracture.  Degenerative changes of the thoracolumbar spine. Multilevel endplate  irregularities noted. Posterior fusion hardware, right shoulder  arthroplasty, and bilateral hip prosthesis noted. No significant  abnormality of the abdominal wall soft tissues.      Impression: 1. Ectasia of the esophagus, moderate-sized hiatal hernia noted, and  circumferential esophageal wall thickening. Please correlate for  esophagitis/gastroesophageal reflux.  2. Small bilateral pleural effusions with mild interstitial edema,  improved from prior exam.  3. Mild dependent right-greater-than-left lower lobe ground-glass to  mild consolidative opacities, for which superimposed developing  pneumonia is not excluded.  4. Nonemergent thyroid ultrasound is recommended to further evaluate  thyroid nodules.  5. Additional chronic and incidental findings as detailed above.      MACRO:  None      Signed by: Kacy Bernal 8/3/2025 3:54 PM  Dictation workstation:   [id number]    Assessment and Plan    Rakan Cervantes is a 89 y.o. female admitted for chest pain.    Acute Medical Issues   #Chest pain:  # Possible PNA:  # Esophagitis/GERD;history of Jonas esophagus;  #Dyspepsia:  #Chronic Respiratory failure on 1 L NC (CHF/Asthma?)  - Less likely ACS, troponin negative x 2, EKG with no ischemic changes.  - CT chest showing esophageal ectasia, which might be secondary to esophagitis/reflux, could be causing the patient's symptoms.  - Continue to monitor on telemetry.  - According to the son and chart review, she is on 1 L NC, for possible asthma?   - Continue home pantoprazole twice daily, sucralfate daily, Ranexa 500 mg twice daily, patient is on aspirin 81 mg daily and atorvastatin 80 mg daily.  - CT showing Mild dependent right-greater-than-left lower lobe ground-glass to mild consolidative opacities, for which superimposed developing pneumonia is not excluded.   - Received ceftriaxone and azithromycin in the ED for  possible pneumonia.  - Symptoms are less concerning for pneumonia, the patient denied having any cough, no leukocytosis on admission or shortness of breath,on 1 l NC(baseline), she does have some chills  - Will continue with antibiotics for now,will obtain PNA labs, pro-shaheed, mycoplasma, MRSA, legionella, urine strept, deescalate as appropriate.    -Albuterol as needed and DuoNeb.  -Will add Mylanta   - RT evaluate and treat, SLP      #Dysuria:  # Possible UTI  -Patient reported having dysuria on day of admission, UA with positive LE and mild WBCs.  - Patient is on ceftriaxone for possible pneumonia and will be covering for possible UTI.    # Hypertensive urgency:  - Patient's blood pressure on admission 192/69, no signs of endorgan damage.  - Unclear when was the last time the patient took her blood pressure medications.  - Will resume blood pressure medications, as needed hydralazine.        Chronic Medical Issues   #HTN/CAD/HLD   - Continue aspirin, 81 mg, oral, Daily, atorvastatin, 80 mg, oral, Nightly, carvedilol, 12.5 mg, oral, BID,   hydrALAZINE, 100 mg, oral, BID, hydroCHLOROthiazide, 12.5 mg, oral, Daily, ranolazine, 500 mg, oral, BID      #Diastolic CHF  - Not in exacerbation , mildly elevated BNP and improving pleural effusion.  - Continue PRN lasix.    #Depression/anxiety:  - Continue Duloxetine, 30 mg, oral, Daily with evening meal      #Anemia:  - On ferrous sulfate, 1 tablet, oral, Once per day on Monday Wednesday Friday    #Peripheral neuropathy:  - Continue gabapentin, 300 mg, oral, BID      #Polyarteritis   - Continue hydroxychloroquine, 200 mg, oral, Daily and sulfasalazine, 500 mg, oral, BID    #Hypokalemia:  - continue potassium chloride CR, 40 mEq, oral, Daily  - Continue to monitor, replete as needed            F: PO intake & IVF PRN  E: Replete as needed  N: Cardiac diet  GI ppx: Protonix 40 mg twice daily   DVT ppx: Lovenox subcutaneous  Antibiotics: Ceftriaxone/azithromycin.   Oxygenation: 1  L NC    Code Status: DNR and No Intubation , confirmed with the patient.  Emergency Contact: Extended Emergency Contact Information  Primary Emergency Contact: Philip Cervantes  Address[place] [address] [place] [id number]  Home Phone: [phone]  Relation: Spouse  Secondary Emergency Contact: Eduar Cervantes  Mobile Phone: [phone]  Relation: Child     Disposition: 89 y.o.female admitted for chest pain,  Anticipate LOS > 2 midnights.         Jesusita Daniel, DO   Internal Medicine, Hospitalist   [facility]  [place]         [1]   Past Medical History:  Diagnosis Date    Abnormality of plasma protein 03/13/2025    Acute kidney injury 03/12/2025    Acute non-ST segment elevation myocardial infarction (Multi) 11/26/2023    Anemia     Arteriosclerosis of coronary artery 05/14/2023    Arthritis     Asthma     Atrial fibrillation (Multi) 11/26/2023    Jonas esophagus     Bilateral carotid artery occlusion 05/23/2023    Bradycardia 02/21/2025    CAD (coronary artery disease)     Cardiac rhythm disorder or disturbance or change 11/26/2023    Carpal tunnel syndrome of right wrist 08/01/2025    Cervical radiculopathy     Chest pain 11/26/2023    Chronic heart failure with preserved ejection fraction 02/21/2025    Chronic obstructive pulmonary disease (Multi) 11/26/2023    Constipation     Coronary artery disease 11/26/2023    Degenerative joint disease     Dyslipidemia     Dysuria     Erosive esophagitis     Erythema of skin 04/07/2025    Fall at home, initial encounter 03/11/2025    Gastrointestinal hemorrhage with melena 04/08/2025    GERD (gastroesophageal reflux disease)     GIB (gastrointestinal bleeding) 04/07/2025    Hematochezia 04/08/2025    Hypercholesterolemia 05/23/2023    Hyperparathyroidism (Multi)     Hypertensive urgency 11/26/2023    Impaired fasting glucose 11/26/2023    Iron deficiency anemia due to chronic blood loss 04/08/2025    Irritable bowel syndrome (IBS)     Labile essential hypertension  11/26/2023    Labile hypertension     Left foot pain     Low blood pressure 11/26/2023    Lung infiltrate on CT 02/24/2025    Macular degeneration     Mixed hyperlipidemia 11/26/2023    Neck pain     Occipital neuralgia of right side 08/01/2025    Osteoporosis 11/26/2023    REclast '19 Cr 1.4 switch to calcitonin.    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    Stage 3 chronic kidney disease (Multi) 11/26/2023    Stage 3a chronic kidney disease (Multi) 02/21/2025    Stroke (Multi)     Supratherapeutic INR 03/12/2025    Transient ischemic attack 11/26/2023   [2]   Past Surgical History:  Procedure Laterality Date    CARDIAC CATHETERIZATION Left 9/27/2024    Procedure: Left Heart Cath;  Surgeon: Nessa Callaway MD;  Location: [facility] Cardiac Cath Lab;  Service: Cardiovascular;  Laterality: Left;    CARDIAC CATHETERIZATION N/A 9/27/2024    Procedure: PCI;  Surgeon: Nessa Callaway MD;  Location: [facility] Cardiac Cath Lab;  Service: Cardiovascular;  Laterality: N/A;    CARDIOVASCULAR STRESS TEST  2017    Dr. Khalil    CARDIOVASCULAR STRESS TEST  2004    Dr. Pantoja    CARPAL TUNNEL RELEASE Left 2014    CATARACT EXTRACTION Bilateral 2011    CHOLECYSTECTOMY      CORONARY STENT PLACEMENT  05/2023    LAD    CT HEAD ANGIO W AND WO IV CONTRAST  05/08/2013    CT HEAD ANGIO W AND WO IV CONTRAST [facility]    MR HEAD ANGIO WO IV CONTRAST  04/23/2013    MR HEAD ANGIO WO IV CONTRAST [facility]    MR HEAD ANGIO WO IV CONTRAST  07/20/2016    MR HEAD ANGIO WO IV CONTRAST [facility]    MR HEAD ANGIO WO IV CONTRAST  10/27/2023    MR HEAD ANGIO WO IV  "CONTRAST 10/27/2023 [facility]    MR NECK ANGIO WO IV CONTRAST  10/27/2023    MR NECK ANGIO WO IV CONTRAST 10/27/2023 [facility]    OTHER SURGICAL HISTORY  02/14/2022    No history of surgery    REVISION TOTAL HIP ARTHROPLASTY Left 2013    Conversion from left ORIF    TOTAL HIP ARTHROPLASTY Right 2016   [3]   Family History  Problem Relation Name Age of Onset    No Known Problems Mother      No Known Problems Father      No Known Problems Sister     [4]   Allergies  Allergen Reactions    Meperidine Hallucinations     Other reaction(s): Mental Status Change    Morphine Hallucinations    Opioids - Morphine Analogues Hallucinations and Confusion    Pregabalin Rash, Other and Unknown     \"Flu like symptoms\"    Flu like symtoms    Tramadol Rash, Itching and Unknown   [5] azithromycin, 500 mg, intravenous, Once  cefTRIAXone, 2 g, intravenous, Once  potassium chloride CR, 40 mEq, oral, Daily  [6]    [7]   "

## 2025-08-03 NOTE — NURSING NOTE
Pt arrived in her room from ED, on 2L of oxygen, alert and orientedx3, bed alarm engaged, call light within reach.

## 2025-08-03 NOTE — ED TRIAGE NOTES
Pt BIB EMS for chest pain that started an hour ago. Per EMS they gave the pt 324 mg of Asprin and 1 nitroglycerin which brought her pain to a 5/10. Pt is newly diagnosed with afib and started blood thinners within the last month. Pt also is on 1L O2 at home. Pt also complaining of N/V and belly pain at this time. Pt alert and oriented x4

## 2025-08-04 LAB
ALBUMIN SERPL BCP-MCNC: 3.1 G/DL (ref 3.4–5)
ANION GAP SERPL CALCULATED.3IONS-SCNC: 9 MMOL/L (ref 10–20)
BUN SERPL-MCNC: 14 MG/DL (ref 6–23)
CALCIUM SERPL-MCNC: 8.6 MG/DL (ref 8.6–10.3)
CARDIAC TROPONIN I PNL SERPL HS: 9 NG/L (ref 0–13)
CHLORIDE SERPL-SCNC: 98 MMOL/L (ref 98–107)
CO2 SERPL-SCNC: 31 MMOL/L (ref 21–32)
CREAT SERPL-MCNC: 0.72 MG/DL (ref 0.5–1.05)
EGFRCR SERPLBLD CKD-EPI 2021: 80 ML/MIN/1.73M*2
ERYTHROCYTE [DISTWIDTH] IN BLOOD BY AUTOMATED COUNT: 15.1 % (ref 11.5–14.5)
GLUCOSE SERPL-MCNC: 83 MG/DL (ref 74–99)
HCT VFR BLD AUTO: 24.3 % (ref 36–46)
HGB BLD-MCNC: 7.7 G/DL (ref 12–16)
LEGIONELLA AG UR QL: NEGATIVE
MCH RBC QN AUTO: 30.2 PG (ref 26–34)
MCHC RBC AUTO-ENTMCNC: 31.7 G/DL (ref 32–36)
MCV RBC AUTO: 95 FL (ref 80–100)
NRBC BLD-RTO: 0 /100 WBCS (ref 0–0)
PHOSPHATE SERPL-MCNC: 3.9 MG/DL (ref 2.5–4.9)
PLATELET # BLD AUTO: 253 X10*3/UL (ref 150–450)
POTASSIUM SERPL-SCNC: 3.6 MMOL/L (ref 3.5–5.3)
PROCALCITONIN SERPL-MCNC: 0.36 NG/ML
RBC # BLD AUTO: 2.55 X10*6/UL (ref 4–5.2)
S PNEUM AG UR QL: POSITIVE
SODIUM SERPL-SCNC: 134 MMOL/L (ref 136–145)
WBC # BLD AUTO: 3.9 X10*3/UL (ref 4.4–11.3)

## 2025-08-04 PROCEDURE — 97165 OT EVAL LOW COMPLEX 30 MIN: CPT | Mod: GO

## 2025-08-04 PROCEDURE — 36415 COLL VENOUS BLD VENIPUNCTURE: CPT | Performed by: INTERNAL MEDICINE

## 2025-08-04 PROCEDURE — 92610 EVALUATE SWALLOWING FUNCTION: CPT | Mod: GN | Performed by: SPEECH-LANGUAGE PATHOLOGIST

## 2025-08-04 PROCEDURE — 80069 RENAL FUNCTION PANEL: CPT | Performed by: INTERNAL MEDICINE

## 2025-08-04 PROCEDURE — 2500000002 HC RX 250 W HCPCS SELF ADMINISTERED DRUGS (ALT 637 FOR MEDICARE OP, ALT 636 FOR OP/ED)

## 2025-08-04 PROCEDURE — 99233 SBSQ HOSP IP/OBS HIGH 50: CPT | Performed by: INTERNAL MEDICINE

## 2025-08-04 PROCEDURE — 2500000004 HC RX 250 GENERAL PHARMACY W/ HCPCS (ALT 636 FOR OP/ED): Performed by: INTERNAL MEDICINE

## 2025-08-04 PROCEDURE — 1200000002 HC GENERAL ROOM WITH TELEMETRY DAILY

## 2025-08-04 PROCEDURE — 99223 1ST HOSP IP/OBS HIGH 75: CPT | Performed by: INTERNAL MEDICINE

## 2025-08-04 PROCEDURE — 2500000001 HC RX 250 WO HCPCS SELF ADMINISTERED DRUGS (ALT 637 FOR MEDICARE OP): Performed by: NURSE PRACTITIONER

## 2025-08-04 PROCEDURE — 85027 COMPLETE CBC AUTOMATED: CPT | Performed by: INTERNAL MEDICINE

## 2025-08-04 PROCEDURE — 97530 THERAPEUTIC ACTIVITIES: CPT | Mod: GP

## 2025-08-04 PROCEDURE — 2500000001 HC RX 250 WO HCPCS SELF ADMINISTERED DRUGS (ALT 637 FOR MEDICARE OP): Performed by: INTERNAL MEDICINE

## 2025-08-04 PROCEDURE — 84484 ASSAY OF TROPONIN QUANT: CPT | Performed by: NURSE PRACTITIONER

## 2025-08-04 PROCEDURE — 97162 PT EVAL MOD COMPLEX 30 MIN: CPT | Mod: GP

## 2025-08-04 PROCEDURE — 2500000002 HC RX 250 W HCPCS SELF ADMINISTERED DRUGS (ALT 637 FOR MEDICARE OP, ALT 636 FOR OP/ED): Performed by: INTERNAL MEDICINE

## 2025-08-04 RX ADMIN — HYDROXYCHLOROQUINE SULFATE 200 MG: 200 TABLET, FILM COATED ORAL at 08:00

## 2025-08-04 RX ADMIN — HYDRALAZINE HYDROCHLORIDE 5 MG: 20 INJECTION INTRAMUSCULAR; INTRAVENOUS at 23:41

## 2025-08-04 RX ADMIN — CARVEDILOL 12.5 MG: 12.5 TABLET, FILM COATED ORAL at 08:00

## 2025-08-04 RX ADMIN — PANTOPRAZOLE SODIUM 40 MG: 40 TABLET, DELAYED RELEASE ORAL at 21:54

## 2025-08-04 RX ADMIN — GABAPENTIN 300 MG: 300 CAPSULE ORAL at 08:00

## 2025-08-04 RX ADMIN — CEFTRIAXONE 1 G: 1 INJECTION, SOLUTION INTRAVENOUS at 17:26

## 2025-08-04 RX ADMIN — SULFASALAZINE 500 MG: 500 TABLET ORAL at 08:00

## 2025-08-04 RX ADMIN — PANTOPRAZOLE SODIUM 40 MG: 40 TABLET, DELAYED RELEASE ORAL at 08:00

## 2025-08-04 RX ADMIN — HYDRALAZINE HYDROCHLORIDE 100 MG: 50 TABLET ORAL at 08:00

## 2025-08-04 RX ADMIN — AZITHROMYCIN DIHYDRATE 500 MG: 500 TABLET ORAL at 17:26

## 2025-08-04 RX ADMIN — FERROUS SULFATE TAB 325 MG (65 MG ELEMENTAL FE) 1 TABLET: 325 (65 FE) TAB at 08:00

## 2025-08-04 RX ADMIN — DULOXETINE 30 MG: 30 CAPSULE, DELAYED RELEASE ORAL at 17:26

## 2025-08-04 RX ADMIN — HYDROCHLOROTHIAZIDE 12.5 MG: 12.5 CAPSULE ORAL at 08:00

## 2025-08-04 RX ADMIN — SULFASALAZINE 500 MG: 500 TABLET ORAL at 21:55

## 2025-08-04 RX ADMIN — RANOLAZINE 500 MG: 500 TABLET, EXTENDED RELEASE ORAL at 21:54

## 2025-08-04 RX ADMIN — SUCRALFATE 1 G: 1 SUSPENSION ORAL at 08:00

## 2025-08-04 RX ADMIN — ASPIRIN 81 MG: 81 TABLET, DELAYED RELEASE ORAL at 08:00

## 2025-08-04 RX ADMIN — HYDRALAZINE HYDROCHLORIDE 100 MG: 50 TABLET ORAL at 21:54

## 2025-08-04 RX ADMIN — RANOLAZINE 500 MG: 500 TABLET, EXTENDED RELEASE ORAL at 08:00

## 2025-08-04 RX ADMIN — ATORVASTATIN CALCIUM 80 MG: 80 TABLET, FILM COATED ORAL at 21:54

## 2025-08-04 RX ADMIN — POTASSIUM CHLORIDE EXTENDED-RELEASE 40 MEQ: 1500 TABLET ORAL at 08:00

## 2025-08-04 RX ADMIN — CARVEDILOL 12.5 MG: 12.5 TABLET, FILM COATED ORAL at 21:54

## 2025-08-04 RX ADMIN — GABAPENTIN 300 MG: 300 CAPSULE ORAL at 21:54

## 2025-08-04 RX ADMIN — ENOXAPARIN SODIUM 40 MG: 100 INJECTION SUBCUTANEOUS at 17:26

## 2025-08-04 SDOH — SOCIAL STABILITY: SOCIAL INSECURITY: WITHIN THE LAST YEAR, HAVE YOU BEEN AFRAID OF YOUR PARTNER OR EX-PARTNER?: NO

## 2025-08-04 SDOH — ECONOMIC STABILITY: FOOD INSECURITY: WITHIN THE PAST 12 MONTHS, YOU WORRIED THAT YOUR FOOD WOULD RUN OUT BEFORE YOU GOT THE MONEY TO BUY MORE.: NEVER TRUE

## 2025-08-04 SDOH — HEALTH STABILITY: MENTAL HEALTH: HOW OFTEN DO YOU HAVE SIX OR MORE DRINKS ON ONE OCCASION?: NEVER

## 2025-08-04 SDOH — ECONOMIC STABILITY: HOUSING INSECURITY: AT ANY TIME IN THE PAST 12 MONTHS, WERE YOU HOMELESS OR LIVING IN A SHELTER (INCLUDING NOW)?: NO

## 2025-08-04 SDOH — HEALTH STABILITY: MENTAL HEALTH: HOW OFTEN DO YOU HAVE A DRINK CONTAINING ALCOHOL?: NEVER

## 2025-08-04 SDOH — ECONOMIC STABILITY: FOOD INSECURITY: WITHIN THE PAST 12 MONTHS, THE FOOD YOU BOUGHT JUST DIDN'T LAST AND YOU DIDN'T HAVE MONEY TO GET MORE.: NEVER TRUE

## 2025-08-04 SDOH — SOCIAL STABILITY: SOCIAL INSECURITY: WITHIN THE LAST YEAR, HAVE YOU BEEN HUMILIATED OR EMOTIONALLY ABUSED IN OTHER WAYS BY YOUR PARTNER OR EX-PARTNER?: NO

## 2025-08-04 SDOH — HEALTH STABILITY: MENTAL HEALTH: HOW MANY DRINKS CONTAINING ALCOHOL DO YOU HAVE ON A TYPICAL DAY WHEN YOU ARE DRINKING?: PATIENT DOES NOT DRINK

## 2025-08-04 SDOH — ECONOMIC STABILITY: HOUSING INSECURITY: IN THE PAST 12 MONTHS, HOW MANY TIMES HAVE YOU MOVED WHERE YOU WERE LIVING?: 0

## 2025-08-04 SDOH — SOCIAL STABILITY: SOCIAL NETWORK: HOW OFTEN DO YOU GET TOGETHER WITH FRIENDS OR RELATIVES?: ONCE A WEEK

## 2025-08-04 SDOH — ECONOMIC STABILITY: FOOD INSECURITY: HOW HARD IS IT FOR YOU TO PAY FOR THE VERY BASICS LIKE FOOD, HOUSING, MEDICAL CARE, AND HEATING?: NOT HARD AT ALL

## 2025-08-04 SDOH — HEALTH STABILITY: PHYSICAL HEALTH: ON AVERAGE, HOW MANY DAYS PER WEEK DO YOU ENGAGE IN MODERATE TO STRENUOUS EXERCISE (LIKE A BRISK WALK)?: 0 DAYS

## 2025-08-04 SDOH — ECONOMIC STABILITY: INCOME INSECURITY: IN THE PAST 12 MONTHS HAS THE ELECTRIC, GAS, OIL, OR WATER COMPANY THREATENED TO SHUT OFF SERVICES IN YOUR HOME?: NO

## 2025-08-04 SDOH — ECONOMIC STABILITY: HOUSING INSECURITY: IN THE LAST 12 MONTHS, WAS THERE A TIME WHEN YOU WERE NOT ABLE TO PAY THE MORTGAGE OR RENT ON TIME?: NO

## 2025-08-04 SDOH — SOCIAL STABILITY: SOCIAL NETWORK: HOW OFTEN DO YOU ATTEND MEETINGS OF THE CLUBS OR ORGANIZATIONS YOU BELONG TO?: NEVER

## 2025-08-04 SDOH — HEALTH STABILITY: PHYSICAL HEALTH: ON AVERAGE, HOW MANY MINUTES DO YOU ENGAGE IN EXERCISE AT THIS LEVEL?: 0 MIN

## 2025-08-04 SDOH — SOCIAL STABILITY: SOCIAL INSECURITY: ARE YOU MARRIED, WIDOWED, DIVORCED, SEPARATED, NEVER MARRIED, OR LIVING WITH A PARTNER?: MARRIED

## 2025-08-04 SDOH — SOCIAL STABILITY: SOCIAL NETWORK: HOW OFTEN DO YOU ATTEND CHURCH OR RELIGIOUS SERVICES?: MORE THAN 4 TIMES PER YEAR

## 2025-08-04 SDOH — ECONOMIC STABILITY: TRANSPORTATION INSECURITY: IN THE PAST 12 MONTHS, HAS LACK OF TRANSPORTATION KEPT YOU FROM MEDICAL APPOINTMENTS OR FROM GETTING MEDICATIONS?: NO

## 2025-08-04 ASSESSMENT — COGNITIVE AND FUNCTIONAL STATUS - GENERAL
STANDING UP FROM CHAIR USING ARMS: A LITTLE
PERSONAL GROOMING: A LITTLE
DRESSING REGULAR UPPER BODY CLOTHING: A LITTLE
MOVING TO AND FROM BED TO CHAIR: A LITTLE
MOVING TO AND FROM BED TO CHAIR: A LITTLE
EATING MEALS: A LITTLE
HELP NEEDED FOR BATHING: A LITTLE
MOVING FROM LYING ON BACK TO SITTING ON SIDE OF FLAT BED WITH BEDRAILS: A LITTLE
HELP NEEDED FOR BATHING: A LITTLE
DAILY ACTIVITIY SCORE: 18
STANDING UP FROM CHAIR USING ARMS: A LITTLE
CLIMB 3 TO 5 STEPS WITH RAILING: TOTAL
DAILY ACTIVITIY SCORE: 18
MOBILITY SCORE: 17
MOBILITY SCORE: 18
DRESSING REGULAR UPPER BODY CLOTHING: A LITTLE
CLIMB 3 TO 5 STEPS WITH RAILING: A LITTLE
WALKING IN HOSPITAL ROOM: A LITTLE
DRESSING REGULAR LOWER BODY CLOTHING: A LITTLE
TURNING FROM BACK TO SIDE WHILE IN FLAT BAD: A LITTLE
TURNING FROM BACK TO SIDE WHILE IN FLAT BAD: A LITTLE
TOILETING: A LITTLE
EATING MEALS: A LITTLE
TOILETING: A LITTLE
DRESSING REGULAR LOWER BODY CLOTHING: A LITTLE
PERSONAL GROOMING: A LITTLE
WALKING IN HOSPITAL ROOM: A LITTLE

## 2025-08-04 ASSESSMENT — ACTIVITIES OF DAILY LIVING (ADL)
BATHING_ASSISTANCE: MINIMAL
ADL_ASSISTANCE: NEEDS ASSISTANCE
LACK_OF_TRANSPORTATION: NO
ADL_ASSISTANCE: NEEDS ASSISTANCE

## 2025-08-04 ASSESSMENT — PAIN - FUNCTIONAL ASSESSMENT
PAIN_FUNCTIONAL_ASSESSMENT: 0-10

## 2025-08-04 ASSESSMENT — ENCOUNTER SYMPTOMS
DYSPNEA ON EXERTION: 0
ALTERED MENTAL STATUS: 1
PALPITATIONS: 0
NEAR-SYNCOPE: 0
NAUSEA: 1
SHORTNESS OF BREATH: 0
ORTHOPNEA: 0
SYNCOPE: 0
PND: 0
VOMITING: 0
CHILLS: 1

## 2025-08-04 ASSESSMENT — LIFESTYLE VARIABLES
SKIP TO QUESTIONS 9-10: 1
AUDIT-C TOTAL SCORE: 0

## 2025-08-04 ASSESSMENT — PAIN SCALES - GENERAL
PAINLEVEL_OUTOF10: 0 - NO PAIN

## 2025-08-04 NOTE — CARE PLAN
Problem: Pain - Adult  Goal: Verbalizes/displays adequate comfort level or baseline comfort level  Outcome: Progressing  Flowsheets (Taken 8/4/2025 0048)  Verbalizes/displays adequate comfort level or baseline comfort level:   Notify Licensed Independent Practitioner if interventions unsuccessful or patient reports new pain   Consider cultural and social influences on pain and pain management   Administer analgesics based on type and severity of pain and evaluate response   Implement non-pharmacological measures as appropriate and evaluate response   Assess pain using appropriate pain scale   Encourage patient to monitor pain and request assistance     Problem: Safety - Adult  Goal: Free from fall injury  Outcome: Progressing  Flowsheets (Taken 8/4/2025 0048)  Free from fall injury:   Based on caregiver fall risk screen, instruct family/caregiver to ask for assistance with transferring infant if caregiver noted to have fall risk factors   Instruct family/caregiver on patient safety     Problem: Discharge Planning  Goal: Discharge to home or other facility with appropriate resources  Outcome: Progressing  Flowsheets (Taken 8/4/2025 0048)  Discharge to home or other facility with appropriate resources:   Refer to discharge planning if patient needs post-hospital services based on physician order or complex needs related to functional status, cognitive ability or social support system   Identify discharge learning needs (meds, wound care, etc)   Arrange for needed discharge resources and transportation as appropriate   Identify barriers to discharge with patient and caregiver     Problem: Chronic Conditions and Co-morbidities  Goal: Patient's chronic conditions and co-morbidity symptoms are monitored and maintained or improved  Outcome: Progressing  Flowsheets (Taken 8/4/2025 0048)  Care Plan - Patient's Chronic Conditions and Co-Morbidity Symptoms are Monitored and Maintained or Improved:   Update acute care plan  with appropriate goals if chronic or comorbid symptoms are exacerbated and prevent overall improvement and discharge   Collaborate with multidisciplinary team to address chronic and comorbid conditions and prevent exacerbation or deterioration   Monitor and assess patient's chronic conditions and comorbid symptoms for stability, deterioration, or improvement   The patient's goals for the shift include no chest pain    The clinical goals for the shift include No CP, Rest, Monitor labs and vitals

## 2025-08-04 NOTE — CARE PLAN
Problem: Pain - Adult  Goal: Verbalizes/displays adequate comfort level or baseline comfort level  8/4/2025 0733 by Alexsandra Weinberg, RN  Outcome: Progressing  8/3/2025 1824 by Alexsandra Weinberg, RN  Outcome: Progressing   The patient's goals for the shift include no chest pain    The clinical goals for the shift include No CP, Rest, Monitor labs and vitals

## 2025-08-04 NOTE — PROGRESS NOTES
Physical Therapy    Physical Therapy Evaluation & Treatment    Patient Name: Rakan Cervantes  MRN: [id number]  [facility]  Room: 421/421-B  Today's Date: 8/4/2025   Time Calculation  Start Time: 0831  Stop Time: 0855  Time Calculation (min): 24 min    Assessment/Plan   PT Assessment  PT Assessment Results: Decreased strength, Decreased endurance, Impaired balance, Decreased mobility, Decreased coordination, Impaired judgement, Decreased safety awareness, Impaired vision, Impaired hearing, Decreased skin integrity, Pain  Rehab Prognosis: Good  Barriers to Discharge Home: Physical needs  Physical Needs: Intermittent mobility assistance needed  Evaluation/Treatment Tolerance: Patient tolerated treatment well  Medical Staff Made Aware: Yes  Strengths: Premorbid level of function  Barriers to Participation: Comorbidities  End of Session Communication: Bedside nurse  Assessment Comment: The patient is a 89 y.o. female admitted to the hospital for chest pain. The patient currently requires supervision to CGA for transfers and ambulation with RW. The patient would continue to benefit from skilled therapy services to address functional deficits.  End of Session Patient Position: Up in chair, Alarm on   IP OR SWING BED PT PLAN  Inpatient or Swing Bed: Inpatient  PT Plan  Treatment/Interventions: Bed mobility, Transfer training, Gait training, Balance training, Neuromuscular re-education, Strengthening, Endurance training, Range of motion, Therapeutic exercise, Therapeutic activity, Home exercise program  PT Plan: Ongoing PT  PT Frequency: 4 times per week (during this acute inpatient hospitalization)  PT Discharge Recommendations: Low intensity level of continued care  Equipment Recommended upon Discharge: Wheeled walker  PT Recommended Transfer Status: Assist x1  PT - OK to Discharge: Yes      Subjective     PT Visit Info:  PT Received On: 08/04/25  General Visit Information:  General  Reason for Referral: mobility  impairment due to chest pain  Referred By: Jesusita Daniel DO  Past Medical History Relevant to Rehab: afib, CAD, CHF, SVT, TIA, HTN, HLD, diverticulosis, polyarteritis, Barretts esophagus  Family/Caregiver Present: No  Prior to Session Communication: Bedside nurse  Patient Position Received: Bed, 3 rail up, Alarm on  Preferred Learning Style: verbal, visual  General Comment: The patient is a 89 y.o. female admitted to the hospital for chest pain.  Home Living:  Home Living  Type of Home: House  Lives With: Spouse  Home Adaptive Equipment: Walker rolling or standard, Cane  Home Layout: One level  Home Access: Level entry  Bathroom Shower/Tub: Tub/shower unit  Bathroom Toilet: Handicapped height  Bathroom Equipment: Grab bars in shower, Tub transfer bench, Grab bars around toilet  Home Living Comments: single floor home  Prior Level of Function:  Prior Function Per Pt/Caregiver Report  Level of [place]: Needs assistance with ADLs, Needs assistance with homemaking  Receives Help From: Family ( and spouse)  ADL Assistance: Needs assistance (spouse assists with dressing and bathing)  Homemaking Assistance: Needs assistance (pt receives meals on wheels; has cleaning help;  assists with laundry)  Ambulatory Assistance: Independent (with use of RW)  Vocational: Retired  Prior Function Comments: spouse assists as needed  Precautions:  Precautions  Hearing/Visual Limitations: glasses prn; mod [place]  Medical Precautions: Fall precautions, Oxygen therapy device and L/min (2L O2)  Precautions Comment: pt denies h/o recent falls     Date/Time Vitals Session Patient Position Pulse Resp SpO2 BP MAP (mmHg)    08/04/25 0938 --  --  --  --  --  142/51  73      Vital Signs Comment: HR in 70s    Objective   Pain:  Pain Assessment  Pain Assessment: 0-10  0-10 (Numeric) Pain Score: 0 - No pain  Cognition:  Cognition  Overall Cognitive Status: Within Functional Limits  Orientation Level: Oriented X4  Following  Commands: Follows one step commands without difficulty  Safety Judgment: Decreased awareness of need for assistance  Insight: Mild  Impulsive: Within functional limits  Processing Speed: Within funtional limits    General Assessments:  General Observation  General Observation: very pleasant; agreeable to mobility    Activity Tolerance  Endurance: Decreased tolerance for upright activites  Activity Tolerance Comments: limited due to general fatigue  Rate of Perceived Exertion (RPE): 4/10    Sensation  Sensation Comment: pt denies paresthesia BLE    Strength  Strength Comments: BLE grossly 3+/5  Coordination  Coordination Comment: mildly increased time and effort for mobility    Postural Control  Posture Comment: forward head; rounded shoulders    Static Sitting Balance  Static Sitting-Balance Support: Bilateral upper extremity supported, Feet supported  Static Sitting-Level of Assistance: Distant supervision  Static Sitting-Comment/Number of Minutes: forward flexed posture    Static Standing Balance  Static Standing-Balance Support: Bilateral upper extremity supported (RW)  Static Standing-Level of Assistance: Contact guard  Static Standing-Comment/Number of Minutes: narrow CESIA; forward flexed posture  Dynamic Standing Balance  Dynamic Standing-Balance Support: Bilateral upper extremity supported (RW)  Dynamic Standing-Level of Assistance: Contact guard  Dynamic Standing-Comments: CGA for ambulation with RW; robert soft knees  Functional Assessments:  Bed Mobility  Bed Mobility: Yes  Bed Mobility 1  Bed Mobility 1: Supine to sitting  Level of Assistance 1: Minimum assistance  Bed Mobility Comments 1: [place] for trunk elevation; intermittent VCs for safe sequencing and hand placement    Transfers  Transfer: Yes  Transfer 1  Transfer From 1: Bed to, Stand to  Transfer to 1: Stand, Chair with arms  Technique 1: Sit to stand, Stand to sit  Transfer Device 1: Walker  Transfer Level of Assistance 1: Contact  guard  Trials/Comments 1: VCs for safe sequencing; CGA for trunk elevation into standing    Ambulation/Gait Training  Ambulation/Gait Training Performed: Yes  Ambulation/Gait Training 1  Surface 1: Level tile  Device 1: Rolling walker  Assistance 1: Contact guard  Quality of Gait 1: Narrow base of support, Shuffling gait, Soft knee(s)  Comments/Distance (ft) 1: 5ftx1 with RW and CGA; intermittent VCs for safe sequencing and use of RW; shuffled like gait with robert soft knees; no major LOB  Extremity/Trunk Assessments:  RLE   RLE :  (grossly 3+/5)  LLE   LLE :  (grossly 3+/5)  Treatments:  Bed Mobility  Bed Mobility: Yes  Bed Mobility 1  Bed Mobility 1: Supine to sitting  Level of Assistance 1: Minimum assistance  Bed Mobility Comments 1: Gregory for trunk elevation; intermittent VCs for safe sequencing and hand placement    Ambulation/Gait Training  Ambulation/Gait Training Performed: Yes  Ambulation/Gait Training 1  Surface 1: Level tile  Device 1: Rolling walker  Assistance 1: Contact guard  Quality of Gait 1: Narrow base of support, Shuffling gait, Soft knee(s)  Comments/Distance (ft) 1: 5ftx1 with RW and CGA; intermittent VCs for safe sequencing and use of RW; shuffled like gait with robert soft knees; no major LOB  Transfers  Transfer: Yes  Transfer 1  Transfer From 1: Bed to, Stand to  Transfer to 1: Stand, Chair with arms  Technique 1: Sit to stand, Stand to sit  Transfer Device 1: Walker  Transfer Level of Assistance 1: Contact guard  Trials/Comments 1: VCs for safe sequencing; CGA for trunk elevation into standing  Outcome Measures:  [facility] Basic Mobility  Turning from your back to your side while in a flat bed without using bedrails: None  Moving from lying on your back to sitting on the side of a flat bed without using bedrails: A little  Moving to and from bed to chair (including a wheelchair): A little  Standing up from a chair using your arms (e.g. wheelchair or bedside chair): A little  To walk in hospital  room: A little  Climbing 3-5 steps with railing: Total  Basic Mobility - Total Score: 17    Encounter Problems       Encounter Problems (Active)       PT Problem       Strength (Progressing)       Start:  08/04/25    Expected End:  09/04/25       The patient will demonstrate an overall strength of 4/5 in BLE to assist with completion of functional mobility.           Functional Mobility (Progressing)       Start:  08/04/25    Expected End:  09/04/25       The patient will complete functional mobility (bed mobility, transfers, etc.) at a mod indep level with LRAD by DC.           Ambulation (Progressing)       Start:  08/04/25    Expected End:  09/04/25       The patient will be able to ambulate at a mod indep level for >40ftx1 with RW.          Balance (Progressing)       Start:  08/04/25    Expected End:  09/04/25       The patient will demonstrate good dynamic standing balance during activity with LRAD.                 Education Documentation  Mobility Training, taught by Oma Jesus PT at 8/4/2025 10:11 AM.  Learner: Patient  Readiness: Acceptance  Method: Explanation  Response: Verbalizes Understanding  Comment: education provided on PT POC    Education Comments  No comments found.

## 2025-08-04 NOTE — PROGRESS NOTES
"Rakan Cervantes is a 89 y.o. female on day 1 of admission presenting with Chest pain, unspecified type.    Patient lives with her  in a single level house, no steps to enter. She uses a walker when ambulating. She is independent with ADLs. She uses [employer], can not recall supplier. Neither she nor her  drive. Her son Eduar provides transportation to appointments, her neighbor Rafaela helps out too. PCP is Samuel Sky MD.   ADOD 08/06/2025  [facility] scores: PT-17, OT-18  Patient would like [facility], RN, PT. She was discharged from [facility] 07/25/2025 with [facility]. States they are no longer coming as \"they said I didn't need it\".   Patient's discharge plan is not complete. Please speak to care coordinator prior to discharging.     Violet Li, RN    "

## 2025-08-04 NOTE — PROGRESS NOTES
08/04/25 1454   Physical Activity   On average, how many days per week do you engage in moderate to strenuous exercise (like a brisk walk)? 0 days   On average, how many minutes do you engage in exercise at this level? 0 min   Financial Resource Strain   How hard is it for you to pay for the very basics like food, housing, medical care, and heating? Not hard   Housing Stability   In the last 12 months, was there a time when you were not able to pay the mortgage or rent on time? N   In the past 12 months, how many times have you moved where you were living? 0   At any time in the past 12 months, were you homeless or living in a shelter (including now)? N   Transportation Needs   In the past 12 months, has lack of transportation kept you from medical appointments or from getting medications? no   In the past 12 months, has lack of transportation kept you from meetings, work, or from getting things needed for daily living? No   Food Insecurity   Within the past 12 months, you worried that your food would run out before you got the money to buy more. Never true   Within the past 12 months, the food you bought just didn't last and you didn't have money to get more. Never true   Stress   Do you feel stress - tense, restless, nervous, or anxious, or unable to sleep at night because your mind is troubled all the time - these days? Not at all   Social Connections   In a typical week, how many times do you talk on the phone with family, friends, or neighbors? More than 3   How often do you get together with friends or relatives? Once   How often do you attend Rastafarian or Church services? More than 4   Do you belong to any clubs or organizations such as Rastafarian groups, unions, fraternal or athletic groups, or school groups? No   How often do you attend meetings of the clubs or organizations you belong to? Never   Are you , , , , never , or living with a partner?    Intimate  Partner Violence   Within the last year, have you been afraid of your partner or ex-partner? No   Within the last year, have you been humiliated or emotionally abused in other ways by your partner or ex-partner? No   Within the last year, have you been kicked, hit, slapped, or otherwise physically hurt by your partner or ex-partner? No   Within the last year, have you been raped or forced to have any kind of sexual activity by your partner or ex-partner? No   Alcohol Use   Q1: How often do you have a drink containing alcohol? Never   Q2: How many drinks containing alcohol do you have on a typical day when you are drinking? None   Q3: How often do you have six or more drinks on one occasion? Never   Utilities   In the past 12 months has the electric, gas, oil, or water company threatened to shut off services in your home? No   Health Literacy   How often do you need to have someone help you when you read instructions, pamphlets, or other written material from your doctor or pharmacy? Rarely   Follow-Ups   We make community resources available to all of our patients to assist with everyday needs. We may be able to connect you with those resources. Would you be interested? N

## 2025-08-04 NOTE — PROGRESS NOTES
08/04/25 1455   Discharge Planning   Living Arrangements Spouse/significant other   Support Systems Spouse/significant other;Children;Friends/neighbors   [facility]   Type of Residence Private residence   Number of Stairs to Enter Residence 0   Number of Stairs Within Residence 0   Do you have animals or pets at home? No   Who is requesting discharge planning? Provider   Home or Post Acute Services In home services   Type of Home Care Services Home nursing visits;Home PT   Expected Discharge Disposition Home H   Does the patient need discharge transport arranged? No   Patient Choice   Provider Choice list and CMS website (https://medicare.gov/care-compare#search) for post-acute Quality and Resource Measure Data were provided and reviewed with: Patient   Patient / Family choosing to utilize agency / facility established prior to hospitalization No   Stroke Family Assessment   Stroke Family Assessment Needed No

## 2025-08-04 NOTE — CONSULTS
Inpatient consult to Cardiology  Consult performed by: DANIA Schwartz-CNP  Consult ordered by: Graciela Andrews MD  Reason for consult: Chest pain        History Of Present Illness:    Rakan Cervantes is a 89 y.o. female presenting with generalized weakness, chest pain, shortness of breath.  Current with Dr. Callaway.  Past medical history of  with history of TIA, parathyroidism, asthma, hypercalcemia, anemia, paroxysmal atrial fibrillation previously on warfarin stopped for recurrent GI bleeds and anemia, coronary disease status post PCI to distal left dominant circumflex in September 2024, SVT, recent GI bleed with discontinuation of antiplatelet therapy.  Patient presented to the hospital initially with chief complaint of chest pain.  Stated she had a sharp pain to the center of her chest at approximately noon states this was severe, no significant radiation to the shoulders, arms or jaw but does report radiation into the back.  She reported some nausea but no vomiting.  Also reported having chills despite having 3 blankets on her.  Patient also reports some abdominal discomfort and a headache. Denies palpitations, near-syncope or syncope.  In emergency department initial EKG is slow atrial fibrillation with heart rate in the 40s.  High-sensitivity troponin values of 11, 12 and 13.  Creatinine 0.70 with a hemoglobin of 7.7.  Chest x-ray and CT chest abdomen pelvis with suggestions of bilateral infiltrates and small pleural effusions as well as esophageal ectasia.  she was significantly hypertensive on admission with blood pressure of 192/69.  She received oral Mylanta, IV antibiotics, IV Pepcid, IV Zofran.  She was made on telemetry for further testing and treatment.  Note patient states her chest discomfort lasted approximately 3 to 4 hours in total and resolved without intervention.       Last Recorded Vitals:  Vitals:    08/04/25 0732 08/04/25 0736 08/04/25 0938 08/04/25 1233   BP: 171/65 170/54  142/51    BP Location: Right arm Left arm Left arm    Patient Position: Lying Lying     Pulse: 67 66     Resp: 18      Temp: 36.5 °C (97.7 °F)      TempSrc: Oral      SpO2: 91%   93%   Weight:       Height:           Last Labs:  CBC - 8/4/2025:  5:53 AM  3.9 7.7 253    24.3      CMP - 8/4/2025:  5:53 AM  8.6 5.7 14 --- 0.5   3.9 3.1 9 86      PTT - 7/20/2025: 11:38 PM  4.6   51.2 46.9     Troponin I, High Sensitivity   Date/Time Value Ref Range Status   08/03/2025 09:14 PM 13 0 - 13 ng/L Final   08/03/2025 04:27 PM 12 0 - 13 ng/L Final   08/03/2025 02:26 PM 11 0 - 13 ng/L Final     BNP   Date/Time Value Ref Range Status   08/03/2025 02:26  (H) 0 - 99 pg/mL Final   07/20/2025 11:38  (H) 0 - 99 pg/mL Final     Hemoglobin A1C   Date/Time Value Ref Range Status   09/10/2024 09:14 AM 5.8 (H) See below % Final   03/14/2024 11:10 AM 6.3 (H) See below % Final     LDL Calculated   Date/Time Value Ref Range Status   10/29/2024 05:34 AM 25 <=99 mg/dL Final     Comment:                                 Near   Borderline      AGE      Desirable  Optimal    High     High     Very High     0-19 Y     0 - 109     ---    110-129   >/= 130     ----    20-24 Y     0 - 119     ---    120-159   >/= 160     ----      >24 Y     0 -  99   100-129  130-159   160-189     >/=190     09/10/2024 09:14 AM 50 (L) 65 - 130 mg/dL Final   03/14/2024 11:10 AM 44 (L) 65 - 130 mg/dL Final     VLDL   Date/Time Value Ref Range Status   10/29/2024 05:34 AM 12 0 - 40 mg/dL Final      Results for orders placed or performed during the hospital encounter of 08/03/25 (from the past 24 hours)   CBC with Differential   Result Value Ref Range    WBC 4.7 4.4 - 11.3 x10*3/uL    nRBC 0.0 0.0 - 0.0 /100 WBCs    RBC 2.67 (L) 4.00 - 5.20 x10*6/uL    Hemoglobin 8.0 (L) 12.0 - 16.0 g/dL    Hematocrit 26.3 (L) 36.0 - 46.0 %    MCV 99 80 - 100 fL    MCH 30.0 26.0 - 34.0 pg    MCHC 30.4 (L) 32.0 - 36.0 g/dL    RDW 15.5 (H) 11.5 - 14.5 %    Platelets 321 150 - 450  x10*3/uL    Neutrophils % 72.6 40.0 - 80.0 %    Immature Granulocytes %, Automated 0.4 0.0 - 0.9 %    Lymphocytes % 14.2 13.0 - 44.0 %    Monocytes % 11.8 2.0 - 10.0 %    Eosinophils % 0.6 0.0 - 6.0 %    Basophils % 0.4 0.0 - 2.0 %    Neutrophils Absolute 3.43 1.60 - 5.50 x10*3/uL    Immature Granulocytes Absolute, Automated 0.02 0.00 - 0.50 x10*3/uL    Lymphocytes Absolute 0.67 (L) 0.80 - 3.00 x10*3/uL    Monocytes Absolute 0.56 0.05 - 0.80 x10*3/uL    Eosinophils Absolute 0.03 0.00 - 0.40 x10*3/uL    Basophils Absolute 0.02 0.00 - 0.10 x10*3/uL   Comprehensive Metabolic Panel   Result Value Ref Range    Glucose 153 (H) 74 - 99 mg/dL    Sodium 134 (L) 136 - 145 mmol/L    Potassium 3.3 (L) 3.5 - 5.3 mmol/L    Chloride 97 (L) 98 - 107 mmol/L    Bicarbonate 31 21 - 32 mmol/L    Anion Gap 9 (L) 10 - 20 mmol/L    Urea Nitrogen 15 6 - 23 mg/dL    Creatinine 0.83 0.50 - 1.05 mg/dL    eGFR 67 >60 mL/min/1.73m*2    Calcium 9.0 8.6 - 10.3 mg/dL    Albumin 3.4 3.4 - 5.0 g/dL    Alkaline Phosphatase 86 33 - 136 U/L    Total Protein 5.7 (L) 6.4 - 8.2 g/dL    AST 14 9 - 39 U/L    Bilirubin, Total 0.5 0.0 - 1.2 mg/dL    ALT 9 7 - 45 U/L   Troponin I, High Sensitivity, Initial   Result Value Ref Range    Troponin I, High Sensitivity 11 0 - 13 ng/L   Lipase   Result Value Ref Range    Lipase 13 9 - 82 U/L   B-Type Natriuretic Peptide   Result Value Ref Range     (H) 0 - 99 pg/mL   Troponin, High Sensitivity, 1 Hour   Result Value Ref Range    Troponin I, High Sensitivity 12 0 - 13 ng/L   Urinalysis with Reflex Culture and Microscopic   Result Value Ref Range    Color, Urine Light-Yellow Light-Yellow, Yellow, Dark-Yellow    Appearance, Urine Clear Clear    Specific Gravity, Urine 1.047 (N) 1.005 - 1.035    pH, Urine 7.5 5.0, 5.5, 6.0, 6.5, 7.0, 7.5, 8.0    Protein, Urine 10 (TRACE) NEGATIVE, 10 (TRACE), 20 (TRACE) mg/dL    Glucose, Urine Normal Normal mg/dL    Blood, Urine NEGATIVE NEGATIVE mg/dL    Ketones, Urine NEGATIVE  NEGATIVE mg/dL    Bilirubin, Urine NEGATIVE NEGATIVE mg/dL    Urobilinogen, Urine Normal Normal mg/dL    Nitrite, Urine NEGATIVE NEGATIVE    Leukocyte Esterase, Urine 75 John/uL (A) NEGATIVE   Microscopic Only, Urine   Result Value Ref Range    WBC, Urine 6-10 (A) 1-5, NONE /HPF    RBC, Urine NONE NONE, 1-2, 3-5 /HPF    Squamous Epithelial Cells, Urine 1-9 (SPARSE) Reference range not established. /HPF    Bacteria, Urine 1+ (A) NONE SEEN /HPF    Mucus, Urine FEW Reference range not established. /LPF    Hyaline Casts, Urine OCCASIONAL (A) NONE /LPF   Legionella Antigen, Urine    Specimen: Clean Catch/Voided; Urine   Result Value Ref Range    L. pneumophila Urine Ag Negative Negative   Streptococcus pneumoniae Antigen, Urine    Specimen: Clean Catch/Voided; Urine   Result Value Ref Range    Streptococcus pneumoniae Ag, Urine Positive (A) Negative   Procalcitonin   Result Value Ref Range    Procalcitonin 0.36 (H) <=0.07 ng/mL   Troponin I, High Sensitivity   Result Value Ref Range    Troponin I, High Sensitivity 13 0 - 13 ng/L   CBC   Result Value Ref Range    WBC 3.9 (L) 4.4 - 11.3 x10*3/uL    nRBC 0.0 0.0 - 0.0 /100 WBCs    RBC 2.55 (L) 4.00 - 5.20 x10*6/uL    Hemoglobin 7.7 (L) 12.0 - 16.0 g/dL    Hematocrit 24.3 (L) 36.0 - 46.0 %    MCV 95 80 - 100 fL    MCH 30.2 26.0 - 34.0 pg    MCHC 31.7 (L) 32.0 - 36.0 g/dL    RDW 15.1 (H) 11.5 - 14.5 %    Platelets 253 150 - 450 x10*3/uL   Renal Function Panel   Result Value Ref Range    Glucose 83 74 - 99 mg/dL    Sodium 134 (L) 136 - 145 mmol/L    Potassium 3.6 3.5 - 5.3 mmol/L    Chloride 98 98 - 107 mmol/L    Bicarbonate 31 21 - 32 mmol/L    Anion Gap 9 (L) 10 - 20 mmol/L    Urea Nitrogen 14 6 - 23 mg/dL    Creatinine 0.72 0.50 - 1.05 mg/dL    eGFR 80 >60 mL/min/1.73m*2    Calcium 8.6 8.6 - 10.3 mg/dL    Phosphorus 3.9 2.5 - 4.9 mg/dL    Albumin 3.1 (L) 3.4 - 5.0 g/dL     *Note: Due to a large number of results and/or encounters for the requested time period, some results  have not been displayed. A complete set of results can be found in Results Review.       Last I/O:  I/O last 3 completed shifts:  In: 550 (10.8 mL/kg) [P.O.:250; IV Piggyback:300]  Out: - (0 mL/kg)   Weight: 50.8 kg     Past Cardiology Tests (Last 3 Years):  EKG:  ECG 12 lead 08/3/2025: Per personal interpretation slow atrial fibrillation without acute ST elevation or T wave inversion    Echo:  Transthoracic Echo (TTE) Limited 07/09/2025  Transthoracic Echo (TTE) Limited  Result Date: 7/9/2025           [place] [address]            Phone [phone] TRANSTHORACIC ECHOCARDIOGRAM REPORT Patient Name:       NICOLE Lock Physician:    [id number] Nessa Callaway MD Study Date:         7/9/2025             Ordering Provider:    [id number] DANIELA SALAS MRN/PID:            [id number]             Fellow: Accession#:         [id number]         Nurse: Date of Birth/Age:  1935 / 89 years Sonographer:          Shannan Gorman                                                                GERARDO Gender Assigned at  F                    Additional Staff: Birth: Height:             149.86 cm            Admit Date: Weight:             54.43 kg             Admission Status:     Inpatient -                                                                Routine BSA / BMI:          1.48 m2 / 24.24      Department Location:  [facility]                     kg/m2 Blood Pressure: 98 /47 mmHg Study Type:    TRANSTHORACIC ECHO (TTE) LIMITED Diagnosis/ICD: Acute diastolic (congestive) heart failure (CHF)-I50.31 Indication:    Acute diastolic congestive heart failure CPT Codes:     Echo Limited-44918; Doppler Limited-21895; Color Doppler-04543 Patient History: Pertinent History: Chest Pain, Hyperlipidemia, HTN, TIA, CHF and MI                     Arteriosclerosis of coronary artery. Study Detail: The following Echo studies were performed: 2D, M-Mode, Doppler and               color flow.  PHYSICIAN INTERPRETATION: Left Ventricle: The left ventricular systolic function is normal with a visually estimated ejection fraction of 60-65%. There is mild concentric left ventricular hypertrophy. There are no regional wall motion abnormalities. The left ventricular cavity size is normal. There is moderately increased septal and mildly increased posterior left ventricular wall thickness. Spectral Doppler shows a normal pattern of left ventricular diastolic filling. Left Atrium: The left atrial size is moderately dilated. Right Ventricle: The right ventricle is normal in size. There is normal right ventricular global systolic function. Right Atrium: The right atrial size is normal. Aortic Valve: The aortic valve is trileaflet. Not assessed. There is no evidence of aortic valve regurgitation. Patient has aortic valve stenosis with reduced leaflet motion. I cannot assess the severity of aortic valve stenosis. No gradients were obtained. Mitral Valve: The mitral valve is moderately thickened. There is moderate mitral annular calcification. Mitral valve regurgitation was not assessed. The E Vmax is 1.54 m/s. Tricuspid Valve: The tricuspid valve is structurally normal. There is trace tricuspid regurgitation. The Doppler estimated right ventricular systolic pressure (RVSP) is slightly elevated at 34 mmHg. Pulmonic Valve: The pulmonic valve is structurally normal. The pulmonic valve regurgitation was not assessed. Pericardium: No pericardial effusion noted. Aorta: The aortic root is normal. Systemic Veins: The inferior vena cava appears normal in size, with IVC inspiratory collapse greater than 50%.  CONCLUSIONS:  1. The left ventricular systolic function is normal with a visually estimated ejection fraction of 60-65%.  2. There is normal right ventricular global systolic  function.  3. The left atrial size is moderately dilated.  4. The mitral valve is moderately thickened.  5. There is moderate mitral annular calcification.  6. The Doppler estimated RVSP is slightly elevated at 34 mmHg.  7. Trace tricuspid regurgitation is visualized.  8. There is moderately increased septal and mildly increased posterior left ventricular wall thickness. QUANTITATIVE DATA SUMMARY:  2D MEASUREMENTS:             Normal Ranges: LAs:             4.90 cm     (2.7-4.0cm) IVSd:            1.36 cm     (0.6-1.1cm) LVPWd:           1.21 cm     (0.6-1.1cm) LVIDd:           3.57 cm     (3.9-5.9cm) LVIDs:           2.50 cm LV Mass Index:   104.7 g/m2 LVEDV Index:     50.43 ml/m2 LV % FS          30.0 %  LEFT ATRIUM:                  Normal Ranges: LA Vol A4C:        67.1 ml    (22+/-6mL/m2) LA Vol A2C:        73.3 ml LA Vol BP:         71.0 ml LA Vol Index A4C:  45.2ml/m2 LA Vol Index A2C:  49.4 ml/m2 LA Vol Index BP:   47.8 ml/m2 LA Area A4C:       21.1 cm2 LA Area A2C:       21.8 cm2 LA Major Axis A4C: 5.6 cm LA Major Axis A2C: 5.5 cm LA Vol A4C:        60.0 ml LA Vol A2C:        67.0 ml LA Vol Index BSA:  42.8 ml/m2  LV SYSTOLIC FUNCTION:                      Normal Ranges: EF-A4C View:    57 % (>=55%) EF-A2C View:    58 % EF-Biplane:     59 % EF-Visual:      63 % LV EF Reported: 63 %  LV DIASTOLIC FUNCTION:             Normal Ranges: MV Peak E:             1.54 m/s    (0.7-1.2 m/s) MV Peak A:             1.03 m/s    (0.42-0.7 m/s) E/A Ratio:             1.50        (1.0-2.2) PulmV Sys Herbert:         53.10 cm/s PulmV Palacio Herbert:        50.10 cm/s PulmV S/D Herbert:         1.10 PulmV A Revs Herbert:      26.10 cm/s PulmV A Revs Dur:      145.00 msec  MITRAL VALVE:          Normal Ranges: MV DT:        325 msec (150-240msec)  TRICUSPID VALVE/RVSP:          Normal Ranges: Peak TR Velocity:     2.78 m/s Est. RA Pressure:     3 mmHg RV Syst Pressure:     34 mmHg  (< 30mmHg) IVC Diam:             1.26 cm  PULMONARY VEINS:  PulmV A Revs Dur: 145.00 msec PulmV A Revs Herbert: 26.10 cm/s PulmV Palacio Herbert:   50.10 cm/s PulmV S/D Herbert:    1.10 PulmV Sys Herbert:    53.10 cm/s  [id number] Nessa Callaway MD Electronically signed on 7/9/2025 at 3:06:33 PM  ** Final **     Transthoracic Echo (TTE) Complete  Result Date: 11/1/2024           [place] [address]            Phone [phone] TRANSTHORACIC ECHOCARDIOGRAM REPORT Patient Name:       NICOLE PRICE       Reading Physician:    [id number] Nessa Callaway MD Study Date:         10/31/2024           Ordering Provider:    [id number] SONJA BATISTA MRN/PID:            [id number]             Fellow: Accession#:         [id number]         Nurse: Date of Birth/Age:  1935 / 89 years Sonographer:          Beka Nj RDCS Gender Assigned at  F                    Additional Staff: Birth: Height:             149.86 cm            Admit Date: Weight:             51.26 kg             Admission Status:     Inpatient -                                                                Routine BSA / BMI:          1.45 m2 / 22.82      Department Location:  [facility]                     kg/m2 Blood Pressure: 155 /67 mmHg Study Type:    TRANSTHORACIC ECHO (TTE) COMPLETE Diagnosis/ICD: Acute combined systolic (congestive) and diastolic (congestive)                heart failure (CHF)-I50.41 Indication:    Congestive Heart Failure CPT Codes:     Echo Complete w Full Doppler-[id number] Patient History: Pertinent History: CHF, A-Fib, CAD, CVA, HTN, Hyperlipidemia and TIA. MI. Study Detail: The following Echo studies were performed: 2D, M-Mode, Doppler and               color flow. Technically challenging study due to body habitus and               patient lying in supine position.  PHYSICIAN INTERPRETATION: Left Ventricle: The left ventricular systolic function is  normal, with a visually estimated ejection fraction of 60-65%. There is moderate eccentric left ventricular hypertrophy. There are no regional wall motion abnormalities. The left ventricular cavity size is normal. There is mild increased septal and normal posterior left ventricular wall thickness. Spectral Doppler shows a normal pattern of left ventricular diastolic filling. Left Atrium: The left atrium is moderately dilated. Right Ventricle: The right ventricle is normal in size. There is normal right ventricular global systolic function. Right Atrium: The right atrium is mildly dilated. Aortic Valve: The aortic valve is trileaflet. There is mild aortic valve thickening. There is evidence of mild aortic valve stenosis. There is mild aortic valve regurgitation. The peak instantaneous gradient of the aortic valve is 14 mmHg. Mitral Valve: The mitral valve is normal in structure. There is mild mitral annular calcification. The peak instantaneous gradient of the mitral valve is 15 mmHg. Echo findings are consistent with normal mitral valve prosthesis structure and function. There is mild to moderate mitral valve regurgitation. Tricuspid Valve: The tricuspid valve is structurally normal. There is mild tricuspid regurgitation. The Doppler estimated RVSP is mild to moderately elevated at 52.3 mmHg. Pulmonic Valve: The pulmonic valve is structurally normal. There is mild pulmonic valve regurgitation. Pericardium: No pericardial effusion noted. Aorta: The aortic root is normal. Systemic Veins: The inferior vena cava appears normal in size, with IVC inspiratory collapse greater than 50%.  CONCLUSIONS:  1. The left ventricular systolic function is normal, with a visually estimated ejection fraction of 60-65%.  2. There is normal right ventricular global systolic function.  3. The left atrium is moderately dilated.  4. Mild to moderate mitral valve regurgitation.  5. Mild to moderately elevated right ventricular systolic  pressure.  6. Mild tricuspid regurgitation is visualized.  7. Mild aortic valve stenosis.  8. Mild aortic valve regurgitation.  9. Echo findings are consistent with normal mitral valve prosthesis structure and function. QUANTITATIVE DATA SUMMARY:  2D MEASUREMENTS:            Normal Ranges: Ao Root d:       3.60 cm    (2.0-3.7cm) LAs:             4.70 cm    (2.7-4.0cm) IVSd:            0.99 cm    (0.6-1.1cm) LVPWd:           0.84 cm    (0.6-1.1cm) LVIDd:           4.99 cm    (3.9-5.9cm) LVIDs:           3.55 cm LV Mass Index:   111.7 g/m2 LV % FS          28.9 %  LA VOLUME:                  Normal Ranges: LA Volume Index: 58.0 ml/m2  RA VOLUME BY A/L METHOD:          Normal Ranges: RA Area A4C:             15.0 cm2  M-MODE MEASUREMENTS:         Normal Ranges: Ao Root:             3.50 cm (2.0-3.7cm) LAs:                 4.80 cm (2.7-4.0cm)  AORTA MEASUREMENTS:         Normal Ranges: Asc Ao, d:          3.10 cm (2.1-3.4cm)  LV SYSTOLIC FUNCTION BY 2D PLANIMETRY (MOD):                      Normal Ranges: EF-A4C View:    58 % (>=55%) EF-A2C View:    63 % EF-Biplane:     59 % EF-Visual:      63 % LV EF Reported: 63 %  LV DIASTOLIC FUNCTION:           Normal Ranges: MV Peak E:             1.71 m/s  (0.7-1.2 m/s) MV Peak A:             1.14 m/s  (0.42-0.7 m/s) E/A Ratio:             1.50      (1.0-2.2) MV e'                  0.088 m/s (>8.0) MV lateral e'          0.10 m/s MV medial e'           0.07 m/s E/e' Ratio:            19.32     (<8.0)  MITRAL VALVE:           Normal Ranges: MV Vmax:      1.92 m/s  (<=1.3m/s) MV peak P.7 mmHg (<5mmHg) MV mean P.0 mmHg  (<48mmHg) MV DT:        218 msec  (150-240msec)  AORTIC VALVE:            Normal Ranges: AoV Vmax:      1.90 m/s  (<=1.7m/s) AoV Peak P.4 mmHg (<20mmHg) LVOT Max Herbert:  1.21 m/s  (<=1.1m/s) LVOT VTI:      27.80 cm LVOT Diameter: 2.30 cm   (1.8-2.4cm) AoV Area,Vmax: 2.65 cm2  (2.5-4.5cm2)  RIGHT VENTRICLE: RV Basal 4.48 cm RV Mid   3.15 cm RV Major  6.9 cm TAPSE:   21.2 mm RV s'    0.15 m/s  TRICUSPID VALVE/RVSP:          Normal Ranges: Peak TR Velocity:     3.51 m/s RV Syst Pressure:     52 mmHg  (< 30mmHg)  PULMONIC VALVE:          Normal Ranges: PV Max Herbert:     1.2 m/s  (0.6-0.9m/s) PV Max P.0 mmHg  [id number] Nessa Callaway MD Electronically signed on 2024 at 2:38:54 PM  ** Final **         Ejection Fractions:  EF   Date/Time Value Ref Range Status   2025 12:05 PM 63 %    10/31/2024 09:43 AM 63 %      Cath:  Cardiac Catheterization Procedure 2024  1. Severe distal left circumflex proximal left posterior descending artery lesion of 90% treated with drug-eluting stent with excellent result.   2. Patient is fully revascularized.     Stress Test:  Nuclear Stress Test 2025: Negative for reversible ischemic changes    Past Medical History:  She has a past medical history of Abnormality of plasma protein (2025), Acute kidney injury (2025), Acute non-ST segment elevation myocardial infarction (Multi) (2023), Anemia, Arteriosclerosis of coronary artery (2023), Arthritis, Asthma, Atrial fibrillation (Multi) (2023), Jonas esophagus, Bilateral carotid artery occlusion (2023), Bradycardia (2025), CAD (coronary artery disease), Cardiac rhythm disorder or disturbance or change (2023), Carpal tunnel syndrome of right wrist (2025), Cervical radiculopathy, Chest pain (2023), Chronic heart failure with preserved ejection fraction (2025), Chronic obstructive pulmonary disease (Multi) (2023), Constipation, Coronary artery disease (2023), Degenerative joint disease, Dyslipidemia, Dysuria, Erosive esophagitis, Erythema of skin (2025), Fall at home, initial encounter (2025), Gastrointestinal hemorrhage with melena (2025), GERD (gastroesophageal reflux disease), GIB (gastrointestinal bleeding) (2025), Hematochezia (2025), Hypercholesterolemia  (05/23/2023), Hyperparathyroidism (Multi), Hypertensive urgency (11/26/2023), Impaired fasting glucose (11/26/2023), Iron deficiency anemia due to chronic blood loss (04/08/2025), Irritable bowel syndrome (IBS), Labile essential hypertension (11/26/2023), Labile hypertension, Left foot pain, Low blood pressure (11/26/2023), Lung infiltrate on CT (02/24/2025), Macular degeneration, Mixed hyperlipidemia (11/26/2023), Neck pain, Occipital neuralgia of right side (08/01/2025), Osteoporosis (11/26/2023), Paresthesias (08/01/2025), Paroxysmal atrial fibrillation (Multi), Pneumonia of left upper lobe due to infectious organism (02/21/2025), Polyarthritis with negative rheumatoid factor (Multi), Post menopausal syndrome, Rapid atrial fibrillation (Multi) (05/14/2023), Right wrist pain (04/08/2025), Sepsis (Multi) (02/21/2025), Septic shock (Multi) (02/21/2025), Spinal stenosis, 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    Past Surgical History:  She has a past surgical history that includes Other surgical history (02/14/2022); MR angio head wo IV contrast (04/23/2013); CT angio head w and wo IV contrast (05/08/2013); MR angio head wo IV contrast (07/20/2016); Cholecystectomy; Cardiovascular stress test (2017); Cardiovascular stress test (2004); Cataract extraction (Bilateral, 2011); Carpal tunnel release (Left, 2014); Total hip arthroplasty (Right, 2016); Revision total hip arthroplasty (Left, 2013); Coronary stent placement (05/2023); MR angio head wo IV contrast (10/27/2023); MR angio neck wo IV contrast (10/27/2023); Cardiac catheterization (Left, 9/27/2024); and Cardiac catheterization (N/A, 9/27/2024).      Social History:  She reports that she has never smoked. She has never been exposed to tobacco smoke. She has never used  smokeless tobacco. She reports that she does not currently use alcohol. She reports that she does not use drugs.    Family History:  Family History[1]     Allergies:  Meperidine, Morphine, Opioids - morphine analogues, Pregabalin, and Tramadol    Inpatient Medications:  Scheduled Medications[2]  PRN Medications[3]  Continuous Medications[4]  Outpatient Medications:  Current Outpatient Medications   Medication Instructions    aspirin 81 mg, Daily    atorvastatin (LIPITOR) 80 mg, oral, Nightly    carvedilol (COREG) 12.5 mg, oral, 2 times daily    docusate sodium (Colace) 100 mg capsule 1 capsule, As needed    DULoxetine (CYMBALTA) 30 mg, oral, Daily with evening meal    ferrous sulfate (FeroSuL) 325 mg (65 mg elemental) tablet 1 tablet, oral, 3 times weekly, Monday, Wednesday, Friday    furosemide (LASIX) 40 mg, oral, Daily PRN    gabapentin (Neurontin) 300 mg capsule TAKE 1 CAPSULE BY MOUTH AT NOON AND BEDTIME    hydrALAZINE (APRESOLINE) 75 mg, oral, 3 times daily    hydrALAZINE (APRESOLINE) 100 mg, oral, 2 times daily    hydroCHLOROthiazide (MICROZIDE) 12.5 mg, oral, Daily    hydroxychloroquine (PLAQUENIL) 200 mg, oral, Daily    magnesium oxide (MAG-OX) 400 mg, oral, 2 times daily    pantoprazole (PROTONIX) 40 mg, oral, 2 times daily    potassium chloride CR 20 mEq ER tablet 20 mEq, oral, Daily, Take with food.    ranolazine (RANEXA) 500 mg, oral, 2 times daily, Do not crush, chew, or split.    Stool Softener 100 mg capsule TAKE 1 CAPSULE BY MOUTH ONCE DAILY AS NEEDED to prevent constipation from iron    sucralfate (CARAFATE) 1 g, oral, Daily    sulfaSALAzine (AZULFIDINE) 500 mg, oral, 2 times daily   Review of Systems   Constitutional: Positive for chills and malaise/fatigue.   Cardiovascular:  Positive for chest pain. Negative for dyspnea on exertion, leg swelling, near-syncope, orthopnea, palpitations, paroxysmal nocturnal dyspnea and syncope.   Respiratory:  Negative for shortness of breath.    Musculoskeletal:   Positive for muscle weakness.   Gastrointestinal:  Positive for nausea. Negative for vomiting.   Psychiatric/Behavioral:  Positive for altered mental status.    All other systems reviewed and are negative.    Physical Exam:  General: alert, oriented x 3, lethargic, does awaken to verbal command.  No distress.  No current chest pain  HEENT: normal cephalic, atraumatic, no scleral icterus  \Neck: No JVD, bruit or thrill, masses or tenderness   Heart: S1/S2, Rate 60, Rhythm regular, no s3 or s4, no murmur, thrill, or heaves at PMI.   Lungs: Clear, equal expansion and excursion, no wheezes, crackles, rhales or rhonci.  Oxygen nasal cannula.  Diminished breath sounds bilateral bases.  Inspiratory effort  Abdomen: bowel sounds x 4, soft, non-tender   Genitourinary: deferred   Extremities: No significant upper or lower extremity edema appreciated.       Assessment/Plan     Chest pain: Somewhat atypical.  The setting of mild altered mental status and significant fatigue with chills.  Suspect relative to a combination of hypertensive  urgency/emergency noting a systolic above 190 as well as suspected community-acquired pneumonia.  Patient's coronary status is well-known as she had a cardiac catheterization approxi-1 year ago.  She did have a stent placed to her PDA, she remained on antiplatelets for approximately three fourths of a year which point was discontinued relative to recurrent GI bleeding.  At this point given she continues to have significant anemia to the point where she is unable to take her warfarin for paroxysmal atrial fibrillation do not believe  would be a candidate for further cardiac studies or intervention given her frailty and age.  Will continue with medical management.  Her chest pain has resolved without recurrence.  Will trend 1 further troponin tomorrow.   Hypertensive urgency: Systolic above 190 on admission.  Currently 142.  Continue with current home medications.   community-acquired pneumonia:  Managed by admitting.  Continues on IV antibiotics.  Coronary artery disease: As above  Paroxysmal atrial fibrillation: Continue carvedilol.  Off warfarin at this time relative to recurrent GI bleeds, patient was being worked up in the outpatient setting for Watchman device.  History of recurrent GI bleeding: As above  History of Tia    Overall impression:    8/4: As above, presents with generalized weakness and chest discomfort as well as abdominal discomfort and some mild shortness of breath with nausea and chills.  Found to have community-acquired pneumonia as well as esophageal ectasia in the setting of hypertensive urgency/emergency.  Is unclear if the chest discomfort was relative to hypertensive emergency but it was significantly elevated with systolic above 190.  Is unclear if the patient was taking all of her medications appropriate at home.  She was bradycardic when she came in slow atrial fibrillation which is now resolved she is currently in a sinus rhythm at 60 bpm.  Her blood pressure is now controlled with a systolic in the 140s.  Creatinine is stable at 0.72 but she remains anemic at 7.7.  Her antiplatelet therapy was previously discontinued as well as her apixaban for stroke risk reduction relative to recurrent GI bleeds. he is being worked up in the outpatient setting for Watchman device.  At this point the patient does not appear to be experiencing acute coronary syndrome.  Her high-sensitivity troponins are negative and flat, on all previous hospitalization she has had elevated high-sensitivity troponins.  She has had no recurrence of her chest discomfort since her initial episode.  Regardless, given the complexity of this patient I doubt we would consider another cardiac catheterization as she would likely be intolerant of antiplatelet therapy relative to her GI bleeding and anemia.  Will continue with medical management.  No plan for further cardiac testing with the exception of a further  troponin level.  Will check another level this morning if no significant findings we will consider stable from the cardiac perspective at this time.    Update 1608; fourth troponin returns negative at 9.  Patient dayna chest pain-free.  Will sign off at this time from a cardiac perspective.  Feel free to reach out with further or new concerns.       Code Status:  DNR and No Intubation    I spent 60 minutes in the professional and overall care of this patient.        Esteban Adams, APRN-CNP         [1]   Family History  Problem Relation Name Age of Onset    No Known Problems Mother      No Known Problems Father      No Known Problems Sister     [2]   Scheduled medications   Medication Dose Route Frequency    aspirin  81 mg oral Daily    atorvastatin  80 mg oral Nightly    azithromycin  500 mg oral q24h    carvedilol  12.5 mg oral BID    cefTRIAXone  1 g intravenous q24h    DULoxetine  30 mg oral Daily with evening meal    enoxaparin  40 mg subcutaneous q24h    ferrous sulfate  1 tablet oral Once per day on Monday Wednesday Friday    gabapentin  300 mg oral BID    hydrALAZINE  100 mg oral BID    hydroCHLOROthiazide  12.5 mg oral Daily    hydroxychloroquine  200 mg oral Daily    pantoprazole  40 mg oral BID    potassium chloride CR  40 mEq oral Daily    ranolazine  500 mg oral BID    sucralfate  1 g oral Daily    sulfaSALAzine  500 mg oral BID   [3]   PRN medications   Medication    albuterol    alum-mag hydroxide-simeth    docusate sodium    furosemide    hydrALAZINE    labetaloL    polyethylene glycol   [4]   Continuous Medications   Medication Dose Last Rate

## 2025-08-04 NOTE — PROGRESS NOTES
Occupational Therapy    Evaluation    Patient Name: Rakan Cervantes  MRN: [id number]  Department: [facility]  Room: 421/421-B  Today's Date: 8/4/2025  Time Calculation  Start Time: 0918  Stop Time: 0928  Time Calculation (min): 10 min        Assessment:  OT Assessment: Pt presents on eval with generalized weakness,  decreased activity tolerance, and impaired standing balance affecting self-care and functional transfers/mobility. Pt will benefit from continued skilled OT to address these deficits and facilitate returning to functional baseline.  Prognosis: Good  Barriers to Discharge Home: Physical needs  Physical Needs: Intermittent mobility assistance needed, Intermittent ADL assistance needed  Evaluation/Treatment Tolerance: Patient limited by fatigue  End of Session Communication: Bedside nurse  End of Session Patient Position: Up in chair, Alarm on (Needs in reach.)  OT Assessment Results: Decreased ADL status, Decreased upper extremity strength, Decreased endurance, Decreased functional mobility  Strengths: Support and attitude of living partners, Premorbid level of function  Barriers to Participation: Comorbidities    Plan:  Treatment Interventions: ADL retraining, Functional transfer training, UE strengthening/ROM, Endurance training, Patient/family training, Equipment evaluation/education, Neuromuscular reeducation  OT Frequency: 3 times per week  OT Discharge Recommendations: Low intensity level of continued care  Equipment Recommended upon Discharge: Wheeled walker  OT Recommended Transfer Status:  (CGA)  OT - OK to Discharge: Yes      Subjective     OT Visit Info:  OT Received On: 08/04/25    General:  General  Reason for Referral: impaired ADL's/mobility  Referred By: Jesusita Daniel DO  Past Medical History Relevant to Rehab: afib, CAD, CHF, SVT, TIA, HTN, HLD, diverticulosis, polyarteritis, Barretts esophagus  Family/Caregiver Present: No  Prior to Session Communication: Bedside nurse  Patient Position  Received: Bed, 3 rail up, Alarm on  General Comment: The patient is a 89 y.o. female admitted to the hospital for chest pain.    Precautions:  Hearing/Visual Limitations: glasses prn; mod [place]  Medical Precautions: Fall precautions, Oxygen therapy device and L/min (2L 02)  Precautions Comment: pt denies h/o recent falls    Pain:  Pain Assessment  Pain Assessment: 0-10  0-10 (Numeric) Pain Score: 0 - No pain    Objective     Cognition:  Overall Cognitive Status: Within Functional Limits  Orientation Level: Oriented X4  Insight: Mild    Home Living:  Type of Home: House  Lives With: Spouse  Home Adaptive Equipment: Walker rolling or standard, Cane  Home Layout: One level  Home Access: Level entry  Bathroom Shower/Tub: Tub/shower unit  Bathroom Toilet: Handicapped height  Bathroom Equipment: Grab bars in shower, Tub transfer bench, Grab bars around toilet  Home Living Comments: single floor home    Prior Function:  Level of [place]: Needs assistance with ADLs, Needs assistance with homemaking  Receives Help From: Family (spouse)  ADL Assistance: Needs assistance (spouse assists with dressing and bathing)  Homemaking Assistance: Needs assistance (pt receives meals on wheels; has cleaning help;  assists with laundry)  Ambulatory Assistance: Independent (mod indep using a RW)  Vocational: Retired  Hand Dominance: Left  Prior Function Comments: spouse assists as needed    ADL:  Eating Assistance: Stand by  Eating Deficit: Setup  Grooming Assistance: Other (Comment) (CGA in standing)  Grooming Deficit: Steadying, Standing with assistive device  Bathing Assistance: Minimal  UE Dressing Assistance: Stand by  UE Dressing Deficit: Setup  LE Dressing Assistance:  (CGA)  LE Dressing Deficit: Steadying  Toileting Assistance with Device: Other (Comment) (CGA)  Toileting Deficit: Steadying, Clothing management up, Clothing management down    Activity Tolerance:  Activity Tolerance Comments: Fair    Bed Mobility/Transfers:  Bed Mobility  Bed Mobility: No    Transfers  Transfer:  (Pt completed sit<>stand with CGA for balance/safety.)    Functional Mobility:  Functional Mobility  Functional Mobility Performed:  (Pt completed short functional mobility using a RW with CGA for balance/safety, but required min A for balance when going backwards a few steps.)    Standing Balance:  Static Standing Balance  Static Standing-Balance Support: Bilateral upper extremity supported  Static Standing-Level of Assistance: Contact guard     Sensation:  Sensation Comment: pt denies paresthesias    Strength:  Strength Comments: DAMON shoulders 3-/5, distally 3+/5    Coordination:  Coordination Comment: DAMON's WFL grossly     Hand Function:  Gross Grasp: Functional  Coordination: Functional      Outcome Measures:[facility] Daily Activity  Putting on and taking off regular lower body clothing: A little  Bathing (including washing, rinsing, drying): A little  Putting on and taking off regular upper body clothing: A little  Toileting, which includes using toilet, bedpan or urinal: A little  Taking care of personal grooming such as brushing teeth: A little  Eating Meals: A little  Daily Activity - Total Score: 18    Education Documentation  Body Mechanics, taught by Omid Soriano Jr., OT at 8/4/2025 10:41 AM.  Learner: Patient  Readiness: Acceptance  Method: Explanation  Response: Verbalizes Understanding  Comment: Education and verbal cues provided throughout eval.    Home Exercise Program, taught by Omid Soriano Jr. OT at 8/4/2025 10:41 AM.  Learner: Patient  Readiness: Acceptance  Method: Explanation  Response: Verbalizes Understanding  Comment: Education and verbal cues provided throughout eval.    ADL Training, taught by Omid Soriano Jr. OT at 8/4/2025 10:41 AM.  Learner: Patient  Readiness: Acceptance  Method: Explanation  Response: Verbalizes Understanding  Comment: Education and verbal cues provided throughout eval.    Education Comments  No comments  found.    Goals:  Encounter Problems       Encounter Problems (Active)       OT Goals       Pt will complete all grooming tasks with mod indep in standing with RW. (Progressing)       Start:  08/04/25    Expected End:  09/04/25            Pt will complete all toileting tasks with mod indep. (Progressing)       Start:  08/04/25    Expected End:  09/04/25            Pt will complete all functional transfers and mobility with mod indep using a RW. (Progressing)       Start:  08/04/25    Expected End:  09/04/25            Pt will tolerate functional mobility for a household distance using a RW with mod indep. (Progressing)       Start:  08/04/25    Expected End:  09/04/25

## 2025-08-04 NOTE — PROGRESS NOTES
Rakan Cervantes is a 89 y.o. female on day 1 of admission presenting with Chest pain, unspecified type.      Subjective   Patient presented  with chest pain.  She denied fever or chills.      Objective     Last Recorded Vitals  /51 (BP Location: Left arm)   Pulse 66   Temp 36.5 °C (97.7 °F) (Oral)   Resp 18   Wt 50.8 kg (112 lb)   SpO2 91%   Intake/Output last 3 Shifts:    Intake/Output Summary (Last 24 hours) at 8/4/2025 1135  Last data filed at 8/4/2025 1048  Gross per 24 hour   Intake 750 ml   Output --   Net 750 ml       Admission Weight  Weight: 50.8 kg (112 lb) (08/03/25 1419)    Daily Weight  08/03/25 : 50.8 kg (112 lb)    Image Results  XR chest 1 view  Narrative: Interpreted By:  Kacy Bernal,   STUDY:  XR CHEST 1 VIEW;  8/3/2025 2:54 pm      INDICATION:  Signs/Symptoms:Chest Pain.      COMPARISON:  07/20/2025      ACCESSION NUMBER(S):  [id number]      ORDERING CLINICIAN:  MARTY LEJEUNE      FINDINGS:  AP radiograph of the chest was provided.              CARDIOMEDIASTINAL SILHOUETTE:  Cardiomediastinal silhouette is stable in size and configuration.      LUNGS:  Small bilateral pleural effusions with bibasilar opacification,  better evaluated on concurrent CT, reported separately.      ABDOMEN:  No remarkable upper abdominal findings.      BONES:  Reverse right shoulder arthroplasty. Advanced osteoarthritis of the  left shoulder.      Impression: Small bilateral pleural effusions with bibasilar opacification,  better evaluated on concurrent CT, reported separately.      Signed by: Kacy Bernal 8/3/2025 3:56 PM  Dictation workstation:   [id number]  CT chest abdomen pelvis w IV contrast  Narrative: Interpreted By:  Kacy Bernal,   STUDY:  CT CHEST ABDOMEN PELVIS W IV CONTRAST;  8/3/2025 2:43 pm      INDICATION:  Signs/Symptoms:abdominal pain, epigastric pain.          COMPARISON:  10/28/2024      ACCESSION NUMBER(S):  [id number]      ORDERING CLINICIAN:  RANDI HERNÁNDEZ      TECHNIQUE:  CT  of the chest, abdomen, and pelvis was performed.  Contiguous axial  images were obtained at 3 mm slice thickness through the chest,  abdomen and pelvis. Coronal and sagittal reconstructions at 3 mm  slice thickness were performed. 75 ML of Omnipaque 350 was  administered intravenously without immediate complication.      FINDINGS:  Marked beam hardening artifact from posterior fusion hardware, right  shoulder arthroplasty, bilateral hip prosthesis, and arms in the  downward position limits evaluation.      CHEST:      LUNG/PLEURA/LARGE AIRWAYS:  Small bilateral pleural effusions with mild interstitial edema,  improved from prior exam. Mild dependent right-greater-than-left  lower lobe ground-glass to mild consolidative opacities. Trachea and  right and left main bronchi are patent.      VESSELS:  Aorta and pulmonary arteries are normal caliber.  Severe atherosclerotic calcifications of the thoracic aorta.  Severe coronary artery calcifications versus stent.      HEART:  The heart is normal in size.  No pericardial effusion.      MEDIASTINUM AND DONATO:  No mediastinal, hilar or axillary lymphadenopathy.  Ectasia of the esophagus, moderate-sized hiatal hernia noted, and  circumferential esophageal wall thickening.      CHEST WALL AND LOWER NECK:  No soft tissue masses in the chest wall.  Multinodular thyroid noted, measuring up to 16 mm on the left.      ABDOMEN:      LIVER:  The liver is normal in size without evidence of focal liver lesions.      BILE DUCTS:  No biliary dilatation.      GALLBLADDER:  Cholecystectomy      PANCREAS:  The pancreas appears unremarkable without evidence of ductal  dilatation or masses.      SPLEEN:  The spleen is normal in size.      ADRENAL GLANDS:  No adrenal nodule or thickening.      KIDNEYS AND URETERS:  The kidneys are normal in size. This exam is nondiagnostic for  evaluation of suspicious renal lesions due to marked beam hardening  artifact. No hydroureteronephrosis or  nephroureterolithiasis.      PELVIS:      BLADDER:  Within normal limits.      REPRODUCTIVE ORGANS:  No pelvic masses.      BOWEL:  Moderate-sized hiatal hernia.  The small and large bowel are normal in caliber and demonstrate no  wall thickening. The appendix is not definitely visualized. There is  however no pericecal stranding or fluid. Prominent sigmoid  diverticulosis without acute diverticulitis.      VESSELS:  Severe atherosclerotic calcifications of the aortoiliac arteries.  The IVC appears normal.  Portal vein, splenic vein, and SMV are patent.      PERITONEUM/RETROPERITONEUM/LYMPH NODES:  No ascites or fluid collection.  No peritoneal nodularity or deposits.  The retroperitoneum is unremarkable.  No abdominopelvic lymphadenopathy is present.      BONE AND SOFT TISSUE:  No acute fracture.  Degenerative changes of the thoracolumbar spine. Multilevel endplate  irregularities noted. Posterior fusion hardware, right shoulder  arthroplasty, and bilateral hip prosthesis noted. No significant  abnormality of the abdominal wall soft tissues.      Impression: 1. Ectasia of the esophagus, moderate-sized hiatal hernia noted, and  circumferential esophageal wall thickening. Please correlate for  esophagitis/gastroesophageal reflux.  2. Small bilateral pleural effusions with mild interstitial edema,  improved from prior exam.  3. Mild dependent right-greater-than-left lower lobe ground-glass to  mild consolidative opacities, for which superimposed developing  pneumonia is not excluded.  4. Nonemergent thyroid ultrasound is recommended to further evaluate  thyroid nodules.  5. Additional chronic and incidental findings as detailed above.      MACRO:  None      Signed by: Kacy Bernal 8/3/2025 3:54 PM  Dictation workstation:   [id number]      Physical Exam  General: cooperating during physical exam.  HEENT: Pupils are equal and reactive to light and commendation , oral mucosa moist, no JVD.  Cardiovascular: PMI  nondisplaced  Lungs: Clear to auscultation bilaterally, no wheezing, no crackles, no dullness to percussion.  Abdomen: No hepatosplenomegaly appreciated, soft , not tender, positive bowel sounds, positive bowel movement.  Neuro: Alert and oriented x2, strength in upper and lower extremities , sensation intact.  Musculoskeletal: No swelling in lower extremities, no limitation in range of motion.  Vascular: Pulses are intact in upper and lower extremities  Skin: No petechiae, ecchymosis or other stigmata for dermatology disease.       Assessment and plan    Chest pain  EKG no ischemic changes.  Monitoring telemetry with  Consult cardiology    Probable pneumonia.  UTI  Patient is on ceftriaxone and Zithromax.  Continue with current antibiotics.    Chronic respiratory failure with hypoxia.  Continue with oxygen therapy to maintain pulse ox more than 92%     Dyspepsia  Esophagitis  History of GERD esophagitis    Hypertension urgency  Continue with BP meds  Blood pressure improved    Hypertension  Coronary artery disease  Hyperlipidemia  Continue with current medication.    Peripheral neuropathy  Patient is on gabapentin    Polyarthritis  On hydroxychloroquine    Patient is DNR CCA/DNI    Deconditioning  PT /OT to see her  Fall precaution  Ambulate with assistant  Bed alarms on.    Anemia  Hemoglobin 7.7 and 24.3.    Transfuse for hemoglobin less than 7    Labs reviewed, check CBC and BMP in AM.  Time spent patient 50 minutes    Graciela Andrews MD

## 2025-08-04 NOTE — PROGRESS NOTES
Speech-Language Pathology    Speech-Language Pathology Clinical Swallow Evaluation    Patient Name: Rakan Cervantes  MRN: [id number]  : 1935  Today's Date: 25  Start Time: 1100  Stop Time: 1118  Time Calculation (min): 18 min      ASSESSMENT  Impressions:  Functional oral phase and no suspected pharyngeal phase dysphagia based on clinical swallow evaluation     PLAN    RECOMMENDATIONS:  Is MBSS recommended? No; no pharyngeal dysphagia suspected.  Solid consistency: Regular (IDDSI level 7)  Liquid consistency: Thin (IDDSI 0)  Medication administration: Whole in thin liquid    Compensatory swallow strategies:  - Upright positioning for all PO intake  - Remain upright for >30 min after meals    Recommended frequency/duration:  Skilled SLP services recommended: No  Discharge recommendation: Home with no further SLP     Strengths: Cognition, Motivation, and Family/caregiver support  Barriers to participation in tx: Hearing impairment    SUBJECTIVE    PMHx relevant to rehab:  PMHx of pAF on warfarin,(discontinued last admission), CAD s/p PCI to distal L circ 2024, CHF, SVT, TIA, HTN, HLD, BL carotid artery stenosis, diverticulosis, GIB, polyarteritis (on Hydroxychloroquine) , Jonas esophagus      Chief complaint: Pt was admitted on 8/3/25 presented to [facility] on 8/3/2025 with a chief complaint of Chest pain.     Chief Complaint   Patient presents with    Chest Pain     Started 1 hour ago   . She was found to have Chest pain, unspecified type.    Relevant imaging results:  CT chest abdomen pelvis 8/3/25  1. Ectasia of the esophagus, moderate-sized hiatal hernia noted, and circumferential esophageal wall thickening. Please correlate for esophagitis/gastroesophageal reflux.  2. Small bilateral pleural effusions with mild interstitial edema, improved from prior exam.  3. Mild dependent right-greater-than-left lower lobe ground-glass to mild consolidative opacities, for which superimposed developing  pneumonia is not excluded.  4. Nonemergent thyroid ultrasound is recommended to further evaluate thyroid nodules.    General Visit Information:     Patient Class: Inpatient  Living Environment: Home     Reason for Referral: assess swallow d/t concenr ofr aspiration in setting of PNA  Prior to Session Communication: Bedside nurse    RN cleared pt to participate in session and reported no difficulty taking pills, given with water    Pt reported no difficulty with swallowing endorses h/o GERD and barretts esophagus, had surgery in 2004, could not provide details    Date of Onset: 08/03/25  Date of Order: 08/03/25  [employer] Diet: regular, thin liquids  Current Diet : regular, thin liquids    Status at time of evaluation:  Pain Assessment  Pain Assessment: 0-10  0-10 (Numeric) Pain Score: 0 - No pain    Pt was alert, pleasant, and cooperative for session.  Orientation: Oriented to situation and Ox4  Ability to follow functional commands: WFL  Nutritional status: Appears well-nourished/no concerns    Respiratory status: Supplemental oxygen via NC  Baseline Vocal Quality: Normal  Volitional Cough: Strong  Volitional Swallow: Within Functional Limits  Patient positioning: Upright in bed    OBJECTIVE  Clinical swallow evaluation completed and consisted of interview, oral motor assessment, and PO trials (approx 4 oz thin liquids, 2 oz pudding and 1 salvatore cracker).    ORAL PHASE: Natural dentition in good condition, upper partial present. Oral mucosa were pink, moist, and free of obvious lesions. Lingual strength and ROM were WFL. Labial strength/ROM were WFL. Labial seal was adequate. Mastication of regular solids was WFL A/P transit and oral clearance were WFL.    PHARYNGEAL PHASE: Laryngeal elevation was visualized or palpated with all trials, however adequacy of hyolaryngeal elevation/excursion cannot be determined at bedside. No immediate or delayed s/sx aspiration/penetration were observed with any consistencies.    Was  3oz challenge administered: Yes; pt drank 3oz of thin liquid in one attempt, without breaking, with no overt s/sx aspiration/penetration observed.     Treatment/Education:  Results and recommendations were relayed to: Patient, Bedside nurse, and Physician  Education provided: Yes   Learner: Patient   Barriers to learning: Hearing impairment barrier   Method of teaching: Verbal   Topic: role of ST and results of assessment   Outcome of teaching: Pt verbalized understanding  Treatment provided: No

## 2025-08-04 NOTE — PROGRESS NOTES
08/04/25 1456   [facility] Disability Status   Are you deaf or do you have serious difficulty hearing? N   Are you blind or do you have serious difficulty seeing, even when wearing glasses? N   Because of a physical, mental, or emotional condition, do you have serious difficulty concentrating, remembering, or making decisions? (5 years old or older) N   Do you have serious difficulty walking or climbing stairs? N   Do you have serious difficulty dressing or bathing? N   Because of a physical, mental, or emotional condition, do you have serious difficulty doing errands alone such as visiting the doctor? N

## 2025-08-05 LAB
ANION GAP SERPL CALCULATED.3IONS-SCNC: 8 MMOL/L (ref 10–20)
BACTERIA UR CULT: NORMAL
BUN SERPL-MCNC: 13 MG/DL (ref 6–23)
CALCIUM SERPL-MCNC: 8.7 MG/DL (ref 8.6–10.3)
CHLORIDE SERPL-SCNC: 98 MMOL/L (ref 98–107)
CO2 SERPL-SCNC: 30 MMOL/L (ref 21–32)
CREAT SERPL-MCNC: 0.75 MG/DL (ref 0.5–1.05)
EGFRCR SERPLBLD CKD-EPI 2021: 76 ML/MIN/1.73M*2
ERYTHROCYTE [DISTWIDTH] IN BLOOD BY AUTOMATED COUNT: 15.2 % (ref 11.5–14.5)
GLUCOSE SERPL-MCNC: 96 MG/DL (ref 74–99)
HCT VFR BLD AUTO: 25.3 % (ref 36–46)
HGB BLD-MCNC: 7.7 G/DL (ref 12–16)
M PNEUMO IGM SER IA-ACNC: 0.03 U/L
MCH RBC QN AUTO: 29.8 PG (ref 26–34)
MCHC RBC AUTO-ENTMCNC: 30.4 G/DL (ref 32–36)
MCV RBC AUTO: 98 FL (ref 80–100)
NRBC BLD-RTO: 0 /100 WBCS (ref 0–0)
PLATELET # BLD AUTO: 226 X10*3/UL (ref 150–450)
POTASSIUM SERPL-SCNC: 3.7 MMOL/L (ref 3.5–5.3)
RBC # BLD AUTO: 2.58 X10*6/UL (ref 4–5.2)
SODIUM SERPL-SCNC: 132 MMOL/L (ref 136–145)
STAPHYLOCOCCUS SPEC CULT: NORMAL
WBC # BLD AUTO: 3.9 X10*3/UL (ref 4.4–11.3)

## 2025-08-05 PROCEDURE — 36415 COLL VENOUS BLD VENIPUNCTURE: CPT | Performed by: INTERNAL MEDICINE

## 2025-08-05 PROCEDURE — 97110 THERAPEUTIC EXERCISES: CPT | Mod: GP

## 2025-08-05 PROCEDURE — 2500000001 HC RX 250 WO HCPCS SELF ADMINISTERED DRUGS (ALT 637 FOR MEDICARE OP): Performed by: NURSE PRACTITIONER

## 2025-08-05 PROCEDURE — 2500000001 HC RX 250 WO HCPCS SELF ADMINISTERED DRUGS (ALT 637 FOR MEDICARE OP): Performed by: INTERNAL MEDICINE

## 2025-08-05 PROCEDURE — 80048 BASIC METABOLIC PNL TOTAL CA: CPT | Performed by: INTERNAL MEDICINE

## 2025-08-05 PROCEDURE — 99232 SBSQ HOSP IP/OBS MODERATE 35: CPT | Performed by: INTERNAL MEDICINE

## 2025-08-05 PROCEDURE — 97530 THERAPEUTIC ACTIVITIES: CPT | Mod: GO

## 2025-08-05 PROCEDURE — 97530 THERAPEUTIC ACTIVITIES: CPT | Mod: GP

## 2025-08-05 PROCEDURE — 2500000004 HC RX 250 GENERAL PHARMACY W/ HCPCS (ALT 636 FOR OP/ED): Performed by: INTERNAL MEDICINE

## 2025-08-05 PROCEDURE — 2500000002 HC RX 250 W HCPCS SELF ADMINISTERED DRUGS (ALT 637 FOR MEDICARE OP, ALT 636 FOR OP/ED): Performed by: INTERNAL MEDICINE

## 2025-08-05 PROCEDURE — 1200000002 HC GENERAL ROOM WITH TELEMETRY DAILY

## 2025-08-05 PROCEDURE — 2500000002 HC RX 250 W HCPCS SELF ADMINISTERED DRUGS (ALT 637 FOR MEDICARE OP, ALT 636 FOR OP/ED)

## 2025-08-05 PROCEDURE — 85027 COMPLETE CBC AUTOMATED: CPT | Performed by: INTERNAL MEDICINE

## 2025-08-05 RX ORDER — HYDRALAZINE HYDROCHLORIDE 20 MG/ML
10 INJECTION INTRAMUSCULAR; INTRAVENOUS EVERY 8 HOURS PRN
Status: DISCONTINUED | OUTPATIENT
Start: 2025-08-05 | End: 2025-08-06

## 2025-08-05 RX ADMIN — HYDROXYCHLOROQUINE SULFATE 200 MG: 200 TABLET, FILM COATED ORAL at 08:39

## 2025-08-05 RX ADMIN — HYDRALAZINE HYDROCHLORIDE 100 MG: 50 TABLET ORAL at 08:39

## 2025-08-05 RX ADMIN — RANOLAZINE 500 MG: 500 TABLET, EXTENDED RELEASE ORAL at 08:41

## 2025-08-05 RX ADMIN — SULFASALAZINE 500 MG: 500 TABLET ORAL at 20:37

## 2025-08-05 RX ADMIN — SULFASALAZINE 500 MG: 500 TABLET ORAL at 08:41

## 2025-08-05 RX ADMIN — PANTOPRAZOLE SODIUM 40 MG: 40 TABLET, DELAYED RELEASE ORAL at 20:43

## 2025-08-05 RX ADMIN — ATORVASTATIN CALCIUM 80 MG: 80 TABLET, FILM COATED ORAL at 20:36

## 2025-08-05 RX ADMIN — DULOXETINE 30 MG: 30 CAPSULE, DELAYED RELEASE ORAL at 17:43

## 2025-08-05 RX ADMIN — ENOXAPARIN SODIUM 40 MG: 100 INJECTION SUBCUTANEOUS at 17:43

## 2025-08-05 RX ADMIN — HYDRALAZINE HYDROCHLORIDE 100 MG: 50 TABLET ORAL at 20:36

## 2025-08-05 RX ADMIN — CARVEDILOL 12.5 MG: 12.5 TABLET, FILM COATED ORAL at 20:37

## 2025-08-05 RX ADMIN — POTASSIUM CHLORIDE EXTENDED-RELEASE 40 MEQ: 1500 TABLET ORAL at 08:41

## 2025-08-05 RX ADMIN — AZITHROMYCIN DIHYDRATE 500 MG: 500 TABLET ORAL at 17:43

## 2025-08-05 RX ADMIN — PANTOPRAZOLE SODIUM 40 MG: 40 TABLET, DELAYED RELEASE ORAL at 08:39

## 2025-08-05 RX ADMIN — CEFTRIAXONE 1 G: 1 INJECTION, SOLUTION INTRAVENOUS at 17:43

## 2025-08-05 RX ADMIN — CARVEDILOL 12.5 MG: 12.5 TABLET, FILM COATED ORAL at 08:39

## 2025-08-05 RX ADMIN — GABAPENTIN 300 MG: 300 CAPSULE ORAL at 08:39

## 2025-08-05 RX ADMIN — SUCRALFATE 1 G: 1 SUSPENSION ORAL at 08:41

## 2025-08-05 RX ADMIN — HYDROCHLOROTHIAZIDE 12.5 MG: 12.5 CAPSULE ORAL at 08:39

## 2025-08-05 RX ADMIN — HYDRALAZINE HYDROCHLORIDE 5 MG: 20 INJECTION INTRAMUSCULAR; INTRAVENOUS at 15:53

## 2025-08-05 RX ADMIN — GABAPENTIN 300 MG: 300 CAPSULE ORAL at 20:37

## 2025-08-05 RX ADMIN — ASPIRIN 81 MG: 81 TABLET, DELAYED RELEASE ORAL at 08:39

## 2025-08-05 RX ADMIN — RANOLAZINE 500 MG: 500 TABLET, EXTENDED RELEASE ORAL at 20:37

## 2025-08-05 RX ADMIN — HYDRALAZINE HYDROCHLORIDE 10 MG: 20 INJECTION INTRAMUSCULAR; INTRAVENOUS at 21:31

## 2025-08-05 ASSESSMENT — COGNITIVE AND FUNCTIONAL STATUS - GENERAL
CLIMB 3 TO 5 STEPS WITH RAILING: A LOT
HELP NEEDED FOR BATHING: A LITTLE
MOBILITY SCORE: 18
DRESSING REGULAR LOWER BODY CLOTHING: A LITTLE
WALKING IN HOSPITAL ROOM: A LITTLE
DAILY ACTIVITIY SCORE: 18
MOVING TO AND FROM BED TO CHAIR: A LITTLE
EATING MEALS: A LITTLE
DRESSING REGULAR UPPER BODY CLOTHING: A LITTLE
TOILETING: A LITTLE
STANDING UP FROM CHAIR USING ARMS: A LITTLE
PERSONAL GROOMING: A LITTLE
TURNING FROM BACK TO SIDE WHILE IN FLAT BAD: A LITTLE

## 2025-08-05 ASSESSMENT — PAIN - FUNCTIONAL ASSESSMENT
PAIN_FUNCTIONAL_ASSESSMENT: 0-10

## 2025-08-05 ASSESSMENT — PAIN SCALES - GENERAL
PAINLEVEL_OUTOF10: 0 - NO PAIN

## 2025-08-05 NOTE — PROGRESS NOTES
Rakan Cervantes is a 89 y.o. female on day 2 of admission presenting with Chest pain, unspecified type.      Subjective   Patient denied chest pain.  She denied fever but complained of feeling weak.  Patient is on 1 L oxygen.    Objective     Last Recorded Vitals  /52 (BP Location: Right arm, Patient Position: Sitting)   Pulse 63   Temp 36 °C (96.8 °F) (Oral)   Resp 21   Wt 50.8 kg (112 lb)   SpO2 99%   Intake/Output last 3 Shifts:    Intake/Output Summary (Last 24 hours) at 8/5/2025 1104  Last data filed at 8/5/2025 0900  Gross per 24 hour   Intake 425 ml   Output --   Net 425 ml       Admission Weight  Weight: 50.8 kg (112 lb) (08/03/25 1419)    Daily Weight  08/03/25 : 50.8 kg (112 lb)    Image Results  XR chest 1 view  Narrative: Interpreted By:  Kacy Bernal,   STUDY:  XR CHEST 1 VIEW;  8/3/2025 2:54 pm      INDICATION:  Signs/Symptoms:Chest Pain.      COMPARISON:  07/20/2025      ACCESSION NUMBER(S):  [id number]      ORDERING CLINICIAN:  MARTY LEJEUNE      FINDINGS:  AP radiograph of the chest was provided.              CARDIOMEDIASTINAL SILHOUETTE:  Cardiomediastinal silhouette is stable in size and configuration.      LUNGS:  Small bilateral pleural effusions with bibasilar opacification,  better evaluated on concurrent CT, reported separately.      ABDOMEN:  No remarkable upper abdominal findings.      BONES:  Reverse right shoulder arthroplasty. Advanced osteoarthritis of the  left shoulder.      Impression: Small bilateral pleural effusions with bibasilar opacification,  better evaluated on concurrent CT, reported separately.      Signed by: Kacy Bernal 8/3/2025 3:56 PM  Dictation workstation:   [id number]  CT chest abdomen pelvis w IV contrast  Narrative: Interpreted By:  Kacy Bernal,   STUDY:  CT CHEST ABDOMEN PELVIS W IV CONTRAST;  8/3/2025 2:43 pm      INDICATION:  Signs/Symptoms:abdominal pain, epigastric pain.          COMPARISON:  10/28/2024      ACCESSION  NUMBER(S):  [id number]      ORDERING CLINICIAN:  RANDI HERNÁNDEZ      TECHNIQUE:  CT of the chest, abdomen, and pelvis was performed.  Contiguous axial  images were obtained at 3 mm slice thickness through the chest,  abdomen and pelvis. Coronal and sagittal reconstructions at 3 mm  slice thickness were performed. 75 ML of Omnipaque 350 was  administered intravenously without immediate complication.      FINDINGS:  Marked beam hardening artifact from posterior fusion hardware, right  shoulder arthroplasty, bilateral hip prosthesis, and arms in the  downward position limits evaluation.      CHEST:      LUNG/PLEURA/LARGE AIRWAYS:  Small bilateral pleural effusions with mild interstitial edema,  improved from prior exam. Mild dependent right-greater-than-left  lower lobe ground-glass to mild consolidative opacities. Trachea and  right and left main bronchi are patent.      VESSELS:  Aorta and pulmonary arteries are normal caliber.  Severe atherosclerotic calcifications of the thoracic aorta.  Severe coronary artery calcifications versus stent.      HEART:  The heart is normal in size.  No pericardial effusion.      MEDIASTINUM AND DONATO:  No mediastinal, hilar or axillary lymphadenopathy.  Ectasia of the esophagus, moderate-sized hiatal hernia noted, and  circumferential esophageal wall thickening.      CHEST WALL AND LOWER NECK:  No soft tissue masses in the chest wall.  Multinodular thyroid noted, measuring up to 16 mm on the left.      ABDOMEN:      LIVER:  The liver is normal in size without evidence of focal liver lesions.      BILE DUCTS:  No biliary dilatation.      GALLBLADDER:  Cholecystectomy      PANCREAS:  The pancreas appears unremarkable without evidence of ductal  dilatation or masses.      SPLEEN:  The spleen is normal in size.      ADRENAL GLANDS:  No adrenal nodule or thickening.      KIDNEYS AND URETERS:  The kidneys are normal in size. This exam is nondiagnostic for  evaluation of suspicious renal  lesions due to marked beam hardening  artifact. No hydroureteronephrosis or nephroureterolithiasis.      PELVIS:      BLADDER:  Within normal limits.      REPRODUCTIVE ORGANS:  No pelvic masses.      BOWEL:  Moderate-sized hiatal hernia.  The small and large bowel are normal in caliber and demonstrate no  wall thickening. The appendix is not definitely visualized. There is  however no pericecal stranding or fluid. Prominent sigmoid  diverticulosis without acute diverticulitis.      VESSELS:  Severe atherosclerotic calcifications of the aortoiliac arteries.  The IVC appears normal.  Portal vein, splenic vein, and SMV are patent.      PERITONEUM/RETROPERITONEUM/LYMPH NODES:  No ascites or fluid collection.  No peritoneal nodularity or deposits.  The retroperitoneum is unremarkable.  No abdominopelvic lymphadenopathy is present.      BONE AND SOFT TISSUE:  No acute fracture.  Degenerative changes of the thoracolumbar spine. Multilevel endplate  irregularities noted. Posterior fusion hardware, right shoulder  arthroplasty, and bilateral hip prosthesis noted. No significant  abnormality of the abdominal wall soft tissues.      Impression: 1. Ectasia of the esophagus, moderate-sized hiatal hernia noted, and  circumferential esophageal wall thickening. Please correlate for  esophagitis/gastroesophageal reflux.  2. Small bilateral pleural effusions with mild interstitial edema,  improved from prior exam.  3. Mild dependent right-greater-than-left lower lobe ground-glass to  mild consolidative opacities, for which superimposed developing  pneumonia is not excluded.  4. Nonemergent thyroid ultrasound is recommended to further evaluate  thyroid nodules.  5. Additional chronic and incidental findings as detailed above.      MACRO:  None      Signed by: Kacy Bernal 8/3/2025 3:54 PM  Dictation workstation:   [id number]      Physical Exam  General: cooperating during physical exam.  HEENT: Pupils are equal and reactive to  light and commendation , oral mucosa moist, no JVD.  Cardiovascular: PMI nondisplaced  Lungs: Clear to auscultation bilaterally, no wheezing, no crackles, no dullness to percussion.  Abdomen: No hepatosplenomegaly appreciated, soft , not tender, positive bowel sounds, positive bowel movement.  Neuro: Alert and oriented x2, strength in upper and lower extremities , sensation intact.  Musculoskeletal: No swelling in lower extremities, no limitation in range of motion.  Vascular: Pulses are intact in upper and lower extremities  Skin: No petechiae, ecchymosis or other stigmata for dermatology disease.       Assessment and plan    Chest pain  EKG no ischemic changes.  Monitoring telemetry with  Evaluated by cardiology    Streptococcus pneumonia  UTI  Patient is on ceftriaxone and Zithromax.  Continue with current antibiotics.  Consult ID    Chronic respiratory failure with hypoxia.  Continue with oxygen therapy to maintain pulse ox more than 92%     Dyspepsia  Esophagitis  History of GERD esophagitis    Hypertension urgency  Continue with BP meds  Blood pressure improved    Hypertension  Coronary artery disease  Hyperlipidemia  Continue with current medication.    Peripheral neuropathy  Patient is on gabapentin    Polyarthritis  On hydroxychloroquine    Patient is DNR CCA/DNI    Deconditioning  PT /OT   Fall precaution  Ambulate with assistant  Bed alarms on.    Hyponatremia  Monitor close  Hold hydroxychloroquine  BMP in a.m.    Anemia  Hemoglobin 7.7 and 24.3.    Transfuse for hemoglobin less than 7    Waiting for ID to see her.    Continue with current antibiotics.    Discharge home with home health care 24 to 48 hours        Graciela Andrews MD

## 2025-08-05 NOTE — PROGRESS NOTES
Physical Therapy    Physical Therapy Treatment    Patient Name: Rakan Cervantes  MRN: [id number]  Department: [address]  Room: [address] [place] [address]  Today's Date: 8/5/2025  Time Calculation  Start Time: 0910  Stop Time: 0933  Time Calculation (min): 23 min         Assessment/Plan   PT Assessment  Rehab Prognosis: Good  Barriers to Discharge Home: Physical needs  Physical Needs: Intermittent mobility assistance needed  Evaluation/Treatment Tolerance: Patient tolerated treatment well  Medical Staff Made Aware: Yes  Strengths: Support of Caregivers  Barriers to Participation: Comorbidities  End of Session Communication: Bedside nurse  Assessment Comment: pt demonstrating improved amb with FWW tolerance/ease on 1 liter o2. requires intermittant verbal cues for improved safety.  End of Session Patient Position: Up in chair, Alarm on (all needs in reach)     PT Plan  Treatment/Interventions: Bed mobility, Transfer training, Gait training, Balance training, Neuromuscular re-education, Strengthening, Endurance training, Range of motion, Therapeutic exercise, Therapeutic activity, Home exercise program  PT Plan: Ongoing PT  PT Frequency: 4 times per week  PT Discharge Recommendations: Low intensity level of continued care  Equipment Recommended upon Discharge: Wheeled walker  PT Recommended Transfer Status: Contact guard, Assistive device  PT - OK to Discharge: Yes    PT Visit Info:  PT Received On: 08/05/25     General Visit Information:   General  Family/Caregiver Present: No  Prior to Session Communication: Bedside nurse  Patient Position Received: Up in chair, Alarm on  Preferred Learning Style: auditory, verbal  General Comment: cleared by nurse for therapy; pt agreeable to therapy; + telemetry    Subjective   Precautions:  Precautions  Hearing/Visual Limitations: glasses prn; mod [place]  Medical Precautions: Fall precautions, Oxygen therapy device and L/min (1 liter o2 via nc)     Date/Time Vitals Session Patient Position Pulse Resp SpO2  BP MAP (mmHg)    08/05/25 0910 During PT  --  --  --  --  --  --      Vital Signs Comment: O2 sat 93 to 95% with HR 67 to 75 bpm---1 liter     Objective   Pain:  Pain Assessment  Pain Assessment: 0-10  0-10 (Numeric) Pain Score: 0 - No pain  Cognition:  Cognition  Overall Cognitive Status: Within Functional Limits  Orientation Level: Oriented X4  Insight: Mild  Coordination:  Movements are Fluid and Coordinated: Yes (age appropriate)  Postural Control:  Postural Control  Posture Comment: forward head; rounded shoulders  Extremity/Trunk Assessments:    Activity Tolerance:  Activity Tolerance  Endurance: Decreased tolerance for upright activites  Activity Tolerance Comments: fair +  Treatments:  Therapeutic Exercise  Therapeutic Exercise Performed: Yes  Therapeutic Exercise Activity 1: seated robert AP x 20 reps  Therapeutic Exercise Activity 2: seated robert LAQ's x 20 reps  Therapeutic Exercise Activity 3: seated robert marching x 20 reps  Therapeutic Exercise Activity 4: seated robert hip abd x 20 reps  Therapeutic Exercise Activity 5: seated robert isometric hip add x 20 reps    Therapeutic Activity  Therapeutic Activity Performed: Yes (see transfers, amb with FWW)    Bed Mobility  Bed Mobility: No    Ambulation/Gait Training  Ambulation/Gait Training Performed: Yes  Ambulation/Gait Training 1  Surface 1: Level tile  Device 1: Rolling walker  Assistance 1: Contact guard, Minimal verbal cues  Quality of Gait 1: Narrow base of support, Shuffling gait, Soft knee(s)  Comments/Distance (ft) 1: 44 ft x 2, + turns, verbal cues for erect posture, staying within frame of FWW at all times  Transfers  Transfer: Yes  Transfer 1  Transfer From 1: Chair with arms to  Transfer to 1: Stand  Technique 1: Sit to stand  Transfer Device 1: Walker  Transfer Level of Assistance 1: Contact guard, Minimal verbal cues  Trials/Comments 1: verbal cues for robert hand placement on arms of chair  Transfers 2  Transfer From 2: Stand to  Transfer to 2: Chair  with arms  Technique 2: Stand to sit  Transfer Device 2: Walker  Transfer Level of Assistance 2: Contact guard, Minimal verbal cues  Trials/Comments 2: verbal cues for reaching back for emily of chair with both hands    Stairs  Stairs: No    Outcome Measures:  [facility] Basic Mobility  Turning from your back to your side while in a flat bed without using bedrails: None  Moving from lying on your back to sitting on the side of a flat bed without using bedrails: A little  Moving to and from bed to chair (including a wheelchair): A little  Standing up from a chair using your arms (e.g. wheelchair or bedside chair): A little  To walk in hospital room: A little  Climbing 3-5 steps with railing: A lot  Basic Mobility - Total Score: 18    Education Documentation  Mobility Training, taught by Michelle Pastor, PT at 8/5/2025  9:51 AM.  Learner: Patient  Readiness: Acceptance  Method: Explanation  Response: Verbalizes Understanding  Comment: PT educated pt in safe transfer technique    Education Comments  No comments found.               Encounter Problems       Encounter Problems (Active)       PT Problem       Strength (Progressing)       Start:  08/04/25    Expected End:  09/04/25       The patient will demonstrate an overall strength of 4/5 in BLE to assist with completion of functional mobility.           Functional Mobility (Progressing)       Start:  08/04/25    Expected End:  09/04/25       The patient will complete functional mobility (bed mobility, transfers, etc.) at a mod indep level with LRAD by DC.           Ambulation (Progressing)       Start:  08/04/25    Expected End:  09/04/25       The patient will be able to ambulate at a mod indep level for >40ftx1 with RW.          Balance (Progressing)       Start:  08/04/25    Expected End:  09/04/25       The patient will demonstrate good dynamic standing balance during activity with LRAD.

## 2025-08-05 NOTE — PROGRESS NOTES
08/05/25 1024   Discharge Planning   Expected Discharge Disposition Home H  (HC)     Will need internal referral for home health upon discharge  ** do not discharge without speaking to care coordination**

## 2025-08-05 NOTE — CARE PLAN
Problem: Pain - Adult  Goal: Verbalizes/displays adequate comfort level or baseline comfort level  Outcome: Progressing     Problem: Safety - Adult  Goal: Free from fall injury  Outcome: Progressing     Problem: Discharge Planning  Goal: Discharge to home or other facility with appropriate resources  Outcome: Progressing     Problem: Chronic Conditions and Co-morbidities  Goal: Patient's chronic conditions and co-morbidity symptoms are monitored and maintained or improved  Outcome: Progressing     Problem: Nutrition  Goal: Nutrient intake appropriate for maintaining nutritional needs  Outcome: Progressing     Problem: Fall/Injury  Goal: Not fall by end of shift  Outcome: Progressing  Goal: Be free from injury by end of the shift  Outcome: Progressing  Goal: Verbalize understanding of personal risk factors for fall in the hospital  Outcome: Progressing  Goal: Verbalize understanding of risk factor reduction measures to prevent injury from fall in the home  Outcome: Progressing  Goal: Use assistive devices by end of the shift  Outcome: Progressing  Goal: Pace activities to prevent fatigue by end of the shift  Outcome: Progressing     Problem: Skin  Goal: Decreased wound size/increased tissue granulation at next dressing change  Outcome: Progressing  Goal: Participates in plan/prevention/treatment measures  Outcome: Progressing  Goal: Prevent/manage excess moisture  Outcome: Progressing  Goal: Prevent/minimize sheer/friction injuries  Outcome: Progressing  Goal: Promote/optimize nutrition  Outcome: Progressing  Goal: Promote skin healing  Outcome: Progressing   The patient's goals for the shift include no chest pain    The clinical goals for the shift include Pt will be HDS this shift    \

## 2025-08-05 NOTE — PROGRESS NOTES
Occupational Therapy    OT Treatment    Patient Name: Rakan Cervantes  MRN: [id number]  Department: [address]  Room: [address] [place] [address]  Today's Date: 8/5/2025  Time Calculation  Start Time: 1355  Stop Time: 1407  Time Calculation (min): 12 min        Assessment:  OT Assessment: steady progress, SBA when standing for ADLs and functional tasks  Prognosis: Good  Barriers to Discharge Home: No anticipated barriers  Evaluation/Treatment Tolerance: Patient limited by fatigue  Medical Staff Made Aware: Yes  End of Session Communication: Bedside nurse  End of Session Patient Position: Up in chair, Alarm on  OT Assessment Results: Decreased ADL status, Decreased upper extremity strength, Decreased endurance, Decreased functional mobility  Prognosis: Good  Barriers to Discharge: None  Evaluation/Treatment Tolerance: Patient limited by fatigue  Medical Staff Made Aware: Yes  Strengths: Ability to acquire knowledge, Attitude of self  Plan:  Treatment Interventions: ADL retraining, Functional transfer training, UE strengthening/ROM, Endurance training, Equipment evaluation/education, Compensatory technique education  OT Frequency: 3 times per week  OT Discharge Recommendations: Low intensity level of continued care  Equipment Recommended upon Discharge: Wheeled walker  OT Recommended Transfer Status: Stand by assist  OT - OK to Discharge: Yes  Treatment Interventions: ADL retraining, Functional transfer training, UE strengthening/ROM, Endurance training, Equipment evaluation/education, Compensatory technique education    Subjective   OT Visit Info:  OT Received On: 08/05/25  General Visit Info:  General  Reason for Referral: impaired ADL's/mobility; complaint of Chest pain  Past Medical History Relevant to Rehab: afib, CAD, CHF, SVT, TIA, HTN, HLD, diverticulosis, polyarteritis, Barretts esophagus  Family/Caregiver Present: Yes  Caregiver Feedback: Son present  Prior to Session Communication: Bedside nurse  Patient Position Received: Up in  chair, Alarm on  Preferred Learning Style: verbal, visual  General Comment: pt agreeable and cooperative with therapy  Precautions:  Hearing/Visual Limitations: glasses prn; mod [place]  Medical Precautions: Fall precautions, Oxygen therapy device and L/min (1L of 02)     Date/Time Vitals Session Patient Position Pulse Resp SpO2 BP MAP (mmHg)    08/05/25 1355 During OT  --  72  --  --  --  --            Pain:  Pain Assessment  Pain Assessment: 0-10  0-10 (Numeric) Pain Score: 0 - No pain    Objective    Cognition:  Cognition  Overall Cognitive Status: Within Functional Limits  Orientation Level: Oriented X4  Coordination:  Movements are Fluid and Coordinated: Yes  Activities of Daily Living: Grooming  Grooming Level of Assistance: Close supervision  Grooming Where Assessed: Standing sinkside  Grooming Comments: pt performed oral hygiene while standing at sink with use of FWW. pt stood for ~2-3 minutes during task with intermittent UE support on walker for additional support.  Functional Standing Tolerance:  Time: 3-4 minutes  Activity: pt performed dynamic reaching task while standing with unilateral UE support on walker.  pt performed forward reaching x15 reps x2 sets each UE while reaching out of CESIA.  pt with limited LUE shoulder ROM for <90 degrees but still reaching out of base of support.  performe at [facility] for safety  Bed Mobility/Transfers: Transfer 1  Technique 1: Sit to stand  Transfer Device 1: Walker  Transfer Level of Assistance 1: Close supervision  Trials/Comments 1: from standard chair, fair control when ascending/descending  Transfers 2  Transfer to 2: Chair with arms  Technique 2: Stand to sit  Transfer Device 2: Walker  Transfer Level of Assistance 2: Close supervision  Trials/Comments 2: pt transferred back to chair at end of session with use of FWW      Functional Mobility:  Functional Mobility  Functional Mobility Performed: Yes  Functional Mobility 1  Surface 1: Level tile  Device 1: Rolling  walker  Assistance 1: Close supervision  Comments 1: pt ambulated to/from bathroom with use of FWW; min cues for safe management of walker, no significant LOB noted throughout  Sitting Balance:  Static Sitting Balance  Static Sitting-Balance Support: Feet supported  Static Sitting-Level of Assistance: Independent  Standing Balance:  Static Standing Balance  Static Standing-Balance Support: Bilateral upper extremity supported  Static Standing-Level of Assistance: Close supervision    Outcome Measures:[facility] Daily Activity  Putting on and taking off regular lower body clothing: A little  Bathing (including washing, rinsing, drying): A little  Putting on and taking off regular upper body clothing: A little  Toileting, which includes using toilet, bedpan or urinal: A little  Taking care of personal grooming such as brushing teeth: A little  Eating Meals: A little  Daily Activity - Total Score: 18        Education Documentation  Body Mechanics, taught by Puma Vela OT at 8/5/2025  3:27 PM.  Learner: Patient  Readiness: Acceptance  Method: Explanation  Response: Verbalizes Understanding  Comment: ADL/functional mobility techniques, facilitating OOB activity, OT POC    ADL Training, taught by Puma Vela OT at 8/5/2025  3:27 PM.  Learner: Patient  Readiness: Acceptance  Method: Explanation  Response: Verbalizes Understanding  Comment: ADL/functional mobility techniques, facilitating OOB activity, OT POC    Education Comments  No comments found.        OP EDUCATION:       Goals:  Encounter Problems       Encounter Problems (Active)       OT Goals       Pt will complete all grooming tasks with mod indep in standing with RW. (Progressing)       Start:  08/04/25    Expected End:  09/04/25            Pt will complete all toileting tasks with mod indep. (Progressing)       Start:  08/04/25    Expected End:  09/04/25            Pt will complete all functional transfers and mobility with mod indep using a RW. (Progressing)        Start:  08/04/25    Expected End:  09/04/25            Pt will tolerate functional mobility for a household distance using a RW with mod indep. (Progressing)       Start:  08/04/25    Expected End:  09/04/25

## 2025-08-05 NOTE — CONSULTS
Inpatient consult to Infectious Diseases  Consult performed by: Cooper Mckee MD  Consult ordered by: Graciela Andrews MD            Primary MD: Samuel Sky MD    Reason For Consult  Pneumonia with positive urine streptococcal antigen    History Of Present Illness  Rakan Cervantes is a 89 y.o. female presenting with chest pain.  Onset was on day of admission, sharp, moderate to severe, on the left side of the sternum, with radiation to her shoulder.  She came to the hospital for further evaluation and management.  She has history of chronic respiratory failure on low-flow oxygen.  Workup was remarkable for bibasilar infiltrates.  She had positive urine streptococcal antigen.  She denies any significant cough.  She denies any fever or chills.  She is on Rocephin and azithromycin.       Past Medical History  She has a past medical history of Abnormality of plasma protein (03/13/2025), Acute kidney injury (03/12/2025), Acute non-ST segment elevation myocardial infarction (Multi) (11/26/2023), Anemia, Arteriosclerosis of coronary artery (05/14/2023), Arthritis, Asthma, Atrial fibrillation (Multi) (11/26/2023), Jonas esophagus, Bilateral carotid artery occlusion (05/23/2023), Bradycardia (02/21/2025), CAD (coronary artery disease), Cardiac rhythm disorder or disturbance or change (11/26/2023), Carpal tunnel syndrome of right wrist (08/01/2025), Cervical radiculopathy, Chest pain (11/26/2023), Chronic heart failure with preserved ejection fraction (02/21/2025), Chronic obstructive pulmonary disease (Multi) (11/26/2023), Constipation, Coronary artery disease (11/26/2023), Degenerative joint disease, Dyslipidemia, Dysuria, Erosive esophagitis, Erythema of skin (04/07/2025), Fall at home, initial encounter (03/11/2025), Gastrointestinal hemorrhage with melena (04/08/2025), GERD (gastroesophageal reflux disease), GIB (gastrointestinal bleeding) (04/07/2025), Hematochezia (04/08/2025), Hypercholesterolemia  (05/23/2023), Hyperparathyroidism (Multi), Hypertensive urgency (11/26/2023), Impaired fasting glucose (11/26/2023), Iron deficiency anemia due to chronic blood loss (04/08/2025), Irritable bowel syndrome (IBS), Labile essential hypertension (11/26/2023), Labile hypertension, Left foot pain, Low blood pressure (11/26/2023), Lung infiltrate on CT (02/24/2025), Macular degeneration, Mixed hyperlipidemia (11/26/2023), Neck pain, Occipital neuralgia of right side (08/01/2025), Osteoporosis (11/26/2023), Paresthesias (08/01/2025), Paroxysmal atrial fibrillation (Multi), Pneumonia of left upper lobe due to infectious organism (02/21/2025), Polyarthritis with negative rheumatoid factor (Multi), Post menopausal syndrome, Rapid atrial fibrillation (Multi) (05/14/2023), Right wrist pain (04/08/2025), Sepsis (Multi) (02/21/2025), Septic shock (Multi) (02/21/2025), Spinal stenosis, 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    Surgical History  She has a past surgical history that includes Other surgical history (02/14/2022); MR angio head wo IV contrast (04/23/2013); CT angio head w and wo IV contrast (05/08/2013); MR angio head wo IV contrast (07/20/2016); Cholecystectomy; Cardiovascular stress test (2017); Cardiovascular stress test (2004); Cataract extraction (Bilateral, 2011); Carpal tunnel release (Left, 2014); Total hip arthroplasty (Right, 2016); Revision total hip arthroplasty (Left, 2013); Coronary stent placement (05/2023); MR angio head wo IV contrast (10/27/2023); MR angio neck wo IV contrast (10/27/2023); Cardiac catheterization (Left, 9/27/2024); and Cardiac catheterization (N/A, 9/27/2024).     Social History     Occupational History    Not on file   Tobacco Use    Smoking status: Never     Passive exposure: Never    Smokeless  tobacco: Never   Vaping Use    Vaping status: Never Used   Substance and Sexual Activity    Alcohol use: Not Currently     Comment: occassional    Drug use: Never    Sexual activity: Not Currently     Travel History   Travel since 07/05/25    No documented travel since 07/05/25           Family History  Family History[1]  Allergies  Meperidine, Morphine, Opioids - morphine analogues, Pregabalin, and Tramadol     Immunization History   Administered Date(s) Administered    Flu vaccine, quadrivalent, high-dose, preservative free, age 65y+ (FLUZONE) 09/28/2020, 09/29/2021, 11/10/2022, 09/15/2023    Flu vaccine, trivalent, preservative free, HIGH-DOSE, age 65y+ (Fluzone) 10/12/2016, 10/16/2017, 10/05/2018, 10/09/2019, 09/20/2024    Flu vaccine, trivalent, preservative free, age 6 months and greater (Fluarix/Fluzone/Flulaval) 09/23/2015    Influenza, seasonal, injectable 11/01/2008, 10/21/2010, 09/27/2011, 10/03/2012, 10/28/2013, 09/11/2014    Moderna SARS-CoV-2 Vaccination 01/30/2021, 02/27/2021, 11/12/2021    Pneumococcal conjugate vaccine, 13-valent (PREVNAR 13) 08/04/2015    Pneumococcal polysaccharide vaccine, 23-valent, age 2 years and older (PNEUMOVAX 23) 10/01/2006, 01/01/2010    Tdap vaccine, age 7 year and older (BOOSTRIX, ADACEL) 09/28/2020    Zoster vaccine, recombinant, adult (SHINGRIX) 05/14/2019, 07/31/2019    Zoster, live 05/27/2015     Medications  Home medications:  Prescriptions Prior to Admission[2]  Current medications:  Scheduled medications  Scheduled Medications[3]  Continuous medications  Continuous Medications[4]  PRN medications  PRN Medications[5]    Review of Systems   All other systems reviewed and are negative.       Objective  Range of Vitals (last 24 hours)  Heart Rate:  [63-77]   Temp:  [36 °C (96.8 °F)-36.7 °C (98.1 °F)]   Resp:  [18-21]   BP: (149-196)/(47-70)   SpO2:  [93 %-99 %]        Daily Weight  08/03/25 : 50.8 kg (112 lb)    Body mass index is 22.62 kg/m².     Physical  Exam  Constitutional:       Appearance: Normal appearance.   HENT:      Head: Normocephalic and atraumatic.      Right Ear: External ear normal.      Left Ear: External ear normal.      Nose: Nose normal.     Eyes:      General: No scleral icterus.     Extraocular Movements: Extraocular movements intact.      Conjunctiva/sclera: Conjunctivae normal.       Cardiovascular:      Rate and Rhythm: Normal rate and regular rhythm.      Heart sounds: Normal heart sounds.   Pulmonary:      Breath sounds: Decreased breath sounds present.   Abdominal:      Palpations: Abdomen is soft.      Tenderness: There is no abdominal tenderness.     Musculoskeletal:      Cervical back: Normal range of motion and neck supple.      Right lower leg: No edema.      Left lower leg: No edema.     Skin:     General: Skin is warm and dry.     Neurological:      Mental Status: She is alert and oriented to person, place, and time.     Psychiatric:         Behavior: Behavior normal. Behavior is cooperative.        Relevant Results  Outside Hospital Results    Labs  Results from last 72 hours   Lab Units 08/05/25 0619 08/04/25  0553 08/03/25  1426   WBC AUTO x10*3/uL 3.9* 3.9* 4.7   HEMOGLOBIN g/dL 7.7* 7.7* 8.0*   HEMATOCRIT % 25.3* 24.3* 26.3*   PLATELETS AUTO x10*3/uL 226 253 321   NEUTROS PCT AUTO %  --   --  72.6   LYMPHS PCT AUTO %  --   --  14.2   MONOS PCT AUTO %  --   --  11.8   EOS PCT AUTO %  --   --  0.6     Results from last 72 hours   Lab Units 08/05/25  0619 08/04/25  0553 08/03/25  1426   SODIUM mmol/L 132* 134* 134*   POTASSIUM mmol/L 3.7 3.6 3.3*   CHLORIDE mmol/L 98 98 97*   CO2 mmol/L 30 31 31   BUN mg/dL 13 14 15   CREATININE mg/dL 0.75 0.72 0.83   GLUCOSE mg/dL 96 83 153*   CALCIUM mg/dL 8.7 8.6 9.0   ANION GAP mmol/L 8* 9* 9*   EGFR mL/min/1.73m*2 76 80 67   PHOSPHORUS mg/dL  --  3.9  --      Results from last 72 hours   Lab Units 08/04/25  0553 08/03/25  1426   ALK PHOS U/L  --  86   BILIRUBIN TOTAL mg/dL  --  0.5   PROTEIN  "TOTAL g/dL  --  5.7*   ALT U/L  --  9   AST U/L  --  14   ALBUMIN g/dL 3.1* 3.4     Estimated Creatinine Clearance: 34.5 mL/min (by C-G formula based on SCr of 0.75 mg/dL).  CRP   Date Value Ref Range Status   10/21/2021 0.3 0 - 2.0 MG/DL Final     Comment:     LESS THAN  Performed at [place] [address]     09/15/2021 0.3 0 - 2.0 MG/DL Final     Comment:     LESS THAN   RESULT CHECKED  Performed at [place] [address]     02/27/2020 0.3 0 - 2.0 MG/DL Final     Comment:     LESS THAN  Performed at [place] [address]       Sedimentation Rate   Date Value Ref Range Status   10/21/2021 18 0 - 20 MM/HR Final     Comment:     Performed at [address] [address]   09/15/2021 17 0 - 20 MM/HR Final     Comment:     Performed at [address] [address]   04/22/2021 24 (H) 0 - 20 MM/HR Final     Comment:     Performed at [address] [address]     No results found for: \"HIV1X2\", \"HIVCONF\", \"TGYLGD6KE\"  Hepatitis C Ab   Date Value Ref Range Status   02/27/2020   Final    Negative  Reference range: NEGATIVE  Performed at the [facility] unless   otherwise noted.       Microbiology  Reviewed-positive urine streptococcal antigen  Imaging  XR chest 1 view  Result Date: 8/3/2025  Interpreted By:  Kacy Bernal, STUDY: XR CHEST 1 VIEW;  8/3/2025 2:54 pm   INDICATION: Signs/Symptoms:Chest Pain.   COMPARISON: 07/20/2025   ACCESSION NUMBER(S): [id number]   ORDERING CLINICIAN: MARTY LEJEUNE   FINDINGS: AP radiograph of the chest was provided.       CARDIOMEDIASTINAL SILHOUETTE: Cardiomediastinal silhouette is stable in size and configuration.   LUNGS: Small bilateral pleural effusions with bibasilar opacification, better evaluated on concurrent CT, reported separately.   ABDOMEN: No remarkable upper abdominal findings.   BONES: Reverse right shoulder " arthroplasty. Advanced osteoarthritis of the left shoulder.       Small bilateral pleural effusions with bibasilar opacification, better evaluated on concurrent CT, reported separately.   Signed by: Kacy Bernal 8/3/2025 3:56 PM Dictation workstation:   [id number]    CT chest abdomen pelvis w IV contrast  Result Date: 8/3/2025  Interpreted By:  Kacy Bernal, STUDY: CT CHEST ABDOMEN PELVIS W IV CONTRAST;  8/3/2025 2:43 pm   INDICATION: Signs/Symptoms:abdominal pain, epigastric pain.     COMPARISON: 10/28/2024   ACCESSION NUMBER(S): [id number]   ORDERING CLINICIAN: RANDI HERNÁNDEZ   TECHNIQUE: CT of the chest, abdomen, and pelvis was performed.  Contiguous axial images were obtained at 3 mm slice thickness through the chest, abdomen and pelvis. Coronal and sagittal reconstructions at 3 mm slice thickness were performed. 75 ML of Omnipaque 350 was administered intravenously without immediate complication.   FINDINGS: Marked beam hardening artifact from posterior fusion hardware, right shoulder arthroplasty, bilateral hip prosthesis, and arms in the downward position limits evaluation.   CHEST:   LUNG/PLEURA/LARGE AIRWAYS: Small bilateral pleural effusions with mild interstitial edema, improved from prior exam. Mild dependent right-greater-than-left lower lobe ground-glass to mild consolidative opacities. Trachea and right and left main bronchi are patent.   VESSELS: Aorta and pulmonary arteries are normal caliber. Severe atherosclerotic calcifications of the thoracic aorta. Severe coronary artery calcifications versus stent.   HEART: The heart is normal in size. No pericardial effusion.   MEDIASTINUM AND DONATO: No mediastinal, hilar or axillary lymphadenopathy. Ectasia of the esophagus, moderate-sized hiatal hernia noted, and circumferential esophageal wall thickening.   CHEST WALL AND LOWER NECK: No soft tissue masses in the chest wall. Multinodular thyroid noted, measuring up to 16 mm on the left.   ABDOMEN:    LIVER: The liver is normal in size without evidence of focal liver lesions.   BILE DUCTS: No biliary dilatation.   GALLBLADDER: Cholecystectomy   PANCREAS: The pancreas appears unremarkable without evidence of ductal dilatation or masses.   SPLEEN: The spleen is normal in size.   ADRENAL GLANDS: No adrenal nodule or thickening.   KIDNEYS AND URETERS: The kidneys are normal in size. This exam is nondiagnostic for evaluation of suspicious renal lesions due to marked beam hardening artifact. No hydroureteronephrosis or nephroureterolithiasis.   PELVIS:   BLADDER: Within normal limits.   REPRODUCTIVE ORGANS: No pelvic masses.   BOWEL: Moderate-sized hiatal hernia. The small and large bowel are normal in caliber and demonstrate no wall thickening. The appendix is not definitely visualized. There is however no pericecal stranding or fluid. Prominent sigmoid diverticulosis without acute diverticulitis.   VESSELS: Severe atherosclerotic calcifications of the aortoiliac arteries. The IVC appears normal. Portal vein, splenic vein, and SMV are patent.   PERITONEUM/RETROPERITONEUM/LYMPH NODES: No ascites or fluid collection. No peritoneal nodularity or deposits. The retroperitoneum is unremarkable. No abdominopelvic lymphadenopathy is present.   BONE AND SOFT TISSUE: No acute fracture. Degenerative changes of the thoracolumbar spine. Multilevel endplate irregularities noted. Posterior fusion hardware, right shoulder arthroplasty, and bilateral hip prosthesis noted. No significant abnormality of the abdominal wall soft tissues.       1. Ectasia of the esophagus, moderate-sized hiatal hernia noted, and circumferential esophageal wall thickening. Please correlate for esophagitis/gastroesophageal reflux. 2. Small bilateral pleural effusions with mild interstitial edema, improved from prior exam. 3. Mild dependent right-greater-than-left lower lobe ground-glass to mild consolidative opacities, for which superimposed developing  pneumonia is not excluded. 4. Nonemergent thyroid ultrasound is recommended to further evaluate thyroid nodules. 5. Additional chronic and incidental findings as detailed above.   MACRO: None   Signed by: Kacy Bernal 8/3/2025 3:54 PM Dictation workstation:   [id number]    ECG 12 lead  Result Date: 7/22/2025  Atrial fibrillation with slow ventricular response with a competing junctional pacemaker Abnormal ECG When compared with ECG of 07-JUL-2025 13:07, Atrial fibrillation has replaced Sinus rhythm Vent. rate has decreased BY  25 BPM Minimal criteria for Anterior infarct are now Present Nonspecific T wave abnormality has replaced inverted T waves in Inferior leads T wave inversion no longer evident in Anterior leads Confirmed by Sergey Bond ([id number]) on 7/22/2025 8:43:11 AM    XR chest 1 view  Result Date: 7/20/2025  Interpreted By:  Finkelstein, Evan, STUDY: XR CHEST 1 VIEW;  7/20/2025 11:12 pm   INDICATION: Signs/Symptoms:sob.     COMPARISON: Chest radiograph 07/09/2025   ACCESSION NUMBER(S): [id number]   ORDERING CLINICIAN: IHSAN POOL   FINDINGS:     CARDIOMEDIASTINAL SILHOUETTE: Cardiomediastinal silhouette is stable in size and configuration.   LUNGS: No pulmonary consolidation, pleural effusion or pneumothorax.   ABDOMEN: No remarkable upper abdominal findings.   BONES: No acute osseous abnormality. Partially visualized right shoulder arthroplasty hardware. Severe left glenohumeral joint osteoarthrosis.       No radiographic evidence of acute cardiopulmonary pathology.   MACRO: None.   Signed by: Evan Finkelstein 7/20/2025 11:56 PM Dictation workstation:   [id number]    ECG 12 lead  Result Date: 7/11/2025  Normal sinus rhythm Nonspecific ST and T wave abnormality Abnormal ECG When compared with ECG of 28-OCT-2024 14:58, ST no longer depressed in Anterior leads T wave inversion no longer evident in Lateral leads Confirmed by Luke Pantoja ([id number]) on 7/11/2025 10:37:41 AM    Transthoracic Echo (TTE)  Limited  Result Date: 7/9/2025           [facility] [id number] [place], [place] [address]            Phone [phone] TRANSTHORACIC ECHOCARDIOGRAM REPORT Patient Name:       NICOLE PRICE       Ashvin Physician:    [id number] Nessa Callaway MD Study Date:         7/9/2025             Ordering Provider:    [id number] DANIELA SALAS MRN/PID:            [id number]             Fellow: Accession#:         [id number]         Nurse: Date of Birth/Age:  1935 / 89 years Sonographer:          Shannan Gorman RDCS Gender Assigned at  F                    Additional Staff: Birth: Height:             149.86 cm            Admit Date: Weight:             54.43 kg             Admission Status:     Inpatient -                                                                Routine BSA / BMI:          1.48 m2 / 24.24      Department Location:  [facility]                     kg/m2 Blood Pressure: 98 /47 mmHg Study Type:    TRANSTHORACIC ECHO (TTE) LIMITED Diagnosis/ICD: Acute diastolic (congestive) heart failure (CHF)-I50.31 Indication:    Acute diastolic congestive heart failure CPT Codes:     Echo Limited-56252; Doppler Limited-04372; Color Doppler-46166 Patient History: Pertinent History: Chest Pain, Hyperlipidemia, HTN, TIA, CHF and MI                    Arteriosclerosis of coronary artery. Study Detail: The following Echo studies were performed: 2D, M-Mode, Doppler and               color flow.  PHYSICIAN INTERPRETATION: Left Ventricle: The left ventricular systolic function is normal with a visually estimated ejection fraction of 60-65%. There is mild concentric left ventricular hypertrophy. There are no regional wall motion abnormalities. The left ventricular cavity size is normal. There is moderately increased septal and  mildly increased posterior left ventricular wall thickness. Spectral Doppler shows a normal pattern of left ventricular diastolic filling. Left Atrium: The left atrial size is moderately dilated. Right Ventricle: The right ventricle is normal in size. There is normal right ventricular global systolic function. Right Atrium: The right atrial size is normal. Aortic Valve: The aortic valve is trileaflet. Not assessed. There is no evidence of aortic valve regurgitation. Patient has aortic valve stenosis with reduced leaflet motion. I cannot assess the severity of aortic valve stenosis. No gradients were obtained. Mitral Valve: The mitral valve is moderately thickened. There is moderate mitral annular calcification. Mitral valve regurgitation was not assessed. The E Vmax is 1.54 m/s. Tricuspid Valve: The tricuspid valve is structurally normal. There is trace tricuspid regurgitation. The Doppler estimated right ventricular systolic pressure (RVSP) is slightly elevated at 34 mmHg. Pulmonic Valve: The pulmonic valve is structurally normal. The pulmonic valve regurgitation was not assessed. Pericardium: No pericardial effusion noted. Aorta: The aortic root is normal. Systemic Veins: The inferior vena cava appears normal in size, with IVC inspiratory collapse greater than 50%.  CONCLUSIONS:  1. The left ventricular systolic function is normal with a visually estimated ejection fraction of 60-65%.  2. There is normal right ventricular global systolic function.  3. The left atrial size is moderately dilated.  4. The mitral valve is moderately thickened.  5. There is moderate mitral annular calcification.  6. The Doppler estimated RVSP is slightly elevated at 34 mmHg.  7. Trace tricuspid regurgitation is visualized.  8. There is moderately increased septal and mildly increased posterior left ventricular wall thickness. QUANTITATIVE DATA SUMMARY:  2D MEASUREMENTS:             Normal Ranges: LAs:             4.90 cm      (2.7-4.0cm) IVSd:            1.36 cm     (0.6-1.1cm) LVPWd:           1.21 cm     (0.6-1.1cm) LVIDd:           3.57 cm     (3.9-5.9cm) LVIDs:           2.50 cm LV Mass Index:   104.7 g/m2 LVEDV Index:     50.43 ml/m2 LV % FS          30.0 %  LEFT ATRIUM:                  Normal Ranges: LA Vol A4C:        67.1 ml    (22+/-6mL/m2) LA Vol A2C:        73.3 ml LA Vol BP:         71.0 ml LA Vol Index A4C:  45.2ml/m2 LA Vol Index A2C:  49.4 ml/m2 LA Vol Index BP:   47.8 ml/m2 LA Area A4C:       21.1 cm2 LA Area A2C:       21.8 cm2 LA Major Axis A4C: 5.6 cm LA Major Axis A2C: 5.5 cm LA Vol A4C:        60.0 ml LA Vol A2C:        67.0 ml LA Vol Index BSA:  42.8 ml/m2  LV SYSTOLIC FUNCTION:                      Normal Ranges: EF-A4C View:    57 % (>=55%) EF-A2C View:    58 % EF-Biplane:     59 % EF-Visual:      63 % LV EF Reported: 63 %  LV DIASTOLIC FUNCTION:             Normal Ranges: MV Peak E:             1.54 m/s    (0.7-1.2 m/s) MV Peak A:             1.03 m/s    (0.42-0.7 m/s) E/A Ratio:             1.50        (1.0-2.2) PulmV Sys Herbert:         53.10 cm/s PulmV Palacio Herbert:        50.10 cm/s PulmV S/D Herbert:         1.10 PulmV A Revs Herbert:      26.10 cm/s PulmV A Revs Dur:      145.00 msec  MITRAL VALVE:          Normal Ranges: MV DT:        325 msec (150-240msec)  TRICUSPID VALVE/RVSP:          Normal Ranges: Peak TR Velocity:     2.78 m/s Est. RA Pressure:     3 mmHg RV Syst Pressure:     34 mmHg  (< 30mmHg) IVC Diam:             1.26 cm  PULMONARY VEINS: PulmV A Revs Dur: 145.00 msec PulmV A Revs Herbert: 26.10 cm/s PulmV Palacio Herbert:   50.10 cm/s PulmV S/D Herbert:    1.10 PulmV Sys Herbert:    53.10 cm/s  [id number] Nessa Callaway MD Electronically signed on 7/9/2025 at 3:06:33 PM  ** Final **     XR chest 1 view  Result Date: 7/9/2025  Interpreted By:  Sherlyn Jackson, STUDY: XR CHEST 1 VIEW 7/9/2025 7:55 am   INDICATION: CHF   COMPARISON: 07/07/2025   ACCESSION NUMBER(S): [id number]   ORDERING CLINICIAN: DANIELA SALAS   TECHNIQUE: AP erect  view of the chest   FINDINGS: The cardiac enlargement is unchanged. Atherosclerosis of the thoracic aorta is noted. Low lung volumes are observed with mild right perihilar infiltrate seen. There is suggestion of a very small left pleural effusion.   Postoperative change from reverse shoulder arthroplasty on the right and lumbar spinal fusion is seen.       Stable cardiomegaly.   Low lung volumes with crowding of the bronchovascular markings. There is some mild right perihilar infiltrate identified.   Very small left pleural effusion.   Signed by: Sherlyn Jackson 7/9/2025 8:05 AM Dictation workstation:   [id number]    XR chest 2 views  Result Date: 7/7/2025  Interpreted By:  Jl Foreman, STUDY: XR CHEST 2 VIEWS; 7/7/2025 1:17 pm   INDICATION: Signs/Symptoms:shortness of breath   COMPARISON: November 2020 and October 2024.   ACCESSION NUMBER(S): [id number]   ORDERING CLINICIAN: LUIS LITTLEJOHN   TECHNIQUE: Number of films: Two-view radiographs of the chest were obtained.   FINDINGS: The cardiac silhouette is mildly enlarged. Calcifications involve the aorta. The lungs are hyperinflated, with prominent interstitial markings bilaterally. Mild bilateral perihilar interstitial infiltrates and possible small left effusion There is no pneumothorax. Degenerative changes again involve the spine and left shoulder. Reversed right shoulder arthroplasty is again in position.       Cardiomegaly and pulmonary vascular congestion on a background of COPD.   Signed by: Jl Foreman 7/7/2025 1:22 PM Dictation workstation:   [id number]     Assessment/Plan   Chronic respiratory failure  Streptococcal pneumonia  Leukopenia    Continue Rocephin  Discontinue azithromycin-potential interaction with azithromycin  Follow-up mycoplasma IgM  Supportive care  Monitor repeat and obese  Long-term plan is total of 7 days of antibiotic therapy, can change to cefuroxime upon discharge  Consider doxycycline if Mycoplasma IgM is positive    This is  a complex infectious disease issue and the following was performed today (for more details please see the above note): Management decisions reflecting the added complexity (e.g., changes in antimicrobial therapy, infection control strategies).        Cooper Mckee MD         [1]   Family History  Problem Relation Name Age of Onset    No Known Problems Mother      No Known Problems Father      No Known Problems Sister     [2]   Medications Prior to Admission   Medication Sig Dispense Refill Last Dose/Taking    aspirin 81 mg EC tablet Take 1 tablet (81 mg) by mouth once daily.       atorvastatin (Lipitor) 80 mg tablet Take 1 tablet (80 mg) by mouth once daily at bedtime. 90 tablet 1     carvedilol (Coreg) 12.5 mg tablet Take 1 tablet (12.5 mg) by mouth 2 times a day. 180 tablet 0     docusate sodium (Colace) 100 mg capsule Take 1 capsule (100 mg) by mouth if needed.       DULoxetine (Cymbalta) 30 mg DR capsule TAKE 1 CAPSULE BY MOUTH ONCE DAILY at dinner 90 capsule 1     ferrous sulfate (FeroSuL) 325 mg (65 mg elemental) tablet Take 1 tablet by mouth 3 times a week. Monday, Wednesday, Friday 36 tablet 1     furosemide (Lasix) 40 mg tablet Take 1 tablet (40 mg) by mouth once daily as needed (take as needed for lower leg edema or weight gain). 90 tablet 3     gabapentin (Neurontin) 300 mg capsule TAKE 1 CAPSULE BY MOUTH AT NOON AND BEDTIME 180 capsule 1     hydrALAZINE (Apresoline) 100 mg tablet Take 1 tablet (100 mg) by mouth 2 times a day. 180 tablet 3     hydrALAZINE (Apresoline) 25 mg tablet Take 3 tablets (75 mg) by mouth 3 times a day. (Patient taking differently: Take 100 mg by mouth 2 times a day. Indications: high blood pressure) 810 tablet 0     hydroCHLOROthiazide (Microzide) 12.5 mg capsule Take 1 capsule (12.5 mg) by mouth once daily. 90 capsule 0     hydroxychloroquine (Plaquenil) 200 mg tablet Take 1 tablet (200 mg) by mouth once daily. 90 tablet 1     magnesium oxide (Mag-Ox) 400 mg (241.3 mg  magnesium) tablet Take 1 tablet (400 mg) by mouth 2 times a day. 60 tablet 1     pantoprazole (ProtoNix) 40 mg EC tablet TAKE 1 TABLET BY MOUTH TWICE DAILY 180 tablet 1     potassium chloride CR 20 mEq ER tablet Take 1 tablet (20 mEq) by mouth once daily. Take with food. 90 tablet 1     ranolazine (Ranexa) 500 mg 12 hr tablet TAKE 1 TABLET (500 MG) BY MOUTH 2 TIMES A DAY. DO NOT CRUSH, CHEW, OR SPLIT. 180 tablet 1     Stool Softener 100 mg capsule TAKE 1 CAPSULE BY MOUTH ONCE DAILY AS NEEDED to prevent constipation from iron 90 capsule 1     sucralfate (Carafate) 100 mg/mL suspension Take 10 mL (1 g) by mouth once daily.       sulfaSALAzine (Azulfidine) 500 mg tablet Take 1 tablet (500 mg) by mouth 2 times a day. 180 tablet 1    [3] aspirin, 81 mg, oral, Daily  atorvastatin, 80 mg, oral, Nightly  azithromycin, 500 mg, oral, q24h  carvedilol, 12.5 mg, oral, BID  cefTRIAXone, 1 g, intravenous, q24h  DULoxetine, 30 mg, oral, Daily with evening meal  enoxaparin, 40 mg, subcutaneous, q24h  ferrous sulfate, 1 tablet, oral, Once per day on Monday Wednesday Friday  gabapentin, 300 mg, oral, BID  hydrALAZINE, 100 mg, oral, BID  [Held by provider] hydroCHLOROthiazide, 12.5 mg, oral, Daily  hydroxychloroquine, 200 mg, oral, Daily  pantoprazole, 40 mg, oral, BID  potassium chloride CR, 40 mEq, oral, Daily  ranolazine, 500 mg, oral, BID  sucralfate, 1 g, oral, Daily  sulfaSALAzine, 500 mg, oral, BID    [4]    [5] PRN medications: albuterol, alum-mag hydroxide-simeth, docusate sodium, furosemide, hydrALAZINE, labetaloL, polyethylene glycol

## 2025-08-05 NOTE — CARE PLAN
The patient's goals for the shift include no chest pain    The clinical goals for the shift include no chest pain    D

## 2025-08-06 ENCOUNTER — HOME CARE VISIT (OUTPATIENT)
Dept: HOME HEALTH SERVICES | Facility: HOME HEALTH | Age: OVER 89
End: 2025-08-06
Payer: MEDICARE

## 2025-08-06 ENCOUNTER — APPOINTMENT (OUTPATIENT)
Dept: PAIN MEDICINE | Facility: CLINIC | Age: OVER 89
End: 2025-08-06
Payer: MEDICARE

## 2025-08-06 LAB
ANION GAP SERPL CALCULATED.3IONS-SCNC: 9 MMOL/L (ref 10–20)
BUN SERPL-MCNC: 8 MG/DL (ref 6–23)
CALCIUM SERPL-MCNC: 8.9 MG/DL (ref 8.6–10.3)
CHLORIDE SERPL-SCNC: 95 MMOL/L (ref 98–107)
CO2 SERPL-SCNC: 30 MMOL/L (ref 21–32)
CREAT SERPL-MCNC: 0.58 MG/DL (ref 0.5–1.05)
EGFRCR SERPLBLD CKD-EPI 2021: 86 ML/MIN/1.73M*2
ERYTHROCYTE [DISTWIDTH] IN BLOOD BY AUTOMATED COUNT: 14.6 % (ref 11.5–14.5)
GLUCOSE SERPL-MCNC: 95 MG/DL (ref 74–99)
HCT VFR BLD AUTO: 25.8 % (ref 36–46)
HGB BLD-MCNC: 8.3 G/DL (ref 12–16)
MCH RBC QN AUTO: 30.1 PG (ref 26–34)
MCHC RBC AUTO-ENTMCNC: 32.2 G/DL (ref 32–36)
MCV RBC AUTO: 94 FL (ref 80–100)
NRBC BLD-RTO: 0 /100 WBCS (ref 0–0)
PLATELET # BLD AUTO: 238 X10*3/UL (ref 150–450)
POTASSIUM SERPL-SCNC: 3.7 MMOL/L (ref 3.5–5.3)
RBC # BLD AUTO: 2.76 X10*6/UL (ref 4–5.2)
SODIUM SERPL-SCNC: 130 MMOL/L (ref 136–145)
WBC # BLD AUTO: 3.6 X10*3/UL (ref 4.4–11.3)

## 2025-08-06 PROCEDURE — 1200000002 HC GENERAL ROOM WITH TELEMETRY DAILY

## 2025-08-06 PROCEDURE — 2500000001 HC RX 250 WO HCPCS SELF ADMINISTERED DRUGS (ALT 637 FOR MEDICARE OP): Performed by: INTERNAL MEDICINE

## 2025-08-06 PROCEDURE — 97530 THERAPEUTIC ACTIVITIES: CPT | Mod: GP

## 2025-08-06 PROCEDURE — 2500000002 HC RX 250 W HCPCS SELF ADMINISTERED DRUGS (ALT 637 FOR MEDICARE OP, ALT 636 FOR OP/ED)

## 2025-08-06 PROCEDURE — 80048 BASIC METABOLIC PNL TOTAL CA: CPT | Performed by: INTERNAL MEDICINE

## 2025-08-06 PROCEDURE — 99233 SBSQ HOSP IP/OBS HIGH 50: CPT | Performed by: INTERNAL MEDICINE

## 2025-08-06 PROCEDURE — 85027 COMPLETE CBC AUTOMATED: CPT | Performed by: INTERNAL MEDICINE

## 2025-08-06 PROCEDURE — 97110 THERAPEUTIC EXERCISES: CPT | Mod: GP

## 2025-08-06 PROCEDURE — 2500000002 HC RX 250 W HCPCS SELF ADMINISTERED DRUGS (ALT 637 FOR MEDICARE OP, ALT 636 FOR OP/ED): Performed by: INTERNAL MEDICINE

## 2025-08-06 PROCEDURE — 2500000001 HC RX 250 WO HCPCS SELF ADMINISTERED DRUGS (ALT 637 FOR MEDICARE OP): Performed by: NURSE PRACTITIONER

## 2025-08-06 PROCEDURE — 36415 COLL VENOUS BLD VENIPUNCTURE: CPT | Performed by: INTERNAL MEDICINE

## 2025-08-06 PROCEDURE — 2500000004 HC RX 250 GENERAL PHARMACY W/ HCPCS (ALT 636 FOR OP/ED): Performed by: INTERNAL MEDICINE

## 2025-08-06 RX ORDER — CARVEDILOL 25 MG/1
25 TABLET ORAL 2 TIMES DAILY
Status: DISCONTINUED | OUTPATIENT
Start: 2025-08-06 | End: 2025-08-07

## 2025-08-06 RX ORDER — CLONIDINE HYDROCHLORIDE 0.1 MG/1
0.1 TABLET ORAL ONCE
Status: COMPLETED | OUTPATIENT
Start: 2025-08-06 | End: 2025-08-06

## 2025-08-06 RX ORDER — HYDRALAZINE HYDROCHLORIDE 50 MG/1
100 TABLET, FILM COATED ORAL 3 TIMES DAILY
Status: DISCONTINUED | OUTPATIENT
Start: 2025-08-06 | End: 2025-08-09 | Stop reason: HOSPADM

## 2025-08-06 RX ORDER — HYDRALAZINE HYDROCHLORIDE 20 MG/ML
10 INJECTION INTRAMUSCULAR; INTRAVENOUS EVERY 6 HOURS PRN
Status: DISCONTINUED | OUTPATIENT
Start: 2025-08-06 | End: 2025-08-09 | Stop reason: HOSPADM

## 2025-08-06 RX ADMIN — RANOLAZINE 500 MG: 500 TABLET, EXTENDED RELEASE ORAL at 08:12

## 2025-08-06 RX ADMIN — PANTOPRAZOLE SODIUM 40 MG: 40 TABLET, DELAYED RELEASE ORAL at 20:30

## 2025-08-06 RX ADMIN — GABAPENTIN 300 MG: 300 CAPSULE ORAL at 08:12

## 2025-08-06 RX ADMIN — RANOLAZINE 500 MG: 500 TABLET, EXTENDED RELEASE ORAL at 20:31

## 2025-08-06 RX ADMIN — HYDRALAZINE HYDROCHLORIDE 10 MG: 20 INJECTION INTRAMUSCULAR; INTRAVENOUS at 06:08

## 2025-08-06 RX ADMIN — HYDRALAZINE HYDROCHLORIDE 100 MG: 50 TABLET ORAL at 20:30

## 2025-08-06 RX ADMIN — CARVEDILOL 12.5 MG: 12.5 TABLET, FILM COATED ORAL at 08:11

## 2025-08-06 RX ADMIN — CLONIDINE HYDROCHLORIDE 0.1 MG: 0.1 TABLET ORAL at 16:35

## 2025-08-06 RX ADMIN — GABAPENTIN 300 MG: 300 CAPSULE ORAL at 20:31

## 2025-08-06 RX ADMIN — HYDRALAZINE HYDROCHLORIDE 100 MG: 50 TABLET ORAL at 08:11

## 2025-08-06 RX ADMIN — HYDRALAZINE HYDROCHLORIDE 10 MG: 20 INJECTION INTRAMUSCULAR; INTRAVENOUS at 13:50

## 2025-08-06 RX ADMIN — HYDROXYCHLOROQUINE SULFATE 200 MG: 200 TABLET, FILM COATED ORAL at 08:11

## 2025-08-06 RX ADMIN — PANTOPRAZOLE SODIUM 40 MG: 40 TABLET, DELAYED RELEASE ORAL at 08:12

## 2025-08-06 RX ADMIN — POTASSIUM CHLORIDE EXTENDED-RELEASE 40 MEQ: 1500 TABLET ORAL at 08:12

## 2025-08-06 RX ADMIN — ATORVASTATIN CALCIUM 80 MG: 80 TABLET, FILM COATED ORAL at 20:30

## 2025-08-06 RX ADMIN — SUCRALFATE 1 G: 1 SUSPENSION ORAL at 08:11

## 2025-08-06 RX ADMIN — SULFASALAZINE 500 MG: 500 TABLET ORAL at 08:11

## 2025-08-06 RX ADMIN — DULOXETINE 30 MG: 30 CAPSULE, DELAYED RELEASE ORAL at 16:35

## 2025-08-06 RX ADMIN — FERROUS SULFATE TAB 325 MG (65 MG ELEMENTAL FE) 1 TABLET: 325 (65 FE) TAB at 08:11

## 2025-08-06 RX ADMIN — CARVEDILOL 25 MG: 25 TABLET, FILM COATED ORAL at 20:30

## 2025-08-06 RX ADMIN — ENOXAPARIN SODIUM 40 MG: 100 INJECTION SUBCUTANEOUS at 16:36

## 2025-08-06 RX ADMIN — ASPIRIN 81 MG: 81 TABLET, DELAYED RELEASE ORAL at 08:12

## 2025-08-06 RX ADMIN — CEFTRIAXONE 1 G: 1 INJECTION, SOLUTION INTRAVENOUS at 16:36

## 2025-08-06 RX ADMIN — SULFASALAZINE 500 MG: 500 TABLET ORAL at 20:30

## 2025-08-06 ASSESSMENT — COGNITIVE AND FUNCTIONAL STATUS - GENERAL
MOVING TO AND FROM BED TO CHAIR: A LITTLE
CLIMB 3 TO 5 STEPS WITH RAILING: A LITTLE
TURNING FROM BACK TO SIDE WHILE IN FLAT BAD: A LITTLE
STANDING UP FROM CHAIR USING ARMS: A LITTLE
DAILY ACTIVITIY SCORE: 19
HELP NEEDED FOR BATHING: A LITTLE
WALKING IN HOSPITAL ROOM: A LITTLE
DRESSING REGULAR LOWER BODY CLOTHING: A LITTLE
MOBILITY SCORE: 18
MOBILITY SCORE: 18
CLIMB 3 TO 5 STEPS WITH RAILING: A LOT
STANDING UP FROM CHAIR USING ARMS: A LITTLE
TOILETING: A LITTLE
DRESSING REGULAR UPPER BODY CLOTHING: A LITTLE
WALKING IN HOSPITAL ROOM: A LITTLE
MOVING TO AND FROM BED TO CHAIR: A LITTLE
MOVING FROM LYING ON BACK TO SITTING ON SIDE OF FLAT BED WITH BEDRAILS: A LITTLE
PERSONAL GROOMING: A LITTLE
TURNING FROM BACK TO SIDE WHILE IN FLAT BAD: A LITTLE

## 2025-08-06 ASSESSMENT — PAIN SCALES - GENERAL
PAINLEVEL_OUTOF10: 0 - NO PAIN

## 2025-08-06 ASSESSMENT — PAIN - FUNCTIONAL ASSESSMENT: PAIN_FUNCTIONAL_ASSESSMENT: 0-10

## 2025-08-06 NOTE — PROGRESS NOTES
Spiritual Care Visit  Spiritual Care Request    Reason for Visit:  Routine Visit: Introduction     Request Received From:       Focus of Care:  Visited With: Patient         Refer to :          Spiritual Care Assessment    Spiritual Assessment:                      Care Provided:       Sense of Community and or Roman Catholic Affiliation:  [employer]         Addressed Needs/Concerns and/or [place]yler Through:  Roman Catholic Encounters  Roman Catholic Needs: Prayer, Literature, Sacred text       Outcome:        Advance Directives:         Spiritual Care Annotation      Annotation: Patient received a visit from the Spiritual care volunteer while admitted.  This patient was offered emotional and spiritual support no other needs were expressed. Spiritual care will remain available for support as requested.     Volunter Initial : Aime TAMAYO    Reviewed and Submitted by Chaplain Hernandez

## 2025-08-06 NOTE — CARE PLAN
Problem: Pain - Adult  Goal: Verbalizes/displays adequate comfort level or baseline comfort level  Outcome: Progressing     Problem: Safety - Adult  Goal: Free from fall injury  Outcome: Progressing     Problem: Discharge Planning  Goal: Discharge to home or other facility with appropriate resources  Outcome: Progressing     Problem: Chronic Conditions and Co-morbidities  Goal: Patient's chronic conditions and co-morbidity symptoms are monitored and maintained or improved  Outcome: Progressing     Problem: Nutrition  Goal: Nutrient intake appropriate for maintaining nutritional needs  Outcome: Progressing     Problem: Fall/Injury  Goal: Not fall by end of shift  Outcome: Progressing  Goal: Be free from injury by end of the shift  Outcome: Progressing  Goal: Verbalize understanding of personal risk factors for fall in the hospital  Outcome: Progressing  Goal: Verbalize understanding of risk factor reduction measures to prevent injury from fall in the home  Outcome: Progressing  Goal: Use assistive devices by end of the shift  Outcome: Progressing  Goal: Pace activities to prevent fatigue by end of the shift  Outcome: Progressing     Problem: Skin  Goal: Decreased wound size/increased tissue granulation at next dressing change  Outcome: Progressing  Goal: Participates in plan/prevention/treatment measures  Outcome: Progressing  Goal: Prevent/manage excess moisture  Outcome: Progressing  Goal: Prevent/minimize sheer/friction injuries  Outcome: Progressing  Goal: Promote/optimize nutrition  Outcome: Progressing  Goal: Promote skin healing  Outcome: Progressing   The patient's goals for the shift include no chest pain    The clinical goals for the shift include manage hypertension

## 2025-08-06 NOTE — CARE PLAN
The patient's goals for the shift include   Problem: Skin  Goal: Prevent/manage excess moisture  Outcome: Progressing     Problem: Fall/Injury  Goal: Verbalize understanding of personal risk factors for fall in the hospital  Outcome: Progressing     Problem: Fall/Injury  Goal: Be free from injury by end of the shift  Outcome: Progressing     Problem: Fall/Injury  Goal: Not fall by end of shift  Outcome: Progressing     Problem: Pain - Adult  Goal: Verbalizes/displays adequate comfort level or baseline comfort level  Outcome: Progressing

## 2025-08-06 NOTE — PROGRESS NOTES
Rakan Cervantes is a 89 y.o. female on day 3 of admission presenting with Chest pain, unspecified type.      Subjective   Patient has been afebrile during the night.  Patient is on 1 L oxygen.  Patient had elevated blood pressure today.    Objective     Last Recorded Vitals  /58 (BP Location: Right arm, Patient Position: Lying)   Pulse 69   Temp 36.5 °C (97.7 °F) (Oral)   Resp 18   Wt 50.8 kg (112 lb)   SpO2 97%   Intake/Output last 3 Shifts:    Intake/Output Summary (Last 24 hours) at 8/6/2025 1137  Last data filed at 8/6/2025 0923  Gross per 24 hour   Intake 650 ml   Output --   Net 650 ml       Admission Weight  Weight: 50.8 kg (112 lb) (08/03/25 1419)    Daily Weight  08/03/25 : 50.8 kg (112 lb)    Image Results  XR chest 1 view  Narrative: Interpreted By:  Kacy Bernal,   STUDY:  XR CHEST 1 VIEW;  8/3/2025 2:54 pm      INDICATION:  Signs/Symptoms:Chest Pain.      COMPARISON:  07/20/2025      ACCESSION NUMBER(S):  [id number]      ORDERING CLINICIAN:  MARTY LEJEUNE      FINDINGS:  AP radiograph of the chest was provided.              CARDIOMEDIASTINAL SILHOUETTE:  Cardiomediastinal silhouette is stable in size and configuration.      LUNGS:  Small bilateral pleural effusions with bibasilar opacification,  better evaluated on concurrent CT, reported separately.      ABDOMEN:  No remarkable upper abdominal findings.      BONES:  Reverse right shoulder arthroplasty. Advanced osteoarthritis of the  left shoulder.      Impression: Small bilateral pleural effusions with bibasilar opacification,  better evaluated on concurrent CT, reported separately.      Signed by: Kacy Bernal 8/3/2025 3:56 PM  Dictation workstation:   [id number]  CT chest abdomen pelvis w IV contrast  Narrative: Interpreted By:  Kacy Bernal,   STUDY:  CT CHEST ABDOMEN PELVIS W IV CONTRAST;  8/3/2025 2:43 pm      INDICATION:  Signs/Symptoms:abdominal pain, epigastric pain.          COMPARISON:  10/28/2024      ACCESSION  NUMBER(S):  [id number]      ORDERING CLINICIAN:  RANDI HERNÁNDEZ      TECHNIQUE:  CT of the chest, abdomen, and pelvis was performed.  Contiguous axial  images were obtained at 3 mm slice thickness through the chest,  abdomen and pelvis. Coronal and sagittal reconstructions at 3 mm  slice thickness were performed. 75 ML of Omnipaque 350 was  administered intravenously without immediate complication.      FINDINGS:  Marked beam hardening artifact from posterior fusion hardware, right  shoulder arthroplasty, bilateral hip prosthesis, and arms in the  downward position limits evaluation.      CHEST:      LUNG/PLEURA/LARGE AIRWAYS:  Small bilateral pleural effusions with mild interstitial edema,  improved from prior exam. Mild dependent right-greater-than-left  lower lobe ground-glass to mild consolidative opacities. Trachea and  right and left main bronchi are patent.      VESSELS:  Aorta and pulmonary arteries are normal caliber.  Severe atherosclerotic calcifications of the thoracic aorta.  Severe coronary artery calcifications versus stent.      HEART:  The heart is normal in size.  No pericardial effusion.      MEDIASTINUM AND DONATO:  No mediastinal, hilar or axillary lymphadenopathy.  Ectasia of the esophagus, moderate-sized hiatal hernia noted, and  circumferential esophageal wall thickening.      CHEST WALL AND LOWER NECK:  No soft tissue masses in the chest wall.  Multinodular thyroid noted, measuring up to 16 mm on the left.      ABDOMEN:      LIVER:  The liver is normal in size without evidence of focal liver lesions.      BILE DUCTS:  No biliary dilatation.      GALLBLADDER:  Cholecystectomy      PANCREAS:  The pancreas appears unremarkable without evidence of ductal  dilatation or masses.      SPLEEN:  The spleen is normal in size.      ADRENAL GLANDS:  No adrenal nodule or thickening.      KIDNEYS AND URETERS:  The kidneys are normal in size. This exam is nondiagnostic for  evaluation of suspicious renal  lesions due to marked beam hardening  artifact. No hydroureteronephrosis or nephroureterolithiasis.      PELVIS:      BLADDER:  Within normal limits.      REPRODUCTIVE ORGANS:  No pelvic masses.      BOWEL:  Moderate-sized hiatal hernia.  The small and large bowel are normal in caliber and demonstrate no  wall thickening. The appendix is not definitely visualized. There is  however no pericecal stranding or fluid. Prominent sigmoid  diverticulosis without acute diverticulitis.      VESSELS:  Severe atherosclerotic calcifications of the aortoiliac arteries.  The IVC appears normal.  Portal vein, splenic vein, and SMV are patent.      PERITONEUM/RETROPERITONEUM/LYMPH NODES:  No ascites or fluid collection.  No peritoneal nodularity or deposits.  The retroperitoneum is unremarkable.  No abdominopelvic lymphadenopathy is present.      BONE AND SOFT TISSUE:  No acute fracture.  Degenerative changes of the thoracolumbar spine. Multilevel endplate  irregularities noted. Posterior fusion hardware, right shoulder  arthroplasty, and bilateral hip prosthesis noted. No significant  abnormality of the abdominal wall soft tissues.      Impression: 1. Ectasia of the esophagus, moderate-sized hiatal hernia noted, and  circumferential esophageal wall thickening. Please correlate for  esophagitis/gastroesophageal reflux.  2. Small bilateral pleural effusions with mild interstitial edema,  improved from prior exam.  3. Mild dependent right-greater-than-left lower lobe ground-glass to  mild consolidative opacities, for which superimposed developing  pneumonia is not excluded.  4. Nonemergent thyroid ultrasound is recommended to further evaluate  thyroid nodules.  5. Additional chronic and incidental findings as detailed above.      MACRO:  None      Signed by: Kacy Bernal 8/3/2025 3:54 PM  Dictation workstation:   [id number]      Physical Exam    General: cooperating during physical exam.  HEENT: Pupils are equal and reactive to  light and commendation , oral mucosa moist, no JVD.  Cardiovascular: PMI nondisplaced  Lungs: Clear to auscultation bilaterally, no wheezing, no crackles, no dullness to percussion.  Abdomen: No hepatosplenomegaly appreciated, soft , not tender, positive bowel sounds, positive bowel movement.  Neuro: Alert and oriented x2, strength in upper and lower extremities , sensation intact.  Musculoskeletal: No swelling in lower extremities, no limitation in range of motion.  Vascular: Pulses are intact in upper and lower extremities  Skin: No petechiae, ecchymosis or other stigmata for dermatology disease.       Assessment and plan      Streptococcus pneumonia  UTI  Patient is on ceftriaxone   Continue with current antibiotics.  Consu ID on the case    Hypertension urgency  Patient had elevated blood pressure today    Chronic respiratory failure with hypoxia.  Continue with oxygen therapy to maintain pulse ox more than 92%     Dyspepsia  Esophagitis  History of GERD esophagitis    Hypertension urgency  Continue with BP meds  Blood pressure improved  Continue with current medication.  Patient is on Coreg, hydralazine and ranolazine  Hydralazine as needed for   Blood pressure more than 180 mmhg    Coronary artery disease  Hyperlipidemia  Continue with current medication.    Peripheral neuropathy  Patient is on gabapentin    Chest pain  EKG no ischemic changes.  Monitoring telemetry with  Evaluated by cardiology      Polyarthritis  On hydroxychloroquine    Patient is DNR CCA/DNI    Deconditioning  PT /OT   Fall precaution  Ambulate with assistant  Bed alarms on.    Hyponatremia  Monitor close  Hold hydroxychloroquine  BMP in a.m.    Anemia  Hemoglobin and hematocrit improved    Addendum   4:42 PM    I was notified by the nurse patient had elevated blood pressure.  Patient had hypertension urgency  Blood pressure was 207/65 .  Patient had IV hydralazine.  I added clonidine 0.1 mg 1 dose stat.  Blood pressure medication  adjusted.  Increase hydralazine to 100 mg 3 times daily, Coreg increased to 25 mg twice daily  Patient will be covered with 10 mg of hydralazine every 6 hours for systolic blood pressure more than 180 MMHG   I will challenge with another dose of clonidine 0.1 mg in 2 hours if blood pressure remains elevated.  Time spent with patient 50 minutes.  Graciela Andrews MD

## 2025-08-06 NOTE — DOCUMENTATION CLARIFICATION NOTE
"    PATIENT:               NICOLE PRICE  ACCT #:                  [id number]  MRN:                       [id number]  :                       1935  ADMIT DATE:       8/3/2025 2:13 PM  DISCH DATE:  RESPONDING PROVIDER #:        [id number]          PROVIDER RESPONSE TEXT:    Metabolic encephalopathy  PNA    CDI QUERY TEXT:    Clarification        Instruction:    Based on your assessment of the patient and the clinical information, please provide the requested documentation by clicking on the appropriate radio button and enter any additional information if prompted.    Question: Please further clarify the most likely etiology of the altered mental status as    When answering this query, please exercise your independent professional judgment. The fact that a question is being asked, does not imply that any particular answer is desired or expected.    The patient's clinical indicators include:  Clinical Information:  8/3 Patient is a 89-year-old female presenting to the emergency department for evaluation of chest pain    Clinical Indicators:  8/3 CXR:  IMPRESSION:  Small bilateral pleural effusions with bibasilar opacification,  better evaluated on concurrent CT, reported separately.    8/3 CT CAP W:  2.Small bilateral pleural effusions with mild interstitial edema,  improved from prior exam.  3.Mild dependent right-greater-than-left lower lobe ground-glass to  mild consolidative opacities, for which superimposed developing  pneumonia is not excluded.     Cards Consult:  \"...  The setting of mild altered mental status and significant fatigue with chills.  Suspect relative to a combination of hypertensive  urgency/emergency noting a systolic above 190 as well as suspected community-acquired pneumonia.\"     Med PN:  \"Streptococcus pneumonia  UTI\"    Treatment:  Cards Consult; Tele; CT CAP: CXR; Zithromax IV(8/3); Rocephin IV(8/3); Oxygen;    Risk Factors:  Age; CHF; Afib; HTN;  Options provided:  -- Metabolic " encephalopathy 2/2 PNA  -- HTN encephalopathy 2/2 HTN Emergency  -- HTN encephalopathy 2/2 HTN Emergency and Metabolic encephalopathy 2/2 PNA  -- Other - I will add my own diagnosis  -- Refer to Clinical Documentation Reviewer    Query created by: Rebecca Gates on 8/6/2025 1:30 PM      Electronically signed by:  LINDA RAMIREZ MD 8/6/2025 2:08 PM

## 2025-08-06 NOTE — PROGRESS NOTES
Group Topic: [facility] Process Group    Date: 8/5/2025  Start Time: 1030  End Time: 1115  Facilitators: June Sparks    Focus: Process  Number in attendance: 5/15  Pt was recruited for group but did not attend. Efforts to encourage participation in programming on the unit will continue.  June Sparks             Rakan Cervantes is a 89 y.o. female on day 3 of admission presenting with Chest pain, unspecified type.    Subjective   Afebrile, no chills  Denies shortness of breath, cough, chest pain  Denies nausea, vomiting, diarrhea, abdominal pain    Objective   Range of Vitals (last 24 hours)  Heart Rate:  [66-74]   Temp:  [36.3 °C (97.3 °F)-36.9 °C (98.4 °F)]   Resp:  [18-21]   BP: (158-209)/(48-86)   SpO2:  [95 %-99 %]        Daily Weight  08/03/25 : 50.8 kg (112 lb)    Body mass index is 22.62 kg/m².    Physical Exam  Constitutional:       Appearance: Normal appearance.   HENT:      Head: Normocephalic and atraumatic.     Eyes:      Extraocular Movements: Extraocular movements intact.      Conjunctiva/sclera: Conjunctivae normal.       Cardiovascular:      Rate and Rhythm: Normal rate.   Pulmonary:      Effort: Pulmonary effort is normal.      Comments: Diminished bilaterally  Abdominal:      Palpations: Abdomen is soft.      Tenderness: There is no abdominal tenderness.     Musculoskeletal:         General: Normal range of motion.      Cervical back: Normal range of motion.     Skin:     General: Skin is warm and dry.     Neurological:      General: No focal deficit present.      Mental Status: She is alert.     Psychiatric:         Mood and Affect: Mood normal.         Behavior: Behavior normal.           Antibiotics  cefTRIAXone - 1 gram/50 mL    Relevant Results  Labs  Results from last 72 hours   Lab Units 08/06/25 0552 08/05/25 0619 08/04/25  0553 08/03/25  1426   WBC AUTO x10*3/uL 3.6* 3.9* 3.9* 4.7   HEMOGLOBIN g/dL 8.3* 7.7* 7.7* 8.0*   HEMATOCRIT % 25.8* 25.3* 24.3* 26.3*   PLATELETS AUTO x10*3/uL 238 226 253 321   NEUTROS PCT AUTO %  --   --   --  72.6   LYMPHS PCT AUTO %  --   --   --  14.2   MONOS PCT AUTO %  --   --   --  11.8   EOS PCT AUTO %  --   --   --  0.6     Results from last 72 hours   Lab Units 08/06/25  0552 08/05/25 0619 08/04/25  0553   SODIUM mmol/L 130* 132* 134*   POTASSIUM mmol/L 3.7 3.7  3.6   CHLORIDE mmol/L 95* 98 98   CO2 mmol/L 30 30 31   BUN mg/dL 8 13 14   CREATININE mg/dL 0.58 0.75 0.72   GLUCOSE mg/dL 95 96 83   CALCIUM mg/dL 8.9 8.7 8.6   ANION GAP mmol/L 9* 8* 9*   EGFR mL/min/1.73m*2 86 76 80   PHOSPHORUS mg/dL  --   --  3.9     Results from last 72 hours   Lab Units 08/04/25  0553 08/03/25  1426   ALK PHOS U/L  --  86   BILIRUBIN TOTAL mg/dL  --  0.5   PROTEIN TOTAL g/dL  --  5.7*   ALT U/L  --  9   AST U/L  --  14   ALBUMIN g/dL 3.1* 3.4     Estimated Creatinine Clearance: 44.6 mL/min (by C-G formula based on SCr of 0.58 mg/dL).  CRP   Date Value Ref Range Status   10/21/2021 0.3 0 - 2.0 MG/DL Final     Comment:     LESS THAN  Performed at [place] [id number]     09/15/2021 0.3 0 - 2.0 MG/DL Final     Comment:     LESS THAN   RESULT CHECKED  Performed at [address] [address]     02/27/2020 0.3 0 - 2.0 MG/DL Final     Comment:     LESS THAN  Performed at [address] [address]       Microbiology  Negative mycoplasma IgM  Positive streptococcus pneumonia antigen  Negative legionella antigen    Imaging  XR chest 1 view  Result Date: 8/3/2025  Interpreted By:  Kacy Bernal, STUDY: XR CHEST 1 VIEW;  8/3/2025 2:54 pm   INDICATION: Signs/Symptoms:Chest Pain.   COMPARISON: 07/20/2025   ACCESSION NUMBER(S): [id number]   ORDERING CLINICIAN: MARTY LEJEUNE   FINDINGS: AP radiograph of the chest was provided.       CARDIOMEDIASTINAL SILHOUETTE: Cardiomediastinal silhouette is stable in size and configuration.   LUNGS: Small bilateral pleural effusions with bibasilar opacification, better evaluated on concurrent CT, reported separately.   ABDOMEN: No remarkable upper abdominal findings.   BONES: Reverse right shoulder arthroplasty. Advanced osteoarthritis of the left shoulder.       Small bilateral pleural effusions with bibasilar opacification, better evaluated on concurrent CT, reported separately.   Signed by: Kacy  Gabe 8/3/2025 3:56 PM Dictation workstation:   [id number]    CT chest abdomen pelvis w IV contrast  Result Date: 8/3/2025  Interpreted By:  Kacy Bernal, STUDY: CT CHEST ABDOMEN PELVIS W IV CONTRAST;  8/3/2025 2:43 pm   INDICATION: Signs/Symptoms:abdominal pain, epigastric pain.     COMPARISON: 10/28/2024   ACCESSION NUMBER(S): [id number]   ORDERING CLINICIAN: RANDI HERNÁNDEZ   TECHNIQUE: CT of the chest, abdomen, and pelvis was performed.  Contiguous axial images were obtained at 3 mm slice thickness through the chest, abdomen and pelvis. Coronal and sagittal reconstructions at 3 mm slice thickness were performed. 75 ML of Omnipaque 350 was administered intravenously without immediate complication.   FINDINGS: Marked beam hardening artifact from posterior fusion hardware, right shoulder arthroplasty, bilateral hip prosthesis, and arms in the downward position limits evaluation.   CHEST:   LUNG/PLEURA/LARGE AIRWAYS: Small bilateral pleural effusions with mild interstitial edema, improved from prior exam. Mild dependent right-greater-than-left lower lobe ground-glass to mild consolidative opacities. Trachea and right and left main bronchi are patent.   VESSELS: Aorta and pulmonary arteries are normal caliber. Severe atherosclerotic calcifications of the thoracic aorta. Severe coronary artery calcifications versus stent.   HEART: The heart is normal in size. No pericardial effusion.   MEDIASTINUM AND DONATO: No mediastinal, hilar or axillary lymphadenopathy. Ectasia of the esophagus, moderate-sized hiatal hernia noted, and circumferential esophageal wall thickening.   CHEST WALL AND LOWER NECK: No soft tissue masses in the chest wall. Multinodular thyroid noted, measuring up to 16 mm on the left.   ABDOMEN:   LIVER: The liver is normal in size without evidence of focal liver lesions.   BILE DUCTS: No biliary dilatation.   GALLBLADDER: Cholecystectomy   PANCREAS: The pancreas appears unremarkable without evidence  of ductal dilatation or masses.   SPLEEN: The spleen is normal in size.   ADRENAL GLANDS: No adrenal nodule or thickening.   KIDNEYS AND URETERS: The kidneys are normal in size. This exam is nondiagnostic for evaluation of suspicious renal lesions due to marked beam hardening artifact. No hydroureteronephrosis or nephroureterolithiasis.   PELVIS:   BLADDER: Within normal limits.   REPRODUCTIVE ORGANS: No pelvic masses.   BOWEL: Moderate-sized hiatal hernia. The small and large bowel are normal in caliber and demonstrate no wall thickening. The appendix is not definitely visualized. There is however no pericecal stranding or fluid. Prominent sigmoid diverticulosis without acute diverticulitis.   VESSELS: Severe atherosclerotic calcifications of the aortoiliac arteries. The IVC appears normal. Portal vein, splenic vein, and SMV are patent.   PERITONEUM/RETROPERITONEUM/LYMPH NODES: No ascites or fluid collection. No peritoneal nodularity or deposits. The retroperitoneum is unremarkable. No abdominopelvic lymphadenopathy is present.   BONE AND SOFT TISSUE: No acute fracture. Degenerative changes of the thoracolumbar spine. Multilevel endplate irregularities noted. Posterior fusion hardware, right shoulder arthroplasty, and bilateral hip prosthesis noted. No significant abnormality of the abdominal wall soft tissues.       1. Ectasia of the esophagus, moderate-sized hiatal hernia noted, and circumferential esophageal wall thickening. Please correlate for esophagitis/gastroesophageal reflux. 2. Small bilateral pleural effusions with mild interstitial edema, improved from prior exam. 3. Mild dependent right-greater-than-left lower lobe ground-glass to mild consolidative opacities, for which superimposed developing pneumonia is not excluded. 4. Nonemergent thyroid ultrasound is recommended to further evaluate thyroid nodules. 5. Additional chronic and incidental findings as detailed above.   MACRO: None   Signed by: Kacy  Gabe 8/3/2025 3:54 PM Dictation workstation:   [id number]    ECG 12 lead  Result Date: 7/22/2025  Atrial fibrillation with slow ventricular response with a competing junctional pacemaker Abnormal ECG When compared with ECG of 07-JUL-2025 13:07, Atrial fibrillation has replaced Sinus rhythm Vent. rate has decreased BY  25 BPM Minimal criteria for Anterior infarct are now Present Nonspecific T wave abnormality has replaced inverted T waves in Inferior leads T wave inversion no longer evident in Anterior leads Confirmed by Sergey Bond ([id number]) on 7/22/2025 8:43:11 AM    XR chest 1 view  Result Date: 7/20/2025  Interpreted By:  Finkelstein, Evan, STUDY: XR CHEST 1 VIEW;  7/20/2025 11:12 pm   INDICATION: Signs/Symptoms:sob.     COMPARISON: Chest radiograph 07/09/2025   ACCESSION NUMBER(S): [id number]   ORDERING CLINICIAN: IHSAN POOL   FINDINGS:     CARDIOMEDIASTINAL SILHOUETTE: Cardiomediastinal silhouette is stable in size and configuration.   LUNGS: No pulmonary consolidation, pleural effusion or pneumothorax.   ABDOMEN: No remarkable upper abdominal findings.   BONES: No acute osseous abnormality. Partially visualized right shoulder arthroplasty hardware. Severe left glenohumeral joint osteoarthrosis.       No radiographic evidence of acute cardiopulmonary pathology.   MACRO: None.   Signed by: Evan Finkelstein 7/20/2025 11:56 PM Dictation workstation:   [id number]    ECG 12 lead  Result Date: 7/11/2025  Normal sinus rhythm Nonspecific ST and T wave abnormality Abnormal ECG When compared with ECG of 28-OCT-2024 14:58, ST no longer depressed in Anterior leads T wave inversion no longer evident in Lateral leads Confirmed by Luke Pantoja ([id number]) on 7/11/2025 10:37:41 AM    Transthoracic Echo (TTE) Limited  Result Date: 7/9/2025           [place] [address]            Phone [phone] TRANSTHORACIC ECHOCARDIOGRAM REPORT Patient Name:       NICOLE PRICE        Reading Physician:    [id number] Nessa Callaway MD Study Date:         7/9/2025             Ordering Provider:    [id number] DANIELA JACOBSIMELDA MRN/PID:            [id number]             Fellow: Accession#:         [id number]         Nurse: Date of Birth/Age:  1935 / 89 years Sonographer:          Shannan Gorman RDCS Gender Assigned at  F                    Additional Staff: Birth: Height:             149.86 cm            Admit Date: Weight:             54.43 kg             Admission Status:     Inpatient -                                                                Routine BSA / BMI:          1.48 m2 / 24.24      Department Location:  [facility]                     kg/m2 Blood Pressure: 98 /47 mmHg Study Type:    TRANSTHORACIC ECHO (TTE) LIMITED Diagnosis/ICD: Acute diastolic (congestive) heart failure (CHF)-I50.31 Indication:    Acute diastolic congestive heart failure CPT Codes:     Echo Limited-48460; Doppler Limited-69743; Color Doppler-05622 Patient History: Pertinent History: Chest Pain, Hyperlipidemia, HTN, TIA, CHF and MI                    Arteriosclerosis of coronary artery. Study Detail: The following Echo studies were performed: 2D, M-Mode, Doppler and               color flow.  PHYSICIAN INTERPRETATION: Left Ventricle: The left ventricular systolic function is normal with a visually estimated ejection fraction of 60-65%. There is mild concentric left ventricular hypertrophy. There are no regional wall motion abnormalities. The left ventricular cavity size is normal. There is moderately increased septal and mildly increased posterior left ventricular wall thickness. Spectral Doppler shows a normal pattern of left ventricular diastolic filling. Left Atrium: The left atrial size is moderately dilated. Right Ventricle:  The right ventricle is normal in size. There is normal right ventricular global systolic function. Right Atrium: The right atrial size is normal. Aortic Valve: The aortic valve is trileaflet. Not assessed. There is no evidence of aortic valve regurgitation. Patient has aortic valve stenosis with reduced leaflet motion. I cannot assess the severity of aortic valve stenosis. No gradients were obtained. Mitral Valve: The mitral valve is moderately thickened. There is moderate mitral annular calcification. Mitral valve regurgitation was not assessed. The E Vmax is 1.54 m/s. Tricuspid Valve: The tricuspid valve is structurally normal. There is trace tricuspid regurgitation. The Doppler estimated right ventricular systolic pressure (RVSP) is slightly elevated at 34 mmHg. Pulmonic Valve: The pulmonic valve is structurally normal. The pulmonic valve regurgitation was not assessed. Pericardium: No pericardial effusion noted. Aorta: The aortic root is normal. Systemic Veins: The inferior vena cava appears normal in size, with IVC inspiratory collapse greater than 50%.  CONCLUSIONS:  1. The left ventricular systolic function is normal with a visually estimated ejection fraction of 60-65%.  2. There is normal right ventricular global systolic function.  3. The left atrial size is moderately dilated.  4. The mitral valve is moderately thickened.  5. There is moderate mitral annular calcification.  6. The Doppler estimated RVSP is slightly elevated at 34 mmHg.  7. Trace tricuspid regurgitation is visualized.  8. There is moderately increased septal and mildly increased posterior left ventricular wall thickness. QUANTITATIVE DATA SUMMARY:  2D MEASUREMENTS:             Normal Ranges: LAs:             4.90 cm     (2.7-4.0cm) IVSd:            1.36 cm     (0.6-1.1cm) LVPWd:           1.21 cm     (0.6-1.1cm) LVIDd:           3.57 cm     (3.9-5.9cm) LVIDs:           2.50 cm LV Mass Index:   104.7 g/m2 LVEDV Index:     50.43 ml/m2 LV %  FS          30.0 %  LEFT ATRIUM:                  Normal Ranges: LA Vol A4C:        67.1 ml    (22+/-6mL/m2) LA Vol A2C:        73.3 ml LA Vol BP:         71.0 ml LA Vol Index A4C:  45.2ml/m2 LA Vol Index A2C:  49.4 ml/m2 LA Vol Index BP:   47.8 ml/m2 LA Area A4C:       21.1 cm2 LA Area A2C:       21.8 cm2 LA Major Axis A4C: 5.6 cm LA Major Axis A2C: 5.5 cm LA Vol A4C:        60.0 ml LA Vol A2C:        67.0 ml LA Vol Index BSA:  42.8 ml/m2  LV SYSTOLIC FUNCTION:                      Normal Ranges: EF-A4C View:    57 % (>=55%) EF-A2C View:    58 % EF-Biplane:     59 % EF-Visual:      63 % LV EF Reported: 63 %  LV DIASTOLIC FUNCTION:             Normal Ranges: MV Peak E:             1.54 m/s    (0.7-1.2 m/s) MV Peak A:             1.03 m/s    (0.42-0.7 m/s) E/A Ratio:             1.50        (1.0-2.2) PulmV Sys Herbert:         53.10 cm/s PulmV Palacio Herbert:        50.10 cm/s PulmV S/D Herbert:         1.10 PulmV A Revs Herbert:      26.10 cm/s PulmV A Revs Dur:      145.00 msec  MITRAL VALVE:          Normal Ranges: MV DT:        325 msec (150-240msec)  TRICUSPID VALVE/RVSP:          Normal Ranges: Peak TR Velocity:     2.78 m/s Est. RA Pressure:     3 mmHg RV Syst Pressure:     34 mmHg  (< 30mmHg) IVC Diam:             1.26 cm  PULMONARY VEINS: PulmV A Revs Dur: 145.00 msec PulmV A Revs Herbert: 26.10 cm/s PulmV Palacio Herbert:   50.10 cm/s PulmV S/D Herbert:    1.10 PulmV Sys Herbert:    53.10 cm/s  [id number] Nessa Callaway MD Electronically signed on 7/9/2025 at 3:06:33 PM  ** Final **     XR chest 1 view  Result Date: 7/9/2025  Interpreted By:  Sherlyn Jackson, STUDY: XR CHEST 1 VIEW 7/9/2025 7:55 am   INDICATION: CHF   COMPARISON: 07/07/2025   ACCESSION NUMBER(S): [id number]   ORDERING CLINICIAN: DANIELA SALAS   TECHNIQUE: AP erect view of the chest   FINDINGS: The cardiac enlargement is unchanged. Atherosclerosis of the thoracic aorta is noted. Low lung volumes are observed with mild right perihilar infiltrate seen. There is suggestion of a very  small left pleural effusion.   Postoperative change from reverse shoulder arthroplasty on the right and lumbar spinal fusion is seen.       Stable cardiomegaly.   Low lung volumes with crowding of the bronchovascular markings. There is some mild right perihilar infiltrate identified.   Very small left pleural effusion.   Signed by: Sherlyn Jackson 7/9/2025 8:05 AM Dictation workstation:   [id number]    XR chest 2 views  Result Date: 7/7/2025  Interpreted By:  Jl Foreman, STUDY: XR CHEST 2 VIEWS; 7/7/2025 1:17 pm   INDICATION: Signs/Symptoms:shortness of breath   COMPARISON: November 2020 and October 2024.   ACCESSION NUMBER(S): [id number]   ORDERING CLINICIAN: LUIS LITTLEJOHN   TECHNIQUE: Number of films: Two-view radiographs of the chest were obtained.   FINDINGS: The cardiac silhouette is mildly enlarged. Calcifications involve the aorta. The lungs are hyperinflated, with prominent interstitial markings bilaterally. Mild bilateral perihilar interstitial infiltrates and possible small left effusion There is no pneumothorax. Degenerative changes again involve the spine and left shoulder. Reversed right shoulder arthroplasty is again in position.       Cardiomegaly and pulmonary vascular congestion on a background of COPD.   Signed by: Jl Foreman 7/7/2025 1:22 PM Dictation workstation:   [id number]      Assessment/Plan   Chronic respiratory failure  Streptococcal pneumonia  Leukopenia     Continue Rocephin  Supportive care  Monitor WBC and temperature  Long-term plan is total of 7 days of antibiotic therapy, can change to cefuroxime upon discharge     This is a complex infectious disease issue and the following was performed today (for more details please see the above note): Management decisions reflecting the added complexity (e.g., changes in antimicrobial therapy, infection control strategies).       Ann Diaz, APRN-CNP

## 2025-08-06 NOTE — PROGRESS NOTES
Physical Therapy    Physical Therapy Treatment    Patient Name: Rakan Cervantes  MRN: [id number]  Department: [address]  Room: [address] [place] [address]  Today's Date: 8/6/2025  Time Calculation  Start Time: 1320  Stop Time: 1350  Time Calculation (min): 30 min         Assessment/Plan   PT Assessment  Rehab Prognosis: Good  Barriers to Discharge Home: Physical needs  Physical Needs: Intermittent mobility assistance needed  Evaluation/Treatment Tolerance: Patient tolerated treatment well, Other (Comment) (except for elevated BP---nurse aware and provided IV meds)  Medical Staff Made Aware: Yes  Strengths: Ability to acquire knowledge  Barriers to Participation: Comorbidities  End of Session Communication: Bedside nurse  Assessment Comment: pt demonstrating slowly improving overall functional mobility, amb with FWW tolerance and ease however pt demonstrates elevated BP today---nurse aware  End of Session Patient Position: Bed, 3 rail up, Alarm on (all needs in reach)     PT Plan  Treatment/Interventions: Bed mobility, Transfer training, Gait training, Balance training, Neuromuscular re-education, Strengthening, Endurance training, Range of motion, Therapeutic exercise, Therapeutic activity, Home exercise program  PT Plan: Ongoing PT  PT Frequency: 4 times per week  PT Discharge Recommendations: Low intensity level of continued care  Equipment Recommended upon Discharge: Wheeled walker  PT Recommended Transfer Status: Stand by assist, Assistive device  PT - OK to Discharge: Yes    PT Visit Info:  PT Received On: 08/06/25     General Visit Information:   General  Family/Caregiver Present: Yes  Caregiver Feedback: spouse present  Prior to Session Communication: Bedside nurse  Patient Position Received: Bed, 3 rail up, Alarm off, caregiver present  Preferred Learning Style: verbal, visual  General Comment: cleared by nurse for therapy; pt agreeable to therapy; + telemetry.    Subjective   Precautions:  Precautions  Hearing/Visual Limitations:  glasses prn; mod [place]  Medical Precautions: Fall precautions, Oxygen therapy device and L/min (1 liter o2 via nc)     Date/Time Vitals Session Patient Position Pulse Resp SpO2 BP MAP (mmHg)    08/06/25 1239 --  --  67  18  99 %  176/49  82     08/06/25 1320 During PT  --  --  --  --  --  --     08/06/25 1352 --  --  --  --  --  198/87  112      Vital Signs Comment: O2 sat 94 to 95% with HR 77 to 83 bpm; seated BP after amb/standing activities 198/87(113)---alerted nurse and returned pt to bed. Nurse entered room to provide with IV BP meds     Objective   Pain:  Pain Assessment  Pain Assessment: 0-10  0-10 (Numeric) Pain Score: 0 - No pain  Cognition:  Cognition  Overall Cognitive Status: Within Functional Limits  Orientation Level: Oriented X4  Following Commands: Follows one step commands without difficulty  Insight: Mild  Coordination:  Movements are Fluid and Coordinated: Yes (age appropriate)  Postural Control:  Postural Control  Posture Comment: forward head; rounded shoulders, leg length discrepancy  Extremity/Trunk Assessments:    Activity Tolerance:  Activity Tolerance  Endurance: Decreased tolerance for upright activites  Activity Tolerance Comments: fair +  Treatments:  Therapeutic Exercise  Therapeutic Exercise Performed: Yes  Therapeutic Exercise Activity 1: standing robert heel raises x 1 set of 10  Therapeutic Exercise Activity 2: standing robert marching x 1 set of 10  Therapeutic Exercise Activity 3: standing robert hip abd x 1 set of 10  Therapeutic Exercise Activity 4: standing robert ham curls x 1 set of 10  Therapeutic Exercise Activity 5: seated robert LAQ's x 20 reps    Therapeutic Activity  Therapeutic Activity Performed: Yes (see bed mobility, transfers, amb with FWW)    Bed Mobility  Bed Mobility: Yes  Bed Mobility 1  Bed Mobility 1: Supine to sitting  Level of Assistance 1: Close supervision  Bed Mobility Comments 1: head of bed elevated; use of bedrail  Bed Mobility 2  Bed Mobility  2: Sitting to  supine  Level of Assistance 2: Minimum assistance  Bed Mobility Comments 2: head of bed elevaated; min assist of 1 for robert LE's onto bed    Ambulation/Gait Training  Ambulation/Gait Training Performed: Yes  Ambulation/Gait Training 1  Surface 1: Level tile  Device 1: Rolling walker  Assistance 1: Contact guard, Minimal verbal cues  Quality of Gait 1: Narrow base of support (leg length discrepancy observed)  Comments/Distance (ft) 1: 48 ft x 2, + turns, verbal cues for pursed lip breathing as needed, staying close to frame of FWW at all times  Transfers  Transfer: Yes  Transfer 1  Transfer From 1: Bed to  Transfer to 1: Stand  Technique 1: Sit to stand  Transfer Device 1: Walker  Transfer Level of Assistance 1: Close supervision, Minimal verbal cues  Trials/Comments 1: verbasl cues for robert hand placement on bed  Transfers 2  Transfer From 2: Stand to  Transfer to 2: Bed  Technique 2: Stand to sit  Transfer Device 2: Walker  Transfer Level of Assistance 2: Close supervision, Minimal verbal cues  Trials/Comments 2: verbal cues for reaching back for bed with both hands  Transfers 3  Transfer From 3:  (into and out of)  Transfer to 3: Chair with arms  Technique 3: Sit to stand, Stand to sit  Transfer Device 3: Walker  Transfer Level of Assistance 3: Close supervision  Trials/Comments 3: good robert hand placement on arms of chair    Stairs  Stairs: No    Outcome Measures:  [facility] Basic Mobility  Turning from your back to your side while in a flat bed without using bedrails: None  Moving from lying on your back to sitting on the side of a flat bed without using bedrails: A little  Moving to and from bed to chair (including a wheelchair): A little  Standing up from a chair using your arms (e.g. wheelchair or bedside chair): A little  To walk in hospital room: A little  Climbing 3-5 steps with railing: A lot  Basic Mobility - Total Score: 18    Education Documentation  Mobility Training, taught by Michelle Pastor, PT at 8/6/2025   2:08 PM.  Learner: Patient  Readiness: Acceptance  Method: Explanation  Response: Verbalizes Understanding, Needs Reinforcement  Comment: PT educated pt in safe use of FWW    Education Comments  No comments found.               Encounter Problems       Encounter Problems (Active)       PT Problem       Strength (Progressing)       Start:  08/04/25    Expected End:  09/04/25       The patient will demonstrate an overall strength of 4/5 in BLE to assist with completion of functional mobility.           Functional Mobility (Progressing)       Start:  08/04/25    Expected End:  09/04/25       The patient will complete functional mobility (bed mobility, transfers, etc.) at a mod indep level with LRAD by DC.           Ambulation (Progressing)       Start:  08/04/25    Expected End:  09/04/25       The patient will be able to ambulate at a mod indep level for >40ftx1 with RW.          Balance (Progressing)       Start:  08/04/25    Expected End:  09/04/25       The patient will demonstrate good dynamic standing balance during activity with LRAD.

## 2025-08-07 LAB
ANION GAP SERPL CALCULATED.3IONS-SCNC: 10 MMOL/L (ref 10–20)
BUN SERPL-MCNC: 7 MG/DL (ref 6–23)
CALCIUM SERPL-MCNC: 8.5 MG/DL (ref 8.6–10.3)
CHLORIDE SERPL-SCNC: 95 MMOL/L (ref 98–107)
CO2 SERPL-SCNC: 28 MMOL/L (ref 21–32)
CREAT SERPL-MCNC: 0.7 MG/DL (ref 0.5–1.05)
EGFRCR SERPLBLD CKD-EPI 2021: 82 ML/MIN/1.73M*2
ERYTHROCYTE [DISTWIDTH] IN BLOOD BY AUTOMATED COUNT: 14.6 % (ref 11.5–14.5)
GLUCOSE SERPL-MCNC: 85 MG/DL (ref 74–99)
HCT VFR BLD AUTO: 23 % (ref 36–46)
HGB BLD-MCNC: 7.5 G/DL (ref 12–16)
MCH RBC QN AUTO: 29.8 PG (ref 26–34)
MCHC RBC AUTO-ENTMCNC: 32.6 G/DL (ref 32–36)
MCV RBC AUTO: 91 FL (ref 80–100)
NRBC BLD-RTO: 0 /100 WBCS (ref 0–0)
PLATELET # BLD AUTO: 234 X10*3/UL (ref 150–450)
POTASSIUM SERPL-SCNC: 3.9 MMOL/L (ref 3.5–5.3)
RBC # BLD AUTO: 2.52 X10*6/UL (ref 4–5.2)
SODIUM SERPL-SCNC: 129 MMOL/L (ref 136–145)
WBC # BLD AUTO: 3.1 X10*3/UL (ref 4.4–11.3)

## 2025-08-07 PROCEDURE — 2500000001 HC RX 250 WO HCPCS SELF ADMINISTERED DRUGS (ALT 637 FOR MEDICARE OP): Performed by: INTERNAL MEDICINE

## 2025-08-07 PROCEDURE — 99232 SBSQ HOSP IP/OBS MODERATE 35: CPT | Performed by: INTERNAL MEDICINE

## 2025-08-07 PROCEDURE — 1200000002 HC GENERAL ROOM WITH TELEMETRY DAILY

## 2025-08-07 PROCEDURE — 2500000004 HC RX 250 GENERAL PHARMACY W/ HCPCS (ALT 636 FOR OP/ED): Performed by: INTERNAL MEDICINE

## 2025-08-07 PROCEDURE — 2500000002 HC RX 250 W HCPCS SELF ADMINISTERED DRUGS (ALT 637 FOR MEDICARE OP, ALT 636 FOR OP/ED)

## 2025-08-07 PROCEDURE — 36415 COLL VENOUS BLD VENIPUNCTURE: CPT | Performed by: INTERNAL MEDICINE

## 2025-08-07 PROCEDURE — 82374 ASSAY BLOOD CARBON DIOXIDE: CPT | Performed by: INTERNAL MEDICINE

## 2025-08-07 PROCEDURE — 2500000002 HC RX 250 W HCPCS SELF ADMINISTERED DRUGS (ALT 637 FOR MEDICARE OP, ALT 636 FOR OP/ED): Performed by: INTERNAL MEDICINE

## 2025-08-07 PROCEDURE — 85027 COMPLETE CBC AUTOMATED: CPT | Performed by: INTERNAL MEDICINE

## 2025-08-07 RX ORDER — CARVEDILOL 12.5 MG/1
12.5 TABLET ORAL 2 TIMES DAILY
Status: DISCONTINUED | OUTPATIENT
Start: 2025-08-07 | End: 2025-08-09 | Stop reason: HOSPADM

## 2025-08-07 RX ADMIN — RANOLAZINE 500 MG: 500 TABLET, EXTENDED RELEASE ORAL at 20:57

## 2025-08-07 RX ADMIN — SULFASALAZINE 500 MG: 500 TABLET ORAL at 20:57

## 2025-08-07 RX ADMIN — ASPIRIN 81 MG: 81 TABLET, DELAYED RELEASE ORAL at 10:04

## 2025-08-07 RX ADMIN — HYDRALAZINE HYDROCHLORIDE 100 MG: 50 TABLET ORAL at 10:03

## 2025-08-07 RX ADMIN — HYDRALAZINE HYDROCHLORIDE 100 MG: 50 TABLET ORAL at 15:55

## 2025-08-07 RX ADMIN — POTASSIUM CHLORIDE EXTENDED-RELEASE 40 MEQ: 1500 TABLET ORAL at 10:09

## 2025-08-07 RX ADMIN — PANTOPRAZOLE SODIUM 40 MG: 40 TABLET, DELAYED RELEASE ORAL at 20:57

## 2025-08-07 RX ADMIN — DULOXETINE 30 MG: 30 CAPSULE, DELAYED RELEASE ORAL at 17:34

## 2025-08-07 RX ADMIN — CARVEDILOL 12.5 MG: 12.5 TABLET, FILM COATED ORAL at 20:57

## 2025-08-07 RX ADMIN — HYDROXYCHLOROQUINE SULFATE 200 MG: 200 TABLET, FILM COATED ORAL at 10:03

## 2025-08-07 RX ADMIN — GABAPENTIN 300 MG: 300 CAPSULE ORAL at 20:57

## 2025-08-07 RX ADMIN — HYDRALAZINE HYDROCHLORIDE 100 MG: 50 TABLET ORAL at 20:57

## 2025-08-07 RX ADMIN — ENOXAPARIN SODIUM 40 MG: 100 INJECTION SUBCUTANEOUS at 17:34

## 2025-08-07 RX ADMIN — SUCRALFATE 1 G: 1 SUSPENSION ORAL at 10:04

## 2025-08-07 RX ADMIN — SULFASALAZINE 500 MG: 500 TABLET ORAL at 10:04

## 2025-08-07 RX ADMIN — CARVEDILOL 12.5 MG: 12.5 TABLET, FILM COATED ORAL at 10:04

## 2025-08-07 RX ADMIN — ATORVASTATIN CALCIUM 80 MG: 80 TABLET, FILM COATED ORAL at 20:57

## 2025-08-07 RX ADMIN — GABAPENTIN 300 MG: 300 CAPSULE ORAL at 10:05

## 2025-08-07 RX ADMIN — PANTOPRAZOLE SODIUM 40 MG: 40 TABLET, DELAYED RELEASE ORAL at 10:03

## 2025-08-07 RX ADMIN — RANOLAZINE 500 MG: 500 TABLET, EXTENDED RELEASE ORAL at 10:04

## 2025-08-07 RX ADMIN — CEFTRIAXONE 1 G: 1 INJECTION, SOLUTION INTRAVENOUS at 17:34

## 2025-08-07 ASSESSMENT — COGNITIVE AND FUNCTIONAL STATUS - GENERAL
STANDING UP FROM CHAIR USING ARMS: A LITTLE
DRESSING REGULAR LOWER BODY CLOTHING: A LITTLE
DRESSING REGULAR UPPER BODY CLOTHING: A LITTLE
MOVING TO AND FROM BED TO CHAIR: A LITTLE
MOVING TO AND FROM BED TO CHAIR: A LITTLE
HELP NEEDED FOR BATHING: A LITTLE
STANDING UP FROM CHAIR USING ARMS: A LITTLE
WALKING IN HOSPITAL ROOM: A LITTLE
MOBILITY SCORE: 19
CLIMB 3 TO 5 STEPS WITH RAILING: A LITTLE
TOILETING: A LITTLE
TOILETING: A LITTLE
HELP NEEDED FOR BATHING: A LITTLE
DAILY ACTIVITIY SCORE: 20
TURNING FROM BACK TO SIDE WHILE IN FLAT BAD: A LITTLE
DAILY ACTIVITIY SCORE: 20
WALKING IN HOSPITAL ROOM: A LITTLE
DRESSING REGULAR UPPER BODY CLOTHING: A LITTLE
DRESSING REGULAR LOWER BODY CLOTHING: A LITTLE
TURNING FROM BACK TO SIDE WHILE IN FLAT BAD: A LITTLE
CLIMB 3 TO 5 STEPS WITH RAILING: A LITTLE
MOBILITY SCORE: 19

## 2025-08-07 ASSESSMENT — PAIN - FUNCTIONAL ASSESSMENT
PAIN_FUNCTIONAL_ASSESSMENT: 0-10

## 2025-08-07 ASSESSMENT — PAIN SCALES - GENERAL
PAINLEVEL_OUTOF10: 0 - NO PAIN

## 2025-08-07 NOTE — CARE PLAN
Over the shift, the patient did not make progress toward the following goals. Barriers to progression include . Recommendations to address these barriers include .        Problem: Pain - Adult  Goal: Verbalizes/displays adequate comfort level or baseline comfort level  8/7/2025 0644 by Sammy Cabrera RN  Outcome: Progressing  8/7/2025 0045 by Sammy Cabrera RN  Outcome: Progressing     Problem: Safety - Adult  Goal: Free from fall injury  8/7/2025 0644 by Sammy Cabrera RN  Outcome: Progressing  8/7/2025 0045 by Sammy Cabrera RN  Outcome: Progressing     Problem: Discharge Planning  Goal: Discharge to home or other facility with appropriate resources  8/7/2025 0644 by Sammy Cabrera RN  Outcome: Progressing  8/7/2025 0045 by Sammy Cabrera RN  Outcome: Progressing     Problem: Chronic Conditions and Co-morbidities  Goal: Patient's chronic conditions and co-morbidity symptoms are monitored and maintained or improved  8/7/2025 0644 by Sammy Cabrera RN  Outcome: Progressing  8/7/2025 0045 by Sammy Cabrera RN  Outcome: Progressing     Problem: Nutrition  Goal: Nutrient intake appropriate for maintaining nutritional needs  8/7/2025 0644 by Sammy Cabrera RN  Outcome: Progressing  8/7/2025 0045 by Sammy Cabrera RN  Outcome: Progressing     Problem: Fall/Injury  Goal: Not fall by end of shift  8/7/2025 0644 by Sammy Cabrera RN  Outcome: Met  8/7/2025 0045 by aSmmy Cabrera RN  Outcome: Progressing  Goal: Be free from injury by end of the shift  8/7/2025 0644 by Sammy Cabrera RN  Outcome: Met  8/7/2025 0045 by Sammy Cabrera RN  Outcome: Progressing  Goal: Verbalize understanding of personal risk factors for fall in the hospital  8/7/2025 0644 by Sammy Cabrera RN  Outcome: Progressing  8/7/2025 0045 by Sammy Cabrera RN  Outcome: Progressing  Goal: Verbalize understanding of risk factor reduction measures to prevent injury from fall in  the home  8/7/2025 0644 by Sammy Cabrera RN  Outcome: Progressing  8/7/2025 0045 by Sammy Cabrera RN  Outcome: Progressing  Goal: Use assistive devices by end of the shift  8/7/2025 0644 by Sammy Cabrera RN  Outcome: Met  8/7/2025 0045 by Sammy Cabrera RN  Outcome: Progressing  Goal: Pace activities to prevent fatigue by end of the shift  8/7/2025 0644 by Sammy Cabrera RN  Outcome: Met  8/7/2025 0045 by Sammy Cabrera RN  Outcome: Progressing     Problem: Skin  Goal: Decreased wound size/increased tissue granulation at next dressing change  8/7/2025 0644 by Sammy Cabrera RN  Outcome: Progressing  8/7/2025 0045 by Sammy Cabrera RN  Outcome: Progressing  Goal: Participates in plan/prevention/treatment measures  8/7/2025 0644 by Sammy Cabrera RN  Outcome: Progressing  8/7/2025 0045 by Sammy Cabrera RN  Outcome: Progressing  Goal: Prevent/manage excess moisture  8/7/2025 0644 by Sammy Cabrera RN  Outcome: Progressing  8/7/2025 0045 by Sammy Cabrera RN  Outcome: Progressing  Goal: Prevent/minimize sheer/friction injuries  8/7/2025 0644 by Sammy Cabrera RN  Outcome: Progressing  8/7/2025 0045 by Sammy Cabrera RN  Outcome: Progressing  Goal: Promote/optimize nutrition  8/7/2025 0644 by Sammy Cabrera RN  Outcome: Progressing  8/7/2025 0045 by Sammy Cabrera RN  Outcome: Progressing  Goal: Promote skin healing  8/7/2025 0644 by Sammy Cabrera RN  Outcome: Progressing  8/7/2025 0045 by Sammy Cabrera RN  Outcome: Progressing

## 2025-08-07 NOTE — NURSING NOTE
Assumed care.  Patient sitting on the chair sitting with chair alarm on.  Oxygen at 1L.  No distress noted.  Call light in reach.  Plan of care ongoing.

## 2025-08-07 NOTE — CARE PLAN
The patient's goals for the shift include no chest pain    The clinical goals for the shift include Fall precaution, monitor B/P.  Promote rest.      Problem: Pain - Adult  Goal: Verbalizes/displays adequate comfort level or baseline comfort level  Outcome: Progressing     Problem: Safety - Adult  Goal: Free from fall injury  Outcome: Progressing     Problem: Discharge Planning  Goal: Discharge to home or other facility with appropriate resources  Outcome: Progressing     Problem: Chronic Conditions and Co-morbidities  Goal: Patient's chronic conditions and co-morbidity symptoms are monitored and maintained or improved  Outcome: Progressing     Problem: Nutrition  Goal: Nutrient intake appropriate for maintaining nutritional needs  Outcome: Progressing     Problem: Fall/Injury  Goal: Verbalize understanding of personal risk factors for fall in the hospital  Outcome: Progressing  Goal: Verbalize understanding of risk factor reduction measures to prevent injury from fall in the home  Outcome: Progressing     Problem: Skin  Goal: Decreased wound size/increased tissue granulation at next dressing change  Outcome: Progressing  Flowsheets (Taken 8/7/2025 1820)  Decreased wound size/increased tissue granulation at next dressing change: Protective dressings over bony prominences  Goal: Participates in plan/prevention/treatment measures  Outcome: Progressing  Flowsheets (Taken 8/7/2025 1820)  Participates in plan/prevention/treatment measures: Increase activity/out of bed for meals  Goal: Prevent/manage excess moisture  Outcome: Progressing  Flowsheets (Taken 8/7/2025 1820)  Prevent/manage excess moisture: Moisturize dry skin  Goal: Prevent/minimize sheer/friction injuries  Outcome: Progressing  Flowsheets (Taken 8/7/2025 1820)  Prevent/minimize sheer/friction injuries:   Use pull sheet   Increase activity/out of bed for meals  Goal: Promote/optimize nutrition  Outcome: Progressing  Flowsheets (Taken 8/7/2025  1820)  Promote/optimize nutrition:   Offer water/supplements/favorite foods   Monitor/record intake including meals  Goal: Promote skin healing  Outcome: Progressing  Flowsheets (Taken 8/7/2025 1820)  Promote skin healing: Assess skin/pad under line(s)/device(s)

## 2025-08-07 NOTE — NURSING NOTE
AM shift assessment unchanged, pt is A&Ox4 in no acute distress, sitting up in chair watching TV with call light in reach, currently denies pain,   no new events/concerns this shift.

## 2025-08-07 NOTE — PROGRESS NOTES
Rakan Cervantes is a 89 y.o. female on day 4 of admission presenting with Chest pain, unspecified type.    Subjective   Interval History:   Patient seen and examined  No cough, chest pain or shortness of breath.  No nausea or vomiting, or diarrhea       Review of Systems   All other systems reviewed and are negative.      Objective   Range of Vitals (last 24 hours)  Heart Rate:  [66-75]   Temp:  [35.2 °C (95.4 °F)-36.8 °C (98.2 °F)]   Resp:  [16-18]   BP: (140-207)/(49-87)   SpO2:  [98 %-100 %]        Daily Weight  08/03/25 : 50.8 kg (112 lb)    Body mass index is 22.62 kg/m².    Physical Exam  Constitutional:       Appearance: Normal appearance.   HENT:      Head: Normocephalic and atraumatic.      Eyes:      Extraocular Movements: Extraocular movements intact.      Conjunctiva/sclera: Conjunctivae normal.         Cardiovascular:      Rate and Rhythm: Normal rate.   Pulmonary:      Effort: Pulmonary effort is normal.      Comments: Diminished bilaterally  Abdominal:      Palpations: Abdomen is soft.      Tenderness: There is no abdominal tenderness.      Musculoskeletal:         General: Normal range of motion.      Cervical back: Normal range of motion.      Skin:     General: Skin is warm and dry.      Neurological:      General: No focal deficit present.      Mental Status: She is alert.      Psychiatric:         Mood and Affect: Mood normal.         Behavior: Behavior normal.     Antibiotics  cefTRIAXone - 1 gram/50 mL    Relevant Results  Labs  Results from last 72 hours   Lab Units 08/07/25  0624 08/06/25  0552 08/05/25  0619   WBC AUTO x10*3/uL 3.1* 3.6* 3.9*   HEMOGLOBIN g/dL 7.5* 8.3* 7.7*   HEMATOCRIT % 23.0* 25.8* 25.3*   PLATELETS AUTO x10*3/uL 234 238 226     Results from last 72 hours   Lab Units 08/07/25  0624 08/06/25  0552 08/05/25  0619   SODIUM mmol/L 129* 130* 132*   POTASSIUM mmol/L 3.9 3.7 3.7   CHLORIDE mmol/L 95* 95* 98   CO2 mmol/L 28 30 30   BUN mg/dL 7 8 13   CREATININE mg/dL 0.70 0.58  0.75   GLUCOSE mg/dL 85 95 96   CALCIUM mg/dL 8.5* 8.9 8.7   ANION GAP mmol/L 10 9* 8*   EGFR mL/min/1.73m*2 82 86 76         Estimated Creatinine Clearance: 36.9 mL/min (by C-G formula based on SCr of 0.7 mg/dL).  CRP   Date Value Ref Range Status   10/21/2021 0.3 0 - 2.0 MG/DL Final     Comment:     LESS THAN  Performed at [address] [address]     09/15/2021 0.3 0 - 2.0 MG/DL Final     Comment:     LESS THAN   RESULT CHECKED  Performed at [address] [address]     02/27/2020 0.3 0 - 2.0 MG/DL Final     Comment:     LESS THAN  Performed at [address] [address]       Microbiology  Reviewed-negative urine culture, positive urine streptococcal antigen  Imaging  XR chest 1 view  Result Date: 8/3/2025  Interpreted By:  Kacy Bernal, STUDY: XR CHEST 1 VIEW;  8/3/2025 2:54 pm   INDICATION: Signs/Symptoms:Chest Pain.   COMPARISON: 07/20/2025   ACCESSION NUMBER(S): [id number]   ORDERING CLINICIAN: MARTY LEJEUNE   FINDINGS: AP radiograph of the chest was provided.       CARDIOMEDIASTINAL SILHOUETTE: Cardiomediastinal silhouette is stable in size and configuration.   LUNGS: Small bilateral pleural effusions with bibasilar opacification, better evaluated on concurrent CT, reported separately.   ABDOMEN: No remarkable upper abdominal findings.   BONES: Reverse right shoulder arthroplasty. Advanced osteoarthritis of the left shoulder.       Small bilateral pleural effusions with bibasilar opacification, better evaluated on concurrent CT, reported separately.   Signed by: Kacy Bernal 8/3/2025 3:56 PM Dictation workstation:   [id number]    CT chest abdomen pelvis w IV contrast  Result Date: 8/3/2025  Interpreted By:  Kacy Bernal, STUDY: CT CHEST ABDOMEN PELVIS W IV CONTRAST;  8/3/2025 2:43 pm   INDICATION: Signs/Symptoms:abdominal pain, epigastric pain.     COMPARISON: 10/28/2024   ACCESSION NUMBER(S): [id number]   ORDERING CLINICIAN: RANDI  EDGAR   TECHNIQUE: CT of the chest, abdomen, and pelvis was performed.  Contiguous axial images were obtained at 3 mm slice thickness through the chest, abdomen and pelvis. Coronal and sagittal reconstructions at 3 mm slice thickness were performed. 75 ML of Omnipaque 350 was administered intravenously without immediate complication.   FINDINGS: Marked beam hardening artifact from posterior fusion hardware, right shoulder arthroplasty, bilateral hip prosthesis, and arms in the downward position limits evaluation.   CHEST:   LUNG/PLEURA/LARGE AIRWAYS: Small bilateral pleural effusions with mild interstitial edema, improved from prior exam. Mild dependent right-greater-than-left lower lobe ground-glass to mild consolidative opacities. Trachea and right and left main bronchi are patent.   VESSELS: Aorta and pulmonary arteries are normal caliber. Severe atherosclerotic calcifications of the thoracic aorta. Severe coronary artery calcifications versus stent.   HEART: The heart is normal in size. No pericardial effusion.   MEDIASTINUM AND DONATO: No mediastinal, hilar or axillary lymphadenopathy. Ectasia of the esophagus, moderate-sized hiatal hernia noted, and circumferential esophageal wall thickening.   CHEST WALL AND LOWER NECK: No soft tissue masses in the chest wall. Multinodular thyroid noted, measuring up to 16 mm on the left.   ABDOMEN:   LIVER: The liver is normal in size without evidence of focal liver lesions.   BILE DUCTS: No biliary dilatation.   GALLBLADDER: Cholecystectomy   PANCREAS: The pancreas appears unremarkable without evidence of ductal dilatation or masses.   SPLEEN: The spleen is normal in size.   ADRENAL GLANDS: No adrenal nodule or thickening.   KIDNEYS AND URETERS: The kidneys are normal in size. This exam is nondiagnostic for evaluation of suspicious renal lesions due to marked beam hardening artifact. No hydroureteronephrosis or nephroureterolithiasis.   PELVIS:   BLADDER: Within normal  limits.   REPRODUCTIVE ORGANS: No pelvic masses.   BOWEL: Moderate-sized hiatal hernia. The small and large bowel are normal in caliber and demonstrate no wall thickening. The appendix is not definitely visualized. There is however no pericecal stranding or fluid. Prominent sigmoid diverticulosis without acute diverticulitis.   VESSELS: Severe atherosclerotic calcifications of the aortoiliac arteries. The IVC appears normal. Portal vein, splenic vein, and SMV are patent.   PERITONEUM/RETROPERITONEUM/LYMPH NODES: No ascites or fluid collection. No peritoneal nodularity or deposits. The retroperitoneum is unremarkable. No abdominopelvic lymphadenopathy is present.   BONE AND SOFT TISSUE: No acute fracture. Degenerative changes of the thoracolumbar spine. Multilevel endplate irregularities noted. Posterior fusion hardware, right shoulder arthroplasty, and bilateral hip prosthesis noted. No significant abnormality of the abdominal wall soft tissues.       1. Ectasia of the esophagus, moderate-sized hiatal hernia noted, and circumferential esophageal wall thickening. Please correlate for esophagitis/gastroesophageal reflux. 2. Small bilateral pleural effusions with mild interstitial edema, improved from prior exam. 3. Mild dependent right-greater-than-left lower lobe ground-glass to mild consolidative opacities, for which superimposed developing pneumonia is not excluded. 4. Nonemergent thyroid ultrasound is recommended to further evaluate thyroid nodules. 5. Additional chronic and incidental findings as detailed above.   MACRO: None   Signed by: Kacy Bernal 8/3/2025 3:54 PM Dictation workstation:   [id number]    ECG 12 lead  Result Date: 7/22/2025  Atrial fibrillation with slow ventricular response with a competing junctional pacemaker Abnormal ECG When compared with ECG of 07-JUL-2025 13:07, Atrial fibrillation has replaced Sinus rhythm Vent. rate has decreased BY  25 BPM Minimal criteria for Anterior infarct are  now Present Nonspecific T wave abnormality has replaced inverted T waves in Inferior leads T wave inversion no longer evident in Anterior leads Confirmed by Sergey Bond ([id number]) on 7/22/2025 8:43:11 AM    XR chest 1 view  Result Date: 7/20/2025  Interpreted By:  Finkelstein, Evan, STUDY: XR CHEST 1 VIEW;  7/20/2025 11:12 pm   INDICATION: Signs/Symptoms:sob.     COMPARISON: Chest radiograph 07/09/2025   ACCESSION NUMBER(S): [id number]   ORDERING CLINICIAN: IHSAN POOL   FINDINGS:     CARDIOMEDIASTINAL SILHOUETTE: Cardiomediastinal silhouette is stable in size and configuration.   LUNGS: No pulmonary consolidation, pleural effusion or pneumothorax.   ABDOMEN: No remarkable upper abdominal findings.   BONES: No acute osseous abnormality. Partially visualized right shoulder arthroplasty hardware. Severe left glenohumeral joint osteoarthrosis.       No radiographic evidence of acute cardiopulmonary pathology.   MACRO: None.   Signed by: Evan Finkelstein 7/20/2025 11:56 PM Dictation workstation:   [id number]    Transthoracic Echo (TTE) Limited  Result Date: 7/9/2025           [place] [address]            Phone [phone] TRANSTHORACIC ECHOCARDIOGRAM REPORT Patient Name:       NICOLE Lock Physician:    [id number] Nessa Callaway MD Study Date:         7/9/2025             Ordering Provider:    [id number] DANIELA SALAS MRN/PID:            [id number]             Fellow: Accession#:         [id number]         Nurse: Date of Birth/Age:  1935 / 89 years Sonographer:          Shannan Gorman                                                                [facility] Gender Assigned at  F                    Additional Staff: Birth: Height:             149.86 cm            Admit Date: Weight:             54.43 kg             Admission Status:      Inpatient -                                                                Routine BSA / BMI:          1.48 m2 / 24.24      Department Location:  [facility]                     kg/m2 Blood Pressure: 98 /47 mmHg Study Type:    TRANSTHORACIC ECHO (TTE) LIMITED Diagnosis/ICD: Acute diastolic (congestive) heart failure (CHF)-I50.31 Indication:    Acute diastolic congestive heart failure CPT Codes:     Echo Limited-24466; Doppler Limited-43506; Color Doppler-20348 Patient History: Pertinent History: Chest Pain, Hyperlipidemia, HTN, TIA, CHF and MI                    Arteriosclerosis of coronary artery. Study Detail: The following Echo studies were performed: 2D, M-Mode, Doppler and               color flow.  PHYSICIAN INTERPRETATION: Left Ventricle: The left ventricular systolic function is normal with a visually estimated ejection fraction of 60-65%. There is mild concentric left ventricular hypertrophy. There are no regional wall motion abnormalities. The left ventricular cavity size is normal. There is moderately increased septal and mildly increased posterior left ventricular wall thickness. Spectral Doppler shows a normal pattern of left ventricular diastolic filling. Left Atrium: The left atrial size is moderately dilated. Right Ventricle: The right ventricle is normal in size. There is normal right ventricular global systolic function. Right Atrium: The right atrial size is normal. Aortic Valve: The aortic valve is trileaflet. Not assessed. There is no evidence of aortic valve regurgitation. Patient has aortic valve stenosis with reduced leaflet motion. I cannot assess the severity of aortic valve stenosis. No gradients were obtained. Mitral Valve: The mitral valve is moderately thickened. There is moderate mitral annular calcification. Mitral valve regurgitation was not assessed. The E Vmax is 1.54 m/s. Tricuspid Valve: The tricuspid valve is structurally normal. There is trace tricuspid regurgitation. The  Doppler estimated right ventricular systolic pressure (RVSP) is slightly elevated at 34 mmHg. Pulmonic Valve: The pulmonic valve is structurally normal. The pulmonic valve regurgitation was not assessed. Pericardium: No pericardial effusion noted. Aorta: The aortic root is normal. Systemic Veins: The inferior vena cava appears normal in size, with IVC inspiratory collapse greater than 50%.  CONCLUSIONS:  1. The left ventricular systolic function is normal with a visually estimated ejection fraction of 60-65%.  2. There is normal right ventricular global systolic function.  3. The left atrial size is moderately dilated.  4. The mitral valve is moderately thickened.  5. There is moderate mitral annular calcification.  6. The Doppler estimated RVSP is slightly elevated at 34 mmHg.  7. Trace tricuspid regurgitation is visualized.  8. There is moderately increased septal and mildly increased posterior left ventricular wall thickness. QUANTITATIVE DATA SUMMARY:  2D MEASUREMENTS:             Normal Ranges: LAs:             4.90 cm     (2.7-4.0cm) IVSd:            1.36 cm     (0.6-1.1cm) LVPWd:           1.21 cm     (0.6-1.1cm) LVIDd:           3.57 cm     (3.9-5.9cm) LVIDs:           2.50 cm LV Mass Index:   104.7 g/m2 LVEDV Index:     50.43 ml/m2 LV % FS          30.0 %  LEFT ATRIUM:                  Normal Ranges: LA Vol A4C:        67.1 ml    (22+/-6mL/m2) LA Vol A2C:        73.3 ml LA Vol BP:         71.0 ml LA Vol Index A4C:  45.2ml/m2 LA Vol Index A2C:  49.4 ml/m2 LA Vol Index BP:   47.8 ml/m2 LA Area A4C:       21.1 cm2 LA Area A2C:       21.8 cm2 LA Major Axis A4C: 5.6 cm LA Major Axis A2C: 5.5 cm LA Vol A4C:        60.0 ml LA Vol A2C:        67.0 ml LA Vol Index BSA:  42.8 ml/m2  LV SYSTOLIC FUNCTION:                      Normal Ranges: EF-A4C View:    57 % (>=55%) EF-A2C View:    58 % EF-Biplane:     59 % EF-Visual:      63 % LV EF Reported: 63 %  LV DIASTOLIC FUNCTION:             Normal Ranges: MV Peak E:              1.54 m/s    (0.7-1.2 m/s) MV Peak A:             1.03 m/s    (0.42-0.7 m/s) E/A Ratio:             1.50        (1.0-2.2) PulmV Sys Herbert:         53.10 cm/s PulmV Palacio Herbert:        50.10 cm/s PulmV S/D Herbert:         1.10 PulmV A Revs Herbert:      26.10 cm/s PulmV A Revs Dur:      145.00 msec  MITRAL VALVE:          Normal Ranges: MV DT:        325 msec (150-240msec)  TRICUSPID VALVE/RVSP:          Normal Ranges: Peak TR Velocity:     2.78 m/s Est. RA Pressure:     3 mmHg RV Syst Pressure:     34 mmHg  (< 30mmHg) IVC Diam:             1.26 cm  PULMONARY VEINS: PulmV A Revs Dur: 145.00 msec PulmV A Revs Herbert: 26.10 cm/s PulmV Palacio Herbert:   50.10 cm/s PulmV S/D Herbert:    1.10 PulmV Sys Herbert:    53.10 cm/s  [id number] Nessa Callaway MD Electronically signed on 7/9/2025 at 3:06:33 PM  ** Final **     XR chest 1 view  Result Date: 7/9/2025  Interpreted By:  Sherlyn Jackson, STUDY: XR CHEST 1 VIEW 7/9/2025 7:55 am   INDICATION: CHF   COMPARISON: 07/07/2025   ACCESSION NUMBER(S): [id number]   ORDERING CLINICIAN: DANIELA SALAS   TECHNIQUE: AP erect view of the chest   FINDINGS: The cardiac enlargement is unchanged. Atherosclerosis of the thoracic aorta is noted. Low lung volumes are observed with mild right perihilar infiltrate seen. There is suggestion of a very small left pleural effusion.   Postoperative change from reverse shoulder arthroplasty on the right and lumbar spinal fusion is seen.       Stable cardiomegaly.   Low lung volumes with crowding of the bronchovascular markings. There is some mild right perihilar infiltrate identified.   Very small left pleural effusion.   Signed by: Sherlyn Jackson 7/9/2025 8:05 AM Dictation workstation:   [id number]      Assessment/Plan   Chronic respiratory failure  Streptococcal pneumonia  Leukopenia, marginal decrease  Hyponatremia-nephrology consulted    Continue Rocephin, while inpatient  Supportive care  Monitor WBC and temperature  Long-term plan is total of 7 days of antibiotic therapy, can  change to cefuroxime upon discharge     This is a complex infectious disease issue and the following was performed today (for more details please see the above note): Management decisions reflecting the added complexity (e.g., changes in antimicrobial therapy, infection control strategies).    Cooper Mckee MD

## 2025-08-07 NOTE — PROGRESS NOTES
Occupational Therapy                 Therapy Communication Note    Patient Name: Rakan Cervantes  MRN: [id number]  Department: [address]  Room: 421/421-  Today's Date: 8/7/2025     Discipline: Occupational Therapy    Missed Visit: OT Missed Visit: Yes     Missed Visit Reason: Missed Visit Reason: Patient refused (Education and encouragement provided on importance of participation in OT session however patient continued to decline d/t anticipating discharge later this date.)    Missed Time: Attempt at 1449    Comment:

## 2025-08-08 ENCOUNTER — APPOINTMENT (OUTPATIENT)
Dept: CARDIOLOGY | Facility: HOSPITAL | Age: OVER 89
End: 2025-08-08
Payer: MEDICARE

## 2025-08-08 LAB
ANION GAP SERPL CALCULATED.3IONS-SCNC: 12 MMOL/L (ref 10–20)
ANION GAP SERPL CALCULATED.3IONS-SCNC: 8 MMOL/L (ref 10–20)
BUN SERPL-MCNC: 7 MG/DL (ref 6–23)
BUN SERPL-MCNC: 7 MG/DL (ref 6–23)
CALCIUM SERPL-MCNC: 8.4 MG/DL (ref 8.6–10.3)
CALCIUM SERPL-MCNC: 9.1 MG/DL (ref 8.6–10.3)
CHLORIDE SERPL-SCNC: 94 MMOL/L (ref 98–107)
CHLORIDE SERPL-SCNC: 97 MMOL/L (ref 98–107)
CO2 SERPL-SCNC: 26 MMOL/L (ref 21–32)
CO2 SERPL-SCNC: 26 MMOL/L (ref 21–32)
CREAT SERPL-MCNC: 0.72 MG/DL (ref 0.5–1.05)
CREAT SERPL-MCNC: 0.83 MG/DL (ref 0.5–1.05)
EGFRCR SERPLBLD CKD-EPI 2021: 67 ML/MIN/1.73M*2
EGFRCR SERPLBLD CKD-EPI 2021: 80 ML/MIN/1.73M*2
ERYTHROCYTE [DISTWIDTH] IN BLOOD BY AUTOMATED COUNT: 14.9 % (ref 11.5–14.5)
GLUCOSE SERPL-MCNC: 102 MG/DL (ref 74–99)
GLUCOSE SERPL-MCNC: 124 MG/DL (ref 74–99)
HCT VFR BLD AUTO: 24.9 % (ref 36–46)
HGB BLD-MCNC: 7.7 G/DL (ref 12–16)
MCH RBC QN AUTO: 30 PG (ref 26–34)
MCHC RBC AUTO-ENTMCNC: 30.9 G/DL (ref 32–36)
MCV RBC AUTO: 97 FL (ref 80–100)
NRBC BLD-RTO: 0 /100 WBCS (ref 0–0)
PLATELET # BLD AUTO: 197 X10*3/UL (ref 150–450)
POTASSIUM SERPL-SCNC: 4.1 MMOL/L (ref 3.5–5.3)
POTASSIUM SERPL-SCNC: 4.2 MMOL/L (ref 3.5–5.3)
RBC # BLD AUTO: 2.57 X10*6/UL (ref 4–5.2)
SODIUM SERPL-SCNC: 127 MMOL/L (ref 136–145)
SODIUM SERPL-SCNC: 128 MMOL/L (ref 136–145)
WBC # BLD AUTO: 3.8 X10*3/UL (ref 4.4–11.3)

## 2025-08-08 PROCEDURE — 2500000004 HC RX 250 GENERAL PHARMACY W/ HCPCS (ALT 636 FOR OP/ED): Performed by: INTERNAL MEDICINE

## 2025-08-08 PROCEDURE — 1200000002 HC GENERAL ROOM WITH TELEMETRY DAILY

## 2025-08-08 PROCEDURE — 2500000001 HC RX 250 WO HCPCS SELF ADMINISTERED DRUGS (ALT 637 FOR MEDICARE OP): Performed by: INTERNAL MEDICINE

## 2025-08-08 PROCEDURE — 85027 COMPLETE CBC AUTOMATED: CPT | Performed by: INTERNAL MEDICINE

## 2025-08-08 PROCEDURE — 97535 SELF CARE MNGMENT TRAINING: CPT | Mod: GO,CO

## 2025-08-08 PROCEDURE — 93005 ELECTROCARDIOGRAM TRACING: CPT

## 2025-08-08 PROCEDURE — 80048 BASIC METABOLIC PNL TOTAL CA: CPT | Performed by: INTERNAL MEDICINE

## 2025-08-08 PROCEDURE — 97530 THERAPEUTIC ACTIVITIES: CPT | Mod: GO,CO

## 2025-08-08 PROCEDURE — 93010 ELECTROCARDIOGRAM REPORT: CPT | Performed by: INTERNAL MEDICINE

## 2025-08-08 PROCEDURE — 2500000002 HC RX 250 W HCPCS SELF ADMINISTERED DRUGS (ALT 637 FOR MEDICARE OP, ALT 636 FOR OP/ED)

## 2025-08-08 PROCEDURE — 99232 SBSQ HOSP IP/OBS MODERATE 35: CPT | Performed by: INTERNAL MEDICINE

## 2025-08-08 PROCEDURE — 36415 COLL VENOUS BLD VENIPUNCTURE: CPT | Performed by: INTERNAL MEDICINE

## 2025-08-08 PROCEDURE — 2500000002 HC RX 250 W HCPCS SELF ADMINISTERED DRUGS (ALT 637 FOR MEDICARE OP, ALT 636 FOR OP/ED): Performed by: INTERNAL MEDICINE

## 2025-08-08 RX ORDER — FUROSEMIDE 10 MG/ML
20 INJECTION INTRAMUSCULAR; INTRAVENOUS ONCE
Status: COMPLETED | OUTPATIENT
Start: 2025-08-08 | End: 2025-08-08

## 2025-08-08 RX ORDER — ONDANSETRON HYDROCHLORIDE 2 MG/ML
4 INJECTION, SOLUTION INTRAVENOUS EVERY 6 HOURS PRN
Status: DISCONTINUED | OUTPATIENT
Start: 2025-08-08 | End: 2025-08-09 | Stop reason: HOSPADM

## 2025-08-08 RX ADMIN — RANOLAZINE 500 MG: 500 TABLET, EXTENDED RELEASE ORAL at 08:47

## 2025-08-08 RX ADMIN — GABAPENTIN 300 MG: 300 CAPSULE ORAL at 20:35

## 2025-08-08 RX ADMIN — FUROSEMIDE 20 MG: 10 INJECTION, SOLUTION INTRAMUSCULAR; INTRAVENOUS at 13:19

## 2025-08-08 RX ADMIN — ATORVASTATIN CALCIUM 80 MG: 80 TABLET, FILM COATED ORAL at 20:35

## 2025-08-08 RX ADMIN — HYDRALAZINE HYDROCHLORIDE 100 MG: 50 TABLET ORAL at 08:47

## 2025-08-08 RX ADMIN — PANTOPRAZOLE SODIUM 40 MG: 40 TABLET, DELAYED RELEASE ORAL at 20:35

## 2025-08-08 RX ADMIN — HYDRALAZINE HYDROCHLORIDE 100 MG: 50 TABLET ORAL at 14:45

## 2025-08-08 RX ADMIN — ENOXAPARIN SODIUM 40 MG: 100 INJECTION SUBCUTANEOUS at 17:30

## 2025-08-08 RX ADMIN — GABAPENTIN 300 MG: 300 CAPSULE ORAL at 08:47

## 2025-08-08 RX ADMIN — SULFASALAZINE 500 MG: 500 TABLET ORAL at 20:35

## 2025-08-08 RX ADMIN — PANTOPRAZOLE SODIUM 40 MG: 40 TABLET, DELAYED RELEASE ORAL at 08:47

## 2025-08-08 RX ADMIN — POTASSIUM CHLORIDE EXTENDED-RELEASE 40 MEQ: 1500 TABLET ORAL at 08:53

## 2025-08-08 RX ADMIN — CEFTRIAXONE 1 G: 1 INJECTION, SOLUTION INTRAVENOUS at 17:30

## 2025-08-08 RX ADMIN — FERROUS SULFATE TAB 325 MG (65 MG ELEMENTAL FE) 1 TABLET: 325 (65 FE) TAB at 08:47

## 2025-08-08 RX ADMIN — CARVEDILOL 12.5 MG: 12.5 TABLET, FILM COATED ORAL at 20:35

## 2025-08-08 RX ADMIN — SUCRALFATE 1 G: 1 SUSPENSION ORAL at 08:46

## 2025-08-08 RX ADMIN — HYDRALAZINE HYDROCHLORIDE 100 MG: 50 TABLET ORAL at 20:35

## 2025-08-08 RX ADMIN — ONDANSETRON 4 MG: 2 INJECTION, SOLUTION INTRAMUSCULAR; INTRAVENOUS at 11:45

## 2025-08-08 RX ADMIN — SULFASALAZINE 500 MG: 500 TABLET ORAL at 08:46

## 2025-08-08 RX ADMIN — ASPIRIN 81 MG: 81 TABLET, DELAYED RELEASE ORAL at 08:46

## 2025-08-08 RX ADMIN — RANOLAZINE 500 MG: 500 TABLET, EXTENDED RELEASE ORAL at 20:35

## 2025-08-08 RX ADMIN — CARVEDILOL 12.5 MG: 12.5 TABLET, FILM COATED ORAL at 08:47

## 2025-08-08 RX ADMIN — HYDROXYCHLOROQUINE SULFATE 200 MG: 200 TABLET, FILM COATED ORAL at 08:47

## 2025-08-08 RX ADMIN — DULOXETINE 30 MG: 30 CAPSULE, DELAYED RELEASE ORAL at 17:30

## 2025-08-08 ASSESSMENT — COGNITIVE AND FUNCTIONAL STATUS - GENERAL
MOVING TO AND FROM BED TO CHAIR: A LITTLE
DRESSING REGULAR UPPER BODY CLOTHING: A LITTLE
MOBILITY SCORE: 19
MOBILITY SCORE: 19
TOILETING: A LITTLE
CLIMB 3 TO 5 STEPS WITH RAILING: A LITTLE
TURNING FROM BACK TO SIDE WHILE IN FLAT BAD: A LITTLE
TOILETING: A LITTLE
HELP NEEDED FOR BATHING: A LITTLE
STANDING UP FROM CHAIR USING ARMS: A LITTLE
STANDING UP FROM CHAIR USING ARMS: A LITTLE
DRESSING REGULAR LOWER BODY CLOTHING: A LITTLE
TOILETING: A LITTLE
HELP NEEDED FOR BATHING: A LITTLE
TURNING FROM BACK TO SIDE WHILE IN FLAT BAD: A LITTLE
DRESSING REGULAR UPPER BODY CLOTHING: A LITTLE
PERSONAL GROOMING: A LITTLE
DRESSING REGULAR LOWER BODY CLOTHING: A LITTLE
HELP NEEDED FOR BATHING: A LITTLE
DAILY ACTIVITIY SCORE: 17
WALKING IN HOSPITAL ROOM: A LITTLE
MOVING TO AND FROM BED TO CHAIR: A LITTLE
DRESSING REGULAR UPPER BODY CLOTHING: A LITTLE
DAILY ACTIVITIY SCORE: 20
CLIMB 3 TO 5 STEPS WITH RAILING: A LITTLE
EATING MEALS: A LITTLE
WALKING IN HOSPITAL ROOM: A LITTLE
DRESSING REGULAR LOWER BODY CLOTHING: A LOT
DAILY ACTIVITIY SCORE: 20

## 2025-08-08 ASSESSMENT — ACTIVITIES OF DAILY LIVING (ADL)
HOME_MANAGEMENT_TIME_ENTRY: 16
BATHING_LEVEL_OF_ASSISTANCE: MODERATE ASSISTANCE
BATHING_WHERE_ASSESSED: SITTING SINKSIDE

## 2025-08-08 ASSESSMENT — PAIN SCALES - GENERAL
PAINLEVEL_OUTOF10: 0 - NO PAIN

## 2025-08-08 ASSESSMENT — PAIN - FUNCTIONAL ASSESSMENT
PAIN_FUNCTIONAL_ASSESSMENT: 0-10

## 2025-08-08 NOTE — CARE PLAN
The patient's goals for the shift include no chest pain    The clinical goals for the shift include maintain safety, increase mobility, OOB for meals    Over the shift, the patient did not make progress toward the following goals. Barriers to progression include   . Recommendations to address these barriers include     Problem: Pain - Adult  Goal: Verbalizes/displays adequate comfort level or baseline comfort level  Outcome: Progressing     Problem: Safety - Adult  Goal: Free from fall injury  Outcome: Progressing     Problem: Discharge Planning  Goal: Discharge to home or other facility with appropriate resources  Outcome: Progressing     Problem: Chronic Conditions and Co-morbidities  Goal: Patient's chronic conditions and co-morbidity symptoms are monitored and maintained or improved  Outcome: Progressing     Problem: Nutrition  Goal: Nutrient intake appropriate for maintaining nutritional needs  Outcome: Progressing     Problem: Fall/Injury  Goal: Verbalize understanding of personal risk factors for fall in the hospital  Outcome: Progressing  Goal: Verbalize understanding of risk factor reduction measures to prevent injury from fall in the home  Outcome: Progressing     Problem: Skin  Goal: Decreased wound size/increased tissue granulation at next dressing change  Outcome: Progressing  Goal: Participates in plan/prevention/treatment measures  Outcome: Progressing  Goal: Prevent/manage excess moisture  Outcome: Progressing  Goal: Prevent/minimize sheer/friction injuries  Outcome: Progressing  Goal: Promote/optimize nutrition  Outcome: Progressing  Goal: Promote skin healing  Outcome: Progressing   .

## 2025-08-08 NOTE — CARE PLAN
The patient's goals for the shift include no chest pain    The clinical goals for the shift include maintain safety, OOB for meal, increase mobility      Problem: Pain - Adult  Goal: Verbalizes/displays adequate comfort level or baseline comfort level  Outcome: Progressing     Problem: Safety - Adult  Goal: Free from fall injury  Outcome: Progressing     Problem: Discharge Planning  Goal: Discharge to home or other facility with appropriate resources  Outcome: Progressing     Problem: Chronic Conditions and Co-morbidities  Goal: Patient's chronic conditions and co-morbidity symptoms are monitored and maintained or improved  Outcome: Progressing     Problem: Nutrition  Goal: Nutrient intake appropriate for maintaining nutritional needs  Outcome: Progressing     Problem: Fall/Injury  Goal: Verbalize understanding of personal risk factors for fall in the hospital  Outcome: Progressing  Goal: Verbalize understanding of risk factor reduction measures to prevent injury from fall in the home  Outcome: Progressing     Problem: Skin  Goal: Decreased wound size/increased tissue granulation at next dressing change  Outcome: Progressing  Goal: Participates in plan/prevention/treatment measures  Outcome: Progressing  Goal: Prevent/manage excess moisture  Outcome: Progressing  Goal: Prevent/minimize sheer/friction injuries  Outcome: Progressing  Goal: Promote/optimize nutrition  Outcome: Progressing  Goal: Promote skin healing  Outcome: Progressing

## 2025-08-08 NOTE — PROGRESS NOTES
08/08/25 0817   Discharge Planning   Expected Discharge Disposition Home H  ([facility])     Will need internal referral for home health upon discharge  ** do not discharge without speaking to care coordination**

## 2025-08-08 NOTE — NURSING NOTE
AM shift assessment unchanged, pt is A&Ox4 in no acute distress, sitting in chair with spouse at side and with call light in reach. Currently denies pain, no new events/concerns this shift. Chair alarm on.

## 2025-08-08 NOTE — PROGRESS NOTES
Rakan Cervantes is a 89 y.o. female on day 5 of admission presenting with Chest pain, unspecified type.    Subjective   Patient is afebrile, denies chills  Denies shortness of breath or cough  She reports midsternal/epigastric sharp pain with nausea.  States this had started 30 minutes prior to my evaluation in the morning.  She denies any nausea vomiting or diarrhea  Discussed with nursing-obtained EKG and notified primary team    Objective   Range of Vitals (last 24 hours)  Heart Rate:  [60-77]   Temp:  [36.4 °C (97.5 °F)-36.8 °C (98.2 °F)]   Resp:  [17]   BP: (147-192)/(47-63)   SpO2:  [97 %-100 %]        Daily Weight  08/03/25 : 50.8 kg (112 lb)    Body mass index is 22.62 kg/m².    Physical Exam  Constitutional:       Appearance: Normal appearance.   HENT:      Head: Normocephalic and atraumatic.     Eyes:      Extraocular Movements: Extraocular movements intact.      Conjunctiva/sclera: Conjunctivae normal.       Cardiovascular:      Rate and Rhythm: Normal rate.   Pulmonary:      Effort: Pulmonary effort is normal.      Comments: Diminished bilaterally  Abdominal:      Palpations: Abdomen is soft.      Tenderness: There is abdominal tenderness.     Musculoskeletal:         General: Normal range of motion.      Cervical back: Normal range of motion.     Skin:     General: Skin is warm and dry.     Neurological:      General: No focal deficit present.      Mental Status: She is alert.     Psychiatric:         Mood and Affect: Mood normal.         Behavior: Behavior normal.         Antibiotics  cefTRIAXone - 1 gram/50 mL    Relevant Results  Labs  Results from last 72 hours   Lab Units 08/08/25  0554 08/07/25  0624 08/06/25  0552   WBC AUTO x10*3/uL 3.8* 3.1* 3.6*   HEMOGLOBIN g/dL 7.7* 7.5* 8.3*   HEMATOCRIT % 24.9* 23.0* 25.8*   PLATELETS AUTO x10*3/uL 197 234 238     Results from last 72 hours   Lab Units 08/08/25  0554 08/07/25  0624 08/06/25  0552   SODIUM mmol/L 127* 129* 130*   POTASSIUM mmol/L 4.2 3.9 3.7    CHLORIDE mmol/L 97* 95* 95*   CO2 mmol/L 26 28 30   BUN mg/dL 7 7 8   CREATININE mg/dL 0.72 0.70 0.58   GLUCOSE mg/dL 102* 85 95   CALCIUM mg/dL 8.4* 8.5* 8.9   ANION GAP mmol/L 8* 10 9*   EGFR mL/min/1.73m*2 80 82 86         Estimated Creatinine Clearance: 35.9 mL/min (by C-G formula based on SCr of 0.72 mg/dL).  CRP   Date Value Ref Range Status   10/21/2021 0.3 0 - 2.0 MG/DL Final     Comment:     LESS THAN  Performed at [address] [address]     09/15/2021 0.3 0 - 2.0 MG/DL Final     Comment:     LESS THAN   RESULT CHECKED  Performed at [address] [address]     02/27/2020 0.3 0 - 2.0 MG/DL Final     Comment:     LESS THAN  Performed at [address] [address]       Microbiology  MRSA PCR negative, Legionella antigen negative, Streptococcus pneumoniae antigen positive, urine culture negative  Imaging  Electrocardiogram, 12-lead PRN ACS symptoms  Result Date: 8/8/2025  Normal sinus rhythm Cannot rule out Anterior infarct , age undetermined Abnormal ECG No previous ECGs available    XR chest 1 view  Result Date: 8/3/2025  Interpreted By:  Kacy Bernal, STUDY: XR CHEST 1 VIEW;  8/3/2025 2:54 pm   INDICATION: Signs/Symptoms:Chest Pain.   COMPARISON: 07/20/2025   ACCESSION NUMBER(S): [id number]   ORDERING CLINICIAN: MARTY LEJEUNE   FINDINGS: AP radiograph of the chest was provided.       CARDIOMEDIASTINAL SILHOUETTE: Cardiomediastinal silhouette is stable in size and configuration.   LUNGS: Small bilateral pleural effusions with bibasilar opacification, better evaluated on concurrent CT, reported separately.   ABDOMEN: No remarkable upper abdominal findings.   BONES: Reverse right shoulder arthroplasty. Advanced osteoarthritis of the left shoulder.       Small bilateral pleural effusions with bibasilar opacification, better evaluated on concurrent CT, reported separately.   Signed by: Kacy Bernal 8/3/2025 3:56 PM Dictation workstation:    [id number]    CT chest abdomen pelvis w IV contrast  Result Date: 8/3/2025  Interpreted By:  Kacy Bernal, STUDY: CT CHEST ABDOMEN PELVIS W IV CONTRAST;  8/3/2025 2:43 pm   INDICATION: Signs/Symptoms:abdominal pain, epigastric pain.     COMPARISON: 10/28/2024   ACCESSION NUMBER(S): [id number]   ORDERING CLINICIAN: RANDI HERNÁNDEZ   TECHNIQUE: CT of the chest, abdomen, and pelvis was performed.  Contiguous axial images were obtained at 3 mm slice thickness through the chest, abdomen and pelvis. Coronal and sagittal reconstructions at 3 mm slice thickness were performed. 75 ML of Omnipaque 350 was administered intravenously without immediate complication.   FINDINGS: Marked beam hardening artifact from posterior fusion hardware, right shoulder arthroplasty, bilateral hip prosthesis, and arms in the downward position limits evaluation.   CHEST:   LUNG/PLEURA/LARGE AIRWAYS: Small bilateral pleural effusions with mild interstitial edema, improved from prior exam. Mild dependent right-greater-than-left lower lobe ground-glass to mild consolidative opacities. Trachea and right and left main bronchi are patent.   VESSELS: Aorta and pulmonary arteries are normal caliber. Severe atherosclerotic calcifications of the thoracic aorta. Severe coronary artery calcifications versus stent.   HEART: The heart is normal in size. No pericardial effusion.   MEDIASTINUM AND DONATO: No mediastinal, hilar or axillary lymphadenopathy. Ectasia of the esophagus, moderate-sized hiatal hernia noted, and circumferential esophageal wall thickening.   CHEST WALL AND LOWER NECK: No soft tissue masses in the chest wall. Multinodular thyroid noted, measuring up to 16 mm on the left.   ABDOMEN:   LIVER: The liver is normal in size without evidence of focal liver lesions.   BILE DUCTS: No biliary dilatation.   GALLBLADDER: Cholecystectomy   PANCREAS: The pancreas appears unremarkable without evidence of ductal dilatation or masses.   SPLEEN: The  spleen is normal in size.   ADRENAL GLANDS: No adrenal nodule or thickening.   KIDNEYS AND URETERS: The kidneys are normal in size. This exam is nondiagnostic for evaluation of suspicious renal lesions due to marked beam hardening artifact. No hydroureteronephrosis or nephroureterolithiasis.   PELVIS:   BLADDER: Within normal limits.   REPRODUCTIVE ORGANS: No pelvic masses.   BOWEL: Moderate-sized hiatal hernia. The small and large bowel are normal in caliber and demonstrate no wall thickening. The appendix is not definitely visualized. There is however no pericecal stranding or fluid. Prominent sigmoid diverticulosis without acute diverticulitis.   VESSELS: Severe atherosclerotic calcifications of the aortoiliac arteries. The IVC appears normal. Portal vein, splenic vein, and SMV are patent.   PERITONEUM/RETROPERITONEUM/LYMPH NODES: No ascites or fluid collection. No peritoneal nodularity or deposits. The retroperitoneum is unremarkable. No abdominopelvic lymphadenopathy is present.   BONE AND SOFT TISSUE: No acute fracture. Degenerative changes of the thoracolumbar spine. Multilevel endplate irregularities noted. Posterior fusion hardware, right shoulder arthroplasty, and bilateral hip prosthesis noted. No significant abnormality of the abdominal wall soft tissues.       1. Ectasia of the esophagus, moderate-sized hiatal hernia noted, and circumferential esophageal wall thickening. Please correlate for esophagitis/gastroesophageal reflux. 2. Small bilateral pleural effusions with mild interstitial edema, improved from prior exam. 3. Mild dependent right-greater-than-left lower lobe ground-glass to mild consolidative opacities, for which superimposed developing pneumonia is not excluded. 4. Nonemergent thyroid ultrasound is recommended to further evaluate thyroid nodules. 5. Additional chronic and incidental findings as detailed above.   MACRO: None   Signed by: Kacy Bernal 8/3/2025 3:54 PM Dictation workstation:    [id number]    ECG 12 lead  Result Date: 7/22/2025  Atrial fibrillation with slow ventricular response with a competing junctional pacemaker Abnormal ECG When compared with ECG of 07-JUL-2025 13:07, Atrial fibrillation has replaced Sinus rhythm Vent. rate has decreased BY  25 BPM Minimal criteria for Anterior infarct are now Present Nonspecific T wave abnormality has replaced inverted T waves in Inferior leads T wave inversion no longer evident in Anterior leads Confirmed by Sergey Bond ([id number]) on 7/22/2025 8:43:11 AM    XR chest 1 view  Result Date: 7/20/2025  Interpreted By:  Finkelstein, Evan, STUDY: XR CHEST 1 VIEW;  7/20/2025 11:12 pm   INDICATION: Signs/Symptoms:sob.     COMPARISON: Chest radiograph 07/09/2025   ACCESSION NUMBER(S): [id number]   ORDERING CLINICIAN: IHSAN POOL   FINDINGS:     CARDIOMEDIASTINAL SILHOUETTE: Cardiomediastinal silhouette is stable in size and configuration.   LUNGS: No pulmonary consolidation, pleural effusion or pneumothorax.   ABDOMEN: No remarkable upper abdominal findings.   BONES: No acute osseous abnormality. Partially visualized right shoulder arthroplasty hardware. Severe left glenohumeral joint osteoarthrosis.       No radiographic evidence of acute cardiopulmonary pathology.   MACRO: None.   Signed by: Evan Finkelstein 7/20/2025 11:56 PM Dictation workstation:   [id number]      Assessment/Plan   Chronic respiratory failure  Streptococcal pneumonia  Leukopenia, marginal increase  Hyponatremia-nephrology consulted     Continue Rocephin, while inpatient  Supportive care  Monitor WBC and temperature  Long-term plan is total of 7 days of antibiotic therapy, can change to cefuroxime upon discharge     This is a complex infectious disease issue and the following was performed today (for more details please see the above note): Management decisions reflecting the added complexity (e.g., changes in antimicrobial therapy, infection control strategies).    Ann Diaz,  APRN-CNP

## 2025-08-08 NOTE — PROGRESS NOTES
Rakan Cervantes is a 89 y.o. female on day 5 of admission presenting with Chest pain, unspecified type.      Subjective   Patient denies chest pain or shortness of breath.    Patient has been afebrile during the night.    Objective     Last Recorded Vitals  /63 (BP Location: Right arm, Patient Position: Lying)   Pulse 71   Temp 36.4 °C (97.5 °F) (Oral)   Resp 17   Wt 50.8 kg (112 lb)   SpO2 98%   Intake/Output last 3 Shifts:    Intake/Output Summary (Last 24 hours) at 8/8/2025 0951  Last data filed at 8/8/2025 0937  Gross per 24 hour   Intake 975 ml   Output --   Net 975 ml       Admission Weight  Weight: 50.8 kg (112 lb) (08/03/25 1419)    Daily Weight  08/03/25 : 50.8 kg (112 lb)    Image Results  XR chest 1 view  Narrative: Interpreted By:  Kacy Bernal,   STUDY:  XR CHEST 1 VIEW;  8/3/2025 2:54 pm      INDICATION:  Signs/Symptoms:Chest Pain.      COMPARISON:  07/20/2025      ACCESSION NUMBER(S):  [id number]      ORDERING CLINICIAN:  MARTY LEJEUNE      FINDINGS:  AP radiograph of the chest was provided.              CARDIOMEDIASTINAL SILHOUETTE:  Cardiomediastinal silhouette is stable in size and configuration.      LUNGS:  Small bilateral pleural effusions with bibasilar opacification,  better evaluated on concurrent CT, reported separately.      ABDOMEN:  No remarkable upper abdominal findings.      BONES:  Reverse right shoulder arthroplasty. Advanced osteoarthritis of the  left shoulder.      Impression: Small bilateral pleural effusions with bibasilar opacification,  better evaluated on concurrent CT, reported separately.      Signed by: Kacy Bernal 8/3/2025 3:56 PM  Dictation workstation:   [id number]  CT chest abdomen pelvis w IV contrast  Narrative: Interpreted By:  Kacy Bernal,   STUDY:  CT CHEST ABDOMEN PELVIS W IV CONTRAST;  8/3/2025 2:43 pm      INDICATION:  Signs/Symptoms:abdominal pain, epigastric pain.          COMPARISON:  10/28/2024      ACCESSION NUMBER(S):  [id number]       ORDERING CLINICIAN:  RANDI HERNÁNDEZ      TECHNIQUE:  CT of the chest, abdomen, and pelvis was performed.  Contiguous axial  images were obtained at 3 mm slice thickness through the chest,  abdomen and pelvis. Coronal and sagittal reconstructions at 3 mm  slice thickness were performed. 75 ML of Omnipaque 350 was  administered intravenously without immediate complication.      FINDINGS:  Marked beam hardening artifact from posterior fusion hardware, right  shoulder arthroplasty, bilateral hip prosthesis, and arms in the  downward position limits evaluation.      CHEST:      LUNG/PLEURA/LARGE AIRWAYS:  Small bilateral pleural effusions with mild interstitial edema,  improved from prior exam. Mild dependent right-greater-than-left  lower lobe ground-glass to mild consolidative opacities. Trachea and  right and left main bronchi are patent.      VESSELS:  Aorta and pulmonary arteries are normal caliber.  Severe atherosclerotic calcifications of the thoracic aorta.  Severe coronary artery calcifications versus stent.      HEART:  The heart is normal in size.  No pericardial effusion.      MEDIASTINUM AND DONATO:  No mediastinal, hilar or axillary lymphadenopathy.  Ectasia of the esophagus, moderate-sized hiatal hernia noted, and  circumferential esophageal wall thickening.      CHEST WALL AND LOWER NECK:  No soft tissue masses in the chest wall.  Multinodular thyroid noted, measuring up to 16 mm on the left.      ABDOMEN:      LIVER:  The liver is normal in size without evidence of focal liver lesions.      BILE DUCTS:  No biliary dilatation.      GALLBLADDER:  Cholecystectomy      PANCREAS:  The pancreas appears unremarkable without evidence of ductal  dilatation or masses.      SPLEEN:  The spleen is normal in size.      ADRENAL GLANDS:  No adrenal nodule or thickening.      KIDNEYS AND URETERS:  The kidneys are normal in size. This exam is nondiagnostic for  evaluation of suspicious renal lesions due to marked beam  hardening  artifact. No hydroureteronephrosis or nephroureterolithiasis.      PELVIS:      BLADDER:  Within normal limits.      REPRODUCTIVE ORGANS:  No pelvic masses.      BOWEL:  Moderate-sized hiatal hernia.  The small and large bowel are normal in caliber and demonstrate no  wall thickening. The appendix is not definitely visualized. There is  however no pericecal stranding or fluid. Prominent sigmoid  diverticulosis without acute diverticulitis.      VESSELS:  Severe atherosclerotic calcifications of the aortoiliac arteries.  The IVC appears normal.  Portal vein, splenic vein, and SMV are patent.      PERITONEUM/RETROPERITONEUM/LYMPH NODES:  No ascites or fluid collection.  No peritoneal nodularity or deposits.  The retroperitoneum is unremarkable.  No abdominopelvic lymphadenopathy is present.      BONE AND SOFT TISSUE:  No acute fracture.  Degenerative changes of the thoracolumbar spine. Multilevel endplate  irregularities noted. Posterior fusion hardware, right shoulder  arthroplasty, and bilateral hip prosthesis noted. No significant  abnormality of the abdominal wall soft tissues.      Impression: 1. Ectasia of the esophagus, moderate-sized hiatal hernia noted, and  circumferential esophageal wall thickening. Please correlate for  esophagitis/gastroesophageal reflux.  2. Small bilateral pleural effusions with mild interstitial edema,  improved from prior exam.  3. Mild dependent right-greater-than-left lower lobe ground-glass to  mild consolidative opacities, for which superimposed developing  pneumonia is not excluded.  4. Nonemergent thyroid ultrasound is recommended to further evaluate  thyroid nodules.  5. Additional chronic and incidental findings as detailed above.      MACRO:  None      Signed by: Kacy Bernal 8/3/2025 3:54 PM  Dictation workstation:   [id number]      Physical Exam    General: cooperating during physical exam.  HEENT: Pupils are equal and reactive to light and commendation , oral  mucosa moist, no JVD.  Cardiovascular: PMI nondisplaced  Lungs: Clear to auscultation bilaterally, no wheezing, no crackles, no dullness to percussion.  Abdomen: No hepatosplenomegaly appreciated, soft , not tender, positive bowel sounds, positive bowel movement.  Neuro: Alert and oriented x2, strength in upper and lower extremities , sensation intact.  Musculoskeletal: No swelling in lower extremities, no limitation in range of motion.  Vascular: Pulses are intact in upper and lower extremities  Skin: No petechiae, ecchymosis or other stigmata for dermatology disease.       Assessment and plan      Hyponatremia  Sodium level worse today 27  Waiting for nephrology to see him today    Streptococcus pneumonia  UTI  Patient is on ceftriaxone   Continue with current antibiotics.  Consu ID on the case    Hypertension  Better controlled today   Continue with BP med    Chronic respiratory failure with hypoxia.  Continue with oxygen therapy to maintain pulse ox more than 92%     Dyspepsia  Esophagitis  History of GERD esophagitis    Hypertension urgency  Continue with BP meds  Blood pressure improved  Continue with current medication.  Patient is on Coreg, hydralazine and ranolazine  Hydralazine as needed for   Blood pressure more than 180 mmhg    Coronary artery disease  Hyperlipidemia  Continue with current medication.    Peripheral neuropathy  Patient is on gabapentin    Chest pain  EKG no ischemic changes.  Monitoring telemetry with  Evaluated by cardiology    Polyarthritis  On hydroxychloroquine    Patient is DNR CCA/DNI    Deconditioning  PT /OT   Fall precaution  Ambulate with assistant  Bed alarms on.    Anemia  Hemoglobin and hematocrit improved    Waiting for nephrology to see her regarding hyponatremia today    Check BMP in a.m.     Graciela Andrews MD

## 2025-08-08 NOTE — PROGRESS NOTES
Occupational Therapy    OT Treatment    Patient Name: Rakan Cervantes  MRN: [id number]  Department: [address]  Room: [facility] [place]  Today's Date: 8/8/2025  Time Calculation  Start Time: 1301  Stop Time: 1325  Time Calculation (min): 24 min      Assessment:  OT Assessment: pt tolerated treatment well and is progressing towards OT POC by displaying increased tolerance for grooming, transfers, and functional mobility tasks with CGA/supervision. pt would benefit from continued OT services to increase strentgh, balance, endurance, safety, and activity tolerance for ADLs  Prognosis: Good  Barriers to Discharge Home: No anticipated barriers  Physical Needs: Intermittent mobility assistance needed, Intermittent ADL assistance needed  Evaluation/Treatment Tolerance: Patient tolerated treatment well, Patient limited by fatigue  Medical Staff Made Aware: Yes  End of Session Communication: Bedside nurse  End of Session Patient Position: Up in chair, Alarm on (call light in reach and all needs met: RN made aware of OT session and RN in room passing medication upon GARDUNO exit)  OT Assessment Results: Decreased ADL status, Decreased upper extremity strength, Decreased endurance, Decreased functional mobility  Prognosis: Good  Barriers to Discharge: None  Evaluation/Treatment Tolerance: Patient tolerated treatment well, Patient limited by fatigue  Medical Staff Made Aware: Yes  Strengths: Ability to acquire knowledge, Attitude of self  Barriers to Participation: Comorbidities  Plan:  Treatment Interventions: ADL retraining, Functional transfer training, UE strengthening/ROM, Endurance training, Equipment evaluation/education, Compensatory technique education  OT Frequency: 3 times per week  OT Discharge Recommendations: Low intensity level of continued care  Equipment Recommended upon Discharge: Wheeled walker  OT Recommended Transfer Status: Stand by assist  OT - OK to Discharge: Yes  Treatment Interventions: ADL retraining, Functional  transfer training, UE strengthening/ROM, Endurance training, Equipment evaluation/education, Compensatory technique education    Subjective   OT Visit Info:  OT Received On: 08/08/25  General Visit Info:  General  Reason for Referral: impaired ADL's/mobility; complaint of Chest pain  Referred By: Jesusita Daniel DO  Past Medical History Relevant to Rehab: afib, CAD, CHF, SVT, TIA, HTN, HLD, diverticulosis, polyarteritis, Barretts esophagus  Family/Caregiver Present: Yes  Caregiver Feedback: spouse present  Prior to Session Communication: Bedside nurse  Patient Position Received: Up in chair, Alarm on  Preferred Learning Style: verbal, visual  General Comment: pt cleared by nursing for OT treatment; pt pleasant and agreeable to therapy  Precautions:  Hearing/Visual Limitations: glasses prn; mod [place]  Medical Precautions: Fall precautions, Oxygen therapy device and L/min (1L O2 via NC)    Vital Signs Comment: O2 98-99% during ADLs while in stance     Pain:  Pain Assessment  Pain Assessment: 0-10  0-10 (Numeric) Pain Score: 0 - No pain    Objective    Cognition:  Cognition  Overall Cognitive Status: Within Functional Limits  Orientation Level: Oriented X4  Cognition Comments: pt pleasant and cooperative  Insight: Mild  Impulsive: Within functional limits  Processing Speed: Within funtional limits    Coordination:  Movements are Fluid and Coordinated: Yes    Activities of Daily Living:    Grooming  Grooming Level of Assistance: Close supervision  Grooming Where Assessed: Standing sinkside  Grooming Comments: pt engaged in combing hair and washing face while in stance at sinkside with close supervision for balance and safety; effortful    UE Bathing  UE Bathing Level of Assistance: Setup, Contact guard  UE Bathing Where Assessed: Standing sinkside  UE Bathing Comments: UB bathing completed while in stance at sinkside with CGA for balance and safety; cues for balance techniques and energy conservation; completed with  increased time; assist to wash back    LE Bathing  LE Bathing Level of Assistance: Moderate assistance  LE Bathing Where Assessed: Sitting sinkside  LE Bathing Comments: LB bathing completed while seated on toilet with Mod A for lower legs; pt able to wash bilateral thighs; effortful and completed with increased time    UE Dressing  UE Dressing Level of Assistance: Setup  UE Dressing Where Assessed: Other (Comment) (in stance in bathroom)  UE Dressing Comments: pt able to don/doff gown while in stance at sinkside with set up of items and CGA for balance and safety    LE Dressing  LE Dressing: Yes  Sock Level of Assistance: Maximum assistance  LE Dressing Where Assessed: Toilet  LE Dressing Comments: pt able to perform figure four but unable to don socks while seated on toilet; Max A to don socks    Bed Mobility/Transfers:   Bed Mobility  Bed Mobility: No    Transfers  Transfer: Yes  Transfer 1  Transfer From 1: Chair with arms to  Transfer to 1: Stand  Technique 1: Sit to stand  Transfer Device 1: Walker  Transfer Level of Assistance 1: Close supervision  Trials/Comments 1: supervision for balance and safety; good hand placement  Transfers 2  Transfer From 2: Stand to  Transfer to 2: Chair with arms  Technique 2: Stand to sit  Transfer Device 2: Walker  Transfer Level of Assistance 2: Close supervision  Trials/Comments 2: good hand placement; supervision for balance and safety    Toilet Transfers  Toilet Transfer From: Chair  Toilet Transfer Type: To and from  Toilet Transfer to: Standard toilet  Toilet Transfer Technique: Ambulating  Toilet Transfers: Contact guard  Toilet Transfers Comments: completed multiple trials during ADL tasks for rest breaks; good use of grab bars; effortful CGA for balance and safety during controlled descent and trunk elevation     Functional Mobility:  Functional Mobility  Functional Mobility Performed: Yes  Functional Mobility 1  Surface 1: Level tile  Device 1: Rolling  walker  Assistance 1: Close supervision  Comments 1: pt able to functionally ambulate to and from bathroom with FWW and supervision for balance and safety; good walker management; no LOB noted    Standing Balance:  Dynamic Standing Balance  Dynamic Standing-Balance Support: Right upper extremity supported, Left upper extremity supported (alternating)  Dynamic Standing-Level of Assistance: Contact guard, Close supervision  Dynamic Standing-Balance: Forward lean, Reaching for objects, Reaching across midline  Dynamic Standing-Comments: grooming and ADL tasks while in stance at sinkside with fair standing balance; completed to increase functional standing balance and tolerance for ADLs:in stance for ~12 minutes with intermittment seated rest breaks throughout    Therapy/Activity:    Therapeutic Activity  Therapeutic Activity Performed: Yes  Therapeutic Activity 1: see bed mobility, transfers, and functional mobility    Outcome Measures:  [facility] Daily Activity  Putting on and taking off regular lower body clothing: A lot  Bathing (including washing, rinsing, drying): A little  Putting on and taking off regular upper body clothing: A little  Toileting, which includes using toilet, bedpan or urinal: A little  Taking care of personal grooming such as brushing teeth: A little  Eating Meals: A little  Daily Activity - Total Score: 17      Education Documentation  Body Mechanics, taught by SHAAN Gaines at 8/8/2025  1:42 PM.  Learner: Patient  Readiness: Acceptance  Method: Explanation  Response: Verbalizes Understanding  Comment: OT POC, body mechanics, balance/safety techniques, fall prevention strategies, and call light use    ADL Training, taught by SHAAN Gaines at 8/8/2025  1:42 PM.  Learner: Patient  Readiness: Acceptance  Method: Explanation  Response: Verbalizes Understanding  Comment: OT POC, body mechanics, balance/safety techniques, fall prevention strategies, and call light use    Goals:  Encounter  Problems       Encounter Problems (Active)       OT Goals       Pt will complete all grooming tasks with mod indep in standing with RW. (Progressing)       Start:  08/04/25    Expected End:  09/04/25            Pt will complete all toileting tasks with mod indep. (Progressing)       Start:  08/04/25    Expected End:  09/04/25            Pt will complete all functional transfers and mobility with mod indep using a RW. (Progressing)       Start:  08/04/25    Expected End:  09/04/25            Pt will tolerate functional mobility for a household distance using a RW with mod indep. (Progressing)       Start:  08/04/25    Expected End:  09/04/25

## 2025-08-08 NOTE — CONSULTS
Inpatient consult to Nephrology  Consult performed by: Doc Steiner MD  Consult ordered by: Graciela Andrews MD  Reason for consult: Hyponatremia          History Of Present Illness  Rakan Cervantes is a 89-year-old  female with history of hypertension, paroxysmal atrial fibrillation, CAD (s/p PCI in September, 2024), bilateral carotid stenosis and HFpEF, admitted with chest pain associated with dyspnea.    Renal consultation has been requested for evaluation and management of worsening hyponatremia.  Serum sodium was 134 mEq/L at presentation and has progressively worsened to 127 mEq/L today.  Her renal function has been relatively stable with SCr around her baseline of 0.6-0.8 mg/dL.    Of note, she was on a thiazide diuretic as well as an SSRI prior to admission.      Past Medical History  She has a past medical history of Abnormality of plasma protein (03/13/2025), Acute kidney injury (03/12/2025), Acute non-ST segment elevation myocardial infarction (Multi) (11/26/2023), Anemia, Arteriosclerosis of coronary artery (05/14/2023), Arthritis, Asthma, Atrial fibrillation (Multi) (11/26/2023), Jonas esophagus, Bilateral carotid artery occlusion (05/23/2023), Bradycardia (02/21/2025), CAD (coronary artery disease), Cardiac rhythm disorder or disturbance or change (11/26/2023), Carpal tunnel syndrome of right wrist (08/01/2025), Cervical radiculopathy, Chest pain (11/26/2023), Chronic heart failure with preserved ejection fraction (02/21/2025), Chronic obstructive pulmonary disease (Multi) (11/26/2023), Constipation, Coronary artery disease (11/26/2023), Degenerative joint disease, Dyslipidemia, Dysuria, Erosive esophagitis, Erythema of skin (04/07/2025), Fall at home, initial encounter (03/11/2025), Gastrointestinal hemorrhage with melena (04/08/2025), GERD (gastroesophageal reflux disease), GIB (gastrointestinal bleeding) (04/07/2025), Hematochezia (04/08/2025), Hypercholesterolemia (05/23/2023),  Hyperparathyroidism (Multi), Hypertensive urgency (11/26/2023), Impaired fasting glucose (11/26/2023), Iron deficiency anemia due to chronic blood loss (04/08/2025), Irritable bowel syndrome (IBS), Labile essential hypertension (11/26/2023), Labile hypertension, Left foot pain, Low blood pressure (11/26/2023), Lung infiltrate on CT (02/24/2025), Macular degeneration, Mixed hyperlipidemia (11/26/2023), Neck pain, Occipital neuralgia of right side (08/01/2025), Osteoporosis (11/26/2023), Paresthesias (08/01/2025), Paroxysmal atrial fibrillation (Multi), Pneumonia of left upper lobe due to infectious organism (02/21/2025), Polyarthritis with negative rheumatoid factor (Multi), Post menopausal syndrome, Rapid atrial fibrillation (Multi) (05/14/2023), Right wrist pain (04/08/2025), Sepsis (Multi) (02/21/2025), Septic shock (Multi) (02/21/2025), Spinal stenosis, 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    Surgical History  She has a past surgical history that includes Other surgical history (02/14/2022); MR angio head wo IV contrast (04/23/2013); CT angio head w and wo IV contrast (05/08/2013); MR angio head wo IV contrast (07/20/2016); Cholecystectomy; Cardiovascular stress test (2017); Cardiovascular stress test (2004); Cataract extraction (Bilateral, 2011); Carpal tunnel release (Left, 2014); Total hip arthroplasty (Right, 2016); Revision total hip arthroplasty (Left, 2013); Coronary stent placement (05/2023); MR angio head wo IV contrast (10/27/2023); MR angio neck wo IV contrast (10/27/2023); Cardiac catheterization (Left, 9/27/2024); and Cardiac catheterization (N/A, 9/27/2024).     Social History  She reports that she has never smoked. She has never been exposed to tobacco smoke. She has never used smokeless tobacco. She  reports that she does not currently use alcohol. She reports that she does not use drugs.  She lives with her .    Family History  Noncontributory.     Medications  Scheduled medications  Scheduled Medications[1]  Continuous medications  Continuous Medications[2]  PRN medications  PRN Medications[3]    Allergies  Meperidine, Morphine, Opioids - morphine analogues, Pregabalin, and Tramadol    Review of Systems  Hard of hearing.  Other 10 systems reviewed were negative except as stated in HPI.         Physical Exam  Vitals 24HR  Heart Rate:  [60-77]   Temp:  [36.4 °C (97.5 °F)-36.8 °C (98.2 °F)]   Resp:  [17]   BP: (147-192)/(47-63)   SpO2:  [97 %-100 %]      General: Elderly woman, not in acute distress at rest  Eyes: Not pale, anicteric  Lungs: Decreased BS bibasilarly with fine basilar rales  Heart: S1 and S2, regular  Abdomen: Soft, nontender  Extremities: No pedal edema  Neuro: Awake and interactive but hard of hearing       I&O 24HR    Intake/Output Summary (Last 24 hours) at 8/8/2025 1230  Last data filed at 8/8/2025 0937  Gross per 24 hour   Intake 625 ml   Output --   Net 625 ml       Relevant Results  Results for orders placed or performed during the hospital encounter of 08/03/25 (from the past 24 hours)   CBC   Result Value Ref Range    WBC 3.8 (L) 4.4 - 11.3 x10*3/uL    nRBC 0.0 0.0 - 0.0 /100 WBCs    RBC 2.57 (L) 4.00 - 5.20 x10*6/uL    Hemoglobin 7.7 (L) 12.0 - 16.0 g/dL    Hematocrit 24.9 (L) 36.0 - 46.0 %    MCV 97 80 - 100 fL    MCH 30.0 26.0 - 34.0 pg    MCHC 30.9 (L) 32.0 - 36.0 g/dL    RDW 14.9 (H) 11.5 - 14.5 %    Platelets 197 150 - 450 x10*3/uL   Basic metabolic panel   Result Value Ref Range    Glucose 102 (H) 74 - 99 mg/dL    Sodium 127 (L) 136 - 145 mmol/L    Potassium 4.2 3.5 - 5.3 mmol/L    Chloride 97 (L) 98 - 107 mmol/L    Bicarbonate 26 21 - 32 mmol/L    Anion Gap 8 (L) 10 - 20 mmol/L    Urea Nitrogen 7 6 - 23 mg/dL    Creatinine 0.72 0.50 - 1.05 mg/dL    eGFR 80 >60  mL/min/1.73m*2    Calcium 8.4 (L) 8.6 - 10.3 mg/dL          Assessment/Plan   89-year-old  female with history of hypertension, paroxysmal atrial fibrillation, CAD (s/p PCI in September, 2024), bilateral carotid stenosis and HFpEF, admitted with chest pain and dyspnea.  Noted to have worsening hyponatremia.    Hyponatremia  Edema/fluid overload  Hypertension    Serum sodium was 134 mEq/L at presentation and has progressively worsened to 127 mEq/L today.  She is clinically overloaded and I will treat as hypervolemic hyponatremia.  Will give a dose of furosemide 20 mg IV and institute free water restriction at 1200 mL/day.  I will repeat BMP later this evening and redose furosemide if needed.    Agree with holding HCTZ although I do not think this was primarily responsible for hyponatremia.    Continue other care.    Thank you for the opportunity to participate in her care.  Dr. Caotes will be covering over the weekend.      Doc Steiner MD         [1] aspirin, 81 mg, oral, Daily  atorvastatin, 80 mg, oral, Nightly  carvedilol, 12.5 mg, oral, BID  cefTRIAXone, 1 g, intravenous, q24h  DULoxetine, 30 mg, oral, Daily with evening meal  enoxaparin, 40 mg, subcutaneous, q24h  ferrous sulfate, 1 tablet, oral, Once per day on Monday Wednesday Friday  gabapentin, 300 mg, oral, BID  hydrALAZINE, 100 mg, oral, TID  [Held by provider] hydroCHLOROthiazide, 12.5 mg, oral, Daily  hydroxychloroquine, 200 mg, oral, Daily  pantoprazole, 40 mg, oral, BID  potassium chloride CR, 40 mEq, oral, Daily  ranolazine, 500 mg, oral, BID  sucralfate, 1 g, oral, Daily  sulfaSALAzine, 500 mg, oral, BID  [2]    [3] PRN medications: albuterol, alum-mag hydroxide-simeth, docusate sodium, [Held by provider] furosemide, hydrALAZINE, labetaloL, ondansetron, polyethylene glycol

## 2025-08-09 ENCOUNTER — DOCUMENTATION (OUTPATIENT)
Dept: HOME HEALTH SERVICES | Facility: HOME HEALTH | Age: OVER 89
End: 2025-08-09
Payer: MEDICARE

## 2025-08-09 VITALS
BODY MASS INDEX: 22.58 KG/M2 | RESPIRATION RATE: 18 BRPM | WEIGHT: 112 LBS | SYSTOLIC BLOOD PRESSURE: 168 MMHG | HEART RATE: 70 BPM | HEIGHT: 59 IN | TEMPERATURE: 98.1 F | OXYGEN SATURATION: 98 % | DIASTOLIC BLOOD PRESSURE: 48 MMHG

## 2025-08-09 LAB
ANION GAP SERPL CALCULATED.3IONS-SCNC: 9 MMOL/L (ref 10–20)
ATRIAL RATE: 71 BPM
BUN SERPL-MCNC: 11 MG/DL (ref 6–23)
CALCIUM SERPL-MCNC: 8.4 MG/DL (ref 8.6–10.3)
CHLORIDE SERPL-SCNC: 96 MMOL/L (ref 98–107)
CO2 SERPL-SCNC: 28 MMOL/L (ref 21–32)
CREAT SERPL-MCNC: 0.81 MG/DL (ref 0.5–1.05)
EGFRCR SERPLBLD CKD-EPI 2021: 69 ML/MIN/1.73M*2
ERYTHROCYTE [DISTWIDTH] IN BLOOD BY AUTOMATED COUNT: 14.7 % (ref 11.5–14.5)
GLUCOSE SERPL-MCNC: 105 MG/DL (ref 74–99)
HCT VFR BLD AUTO: 23.3 % (ref 36–46)
HGB BLD-MCNC: 7.2 G/DL (ref 12–16)
MCH RBC QN AUTO: 30 PG (ref 26–34)
MCHC RBC AUTO-ENTMCNC: 30.9 G/DL (ref 32–36)
MCV RBC AUTO: 97 FL (ref 80–100)
NRBC BLD-RTO: 0 /100 WBCS (ref 0–0)
P AXIS: 52 DEGREES
P OFFSET: 196 MS
P ONSET: 132 MS
PLATELET # BLD AUTO: 202 X10*3/UL (ref 150–450)
POTASSIUM SERPL-SCNC: 4 MMOL/L (ref 3.5–5.3)
PR INTERVAL: 166 MS
Q ONSET: 215 MS
QRS COUNT: 11 BEATS
QRS DURATION: 96 MS
QT INTERVAL: 410 MS
QTC CALCULATION(BAZETT): 445 MS
QTC FREDERICIA: 433 MS
R AXIS: 12 DEGREES
RBC # BLD AUTO: 2.4 X10*6/UL (ref 4–5.2)
SODIUM SERPL-SCNC: 129 MMOL/L (ref 136–145)
T AXIS: 21 DEGREES
T OFFSET: 420 MS
VENTRICULAR RATE: 71 BPM
WBC # BLD AUTO: 3.4 X10*3/UL (ref 4.4–11.3)

## 2025-08-09 PROCEDURE — 2500000002 HC RX 250 W HCPCS SELF ADMINISTERED DRUGS (ALT 637 FOR MEDICARE OP, ALT 636 FOR OP/ED): Performed by: INTERNAL MEDICINE

## 2025-08-09 PROCEDURE — 99239 HOSP IP/OBS DSCHRG MGMT >30: CPT | Performed by: INTERNAL MEDICINE

## 2025-08-09 PROCEDURE — 85027 COMPLETE CBC AUTOMATED: CPT | Performed by: INTERNAL MEDICINE

## 2025-08-09 PROCEDURE — 2500000002 HC RX 250 W HCPCS SELF ADMINISTERED DRUGS (ALT 637 FOR MEDICARE OP, ALT 636 FOR OP/ED)

## 2025-08-09 PROCEDURE — 2500000001 HC RX 250 WO HCPCS SELF ADMINISTERED DRUGS (ALT 637 FOR MEDICARE OP): Performed by: INTERNAL MEDICINE

## 2025-08-09 PROCEDURE — 36415 COLL VENOUS BLD VENIPUNCTURE: CPT | Performed by: INTERNAL MEDICINE

## 2025-08-09 PROCEDURE — 80048 BASIC METABOLIC PNL TOTAL CA: CPT | Performed by: INTERNAL MEDICINE

## 2025-08-09 RX ORDER — FUROSEMIDE 40 MG/1
20 TABLET ORAL DAILY PRN
Start: 2025-08-09

## 2025-08-09 RX ADMIN — SULFASALAZINE 500 MG: 500 TABLET ORAL at 08:01

## 2025-08-09 RX ADMIN — PANTOPRAZOLE SODIUM 40 MG: 40 TABLET, DELAYED RELEASE ORAL at 08:01

## 2025-08-09 RX ADMIN — SUCRALFATE 1 G: 1 SUSPENSION ORAL at 08:00

## 2025-08-09 RX ADMIN — HYDRALAZINE HYDROCHLORIDE 100 MG: 50 TABLET ORAL at 08:01

## 2025-08-09 RX ADMIN — CARVEDILOL 12.5 MG: 12.5 TABLET, FILM COATED ORAL at 08:01

## 2025-08-09 RX ADMIN — POTASSIUM CHLORIDE EXTENDED-RELEASE 40 MEQ: 1500 TABLET ORAL at 08:01

## 2025-08-09 RX ADMIN — HYDROXYCHLOROQUINE SULFATE 200 MG: 200 TABLET, FILM COATED ORAL at 08:01

## 2025-08-09 RX ADMIN — ASPIRIN 81 MG: 81 TABLET, DELAYED RELEASE ORAL at 08:00

## 2025-08-09 RX ADMIN — HYDRALAZINE HYDROCHLORIDE 100 MG: 50 TABLET ORAL at 15:25

## 2025-08-09 RX ADMIN — RANOLAZINE 500 MG: 500 TABLET, EXTENDED RELEASE ORAL at 08:01

## 2025-08-09 RX ADMIN — GABAPENTIN 300 MG: 300 CAPSULE ORAL at 08:01

## 2025-08-09 ASSESSMENT — PAIN SCALES - GENERAL: PAINLEVEL_OUTOF10: 0 - NO PAIN

## 2025-08-09 ASSESSMENT — COGNITIVE AND FUNCTIONAL STATUS - GENERAL
CLIMB 3 TO 5 STEPS WITH RAILING: A LITTLE
MOVING TO AND FROM BED TO CHAIR: A LITTLE
WALKING IN HOSPITAL ROOM: A LITTLE
MOBILITY SCORE: 19
DRESSING REGULAR UPPER BODY CLOTHING: A LITTLE
TOILETING: A LITTLE
HELP NEEDED FOR BATHING: A LITTLE
DRESSING REGULAR LOWER BODY CLOTHING: A LITTLE
STANDING UP FROM CHAIR USING ARMS: A LITTLE
TURNING FROM BACK TO SIDE WHILE IN FLAT BAD: A LITTLE
DAILY ACTIVITIY SCORE: 20

## 2025-08-09 NOTE — CARE PLAN
The patient's goals for the shift include no chest pain    The clinical goals for the shift include monitor i&o    Problem: Pain - Adult  Goal: Verbalizes/displays adequate comfort level or baseline comfort level  Outcome: Progressing     Problem: Safety - Adult  Goal: Free from fall injury  Outcome: Progressing     Problem: Discharge Planning  Goal: Discharge to home or other facility with appropriate resources  Outcome: Progressing     Problem: Chronic Conditions and Co-morbidities  Goal: Patient's chronic conditions and co-morbidity symptoms are monitored and maintained or improved  Outcome: Progressing     Problem: Nutrition  Goal: Nutrient intake appropriate for maintaining nutritional needs  Outcome: Progressing     Problem: Fall/Injury  Goal: Verbalize understanding of personal risk factors for fall in the hospital  Outcome: Progressing  Goal: Verbalize understanding of risk factor reduction measures to prevent injury from fall in the home  Outcome: Progressing     Problem: Skin  Goal: Decreased wound size/increased tissue granulation at next dressing change  Outcome: Progressing  Goal: Participates in plan/prevention/treatment measures  Outcome: Progressing  Goal: Prevent/manage excess moisture  Outcome: Progressing  Goal: Prevent/minimize sheer/friction injuries  Outcome: Progressing  Goal: Promote/optimize nutrition  Outcome: Progressing  Goal: Promote skin healing  Outcome: Progressing

## 2025-08-09 NOTE — PROGRESS NOTES
08/09/25 1427   Discharge Planning   Expected Discharge Disposition Home H  ([facility] SOC 8/11)     NO BARRIERS TO DISCHARGE FROM CARE TRANSITIONS

## 2025-08-09 NOTE — NURSING NOTE
Pt being discharged home with  and son, [facility] set up, pt given discharge instructions and reviewed follow up appointments and medications. Pt family member could not find portable oxygen tank at home, but says they have the large at home tank still. Respiratory set pt up with a canister to go home with. Pt discharged in stable condition. Pt discharged to family vehicle by wheelchair

## 2025-08-09 NOTE — DISCHARGE SUMMARY
Discharge Diagnosis  Chest pain, unspecified type           Issues Requiring Follow-Up  Hyponatremia  Coronary artery disease  Hypertension  Anemia  Arthritis      Discharge Meds     Medication List      CHANGE how you take these medications     hydrALAZINE 100 mg tablet; Commonly known as: Apresoline; Take 1 tablet   (100 mg) by mouth 2 times a day.; What changed: Another medication with   the same name was removed. Continue taking this medication, and follow the   directions you see here.     CONTINUE taking these medications     aspirin 81 mg EC tablet   atorvastatin 80 mg tablet; Commonly known as: Lipitor; Take 1 tablet (80   mg) by mouth once daily at bedtime.   carvedilol 12.5 mg tablet; Commonly known as: Coreg; Take 1 tablet (12.5   mg) by mouth 2 times a day.   DULoxetine 30 mg DR capsule; Commonly known as: Cymbalta; TAKE 1 CAPSULE   BY MOUTH ONCE DAILY at dinner   ferrous sulfate 325 mg (65 mg elemental) tablet; Commonly known as:   FeroSuL; Take 1 tablet by mouth 3 times a week. Monday, Wednesday, Friday   furosemide 40 mg tablet; Commonly known as: Lasix; Take 1 tablet (40 mg)   by mouth once daily as needed (take as needed for lower leg edema or   weight gain).   gabapentin 300 mg capsule; Commonly known as: Neurontin; TAKE 1 CAPSULE   BY MOUTH AT NOON AND BEDTIME   hydroxychloroquine 200 mg tablet; Commonly known as: Plaquenil; Take 1   tablet (200 mg) by mouth once daily.   pantoprazole 40 mg EC tablet; Commonly known as: ProtoNix; TAKE 1 TABLET   BY MOUTH TWICE DAILY   potassium chloride CR 20 mEq ER tablet; Commonly known as: Klor-Con M20;   Take 1 tablet (20 mEq) by mouth once daily. Take with food.   ranolazine 500 mg 12 hr tablet; Commonly known as: Ranexa; TAKE 1 TABLET   (500 MG) BY MOUTH 2 TIMES A DAY. DO NOT CRUSH, CHEW, OR SPLIT.   * Stool Softener 100 mg capsule; Generic drug: docusate sodium; TAKE 1   CAPSULE BY MOUTH ONCE DAILY AS NEEDED to prevent constipation from iron   * docusate  sodium 100 mg capsule; Commonly known as: Colace   sucralfate 100 mg/mL suspension; Commonly known as: Carafate; Take 10 mL   (1 g) by mouth once daily.   sulfaSALAzine 500 mg tablet; Commonly known as: Azulfidine; Take 1   tablet (500 mg) by mouth 2 times a day.  * This list has 2 medication(s) that are the same as other medications   prescribed for you. Read the directions carefully, and ask your doctor or   other care provider to review them with you.     STOP taking these medications     hydroCHLOROthiazide 12.5 mg capsule; Commonly known as: Microzide   magnesium oxide 400 mg (241.3 mg elemental) tablet; Commonly known as:   Mag-Ox       Test Results Pending At Discharge  Pending Labs       Order Current Status    Extra Urine Gray Tube In process    Urinalysis with Reflex Culture and Microscopic In process            Hospital Course     Patient is a 89 years old female with past medical history of coronary artery disease status post PCI, carotid artery stenosis, arthritis, polyarthritis on hydroxychloroquine.  Patient presented to [facility] ER complaining of chest pain.  EKG did not reveal ischemic changes.  Patient was evaluated by cardiology.  CT chest abdomen pelvis revealed right greater than left lower lobe groundglass opacities.  Ectasia of esophagus and moderate size hiatal hernia.  There was concern for urinary tract infection as well.  Patient was started on ceftriaxone and Zithromax.  Dr. Mckee was consulted from ID services.  Patient has been on hydrochlorothiazide.  She was found to have hyponatremic.  Dr. Steiner was consulted from nephrology services.  Hydrochlorothiazide was discontinued.  Patient was advised regarding fluid restriction.  She has been on Lasix as needed.  Patient is on 1 L oxygen at home.  Patient has been on the same amount of oxygen during this hospitalization.  Sodium level improved today from 127  to 129.  Patient completed full course of antibiotics.  Discussed with ID on  the case.   I advised the patient to have BMP in 1 week.  Patient is clinically hemodynamic stable to get discharge today if okay with nephrology.  Patient will be discharged home with home health care.        Pertinent Physical Exam At Time of Discharge  Physical Exam  General: In non acute distress, cooperating during physical exam, on 1 L oxygen  HEENT: Pupils are equal and reactive to light and commendation , oral mucosa moist, no JVD oral mucosa is moist.  Cardiovascular: PMI nondisplaced  Lungs: Clear to auscultation bilaterally, no wheezing, no crackles, no dullness to percussion.  Abdomen: No hepatosplenomegaly appreciated, soft , not tender, positive bowel sounds, positive bowel movement.  Neuro: Alert and oriented x2, strength in upper and lower extremities , sensation intact.  Musculoskeletal: No swelling in lower extremities, no limitation in range of motion.  Vascular: Pulses are intact in upper and lower extremities  Skin: No petechiae, ecchymosis or other stigmata for dermatology disease.   Outpatient Follow-Up  Future Appointments   Date Time Provider Department Center   8/19/2025  1:00 PM VERITO HUITRON [facility]   8/20/2025  8:00 AM Randall Green MD [id number] [facility]   9/15/2025  9:45 AM Campos Stevenson MD [id number] [facility]   10/13/2025  1:45 PM Nessa Callaway MD [id number] [facility]   11/4/2025  3:30 PM Samuel Sky MD [id number] [facility]   4/28/2026 11:00 AM Samuel Sky MD [id number] [facility]     Follow-up with nephrology in 2 weeks  Follow-up with PCP in 2 weeks  Follow-up with cardiology in 2 weeks.    Time spent discharging patient 40 minutes    Graciela Andrews MD

## 2025-08-09 NOTE — PROGRESS NOTES
Rakan Cervantes is a 89 y.o. female on day 6 of admission presenting with Chest pain, unspecified type.      Subjective   No acute events       Objective          Vitals 24HR  Heart Rate:  [67-80]   Temp:  [36.2 °C (97.2 °F)-37 °C (98.6 °F)]   Resp:  [17-19]   BP: (149-179)/(48-57)   SpO2:  [97 %-99 %]              Intake/Output last 3 Shifts:    Intake/Output Summary (Last 24 hours) at 8/9/2025 1406  Last data filed at 8/8/2025 2035  Gross per 24 hour   Intake 230 ml   Output --   Net 230 ml       Physical Exam  Gen: NAD  HENT: atraumatic  Heart: RRR  Lungs: CTA  Abdomen: Soft  Ext; no edema  Neuro: non focal  Psych : AAO x 3    Relevant Results                        Assessment & Plan  Chest pain, unspecified type    89-year-old female with hyponatremia    -Hyponatremia felt to be acute, hypervolemic, asymptomatic.  Sodium continues to improve up to 129.  Plans for discharge noted and no objections.      Jeff Coates MD

## 2025-08-09 NOTE — CARE PLAN
Over the shift, the patient did not make progress toward the following goals. Barriers to progression include . Recommendations to address these barriers include .      Problem: Pain - Adult  Goal: Verbalizes/displays adequate comfort level or baseline comfort level  8/9/2025 0723 by Sammy Cabrera RN  Outcome: Progressing  8/8/2025 2253 by Sammy Cabrera RN  Outcome: Progressing     Problem: Safety - Adult  Goal: Free from fall injury  8/9/2025 0723 by Sammy Cabrera RN  Outcome: Progressing  8/8/2025 2253 by Sammy Cabrera RN  Outcome: Progressing     Problem: Discharge Planning  Goal: Discharge to home or other facility with appropriate resources  8/9/2025 0723 by Sammy Cabrera RN  Outcome: Progressing  8/8/2025 2253 by Sammy Cabrera RN  Outcome: Progressing     Problem: Chronic Conditions and Co-morbidities  Goal: Patient's chronic conditions and co-morbidity symptoms are monitored and maintained or improved  8/9/2025 0723 by Sammy Cabrera RN  Outcome: Progressing  8/8/2025 2253 by Sammy Cabrera RN  Outcome: Progressing     Problem: Nutrition  Goal: Nutrient intake appropriate for maintaining nutritional needs  8/9/2025 0723 by Sammy Cabrera RN  Outcome: Progressing  8/8/2025 2253 by Sammy Cabrera RN  Outcome: Progressing     Problem: Fall/Injury  Goal: Verbalize understanding of personal risk factors for fall in the hospital  8/9/2025 0723 by Sammy Cabrera RN  Outcome: Progressing  8/8/2025 2253 by Sammy Cabrera RN  Outcome: Progressing  Goal: Verbalize understanding of risk factor reduction measures to prevent injury from fall in the home  8/9/2025 0723 by Sammy Cabrera RN  Outcome: Progressing  8/8/2025 2253 by Sammy Cabrera RN  Outcome: Progressing     Problem: Skin  Goal: Decreased wound size/increased tissue granulation at next dressing change  8/9/2025 0723 by Sammy Cabrera RN  Outcome: Progressing  8/8/2025 2253 by Sammy  Leonardo Cabrera RN  Outcome: Progressing  Goal: Participates in plan/prevention/treatment measures  8/9/2025 0723 by Sammy Cabrera RN  Outcome: Progressing  8/8/2025 2253 by Sammy Cabrera RN  Outcome: Progressing  Goal: Prevent/manage excess moisture  8/9/2025 0723 by Sammy Cabrera RN  Outcome: Progressing  8/8/2025 2253 by Sammy Cabrera RN  Outcome: Progressing  Goal: Prevent/minimize sheer/friction injuries  8/9/2025 0723 by Sammy Cabrera RN  Outcome: Progressing  8/8/2025 2253 by Sammy Cabrera RN  Outcome: Progressing  Goal: Promote/optimize nutrition  8/9/2025 0723 by Sammy Cabrera RN  Outcome: Progressing  8/8/2025 2253 by Sammy Cabrera RN  Outcome: Progressing  Goal: Promote skin healing  8/9/2025 0723 by Sammy Cabrera RN  Outcome: Progressing  8/8/2025 2253 by Sammy Cabrera RN  Outcome: Progressing

## 2025-08-09 NOTE — HH CARE COORDINATION
Home Care received a Referral to Resume Care for Physical Therapy and Occupational Therapy. We have processed the referral for a Resumption of Care within 48 hours of 8/11.     If you have any questions or concerns, please feel free to contact us at [phone]. Follow the prompts, enter your five digit zip code, and you will be directed to your care team on [facility].

## 2025-08-11 ENCOUNTER — HOME CARE VISIT (OUTPATIENT)
Dept: HOME HEALTH SERVICES | Facility: HOME HEALTH | Age: OVER 89
End: 2025-08-11
Payer: MEDICARE

## 2025-08-11 VITALS
DIASTOLIC BLOOD PRESSURE: 75 MMHG | SYSTOLIC BLOOD PRESSURE: 205 MMHG | TEMPERATURE: 99.1 F | HEART RATE: 74 BPM | OXYGEN SATURATION: 94 % | RESPIRATION RATE: 18 BRPM

## 2025-08-11 PROCEDURE — G0151 HHCP-SERV OF PT,EA 15 MIN: HCPCS

## 2025-08-11 ASSESSMENT — ENCOUNTER SYMPTOMS
AGGRESSION WITHIN DEFINED LIMITS: 1
SLEEP QUALITY: ADEQUATE
ANGER WITHIN DEFINED LIMITS: 1

## 2025-08-11 ASSESSMENT — ACTIVITIES OF DAILY LIVING (ADL): OASIS_M1830: 03

## 2025-08-12 ENCOUNTER — PATIENT OUTREACH (OUTPATIENT)
Dept: PRIMARY CARE | Facility: CLINIC | Age: OVER 89
End: 2025-08-12

## 2025-08-12 ENCOUNTER — DOCUMENTATION (OUTPATIENT)
Dept: PRIMARY CARE | Facility: CLINIC | Age: OVER 89
End: 2025-08-12
Payer: MEDICARE

## 2025-08-12 DIAGNOSIS — I10 ESSENTIAL HYPERTENSION: ICD-10-CM

## 2025-08-12 DIAGNOSIS — I48.0 PAF (PAROXYSMAL ATRIAL FIBRILLATION) (MULTI): ICD-10-CM

## 2025-08-12 DIAGNOSIS — F03.B0 MODERATE DEMENTIA, UNSPECIFIED DEMENTIA TYPE, UNSPECIFIED WHETHER BEHAVIORAL, PSYCHOTIC, OR MOOD DISTURBANCE OR ANXIETY: ICD-10-CM

## 2025-08-12 SDOH — HEALTH STABILITY: MENTAL HEALTH: SMOKING IN HOME: 0

## 2025-08-12 SDOH — ECONOMIC STABILITY: HOUSING INSECURITY: EVIDENCE OF SMOKING MATERIAL: 0

## 2025-08-12 ASSESSMENT — ACTIVITIES OF DAILY LIVING (ADL): ENTERING_EXITING_HOME: STAND BY ASSIST

## 2025-08-12 ASSESSMENT — ENCOUNTER SYMPTOMS
DENIES PAIN: 1
PERSON REPORTING PAIN: PATIENT

## 2025-08-13 DIAGNOSIS — M06.09 RHEUMATOID ARTHRITIS WITHOUT RHEUMATOID FACTOR, MULTIPLE SITES (MULTI): ICD-10-CM

## 2025-08-13 RX ORDER — DULOXETIN HYDROCHLORIDE 30 MG/1
30 CAPSULE, DELAYED RELEASE ORAL
Qty: 90 CAPSULE | Refills: 0 | Status: SHIPPED | OUTPATIENT
Start: 2025-08-13

## 2025-08-14 DIAGNOSIS — E87.1 HYPONATREMIA: ICD-10-CM

## 2025-08-16 ENCOUNTER — HOME CARE VISIT (OUTPATIENT)
Dept: HOME HEALTH SERVICES | Facility: HOME HEALTH | Age: OVER 89
End: 2025-08-16
Payer: MEDICARE

## 2025-08-16 VITALS
OXYGEN SATURATION: 99 % | SYSTOLIC BLOOD PRESSURE: 132 MMHG | RESPIRATION RATE: 16 BRPM | DIASTOLIC BLOOD PRESSURE: 60 MMHG | WEIGHT: 112 LBS | TEMPERATURE: 97.8 F | HEART RATE: 64 BPM | BODY MASS INDEX: 22.62 KG/M2

## 2025-08-16 PROCEDURE — G0299 HHS/HOSPICE OF RN EA 15 MIN: HCPCS

## 2025-08-16 SDOH — ECONOMIC STABILITY: HOUSING INSECURITY: EVIDENCE OF SMOKING MATERIAL: 0

## 2025-08-16 SDOH — HEALTH STABILITY: MENTAL HEALTH: SMOKING IN HOME: 0

## 2025-08-16 SDOH — ECONOMIC STABILITY: GENERAL

## 2025-08-16 ASSESSMENT — ACTIVITIES OF DAILY LIVING (ADL): MONEY MANAGEMENT (EXPENSES/BILLS): TOTALLY DEPENDENT

## 2025-08-16 ASSESSMENT — ENCOUNTER SYMPTOMS
PAIN LOCATION - PAIN SEVERITY: 5/10
CHANGE IN APPETITE: UNCHANGED
LAST BOWEL MOVEMENT: 67432
SUBJECTIVE PAIN PROGRESSION: UNCHANGED
DESCRIPTION OF MEMORY LOSS: SHORT TERM
LOWEST PAIN SEVERITY IN PAST 24 HOURS: 0/10
HIGHEST PAIN SEVERITY IN PAST 24 HOURS: 7/10
BOWEL PATTERN NORMAL: 1
APPETITE LEVEL: FAIR
PERSON REPORTING PAIN: PATIENT
PAIN: 1
PAIN LOCATION: LEFT SHOULDER
STOOL FREQUENCY: DAILY

## 2025-08-18 DIAGNOSIS — I63.9 CEREBROVASCULAR ACCIDENT (CVA), UNSPECIFIED MECHANISM (MULTI): ICD-10-CM

## 2025-08-18 DIAGNOSIS — G45.9 TRANSIENT ISCHEMIC ATTACK: ICD-10-CM

## 2025-08-18 DIAGNOSIS — J44.9 CHRONIC OBSTRUCTIVE PULMONARY DISEASE, UNSPECIFIED COPD TYPE (MULTI): Primary | ICD-10-CM

## 2025-08-18 DIAGNOSIS — J45.909 ASTHMA, UNSPECIFIED ASTHMA SEVERITY, UNSPECIFIED WHETHER COMPLICATED, UNSPECIFIED WHETHER PERSISTENT (HHS-HCC): ICD-10-CM

## 2025-08-19 ENCOUNTER — HOSPITAL ENCOUNTER (OUTPATIENT)
Dept: OPERATING ROOM | Facility: HOSPITAL | Age: OVER 89
Discharge: HOME | End: 2025-08-19
Payer: MEDICARE

## 2025-08-19 VITALS
WEIGHT: 120 LBS | BODY MASS INDEX: 24.19 KG/M2 | SYSTOLIC BLOOD PRESSURE: 197 MMHG | TEMPERATURE: 98.7 F | OXYGEN SATURATION: 96 % | HEIGHT: 59 IN | DIASTOLIC BLOOD PRESSURE: 70 MMHG | HEART RATE: 71 BPM | RESPIRATION RATE: 16 BRPM

## 2025-08-19 DIAGNOSIS — M96.1 POSTLAMINECTOMY SYNDROME, LUMBAR REGION: ICD-10-CM

## 2025-08-19 PROCEDURE — 2550000001 HC RX 255 CONTRASTS: Mod: JW | Performed by: ANESTHESIOLOGY

## 2025-08-19 PROCEDURE — 7100000010 HC PHASE TWO TIME - EACH INCREMENTAL 1 MINUTE

## 2025-08-19 PROCEDURE — 62323 NJX INTERLAMINAR LMBR/SAC: CPT | Performed by: ANESTHESIOLOGY

## 2025-08-19 PROCEDURE — 3600000001 HC OR TIME - INITIAL BASE CHARGE - PROCEDURE LEVEL ONE

## 2025-08-19 PROCEDURE — 2500000005 HC RX 250 GENERAL PHARMACY W/O HCPCS: Performed by: ANESTHESIOLOGY

## 2025-08-19 PROCEDURE — 2500000004 HC RX 250 GENERAL PHARMACY W/ HCPCS (ALT 636 FOR OP/ED): Mod: JW | Performed by: ANESTHESIOLOGY

## 2025-08-19 PROCEDURE — 3600000006 HC OR TIME - EACH INCREMENTAL 1 MINUTE - PROCEDURE LEVEL ONE

## 2025-08-19 PROCEDURE — 7100000009 HC PHASE TWO TIME - INITIAL BASE CHARGE

## 2025-08-19 RX ORDER — SODIUM CHLORIDE 9 MG/ML
INJECTION, SOLUTION INTRAMUSCULAR; INTRAVENOUS; SUBCUTANEOUS AS NEEDED
Status: COMPLETED | OUTPATIENT
Start: 2025-08-19 | End: 2025-08-19

## 2025-08-19 RX ORDER — LIDOCAINE HYDROCHLORIDE 5 MG/ML
INJECTION, SOLUTION INFILTRATION; INTRAVENOUS AS NEEDED
Status: COMPLETED | OUTPATIENT
Start: 2025-08-19 | End: 2025-08-19

## 2025-08-19 RX ORDER — DEXAMETHASONE SODIUM PHOSPHATE 10 MG/ML
INJECTION INTRAMUSCULAR; INTRAVENOUS AS NEEDED
Status: COMPLETED | OUTPATIENT
Start: 2025-08-19 | End: 2025-08-19

## 2025-08-19 RX ADMIN — LIDOCAINE HYDROCHLORIDE 10 ML: 5 INJECTION, SOLUTION INFILTRATION at 12:26

## 2025-08-19 RX ADMIN — SODIUM CHLORIDE 4 ML: 9 INJECTION INTRAMUSCULAR; INTRAVENOUS; SUBCUTANEOUS at 12:26

## 2025-08-19 RX ADMIN — DEXAMETHASONE SODIUM PHOSPHATE 15 MG: 10 INJECTION, SOLUTION INTRAMUSCULAR; INTRAVENOUS at 12:26

## 2025-08-19 RX ADMIN — IOHEXOL 1 ML: 240 INJECTION, SOLUTION INTRATHECAL; INTRAVASCULAR; INTRAVENOUS; ORAL at 12:26

## 2025-08-19 ASSESSMENT — PAIN DESCRIPTION - DESCRIPTORS: DESCRIPTORS: SHARP

## 2025-08-19 ASSESSMENT — PAIN SCALES - GENERAL
PAINLEVEL_OUTOF10: 0 - NO PAIN
PAINLEVEL_OUTOF10: 7

## 2025-08-19 ASSESSMENT — PAIN - FUNCTIONAL ASSESSMENT
PAIN_FUNCTIONAL_ASSESSMENT: 0-10
PAIN_FUNCTIONAL_ASSESSMENT: 0-10

## 2025-08-20 ENCOUNTER — OFFICE VISIT (OUTPATIENT)
Facility: CLINIC | Age: OVER 89
End: 2025-08-20
Payer: MEDICARE

## 2025-08-20 VITALS — DIASTOLIC BLOOD PRESSURE: 70 MMHG | HEART RATE: 82 BPM | OXYGEN SATURATION: 93 % | SYSTOLIC BLOOD PRESSURE: 164 MMHG

## 2025-08-20 DIAGNOSIS — I16.0 HYPERTENSIVE URGENCY: Primary | ICD-10-CM

## 2025-08-20 DIAGNOSIS — I25.84 CORONARY ATHEROSCLEROSIS DUE TO CALCIFIED CORONARY LESION (CODE): ICD-10-CM

## 2025-08-20 PROCEDURE — 99215 OFFICE O/P EST HI 40 MIN: CPT | Performed by: INTERNAL MEDICINE

## 2025-08-20 PROCEDURE — 99212 OFFICE O/P EST SF 10 MIN: CPT

## 2025-08-20 PROCEDURE — 1036F TOBACCO NON-USER: CPT | Performed by: INTERNAL MEDICINE

## 2025-08-20 PROCEDURE — 1111F DSCHRG MED/CURRENT MED MERGE: CPT | Performed by: INTERNAL MEDICINE

## 2025-08-20 PROCEDURE — 3077F SYST BP >= 140 MM HG: CPT | Performed by: INTERNAL MEDICINE

## 2025-08-20 PROCEDURE — 3078F DIAST BP <80 MM HG: CPT | Performed by: INTERNAL MEDICINE

## 2025-08-20 PROCEDURE — G2211 COMPLEX E/M VISIT ADD ON: HCPCS | Performed by: INTERNAL MEDICINE

## 2025-08-20 PROCEDURE — 1126F AMNT PAIN NOTED NONE PRSNT: CPT | Performed by: INTERNAL MEDICINE

## 2025-08-20 PROCEDURE — 1159F MED LIST DOCD IN RCRD: CPT | Performed by: INTERNAL MEDICINE

## 2025-08-20 ASSESSMENT — ENCOUNTER SYMPTOMS
LIGHT-HEADEDNESS: 0
PALPITATIONS: 0
LOSS OF SENSATION IN FEET: 1
HEMATURIA: 0
FATIGUE: 0
BLOOD IN STOOL: 0
FEVER: 0
WEAKNESS: 1
CHILLS: 0
OCCASIONAL FEELINGS OF UNSTEADINESS: 1
SHORTNESS OF BREATH: 1
MYALGIAS: 0
DIZZINESS: 0
DEPRESSION: 0

## 2025-08-20 ASSESSMENT — PAIN SCALES - GENERAL: PAINLEVEL_OUTOF10: 0-NO PAIN

## 2025-08-20 ASSESSMENT — LIFESTYLE VARIABLES: TOTAL SCORE: 0

## 2025-09-02 ENCOUNTER — HOSPITAL ENCOUNTER (OUTPATIENT)
Dept: VASCULAR MEDICINE | Facility: CLINIC | Age: OVER 89
Discharge: HOME | End: 2025-09-02
Payer: MEDICARE

## 2025-09-02 DIAGNOSIS — I16.0 HYPERTENSIVE URGENCY: ICD-10-CM

## 2025-09-02 DIAGNOSIS — I25.84 CORONARY ATHEROSCLEROSIS DUE TO CALCIFIED CORONARY LESION (CODE): ICD-10-CM

## 2025-09-02 DIAGNOSIS — I10 ESSENTIAL (PRIMARY) HYPERTENSION: ICD-10-CM

## 2025-09-02 PROCEDURE — 93975 VASCULAR STUDY: CPT

## 2025-09-02 PROCEDURE — 93975 VASCULAR STUDY: CPT | Performed by: SURGERY

## (undated) DEVICE — CATHETER, BALLOON, NC EUPHORA NONCOMPLIANT 2.5 X 12 X 142CM

## (undated) DEVICE — GUIDEWIRE, J TIP, 3 MM, 0.035 IN X 260 CM, PTFE

## (undated) DEVICE — CATHETER, DIAGNOSTIC, JUDKINS, RIGHT, 5 FR-JR 4.0

## (undated) DEVICE — GUIDEWIRE, RUN THROUGH WIRE, 180CM

## (undated) DEVICE — PACK, ANGIO P2, CUSTOM, LAKE

## (undated) DEVICE — BAND, VASCULAR, RADIAL HEMOSTAT, REGULAR 24CM

## (undated) DEVICE — GLIDEWIRE, ANGLE, STANDARD, .035 X 180CM, 1.5MM J-TIP

## (undated) DEVICE — INFLATION KIT, ADVANTAGE, ENCORE 26 (1/BOX)

## (undated) DEVICE — CATHETER, OPTITORQUE, 5FR, TIG, 1H/100CM

## (undated) DEVICE — CATHETER, GUIDING, VISTA BRITE 6FR, XB 3.0 100CM

## (undated) DEVICE — GUIDEWIRE, ANGLE TIP,  .035 DIA, 180 CM, 3 CM TIP"

## (undated) DEVICE — CATHETER, BALLOON DILATION, EUPHORA SEMICOMPLIANT 2.50  X 12 MM X 142CM

## (undated) DEVICE — KIT, NAMIC STANDARD LEFT HEART, CUSTOM, LWMC

## (undated) DEVICE — COVER, EQUIPMENT, ZERO GRAVITY

## (undated) DEVICE — GLIDESHEATH, SLENDER 6FR, 10CM, NITINOL KIT